# Patient Record
Sex: FEMALE | Race: ASIAN | NOT HISPANIC OR LATINO | URBAN - METROPOLITAN AREA
[De-identification: names, ages, dates, MRNs, and addresses within clinical notes are randomized per-mention and may not be internally consistent; named-entity substitution may affect disease eponyms.]

---

## 2017-06-14 ENCOUNTER — INPATIENT (INPATIENT)
Facility: HOSPITAL | Age: 57
LOS: 19 days | Discharge: HOME | DRG: 326 | End: 2017-07-04
Attending: SURGERY | Admitting: SURGERY
Payer: COMMERCIAL

## 2017-06-14 VITALS
OXYGEN SATURATION: 98 % | RESPIRATION RATE: 18 BRPM | HEART RATE: 80 BPM | SYSTOLIC BLOOD PRESSURE: 109 MMHG | DIASTOLIC BLOOD PRESSURE: 78 MMHG | TEMPERATURE: 98 F

## 2017-06-14 LAB
ALBUMIN SERPL ELPH-MCNC: 4.6 G/DL — SIGNIFICANT CHANGE UP (ref 3.3–5)
ALP SERPL-CCNC: 82 U/L — SIGNIFICANT CHANGE UP (ref 40–120)
ALT FLD-CCNC: 20 U/L RC — SIGNIFICANT CHANGE UP (ref 10–45)
ANION GAP SERPL CALC-SCNC: 22 MMOL/L — HIGH (ref 5–17)
APPEARANCE UR: CLEAR — SIGNIFICANT CHANGE UP
APTT BLD: 25.5 SEC — LOW (ref 27.5–37.4)
AST SERPL-CCNC: 24 U/L — SIGNIFICANT CHANGE UP (ref 10–40)
BASOPHILS # BLD AUTO: 0.1 K/UL — SIGNIFICANT CHANGE UP (ref 0–0.2)
BASOPHILS NFR BLD AUTO: 0.6 % — SIGNIFICANT CHANGE UP (ref 0–2)
BILIRUB SERPL-MCNC: 0.8 MG/DL — SIGNIFICANT CHANGE UP (ref 0.2–1.2)
BILIRUB UR-MCNC: NEGATIVE — SIGNIFICANT CHANGE UP
BLD GP AB SCN SERPL QL: NEGATIVE — SIGNIFICANT CHANGE UP
BUN SERPL-MCNC: 14 MG/DL — SIGNIFICANT CHANGE UP (ref 7–23)
CALCIUM SERPL-MCNC: 9.8 MG/DL — SIGNIFICANT CHANGE UP (ref 8.4–10.5)
CHLORIDE SERPL-SCNC: 84 MMOL/L — LOW (ref 96–108)
CO2 SERPL-SCNC: 33 MMOL/L — HIGH (ref 22–31)
COLOR SPEC: YELLOW — SIGNIFICANT CHANGE UP
CREAT SERPL-MCNC: 0.76 MG/DL — SIGNIFICANT CHANGE UP (ref 0.5–1.3)
DIFF PNL FLD: ABNORMAL
EOSINOPHIL # BLD AUTO: 0.1 K/UL — SIGNIFICANT CHANGE UP (ref 0–0.5)
EOSINOPHIL NFR BLD AUTO: 1 % — SIGNIFICANT CHANGE UP (ref 0–6)
EPI CELLS # UR: SIGNIFICANT CHANGE UP /HPF
GLUCOSE SERPL-MCNC: 91 MG/DL — SIGNIFICANT CHANGE UP (ref 70–99)
GLUCOSE UR QL: NEGATIVE — SIGNIFICANT CHANGE UP
HCT VFR BLD CALC: 42.3 % — SIGNIFICANT CHANGE UP (ref 34.5–45)
HGB BLD-MCNC: 14.2 G/DL — SIGNIFICANT CHANGE UP (ref 11.5–15.5)
INR BLD: 1.18 RATIO — HIGH (ref 0.88–1.16)
KETONES UR-MCNC: ABNORMAL
LEUKOCYTE ESTERASE UR-ACNC: ABNORMAL
LYMPHOCYTES # BLD AUTO: 2.2 K/UL — SIGNIFICANT CHANGE UP (ref 1–3.3)
LYMPHOCYTES # BLD AUTO: 24.1 % — SIGNIFICANT CHANGE UP (ref 13–44)
MCHC RBC-ENTMCNC: 30.5 PG — SIGNIFICANT CHANGE UP (ref 27–34)
MCHC RBC-ENTMCNC: 33.7 GM/DL — SIGNIFICANT CHANGE UP (ref 32–36)
MCV RBC AUTO: 90.6 FL — SIGNIFICANT CHANGE UP (ref 80–100)
MONOCYTES # BLD AUTO: 0.5 K/UL — SIGNIFICANT CHANGE UP (ref 0–0.9)
MONOCYTES NFR BLD AUTO: 6.2 % — SIGNIFICANT CHANGE UP (ref 2–14)
NEUTROPHILS # BLD AUTO: 6.1 K/UL — SIGNIFICANT CHANGE UP (ref 1.8–7.4)
NEUTROPHILS NFR BLD AUTO: 68 % — SIGNIFICANT CHANGE UP (ref 43–77)
NITRITE UR-MCNC: NEGATIVE — SIGNIFICANT CHANGE UP
PH UR: 6 — SIGNIFICANT CHANGE UP (ref 5–8)
PLATELET # BLD AUTO: 390 K/UL — SIGNIFICANT CHANGE UP (ref 150–400)
POTASSIUM SERPL-MCNC: 2.2 MMOL/L — CRITICAL LOW (ref 3.5–5.3)
POTASSIUM SERPL-SCNC: 2.2 MMOL/L — CRITICAL LOW (ref 3.5–5.3)
PROT SERPL-MCNC: 7.7 G/DL — SIGNIFICANT CHANGE UP (ref 6–8.3)
PROT UR-MCNC: 30 MG/DL
PROTHROM AB SERPL-ACNC: 12.9 SEC — HIGH (ref 9.8–12.7)
RBC # BLD: 4.67 M/UL — SIGNIFICANT CHANGE UP (ref 3.8–5.2)
RBC # FLD: 12.9 % — SIGNIFICANT CHANGE UP (ref 10.3–14.5)
RBC CASTS # UR COMP ASSIST: ABNORMAL /HPF (ref 0–2)
RH IG SCN BLD-IMP: POSITIVE — SIGNIFICANT CHANGE UP
SODIUM SERPL-SCNC: 139 MMOL/L — SIGNIFICANT CHANGE UP (ref 135–145)
SP GR SPEC: 1.02 — SIGNIFICANT CHANGE UP (ref 1.01–1.02)
UROBILINOGEN FLD QL: NEGATIVE — SIGNIFICANT CHANGE UP
WBC # BLD: 8.9 K/UL — SIGNIFICANT CHANGE UP (ref 3.8–10.5)
WBC # FLD AUTO: 8.9 K/UL — SIGNIFICANT CHANGE UP (ref 3.8–10.5)
WBC UR QL: SIGNIFICANT CHANGE UP /HPF (ref 0–5)

## 2017-06-14 PROCEDURE — 99285 EMERGENCY DEPT VISIT HI MDM: CPT | Mod: 25

## 2017-06-14 PROCEDURE — 71010: CPT | Mod: 26

## 2017-06-14 PROCEDURE — 43753 TX GASTRO INTUB W/ASP: CPT

## 2017-06-14 PROCEDURE — 74177 CT ABD & PELVIS W/CONTRAST: CPT | Mod: 26

## 2017-06-14 RX ORDER — CEFTRIAXONE 500 MG/1
1 INJECTION, POWDER, FOR SOLUTION INTRAMUSCULAR; INTRAVENOUS ONCE
Qty: 0 | Refills: 0 | Status: COMPLETED | OUTPATIENT
Start: 2017-06-14 | End: 2017-06-14

## 2017-06-14 RX ORDER — POTASSIUM CHLORIDE 20 MEQ
10 PACKET (EA) ORAL ONCE
Qty: 0 | Refills: 0 | Status: COMPLETED | OUTPATIENT
Start: 2017-06-14 | End: 2017-06-14

## 2017-06-14 RX ORDER — PANTOPRAZOLE SODIUM 20 MG/1
40 TABLET, DELAYED RELEASE ORAL ONCE
Qty: 0 | Refills: 0 | Status: COMPLETED | OUTPATIENT
Start: 2017-06-14 | End: 2017-06-14

## 2017-06-14 RX ORDER — SODIUM CHLORIDE 9 MG/ML
1000 INJECTION, SOLUTION INTRAVENOUS ONCE
Qty: 0 | Refills: 0 | Status: COMPLETED | OUTPATIENT
Start: 2017-06-14 | End: 2017-06-14

## 2017-06-14 RX ORDER — FAMOTIDINE 10 MG/ML
20 INJECTION INTRAVENOUS ONCE
Qty: 0 | Refills: 0 | Status: COMPLETED | OUTPATIENT
Start: 2017-06-14 | End: 2017-06-14

## 2017-06-14 RX ORDER — FAMOTIDINE 10 MG/ML
40 INJECTION INTRAVENOUS ONCE
Qty: 0 | Refills: 0 | Status: DISCONTINUED | OUTPATIENT
Start: 2017-06-14 | End: 2017-06-14

## 2017-06-14 RX ORDER — SODIUM CHLORIDE 9 MG/ML
1000 INJECTION, SOLUTION INTRAVENOUS
Qty: 0 | Refills: 0 | Status: DISCONTINUED | OUTPATIENT
Start: 2017-06-14 | End: 2017-06-16

## 2017-06-14 RX ORDER — ONDANSETRON 8 MG/1
4 TABLET, FILM COATED ORAL ONCE
Qty: 0 | Refills: 0 | Status: COMPLETED | OUTPATIENT
Start: 2017-06-14 | End: 2017-06-14

## 2017-06-14 RX ADMIN — PANTOPRAZOLE SODIUM 40 MILLIGRAM(S): 20 TABLET, DELAYED RELEASE ORAL at 20:55

## 2017-06-14 RX ADMIN — SODIUM CHLORIDE 1000 MILLILITER(S): 9 INJECTION, SOLUTION INTRAVENOUS at 20:55

## 2017-06-14 RX ADMIN — Medication 100 MILLIEQUIVALENT(S): at 22:53

## 2017-06-14 RX ADMIN — FAMOTIDINE 20 MILLIGRAM(S): 10 INJECTION INTRAVENOUS at 21:27

## 2017-06-14 RX ADMIN — SODIUM CHLORIDE 200 MILLILITER(S): 9 INJECTION, SOLUTION INTRAVENOUS at 22:53

## 2017-06-14 RX ADMIN — CEFTRIAXONE 100 GRAM(S): 500 INJECTION, POWDER, FOR SOLUTION INTRAMUSCULAR; INTRAVENOUS at 22:38

## 2017-06-14 RX ADMIN — ONDANSETRON 4 MILLIGRAM(S): 8 TABLET, FILM COATED ORAL at 20:55

## 2017-06-14 NOTE — ED PROVIDER NOTE - CARE PLAN
Principal Discharge DX:	Gastric mass Principal Discharge DX:	Gastric mass  Secondary Diagnosis:	Gastric outlet obstruction

## 2017-06-14 NOTE — ED ADULT NURSE NOTE - OBJECTIVE STATEMENT
57y female c/o abdominal mass. A&Ox3, neuro intact, VSS. Hx of recent endoscopy showing abdominal mass, instructed to come to ED. States abdominal swelling 3 weeks ago. Patient reports decreased PO intake for 1 month and increasing weakness. States nausea and vomiting. Denies abdominal pain. Denies chest pain, sob, fever/chills. Abdomen is distended yet soft and nontender.

## 2017-06-14 NOTE — ED PROVIDER NOTE - PROGRESS NOTE DETAILS
AR[employer]:  Pt has had CT abd/pelvis demonstrating distended stomach with mass at pylorus/distal stomach/proximal duodenum.  Surgery consulted with formal CT read pending.  Pt pending surg consult and formal recs with planned admission to surgery vs. medicine for onc work-up and relief of apparent outlet obstruction.  Stable at time of signout to incoming team.

## 2017-06-14 NOTE — ED ADULT NURSE NOTE - CHIEF COMPLAINT QUOTE
vomiting for a month s/p EGD today and with result of Gastric Mass and gastric obstruction. Weakness and loss 19 lbs in a month, no BM for 9 days.

## 2017-06-14 NOTE — ED PROVIDER NOTE - ATTENDING CONTRIBUTION TO CARE
Attending MD Connelly.  Agree with above.  Pt is a 57 yr old female presenting to ED after EGD 2 days ago and now recurrent EGD for '2nd opinion' today.  Pt found to have a large gastric mass that was insufficiently visualized given large size.  She has been vomiting and has now not had a BM x 9 days.  Pt is otherwise healthy.  Pt is not a smoker - no obvious oncologic risk factors.  Pt has progressed to limited/no PO in 1 mo. I agree with above.  Note/HPI authored by attendng.

## 2017-06-14 NOTE — ED PROVIDER NOTE - PHYSICAL EXAMINATION
PT is cachectic, pale, vague epigastric discomfort on palpation.  No palpable masses/rebound/distention noted.

## 2017-06-14 NOTE — ED PROVIDER NOTE - OBJECTIVE STATEMENT
56 yo female with No PMHx p/w nausea and vomiting.  The patient reports that she has been having worsening nausea and vomiting over the past months.  She endorses dysphagia to solids and liquids over that time.  No BM for 9 days.  Denies fevers/chills, CP, SOB.  Patient saw her GI doctor today (Dr. Newman) who did an EGD and saw a large antral mass so patient was sent to the ER.

## 2017-06-15 DIAGNOSIS — K31.1 ADULT HYPERTROPHIC PYLORIC STENOSIS: ICD-10-CM

## 2017-06-15 DIAGNOSIS — K31.9 DISEASE OF STOMACH AND DUODENUM, UNSPECIFIED: ICD-10-CM

## 2017-06-15 DIAGNOSIS — E87.6 HYPOKALEMIA: ICD-10-CM

## 2017-06-15 LAB
ANION GAP SERPL CALC-SCNC: 13 MMOL/L — SIGNIFICANT CHANGE UP (ref 5–17)
ANION GAP SERPL CALC-SCNC: 13 MMOL/L — SIGNIFICANT CHANGE UP (ref 5–17)
ANION GAP SERPL CALC-SCNC: 15 MMOL/L — SIGNIFICANT CHANGE UP (ref 5–17)
BUN SERPL-MCNC: 11 MG/DL — SIGNIFICANT CHANGE UP (ref 7–23)
BUN SERPL-MCNC: 9 MG/DL — SIGNIFICANT CHANGE UP (ref 7–23)
BUN SERPL-MCNC: 9 MG/DL — SIGNIFICANT CHANGE UP (ref 7–23)
CALCIUM SERPL-MCNC: 7.4 MG/DL — LOW (ref 8.4–10.5)
CALCIUM SERPL-MCNC: 8 MG/DL — LOW (ref 8.4–10.5)
CALCIUM SERPL-MCNC: 8 MG/DL — LOW (ref 8.4–10.5)
CHLORIDE SERPL-SCNC: 101 MMOL/L — SIGNIFICANT CHANGE UP (ref 96–108)
CHLORIDE SERPL-SCNC: 104 MMOL/L — SIGNIFICANT CHANGE UP (ref 96–108)
CHLORIDE SERPL-SCNC: 92 MMOL/L — LOW (ref 96–108)
CHOLEST SERPL-MCNC: 101 MG/DL — SIGNIFICANT CHANGE UP (ref 10–199)
CO2 SERPL-SCNC: 22 MMOL/L — SIGNIFICANT CHANGE UP (ref 22–31)
CO2 SERPL-SCNC: 24 MMOL/L — SIGNIFICANT CHANGE UP (ref 22–31)
CO2 SERPL-SCNC: 29 MMOL/L — SIGNIFICANT CHANGE UP (ref 22–31)
CREAT SERPL-MCNC: 0.46 MG/DL — LOW (ref 0.5–1.3)
CREAT SERPL-MCNC: 0.52 MG/DL — SIGNIFICANT CHANGE UP (ref 0.5–1.3)
CREAT SERPL-MCNC: 0.6 MG/DL — SIGNIFICANT CHANGE UP (ref 0.5–1.3)
GLUCOSE SERPL-MCNC: 69 MG/DL — LOW (ref 70–99)
GLUCOSE SERPL-MCNC: 86 MG/DL — SIGNIFICANT CHANGE UP (ref 70–99)
GLUCOSE SERPL-MCNC: 95 MG/DL — SIGNIFICANT CHANGE UP (ref 70–99)
MAGNESIUM SERPL-MCNC: 2 MG/DL — SIGNIFICANT CHANGE UP (ref 1.6–2.6)
POTASSIUM SERPL-MCNC: 2.7 MMOL/L — CRITICAL LOW (ref 3.5–5.3)
POTASSIUM SERPL-MCNC: 2.8 MMOL/L — CRITICAL LOW (ref 3.5–5.3)
POTASSIUM SERPL-MCNC: 3 MMOL/L — LOW (ref 3.5–5.3)
POTASSIUM SERPL-SCNC: 2.7 MMOL/L — CRITICAL LOW (ref 3.5–5.3)
POTASSIUM SERPL-SCNC: 2.8 MMOL/L — CRITICAL LOW (ref 3.5–5.3)
POTASSIUM SERPL-SCNC: 3 MMOL/L — LOW (ref 3.5–5.3)
SODIUM SERPL-SCNC: 136 MMOL/L — SIGNIFICANT CHANGE UP (ref 135–145)
SODIUM SERPL-SCNC: 138 MMOL/L — SIGNIFICANT CHANGE UP (ref 135–145)
SODIUM SERPL-SCNC: 139 MMOL/L — SIGNIFICANT CHANGE UP (ref 135–145)
TRIGL SERPL-MCNC: 58 MG/DL — SIGNIFICANT CHANGE UP (ref 10–149)

## 2017-06-15 PROCEDURE — 99222 1ST HOSP IP/OBS MODERATE 55: CPT

## 2017-06-15 PROCEDURE — 71010: CPT | Mod: 26

## 2017-06-15 RX ORDER — MAGNESIUM SULFATE 500 MG/ML
2 VIAL (ML) INJECTION ONCE
Qty: 0 | Refills: 0 | Status: COMPLETED | OUTPATIENT
Start: 2017-06-15 | End: 2017-06-15

## 2017-06-15 RX ORDER — ENOXAPARIN SODIUM 100 MG/ML
40 INJECTION SUBCUTANEOUS DAILY
Qty: 0 | Refills: 0 | Status: DISCONTINUED | OUTPATIENT
Start: 2017-06-15 | End: 2017-06-22

## 2017-06-15 RX ORDER — POTASSIUM CHLORIDE 20 MEQ
10 PACKET (EA) ORAL
Qty: 0 | Refills: 0 | Status: COMPLETED | OUTPATIENT
Start: 2017-06-15 | End: 2017-06-15

## 2017-06-15 RX ORDER — CALCIUM GLUCONATE 100 MG/ML
1 VIAL (ML) INTRAVENOUS ONCE
Qty: 0 | Refills: 0 | Status: COMPLETED | OUTPATIENT
Start: 2017-06-15 | End: 2017-06-15

## 2017-06-15 RX ORDER — POTASSIUM CHLORIDE 20 MEQ
10 PACKET (EA) ORAL ONCE
Qty: 0 | Refills: 0 | Status: COMPLETED | OUTPATIENT
Start: 2017-06-15 | End: 2017-06-15

## 2017-06-15 RX ADMIN — Medication 100 MILLIEQUIVALENT(S): at 14:50

## 2017-06-15 RX ADMIN — Medication 100 MILLIEQUIVALENT(S): at 16:49

## 2017-06-15 RX ADMIN — Medication 100 MILLIEQUIVALENT(S): at 02:45

## 2017-06-15 RX ADMIN — Medication 100 MILLIEQUIVALENT(S): at 09:29

## 2017-06-15 RX ADMIN — Medication 100 MILLIEQUIVALENT(S): at 00:26

## 2017-06-15 RX ADMIN — Medication 100 MILLIEQUIVALENT(S): at 03:50

## 2017-06-15 RX ADMIN — Medication 50 GRAM(S): at 19:51

## 2017-06-15 RX ADMIN — Medication 400 GRAM(S): at 23:25

## 2017-06-15 RX ADMIN — Medication 100 MILLIEQUIVALENT(S): at 19:51

## 2017-06-15 RX ADMIN — ENOXAPARIN SODIUM 40 MILLIGRAM(S): 100 INJECTION SUBCUTANEOUS at 14:50

## 2017-06-15 RX ADMIN — Medication 100 MILLIEQUIVALENT(S): at 08:01

## 2017-06-15 RX ADMIN — Medication 100 MILLIEQUIVALENT(S): at 11:00

## 2017-06-15 RX ADMIN — SODIUM CHLORIDE 125 MILLILITER(S): 9 INJECTION, SOLUTION INTRAVENOUS at 14:50

## 2017-06-15 RX ADMIN — Medication 100 MILLIEQUIVALENT(S): at 01:28

## 2017-06-15 NOTE — ED PROCEDURE NOTE - ATTENDING CONTRIBUTION TO CARE
ATTENDING MD:  I, Gerry Guzmán, was available in the Emergency Department throughout the entire procedure and I was present during the key portions. I agree with the procedure as documented.

## 2017-06-15 NOTE — CONSULT NOTE ADULT - ASSESSMENT
pt with above history  p/w nausea/ vominting found to have gastric mass    -nPO with IVF & NGT  - Electrolyte repletion  - surg onc f/u     Hypokalemia - likely sec to GI loss replete and f/u

## 2017-06-15 NOTE — H&P ADULT - NSHPPHYSICALEXAM_GEN_ALL_CORE
General: alert and oriented, NAD  Resp: airway patent, respirations unlabored  CVS: regular rate and rhythm  Abdomen: soft, significantly distended, focal point of mild LLQ TTP. No rebound/guarding. NGT in place, non-bilious drainage  Extremities: no edema  Skin: warm, dry, appropriate color

## 2017-06-15 NOTE — CONSULT NOTE ADULT - SUBJECTIVE AND OBJECTIVE BOX
ADVANCED GI INITIAL CONSULT:    HPI: 56 y/o F with no PMH p/w 6 weeks of abdominal pain, N/V and inability to tolerate oral intake with a 20 lb weight loss. She had an EGD on 6/12/17 that was incomplete due to food in the stomach, and subsequent EGD on 6/14/17 by Dr Newman that showed an obstructing gastric mass during outpatient EGD. She was sent to the ED for further management.    PAST MEDICAL & SURGICAL HISTORY:  No pertinent past medical history  No significant past surgical history    Review of Systems: Negative except as per HPI.    MEDICATIONS  (STANDING):  lactated ringers 1000milliLiter(s) IV Continuous <Continuous>  potassium chloride  10 mEq/100 mL IVPB 10milliEquivalent(s) IV Intermittent every 1 hour  enoxaparin Injectable 40milliGRAM(s) SubCutaneous daily    ALLERGIES: NKDA    SOCIAL HISTORY:  - Alcohol: denies  - Recreational drug use: denies    FAMILY HISTORY:    Vital Signs Last 24 Hrs  T(C): 36.4, Max: 36.7 (06-14 @ 19:03)  T(F): 97.6, Max: 98.1 (06-14 @ 19:03)  HR: 65 (64 - 80)  BP: 105/65 (105/65 - 110/62)  BP(mean): --  RR: 17 (17 - 18)  SpO2: 98% (97% - 99%)    PHYSICAL EXAM:  Constitutional: no acute distress, NGT in place  Eyes: no icterus  Neck: no masses, no LAD  Respiratory: normal inspiratory effort; no wheezing or crackles  Cardiovascular: RRR, normal S1/S2, no murmurs/rubs/gallops  Gastrointestinal: soft, nondistended, nontender, +BS  Extremities: no LE edema  Neurological: AAOx3, no asterixis  Skin: no rashes, bruises, petechiae    LABS:                        14.2   8.9   )-----------( 390      ( 14 Jun 2017 20:52 )             42.3     06-15    138  |  101  |  9   ----------------------------<  86  2.8<LL>   |  24  |  0.52    Ca    7.4<L>      15 Maynor 2017 05:42  Mg     2.0     06-15    TPro  7.7  /  Alb  4.6  /  TBili  0.8  /  DBili  x   /  AST  24  /  ALT  20  /  AlkPhos  82  06-14    PT/INR - ( 14 Jun 2017 20:52 )   PT: 12.9 sec;   INR: 1.18 ratio      PTT - ( 14 Jun 2017 20:52 )  PTT:25.5 sec  LIVER FUNCTIONS - ( 14 Jun 2017 20:52 )  Alb: 4.6 g/dL / Pro: 7.7 g/dL / ALK PHOS: 82 U/L / ALT: 20 U/L RC / AST: 24 U/L / GGT:

## 2017-06-15 NOTE — CONSULT NOTE ADULT - SUBJECTIVE AND OBJECTIVE BOX
Patient is a 57yFemale admitted with a gastric mass and gastric outlet obstruction       labs done on 06-15    138  |  101  |  9   ----------------------------<  86  2.8<LL>   |  24  |  0.52    Ca    7.4<L>      15 Maynor 2017 05:42  Mg     2.0     06-15    TPro  7.7  /  Alb  4.6  /  TBili  0.8  /  DBili  x   /  AST  24  /  ALT  20  /  AlkPhos  82  06-14  LIVER FUNCTIONS - ( 14 Jun 2017 20:52 )  Alb: 4.6 g/dL / Pro: 7.7 g/dL / ALK PHOS: 82 U/L / ALT: 20 U/L RC / AST: 24 U/L / GGT: x         CT:  Gastric thickening suspicious for CA no evidence of other bowel obstruction

## 2017-06-15 NOTE — ED ADULT NURSE REASSESSMENT NOTE - NS ED NURSE REASSESS COMMENT FT1
report received from MITZI Desir. Pt. awake, alert and oriented, NG tube drianing scant amount of brown drainage to wall suction. Breathing unlabored on RA. skin warm pink and dry. family at bedside. comfort and safety measures in place. admitted to surgery, awaiting bed.

## 2017-06-15 NOTE — ED PROCEDURE NOTE - CPROC ED GASTRIC INTUB DETAIL1
Gastric tube connected to low continuous suction./The nasogastric tube (see size above) was inserted via the anatomic location./Placement was confirmed by aspiration of gastric secretions.

## 2017-06-15 NOTE — CONSULT NOTE ADULT - ASSESSMENT
Gastric outlet obstruction secondary to gastric mass, biopsy pending  Hypokalemia most likely secondary to vomiting

## 2017-06-15 NOTE — H&P ADULT - HISTORY OF PRESENT ILLNESS
56 y/o female with h/o 6 weeks of abdominal pain, nausea, emesis, inability to tolerate liquids/solids & 20 pound weight loss in past 1 month now presents to the ED after her 2nd EGD in 2 days where her second opinion gastroenterologist (Dr. Newman) found an obstructing gastric mass during outpatient EGD. Patient denies fevers/chills, no night sweats.

## 2017-06-15 NOTE — CONSULT NOTE ADULT - SUBJECTIVE AND OBJECTIVE BOX
Chief Complaint/Reason for Admission: persistent nausea/emesis 	  History of Present Illness: 	  58 y/o female with h/o 6 weeks of abdominal pain, nausea, emesis, inability to tolerate liquids/solids & 20 pound weight loss in past 1 month now presents to the ED after her 2nd EGD in 2 days where her second opinion gastroenterologist (Dr. Newman) found an obstructing gastric mass during outpatient EGD. Patient denies fevers/chills, no night sweats.       Allergies and Intolerances:        Allergies:  	No Known Allergies:     Home Medications:   zofran    . .    Patient History:   Past Medical History:  No pertinent past medical history.    Past Surgical History:  No significant past surgical history.        Physical Exam:  Physical Exam: General: alert and oriented, NAD Resp: airway patent, respirations unlabored CVS: regular rate and rhythm Abdomen: soft, significantly distended, focal point of mild LLQ TTP. No rebound/guarding. NGT in place, non-bilious drainage Extremities: no edema Skin: warm, dry, appropriate color	      Labs and Results:  Labs, Radiology, Cardiology, and Other Results: 14.2  8.9   )-----------( 390      ( 2017 20:52 )            42.3    06-15  138  |  101  |  9  ----------------------------<  86 2.8<LL>   |  24  |  0.52  Ca    7.4<L>      15 Maynor 2017 05:42 Mg     2.0     06-15  TPro  7.7  /  Alb  4.6  /  TBili  0.8  /  DBili  x   /  AST  24  /  ALT  20  /  AlkPhos  82  06-14         Urinalysis Basic - ( 2017 21:07 )  Color: Yellow / Appearance: Clear / S.022 / pH: x Gluc: x / Ketone: Large  / Bili: Negative / Urobili: Negative  Blood: x / Protein: 30 mg/dL / Nitrite: Negative  Leuk Esterase: Moderate / RBC: 5-10 /HPF / WBC 11-25 /HPF  Sq Epi: x / Non Sq Epi: OCC /HPF / Bacteria: x    PT/INR - ( 2017 20:52 )   PT: 12.9 sec;   INR: 1.18 ratio      PTT - ( 2017 20:52 )  PTT:25.5 sec

## 2017-06-15 NOTE — ED ADULT NURSE REASSESSMENT NOTE - NS ED NURSE REASSESS COMMENT FT1
Patient awaiting Surgery Consult. Samuel WONG placed NG tube, draining dark brown fluid. Potassium currently being administered IV. Family at bedside. Safety provided. Will continue to monitor.

## 2017-06-15 NOTE — H&P ADULT - NSHPLABSRESULTS_GEN_ALL_CORE
14.2   8.9   )-----------( 390      ( 2017 20:52 )             42.3       06-15    138  |  101  |  9   ----------------------------<  86  2.8<LL>   |  24  |  0.52    Ca    7.4<L>      15 Maynor 2017 05:42  Mg     2.0     06-15    TPro  7.7  /  Alb  4.6  /  TBili  0.8  /  DBili  x   /  AST  24  /  ALT  20  /  AlkPhos  82  06-14              Urinalysis Basic - ( 2017 21:07 )    Color: Yellow / Appearance: Clear / S.022 / pH: x  Gluc: x / Ketone: Large  / Bili: Negative / Urobili: Negative   Blood: x / Protein: 30 mg/dL / Nitrite: Negative   Leuk Esterase: Moderate / RBC: 5-10 /HPF / WBC 11-25 /HPF   Sq Epi: x / Non Sq Epi: OCC /HPF / Bacteria: x        PT/INR - ( 2017 20:52 )   PT: 12.9 sec;   INR: 1.18 ratio         PTT - ( 2017 20:52 )  PTT:25.5 sec

## 2017-06-15 NOTE — CONSULT NOTE ADULT - ASSESSMENT
Impression:  1) Obstructing Gastric Mass    Plan:  - npo  - NGT to low intermittent suction  - Plan EGD + EUS today for additional biopsies and evaluation

## 2017-06-15 NOTE — H&P ADULT - ASSESSMENT
56 y/o female with GOO 2/2 obstructing gastric mass    - Admit to surgical oncology  - NPO with IVF & NGT  - Electrolyte repletion  - Will f/u with patient's gastroenterologist regarding exact EGD findings & f/u pathology results  - Will D/w Dr. Evans regarding TPN in anticipation of patient's prolonged inability to eat & concern for malnutrition  - D/w Dr. Trent

## 2017-06-16 ENCOUNTER — RESULT REVIEW (OUTPATIENT)
Age: 57
End: 2017-06-16

## 2017-06-16 LAB
ALBUMIN SERPL ELPH-MCNC: 2.8 G/DL — LOW (ref 3.3–5)
ALP SERPL-CCNC: 55 U/L — SIGNIFICANT CHANGE UP (ref 40–120)
ALT FLD-CCNC: 16 U/L RC — SIGNIFICANT CHANGE UP (ref 10–45)
ANION GAP SERPL CALC-SCNC: 15 MMOL/L — SIGNIFICANT CHANGE UP (ref 5–17)
ANION GAP SERPL CALC-SCNC: 16 MMOL/L — SIGNIFICANT CHANGE UP (ref 5–17)
ANION GAP SERPL CALC-SCNC: 18 MMOL/L — HIGH (ref 5–17)
ANION GAP SERPL CALC-SCNC: 22 MMOL/L — HIGH (ref 5–17)
APTT BLD: 26.3 SEC — LOW (ref 27.5–37.4)
APTT BLD: 26.7 SEC — LOW (ref 27.5–37.4)
AST SERPL-CCNC: 17 U/L — SIGNIFICANT CHANGE UP (ref 10–40)
BILIRUB SERPL-MCNC: 0.2 MG/DL — SIGNIFICANT CHANGE UP (ref 0.2–1.2)
BUN SERPL-MCNC: 11 MG/DL — SIGNIFICANT CHANGE UP (ref 7–23)
BUN SERPL-MCNC: 7 MG/DL — SIGNIFICANT CHANGE UP (ref 7–23)
BUN SERPL-MCNC: 8 MG/DL — SIGNIFICANT CHANGE UP (ref 7–23)
BUN SERPL-MCNC: 9 MG/DL — SIGNIFICANT CHANGE UP (ref 7–23)
CALCIUM SERPL-MCNC: 8 MG/DL — LOW (ref 8.4–10.5)
CALCIUM SERPL-MCNC: 8.5 MG/DL — SIGNIFICANT CHANGE UP (ref 8.4–10.5)
CHLORIDE SERPL-SCNC: 104 MMOL/L — SIGNIFICANT CHANGE UP (ref 96–108)
CHLORIDE SERPL-SCNC: 107 MMOL/L — SIGNIFICANT CHANGE UP (ref 96–108)
CO2 SERPL-SCNC: 14 MMOL/L — LOW (ref 22–31)
CO2 SERPL-SCNC: 15 MMOL/L — LOW (ref 22–31)
CO2 SERPL-SCNC: 17 MMOL/L — LOW (ref 22–31)
CO2 SERPL-SCNC: 20 MMOL/L — LOW (ref 22–31)
CREAT SERPL-MCNC: 0.43 MG/DL — LOW (ref 0.5–1.3)
CREAT SERPL-MCNC: 0.44 MG/DL — LOW (ref 0.5–1.3)
CREAT SERPL-MCNC: 0.47 MG/DL — LOW (ref 0.5–1.3)
CREAT SERPL-MCNC: 0.5 MG/DL — SIGNIFICANT CHANGE UP (ref 0.5–1.3)
CULTURE RESULTS: NO GROWTH — SIGNIFICANT CHANGE UP
GAS PNL BLDA: SIGNIFICANT CHANGE UP
GLUCOSE SERPL-MCNC: 108 MG/DL — HIGH (ref 70–99)
GLUCOSE SERPL-MCNC: 61 MG/DL — LOW (ref 70–99)
GLUCOSE SERPL-MCNC: 67 MG/DL — LOW (ref 70–99)
GLUCOSE SERPL-MCNC: 96 MG/DL — SIGNIFICANT CHANGE UP (ref 70–99)
HCT VFR BLD CALC: 34.4 % — LOW (ref 34.5–45)
HCT VFR BLD CALC: 38.1 % — SIGNIFICANT CHANGE UP (ref 34.5–45)
HGB BLD-MCNC: 11.5 G/DL — SIGNIFICANT CHANGE UP (ref 11.5–15.5)
HGB BLD-MCNC: 12.4 G/DL — SIGNIFICANT CHANGE UP (ref 11.5–15.5)
INR BLD: 1.14 RATIO — SIGNIFICANT CHANGE UP (ref 0.88–1.16)
INR BLD: 1.15 RATIO — SIGNIFICANT CHANGE UP (ref 0.88–1.16)
MAGNESIUM SERPL-MCNC: 1.9 MG/DL — SIGNIFICANT CHANGE UP (ref 1.6–2.6)
MAGNESIUM SERPL-MCNC: 2.1 MG/DL — SIGNIFICANT CHANGE UP (ref 1.6–2.6)
MAGNESIUM SERPL-MCNC: 2.2 MG/DL — SIGNIFICANT CHANGE UP (ref 1.6–2.6)
MCHC RBC-ENTMCNC: 30.1 PG — SIGNIFICANT CHANGE UP (ref 27–34)
MCHC RBC-ENTMCNC: 30.4 PG — SIGNIFICANT CHANGE UP (ref 27–34)
MCHC RBC-ENTMCNC: 32.5 GM/DL — SIGNIFICANT CHANGE UP (ref 32–36)
MCHC RBC-ENTMCNC: 33.4 GM/DL — SIGNIFICANT CHANGE UP (ref 32–36)
MCV RBC AUTO: 91.2 FL — SIGNIFICANT CHANGE UP (ref 80–100)
MCV RBC AUTO: 92.4 FL — SIGNIFICANT CHANGE UP (ref 80–100)
PHOSPHATE SERPL-MCNC: 2.6 MG/DL — SIGNIFICANT CHANGE UP (ref 2.5–4.5)
PHOSPHATE SERPL-MCNC: 2.6 MG/DL — SIGNIFICANT CHANGE UP (ref 2.5–4.5)
PHOSPHATE SERPL-MCNC: 3.9 MG/DL — SIGNIFICANT CHANGE UP (ref 2.5–4.5)
PLATELET # BLD AUTO: 301 K/UL — SIGNIFICANT CHANGE UP (ref 150–400)
PLATELET # BLD AUTO: 369 K/UL — SIGNIFICANT CHANGE UP (ref 150–400)
POTASSIUM SERPL-MCNC: 3.3 MMOL/L — LOW (ref 3.5–5.3)
POTASSIUM SERPL-MCNC: 3.5 MMOL/L — SIGNIFICANT CHANGE UP (ref 3.5–5.3)
POTASSIUM SERPL-MCNC: 3.7 MMOL/L — SIGNIFICANT CHANGE UP (ref 3.5–5.3)
POTASSIUM SERPL-MCNC: 4.8 MMOL/L — SIGNIFICANT CHANGE UP (ref 3.5–5.3)
POTASSIUM SERPL-SCNC: 3.3 MMOL/L — LOW (ref 3.5–5.3)
POTASSIUM SERPL-SCNC: 3.5 MMOL/L — SIGNIFICANT CHANGE UP (ref 3.5–5.3)
POTASSIUM SERPL-SCNC: 3.7 MMOL/L — SIGNIFICANT CHANGE UP (ref 3.5–5.3)
POTASSIUM SERPL-SCNC: 4.8 MMOL/L — SIGNIFICANT CHANGE UP (ref 3.5–5.3)
PROT SERPL-MCNC: 5 G/DL — LOW (ref 6–8.3)
PROTHROM AB SERPL-ACNC: 12.5 SEC — SIGNIFICANT CHANGE UP (ref 9.8–12.7)
PROTHROM AB SERPL-ACNC: 12.5 SEC — SIGNIFICANT CHANGE UP (ref 9.8–12.7)
RBC # BLD: 3.77 M/UL — LOW (ref 3.8–5.2)
RBC # BLD: 4.12 M/UL — SIGNIFICANT CHANGE UP (ref 3.8–5.2)
RBC # FLD: 12.8 % — SIGNIFICANT CHANGE UP (ref 10.3–14.5)
RBC # FLD: 12.8 % — SIGNIFICANT CHANGE UP (ref 10.3–14.5)
SODIUM SERPL-SCNC: 138 MMOL/L — SIGNIFICANT CHANGE UP (ref 135–145)
SODIUM SERPL-SCNC: 139 MMOL/L — SIGNIFICANT CHANGE UP (ref 135–145)
SODIUM SERPL-SCNC: 139 MMOL/L — SIGNIFICANT CHANGE UP (ref 135–145)
SODIUM SERPL-SCNC: 140 MMOL/L — SIGNIFICANT CHANGE UP (ref 135–145)
SPECIMEN SOURCE: SIGNIFICANT CHANGE UP
WBC # BLD: 11.8 K/UL — HIGH (ref 3.8–10.5)
WBC # BLD: 6 K/UL — SIGNIFICANT CHANGE UP (ref 3.8–10.5)
WBC # FLD AUTO: 11.8 K/UL — HIGH (ref 3.8–10.5)
WBC # FLD AUTO: 6 K/UL — SIGNIFICANT CHANGE UP (ref 3.8–10.5)

## 2017-06-16 PROCEDURE — 88312 SPECIAL STAINS GROUP 1: CPT | Mod: 26

## 2017-06-16 PROCEDURE — 93010 ELECTROCARDIOGRAM REPORT: CPT

## 2017-06-16 PROCEDURE — 43239 EGD BIOPSY SINGLE/MULTIPLE: CPT | Mod: 59,GC

## 2017-06-16 PROCEDURE — 71010: CPT | Mod: 26,77

## 2017-06-16 PROCEDURE — 43237 ENDOSCOPIC US EXAM ESOPH: CPT | Mod: GC

## 2017-06-16 PROCEDURE — 88305 TISSUE EXAM BY PATHOLOGIST: CPT | Mod: 26

## 2017-06-16 PROCEDURE — 71010: CPT | Mod: 26

## 2017-06-16 PROCEDURE — 99232 SBSQ HOSP IP/OBS MODERATE 35: CPT

## 2017-06-16 RX ORDER — SODIUM CHLORIDE 9 MG/ML
500 INJECTION, SOLUTION INTRAVENOUS ONCE
Qty: 0 | Refills: 0 | Status: COMPLETED | OUTPATIENT
Start: 2017-06-16 | End: 2017-06-16

## 2017-06-16 RX ORDER — ONDANSETRON 8 MG/1
4 TABLET, FILM COATED ORAL ONCE
Qty: 0 | Refills: 0 | Status: COMPLETED | OUTPATIENT
Start: 2017-06-16 | End: 2017-06-16

## 2017-06-16 RX ORDER — POTASSIUM PHOSPHATE, MONOBASIC POTASSIUM PHOSPHATE, DIBASIC 236; 224 MG/ML; MG/ML
15 INJECTION, SOLUTION INTRAVENOUS ONCE
Qty: 0 | Refills: 0 | Status: COMPLETED | OUTPATIENT
Start: 2017-06-16 | End: 2017-06-16

## 2017-06-16 RX ORDER — SODIUM CHLORIDE 9 MG/ML
1000 INJECTION, SOLUTION INTRAVENOUS ONCE
Qty: 0 | Refills: 0 | Status: COMPLETED | OUTPATIENT
Start: 2017-06-16 | End: 2017-06-16

## 2017-06-16 RX ORDER — SODIUM CHLORIDE 9 MG/ML
1000 INJECTION, SOLUTION INTRAVENOUS
Qty: 0 | Refills: 0 | Status: DISCONTINUED | OUTPATIENT
Start: 2017-06-16 | End: 2017-06-17

## 2017-06-16 RX ORDER — PANTOPRAZOLE SODIUM 20 MG/1
40 TABLET, DELAYED RELEASE ORAL DAILY
Qty: 0 | Refills: 0 | Status: DISCONTINUED | OUTPATIENT
Start: 2017-06-16 | End: 2017-06-23

## 2017-06-16 RX ORDER — POTASSIUM CHLORIDE 20 MEQ
10 PACKET (EA) ORAL
Qty: 0 | Refills: 0 | Status: DISCONTINUED | OUTPATIENT
Start: 2017-06-16 | End: 2017-06-16

## 2017-06-16 RX ORDER — POTASSIUM CHLORIDE 20 MEQ
10 PACKET (EA) ORAL
Qty: 0 | Refills: 0 | Status: COMPLETED | OUTPATIENT
Start: 2017-06-16 | End: 2017-06-16

## 2017-06-16 RX ORDER — DEXMEDETOMIDINE HYDROCHLORIDE IN 0.9% SODIUM CHLORIDE 4 UG/ML
0.2 INJECTION INTRAVENOUS
Qty: 200 | Refills: 0 | Status: DISCONTINUED | OUTPATIENT
Start: 2017-06-16 | End: 2017-06-16

## 2017-06-16 RX ORDER — PROPOFOL 10 MG/ML
10 INJECTION, EMULSION INTRAVENOUS
Qty: 500 | Refills: 0 | Status: DISCONTINUED | OUTPATIENT
Start: 2017-06-16 | End: 2017-06-17

## 2017-06-16 RX ADMIN — Medication 100 MILLIEQUIVALENT(S): at 15:30

## 2017-06-16 RX ADMIN — POTASSIUM PHOSPHATE, MONOBASIC POTASSIUM PHOSPHATE, DIBASIC 62.5 MILLIMOLE(S): 236; 224 INJECTION, SOLUTION INTRAVENOUS at 15:30

## 2017-06-16 RX ADMIN — SODIUM CHLORIDE 125 MILLILITER(S): 9 INJECTION, SOLUTION INTRAVENOUS at 05:49

## 2017-06-16 RX ADMIN — ONDANSETRON 4 MILLIGRAM(S): 8 TABLET, FILM COATED ORAL at 06:30

## 2017-06-16 RX ADMIN — SODIUM CHLORIDE 125 MILLILITER(S): 9 INJECTION, SOLUTION INTRAVENOUS at 17:08

## 2017-06-16 RX ADMIN — SODIUM CHLORIDE 4000 MILLILITER(S): 9 INJECTION, SOLUTION INTRAVENOUS at 16:04

## 2017-06-16 RX ADMIN — PROPOFOL 3.85 MICROGRAM(S)/KG/MIN: 10 INJECTION, EMULSION INTRAVENOUS at 20:18

## 2017-06-16 RX ADMIN — SODIUM CHLORIDE 4000 MILLILITER(S): 9 INJECTION, SOLUTION INTRAVENOUS at 19:10

## 2017-06-16 RX ADMIN — SODIUM CHLORIDE 1500 MILLILITER(S): 9 INJECTION, SOLUTION INTRAVENOUS at 21:41

## 2017-06-16 RX ADMIN — Medication 100 MILLIEQUIVALENT(S): at 17:10

## 2017-06-16 RX ADMIN — DEXMEDETOMIDINE HYDROCHLORIDE IN 0.9% SODIUM CHLORIDE 3.21 MICROGRAM(S)/KG/HR: 4 INJECTION INTRAVENOUS at 15:30

## 2017-06-16 RX ADMIN — Medication 100 MILLIEQUIVALENT(S): at 18:30

## 2017-06-16 RX ADMIN — PANTOPRAZOLE SODIUM 40 MILLIGRAM(S): 20 TABLET, DELAYED RELEASE ORAL at 01:42

## 2017-06-16 RX ADMIN — ENOXAPARIN SODIUM 40 MILLIGRAM(S): 100 INJECTION SUBCUTANEOUS at 15:30

## 2017-06-16 RX ADMIN — Medication 100 MILLIEQUIVALENT(S): at 03:03

## 2017-06-16 RX ADMIN — Medication 100 MILLIEQUIVALENT(S): at 05:50

## 2017-06-16 NOTE — PROGRESS NOTE ADULT - SUBJECTIVE AND OBJECTIVE BOX
Patient underwent EGD/EUS this morning, and was intubated for airway protection due to gastric outlet obstruction prior to the procedure. After the endoscope was withdrawn (while the patient was still intubated), she was noted to have oxygen desaturations to the 80s with decreased breath sounds in the left side. Urgent CXR revealed left lung opacification (but no pneumothorax), with the ETT in the right mainstem bronchus. The ETT was repositioned and positioned above the gloria with improvement in left lung aeration. She remained intubated and was transferred to the SICU for continued care and management. Discussed with Dr Trent, SICU team and the patient's  & son. Continue IV antibiotics and care per SICU. Patient underwent EGD/EUS this morning, and was intubated for airway protection due to gastric outlet obstruction prior to the procedure. After the endoscope was withdrawn (while the patient was still intubated), she was noted to have oxygen desaturations to the 80s with decreased breath sounds in the left side. Urgent CXR revealed left lung opacification consistent with atelectasis (but no pneumothorax), with the ETT in the right mainstem bronchus. The ETT was repositioned and positioned above the gloria with improvement in left lung aeration. She remained intubated and was transferred to the SICU for continued care and management. Discussed with Dr Trent, SICU team and the patient's  & son. Continue IV antibiotics and care per SICU. Patient underwent EGD/EUS this morning, and was intubated for airway protection due to gastric outlet obstruction prior to the procedure. After the endoscope was withdrawn (while the patient was still intubated), she was noted to have oxygen desaturations to the 80s with decreased breath sounds in the left side. Urgent CXR revealed left lung opacification consistent with atelectasis (but no pneumothorax), with the ETT in the right mainstem bronchus. The ETT was repositioned and positioned above the gloria with improvement in left lung aeration. She remained intubated and was transferred to the SICU for continued care and management. Discussed with Dr Trent, SICU team and the patient's  & son. Consider antibiotics for possible aspiration.

## 2017-06-16 NOTE — PROGRESS NOTE ADULT - SUBJECTIVE AND OBJECTIVE BOX
[facility] GENERAL SURGERY DAILY PROGRESS NOTE:       Subjective:Pt resting comfortably in bed. Nausea controlled. C/o yang-oral numbness and tingling o/n. Administered repletions for K, Ca, Mg, Ph. NGT in place      Objective:  Gen: NAD, AOx3  Abd: soft, NT, ND        MEDICATIONS  (STANDING):  lactated ringers 1000milliLiter(s) IV Continuous <Continuous>  enoxaparin Injectable 40milliGRAM(s) SubCutaneous daily  pantoprazole  Injectable 40milliGRAM(s) IV Push daily  potassium chloride  10 mEq/100 mL IVPB 10milliEquivalent(s) IV Intermittent every 1 hour  ondansetron Injectable 4milliGRAM(s) IV Push once    MEDICATIONS  (PRN):      Vital Signs Last 24 Hrs  T(C): 36.9, Max: 36.9 ( @ 05:13)  T(F): 98.4, Max: 98.4 ( @ 05:13)  HR: 64 (63 - 85)  BP: 111/74 (95/54 - 111/74)  BP(mean): --  RR: 18 (17 - 18)  SpO2: 98% (95% - 98%)    I&O's Detail    I & Os for current day (as of 2017 06:21)  =============================================  IN:    lactated ringers: 1500 ml    Total IN: 1500 ml  ---------------------------------------------  OUT:    Voided: 900 ml    Nasoenteral Tube: 625 ml    Total OUT: 1525 ml  ---------------------------------------------  Total NET: -25 ml      Daily Height in cm: 160.02 (15 Maynor 2017 16:44)    Daily     LABS:                        14.2   8.9   )-----------( 390      ( 2017 20:52 )             42.3     06-16    139  |  104  |  11  ----------------------------<  67<L>  3.5   |  20<L>  |  0.47<L>    Ca    8.5      2017 01:33  Mg     2.0     06-15    TPro  7.7  /  Alb  4.6  /  TBili  0.8  /  DBili  x   /  AST  24  /  ALT  20  /  AlkPhos  82  06-14    PT/INR - ( 2017 20:52 )   PT: 12.9 sec;   INR: 1.18 ratio         PTT - ( 2017 20:52 )  PTT:25.5 sec  Urinalysis Basic - ( 2017 21:07 )    Color: Yellow / Appearance: Clear / S.022 / pH: x  Gluc: x / Ketone: Large  / Bili: Negative / Urobili: Negative   Blood: x / Protein: 30 mg/dL / Nitrite: Negative   Leuk Esterase: Moderate / RBC: 5-10 /HPF / WBC 11-25 /HPF   Sq Epi: x / Non Sq Epi: OCC /HPF / Bacteria: x        RADIOLOGY & ADDITIONAL STUDIES:

## 2017-06-16 NOTE — PROGRESS NOTE ADULT - ASSESSMENT
57F GOO 2/2 obstructing gastric mass  - nausea control  - NPO/NGT  - f/u med/onc  - f/u GI for EGD/EUS today

## 2017-06-16 NOTE — CONSULT NOTE ADULT - ASSESSMENT
RIGOBERTO CHRISTENSEN is a 57y Female with no significant PMH/PSH admitted for gastric outlet obstruction secondary to mass at the gastric antrum, pt remained intubated post-op due to R mainstem intubation and possible mucous plug. Pt with worsening anion gap and metabolic acidosis, hypovolemic, responsive to fluid bolus.    NEURO: post-procedure sedation/pain control  - Propofol gtt  -IV Tylenol prn     RESPIRATORY: Intubated  - f/u ventilator changes with serial ABGs  - repeat CXR/ABG in am     CARDIOVASCULAR:  -Hemodynamic monitoring   -Trend lactate    GI: gastric mass  -NPO  -NGT to LCWS  -Protonix   -f/u bx results, GI following     RENAL: metabolic acidosis with anion gap  -LR @ 125mL/hr  -BMP q 6hrs  -Replete lytes as needed   -Monitor UOP   HEME: VTE prophylaxis  - Lovenox  -SCDs     ID:   - Trend WBC    ENDO:   -No issues

## 2017-06-16 NOTE — PROGRESS NOTE ADULT - ASSESSMENT
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  Post-procedure CXR showed L lung opacification, got intubated for airway, transferred to SICU   - vent support, propofol for sedation, iv fludis, further care as per sicu team  - f/u biopsy  - had extensive lengthy discussion with pts family about pts clinical status and management plan

## 2017-06-16 NOTE — PROGRESS NOTE ADULT - SUBJECTIVE AND OBJECTIVE BOX
PRE ENDOSCOPY EVALUATION    Referring Physician: Dr Koffi Trent    Procedure: EGD/EUS    Moderate sedation [ ]      Sedation by Anesthesia [X]    Indication for Procedure: Gastric Mass    Pertinent History: 58 y/o F with 4 weeks of abdominal pain, nausea and vomiting found to have large gastric mass on recent EGD as outpatient.    PAST MEDICAL & SURGICAL HISTORY:  No pertinent past medical history  No significant past surgical history    PMH of Gastroparesis [ ]  Gastric Surgery [ ]  Gastric Outlet Obstruction [x]    Allergies: No Known Allergies    Latex allergy [ ] yes [x] no    Medications:  lactated ringers 1000milliLiter(s) IV Continuous <Continuous>  enoxaparin Injectable 40milliGRAM(s) SubCutaneous daily  pantoprazole  Injectable 40milliGRAM(s) IV Push daily  potassium chloride  10 mEq/100 mL IVPB 10milliEquivalent(s) IV Intermittent every 1 hour    Smoking: [ ] yes  [x] no    AICD/PPM: [ ] yes   [x] no    Pertinent lab data:                        11.5   6.0   )-----------( 301      ( 16 Jun 2017 06:31 )             34.4     139  |  104  |  9   ----------------------------<  61<L>  3.7   |  17<L>  |  0.44<L>    Ca    8.5      16 Jun 2017 06:31  Phos  2.6     06-16  Mg     2.2     06-16    TPro  7.7  /  Alb  4.6  /  TBili  0.8  /  DBili  x   /  AST  24  /  ALT  20  /  AlkPhos  82  06-14    PT/INR - ( 16 Jun 2017 06:31 )   PT: 12.5 sec;   INR: 1.15 ratio      PTT - ( 16 Jun 2017 06:31 )  PTT:26.7 sec    Physical Examination:  Daily Height in cm: 160.02 (15 Maynor 2017 16:44)    Daily   Vital Signs Last 24 Hrs  T(C): 36.8, Max: 36.9 (06-16 @ 05:13)  T(F): 98.2, Max: 98.4 (06-16 @ 05:13)  HR: 60 (60 - 85)  BP: 106/70 (95/54 - 111/74)  BP(mean): --  RR: 18 (17 - 18)  SpO2: 96% (95% - 98%)    Constitutional: NAD, NGT in place.  HEENT: PERRLA, EOMI,     Neck:  No JVD  Respiratory: CTAB/L  Cardiovascular: S1 and S2  Gastrointestinal: BS+, soft, NT/ND  Extremities: No peripheral edema  Neurological: A/O x 3, no focal deficits  Psychiatric: Normal mood, normal affect  Skin: No rashes    ASA Class: I [ ]  II [ ]  III [x]  IV [ ]    The patient is a suitable candidate for the planned procedure unless box checked [ ]  No, explain:

## 2017-06-16 NOTE — CONSULT NOTE ADULT - SUBJECTIVE AND OBJECTIVE BOX
HISTORY OF PRESENT ILLNESS:  RIGOBERTO CHRISTENSEN is a 57y Female with no significant PMH/PSH admitted for abdominal pain x 6 weeks, 20lb weight loss over 1 month, worsening N/V and poor PO tolerance.  Pt received EGD as an outpatient which showed obstructive mass. Pt underwent EGD today and remained intubated post-procedure 85    PAST MEDICAL HISTORY: No pertinent past medical history      PAST SURGICAL HISTORY: No significant past surgical history      FAMILY HISTORY:     SOCIAL HISTORY:    CODE STATUS:     HOME MEDICATIONS:    ALLERGIES: No Known Allergies      VITAL SIGNS:  ICU Vital Signs Last 24 Hrs  T(C): 36.5, Max: 36.9 (06-16 @ 05:13)  T(F): 97.7, Max: 98.4 (06-16 @ 05:13)  HR: 58 (58 - 98)  BP: 123/66 (95/59 - 123/66)  BP(mean): 89 (89 - 89)  ABP: 97/51 (97/51 - 153/84)  ABP(mean): 70 (70 - 113)  RR: 27 (12 - 27)  SpO2: 100% (95% - 100%)      NEURO  Exam:  dexmedetomidine Infusion 0.2MICROgram(s)/kG/Hr IV Continuous <Continuous>      RESPIRATORY  Mechanical Ventilation: Mode: AC/ CMV (Assist Control/ Continuous Mandatory Ventilation), RR (machine): 12, RR (patient): 12, TV (machine): 400, FiO2: 40, PEEP: 5, ITime: 1, MAP: 8, PIP: 17  ABG - ( 16 Jun 2017 15:46 )  pH: 7.32  /  pCO2: 29    /  pO2: 198   / HCO3: 15    / Base Excess: -9.9  /  SaO2: 100     Lactate: x                Exam:      CARDIOVASCULAR    Exam:  Cardiac Rhythm:      GI/NUTRITION  Exam:  Diet:  pantoprazole  Injectable 40milliGRAM(s) IV Push daily      GENITOURINARY/RENAL  potassium chloride  10 mEq/100 mL IVPB 10milliEquivalent(s) IV Intermittent every 1 hour  lactated ringers. 1000milliLiter(s) IV Continuous <Continuous>    I & Os for 24h ending 06-16 @ 07:00  =============================================  IN:    lactated ringers: 1500 ml    Total IN: 1500 ml  ---------------------------------------------  OUT:    Voided: 900 ml    Nasoenteral Tube: 625 ml    Total OUT: 1525 ml  ---------------------------------------------  Total NET: -25 ml    I & Os for current day (as of 06-16 @ 17:27)  =============================================  IN:    Lactated Ringers IV Bolus: 1000 ml    dexmedetomidine Infusion: 4.8 ml    Total IN: 1004.8 ml  ---------------------------------------------  OUT:    Indwelling Catheter - Urethral: 650 ml    Total OUT: 650 ml  ---------------------------------------------  Total NET: 354.8 ml      06-16    140  |  104  |  8   ----------------------------<  96  3.3<L>   |  14<L>  |  0.50    Ca    8.5      16 Jun 2017 13:20  Phos  2.6     06-16  Mg     2.1     06-16    TPro  6.3  /  Alb  3.4  /  TBili  0.3  /  DBili  0.1  /  AST  28  /  ALT  21  /  AlkPhos  72  06-16    [ ] Mathis catheter, indication: urine output monitoring in critically ill patient    HEMATOLOGIC  [ ] VTE Prophylaxis:  enoxaparin Injectable 40milliGRAM(s) SubCutaneous daily                          12.4   11.8  )-----------( 369      ( 16 Jun 2017 13:20 )             38.1     PT/INR - ( 16 Jun 2017 13:20 )   PT: 12.5 sec;   INR: 1.14 ratio         PTT - ( 16 Jun 2017 13:20 )  PTT:26.3 sec  Transfusion: [ ] PRBC	[ ] Platelets	[ ] FFP	[ ] Cryoprecipitate      INFECTIOUS DISEASES    RECENT CULTURES:      ENDOCRINE    CAPILLARY BLOOD GLUCOSE      PATIENT CARE ACCESS DEVICES:  [ ] Peripheral IV  [ ] Central Venous Line	[ ] R	[ ] L	[ ] IJ	[ ] Fem	[ ] SC	Placed:   [ ] Arterial Line		[ ] R	[ ] L	[ ] Fem	[ ] Rad	[ ] Ax	Placed:   [ ] PICC:					[ ] Mediport  [ ] Urinary Catheter, Date Placed:   [x] Necessity of urinary, arterial, and venous catheters discussed    OTHER MEDICATIONS:     IMAGING STUDIES: HISTORY OF PRESENT ILLNESS:  RIGOBERTO CHRISTENSEN is a 57y Female with no significant PMH/PSH admitted for abdominal pain x 6 weeks, 20lb weight loss over 1 month, worsening N/V and poor PO tolerance.  Pt received EGD as an outpatient which showed obstructive mass. 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  Post-procedure CXR showed L lung opacification, pt remained intubated and SICU consulted. Initial CXR shows R mainstem intubation, ETT pulled back prior to arrival in SICU, repeat CXR shows improved ETT placement above the gloria and improvement of L lung aeration.  Pt noted to have increased anion gap and metabolic acidosis, possibly 2/2 N/V and poor PO intake.  Pt appears hypovolemic on bedside ultrasound, bolused 1L LR.     PAST MEDICAL HISTORY: No pertinent past medical history      PAST SURGICAL HISTORY: No significant past surgical history      SOCIAL HISTORY: , lives with spouse    CODE STATUS: Full code    HOME MEDICATIONS: Zofran     ALLERGIES: No Known Allergies      VITAL SIGNS:  ICU Vital Signs Last 24 Hrs  T(C): 36.5, Max: 36.9 (06-16 @ 05:13)  T(F): 97.7, Max: 98.4 (06-16 @ 05:13)  HR: 58 (58 - 98)  BP: 123/66 (95/59 - 123/66)  BP(mean): 89 (89 - 89)  ABP: 97/51 (97/51 - 153/84)  ABP(mean): 70 (70 - 113)  RR: 27 (12 - 27)  SpO2: 100% (95% - 100%)      NEURO  Exam: RASS -1  dexmedetomidine Infusion 0.2MICROgram(s)/kG/Hr IV Continuous <Continuous>      RESPIRATORY  Mechanical Ventilation: Mode: PRVC, RR (machine): 12, RR (patient): 12, TV (machine): 400, FiO2: 40, PEEP: 5, ITime: 1, MAP: 8, PIP: 17  ABG - ( 16 Jun 2017 15:46 )  pH: 7.32  /  pCO2: 29    /  pO2: 198   / HCO3: 15    / Base Excess: -9.9  /  SaO2: 100     Lactate: 1.3    Exam: CTA B/L       CARDIOVASCULAR    Exam: RRR. +S1, +S2.  Cardiac Rhythm: Sinus      GI/NUTRITION  Exam: Soft, non-tender, non-distended.  Diet: NPO  pantoprazole  Injectable 40milliGRAM(s) IV Push daily      GENITOURINARY/RENAL  potassium chloride  10 mEq/100 mL IVPB 10milliEquivalent(s) IV Intermittent every 1 hour  lactated ringers. 1000milliLiter(s) IV Continuous <Continuous>    I & Os for 24h ending 06-16 @ 07:00  =============================================  IN:    lactated ringers: 1500 ml    Total IN: 1500 ml  ---------------------------------------------  OUT:    Voided: 900 ml    Nasoenteral Tube: 625 ml    Total OUT: 1525 ml  ---------------------------------------------  Total NET: -25 ml    I & Os for current day (as of 06-16 @ 17:27)  =============================================  IN:    Lactated Ringers IV Bolus: 1000 ml    dexmedetomidine Infusion: 4.8 ml    Total IN: 1004.8 ml  ---------------------------------------------  OUT:    Indwelling Catheter - Urethral: 650 ml    Total OUT: 650 ml  ---------------------------------------------  Total NET: 354.8 ml      06-16    140  |  104  |  8   ----------------------------<  96  3.3<L>   |  14<L>  |  0.50    Ca    8.5      16 Jun 2017 13:20  Phos  2.6     06-16  Mg     2.1     06-16    TPro  6.3  /  Alb  3.4  /  TBili  0.3  /  DBili  0.1  /  AST  28  /  ALT  21  /  AlkPhos  72  06-16    [x ] Mathis catheter, indication: urine output monitoring in critically ill patient    HEMATOLOGIC  [ x] VTE Prophylaxis:  enoxaparin Injectable 40milliGRAM(s) SubCutaneous daily                          12.4   11.8  )-----------( 369      ( 16 Jun 2017 13:20 )             38.1     PT/INR - ( 16 Jun 2017 13:20 )   PT: 12.5 sec;   INR: 1.14 ratio         PTT - ( 16 Jun 2017 13:20 )  PTT:26.3 sec  Transfusion: [ ] PRBC	[ ] Platelets	[ ] FFP	[ ] Cryoprecipitate      INFECTIOUS DISEASES    RECENT CULTURES:      ENDOCRINE    CAPILLARY BLOOD GLUCOSE      PATIENT CARE ACCESS DEVICES:  [x ] Peripheral IV  [ ] Central Venous Line	[ ] R	[ ] L	[ ] IJ	[ ] Fem	[ ] SC	Placed:   [x ] Arterial Line		[x ] R	[ ] L	[ ] Fem	[x ] Rad	[ ] Ax	Placed:   [ ] PICC:					[ ] Mediport  [x ] Urinary Catheter, Date Placed:   [x] Necessity of urinary, arterial, and venous catheters discussed    OTHER MEDICATIONS:     IMAGING STUDIES:  CXR 6/16:  Repositioning of endotracheal tube, now in the midtrachea.Endotracheal tube has been repositioned and is in the midtrachea. Enteric   tube is in the stomach.    Small left pleural effusion with associated left lung atelectasis.  The right lungs clear.  No pneumothorax bilaterally.  The cardiomediastinal silhouette is unremarkable.

## 2017-06-16 NOTE — PROGRESS NOTE ADULT - SUBJECTIVE AND OBJECTIVE BOX
chief complaint : pt with gastric outlet obstruction    SUBJECTIVE / OVERNIGHT EVENTS: pt intubated       MEDICATIONS  (STANDING):  enoxaparin Injectable 40milliGRAM(s) SubCutaneous daily  pantoprazole  Injectable 40milliGRAM(s) IV Push daily  lactated ringers. 1000milliLiter(s) IV Continuous <Continuous>  propofol Infusion 10MICROgram(s)/kG/Min IV Continuous <Continuous>    MEDICATIONS  (PRN):      Vital Signs Last 24 Hrs  T(C): 36.5, Max: 36.9 (06-16 @ 05:13)  HR: 61 (52 - 98)  BP: 131/66 (95/61 - 131/66)  RR: 20 (12 - 39)  SpO2: 100% (95% - 100%)  Wt(kg): --  CAPILLARY BLOOD GLUCOSE    I&O's Summary  I & Os for 24h ending 16 Jun 2017 07:00  =============================================  IN: 1500 ml / OUT: 1525 ml / NET: -25 ml    I & Os for current day (as of 16 Jun 2017 22:18)  =============================================  IN: 2574.6 ml / OUT: 2100 ml / NET: 474.6 ml      Constitutional: No fever, fatigue  Skin: No rash.  Eyes: No recent vision problems or eye pain.  ENT: No congestion, ear pain, or sore throat.  Cardiovascular: No chest pain or palpation.  Respiratory: No cough, shortness of breath, congestion, or wheezing.  Gastrointestinal: No abdominal pain, nausea, vomiting, or diarrhea.  Genitourinary: No dysuria.  Musculoskeletal: No joint swelling.  Neurologic: No headache.    PHYSICAL EXAM  NECK: Supple, No JVD  CHEST/LUNG: dec br  HEART:  S1 , S2 +  ABDOMEN: soft, mild distension +, bs+  EXTREMITIES:  no edema      LABS:                        12.4   11.8  )-----------( 369      ( 16 Jun 2017 13:20 )             38.1     06-16    138  |  107  |  7   ----------------------------<  108<H>  4.8   |  15<L>  |  0.43<L>    Ca    8.0<L>      16 Jun 2017 19:09  Phos  3.9     06-16  Mg     1.9     06-16    TPro  5.0<L>  /  Alb  2.8<L>  /  TBili  0.2  /  DBili  x   /  AST  17  /  ALT  16  /  AlkPhos  55  06-16    PT/INR - ( 16 Jun 2017 13:20 )   PT: 12.5 sec;   INR: 1.14 ratio         PTT - ( 16 Jun 2017 13:20 )  PTT:26.3 sec          RADIOLOGY & ADDITIONAL TESTS:    Imaging Personally Reviewed:    Consultant(s) Notes Reviewed:      Care Discussed with Consultants/Other Providers:

## 2017-06-16 NOTE — PROGRESS NOTE ADULT - SUBJECTIVE AND OBJECTIVE BOX
PN not started yet.   Labs   06-16    139  |  104  |  9   ----------------------------<  61<L>  3.7   |  17<L>  |  0.44<L>    Ca    8.5      16 Jun 2017 06:31  Phos  2.6     06-16  Mg     2.2     06-16    TPro  7.7  /  Alb  4.6  /  TBili  0.8  /  DBili  x   /  AST  24  /  ALT  20  /  AlkPhos  82  06-14      LIVER FUNCTIONS - ( 14 Jun 2017 20:52 )  Alb: 4.6 g/dL / Pro: 7.7 g/dL / ALK PHOS: 82 U/L / ALT: 20 U/L RC / AST: 24 U/L / GGT: x           CAPILLARY BLOOD GLUCOSE    I&O's Detail    I & Os for current day (as of 16 Jun 2017 09:45)  =============================================  IN:    lactated ringers: 1500 ml    Total IN: 1500 ml  ---------------------------------------------  OUT:    Voided: 900 ml    Nasoenteral Tube: 625 ml    Total OUT: 1525 ml  ---------------------------------------------  Total NET: -25 ml      T(C): 36.8, Max: 36.9 (06-16 @ 05:13)  HR: 60 (60 - 85)  BP: 106/70 (95/54 - 111/74)  RR: 18 (17 - 18)  SpO2: 96% (95% - 98%)  Wt(kg): -- 64.1 kbs  BMI 25    Radiology    EXAM:  CHEST-PORTABLE URGENT                            PROCEDURE DATE:  06/15/2017            INTERPRETATION:  CLINICAL INDICATION: NG tube placement.    EXAM: Frontal view of the chest with comparison made to chest radiograph   dated the same day.    FINDINGS:   The heart size is normal.   The lungs are clear. There are no pleural effusions or pneumothorax.  Interval placement of an enteric tube seen coursing below the GE junction   with tip in the distal stomach/proximal duodenum.    IMPRESSION:   Interval placement of an enteric tube seen coursing below the GE junction   with tip in the distal stomach/proximal duodenum.                GHANSHYAM AN M.D., RADIOLOGY RESIDENT  This document has been electronically signed.  JOSE PEOPLES M.D., ATTENDING RADIOLOGIST  This document has been electronically signed. Maynor 15 2017  9:54AM

## 2017-06-16 NOTE — PROGRESS NOTE ADULT - PROBLEM SELECTOR PLAN 3
As per X Ray report of 6/15/17 tip of NG tube may already be in duodenum.  If GI can advance an NJ tube into the post pyloric position this will be a better way to provide nutritional support than TPN.  TPN not approved pending attempt at placement of NJ tube

## 2017-06-17 DIAGNOSIS — E44.1 MILD PROTEIN-CALORIE MALNUTRITION: ICD-10-CM

## 2017-06-17 LAB
ALBUMIN SERPL ELPH-MCNC: 2.3 G/DL — LOW (ref 3.3–5)
ALBUMIN SERPL ELPH-MCNC: 2.5 G/DL — LOW (ref 3.3–5)
ALP SERPL-CCNC: 51 U/L — SIGNIFICANT CHANGE UP (ref 40–120)
ALP SERPL-CCNC: 52 U/L — SIGNIFICANT CHANGE UP (ref 40–120)
ALT FLD-CCNC: 15 U/L RC — SIGNIFICANT CHANGE UP (ref 10–45)
ALT FLD-CCNC: 17 U/L RC — SIGNIFICANT CHANGE UP (ref 10–45)
ANION GAP SERPL CALC-SCNC: 15 MMOL/L — SIGNIFICANT CHANGE UP (ref 5–17)
ANION GAP SERPL CALC-SCNC: 16 MMOL/L — SIGNIFICANT CHANGE UP (ref 5–17)
AST SERPL-CCNC: 14 U/L — SIGNIFICANT CHANGE UP (ref 10–40)
AST SERPL-CCNC: 17 U/L — SIGNIFICANT CHANGE UP (ref 10–40)
BILIRUB SERPL-MCNC: 0.2 MG/DL — SIGNIFICANT CHANGE UP (ref 0.2–1.2)
BILIRUB SERPL-MCNC: 0.3 MG/DL — SIGNIFICANT CHANGE UP (ref 0.2–1.2)
BUN SERPL-MCNC: 6 MG/DL — LOW (ref 7–23)
BUN SERPL-MCNC: 6 MG/DL — LOW (ref 7–23)
CALCIUM SERPL-MCNC: 7.9 MG/DL — LOW (ref 8.4–10.5)
CALCIUM SERPL-MCNC: 8.1 MG/DL — LOW (ref 8.4–10.5)
CHLORIDE SERPL-SCNC: 107 MMOL/L — SIGNIFICANT CHANGE UP (ref 96–108)
CHLORIDE SERPL-SCNC: 109 MMOL/L — HIGH (ref 96–108)
CO2 SERPL-SCNC: 15 MMOL/L — LOW (ref 22–31)
CO2 SERPL-SCNC: 16 MMOL/L — LOW (ref 22–31)
CREAT SERPL-MCNC: 0.38 MG/DL — LOW (ref 0.5–1.3)
CREAT SERPL-MCNC: 0.41 MG/DL — LOW (ref 0.5–1.3)
GAS PNL BLDA: SIGNIFICANT CHANGE UP
GLUCOSE SERPL-MCNC: 82 MG/DL — SIGNIFICANT CHANGE UP (ref 70–99)
GLUCOSE SERPL-MCNC: 88 MG/DL — SIGNIFICANT CHANGE UP (ref 70–99)
HCT VFR BLD CALC: 30.2 % — LOW (ref 34.5–45)
HCT VFR BLD CALC: 30.4 % — LOW (ref 34.5–45)
HGB BLD-MCNC: 10.2 G/DL — LOW (ref 11.5–15.5)
HGB BLD-MCNC: 10.3 G/DL — LOW (ref 11.5–15.5)
MAGNESIUM SERPL-MCNC: 1.9 MG/DL — SIGNIFICANT CHANGE UP (ref 1.6–2.6)
MAGNESIUM SERPL-MCNC: 3.1 MG/DL — HIGH (ref 1.6–2.6)
MCHC RBC-ENTMCNC: 30.8 PG — SIGNIFICANT CHANGE UP (ref 27–34)
MCHC RBC-ENTMCNC: 31.6 PG — SIGNIFICANT CHANGE UP (ref 27–34)
MCHC RBC-ENTMCNC: 33.4 GM/DL — SIGNIFICANT CHANGE UP (ref 32–36)
MCHC RBC-ENTMCNC: 34.2 GM/DL — SIGNIFICANT CHANGE UP (ref 32–36)
MCV RBC AUTO: 92.1 FL — SIGNIFICANT CHANGE UP (ref 80–100)
MCV RBC AUTO: 92.4 FL — SIGNIFICANT CHANGE UP (ref 80–100)
PHOSPHATE SERPL-MCNC: 2.1 MG/DL — LOW (ref 2.5–4.5)
PHOSPHATE SERPL-MCNC: 3 MG/DL — SIGNIFICANT CHANGE UP (ref 2.5–4.5)
PLATELET # BLD AUTO: 275 K/UL — SIGNIFICANT CHANGE UP (ref 150–400)
PLATELET # BLD AUTO: 282 K/UL — SIGNIFICANT CHANGE UP (ref 150–400)
POTASSIUM SERPL-MCNC: 3.6 MMOL/L — SIGNIFICANT CHANGE UP (ref 3.5–5.3)
POTASSIUM SERPL-MCNC: 4.1 MMOL/L — SIGNIFICANT CHANGE UP (ref 3.5–5.3)
POTASSIUM SERPL-SCNC: 3.6 MMOL/L — SIGNIFICANT CHANGE UP (ref 3.5–5.3)
POTASSIUM SERPL-SCNC: 4.1 MMOL/L — SIGNIFICANT CHANGE UP (ref 3.5–5.3)
PROT SERPL-MCNC: 4.7 G/DL — LOW (ref 6–8.3)
PROT SERPL-MCNC: 4.7 G/DL — LOW (ref 6–8.3)
RBC # BLD: 3.27 M/UL — LOW (ref 3.8–5.2)
RBC # BLD: 3.3 M/UL — LOW (ref 3.8–5.2)
RBC # FLD: 12.8 % — SIGNIFICANT CHANGE UP (ref 10.3–14.5)
RBC # FLD: 13 % — SIGNIFICANT CHANGE UP (ref 10.3–14.5)
SODIUM SERPL-SCNC: 138 MMOL/L — SIGNIFICANT CHANGE UP (ref 135–145)
SODIUM SERPL-SCNC: 140 MMOL/L — SIGNIFICANT CHANGE UP (ref 135–145)
WBC # BLD: 7.7 K/UL — SIGNIFICANT CHANGE UP (ref 3.8–10.5)
WBC # BLD: 7.8 K/UL — SIGNIFICANT CHANGE UP (ref 3.8–10.5)
WBC # FLD AUTO: 7.7 K/UL — SIGNIFICANT CHANGE UP (ref 3.8–10.5)
WBC # FLD AUTO: 7.8 K/UL — SIGNIFICANT CHANGE UP (ref 3.8–10.5)

## 2017-06-17 PROCEDURE — 99232 SBSQ HOSP IP/OBS MODERATE 35: CPT | Mod: GC

## 2017-06-17 RX ORDER — POTASSIUM PHOSPHATE, MONOBASIC POTASSIUM PHOSPHATE, DIBASIC 236; 224 MG/ML; MG/ML
15 INJECTION, SOLUTION INTRAVENOUS ONCE
Qty: 0 | Refills: 0 | Status: COMPLETED | OUTPATIENT
Start: 2017-06-17 | End: 2017-06-17

## 2017-06-17 RX ORDER — SODIUM CHLORIDE 9 MG/ML
1000 INJECTION, SOLUTION INTRAVENOUS
Qty: 0 | Refills: 0 | Status: DISCONTINUED | OUTPATIENT
Start: 2017-06-17 | End: 2017-06-23

## 2017-06-17 RX ORDER — MAGNESIUM SULFATE 500 MG/ML
2 VIAL (ML) INJECTION ONCE
Qty: 0 | Refills: 0 | Status: COMPLETED | OUTPATIENT
Start: 2017-06-17 | End: 2017-06-17

## 2017-06-17 RX ORDER — POTASSIUM CHLORIDE 20 MEQ
10 PACKET (EA) ORAL
Qty: 0 | Refills: 0 | Status: COMPLETED | OUTPATIENT
Start: 2017-06-17 | End: 2017-06-17

## 2017-06-17 RX ORDER — ACETAMINOPHEN 500 MG
1000 TABLET ORAL ONCE
Qty: 0 | Refills: 0 | Status: COMPLETED | OUTPATIENT
Start: 2017-06-17 | End: 2017-06-18

## 2017-06-17 RX ADMIN — Medication 100 MILLIEQUIVALENT(S): at 09:43

## 2017-06-17 RX ADMIN — PROPOFOL 3.85 MICROGRAM(S)/KG/MIN: 10 INJECTION, EMULSION INTRAVENOUS at 06:00

## 2017-06-17 RX ADMIN — Medication 100 MILLIEQUIVALENT(S): at 08:35

## 2017-06-17 RX ADMIN — SODIUM CHLORIDE 125 MILLILITER(S): 9 INJECTION, SOLUTION INTRAVENOUS at 08:35

## 2017-06-17 RX ADMIN — POTASSIUM PHOSPHATE, MONOBASIC POTASSIUM PHOSPHATE, DIBASIC 62.5 MILLIMOLE(S): 236; 224 INJECTION, SOLUTION INTRAVENOUS at 08:35

## 2017-06-17 RX ADMIN — ENOXAPARIN SODIUM 40 MILLIGRAM(S): 100 INJECTION SUBCUTANEOUS at 11:58

## 2017-06-17 RX ADMIN — SODIUM CHLORIDE 125 MILLILITER(S): 9 INJECTION, SOLUTION INTRAVENOUS at 06:01

## 2017-06-17 RX ADMIN — Medication 50 GRAM(S): at 06:00

## 2017-06-17 RX ADMIN — PANTOPRAZOLE SODIUM 40 MILLIGRAM(S): 20 TABLET, DELAYED RELEASE ORAL at 11:58

## 2017-06-17 NOTE — PROGRESS NOTE ADULT - SUBJECTIVE AND OBJECTIVE BOX
[facility] GENERAL SURGERY DAILY PROGRESS NOTE:       Subjective: Pt remains intubated o/n. sedated on propofol. martinez in place.       Objective:  gen: NAD  Abd: soft, nt, nd        MEDICATIONS  (STANDING):  enoxaparin Injectable 40milliGRAM(s) SubCutaneous daily  pantoprazole  Injectable 40milliGRAM(s) IV Push daily  lactated ringers. 1000milliLiter(s) IV Continuous <Continuous>  acetaminophen  IVPB. 1000milliGRAM(s) IV Intermittent once  potassium phosphate IVPB 15milliMole(s) IV Intermittent once  potassium chloride  10 mEq/100 mL IVPB 10milliEquivalent(s) IV Intermittent every 1 hour    MEDICATIONS  (PRN):      Vital Signs Last 24 Hrs  T(C): 36.9, Max: 36.9 ( @ 07:00)  T(F): 98.4, Max: 98.4 ( @ 07:00)  HR: 60 (47 - 98)  BP: 115/57 (95/47 - 135/63)  BP(mean): 80 (68 - 97)  RR: 35 (12 - 39)  SpO2: 100% (99% - 100%)    I&O's Detail    I & Os for current day (as of 2017 07:54)  =============================================  IN:    lactated ringers.: 1625 ml    Lactated Ringers IV Bolus: 1500 ml    IV PiggyBack: 600 ml    propofol Infusion: 77 ml    dexmedetomidine Infusion: 9.2 ml    Total IN: 3811.2 ml  ---------------------------------------------  OUT:    Indwelling Catheter - Urethral: 3325 ml    Nasoenteral Tube: 500 ml    Total OUT: 3825 ml  ---------------------------------------------  Total NET: -13.8 ml      Daily     Daily Weight in k.3 (2017 05:00)    LABS:                        10.3   7.7   )-----------( 282      ( 2017 06:44 )             30.2         140  |  109<H>  |  6<L>  ----------------------------<  82  3.6   |  16<L>  |  0.41<L>    Ca    7.9<L>      2017 06:44  Phos  2.1       Mg     3.1         TPro  4.7<L>  /  Alb  2.5<L>  /  TBili  0.3  /  DBili  x   /  AST  14  /  ALT  15  /  AlkPhos  52      PT/INR - ( 2017 13:20 )   PT: 12.5 sec;   INR: 1.14 ratio         PTT - ( 2017 13:20 )  PTT:26.3 sec      RADIOLOGY & ADDITIONAL STUDIES:

## 2017-06-17 NOTE — PROGRESS NOTE ADULT - ASSESSMENT
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  Post-procedure CXR showed L lung opacification, got intubated for airway, s/p sicu stay , successfully extubated   - ngt  - f/u biopsy  - had extensive lengthy discussion with pts family about pts clinical status and management plan

## 2017-06-17 NOTE — AIRWAY REMOVAL NOTE  ADULT & PEDS - ARTIFICAL AIRWAY REMOVAL COMMENTS
Order for extubation verified, pt. was identified by full name and birth date compared to the ID band, RN was present during the extubation procedure

## 2017-06-17 NOTE — PROGRESS NOTE ADULT - ASSESSMENT
RIGOBERTO CHRISTENSEN is a 57y Female with no significant PMH/PSH admitted for gastric outlet obstruction secondary to mass at the gastric antrum, pt remained intubated post-op due to R mainstem intubation and possible mucous plug. Pt with worsening anion gap and metabolic acidosis, hypovolemic, responsive to fluid bolus.    NEURO: post-procedure sedation/pain control  - Propofol gtt, will wean once attempting extubation  -IV Tylenol prn     RESPIRATORY: Intubated  - repeat CXR/ABG in am   -likely SBT to attempt extubation    CARDIOVASCULAR:  -Hemodynamic monitoring   -Trend lactate    GI: gastric mass  -NPO  -NGT to LCWS  -Protonix   -f/u bx results, GI following     RENAL: metabolic acidosis with anion gap  -LR @ 125mL/hr  -BMP q 6hrs  -Replete lytes as needed   -Monitor UOP     HEME: VTE prophylaxis  - Lovenox  -SCDs     ID:   - Trend WBC    ENDO:   -No issues RIGOBERTO CHRISTENSEN is a 57y Female with no significant PMH/PSH admitted for gastric outlet obstruction secondary to mass at the gastric antrum, pt remained intubated post-op due to R mainstem intubation and possible mucous plug. Metabolic acidosis improved, euvolemic. Extubated to NC this am.     NEURO: post-procedure pain control  -IV Tylenol prn     RESPIRATORY: Extubated to NC  - NC prn  - Incentive spirometry     CARDIOVASCULAR:  - Hemodynamically stable    GI: gastric mass  -NPO  -NGT to LCWS  -Protonix   -f/u bx results, f/u nutrition plan with GI and primary team    RENAL: metabolic acidosis improved  -LR @ 125mL/hr  -Replete lytes as needed     HEME: VTE prophylaxis  - Lovenox  -SCDs     ID:   - Trend WBC    ENDO:   -No issues     Lines:  - D/C martinez  - PIV  -NGT    Dispo: Transfer to floor

## 2017-06-17 NOTE — PROVIDER CONTACT NOTE (OTHER) - BACKGROUND
6/15: endoscopy, gastric mass  6/16: endoscopy, gastric mass bx 6/15: endoscopy, gastric mass, gastric outlet obstruction  6/16: endoscopy, gastric mass bx

## 2017-06-17 NOTE — PROGRESS NOTE ADULT - ASSESSMENT
57F GOO 2/2 obstructing gastric mass s/p EGD, R mainstem intubation  - wean off vent as tolerated  - NPO/NGT  - f/u med/onc  - f/u GI  - care as per SICU

## 2017-06-17 NOTE — PROGRESS NOTE ADULT - SUBJECTIVE AND OBJECTIVE BOX
EUS and biopsy performed as per GI  Results as below  Procedure:           Upper EUS  Indications:         Gastric mass with gastric outlet obstruction.  Providers:           Zeeshan Garrett MD, Torsten Chacon MD (Fellow)  Referring MD:        Koffi Trent  Medicines:Monitored Anesthesia Care  Complications:       No immediate complications.  ____________________________________________________________________________________________________  Procedure:           Pre-Anesthesia Assessment:                       -The anesthesia plan was to use general anesthesia.                       After obtaining informed consent, the existing NG tube was removed, and the                        endoscope was passed under direct vision. Throughout the procedure, the                   patient's blood pressure, pulse, and oxygen saturations were monitored                        continuously. The Endoscope was introduced through the mouth, and advanced to                        the duodenal bulb. The Endoscope was introduced through the mouth, and                        advanced to the second part of duodenum. The upper GI endoscopy was                        accomplished without difficulty. After completion of the endoscopy, a new NG                        tube was inserted. The patient tolerated the procedure well. After obtaining                        informed consent, the existing NG tube was removed, and the endoscope was                        passed under direct vision. Throughout the procedure, the patient's blood                        pressure, pulse, and oxygen saturations were monitored continuously.                                                                                                        Findings:       EGD:       A few erosionswere found in the lower third of the esophagus.       The Z-line was regular and was found 38 cm from the incisors.       A large amount of food (residue) was found in the gastric fundus and in the gastric antrum.       A 4-5 cm, infiltrative, circumferential mass with friable mucosa was found in the prepyloric        antrum at 53 cm from the incisors. The lumen was distorted and narrowed, measuring        approximately 9 mm in diameter. The mass was able to be traversed using an upper endoscope.       Biopsies were taken with a cold forceps for histology.       Endosonographic Finding: :       A circumferential mass was identified in the antrum. Due to distorted lumen/angulation,        optimal views for T staging were not obtained, but thelesion appeared to invade beyond the        muscularis propria. No perigastric lymph nodes were visualized.                                                                                                        Impression:          EGD:          - A few erosions in the lower third of the esophagus.                       - Z-line regular, 38 cm from the incisors.                       - A large amount of food (residue) in the distended stomach.                       - 4-5 cm infiltrative, circumferential, prepyloric mass was found. Biopsied.                       EUS:                       - A mass was found in the antrum of the stomach. This was staged T3 Nx Mx by                        endosonographic criteria. Small perigastriclymph nodes were visualized on CT                        scan. Stage by EUS and CT is likely T3N1.                       - NGT replaced at end of procedure.                       - Post procedure hypoxia, patient remained intubated/ventilated and ETT                       position adjusted by anesthesia.  Recommendation:      - Transfer patient to SICU for ongoing care.                       - Await biopsy results.                       - Followup with SICU team and surgical oncology for furthercare.                       - If malignant gastric obstruction, duodenal stenting can be offered

## 2017-06-17 NOTE — PROGRESS NOTE ADULT - ATTENDING COMMENTS
57 year old with gastric outlet obstruction secondary to antral mass, s/p biopsies. awaiting results.

## 2017-06-17 NOTE — PROGRESS NOTE ADULT - SUBJECTIVE AND OBJECTIVE BOX
Chief Complaint:  Patient is a 57y old  Female who presents with a chief complaint of persistent nausea/emesis due to gastric outlet obstruction.      Interval Events: Patient reports having no pain and feels well. She is communicating by hand gestures appropriately. Per RN, no bowel movements. She is awake and alert and is being extubated now.    Allergies:  No Known Allergies      Hospital Medications:  enoxaparin Injectable 40milliGRAM(s) SubCutaneous daily  pantoprazole  Injectable 40milliGRAM(s) IV Push daily  lactated ringers. 1000milliLiter(s) IV Continuous <Continuous>  acetaminophen  IVPB. 1000milliGRAM(s) IV Intermittent once  potassium chloride  10 mEq/100 mL IVPB 10milliEquivalent(s) IV Intermittent every 1 hour      PMHX/PSHX:  No pertinent past medical history  No significant past surgical history      Family history:      ROS:     General:  No wt loss, fevers, chills, night sweats, fatigue,   ENT:  No sore throat  CV:  No pain, palpitations, hypo/hypertension  Resp:  No dyspnea, cough, tachypnea, wheezing  GI:  See HPI  :  No pain, bleeding  Muscle:  No pain  Heme:  No petechiae, ecchymosis, easy bruisability  Skin:  No rash, edema      PHYSICAL EXAM:   Vital Signs:  Vital Signs Last 24 Hrs  T(C): 36.9, Max: 36.9 ( @ 07:00)  T(F): 98.4, Max: 98.4 ( @ 07:00)  HR: 79 (47 - 98)  BP: 122/60 (95/47 - 135/63)  BP(mean): 86 (68 - 97)  RR: 25 (12 - 39)  SpO2: 100% (99% - 100%)  Daily     Daily Weight in k.3 (2017 05:00)    GENERAL:  Appears stated age, well-groomed, no distress, orally intubated  HEENT:  NC/AT,  conjunctivae clear, sclera -anicteric  CHEST:  Full & symmetric excursion, no increased effort, breath sounds clear  HEART:  Regular rhythm, S1, S2, no murmur/rub/S3/S4,  no edema  ABDOMEN:  Soft, non-tender, non-distended, normoactive bowel sounds,  no masses palpable  EXTREMITIES:  no cyanosis,clubbing or edema  SKIN:  No rash/erythema/ecchymoses/petechiae/wounds/abscess/warm/dry  NEURO:  Alert, oriented despite being intubated; moving all 4 extremities    LABS:                        10.3   7.7   )-----------( 282      ( 2017 06:44 )             30.2         140  |  109<H>  |  6<L>  ----------------------------<  82  3.6   |  16<L>  |  0.41<L>    Ca    7.9<L>      2017 06:44  Phos  2.1       Mg     3.1         TPro  4.7<L>  /  Alb  2.5<L>  /  TBili  0.3  /  DBili  x   /  AST  14  /  ALT  15  /  AlkPhos  52      LIVER FUNCTIONS - ( 2017 06:44 )  Alb: 2.5 g/dL / Pro: 4.7 g/dL / ALK PHOS: 52 U/L / ALT: 15 U/L RC / AST: 14 U/L / GGT: x           PT/INR - ( 2017 13:20 )   PT: 12.5 sec;   INR: 1.14 ratio         PTT - ( 2017 13:20 )  PTT:26.3 sec        Imaging:  EUS 17:  Impression:          EGD:          - A few erosions in the lower third of the esophagus.                       - Z-line regular, 38 cm from the incisors.                       - A large amount of food (residue) in the distended stomach.                       - 4-5 cm infiltrative, circumferential, prepyloric mass was found. Biopsied.                       EUS:                       - A mass was found in the antrum of the stomach. This was staged T3 Nx Mx by                        endosonographic criteria. Small perigastriclymph nodes were visualized on CT                        scan. Stage by EUS and CT is likely T3N1.                       - NGT replaced at end of procedure.                       - Post procedure hypoxia, patient remained intubated/ventilated and ETT                       position adjusted by anesthesia.  Recommendation:      - Transfer patient to SICU for ongoing care.                       - Await biopsy results.                       - Followup with SICU team and surgical oncology for furthercare.                       - If malignant gastric obstruction, duodenal stenting can be offered.

## 2017-06-17 NOTE — PROGRESS NOTE ADULT - SUBJECTIVE AND OBJECTIVE BOX
chief complaint : pt with gastric outlet obstruction    SUBJECTIVE / OVERNIGHT EVENTS: pt was successfully extubated transferred to floor for further care       MEDICATIONS  (STANDING):  enoxaparin Injectable 40milliGRAM(s) SubCutaneous daily  pantoprazole  Injectable 40milliGRAM(s) IV Push daily  lactated ringers. 1000milliLiter(s) IV Continuous <Continuous>  propofol Infusion 10MICROgram(s)/kG/Min IV Continuous <Continuous>    MEDICATIONS  (PRN):      Vital Signs Last 24 Hrs  T(C): 36.5, Max: 36.9 (06-16 @ 05:13)  HR: 61 (52 - 98)  BP: 131/66 (95/61 - 131/66)  RR: 20 (12 - 39)  SpO2: 100% (95% - 100%)  Wt(kg): --  CAPILLARY BLOOD GLUCOSE    I&O's Summary  I & Os for 24h ending 16 Jun 2017 07:00  =============================================  IN: 1500 ml / OUT: 1525 ml / NET: -25 ml    I & Os for current day (as of 16 Jun 2017 22:18)  =============================================  IN: 2574.6 ml / OUT: 2100 ml / NET: 474.6 ml      Constitutional: No fever, fatigue  Skin: No rash.  Eyes: No recent vision problems or eye pain.  ENT: No congestion, ear pain, or sore throat.  Cardiovascular: No chest pain or palpation.  Respiratory: No cough, shortness of breath, congestion, or wheezing.  Gastrointestinal: No abdominal pain, nausea, vomiting, or diarrhea.  Genitourinary: No dysuria.  Musculoskeletal: No joint swelling.  Neurologic: No headache.    PHYSICAL EXAM  NECK: Supple, No JVD  CHEST/LUNG: dec br  HEART:  S1 , S2 +  ABDOMEN: soft, mild distension +, bs+  EXTREMITIES:  no edema      LABS:                        12.4   11.8  )-----------( 369      ( 16 Jun 2017 13:20 )             38.1     06-16    138  |  107  |  7   ----------------------------<  108<H>  4.8   |  15<L>  |  0.43<L>    Ca    8.0<L>      16 Jun 2017 19:09  Phos  3.9     06-16  Mg     1.9     06-16    TPro  5.0<L>  /  Alb  2.8<L>  /  TBili  0.2  /  DBili  x   /  AST  17  /  ALT  16  /  AlkPhos  55  06-16    PT/INR - ( 16 Jun 2017 13:20 )   PT: 12.5 sec;   INR: 1.14 ratio         PTT - ( 16 Jun 2017 13:20 )  PTT:26.3 sec          RADIOLOGY & ADDITIONAL TESTS:    Imaging Personally Reviewed:    Consultant(s) Notes Reviewed:      Care Discussed with Consultants/Other Providers:

## 2017-06-17 NOTE — PROGRESS NOTE ADULT - SUBJECTIVE AND OBJECTIVE BOX
HISTORY  57y Female w/ no significant PMH/PSH admitted for abdominal pain x 6 weeks, 20lb weight loss over 1 month, worsening N/V and poor PO tolerance.  Pt received EGD as an outpatient which showed obstructive mass. Pt underwent EGD significant for gastric tumor in the antrum which was biopsied.  Post-procedure CXR showed L lung opacification, pt remained intubated and SICU consulted. Initial CXR shows R mainstem intubation, ETT pulled back prior to arrival in SICU, repeat CXR shows improved ETT placement above the gloria and improvement of L lung aeration.  Pt noted to have increased anion gap and metabolic acidosis, possibly 2/2 N/V and poor PO intake.    24 HOUR EVENTS: Given 1L and 500cc boluses for hypotension yesterday. Bradycardic to 40s/50s on precedex, which was switched to propofol, however patient remained bradycardic. EKG showed sinus bradycardia. Anion gap resolved.    SUBJECTIVE/ROS:  [ ] A ten-point review of systems was otherwise negative except as noted.  [X] Due to altered mental status/intubation, subjective information were not able to be obtained from the patient. History was obtained, to the extent possible, from review of the chart and collateral sources of information.      NEURO  RASS: 0     GCS:     CAM ICU:  Exam: arousable, following commands  Meds: propofol Infusion 10MICROgram(s)/kG/Min IV Continuous <Continuous>    [x] Adequacy of sedation and pain control has been assessed and adjusted      RESPIRATORY  RR: 22 (12 - 39)  SpO2: 100% (96% - 100%)  Wt(kg): --  Exam: unlabored, clear to auscultation bilaterally  Mechanical Ventilation: Mode: AC/ CMV (Assist Control/ Continuous Mandatory Ventilation), RR (machine): 12, RR (patient): 17, TV (machine): 400, FiO2: 40, PEEP: 5, ITime: 1, MAP: 8, PIP: 12  ABG - ( 17 Jun 2017 01:04 )  pH: 7.37  /  pCO2: 29    /  pO2: 184   / HCO3: 16    / Base Excess: -7.5  /  SaO2: 100     Lactate: x          [N/A] Extubation Readiness Assessed  Meds:       CARDIOVASCULAR  HR: 67 (47 - 98)  BP: 135/63 (96/51 - 135/63)  BP(mean): 91 (71 - 93)  ABP: 137/72 (90/64 - 153/84)  ABP(mean): 100 (70 - 113)  Wt(kg): --  CVP(cm H2O): --      Exam: regular rate and rhythm  Cardiac Rhythm: sinus  Perfusion     [x]Adequate   [ ]Inadequate  Mentation   [x]Normal       [ ]Reduced  Extremities  [x]Warm         [ ]Cool  Volume Status [ ]Hypervolemic [x]Euvolemic [ ]Hypovolemic  Meds:       GI/NUTRITION  Exam: soft, nontender, nondistended  Diet: NPO  Meds: pantoprazole  Injectable 40milliGRAM(s) IV Push daily      GENITOURINARY  I&O's Detail  I & Os for 24h ending 06-16 @ 07:00  =============================================  IN:    lactated ringers: 1500 ml    Total IN: 1500 ml  ---------------------------------------------  OUT:    Voided: 900 ml    Nasoenteral Tube: 625 ml    Total OUT: 1525 ml  ---------------------------------------------  Total NET: -25 ml    I & Os for current day (as of 06-17 @ 02:01)  =============================================  IN:    Lactated Ringers IV Bolus: 1500 ml    lactated ringers.: 875 ml    IV PiggyBack: 550 ml    propofol Infusion: 38.5 ml    dexmedetomidine Infusion: 9.2 ml    Total IN: 2972.7 ml  ---------------------------------------------  OUT:    Indwelling Catheter - Urethral: 2355 ml    Nasoenteral Tube: 200 ml    Total OUT: 2555 ml  ---------------------------------------------  Total NET: 417.7 ml      06-17    138  |  107  |  6<L>  ----------------------------<  88  4.1   |  15<L>  |  0.38<L>    Ca    8.1<L>      17 Jun 2017 01:03  Phos  3.0     06-17  Mg     1.9     06-17    TPro  4.7<L>  /  Alb  2.3<L>  /  TBili  0.2  /  DBili  x   /  AST  17  /  ALT  17  /  AlkPhos  51  06-17    [X] Mathis catheter, indication: N/A  Meds: lactated ringers. 1000milliLiter(s) IV Continuous <Continuous>        HEMATOLOGIC  Meds: enoxaparin Injectable 40milliGRAM(s) SubCutaneous daily    [x] VTE Prophylaxis                        10.2   7.8   )-----------( 275      ( 17 Jun 2017 01:03 )             30.4     PT/INR - ( 16 Jun 2017 13:20 )   PT: 12.5 sec;   INR: 1.14 ratio         PTT - ( 16 Jun 2017 13:20 )  PTT:26.3 sec  Transfusion     [ ] PRBC   [ ] Platelets   [ ] FFP   [ ] Cryoprecipitate      INFECTIOUS DISEASES  WBC Count: 7.8 K/uL (06-17 @ 01:03)  WBC Count: 11.8 K/uL (06-16 @ 13:20)  WBC Count: 6.0 K/uL (06-16 @ 06:31)    RECENT CULTURES:  Specimen Source: .Urine Clean Catch (Midstream)  Date/Time: 06-15 @ 19:15  Culture Results:   No growth  Gram Stain: --  Organism: --    Meds:       ENDOCRINE  CAPILLARY BLOOD GLUCOSE    Meds:       ACCESS DEVICES:  [X] Peripheral IV  [ ] Central Venous Line	[ ] R	[ ] L	[ ] IJ	[ ] Fem	[ ] SC	Placed:   [X] Arterial Line		[X] R	[ ] L	[ ] Fem	[X] Rad	[ ] Ax	Placed:   [ ] PICC:					[ ] Mediport  [X] Urinary Catheter, Date Placed:   [x] Necessity of urinary, arterial, and venous catheters discussed    OTHER MEDICATIONS:      CODE STATUS:      IMAGING: HISTORY  57y Female w/ no significant PMH/PSH admitted for abdominal pain x 6 weeks, 20lb weight loss over 1 month, worsening N/V and poor PO tolerance.  Pt received EGD as an outpatient which showed obstructive mass. Pt underwent EGD significant for gastric tumor in the antrum which was biopsied.  Post-procedure CXR showed L lung opacification, pt remained intubated and SICU consulted. Initial CXR shows R mainstem intubation, ETT pulled back prior to arrival in SICU, repeat CXR shows improved ETT placement above the gloria and improvement of L lung aeration.  Pt noted to have increased anion gap and metabolic acidosis, possibly 2/2 N/V and poor PO intake.    24 HOUR EVENTS: Given 1L and 500cc boluses for hypotension yesterday. Bradycardic to 40s/50s on precedex, which was switched to propofol, however patient remained bradycardic. EKG showed sinus bradycardia. Anion gap resolved.    SUBJECTIVE/ROS:  [ ] A ten-point review of systems was otherwise negative except as noted.  [X] Due to altered mental status/intubation, subjective information were not able to be obtained from the patient. History was obtained, to the extent possible, from review of the chart and collateral sources of information.      NEURO  RASS: 0     GCS:     CAM ICU:  Exam: arousable, following commands  Meds: propofol Infusion 10MICROgram(s)/kG/Min IV Continuous <Continuous>    [x] Adequacy of sedation and pain control has been assessed and adjusted      RESPIRATORY  RR: 22 (12 - 39)  SpO2: 100% (96% - 100%)  Wt(kg): --  Exam: unlabored, clear to auscultation bilaterally  Mechanical Ventilation: Mode: AC/ CMV (Assist Control/ Continuous Mandatory Ventilation), RR (machine): 12, RR (patient): 17, TV (machine): 400, FiO2: 40, PEEP: 5, ITime: 1, MAP: 8, PIP: 12  ABG - ( 17 Jun 2017 01:04 )  pH: 7.37  /  pCO2: 29    /  pO2: 184   / HCO3: 16    / Base Excess: -7.5  /  SaO2: 100     Lactate: x          [N/A] Extubation Readiness Assessed  Meds:       CARDIOVASCULAR  HR: 67 (47 - 98)  BP: 135/63 (96/51 - 135/63)  BP(mean): 91 (71 - 93)  ABP: 137/72 (90/64 - 153/84)  ABP(mean): 100 (70 - 113)  Wt(kg): --  CVP(cm H2O): --      Exam: regular rate and rhythm  Cardiac Rhythm: sinus  Perfusion     [x]Adequate   [ ]Inadequate  Mentation   [x]Normal       [ ]Reduced  Extremities  [x]Warm         [ ]Cool  Volume Status [ ]Hypervolemic [x]Euvolemic [ ]Hypovolemic  Meds:       GI/NUTRITION  Exam: soft, nontender, nondistended  Diet: NPO  Meds: pantoprazole  Injectable 40milliGRAM(s) IV Push daily      GENITOURINARY  I&O's Detail    I & Os for current day (as of 17 Jun 2017 07:32)  =============================================  IN:    lactated ringers.: 1625 ml    Lactated Ringers IV Bolus: 1500 ml    IV PiggyBack: 600 ml    propofol Infusion: 77 ml    dexmedetomidine Infusion: 9.2 ml    Total IN: 3811.2 ml  ---------------------------------------------  OUT:    Indwelling Catheter - Urethral: 3325 ml    Nasoenteral Tube: 500 ml    Total OUT: 3825 ml  ---------------------------------------------  Total NET: -13.8 ml    06-17    138  |  107  |  6<L>  ----------------------------<  88  4.1   |  15<L>  |  0.38<L>    Ca    8.1<L>      17 Jun 2017 01:03  Phos  3.0     06-17  Mg     1.9     06-17    TPro  4.7<L>  /  Alb  2.3<L>  /  TBili  0.2  /  DBili  x   /  AST  17  /  ALT  17  /  AlkPhos  51  06-17    [X] Mathis catheter, indication: N/A  Meds: lactated ringers. 1000milliLiter(s) IV Continuous <Continuous>        HEMATOLOGIC  Meds: enoxaparin Injectable 40milliGRAM(s) SubCutaneous daily    [x] VTE Prophylaxis                        10.2   7.8   )-----------( 275      ( 17 Jun 2017 01:03 )             30.4     PT/INR - ( 16 Jun 2017 13:20 )   PT: 12.5 sec;   INR: 1.14 ratio         PTT - ( 16 Jun 2017 13:20 )  PTT:26.3 sec  Transfusion     [ ] PRBC   [ ] Platelets   [ ] FFP   [ ] Cryoprecipitate      INFECTIOUS DISEASES  WBC Count: 7.8 K/uL (06-17 @ 01:03)  WBC Count: 11.8 K/uL (06-16 @ 13:20)  WBC Count: 6.0 K/uL (06-16 @ 06:31)    RECENT CULTURES:  Specimen Source: .Urine Clean Catch (Midstream)  Date/Time: 06-15 @ 19:15  Culture Results:   No growth  Gram Stain: --  Organism: --    Meds:       ENDOCRINE  CAPILLARY BLOOD GLUCOSE    Meds:       ACCESS DEVICES:  [X] Peripheral IV  [ ] Central Venous Line	[ ] R	[ ] L	[ ] IJ	[ ] Fem	[ ] SC	Placed:   [X] Arterial Line		[X] R	[ ] L	[ ] Fem	[X] Rad	[ ] Ax	Placed:   [ ] PICC:					[ ] Mediport  [X] Urinary Catheter, Date Placed:   [x] Necessity of urinary, arterial, and venous catheters discussed    OTHER MEDICATIONS:      CODE STATUS:      IMAGING: HISTORY  57y Female w/ no significant PMH/PSH admitted for abdominal pain x 6 weeks, 20lb weight loss over 1 month, worsening N/V and poor PO tolerance.  Pt received EGD as an outpatient which showed obstructive mass. Pt underwent EGD significant for gastric tumor in the antrum which was biopsied.  Post-procedure CXR showed L lung opacification, pt remained intubated and SICU consulted. Initial CXR shows R mainstem intubation, ETT pulled back prior to arrival in SICU, repeat CXR shows improved ETT placement above the gloria and improvement of L lung aeration.  Pt noted to have increased anion gap and metabolic acidosis, possibly 2/2 N/V and poor PO intake.    24 HOUR EVENTS: Given 1L and 500cc boluses for hypotension yesterday. Bradycardic to 40s/50s on precedex, which was switched to propofol, however patient remained bradycardic. EKG showed sinus bradycardia. Anion gap resolved. Extubated this am.     SUBJECTIVE/ROS:  [ ] A ten-point review of systems was otherwise negative except as noted.  [X] Due to altered mental status/intubation, subjective information were not able to be obtained from the patient. History was obtained, to the extent possible, from review of the chart and collateral sources of information.      NEURO: awake, alert, following commands. In NAD.   RASS: 0     GCS:     CAM ICU:  Exam: arousable, following commands  Meds: IV Tylenol prn     [x] Adequacy of sedation and pain control has been assessed and adjusted      RESPIRATORY  RR: 22 (12 - 39)  SpO2: 100% (96% - 100%) on NC  Wt(kg): --  Exam: unlabored, clear to auscultation bilaterally  Mechanical Ventilation:   ABG - ( 17 Jun 2017 01:04 )  pH: 7.37  /  pCO2: 29    /  pO2: 184   / HCO3: 16    / Base Excess: -7.5  /  SaO2: 100     Lactate: x        [N/A] Extubation Readiness Assessed  Meds:       CARDIOVASCULAR  HR: 67 (47 - 98)  BP: 135/63 (96/51 - 135/63)  BP(mean): 91 (71 - 93)  ABP: 137/72 (90/64 - 153/84)  ABP(mean): 100 (70 - 113)  Wt(kg): --  CVP(cm H2O): --      Exam: regular rate and rhythm  Cardiac Rhythm: sinus  Perfusion     [x]Adequate   [ ]Inadequate  Mentation   [x]Normal       [ ]Reduced  Extremities  [x]Warm         [ ]Cool  Volume Status [ ]Hypervolemic [x]Euvolemic [ ]Hypovolemic  Meds:       GI/NUTRITION  Exam: soft, nontender, nondistended  Diet: NPO with NGT  Meds: pantoprazole  Injectable 40milliGRAM(s) IV Push daily      GENITOURINARY  I&O's Detail    I & Os for current day (as of 17 Jun 2017 07:32)  =============================================  IN:    lactated ringers.: 1625 ml    Lactated Ringers IV Bolus: 1500 ml    IV PiggyBack: 600 ml    propofol Infusion: 77 ml    dexmedetomidine Infusion: 9.2 ml    Total IN: 3811.2 ml  ---------------------------------------------  OUT:    Indwelling Catheter - Urethral: 3325 ml    Nasoenteral Tube: 500 ml    Total OUT: 3825 ml  ---------------------------------------------  Total NET: -13.8 ml    06-17    138  |  107  |  6<L>  ----------------------------<  88  4.1   |  15<L>  |  0.38<L>    Ca    8.1<L>      17 Jun 2017 01:03  Phos  3.0     06-17  Mg     1.9     06-17    TPro  4.7<L>  /  Alb  2.3<L>  /  TBili  0.2  /  DBili  x   /  AST  17  /  ALT  17  /  AlkPhos  51  06-17    [X] Mathis catheter, indication: N/A  Meds: lactated ringers. 1000milliLiter(s) IV Continuous <Continuous>        HEMATOLOGIC  Meds: enoxaparin Injectable 40milliGRAM(s) SubCutaneous daily    [x] VTE Prophylaxis                        10.2   7.8   )-----------( 275      ( 17 Jun 2017 01:03 )             30.4     PT/INR - ( 16 Jun 2017 13:20 )   PT: 12.5 sec;   INR: 1.14 ratio         PTT - ( 16 Jun 2017 13:20 )  PTT:26.3 sec  Transfusion     [ ] PRBC   [ ] Platelets   [ ] FFP   [ ] Cryoprecipitate      INFECTIOUS DISEASES  WBC Count: 7.8 K/uL (06-17 @ 01:03)  WBC Count: 11.8 K/uL (06-16 @ 13:20)  WBC Count: 6.0 K/uL (06-16 @ 06:31)    RECENT CULTURES:  Specimen Source: .Urine Clean Catch (Midstream)  Date/Time: 06-15 @ 19:15  Culture Results:   No growth  Gram Stain: --  Organism: --    Meds:     ENDOCRINE  CAPILLARY BLOOD GLUCOSE    Meds:       ACCESS DEVICES:  [X] Peripheral IV  [ ] Central Venous Line	[ ] R	[ ] L	[ ] IJ	[ ] Fem	[ ] SC	Placed:   [ ] Arterial Line		[ ] R	[ ] L	[ ] Fem	[X] Rad	[ ] Ax	Placed:   [ ] PICC:					[ ] Mediport  [X] Urinary Catheter, Date Placed:   [x] Necessity of urinary, arterial, and venous catheters discussed    OTHER MEDICATIONS:      CODE STATUS:      IMAGING:  CXR 6/16  Small left pleural effusion with associated left lung atelectasis.  The right lungs clear.  No pneumothorax bilaterally.  The cardiomediastinal silhouette is unremarkable.  Degenerative changes of the thoracic spine.

## 2017-06-17 NOTE — PROGRESS NOTE ADULT - ASSESSMENT
Impression:  1. Gastric outlet obstruction due to likely gastric CA, T3N1 - s/p EUS for staging and biopsy  2. Hypoxia due to ?atelectasis - resolving, being extubated now    Recommend:  -Extubation per primary team  -Follow-up pathology from gastric mass biopsy  -If biopsy confirms malignancy, follow-up with surgical oncology team regarding possible surgery; otherwise stenting can be offered

## 2017-06-17 NOTE — PROGRESS NOTE ADULT - ASSESSMENT
Functional GOO secondary to gastric lesion  Since the mass can be traversed with the endosope it is unclear as to why, as per recommendation of 6/16/17, a naso enteric feeding tube was not placed.  TPN is not an option if the SB is not obstructed and a feeding tube can be placed.

## 2017-06-18 LAB
ANION GAP SERPL CALC-SCNC: 12 MMOL/L — SIGNIFICANT CHANGE UP (ref 5–17)
BUN SERPL-MCNC: 4 MG/DL — LOW (ref 7–23)
CA-I BLD-SCNC: 1.14 MMOL/L — SIGNIFICANT CHANGE UP (ref 1.12–1.3)
CALCIUM SERPL-MCNC: 7.9 MG/DL — LOW (ref 8.4–10.5)
CHLORIDE SERPL-SCNC: 106 MMOL/L — SIGNIFICANT CHANGE UP (ref 96–108)
CO2 SERPL-SCNC: 23 MMOL/L — SIGNIFICANT CHANGE UP (ref 22–31)
CREAT SERPL-MCNC: 0.46 MG/DL — LOW (ref 0.5–1.3)
GLUCOSE SERPL-MCNC: 119 MG/DL — HIGH (ref 70–99)
HCT VFR BLD CALC: 30.8 % — LOW (ref 34.5–45)
HGB BLD-MCNC: 10.7 G/DL — LOW (ref 11.5–15.5)
MAGNESIUM SERPL-MCNC: 2 MG/DL — SIGNIFICANT CHANGE UP (ref 1.6–2.6)
MCHC RBC-ENTMCNC: 31.9 PG — SIGNIFICANT CHANGE UP (ref 27–34)
MCHC RBC-ENTMCNC: 34.8 GM/DL — SIGNIFICANT CHANGE UP (ref 32–36)
MCV RBC AUTO: 91.7 FL — SIGNIFICANT CHANGE UP (ref 80–100)
PHOSPHATE SERPL-MCNC: 2.1 MG/DL — LOW (ref 2.5–4.5)
PLATELET # BLD AUTO: 283 K/UL — SIGNIFICANT CHANGE UP (ref 150–400)
POTASSIUM SERPL-MCNC: 2.9 MMOL/L — CRITICAL LOW (ref 3.5–5.3)
POTASSIUM SERPL-SCNC: 2.9 MMOL/L — CRITICAL LOW (ref 3.5–5.3)
RBC # BLD: 3.36 M/UL — LOW (ref 3.8–5.2)
RBC # FLD: 13.2 % — SIGNIFICANT CHANGE UP (ref 10.3–14.5)
SODIUM SERPL-SCNC: 141 MMOL/L — SIGNIFICANT CHANGE UP (ref 135–145)
WBC # BLD: 5.7 K/UL — SIGNIFICANT CHANGE UP (ref 3.8–10.5)
WBC # FLD AUTO: 5.7 K/UL — SIGNIFICANT CHANGE UP (ref 3.8–10.5)

## 2017-06-18 PROCEDURE — 71010: CPT | Mod: 26

## 2017-06-18 RX ORDER — TETRACAINE/BENZOCAINE/BUTAMBEN 2%-14%-2%
1 OINTMENT (GRAM) TOPICAL THREE TIMES A DAY
Qty: 0 | Refills: 0 | Status: DISCONTINUED | OUTPATIENT
Start: 2017-06-18 | End: 2017-06-23

## 2017-06-18 RX ORDER — POTASSIUM CHLORIDE 20 MEQ
10 PACKET (EA) ORAL
Qty: 0 | Refills: 0 | Status: COMPLETED | OUTPATIENT
Start: 2017-06-18 | End: 2017-06-18

## 2017-06-18 RX ORDER — POTASSIUM PHOSPHATE, MONOBASIC POTASSIUM PHOSPHATE, DIBASIC 236; 224 MG/ML; MG/ML
15 INJECTION, SOLUTION INTRAVENOUS ONCE
Qty: 0 | Refills: 0 | Status: COMPLETED | OUTPATIENT
Start: 2017-06-18 | End: 2017-06-18

## 2017-06-18 RX ORDER — ONDANSETRON 8 MG/1
4 TABLET, FILM COATED ORAL ONCE
Qty: 0 | Refills: 0 | Status: DISCONTINUED | OUTPATIENT
Start: 2017-06-18 | End: 2017-06-22

## 2017-06-18 RX ADMIN — PANTOPRAZOLE SODIUM 40 MILLIGRAM(S): 20 TABLET, DELAYED RELEASE ORAL at 13:06

## 2017-06-18 RX ADMIN — Medication 1 SPRAY(S): at 13:06

## 2017-06-18 RX ADMIN — Medication 100 MILLIEQUIVALENT(S): at 19:13

## 2017-06-18 RX ADMIN — SODIUM CHLORIDE 100 MILLILITER(S): 9 INJECTION, SOLUTION INTRAVENOUS at 15:24

## 2017-06-18 RX ADMIN — Medication 1000 MILLIGRAM(S): at 03:34

## 2017-06-18 RX ADMIN — Medication 100 MILLIEQUIVALENT(S): at 15:24

## 2017-06-18 RX ADMIN — Medication 400 MILLIGRAM(S): at 03:14

## 2017-06-18 RX ADMIN — Medication 100 MILLIEQUIVALENT(S): at 13:06

## 2017-06-18 RX ADMIN — POTASSIUM PHOSPHATE, MONOBASIC POTASSIUM PHOSPHATE, DIBASIC 62.5 MILLIMOLE(S): 236; 224 INJECTION, SOLUTION INTRAVENOUS at 22:04

## 2017-06-18 RX ADMIN — ENOXAPARIN SODIUM 40 MILLIGRAM(S): 100 INJECTION SUBCUTANEOUS at 13:06

## 2017-06-18 NOTE — PROGRESS NOTE ADULT - SUBJECTIVE AND OBJECTIVE BOX
chief complaint : pt with gastric outlet obstruction    SUBJECTIVE / OVERNIGHT EVENTS: pt denies abd pain, nausea, vomiting     NGT +      MEDICATIONS  (STANDING):  enoxaparin Injectable 40milliGRAM(s) SubCutaneous daily  pantoprazole  Injectable 40milliGRAM(s) IV Push daily  dextrose 5% + lactated ringers. 1000milliLiter(s) IV Continuous <Continuous>  ondansetron Injectable 4milliGRAM(s) IV Push once  potassium chloride  10 mEq/100 mL IVPB 10milliEquivalent(s) IV Intermittent every 1 hour  potassium phosphate IVPB 15milliMole(s) IV Intermittent once    MEDICATIONS  (PRN):  tetracaine/benzocaine/butamben Spray 1Spray(s) Topical three times a day PRN throat pain      Vital Signs Last 24 Hrs  T(C): 36.9, Max: 37 ( @ 01:18)  T(F): 98.4, Max: 98.6 ( @ 01:18)  HR: 57 (56 - 64)  BP: 106/66 (101/64 - 112/72)  BP(mean): --  RR: 18 (18 - 18)  SpO2: 96% (96% - 99%)  Constitutional: No fever, fatigue  Skin: No rash.  Eyes: No recent vision problems or eye pain.  ENT: No congestion, ear pain, or sore throat.  Cardiovascular: No chest pain or palpation.  Respiratory: No cough, shortness of breath, congestion, or wheezing.  Gastrointestinal: No abdominal pain, nausea, vomiting, or diarrhea.  Genitourinary: No dysuria.  Musculoskeletal: No joint swelling.  Neurologic: No headache.    PHYSICAL EXAM  NECK: Supple, No JVD  CHEST/LUNG: dec br  HEART:  S1 , S2 +  ABDOMEN: soft, mild distension +, bs+  EXTREMITIES:  no edema      LABS:      141  |  106  |  4<L>  ----------------------------<  119<H>  2.9<LL>   |  23  |  0.46<L>    Ca    7.9<L>      2017 10:12  Phos  2.1       Mg     2.0         TPro  4.7<L>  /  Alb  2.5<L>  /  TBili  0.3  /  DBili      /  AST  14  /  ALT  15  /  AlkPhos  52  06-17    Creatinine Trend: 0.46 <--, 0.41 <--, 0.38 <--, 0.43 <--, 0.50 <--, 0.44 <--, 0.47 <--, 0.46 <--, 0.52 <--, 0.60 <--, 0.76 <--                        10.7   5.7   )-----------( 283      ( 2017 09:33 )             30.8     Urine Studies:  Urinalysis Basic - ( 2017 21:07 )    Color: Yellow / Appearance: Clear / S.022 / pH:   Gluc:  / Ketone: Large  / Bili: Negative / Urobili: Negative   Blood:  / Protein: 30 mg/dL / Nitrite: Negative   Leuk Esterase: Moderate / RBC: 5-10 /HPF / WBC 11-25 /HPF   Sq Epi:  / Non Sq Epi: OCC /HPF / Bacteria:             ABG - ( 2017 07:52 )  pH: 7.40  /  pCO2: 30    /  pO2: 207   / HCO3: 19    / Base Excess: -4.9  /  SaO2: 100               LIVER FUNCTIONS - ( 2017 06:44 )  Alb: 2.5 g/dL / Pro: 4.7 g/dL / ALK PHOS: 52 U/L / ALT: 15 U/L RC / AST: 14 U/L / GGT: x             PT/INR - ( 2017 13:20 )   PT: 12.5 sec;   INR: 1.14 ratio         PTT - ( 2017 13:20 )  PTT:26.3 sec          RADIOLOGY & ADDITIONAL TESTS:    Imaging Personally Reviewed:    Consultant(s) Notes Reviewed:      Care Discussed with Consultants/Other Providers:

## 2017-06-18 NOTE — PROGRESS NOTE ADULT - ASSESSMENT
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  Post-procedure CXR showed L lung opacification, got intubated for airway, s/p sicu stay , successfully extubated   - ngt  - f/u biopsy  - had extensive lengthy discussion with pts family about pts clinical status and management plan    Hypokalemia - replete and f/u

## 2017-06-18 NOTE — PROGRESS NOTE ADULT - SUBJECTIVE AND OBJECTIVE BOX
[facility] GENERAL SURGERY DAILY PROGRESS NOTE:       Subjective: NGT fell out o/n. NGT re-inserted without issue. Pt denies N/V.     Objective:  Gen: NAD, AOx3  Abd: soft, NT, ND        MEDICATIONS  (STANDING):  enoxaparin Injectable 40milliGRAM(s) SubCutaneous daily  pantoprazole  Injectable 40milliGRAM(s) IV Push daily  dextrose 5% + lactated ringers. 1000milliLiter(s) IV Continuous <Continuous>    MEDICATIONS  (PRN):      Vital Signs Last 24 Hrs  T(C): 36.6, Max: 37 (06-18 @ 01:18)  T(F): 97.8, Max: 98.6 (06-18 @ 01:18)  HR: 56 (56 - 76)  BP: 103/58 (101/64 - 127/87)  BP(mean): 83 (83 - 101)  RR: 18 (18 - 35)  SpO2: 98% (96% - 100%)    I&O's Detail    I & Os for current day (as of 18 Jun 2017 09:36)  =============================================  IN:    dextrose 5% + lactated ringers.: 1900 ml    IV PiggyBack: 550 ml    lactated ringers.: 500 ml    Total IN: 2950 ml  ---------------------------------------------  OUT:    Nasoenteral Tube: 850 ml    Indwelling Catheter - Urethral: 635 ml    Voided: 600 ml    Total OUT: 2085 ml  ---------------------------------------------  Total NET: 865 ml      Daily     Daily     LABS:                        10.3   7.7   )-----------( 282      ( 17 Jun 2017 06:44 )             30.2     06-17    140  |  109<H>  |  6<L>  ----------------------------<  82  3.6   |  16<L>  |  0.41<L>    Ca    7.9<L>      17 Jun 2017 06:44  Phos  2.1     06-17  Mg     3.1     06-17    TPro  4.7<L>  /  Alb  2.5<L>  /  TBili  0.3  /  DBili  x   /  AST  14  /  ALT  15  /  AlkPhos  52  06-17    PT/INR - ( 16 Jun 2017 13:20 )   PT: 12.5 sec;   INR: 1.14 ratio         PTT - ( 16 Jun 2017 13:20 )  PTT:26.3 sec      RADIOLOGY & ADDITIONAL STUDIES:

## 2017-06-18 NOTE — PROGRESS NOTE ADULT - ASSESSMENT
57F GOO 2/2 obstructing gastric mass s/p EGD, R mainstem intubation  - NPO/NGT  - f/u med/onc  - f/u GIF  - f/u GI    Pt seen w/Dr. Gutierres

## 2017-06-19 LAB
ANION GAP SERPL CALC-SCNC: 10 MMOL/L — SIGNIFICANT CHANGE UP (ref 5–17)
ANION GAP SERPL CALC-SCNC: 10 MMOL/L — SIGNIFICANT CHANGE UP (ref 5–17)
BUN SERPL-MCNC: 3 MG/DL — LOW (ref 7–23)
BUN SERPL-MCNC: 4 MG/DL — LOW (ref 7–23)
CALCIUM SERPL-MCNC: 8 MG/DL — LOW (ref 8.4–10.5)
CALCIUM SERPL-MCNC: 8.7 MG/DL — SIGNIFICANT CHANGE UP (ref 8.4–10.5)
CHLORIDE SERPL-SCNC: 107 MMOL/L — SIGNIFICANT CHANGE UP (ref 96–108)
CHLORIDE SERPL-SCNC: 107 MMOL/L — SIGNIFICANT CHANGE UP (ref 96–108)
CO2 SERPL-SCNC: 26 MMOL/L — SIGNIFICANT CHANGE UP (ref 22–31)
CO2 SERPL-SCNC: 27 MMOL/L — SIGNIFICANT CHANGE UP (ref 22–31)
CREAT SERPL-MCNC: 0.44 MG/DL — LOW (ref 0.5–1.3)
CREAT SERPL-MCNC: 0.46 MG/DL — LOW (ref 0.5–1.3)
GLUCOSE SERPL-MCNC: 107 MG/DL — HIGH (ref 70–99)
GLUCOSE SERPL-MCNC: 120 MG/DL — HIGH (ref 70–99)
HCT VFR BLD CALC: 32.3 % — LOW (ref 34.5–45)
HGB BLD-MCNC: 10.5 G/DL — LOW (ref 11.5–15.5)
MAGNESIUM SERPL-MCNC: 1.9 MG/DL — SIGNIFICANT CHANGE UP (ref 1.6–2.6)
MCHC RBC-ENTMCNC: 30 PG — SIGNIFICANT CHANGE UP (ref 27–34)
MCHC RBC-ENTMCNC: 32.5 GM/DL — SIGNIFICANT CHANGE UP (ref 32–36)
MCV RBC AUTO: 92.2 FL — SIGNIFICANT CHANGE UP (ref 80–100)
PHOSPHATE SERPL-MCNC: 2.9 MG/DL — SIGNIFICANT CHANGE UP (ref 2.5–4.5)
PLATELET # BLD AUTO: 266 K/UL — SIGNIFICANT CHANGE UP (ref 150–400)
POTASSIUM SERPL-MCNC: 3.2 MMOL/L — LOW (ref 3.5–5.3)
POTASSIUM SERPL-MCNC: 3.2 MMOL/L — LOW (ref 3.5–5.3)
POTASSIUM SERPL-SCNC: 3.2 MMOL/L — LOW (ref 3.5–5.3)
POTASSIUM SERPL-SCNC: 3.2 MMOL/L — LOW (ref 3.5–5.3)
RBC # BLD: 3.51 M/UL — LOW (ref 3.8–5.2)
RBC # FLD: 13.5 % — SIGNIFICANT CHANGE UP (ref 10.3–14.5)
SODIUM SERPL-SCNC: 143 MMOL/L — SIGNIFICANT CHANGE UP (ref 135–145)
SODIUM SERPL-SCNC: 144 MMOL/L — SIGNIFICANT CHANGE UP (ref 135–145)
WBC # BLD: 3.8 K/UL — SIGNIFICANT CHANGE UP (ref 3.8–10.5)
WBC # FLD AUTO: 3.8 K/UL — SIGNIFICANT CHANGE UP (ref 3.8–10.5)

## 2017-06-19 PROCEDURE — 99232 SBSQ HOSP IP/OBS MODERATE 35: CPT

## 2017-06-19 PROCEDURE — 99232 SBSQ HOSP IP/OBS MODERATE 35: CPT | Mod: GC

## 2017-06-19 RX ORDER — MAGNESIUM SULFATE 500 MG/ML
2 VIAL (ML) INJECTION ONCE
Qty: 0 | Refills: 0 | Status: COMPLETED | OUTPATIENT
Start: 2017-06-19 | End: 2017-06-19

## 2017-06-19 RX ORDER — POTASSIUM CHLORIDE 20 MEQ
10 PACKET (EA) ORAL
Qty: 0 | Refills: 0 | Status: COMPLETED | OUTPATIENT
Start: 2017-06-19 | End: 2017-06-19

## 2017-06-19 RX ADMIN — PANTOPRAZOLE SODIUM 40 MILLIGRAM(S): 20 TABLET, DELAYED RELEASE ORAL at 11:23

## 2017-06-19 RX ADMIN — ENOXAPARIN SODIUM 40 MILLIGRAM(S): 100 INJECTION SUBCUTANEOUS at 11:21

## 2017-06-19 RX ADMIN — Medication 100 MILLIEQUIVALENT(S): at 10:00

## 2017-06-19 RX ADMIN — Medication 100 MILLIEQUIVALENT(S): at 14:00

## 2017-06-19 RX ADMIN — Medication 100 MILLIEQUIVALENT(S): at 11:20

## 2017-06-19 RX ADMIN — Medication 50 GRAM(S): at 22:02

## 2017-06-19 RX ADMIN — Medication 100 MILLIEQUIVALENT(S): at 23:02

## 2017-06-19 NOTE — PROGRESS NOTE ADULT - ASSESSMENT
57F GOO 2/2 obstructing gastric mass s/p EGD, R mainstem intubation  - NPO/NGT  - f/u med/onc  - f/u GIF  - f/u GI    Pt seen w/Dr. Gutierres 57F GOO 2/2 obstructing gastric mass s/p EGD, R mainstem intubation  - NPO/NGT  - f/u med/onc  - f/u GIFxn  - f/u GI- ?placement of NJ tube  - K 3.2 - KCL 10 meq x3 doses ordered, f/u repeat

## 2017-06-19 NOTE — PROGRESS NOTE ADULT - SUBJECTIVE AND OBJECTIVE BOX
chief complaint : pt with gastric outlet obstruction    SUBJECTIVE / OVERNIGHT EVENTS: pt denies abd pain, nausea, vomiting     NGT +    MEDICATIONS  (STANDING):  enoxaparin Injectable 40milliGRAM(s) SubCutaneous daily  pantoprazole  Injectable 40milliGRAM(s) IV Push daily  dextrose 5% + lactated ringers. 1000milliLiter(s) IV Continuous <Continuous>  ondansetron Injectable 4milliGRAM(s) IV Push once  potassium chloride  10 mEq/100 mL IVPB 10milliEquivalent(s) IV Intermittent every 1 hour    MEDICATIONS  (PRN):  tetracaine/benzocaine/butamben Spray 1Spray(s) Topical three times a day PRN throat pain      Vital Signs Last 24 Hrs  T(C): 36.9, Max: 37 ( @ 22:14)  T(F): 98.4, Max: 98.6 ( @ 22:14)  HR: 62 (54 - 62)  BP: 114/74 (101/61 - 119/74)  BP(mean): --  RR: 17 (17 - 18)  SpO2: 97% (96% - 97%)    Constitutional: No fever, fatigue  Skin: No rash.  Eyes: No recent vision problems or eye pain.  ENT: No congestion, ear pain, or sore throat.  Cardiovascular: No chest pain or palpation.  Respiratory: No cough, shortness of breath, congestion, or wheezing.  Gastrointestinal: No abdominal pain, nausea, vomiting, or diarrhea.  Genitourinary: No dysuria.  Musculoskeletal: No joint swelling.  Neurologic: No headache.    PHYSICAL EXAM  NECK: Supple, No JVD  CHEST/LUNG: dec br  HEART:  S1 , S2 +  ABDOMEN: soft, mild distension +, bs+  EXTREMITIES:  no edema    LABS:      143  |  107  |  4<L>  ----------------------------<  120<H>  3.2<L>   |  26  |  0.44<L>    Ca    8.0<L>      2017 07:16  Phos  2.9       Mg     1.9           Creatinine Trend: 0.44 <--, 0.46 <--, 0.41 <--, 0.38 <--, 0.43 <--, 0.50 <--, 0.44 <--, 0.47 <--, 0.46 <--, 0.52 <--, 0.60 <--, 0.76 <--                        10.5   3.8   )-----------( 266      ( 2017 07:16 )             32.3     Urine Studies:  Urinalysis Basic - ( 2017 21:07 )    Color: Yellow / Appearance: Clear / S.022 / pH:   Gluc:  / Ketone: Large  / Bili: Negative / Urobili: Negative   Blood:  / Protein: 30 mg/dL / Nitrite: Negative   Leuk Esterase: Moderate / RBC: 5-10 /HPF / WBC 11-25 /HPF   Sq Epi:  / Non Sq Epi: OCC /HPF / Bacteria:

## 2017-06-19 NOTE — PROGRESS NOTE ADULT - ASSESSMENT
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  Post-procedure CXR showed L lung opacification, got intubated for airway, s/p sicu stay , successfully extubated   - ngt, poss parenteral nutrition   - f/u biopsy  - had extensive lengthy discussion with pts family about pts clinical status and management plan    Hypokalemia - replete and f/u

## 2017-06-19 NOTE — PROGRESS NOTE ADULT - SUBJECTIVE AND OBJECTIVE BOX
[facility] GENERAL SURGERY DAILY PROGRESS NOTE:       Subjective: NGT fell out o/n. NGT re-inserted without issue. Pt denies N/V.     Objective:  Gen: NAD, AOx3  Abd: soft, NT, ND        MEDICATIONS  (STANDING):  enoxaparin Injectable 40milliGRAM(s) SubCutaneous daily  pantoprazole  Injectable 40milliGRAM(s) IV Push daily  dextrose 5% + lactated ringers. 1000milliLiter(s) IV Continuous <Continuous>    MEDICATIONS  (PRN):          RADIOLOGY & ADDITIONAL STUDIES: [facility] GENERAL SURGERY DAILY PROGRESS NOTE:       Subjective: NGT fell out o/n. NGT re-inserted without issue. Pt denies N/V.   no GI function      MEDICATIONS  (STANDING):  enoxaparin Injectable 40milliGRAM(s) SubCutaneous daily  pantoprazole  Injectable 40milliGRAM(s) IV Push daily  dextrose 5% + lactated ringers. 1000milliLiter(s) IV Continuous <Continuous>    Objective:  Gen: NAD, A&Ox3  Abd: soft, NT, ND          no rebound/guarding

## 2017-06-19 NOTE — PROGRESS NOTE ADULT - SUBJECTIVE AND OBJECTIVE BOX
Chief Complaint:  Gastric outlet obstruction      Interval Events: No acute events overnight. Denies abdominal pain/nausea/vomiting. Denies shortness of breath. NG output= 800cc/24hrs     Allergies:  No Known Allergies    Hospital Medications:  enoxaparin Injectable 40milliGRAM(s) SubCutaneous daily  pantoprazole  Injectable 40milliGRAM(s) IV Push daily  dextrose 5% + lactated ringers. 1000milliLiter(s) IV Continuous <Continuous>  tetracaine/benzocaine/butamben Spray 1Spray(s) Topical three times a day PRN  ondansetron Injectable 4milliGRAM(s) IV Push once  potassium chloride  10 mEq/100 mL IVPB 10milliEquivalent(s) IV Intermittent every 1 hour      PMHX/PSHX:  No pertinent past medical history  No significant past surgical history    ROS: as per hpi       PHYSICAL EXAM:   Vital Signs:  Vital Signs Last 24 Hrs  T(C): 36.9, Max: 37 (06-18 @ 22:14)  T(F): 98.5, Max: 98.6 (06-18 @ 22:14)  HR: 55 (54 - 62)  BP: 119/74 (101/61 - 119/74)  BP(mean): --  RR: 17 (17 - 18)  SpO2: 97% (96% - 98%)  Daily     Daily     GENERAL:  NAD, NG tube   HEENT: sclera -anicteric  CHEST:  breath sounds clear  HEART:  Regular rhythm, S1, S2  ABDOMEN:  Soft, mild distension, non-tender, normoactive bowel sounds  NEURO:  Alert, oriented    LABS:                        10.5   3.8   )-----------( 266      ( 19 Jun 2017 07:16 )             32.3     06-19    143  |  107  |  4<L>  ----------------------------<  120<H>  3.2<L>   |  26  |  0.44<L>    Ca    8.0<L>      19 Jun 2017 07:16  Phos  2.9     06-19  Mg     1.9     06-19                Imaging:

## 2017-06-19 NOTE — PROGRESS NOTE ADULT - ATTENDING COMMENTS
I have seen and examined the patient, reviewed the laboratory and radiologic data and agree with practitioner's history, physical assessment, plan and reviewed and edited where appropriate.    Plan:  1) OR planning  2) NGT/NPO  3) TPN consult

## 2017-06-19 NOTE — PROGRESS NOTE ADULT - ASSESSMENT
Impression:  1. Gastric outlet obstruction: likely due to gastric CA, T3N1 - s/p EUS for staging and biopsy  2. Hypoxia due to ?atelectasis - resolved    Recommend:  - NPO, IVF, NG tube   - Follow-up pathology from gastric mass biopsy  - If biopsy confirms malignancy, follow-up with surgical oncology team regarding possible surgery; otherwise stenting can be offered

## 2017-06-19 NOTE — PROGRESS NOTE ADULT - ATTENDING COMMENTS
Impression:    #1.  Biopsy-proven malignant gastric outlet obstruction from gastric cancer involving the prepyloric antrum, suspect T3N1 by EUS/CT combined staging.    Recommendations:    #1.  Discussed option of duodenal stent versus primary surgical management with Dr. Koffi Trent, surgical oncologist.  He plans surgical management without duodenal stent, which is reasonable.      #2.  Discussed with patient and son as well.     We'll sign off, please call for questions or concerns.    [phone]

## 2017-06-19 NOTE — PHYSICAL THERAPY INITIAL EVALUATION ADULT - PERTINENT HX OF CURRENT PROBLEM, REHAB EVAL
58 y/o female with h/o 6 weeks of abdominal pain, nausea, emesis, inability to tolerate liquids/solids & 20 pound weight loss in past 1 month now presents to the ED after her 2nd EGD in 2 days where her second opinion gastroenterologist (Dr. Newman) found an obstructing gastric mass during outpatient EGD.; CXR showed L lung opacification, got intubated for airway, s/p sicu stay , successfully extubated

## 2017-06-20 DIAGNOSIS — K86.9 DISEASE OF PANCREAS, UNSPECIFIED: ICD-10-CM

## 2017-06-20 PROBLEM — Z00.00 ENCOUNTER FOR PREVENTIVE HEALTH EXAMINATION: Status: ACTIVE | Noted: 2017-06-20

## 2017-06-20 LAB
ANION GAP SERPL CALC-SCNC: 11 MMOL/L — SIGNIFICANT CHANGE UP (ref 5–17)
BUN SERPL-MCNC: 3 MG/DL — LOW (ref 7–23)
CALCIUM SERPL-MCNC: 8.5 MG/DL — SIGNIFICANT CHANGE UP (ref 8.4–10.5)
CHLORIDE SERPL-SCNC: 105 MMOL/L — SIGNIFICANT CHANGE UP (ref 96–108)
CO2 SERPL-SCNC: 25 MMOL/L — SIGNIFICANT CHANGE UP (ref 22–31)
CREAT SERPL-MCNC: 0.43 MG/DL — LOW (ref 0.5–1.3)
GLUCOSE SERPL-MCNC: 100 MG/DL — HIGH (ref 70–99)
HCT VFR BLD CALC: 34.7 % — SIGNIFICANT CHANGE UP (ref 34.5–45)
HGB BLD-MCNC: 11.3 G/DL — LOW (ref 11.5–15.5)
MAGNESIUM SERPL-MCNC: 2.4 MG/DL — SIGNIFICANT CHANGE UP (ref 1.6–2.6)
MCHC RBC-ENTMCNC: 28.6 PG — SIGNIFICANT CHANGE UP (ref 27–34)
MCHC RBC-ENTMCNC: 32.6 GM/DL — SIGNIFICANT CHANGE UP (ref 32–36)
MCV RBC AUTO: 87.8 FL — SIGNIFICANT CHANGE UP (ref 80–100)
PHOSPHATE SERPL-MCNC: 2.7 MG/DL — SIGNIFICANT CHANGE UP (ref 2.5–4.5)
PLATELET # BLD AUTO: 339 K/UL — SIGNIFICANT CHANGE UP (ref 150–400)
POTASSIUM SERPL-MCNC: 3.7 MMOL/L — SIGNIFICANT CHANGE UP (ref 3.5–5.3)
POTASSIUM SERPL-SCNC: 3.7 MMOL/L — SIGNIFICANT CHANGE UP (ref 3.5–5.3)
RBC # BLD: 3.95 M/UL — SIGNIFICANT CHANGE UP (ref 3.8–5.2)
RBC # FLD: 14.6 % — HIGH (ref 10.3–14.5)
SODIUM SERPL-SCNC: 141 MMOL/L — SIGNIFICANT CHANGE UP (ref 135–145)
SURGICAL PATHOLOGY STUDY: SIGNIFICANT CHANGE UP
WBC # BLD: 5.08 K/UL — SIGNIFICANT CHANGE UP (ref 3.8–10.5)
WBC # FLD AUTO: 5.08 K/UL — SIGNIFICANT CHANGE UP (ref 3.8–10.5)

## 2017-06-20 PROCEDURE — 99232 SBSQ HOSP IP/OBS MODERATE 35: CPT

## 2017-06-20 PROCEDURE — 99231 SBSQ HOSP IP/OBS SF/LOW 25: CPT | Mod: GC

## 2017-06-20 RX ORDER — POTASSIUM CHLORIDE 20 MEQ
10 PACKET (EA) ORAL
Qty: 0 | Refills: 0 | Status: COMPLETED | OUTPATIENT
Start: 2017-06-20 | End: 2017-06-20

## 2017-06-20 RX ORDER — SODIUM CHLORIDE 0.65 %
1 AEROSOL, SPRAY (ML) NASAL
Qty: 0 | Refills: 0 | Status: DISCONTINUED | OUTPATIENT
Start: 2017-06-20 | End: 2017-06-23

## 2017-06-20 RX ADMIN — PANTOPRAZOLE SODIUM 40 MILLIGRAM(S): 20 TABLET, DELAYED RELEASE ORAL at 11:39

## 2017-06-20 RX ADMIN — ENOXAPARIN SODIUM 40 MILLIGRAM(S): 100 INJECTION SUBCUTANEOUS at 11:38

## 2017-06-20 RX ADMIN — Medication 100 MILLIEQUIVALENT(S): at 13:33

## 2017-06-20 RX ADMIN — Medication 100 MILLIEQUIVALENT(S): at 03:11

## 2017-06-20 RX ADMIN — Medication 100 MILLIEQUIVALENT(S): at 16:16

## 2017-06-20 RX ADMIN — Medication 100 MILLIEQUIVALENT(S): at 16:30

## 2017-06-20 RX ADMIN — Medication 1 SPRAY(S): at 18:40

## 2017-06-20 RX ADMIN — Medication 100 MILLIEQUIVALENT(S): at 01:09

## 2017-06-20 NOTE — PROGRESS NOTE ADULT - ASSESSMENT
Impression:  Gastric outlet obstruction: due to gastric CA, stage T3N1    Recommend:  - NPO, IVF, NG tube   - Plan for surgical management without duodenal stent  - Nasojejunal tube cannot be placed endoscopically due to obstruction and anatomy   - Care as per surgery team

## 2017-06-20 NOTE — DIETITIAN INITIAL EVALUATION ADULT. - NS AS NUTRI INTERV MEALS SNACK
Other (specify)/Medical team to advance diet when medically feasible via tolerated route. Consider advancing to clear liquid, followed by low fiber diet as tolerated. If NPO exceeds 7 days, recommend team consider alternate means of nutrition. Discussed nutrition concerns with PA. RD to remain available for further nutritional interventions as indicated/requested by medical team/pt. Medical team to advance diet when medically feasible via tolerated route. If NPO exceeds 7 days, recommend team consider alternate means of nutrition. Discussed nutrition concerns with PA. RD to remain available for further nutritional interventions as indicated/requested by medical team/pt./Other (specify)

## 2017-06-20 NOTE — PROGRESS NOTE ADULT - SUBJECTIVE AND OBJECTIVE BOX
chief complaint : pt with gastric outlet obstruction    SUBJECTIVE / OVERNIGHT EVENTS: pt denies abd pain, nausea, vomiting     NGT +    MEDICATIONS  (STANDING):  enoxaparin Injectable 40milliGRAM(s) SubCutaneous daily  pantoprazole  Injectable 40milliGRAM(s) IV Push daily  dextrose 5% + lactated ringers. 1000milliLiter(s) IV Continuous <Continuous>  ondansetron Injectable 4milliGRAM(s) IV Push once    MEDICATIONS  (PRN):  tetracaine/benzocaine/butamben Spray 1Spray(s) Topical three times a day PRN throat pain  sodium chloride 0.65% Nasal 1Spray(s) Both Nostrils every 1 hour PRN Congestion    Vital Signs Last 24 Hrs  T(C): 36.8, Max: 37 ( @ 01:08)  T(F): 98.3, Max: 98.6 ( @ 01:08)  HR: 68 (57 - 77)  BP: 110/68 (99/64 - 122/70)  BP(mean): --  RR: 18 (17 - 18)  SpO2: 98% (96% - 98%)      Constitutional: No fever, fatigue  Skin: No rash.  Eyes: No recent vision problems or eye pain.  ENT: No congestion, ear pain, or sore throat.  Cardiovascular: No chest pain or palpation.  Respiratory: No cough, shortness of breath, congestion, or wheezing.  Gastrointestinal: No abdominal pain, nausea, vomiting, or diarrhea.  Genitourinary: No dysuria.  Musculoskeletal: No joint swelling.  Neurologic: No headache.    PHYSICAL EXAM  NECK: Supple, No JVD  CHEST/LUNG: dec br  HEART:  S1 , S2 +  ABDOMEN: soft, mild distension +, bs+  EXTREMITIES:  no edema    LABS:      141  |  105  |  3<L>  ----------------------------<  100<H>  3.7   |  25  |  0.43<L>    Ca    8.5      2017 09:39  Phos  2.7     06-20  Mg     2.4     -20      Creatinine Trend: 0.43 <--, 0.46 <--, 0.44 <--, 0.46 <--, 0.41 <--, 0.38 <--, 0.43 <--, 0.50 <--, 0.44 <--, 0.47 <--, 0.46 <--, 0.52 <--, 0.60 <--, 0.76 <--                        11.3   5.08  )-----------( 339      ( 2017 09:37 )             34.7     Urine Studies:  Urinalysis Basic - ( 2017 21:07 )    Color: Yellow / Appearance: Clear / S.022 / pH:   Gluc:  / Ketone: Large  / Bili: Negative / Urobili: Negative   Blood:  / Protein: 30 mg/dL / Nitrite: Negative   Leuk Esterase: Moderate / RBC: 5-10 /HPF / WBC 11-25 /HPF   Sq Epi:  / Non Sq Epi: OCC /HPF / Bacteria:

## 2017-06-20 NOTE — DIETITIAN INITIAL EVALUATION ADULT. - ORAL INTAKE PTA
Pt reports poor po intake for 1.5 months PTA, reports inability to tolerate soups, fluids, finely pureed soups. Reports emesis within 2-3 hrs after eating./poor

## 2017-06-20 NOTE — PROGRESS NOTE ADULT - ATTENDING COMMENTS
I have seen and examined the patient, reviewed the laboratory and radiologic data and agree with practitioner's history, physical assessment, plan and reviewed and edited where appropriate.    Plan:  1) TPN consultation  2) NPO/NGT/IVF  3) OR planning  4) Awaiting pathology

## 2017-06-20 NOTE — DIETITIAN INITIAL EVALUATION ADULT. - ENERGY NEEDS
Height: 63 inches, Weight: 127 pounds (pt stated wt)- noted with chart wt of 143.9 lbs- suspect inaccuracy/based on pt's reported wt prior to loss. BMI: 22.5 kg/m2 IBW: 115 pounds (+/-10%), %IBW: 110%. No edema, no pressure ulcers.

## 2017-06-20 NOTE — PROGRESS NOTE ADULT - SUBJECTIVE AND OBJECTIVE BOX
[facility] GENERAL SURGERY DAILY PROGRESS NOTE:       Subjective: No acute events overnight. Pt denies N/V.   no GI function      MEDICATIONS  (STANDING):  enoxaparin Injectable 40milliGRAM(s) SubCutaneous daily  pantoprazole  Injectable 40milliGRAM(s) IV Push daily  dextrose 5% + lactated ringers. 1000milliLiter(s) IV Continuous <Continuous>    Objective:  Gen: NAD, A&Ox3  Abd: soft, NT, ND          no rebound/guarding [facility] GENERAL SURGERY DAILY PROGRESS NOTE:       Subjective: No acute events overnight. Pt denies N/V.   no GI function      Objective:  Gen: NAD, A&Ox3  Abd: soft, NT, ND          no rebound/guarding        MEDICATIONS  (STANDING):  enoxaparin Injectable 40milliGRAM(s) SubCutaneous daily  pantoprazole  Injectable 40milliGRAM(s) IV Push daily  dextrose 5% + lactated ringers. 1000milliLiter(s) IV Continuous <Continuous>  ondansetron Injectable 4milliGRAM(s) IV Push once  potassium chloride  10 mEq/100 mL IVPB 10milliEquivalent(s) IV Intermittent every 1 hour    MEDICATIONS  (PRN):  tetracaine/benzocaine/butamben Spray 1Spray(s) Topical three times a day PRN throat pain      Vital Signs Last 24 Hrs  T(C): 37, Max: 37 ( @ 14:14)  T(F): 98.6, Max: 98.6 ( @ 14:14)  HR: 70 (57 - 70)  BP: 115/70 (99/64 - 123/82)  BP(mean): --  RR: 17 (17 - 18)  SpO2: 97% (96% - 98%)    I&O's Detail  I & Os for 24h ending 2017 07:00  =============================================  IN:    dextrose 5% + lactated ringers.: 1200 ml    IV PiggyBack: 350 ml    Total IN: 1550 ml  ---------------------------------------------  OUT:    Voided: 1250 ml    Nasoenteral Tube: 900 ml    Total OUT: 2150 ml  ---------------------------------------------  Total NET: -600 ml    I & Os for current day (as of 2017 14:00)  =============================================  IN:    IV PiggyBack: 100 ml    Total IN: 100 ml  ---------------------------------------------  OUT:    Nasoenteral Tube: 250 ml    Total OUT: 250 ml  ---------------------------------------------  Total NET: -150 ml      Daily     Daily Weight in k.6 (2017 10:15)    LABS:                        11.3   5.08  )-----------( 339      ( 2017 09:37 )             34.7     06-20    141  |  105  |  3<L>  ----------------------------<  100<H>  3.7   |  25  |  0.43<L>    Ca    8.5      2017 09:39  Phos  2.7       Mg     2.4                 RADIOLOGY & ADDITIONAL STUDIES:

## 2017-06-20 NOTE — PROGRESS NOTE ADULT - ASSESSMENT
57F GOO 2/2 obstructing gastric mass s/p EGD, R mainstem intubation  - NPO/NGT  - replete K+  - f/u med/onc  - f/u GIFxn  - f/u GI- ?placement of NJ tube  - K 3.2 - KCL 10 meq x3 doses ordered, f/u repeat 57F GOO 2/2 obstructing gastric mass s/p EGD, R mainstem intubation  - NPO/NGT  - f/u med/onc  - f/u GIFxn

## 2017-06-20 NOTE — CHART NOTE - NSCHARTNOTEFT_GEN_A_CORE
Upon Nutritional Assessment by the Registered Dietitian your patient was determined to meet criteria / has evidence of the following diagnosis/diagnoses:          [ ]  Mild Protein Calorie Malnutrition        [ ]  Moderate Protein Calorie Malnutrition        [X ] Severe Protein Calorie Malnutrition        [ ] Unspecified Protein Calorie Malnutrition        [ ] Underweight / BMI <19        [ ] Morbid Obesity / BMI > 40      Findings as based on:  [X ] Comprehensive nutrition assessment   [X ] Nutrition Focused Physical Exam  [X ] Other: gastric obstruction, 20lb(13.6%) wt loss + poor intake PTA (<75% estimated nutrient needs)x1.5 months      Nutrition Plan/Recommendations:  Medical team to advance diet when medically feasible via tolerated route. If NPO exceeds 7 days, recommend team consider alternate means of nutrition.         PROVIDER Section:     By signing this assessment you are acknowledging and agree with the diagnosis/diagnoses assigned by the Registered Dietitian    Comments:

## 2017-06-20 NOTE — DIETITIAN INITIAL EVALUATION ADULT. - OTHER INFO
Pt admitted with gastric outlet obstruction secondary to obstructing gastric mass. Pt seen for length of stay and extended NPO status (day#6). Pt NPO with NGT to suction (output: 6/2070=127ll, 6/1970=999oc, 6/1809=7366wm). Per chart, pt with ?NJ tube placement, pending GI. Per Dr. Evans, pt not candidate for TPN at this time. Nutrition concerns discussed, team aware. Pt pending OR Friday for gastrectomy.

## 2017-06-20 NOTE — DIETITIAN INITIAL EVALUATION ADULT. - PHYSICAL APPEARANCE
Nutrition focused physical exam reveals severe muscle wasting in temporal and clavicle region; fat pad loss in orbital region./underweight

## 2017-06-20 NOTE — PROGRESS NOTE ADULT - SUBJECTIVE AND OBJECTIVE BOX
Chief Complaint:  malignant gastric outlet obstruction    Interval Events:  No acute events overnight. Denies abdominal pain/nausea/vomiting.    Allergies:  No Known Allergies    Hospital Medications:  enoxaparin Injectable 40milliGRAM(s) SubCutaneous daily  pantoprazole  Injectable 40milliGRAM(s) IV Push daily  dextrose 5% + lactated ringers. 1000milliLiter(s) IV Continuous <Continuous>  tetracaine/benzocaine/butamben Spray 1Spray(s) Topical three times a day PRN  ondansetron Injectable 4milliGRAM(s) IV Push once      PMHX/PSHX:  No pertinent past medical history  No significant past surgical history    ROS: as per hpi       PHYSICAL EXAM:   Vital Signs:  Vital Signs Last 24 Hrs  T(C): 37, Max: 37 (06-19 @ 14:14)  T(F): 98.6, Max: 98.6 (06-19 @ 14:14)  HR: 70 (57 - 70)  BP: 115/70 (99/64 - 123/82)  BP(mean): --  RR: 17 (17 - 18)  SpO2: 97% (96% - 98%)  Daily     Daily     GENERAL:  NAD, NG tube   HEENT: sclera -anicteric  CHEST:  breath sounds clear  HEART:  Regular rhythm, S1, S2  ABDOMEN:  Soft, mild distension, non-tender, normoactive bowel sounds  NEURO:  Alert, oriented    LABS:                        10.5   3.8   )-----------( 266      ( 19 Jun 2017 07:16 )             32.3     06-20    141  |  105  |  3<L>  ----------------------------<  100<H>  3.7   |  25  |  0.43<L>    Ca    8.5      20 Jun 2017 09:39  Phos  2.7     06-20  Mg     2.4     06-20                Imaging:

## 2017-06-21 LAB
ANION GAP SERPL CALC-SCNC: 9 MMOL/L — SIGNIFICANT CHANGE UP (ref 5–17)
BUN SERPL-MCNC: 4 MG/DL — LOW (ref 7–23)
CALCIUM SERPL-MCNC: 8.5 MG/DL — SIGNIFICANT CHANGE UP (ref 8.4–10.5)
CHLORIDE SERPL-SCNC: 107 MMOL/L — SIGNIFICANT CHANGE UP (ref 96–108)
CO2 SERPL-SCNC: 25 MMOL/L — SIGNIFICANT CHANGE UP (ref 22–31)
CREAT SERPL-MCNC: 0.41 MG/DL — LOW (ref 0.5–1.3)
GLUCOSE SERPL-MCNC: 102 MG/DL — HIGH (ref 70–99)
HCT VFR BLD CALC: 31.3 % — LOW (ref 34.5–45)
HGB BLD-MCNC: 10.3 G/DL — LOW (ref 11.5–15.5)
MAGNESIUM SERPL-MCNC: 2.1 MG/DL — SIGNIFICANT CHANGE UP (ref 1.6–2.6)
MCHC RBC-ENTMCNC: 29.3 PG — SIGNIFICANT CHANGE UP (ref 27–34)
MCHC RBC-ENTMCNC: 32.9 GM/DL — SIGNIFICANT CHANGE UP (ref 32–36)
MCV RBC AUTO: 88.9 FL — SIGNIFICANT CHANGE UP (ref 80–100)
PHOSPHATE SERPL-MCNC: 3.2 MG/DL — SIGNIFICANT CHANGE UP (ref 2.5–4.5)
PLATELET # BLD AUTO: 300 K/UL — SIGNIFICANT CHANGE UP (ref 150–400)
POTASSIUM SERPL-MCNC: 3.6 MMOL/L — SIGNIFICANT CHANGE UP (ref 3.5–5.3)
POTASSIUM SERPL-SCNC: 3.6 MMOL/L — SIGNIFICANT CHANGE UP (ref 3.5–5.3)
RBC # BLD: 3.52 M/UL — LOW (ref 3.8–5.2)
RBC # FLD: 14.7 % — HIGH (ref 10.3–14.5)
SODIUM SERPL-SCNC: 141 MMOL/L — SIGNIFICANT CHANGE UP (ref 135–145)
WBC # BLD: 5.35 K/UL — SIGNIFICANT CHANGE UP (ref 3.8–10.5)
WBC # FLD AUTO: 5.35 K/UL — SIGNIFICANT CHANGE UP (ref 3.8–10.5)

## 2017-06-21 PROCEDURE — 99232 SBSQ HOSP IP/OBS MODERATE 35: CPT

## 2017-06-21 PROCEDURE — 71010: CPT | Mod: 26

## 2017-06-21 RX ORDER — ACETAMINOPHEN 500 MG
1000 TABLET ORAL ONCE
Qty: 0 | Refills: 0 | Status: COMPLETED | OUTPATIENT
Start: 2017-06-21 | End: 2017-06-21

## 2017-06-21 RX ORDER — POTASSIUM CHLORIDE 20 MEQ
10 PACKET (EA) ORAL
Qty: 0 | Refills: 0 | Status: COMPLETED | OUTPATIENT
Start: 2017-06-21 | End: 2017-06-21

## 2017-06-21 RX ADMIN — ENOXAPARIN SODIUM 40 MILLIGRAM(S): 100 INJECTION SUBCUTANEOUS at 18:19

## 2017-06-21 RX ADMIN — Medication 400 MILLIGRAM(S): at 04:50

## 2017-06-21 RX ADMIN — PANTOPRAZOLE SODIUM 40 MILLIGRAM(S): 20 TABLET, DELAYED RELEASE ORAL at 18:19

## 2017-06-21 RX ADMIN — Medication 400 MILLIGRAM(S): at 20:10

## 2017-06-21 RX ADMIN — Medication 100 MILLIEQUIVALENT(S): at 21:05

## 2017-06-21 RX ADMIN — Medication 100 MILLIEQUIVALENT(S): at 18:19

## 2017-06-21 RX ADMIN — Medication 1000 MILLIGRAM(S): at 20:40

## 2017-06-21 NOTE — PROGRESS NOTE ADULT - ASSESSMENT
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  Post-procedure CXR showed L lung opacification, got intubated for airway, s/p sicu stay , successfully extubated   - ngt, NO TPN at present since pt going for OR on friday as per Dr. Evans  - f/u biopsy  - had extensive lengthy discussion with pts family about pts clinical status and management plan    Hypokalemia - replete and f/u

## 2017-06-21 NOTE — PROGRESS NOTE ADULT - ASSESSMENT
57F GOO 2/2 obstructing gastric mass s/p EGD, R mainstem intubation  - NPO/NGT  - f/u med/onc  - f/u GIFxn

## 2017-06-21 NOTE — CHART NOTE - NSCHARTNOTEFT_GEN_A_CORE
Source: Patient [ X]    Family [ X]     other [ X], [facility] PA (Hansa), RN (Terri), MD (Nathan)    Pt admitted with gastric outlet obstruction secondary to obstructing gastric mass. Pt last seen by RD . Extended NPO with NGT to suction continues (output: [id number], 47=339ah, 45=9422ik).    Diet : NPO      Patient reports: No N+V. No flatus. Small, pea sized BM noted .    Current Weight: No new wts. 6/15 admission/dosin.1kg    Pertinent Medications: MEDICATIONS  (STANDING):  enoxaparin Injectable 40milliGRAM(s) SubCutaneous daily  pantoprazole  Injectable 40milliGRAM(s) IV Push daily  dextrose 5% + lactated ringers. 1000milliLiter(s) IV Continuous <Continuous>  ondansetron Injectable 4milliGRAM(s) IV Push once    Pertinent Labs:   Na141 mmol/L Glu 102 mg/dL<H> K+ 3.6 mmol/L Cr  0.41 mg/dL<L> BUN 4 mg/dL<L> Phos 3.2 mg/dL    Skin: No edema. No pressure ulcers.     Estimated Needs:   [X ] no change since previous assessment  [ ] recalculated:       Previous Nutrition Diagnosis:  [X ] Malnutrition, Severe       Nutrition Diagnosis is [X ] ongoing  [ ] resolved [ ] not applicable        Interventions: Per discussion with PA & MD, Dr. Evans with plans for TPN. TPN ordered . TPN as per Dr. Evans.     Monitoring and Evaluation:     [X ] other: Monitor TPN tolerance. RD to remain available for further nutritional interventions as indicated/requested by medical team/pt.

## 2017-06-21 NOTE — CONSULT NOTE ADULT - SUBJECTIVE AND OBJECTIVE BOX
Patient is a 57y Female admitted for work up of gastric outlet obstruction secondary to gastric lesion    On admission she had been unable to tolerate any oral diet and had lost 20 lbs in the past month  PMG  No pertinent past medical history    Issues noted on this admission include    Gastric mass  Mild protein-calorie malnutrition  Hypokalemia  Gastric outlet obstruction    No significant past surgical history    Hgt, 160.02 cms  Wgt, 64.1  BMI 25    Vital Signs Last 24 Hrs  T(C): 36.7, Max: 36.9 (06-20 @ 14:46)  T(F): 98, Max: 98.5 (06-20 @ 14:46)  HR: 55 (55 - 77)  BP: 111/71 (95/57 - 122/70)  BP(mean): --  RR: 18 (18 - 18)  SpO2: 97% (96% - 99%)  06-21    141  |  107  |  4<L>  ----------------------------<  102<H>  3.6   |  25  |  0.41<L>    Ca    8.5      21 Jun 2017 08:27  Phos  3.2     06-21  Mg     2.1     06-21        CT scan on 6/14 was significant for:  IMPRESSION: Gastric outlet obstruction secondary to a mass in the   antrum/pylorus. Findings are concerning for adenocarcinoma. Tissue   sampling recommended.    Pt underwent endoscopy on  which showed the lesion and it was documented that a 9 mm scope could be passed through the lesion for purposes of biopsy.  At that time an NGT was reinserted but it not documented that any attempt was made to pass a naso enteric feeding tube past the lesion into the unobstructed bowel

## 2017-06-21 NOTE — PROGRESS NOTE ADULT - SUBJECTIVE AND OBJECTIVE BOX
[facility] GENERAL SURGERY DAILY PROGRESS NOTE:       Subjective: No acute events overnight. Pt denies N/V.   no GI function      Objective:  Gen: NAD, A&Ox3  Abd: soft, NT, ND          no rebound/guarding        MEDICATIONS  (STANDING):  enoxaparin Injectable 40milliGRAM(s) SubCutaneous daily  pantoprazole  Injectable 40milliGRAM(s) IV Push daily  dextrose 5% + lactated ringers. 1000milliLiter(s) IV Continuous <Continuous>  ondansetron Injectable 4milliGRAM(s) IV Push once  potassium chloride  10 mEq/100 mL IVPB 10milliEquivalent(s) IV Intermittent every 1 hour    MEDICATIONS  (PRN):  tetracaine/benzocaine/butamben Spray 1Spray(s) Topical three times a day PRN throat pain      Vital Signs Last 24 Hrs  T(C): 37, Max: 37 ( @ 14:14)  T(F): 98.6, Max: 98.6 ( @ 14:14)  HR: 70 (57 - 70)  BP: 115/70 (99/64 - 123/82)  BP(mean): --  RR: 17 (17 - 18)  SpO2: 97% (96% - 98%)    I&O's Detail  I & Os for 24h ending 2017 07:00  =============================================  IN:    dextrose 5% + lactated ringers.: 1200 ml    IV PiggyBack: 350 ml    Total IN: 1550 ml  ---------------------------------------------  OUT:    Voided: 1250 ml    Nasoenteral Tube: 900 ml    Total OUT: 2150 ml  ---------------------------------------------  Total NET: -600 ml    I & Os for current day (as of 2017 14:00)  =============================================  IN:    IV PiggyBack: 100 ml    Total IN: 100 ml  ---------------------------------------------  OUT:    Nasoenteral Tube: 250 ml    Total OUT: 250 ml  ---------------------------------------------  Total NET: -150 ml      Daily     Daily Weight in k.6 (2017 10:15)    LABS:                        11.3   5.08  )-----------( 339      ( 2017 09:37 )             34.7     06-20    141  |  105  |  3<L>  ----------------------------<  100<H>  3.7   |  25  |  0.43<L>    Ca    8.5      2017 09:39  Phos  2.7       Mg     2.4                 RADIOLOGY & ADDITIONAL STUDIES:

## 2017-06-21 NOTE — CONSULT NOTE ADULT - PROBLEM SELECTOR RECOMMENDATION 9
Since no attempt was made to pass NE tube at time of endoscopy will be required to start PN.  N.B. pt will need an j tube post op for nutrition unless that are anatomical or physiologic contraindications, i.e. carcinomatosis/ascites etc.  TPN is not approved for long term nutritional support in the absence of these findings

## 2017-06-21 NOTE — PROGRESS NOTE ADULT - ATTENDING COMMENTS
I have seen and examined the patient, reviewed the laboratory and radiologic data and agree with practitioner's history, physical assessment, plan and reviewed and edited where appropriate.    Plan:  1) TPN to start  2) NPO / NGT  3) Path confirmed as moderately differentiated adenocarcinoma  4) OR Friday - exlap, distal gastrectomy, possible venting gastrostomy, possible jejunostomy tube

## 2017-06-21 NOTE — CONSULT NOTE ADULT - ASSESSMENT
Pt with functional GOO secondary to gastric lesion  Pt remans NPO with note yesterday from GI stating that it was impossible to pass an NE tube even though there is no previous documentation of that.

## 2017-06-21 NOTE — PROGRESS NOTE ADULT - SUBJECTIVE AND OBJECTIVE BOX
chief complaint : pt with gastric outlet obstruction    SUBJECTIVE / OVERNIGHT EVENTS: pt denies abd pain, nausea, vomiting     NGT +  MEDICATIONS  (STANDING):  enoxaparin Injectable 40milliGRAM(s) SubCutaneous daily  pantoprazole  Injectable 40milliGRAM(s) IV Push daily  dextrose 5% + lactated ringers. 1000milliLiter(s) IV Continuous <Continuous>  ondansetron Injectable 4milliGRAM(s) IV Push once    MEDICATIONS  (PRN):  tetracaine/benzocaine/butamben Spray 1Spray(s) Topical three times a day PRN throat pain  sodium chloride 0.65% Nasal 1Spray(s) Both Nostrils every 1 hour PRN Congestion      Vital Signs Last 24 Hrs  T(C): 36.7, Max: 36.9 ( @ 14:46)  T(F): 98, Max: 98.5 ( @ 14:46)  HR: 55 (55 - 77)  BP: 111/71 (95/57 - 122/70)  BP(mean): --  RR: 18 (18 - 18)  SpO2: 97% (96% - 99%)    Constitutional: No fever, fatigue  Skin: No rash.  Eyes: No recent vision problems or eye pain.  ENT: No congestion, ear pain, or sore throat.  Cardiovascular: No chest pain or palpation.  Respiratory: No cough, shortness of breath, congestion, or wheezing.  Gastrointestinal: No abdominal pain, nausea, vomiting, or diarrhea.  Genitourinary: No dysuria.  Musculoskeletal: No joint swelling.  Neurologic: No headache.    PHYSICAL EXAM  NECK: Supple, No JVD  CHEST/LUNG: dec br  HEART:  S1 , S2 +  ABDOMEN: soft, mild distension +, bs+  EXTREMITIES:  no edema  LABS:      141  |  107  |  4<L>  ----------------------------<  102<H>  3.6   |  25  |  0.41<L>    Ca    8.5      2017 08:27  Phos  3.2     -  Mg     2.1           Creatinine Trend: 0.41 <--, 0.43 <--, 0.46 <--, 0.44 <--, 0.46 <--, 0.41 <--, 0.38 <--, 0.43 <--, 0.50 <--, 0.44 <--, 0.47 <--, 0.46 <--, 0.52 <--, 0.60 <--, 0.76 <--                        10.3   5.35  )-----------( 300      ( 2017 08:22 )             31.3     Urine Studies:  Urinalysis Basic - ( 2017 21:07 )    Color: Yellow / Appearance: Clear / S.022 / pH:   Gluc:  / Ketone: Large  / Bili: Negative / Urobili: Negative   Blood:  / Protein: 30 mg/dL / Nitrite: Negative   Leuk Esterase: Moderate / RBC: 5-10 /HPF / WBC 11-25 /HPF   Sq Epi:  / Non Sq Epi: OCC /HPF / Bacteria:

## 2017-06-22 DIAGNOSIS — R07.9 CHEST PAIN, UNSPECIFIED: ICD-10-CM

## 2017-06-22 LAB
ANION GAP SERPL CALC-SCNC: 7 MMOL/L — SIGNIFICANT CHANGE UP (ref 5–17)
APTT BLD: 31.2 SEC — SIGNIFICANT CHANGE UP (ref 27.5–37.4)
APTT BLD: 59 SEC — HIGH (ref 27.5–37.4)
BLD GP AB SCN SERPL QL: NEGATIVE — SIGNIFICANT CHANGE UP
BUN SERPL-MCNC: 9 MG/DL — SIGNIFICANT CHANGE UP (ref 7–23)
CALCIUM SERPL-MCNC: 8.3 MG/DL — LOW (ref 8.4–10.5)
CHLORIDE SERPL-SCNC: 107 MMOL/L — SIGNIFICANT CHANGE UP (ref 96–108)
CK MB BLD-MCNC: 2.5 % — SIGNIFICANT CHANGE UP (ref 0–3.5)
CK MB BLD-MCNC: 4.9 % — HIGH (ref 0–3.5)
CK MB BLD-MCNC: 5.6 % — HIGH (ref 0–3.5)
CK MB CFR SERPL CALC: 1.8 NG/ML — SIGNIFICANT CHANGE UP (ref 0–3.8)
CK MB CFR SERPL CALC: 5 NG/ML — HIGH (ref 0–3.8)
CK MB CFR SERPL CALC: 5.7 NG/ML — HIGH (ref 0–3.8)
CK SERPL-CCNC: 71 U/L — SIGNIFICANT CHANGE UP (ref 25–170)
CO2 SERPL-SCNC: 27 MMOL/L — SIGNIFICANT CHANGE UP (ref 22–31)
CREAT SERPL-MCNC: 0.44 MG/DL — LOW (ref 0.5–1.3)
GLUCOSE SERPL-MCNC: 142 MG/DL — HIGH (ref 70–99)
HCT VFR BLD CALC: 30.7 % — LOW (ref 34.5–45)
HCT VFR BLD CALC: 30.8 % — LOW (ref 34.5–45)
HGB BLD-MCNC: 10.3 G/DL — LOW (ref 11.5–15.5)
HGB BLD-MCNC: 10.8 G/DL — LOW (ref 11.5–15.5)
INR BLD: 1.26 RATIO — HIGH (ref 0.88–1.16)
MAGNESIUM SERPL-MCNC: 2 MG/DL — SIGNIFICANT CHANGE UP (ref 1.6–2.6)
MCHC RBC-ENTMCNC: 32.2 PG — SIGNIFICANT CHANGE UP (ref 27–34)
MCHC RBC-ENTMCNC: 32.5 PG — SIGNIFICANT CHANGE UP (ref 27–34)
MCHC RBC-ENTMCNC: 33.5 GM/DL — SIGNIFICANT CHANGE UP (ref 32–36)
MCHC RBC-ENTMCNC: 35 GM/DL — SIGNIFICANT CHANGE UP (ref 32–36)
MCV RBC AUTO: 92 FL — SIGNIFICANT CHANGE UP (ref 80–100)
MCV RBC AUTO: 97.1 FL — SIGNIFICANT CHANGE UP (ref 80–100)
PHOSPHATE SERPL-MCNC: 2.7 MG/DL — SIGNIFICANT CHANGE UP (ref 2.5–4.5)
PLATELET # BLD AUTO: 251 K/UL — SIGNIFICANT CHANGE UP (ref 150–400)
PLATELET # BLD AUTO: 262 K/UL — SIGNIFICANT CHANGE UP (ref 150–400)
POTASSIUM SERPL-MCNC: 3.9 MMOL/L — SIGNIFICANT CHANGE UP (ref 3.5–5.3)
POTASSIUM SERPL-SCNC: 3.9 MMOL/L — SIGNIFICANT CHANGE UP (ref 3.5–5.3)
PROTHROM AB SERPL-ACNC: 13.7 SEC — HIGH (ref 9.8–12.7)
RBC # BLD: 3.16 M/UL — LOW (ref 3.8–5.2)
RBC # BLD: 3.35 M/UL — LOW (ref 3.8–5.2)
RBC # FLD: 13 % — SIGNIFICANT CHANGE UP (ref 10.3–14.5)
RBC # FLD: 13.5 % — SIGNIFICANT CHANGE UP (ref 10.3–14.5)
RH IG SCN BLD-IMP: POSITIVE — SIGNIFICANT CHANGE UP
SODIUM SERPL-SCNC: 141 MMOL/L — SIGNIFICANT CHANGE UP (ref 135–145)
TROPONIN T SERPL-MCNC: 0.11 NG/ML — HIGH (ref 0–0.06)
TROPONIN T SERPL-MCNC: 0.2 NG/ML — HIGH (ref 0–0.06)
TROPONIN T SERPL-MCNC: 0.27 NG/ML — HIGH (ref 0–0.06)
WBC # BLD: 5.7 K/UL — SIGNIFICANT CHANGE UP (ref 3.8–10.5)
WBC # BLD: 5.9 K/UL — SIGNIFICANT CHANGE UP (ref 3.8–10.5)
WBC # FLD AUTO: 5.7 K/UL — SIGNIFICANT CHANGE UP (ref 3.8–10.5)
WBC # FLD AUTO: 5.9 K/UL — SIGNIFICANT CHANGE UP (ref 3.8–10.5)

## 2017-06-22 PROCEDURE — 99232 SBSQ HOSP IP/OBS MODERATE 35: CPT | Mod: 57

## 2017-06-22 PROCEDURE — 99255 IP/OBS CONSLTJ NEW/EST HI 80: CPT

## 2017-06-22 PROCEDURE — 93010 ELECTROCARDIOGRAM REPORT: CPT | Mod: 77

## 2017-06-22 PROCEDURE — 93010 ELECTROCARDIOGRAM REPORT: CPT

## 2017-06-22 PROCEDURE — 75574 CT ANGIO HRT W/3D IMAGE: CPT | Mod: 26

## 2017-06-22 RX ORDER — HEPARIN SODIUM 5000 [USP'U]/ML
INJECTION INTRAVENOUS; SUBCUTANEOUS
Qty: 25000 | Refills: 0 | Status: DISCONTINUED | OUTPATIENT
Start: 2017-06-22 | End: 2017-06-22

## 2017-06-22 RX ORDER — ASPIRIN/CALCIUM CARB/MAGNESIUM 324 MG
300 TABLET ORAL DAILY
Qty: 0 | Refills: 0 | Status: DISCONTINUED | OUTPATIENT
Start: 2017-06-22 | End: 2017-06-22

## 2017-06-22 RX ORDER — ASPIRIN/CALCIUM CARB/MAGNESIUM 324 MG
300 TABLET ORAL ONCE
Qty: 0 | Refills: 0 | Status: COMPLETED | OUTPATIENT
Start: 2017-06-22 | End: 2017-06-22

## 2017-06-22 RX ORDER — ACETAMINOPHEN 500 MG
1000 TABLET ORAL ONCE
Qty: 0 | Refills: 0 | Status: COMPLETED | OUTPATIENT
Start: 2017-06-22 | End: 2017-06-22

## 2017-06-22 RX ADMIN — Medication 100 MILLIEQUIVALENT(S): at 00:00

## 2017-06-22 RX ADMIN — Medication 63.75 MILLIMOLE(S): at 06:46

## 2017-06-22 RX ADMIN — HEPARIN SODIUM 750 UNIT(S)/HR: 5000 INJECTION INTRAVENOUS; SUBCUTANEOUS at 04:57

## 2017-06-22 RX ADMIN — Medication 1000 MILLIGRAM(S): at 19:48

## 2017-06-22 RX ADMIN — Medication 63.75 MILLIMOLE(S): at 12:27

## 2017-06-22 RX ADMIN — Medication 1000 MILLIGRAM(S): at 02:19

## 2017-06-22 RX ADMIN — HEPARIN SODIUM 750 UNIT(S)/HR: 5000 INJECTION INTRAVENOUS; SUBCUTANEOUS at 11:51

## 2017-06-22 RX ADMIN — PANTOPRAZOLE SODIUM 40 MILLIGRAM(S): 20 TABLET, DELAYED RELEASE ORAL at 11:43

## 2017-06-22 RX ADMIN — Medication 300 MILLIGRAM(S): at 05:02

## 2017-06-22 RX ADMIN — Medication 400 MILLIGRAM(S): at 19:48

## 2017-06-22 NOTE — CONSULT NOTE ADULT - PROBLEM SELECTOR RECOMMENDATION 2
As above, the question of the appropriateness of PN will have to wait pending results of work up and correction of hypokalemia  Also ordered Cholesterol and Triglyceride levels in anticipation of possible PN in the future
For OR this week
Will need to speak to primary team to clarify if surgery is planned, as this would require cardiac evaluation for possible CAD

## 2017-06-22 NOTE — PROGRESS NOTE ADULT - SUBJECTIVE AND OBJECTIVE BOX
chief complaint : pt with gastric outlet obstruction    SUBJECTIVE / OVERNIGHT EVENTS: pt denies abd pain, nausea, vomiting , c/o chest pain [facility]  MEDICATIONS  (STANDING):  pantoprazole  Injectable 40milliGRAM(s) IV Push daily  dextrose 5% + lactated ringers. 1000milliLiter(s) IV Continuous <Continuous>    MEDICATIONS  (PRN):  tetracaine/benzocaine/butamben Spray 1Spray(s) Topical three times a day PRN throat pain  sodium chloride 0.65% Nasal 1Spray(s) Both Nostrils every 1 hour PRN Congestion      Vital Signs Last 24 Hrs  T(C): 36.4, Max: 37.1 (06-21 @ 21:10)  T(F): 97.5, Max: 98.8 (06-21 @ 21:10)  HR: 65 (58 - 75)  BP: 102/61 (101/62 - 117/75)  BP(mean): --  RR: 18 (17 - 18)  SpO2: 98% (97% - 100%)      Constitutional: No fever, fatigue  Skin: No rash.  Eyes: No recent vision problems or eye pain.  ENT: No congestion, ear pain, or sore throat.  Cardiovascular: No chest pain or palpation.  Respiratory: No cough, shortness of breath, congestion, or wheezing.  Gastrointestinal: No abdominal pain, nausea, vomiting, or diarrhea.  Genitourinary: No dysuria.  Musculoskeletal: No joint swelling.  Neurologic: No headache.    PHYSICAL EXAM  NECK: Supple, No JVD  CHEST/LUNG: dec br  HEART:  S1 , S2 +  ABDOMEN: soft, mild distension +, bs+  EXTREMITIES:  no edema    LABS:  06-22    141  |  107  |  9   ----------------------------<  142<H>  3.9   |  27  |  0.44<L>    Ca    8.3<L>      22 Jun 2017 02:15  Phos  2.7     06-22  Mg     2.0     06-22      Creatinine Trend: 0.44 <--, 0.41 <--, 0.43 <--, 0.46 <--, 0.44 <--, 0.46 <--, 0.41 <--, 0.38 <--, 0.43 <--, 0.50 <--, 0.44 <--, 0.47 <--                        10.3   5.9   )-----------( 251      ( 22 Jun 2017 11:21 )             30.7     Urine Studies:      CARDIAC MARKERS ( 22 Jun 2017 17:17 )  x     / 0.20 ng/mL / 102 U/L / x     / 5.0 ng/mL  CARDIAC MARKERS ( 22 Jun 2017 11:25 )  x     / 0.27 ng/mL / 102 U/L / x     / 5.7 ng/mL  CARDIAC MARKERS ( 22 Jun 2017 02:15 )  x     / 0.11 ng/mL / 71 U/L / x     / 1.8 ng/mL          PT/INR - ( 22 Jun 2017 02:43 )   PT: 13.7 sec;   INR: 1.26 ratio         PTT - ( 22 Jun 2017 11:21 )  PTT:59.0 sec

## 2017-06-22 NOTE — PROGRESS NOTE ADULT - ASSESSMENT
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  Post-procedure CXR showed L lung opacification, got intubated for airway, s/p sicu stay , successfully extubated   - ngt, NO TPN at present since pt going for OR on friday as per Dr. Evans  - cards eval, echo pending   - had extensive lengthy discussion with pts family about pts clinical status and management plan    Hypokalemia - replete and f/u

## 2017-06-22 NOTE — PROGRESS NOTE ADULT - SUBJECTIVE AND OBJECTIVE BOX
Events of previous 24 hrs noted  VS    T(C): 36.7, Max: 37.2 (06-21 @ 14:40)  HR: 58 (52 - 75)  BP: 115/71 (101/62 - 128/80)  RR: 17 (17 - 18)  SpO2: 98% (97% - 100%)      Labs   06-22    141  |  107  |  9   ----------------------------<  142<H>  3.9   |  27  |  0.44<L>    Ca    8.3<L>      22 Jun 2017 02:15  Phos  2.7     06-22  Mg     2.0     06-22          CAPILLARY BLOOD GLUCOSE  122 (22 Jun 2017 07:07)  122 (22 Jun 2017 00:34)

## 2017-06-22 NOTE — PROVIDER CONTACT NOTE (OTHER) - ASSESSMENT
Pt. resting in bed.  phone used. Pt. C/o of pain in her chest that worsened when she walked to the bathroom. Pt. non diaphoretic. Pt. vital signs wdl.

## 2017-06-22 NOTE — CONSULT NOTE ADULT - SUBJECTIVE AND OBJECTIVE BOX
Patient is a 57y old  Female who presents with a chief complaint of persistent nausea/emesis (15 Maynor 2017 08:39), now with CP    HPI:  56 y/o female w/ no PMH with h/o 6 weeks of abdominal pain, nausea, emesis, inability to tolerate liquids/solids & 20 pound weight loss in past 1 month now presents to the ED after her 2nd EGD in 2 days where her second opinion gastroenterologist (Dr. Newman) found an obstructing gastric mass during outpatient EGD. Patient status post EGD 6/16, pathology showing moderately differentiated adenocarcinoma. Hospital course c/b R mainstem intubation and L lung opacification and ? mucus plug req patient to remain intubated post EGD (extubated 6/17). Currently on TPN via PICC.   Today, patient with new onset chest pressure/tightness radiating to back that started while she was laying in bed @ 0200 worse with ambulating to the bathroom and better now w/ rest, chest wall not tender to palpation, pain not pleuritic in nature or improved by sitting up. Currently very mild. She has never had pain like this before No dyspnea, diaphoresis, LE edema, palpitations, lightheadedness/dizziness, syncope/near syncope, orthopnea, PND, WALDROP. No family history of premature cardiac dz in 1st degree relatives, no prior cardiac w/u, no hx CHF, arrhythmia, HTN, DM, or HLD.     PAST MEDICAL & SURGICAL HISTORY:  No pertinent past medical history  No significant past surgical history    PREVIOUS DIAGNOSTIC TESTING: no previous cardiac w/u     HOME MEDICATIONS : none    MEDICATIONS  (STANDING):  pantoprazole  Injectable 40milliGRAM(s) IV Push daily  dextrose 5% + lactated ringers. 1000milliLiter(s) IV Continuous <Continuous>  heparin  Infusion. Unit(s)/Hr IV Continuous <Continuous>  sodium phosphate IVPB 15milliMole(s) IV Intermittent every 4 hours    MEDICATIONS  (PRN):  tetracaine/benzocaine/butamben Spray 1Spray(s) Topical three times a day PRN throat pain  sodium chloride 0.65% Nasal 1Spray(s) Both Nostrils every 1 hour PRN Congestion    FAMILY HISTORY: no pertinent PMH in 1st degree relatives     Social History: lives with , IADLs, no history of alcohol, tobacco or ilicit drug use  Allergies    No Known Allergies    REVIEW OF SYSTEMS  General: weight loss; no fevers/chills/sick contacts/sinus symptoms/URI symptoms   HEENT:no sore throats, rhinorrhea, no HAs   CV: see HPI  Respiratory: no SOB, no WALDROP,   GI: see HPI   : no difficulty urinating, change in urinary frequency, dysuria   Musculoskeletal: no myalgias or arthralgias,  Integumentary: no rashes   Neuro: no changes in vision or hearing, no syncope/near syncope, HAs  Psych:	no hx anxiety/depression  All others negative except as noted above and in HPI     Vital Signs Last 24 Hrs  T(C): 36.7, Max: 37.2 (06-21 @ 14:40)  T(F): 98.1, Max: 99 (06-21 @ 14:40)  HR: 58 (52 - 75)  BP: 115/71 (101/62 - 128/80)  RR: 17 (17 - 18)  SpO2: 98% (97% - 100%)    I&O's Summary  I & Os for 24h ending 21 Jun 2017 07:00  =============================================  IN: 2700 ml / OUT: 1500 ml / NET: 1200 ml    I & Os for current day (as of 22 Jun 2017 06:12)  =============================================  IN: 0 ml / OUT: 0 ml / NET: 0 ml    Daily     Daily     PHYSICAL EXAM:  General:NAD, thin   HEENT: oral mucosa moist; head atraumatic normocephalic; trachea midline;  CV: S1,S2, RRR, no M/R/G, no JVD, no LE edema, Pulses 2+ UE, LE   Respiratory: CTA bilaterally   GI: distended   Musculoskeletal: moving all 4 extremities, chest wall non tender on palpation   Integumentary: no focal lesions or breakdown   Neuro: A, A, O x 4, PERRL  Psych: mood and affect appropriate     TELEMETRY: sinus    EKG: sinus claribel                         10.8   5.7   )-----------( 262      ( 22 Jun 2017 02:15 )             30.8     06-22    141  |  107  |  9   ----------------------------<  142<H>  3.9   |  27  |  0.44<L>    Ca    8.3<L>      22 Jun 2017 02:15  Phos  2.7     06-22  Mg     2.0     06-22    Creatine Kinase, Serum: 71 U/L (06-22 @ 02:15)  CKMB Units: 1.8 ng/mL (06-22 @ 02:15)  Troponin T, Serum: 0.11 ng/mL (06-22 @ 02:15)    PT/INR - ( 22 Jun 2017 02:43 )   PT: 13.7 sec;   INR: 1.26 ratio       PTT - ( 22 Jun 2017 02:43 )  PTT:31.2 sec Patient is a 57y old  Female who presents with a chief complaint of persistent nausea/emesis (15 Maynor 2017 08:39), now with CP    HPI:  58 y/o female w/ no PMH with h/o 6 weeks of abdominal pain, nausea, emesis, inability to tolerate liquids/solids & 20 pound weight loss in past 1 month. Admitted thru ED after her 2nd EGD in 2 days by gastroenterologist (Dr. Newman) found an obstructing gastric mass. Patient also s/p EGD 6/16, pathology showing moderately differentiated adenocarcinoma. Hospital course c/b R mainstem intubation and L lung opacification and ? mucus plug req patient to remain intubated post EGD (extubated 6/17). Currently on TPN via PICC.   Today, patient with new onset chest pressure/tightness radiating to back that started while she was laying in bed @ 0200 worse with ambulating to the bathroom and better now w/ rest, chest wall not tender to palpation, pain not pleuritic in nature or improved by sitting up. Currently very mild. She has never had pain like this before.  No dyspnea, diaphoresis, LE edema, palpitations, lightheadedness/dizziness, syncope/near syncope, orthopnea, PND, WALDROP. No family history of premature cardiac dz in 1st degree relatives, no prior cardiac w/u, no hx CHF, arrhythmia, HTN, DM, or HLD.     PAST MEDICAL & SURGICAL HISTORY:  No pertinent past medical history  No significant past surgical history    PREVIOUS DIAGNOSTIC TESTING: no previous cardiac w/u     HOME MEDICATIONS : none    MEDICATIONS  (STANDING):  pantoprazole  Injectable 40milliGRAM(s) IV Push daily  dextrose 5% + lactated ringers. 1000milliLiter(s) IV Continuous <Continuous>  heparin  Infusion. Unit(s)/Hr IV Continuous <Continuous>  sodium phosphate IVPB 15milliMole(s) IV Intermittent every 4 hours  MEDICATIONS  (PRN):  tetracaine/benzocaine/butamben Spray 1Spray(s) Topical three times a day PRN throat pain  sodium chloride 0.65% Nasal 1Spray(s) Both Nostrils every 1 hour PRN Congestion    FAMILY HISTORY: no heart disease in 1st degree relatives     Social History: lives with , IADLs, no history of alcohol, tobacco or ilicit drug use    No Known Allergies    REVIEW OF SYSTEMS  General: weight loss; no fevers/chills/sick contacts/sinus symptoms/URI symptoms   HEENT:no sore throats, rhinorrhea, no HAs   CV: see HPI  Respiratory: no SOB, no WALDROP,   GI: see HPI   : no difficulty urinating, change in urinary frequency, dysuria   Musculoskeletal: no myalgias or arthralgias,  Integumentary: no rashes   Neuro: no changes in vision or hearing, no syncope/near syncope, HAs  Psych:	no hx anxiety/depression  All others negative except as noted above and in HPI     Vital Signs Last 24 Hrs  T(C): 36.7, Max: 37.2 (06-21 @ 14:40)  T(F): 98.1, Max: 99 (06-21 @ 14:40)  HR: 58 (52 - 75)  BP: 115/71 (101/62 - 128/80)  RR: 17 (17 - 18)  SpO2: 98% (97% - 100%)    I & Os for 24h ending 21 Jun 2017 07:00  =============================================  IN: 2700 ml / OUT: 1500 ml / NET: 1200 ml    I & Os for current day (as of 22 Jun 2017 06:12)  =============================================  IN: 0 ml / OUT: 0 ml / NET: 0 ml    EXAM:  General:NAD, thin   HEENT: oral mucosa moist; head atraumatic normocephalic; trachea midline;  Respiratory: CTA bilaterally   Heart: S1,S2, RRR, no M/R/G, no JVD  GI: distended   Musculoskeletal: moving all 4 extremities, chest wall non tender on palpation   EXT:  , no LE edema, Pulses 2+ UE, LE   Integumentary: no focal lesions or breakdown   Neuro: A, A, O x 3; non-focal  Psych: mood and affect appropriate     TELEMETRY: sinus    EKG: Sinus claribel.  Normal intervals and axis.  Early transition/J point elevation                        10.8   5.7   )-----------( 262      ( 22 Jun 2017 02:15 )             30.8     06-22    141  |  107  |  9   ----------------------------<  142<H>  3.9   |  27  |  0.44<L>    Ca    8.3<L>      22 Jun 2017 02:15  Phos  2.7     06-22  Mg     2.0     06-22    Creatine Kinase, Serum: 71 U/L (06-22 @ 02:15)  CKMB Units: 1.8 ng/mL (06-22 @ 02:15)  Troponin T, Serum: 0.11 ng/mL (06-22 @ 02:15)    PT/INR - ( 22 Jun 2017 02:43 )   PT: 13.7 sec;   INR: 1.26 ratio       PTT - ( 22 Jun 2017 02:43 )  PTT:31.2 sec

## 2017-06-22 NOTE — PROGRESS NOTE ADULT - SUBJECTIVE AND OBJECTIVE BOX
GENERAL SURGERY DAILY PROGRESS NOTE:     Subjective:  Chest pain improved somewhat, but still present. Denies SOB. Denies n/v.       Objective:    MEDICATIONS  (STANDING):  pantoprazole  Injectable 40milliGRAM(s) IV Push daily  dextrose 5% + lactated ringers. 1000milliLiter(s) IV Continuous <Continuous>  heparin  Infusion. Unit(s)/Hr IV Continuous <Continuous>  sodium phosphate IVPB 15milliMole(s) IV Intermittent every 4 hours    MEDICATIONS  (PRN):  tetracaine/benzocaine/butamben Spray 1Spray(s) Topical three times a day PRN throat pain  sodium chloride 0.65% Nasal 1Spray(s) Both Nostrils every 1 hour PRN Congestion      Vital Signs Last 24 Hrs  T(C): 36.7, Max: 37.2 (06-21 @ 14:40)  T(F): 98.1, Max: 99 (06-21 @ 14:40)  HR: 58 (52 - 75)  BP: 115/71 (101/62 - 128/80)  BP(mean): --  RR: 17 (17 - 18)  SpO2: 98% (97% - 100%)    I&O's Detail    I & Os for current day (as of 22 Jun 2017 07:42)  =============================================  IN:    Total IN: 0 ml  ---------------------------------------------  OUT:    Total OUT: 0 ml  ---------------------------------------------  Total NET: 0 ml      Daily     Daily     LABS:                        10.8   5.7   )-----------( 262      ( 22 Jun 2017 02:15 )             30.8     06-22    141  |  107  |  9   ----------------------------<  142<H>  3.9   |  27  |  0.44<L>    Ca    8.3<L>      22 Jun 2017 02:15  Phos  2.7     06-22  Mg     2.0     06-22      PT/INR - ( 22 Jun 2017 02:43 )   PT: 13.7 sec;   INR: 1.26 ratio         PTT - ( 22 Jun 2017 02:43 )  PTT:31.2 sec      NAD, awake and alert  NGT with brown gastric contents  Respirations nonlabored  CV Reg rate, no murmurs  Abdomen soft, nontender, nondistended  No guarding or rebound tenderness

## 2017-06-22 NOTE — PROGRESS NOTE ADULT - ASSESSMENT
57F GOO 2/2 obstructing gastric mass, now with elevated troponins and chest pain  - trend cardiac enzymes  - telemetry  - rectal ASA  - Heparin drip  - f/u cards  - will likely postpone J tube incision  - continue TPN

## 2017-06-22 NOTE — CONSULT NOTE ADULT - CONSULT REQUESTED DATE/TIME
15-Maynor-2017 09:58
15-Maynor-2017
15-Maynor-2017 09:42
16-Jun-2017 14:30
21-Jun-2017 09:15
22-Jun-2017 06:12

## 2017-06-22 NOTE — CONSULT NOTE ADULT - PROBLEM SELECTOR RECOMMENDATION 9
patient with exertional CP better with rest and + troponin suspicious for NSTEMI, trend Christie, monitor on tele, serial EKGs   c/w rectal ASA and hep gtt x 48 hours, no DAPT @ this time as patient cannot take PO, no statin @ this time as patient cannot take PO   ischemic eval   check bilateral BP in arms, low suspicion for PE @ this time patient with exertional CP better with rest and + troponin suspicious for NSTEMI, trend Christie, monitor on tele, serial EKGs   c/w rectal ASA and hep gtt x 48 hours, no DAPT @ this time as patient cannot take PO, no statin @ this time as patient cannot take PO   ischemic eval   check bilateral BP in arms and consider checking CXR, low suspicion for PE @ this time patient with exertional CP better with rest and + troponin suspicious for NSTEMI, trend Christie, monitor on tele, serial EKGs   c/w rectal ASA and hep gtt x 48 hours, no DAPT @ this time as patient cannot take PO, no statin @ this time as patient cannot take PO   check bilateral BP in arms and consider checking CXR, low suspicion for PE @ this time + troponin on one set of cardiac enz with normal total CPK and CPK-MB; possible NSTEMI  Await additional Christie  Schedule TTE to assess cardiac function  Monitor on tele  Repeat EKG   c/w rectal ASA and hep gtt x 48 hours, no DAPT @ this time as patient cannot take PO; no statin @ this time as patient cannot take PO

## 2017-06-22 NOTE — CONSULT NOTE ADULT - CONSULT REASON
CP, elevated troponin
Gastric Mass
Gastric outlet obstruction
TPN
intubated post-EGD
persistent hypokalemia

## 2017-06-22 NOTE — CHART NOTE - NSCHARTNOTEFT_GEN_A_CORE
Surgery Senior Resident    Called by surgical intern that patient's troponin was positive at 0.11    Patient had complained of chest pressure when ambulating to the bathroom. Pressure was described as tightness and pressure and radiating to the back.  Patient was hemodynamically stable. EKG was obtained and was unchanged. Troponin and AM labs were sent.      Patient seen and examined. Still complaining of mild chest pain but improved from prior. No shortness of breath.   T 37, HR 75, /75, RR 18, 100% on room air    EKG reviewed with cardiology fellow.   Recommending full dose ASA (rectal 300 mg), starting full dose anticoagulation with heparin without a bolus, and transferring patient to a telemetry floor.     Will proceed with the above.   Will continue to trend enzymes   Discussed with chief resident and attending, Dr. Trent

## 2017-06-22 NOTE — CONSULT NOTE ADULT - ASSESSMENT
58 y/o female w/ no PMH p/w obstructing gastric mass during outpatient EGD. Patient status post EGD 6/16, pathology showing moderately differentiated adenocarcinoma. Hospital course c/b R mainstem intubation and L lung opacification and ? mucus plug req patient to remain intubated post EGD (extubated 6/17). Currently on TPN via PICC. Now w/ new exertional CP and + troponin, currently CP very mild and improved 58 y/o female  Episode of exertional CP and + troponin, currently CP very mild and improved.  Possible NSTEMI,  Recently diagnosed obstructing gastric mass seen on EGD; pathology showing moderately differentiated adenocarcinoma.   Hospital course c/b R mainstem intubation and L lung opacification and ? mucus plug req patient to remain intubated post EGD (extubated 6/17).   Currently on TPN via PICC.

## 2017-06-22 NOTE — CONSULT NOTE ADULT - ATTENDING COMMENTS
57 year old female with gastric mass at the antrum s/ p endoscopy where the patient possibly aspirated and had desaturation. ON CXR it appeared ETT was in the right main stem bronchus and once pulled back CXR appeared improved. Patient was oxygenation appropriately the entire time.  WIll try to wean off sedatives and extubate as toelrated.
Patient seen and examined; discussed with MEJIA Ortega N.P and cardiology fellow, Dr. SOFIE Michael. Hx., exam and labs as above.  I agree with the assessment and recommendations.  JAIRO Gonzalez M.D.  Cardiology Consult Service  [phone] or [phone]

## 2017-06-23 ENCOUNTER — APPOINTMENT (OUTPATIENT)
Dept: GASTROENTEROLOGY | Facility: CLINIC | Age: 57
End: 2017-06-23

## 2017-06-23 ENCOUNTER — APPOINTMENT (OUTPATIENT)
Dept: SURGICAL ONCOLOGY | Facility: HOSPITAL | Age: 57
End: 2017-06-23

## 2017-06-23 ENCOUNTER — RESULT REVIEW (OUTPATIENT)
Age: 57
End: 2017-06-23

## 2017-06-23 LAB
ALBUMIN SERPL ELPH-MCNC: 2.3 G/DL — LOW (ref 3.3–5)
ALP SERPL-CCNC: 72 U/L — SIGNIFICANT CHANGE UP (ref 40–120)
ALT FLD-CCNC: 80 U/L RC — HIGH (ref 10–45)
ANION GAP SERPL CALC-SCNC: 10 MMOL/L — SIGNIFICANT CHANGE UP (ref 5–17)
ANION GAP SERPL CALC-SCNC: 11 MMOL/L — SIGNIFICANT CHANGE UP (ref 5–17)
APTT BLD: 30 SEC — SIGNIFICANT CHANGE UP (ref 27.5–37.4)
AST SERPL-CCNC: 89 U/L — HIGH (ref 10–40)
BILIRUB SERPL-MCNC: 1 MG/DL — SIGNIFICANT CHANGE UP (ref 0.2–1.2)
BUN SERPL-MCNC: 15 MG/DL — SIGNIFICANT CHANGE UP (ref 7–23)
BUN SERPL-MCNC: 18 MG/DL — SIGNIFICANT CHANGE UP (ref 7–23)
CALCIUM SERPL-MCNC: 7.7 MG/DL — LOW (ref 8.4–10.5)
CALCIUM SERPL-MCNC: 8.3 MG/DL — LOW (ref 8.4–10.5)
CHLORIDE SERPL-SCNC: 107 MMOL/L — SIGNIFICANT CHANGE UP (ref 96–108)
CHLORIDE SERPL-SCNC: 107 MMOL/L — SIGNIFICANT CHANGE UP (ref 96–108)
CK MB CFR SERPL CALC: 2.7 NG/ML — SIGNIFICANT CHANGE UP (ref 0–3.8)
CK MB CFR SERPL CALC: 2.7 NG/ML — SIGNIFICANT CHANGE UP (ref 0–3.8)
CK SERPL-CCNC: 79 U/L — SIGNIFICANT CHANGE UP (ref 25–170)
CO2 SERPL-SCNC: 21 MMOL/L — LOW (ref 22–31)
CO2 SERPL-SCNC: 24 MMOL/L — SIGNIFICANT CHANGE UP (ref 22–31)
CREAT SERPL-MCNC: 0.35 MG/DL — LOW (ref 0.5–1.3)
CREAT SERPL-MCNC: 0.36 MG/DL — LOW (ref 0.5–1.3)
GAS PNL BLDA: SIGNIFICANT CHANGE UP
GLUCOSE SERPL-MCNC: 100 MG/DL — HIGH (ref 70–99)
GLUCOSE SERPL-MCNC: 215 MG/DL — HIGH (ref 70–99)
HCT VFR BLD CALC: 31.3 % — LOW (ref 34.5–45)
HCT VFR BLD CALC: 38.1 % — SIGNIFICANT CHANGE UP (ref 34.5–45)
HGB BLD-MCNC: 10.4 G/DL — LOW (ref 11.5–15.5)
HGB BLD-MCNC: 13.1 G/DL — SIGNIFICANT CHANGE UP (ref 11.5–15.5)
INR BLD: 1.15 RATIO — SIGNIFICANT CHANGE UP (ref 0.88–1.16)
MAGNESIUM SERPL-MCNC: 2.3 MG/DL — SIGNIFICANT CHANGE UP (ref 1.6–2.6)
MCHC RBC-ENTMCNC: 29.3 PG — SIGNIFICANT CHANGE UP (ref 27–34)
MCHC RBC-ENTMCNC: 31.2 PG — SIGNIFICANT CHANGE UP (ref 27–34)
MCHC RBC-ENTMCNC: 33.2 GM/DL — SIGNIFICANT CHANGE UP (ref 32–36)
MCHC RBC-ENTMCNC: 34.3 GM/DL — SIGNIFICANT CHANGE UP (ref 32–36)
MCV RBC AUTO: 88.2 FL — SIGNIFICANT CHANGE UP (ref 80–100)
MCV RBC AUTO: 91 FL — SIGNIFICANT CHANGE UP (ref 80–100)
PHOSPHATE SERPL-MCNC: 2.7 MG/DL — SIGNIFICANT CHANGE UP (ref 2.5–4.5)
PLATELET # BLD AUTO: 259 K/UL — SIGNIFICANT CHANGE UP (ref 150–400)
PLATELET # BLD AUTO: 317 K/UL — SIGNIFICANT CHANGE UP (ref 150–400)
POTASSIUM SERPL-MCNC: 3.2 MMOL/L — LOW (ref 3.5–5.3)
POTASSIUM SERPL-MCNC: 3.5 MMOL/L — SIGNIFICANT CHANGE UP (ref 3.5–5.3)
POTASSIUM SERPL-SCNC: 3.2 MMOL/L — LOW (ref 3.5–5.3)
POTASSIUM SERPL-SCNC: 3.5 MMOL/L — SIGNIFICANT CHANGE UP (ref 3.5–5.3)
PROT SERPL-MCNC: 4.4 G/DL — LOW (ref 6–8.3)
PROTHROM AB SERPL-ACNC: 12.5 SEC — SIGNIFICANT CHANGE UP (ref 9.8–12.7)
RBC # BLD: 3.55 M/UL — LOW (ref 3.8–5.2)
RBC # BLD: 4.19 M/UL — SIGNIFICANT CHANGE UP (ref 3.8–5.2)
RBC # FLD: 13.1 % — SIGNIFICANT CHANGE UP (ref 10.3–14.5)
RBC # FLD: 14.9 % — HIGH (ref 10.3–14.5)
SODIUM SERPL-SCNC: 139 MMOL/L — SIGNIFICANT CHANGE UP (ref 135–145)
SODIUM SERPL-SCNC: 141 MMOL/L — SIGNIFICANT CHANGE UP (ref 135–145)
TROPONIN T SERPL-MCNC: 0.07 NG/ML — HIGH (ref 0–0.06)
TROPONIN T SERPL-MCNC: 0.12 NG/ML — HIGH (ref 0–0.06)
WBC # BLD: 12.9 K/UL — HIGH (ref 3.8–10.5)
WBC # BLD: 7.29 K/UL — SIGNIFICANT CHANGE UP (ref 3.8–10.5)
WBC # FLD AUTO: 12.9 K/UL — HIGH (ref 3.8–10.5)
WBC # FLD AUTO: 7.29 K/UL — SIGNIFICANT CHANGE UP (ref 3.8–10.5)

## 2017-06-23 PROCEDURE — 38747 REMOVE ABDOMINAL LYMPH NODES: CPT | Mod: 59

## 2017-06-23 PROCEDURE — 43632 REMOVAL OF STOMACH PARTIAL: CPT

## 2017-06-23 PROCEDURE — 44015 INSERT NEEDLE CATH BOWEL: CPT

## 2017-06-23 PROCEDURE — 99233 SBSQ HOSP IP/OBS HIGH 50: CPT

## 2017-06-23 PROCEDURE — 93010 ELECTROCARDIOGRAM REPORT: CPT

## 2017-06-23 RX ORDER — ENOXAPARIN SODIUM 100 MG/ML
40 INJECTION SUBCUTANEOUS DAILY
Qty: 0 | Refills: 0 | Status: DISCONTINUED | OUTPATIENT
Start: 2017-06-23 | End: 2017-06-23

## 2017-06-23 RX ORDER — POTASSIUM CHLORIDE 20 MEQ
10 PACKET (EA) ORAL
Qty: 0 | Refills: 0 | Status: COMPLETED | OUTPATIENT
Start: 2017-06-23 | End: 2017-06-23

## 2017-06-23 RX ORDER — HYDROMORPHONE HYDROCHLORIDE 2 MG/ML
0.5 INJECTION INTRAMUSCULAR; INTRAVENOUS; SUBCUTANEOUS
Qty: 0 | Refills: 0 | Status: DISCONTINUED | OUTPATIENT
Start: 2017-06-23 | End: 2017-06-30

## 2017-06-23 RX ORDER — ONDANSETRON 8 MG/1
4 TABLET, FILM COATED ORAL ONCE
Qty: 0 | Refills: 0 | Status: DISCONTINUED | OUTPATIENT
Start: 2017-06-23 | End: 2017-06-24

## 2017-06-23 RX ORDER — HYDROMORPHONE HYDROCHLORIDE 2 MG/ML
0.5 INJECTION INTRAMUSCULAR; INTRAVENOUS; SUBCUTANEOUS
Qty: 0 | Refills: 0 | Status: DISCONTINUED | OUTPATIENT
Start: 2017-06-23 | End: 2017-06-24

## 2017-06-23 RX ORDER — PANTOPRAZOLE SODIUM 20 MG/1
40 TABLET, DELAYED RELEASE ORAL DAILY
Qty: 0 | Refills: 0 | Status: DISCONTINUED | OUTPATIENT
Start: 2017-06-23 | End: 2017-06-29

## 2017-06-23 RX ORDER — NALOXONE HYDROCHLORIDE 4 MG/.1ML
0.1 SPRAY NASAL
Qty: 0 | Refills: 0 | Status: DISCONTINUED | OUTPATIENT
Start: 2017-06-23 | End: 2017-06-30

## 2017-06-23 RX ORDER — ONDANSETRON 8 MG/1
4 TABLET, FILM COATED ORAL EVERY 6 HOURS
Qty: 0 | Refills: 0 | Status: DISCONTINUED | OUTPATIENT
Start: 2017-06-23 | End: 2017-06-30

## 2017-06-23 RX ORDER — SODIUM CHLORIDE 9 MG/ML
1000 INJECTION, SOLUTION INTRAVENOUS
Qty: 0 | Refills: 0 | Status: DISCONTINUED | OUTPATIENT
Start: 2017-06-23 | End: 2017-06-25

## 2017-06-23 RX ORDER — ENOXAPARIN SODIUM 100 MG/ML
40 INJECTION SUBCUTANEOUS EVERY 24 HOURS
Qty: 0 | Refills: 0 | Status: DISCONTINUED | OUTPATIENT
Start: 2017-06-23 | End: 2017-07-04

## 2017-06-23 RX ORDER — HYDROMORPHONE HYDROCHLORIDE 2 MG/ML
30 INJECTION INTRAMUSCULAR; INTRAVENOUS; SUBCUTANEOUS
Qty: 0 | Refills: 0 | Status: DISCONTINUED | OUTPATIENT
Start: 2017-06-23 | End: 2017-06-30

## 2017-06-23 RX ADMIN — Medication 100 MILLIEQUIVALENT(S): at 22:50

## 2017-06-23 RX ADMIN — Medication 100 MILLIEQUIVALENT(S): at 22:02

## 2017-06-23 RX ADMIN — SODIUM CHLORIDE 100 MILLILITER(S): 9 INJECTION, SOLUTION INTRAVENOUS at 18:29

## 2017-06-23 RX ADMIN — Medication 100 MILLIEQUIVALENT(S): at 23:30

## 2017-06-23 RX ADMIN — HYDROMORPHONE HYDROCHLORIDE 30 MILLILITER(S): 2 INJECTION INTRAMUSCULAR; INTRAVENOUS; SUBCUTANEOUS at 23:32

## 2017-06-23 RX ADMIN — HYDROMORPHONE HYDROCHLORIDE 30 MILLILITER(S): 2 INJECTION INTRAMUSCULAR; INTRAVENOUS; SUBCUTANEOUS at 19:18

## 2017-06-23 NOTE — PROGRESS NOTE ADULT - SUBJECTIVE AND OBJECTIVE BOX
POST OPERATIVE DAY #:     SUBJECTIVE: Pt seen and examined at bedside.  No overnight events. Pt remains NPO/NGT in place  no nausea/ emesis  Pt transferred to [facility] for demand ischemia. Denies any chest pain, SOB, palpitations. Troponins trending down    MEDICATIONS  (STANDING):  pantoprazole  Injectable 40milliGRAM(s) IV Push daily  dextrose 5% + lactated ringers. 1000milliLiter(s) IV Continuous <Continuous>  enoxaparin Injectable 40milliGRAM(s) SubCutaneous daily    MEDICATIONS  (PRN):  tetracaine/benzocaine/butamben Spray 1Spray(s) Topical three times a day PRN throat pain  sodium chloride 0.65% Nasal 1Spray(s) Both Nostrils every 1 hour PRN Congestion      Vital Signs Last 24 Hrs  T(C): 36.8, Max: 36.9 (06-22 @ 20:44)  T(F): 98.3, Max: 98.4 (06-22 @ 20:44)  HR: 61 (60 - 65)  BP: 97/61 (92/62 - 102/61)  BP(mean): --  RR: 18 (18 - 18)  SpO2: 99% (97% - 99%)  I&O's Summary    I & Os for current day (as of 23 Jun 2017 07:35)  =============================================  IN: 1705 ml / OUT: 200 ml / NET: 1505 ml    I&O's Detail    I & Os for current day (as of 23 Jun 2017 07:35)  =============================================  IN:    TPN (Total Parenteral Nutrition): 780 ml    dextrose 5% + lactated ringers.: 600 ml    IV PiggyBack: 250 ml    heparin  Infusion.: 75 ml    Total IN: 1705 ml  ---------------------------------------------  OUT:    Voided: 200 ml    Total OUT: 200 ml  ---------------------------------------------  Total NET: 1505 ml      Labs:                         10.3   5.9   )-----------( 251      ( 22 Jun 2017 11:21 )             30.7     06-22    141  |  107  |  9   ----------------------------<  142<H>  3.9   |  27  |  0.44<L>    Ca    8.3<L>      22 Jun 2017 02:15  Phos  2.7     06-22  Mg     2.0     06-22      PT/INR - ( 22 Jun 2017 02:43 )   PT: 13.7 sec;   INR: 1.26 ratio         PTT - ( 22 Jun 2017 11:21 )  PTT:59.0 sec      Physical Exam:  General Appearance: NAD, A&O x3  Chest: Equal expansion bilaterally, equal breath sounds, no rales/rhonchi/ wheeze  CV: S1/ S2, regular rate/rhythm, no murmurs  Pulm: nonlabored breathing, no respiratory distress  Abdomen: Soft nontender, non distended

## 2017-06-23 NOTE — PROGRESS NOTE ADULT - ATTENDING COMMENTS
Patient seen and examined; discussed with cardiology fellow. Hx., exam and labs as above.  I agree with the assessment and recommendations.  JAIRO Gonzalez M.D.  Cardiology Consult Service  [phone] or [phone]

## 2017-06-23 NOTE — PROGRESS NOTE ADULT - ASSESSMENT
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  Post-procedure CXR showed L lung opacification, got intubated for airway, s/p sicu stay , successfully extubated   - ngt, NO TPN at present since pt going for OR on friday as per Dr. Evans, tahir OR today  - cards cleared pt for OR   - had extensive lengthy discussion with pts family about pts clinical status and management plan    Hypokalemia - replete and f/u

## 2017-06-23 NOTE — PROGRESS NOTE ADULT - SUBJECTIVE AND OBJECTIVE BOX
Day #2 of PN  PN infusion at 65  T(C): 36.8, Max: 36.9 (06-22 @ 20:44)  HR: 61 (60 - 65)  BP: 97/61 (92/62 - 102/61)  RR: 18 (18 - 18)  SpO2: 99% (97% - 99%)      Labs   06-23    141  |  107  |  18  ----------------------------<  100<H>  3.5   |  24  |  0.36<L>    Ca    8.3<L>      23 Jun 2017 07:11  Phos  2.7     06-23  Mg     2.3     06-23          CAPILLARY BLOOD GLUCOSE  117 (23 Jun 2017 05:33)  112 (22 Jun 2017 23:49)  111 (22 Jun 2017 18:06)  126 (22 Jun 2017 12:10)    I&O's Detail    I & Os for current day (as of 23 Jun 2017 09:43)  =============================================  IN:    TPN (Total Parenteral Nutrition): 780 ml    dextrose 5% + lactated ringers.: 600 ml    IV PiggyBack: 250 ml    heparin  Infusion.: 75 ml    Total IN: 1705 ml  ---------------------------------------------  OUT:    Voided: 200 ml    Total OUT: 200 ml  ---------------------------------------------  Total NET: 1505 ml

## 2017-06-23 NOTE — PROGRESS NOTE ADULT - SUBJECTIVE AND OBJECTIVE BOX
chief complaint : pt with gastric outlet obstruction    SUBJECTIVE / OVERNIGHT EVENTS: pt denies abd pain, nausea, vomiting , c/o chest pain overnight transferred to [facility]           MEDICATIONS  (STANDING):  HYDROmorphone PCA (1 mG/mL) 30milliLiter(s) PCA Continuous PCA Continuous  enoxaparin Injectable 40milliGRAM(s) SubCutaneous every 24 hours  lactated ringers. 1000milliLiter(s) IV Continuous <Continuous>  pantoprazole  Injectable 40milliGRAM(s) IV Push daily  potassium chloride  10 mEq/100 mL IVPB 10milliEquivalent(s) IV Intermittent every 1 hour    MEDICATIONS  (PRN):  HYDROmorphone  Injectable 0.5milliGRAM(s) IV Push every 10 minutes PRN Moderate Pain  ondansetron Injectable 4milliGRAM(s) IV Push once PRN Nausea and/or Vomiting  HYDROmorphone PCA (1 mG/mL) Rescue Clinician Bolus 0.5milliGRAM(s) IV Push every 15 minutes PRN for Pain Scale GREATER THAN 6  naloxone Injectable 0.1milliGRAM(s) IV Push every 3 minutes PRN For ANY of the following changes in patient status:  A. RR LESS THAN 10 breaths per minute, B. Oxygen saturation LESS THAN 90%, C. Sedation score of 6  ondansetron Injectable 4milliGRAM(s) IV Push every 6 hours PRN Nausea      Vital Signs Last 24 Hrs  T(C): 36.2, Max: 36.9 (06-23 @ 00:18)  T(F): 97.2, Max: 98.4 (06-23 @ 00:18)  HR: 71 (60 - 84)  BP: 102/51 (94/58 - 136/79)  BP(mean): 73 (68 - 101)  RR: 16 (15 - 18)  SpO2: 95% (95% - 100%)  Constitutional: No fever, fatigue      Skin: No rash.  Eyes: No recent vision problems or eye pain.  ENT: No congestion, ear pain, or sore throat.  Cardiovascular: No chest pain or palpation.  Respiratory: No cough, shortness of breath, congestion, or wheezing.  Gastrointestinal: No abdominal pain, nausea, vomiting, or diarrhea.  Genitourinary: No dysuria.  Musculoskeletal: No joint swelling.  Neurologic: No headache.    PHYSICAL EXAM  NECK: Supple, No JVD  CHEST/LUNG: dec br  HEART:  S1 , S2 +  ABDOMEN: soft, mild distension +, bs+  EXTREMITIES:  no edema    LABS:  06-23    139  |  107  |  15  ----------------------------<  215<H>  3.2<L>   |  21<L>  |  0.35<L>    Ca    7.7<L>      23 Jun 2017 18:41  Phos  2.7     06-23  Mg     2.3     06-23    TPro  4.4<L>  /  Alb  2.3<L>  /  TBili  1.0  /  DBili      /  AST  89<H>  /  ALT  80<H>  /  AlkPhos  72  06-23    Creatinine Trend: 0.35 <--, 0.36 <--, 0.44 <--, 0.41 <--, 0.43 <--, 0.46 <--, 0.44 <--, 0.46 <--, 0.41 <--, 0.38 <--                        13.1   12.9  )-----------( 259      ( 23 Jun 2017 18:41 )             38.1     Urine Studies:      CARDIAC MARKERS ( 23 Jun 2017 18:41 )  x     / 0.07 ng/mL / x     / x     / 2.7 ng/mL  CARDIAC MARKERS ( 23 Jun 2017 07:11 )  x     / 0.12 ng/mL / 79 U/L / x     / 2.7 ng/mL  CARDIAC MARKERS ( 22 Jun 2017 17:17 )  x     / 0.20 ng/mL / 102 U/L / x     / 5.0 ng/mL  CARDIAC MARKERS ( 22 Jun 2017 11:25 )  x     / 0.27 ng/mL / 102 U/L / x     / 5.7 ng/mL  CARDIAC MARKERS ( 22 Jun 2017 02:15 )  x     / 0.11 ng/mL / 71 U/L / x     / 1.8 ng/mL      ABG - ( 23 Jun 2017 18:27 )  pH: 7.38  /  pCO2: 38    /  pO2: 120   / HCO3: 22    / Base Excess: -2.5  /  SaO2: 99                LIVER FUNCTIONS - ( 23 Jun 2017 18:41 )  Alb: 2.3 g/dL / Pro: 4.4 g/dL / ALK PHOS: 72 U/L / ALT: 80 U/L RC / AST: 89 U/L / GGT: x           PT/INR - ( 23 Jun 2017 18:41 )   PT: 12.5 sec;   INR: 1.15 ratio         PTT - ( 23 Jun 2017 18:41 )  PTT:30.0 sec

## 2017-06-23 NOTE — PROGRESS NOTE ADULT - SUBJECTIVE AND OBJECTIVE BOX
[facility] Progress Note  Consult Spectra [id number]    Interval Events:  Pt still with very mild chest pain radiating to the back. Had CT heart yesterday with no significant blockages. small area of subendocardial hypoperfusion of unclear etiology.    Review of Systems:  Constitutional: [ -] Fever [- ] Chills [ ] Fatigue [ ] Weight change   HEENT: [ ] Blurred vision [ ] Eye Pain [- ] Headache [ ] Runny nose [ ] Sore Throat   Respiratory: [ ] Cough [ ] Wheezing [ -] Shortness of breath  Cardiovascular: [x ] Chest Pain [ -] Palpitations [- ] WALDROP [ ] PND [ ] Orthopnea  Gastrointestinal: [- ] Abdominal Pain [ ] Diarrhea [ ] Constipation [ ] Hemorrhoids [- ] Nausea [- ] Vomiting  Genitourinary: [ ] Nocturia [ -] Dysuria [ ] Incontinence  Extremities: [ ] Swelling [- ] Joint Pain  Neurologic: [ ] Focal deficit [ ] Paresthesias [- ] Syncope  Skin: [- ] Rash [ ] Ecchymoses [ ] Wounds [ ] Lesions  Psychiatry: [- ] Depression [ ] Suicidal/Homicidal Ideation [ ] Anxiety [ ] Sleep Disturbances  [x ] 10 point review of systems is otherwise negative except as mentioned above            [ ]Unable to obtain      MEDICATIONS:  enoxaparin Injectable 40milliGRAM(s) SubCutaneous daily  pantoprazole  Injectable 40milliGRAM(s) IV Push daily  dextrose 5% + lactated ringers. 1000milliLiter(s) IV Continuous <Continuous>  tetracaine/benzocaine/butamben Spray 1Spray(s) Topical three times a day PRN  sodium chloride 0.65% Nasal 1Spray(s) Both Nostrils every 1 hour PRN      PHYSICAL EXAM:  T(C): 36.8, Max: 36.9 (06-22 @ 20:44)  HR: 61 (60 - 65)  BP: 97/61 (92/62 - 102/61)  RR: 18 (18 - 18)  SpO2: 99% (97% - 99%)  Wt(kg): --  I&O's Summary    I & Os for current day (as of 23 Jun 2017 09:52)  =============================================  IN: 1705 ml / OUT: 200 ml / NET: 1505 ml    Daily       Appearance: no acute distress	  HEENT:   NGT 	  Cardiovascular: s1s2 rrr no m/g/r  Respiratory: Lungs clear to auscultation	  Psychiatry: A & O x 3, Mood & affect appropriate  Gastrointestinal:  soft nt nd  Skin: No rashes, No cyanosis	  Neurologic: grossly non-focal    LABS:	 	    CBC Full  -  ( 23 Jun 2017 08:14 )  WBC Count : 7.29 K/uL  Hemoglobin : 10.4 g/dL  Hematocrit : 31.3 %  Platelet Count - Automated : 317 K/uL  Mean Cell Volume : 88.2 fl  Mean Cell Hemoglobin : 29.3 pg  Mean Cell Hemoglobin Concentration : 33.2 gm/dL    06-23    141  |  107  |  18  ----------------------------<  100<H>  3.5   |  24  |  0.36<L>  06-22    141  |  107  |  9   ----------------------------<  142<H>  3.9   |  27  |  0.44<L>    Ca    8.3<L>      23 Jun 2017 07:11  Ca    8.3<L>      22 Jun 2017 02:15  Phos  2.7     06-23  Phos  2.7     06-22  Mg     2.3     06-23  Mg     2.0     06-22      CARDIAC MARKERS:  Troponin T, Serum: 0.12 ng/mL (06-23 @ 07:11)  Troponin T, Serum: 0.20 ng/mL (06-22 @ 17:17)  Troponin T, Serum: 0.27 ng/mL (06-22 @ 11:25)  Troponin T, Serum: 0.11 ng/mL (06-22 @ 02:15)      TELEMETRY: 	  none    PREVIOUS DIAGNOSTIC TESTING:    [ ] Echocardiogram:  [ ] Catheterization:  [ ] Stress Test:  	  [x] CT Heart: 6/22/2017  IMPRESSION:    1.  No coronary artery disease.  2.  No significant stenosis.  3.  Subendocardial hypodensity along the apex is of uncertain origin.   This could be secondary to a perfusion abnormality or stress related   perfusion abnormality in the appropriate clinical setting.  4.  The findings were discussed with Dr. Gonzalez. [facility] Progress Note  Consult Spectra [id number]    Interval Events:  Pt still with very mild chest pain radiating to the back. Had CT heart yesterday with no significant blockages; small area of subendocardial hypoperfusion of unclear etiology/significance noted.    Review of Systems:  Constitutional: [ -] Fever [- ] Chills [ ] Fatigue [ ] Weight change   HEENT: [ ] Blurred vision [ ] Eye Pain [- ] Headache [ ] Runny nose [ ] Sore Throat   Respiratory: [ ] Cough [ ] Wheezing [ -] Shortness of breath  Cardiovascular: [x ] Chest Pain [ -] Palpitations [- ] WALDROP [ ] PND [ ] Orthopnea  Gastrointestinal: [- ] Abdominal Pain [ ] Diarrhea [ ] Constipation [ ] Hemorrhoids [- ] Nausea [- ] Vomiting  Genitourinary: [ ] Nocturia [ -] Dysuria [ ] Incontinence  Extremities: [ ] Swelling [- ] Joint Pain  Neurologic: [ ] Focal deficit [ ] Paresthesias [- ] Syncope  Skin: [- ] Rash [ ] Ecchymoses [ ] Wounds [ ] Lesions  Psychiatry: [- ] Depression [ ] Suicidal/Homicidal Ideation [ ] Anxiety [ ] Sleep Disturbances  [x ] 10 point review of systems is otherwise negative except as mentioned above               MEDICATIONS:  enoxaparin Injectable 40milliGRAM(s) SubCutaneous daily  pantoprazole  Injectable 40milliGRAM(s) IV Push daily  dextrose 5% + lactated ringers. 1000milliLiter(s) IV Continuous <Continuous>  tetracaine/benzocaine/butamben Spray 1Spray(s) Topical three times a day PRN  sodium chloride 0.65% Nasal 1Spray(s) Both Nostrils every 1 hour PRN    PHYSICAL EXAM:  T(C): 36.8, Max: 36.9 (06-22 @ 20:44)  HR: 61 (60 - 65)  BP: 97/61 (92/62 - 102/61)  RR: 18 (18 - 18)  SpO2: 99% (97% - 99%)    Appearance: Alert, no acute distress	  HEENT:   PERRLA, EOM intact, NG tube in place	  Neck: Normal carotid upstrokes, no JVD  Cardiovascular: Normal s1 & s2, rrr, no m/g/r  Respiratory: Lungs clear to auscultation	  Gastrointestinal:  soft nt nd  Skin: No rashes, No cyanosis	  Neurologic: grossly non-focal  Psychiatry: A & O x 3, Mood & affect appropriate    LABS:	 	    CBC Full  -  ( 23 Jun 2017 08:14 )  WBC Count : 7.29 K/uL  Hemoglobin : 10.4 g/dL  Hematocrit : 31.3 %  Platelet Count - Automated : 317 K/uL  Mean Cell Volume : 88.2 fl  Mean Cell Hemoglobin : 29.3 pg  Mean Cell Hemoglobin Concentration : 33.2 gm/dL    06-23    141  |  107  |  18  ----------------------------<  100<H>  3.5   |  24  |  0.36<L>    06-22    141  |  107  |  9   ----------------------------<  142<H>  3.9   |  27  |  0.44<L>    Ca    8.3<L>      23 Jun 2017 07:11  Ca    8.3<L>      22 Jun 2017 02:15    Phos  2.7     06-23  Phos  2.7     06-22    Mg     2.3     06-23  Mg     2.0     06-22    CARDIAC MARKERS:  Troponin T, Serum: 0.12 ng/mL (06-23 @ 07:11)  Troponin T, Serum: 0.20 ng/mL (06-22 @ 17:17)  Troponin T, Serum: 0.27 ng/mL (06-22 @ 11:25)  Troponin T, Serum: 0.11 ng/mL (06-22 @ 02:15)    TELEMETRY: 	  patient not on tele    CT Heart: 6/22/2017  IMPRESSION:  1.  No coronary artery disease.  2.  No significant stenosis.  3.  Subendocardial hypodensity along the apex is of uncertain origin.   This could be secondary to a perfusion abnormality or stress related   perfusion abnormality in the appropriate clinical setting.  4.  The findings were discussed with Dr. Gonzalez.

## 2017-06-23 NOTE — PROGRESS NOTE ADULT - ATTENDING COMMENTS
Pt seen and examined.  For OR today - exlap, distal gastrectomy, possible gastrostomy, possible feeding jejunostomy

## 2017-06-23 NOTE — BRIEF OPERATIVE NOTE - OPERATION/FINDINGS
s/p exploratory laparotomy, distal gastrectomy, billroth II reconstruction, D2 lymphadenectomy, feeding jejunostomy tube placement    no evidence of metastatic disease in the abdomen, gross margins are negative

## 2017-06-23 NOTE — PROGRESS NOTE ADULT - ASSESSMENT
57F s/p exploratory laparotomy, distal gastrectomy, billroth II reconstruction, D2 lymphadenectomy, feeding jejunostomy tube placement  - PCA for pain  - NPO/NGT  - VTE ppx  - PACU overnight  - AM labs  - I/Os  - replete electrolytes

## 2017-06-23 NOTE — PROGRESS NOTE ADULT - SUBJECTIVE AND OBJECTIVE BOX
GENERAL SURGERY POSTOP NOTE:   s/p exploratory laparotomy, distal gastrectomy, billroth II reconstruction, D2 lymphadenectomy, feeding jejunostomy tube placement  Subjective:  Seen in PACU, accompanied by son.  Pain controlled. Denies nausea. Feeling tired. Denies CP/SOB.      Objective:    MEDICATIONS  (STANDING):  HYDROmorphone PCA (1 mG/mL) 30milliLiter(s) PCA Continuous PCA Continuous  enoxaparin Injectable 40milliGRAM(s) SubCutaneous every 24 hours  lactated ringers. 1000milliLiter(s) IV Continuous <Continuous>  pantoprazole  Injectable 40milliGRAM(s) IV Push daily  potassium chloride  10 mEq/100 mL IVPB 10milliEquivalent(s) IV Intermittent every 1 hour    MEDICATIONS  (PRN):  HYDROmorphone  Injectable 0.5milliGRAM(s) IV Push every 10 minutes PRN Moderate Pain  ondansetron Injectable 4milliGRAM(s) IV Push once PRN Nausea and/or Vomiting  HYDROmorphone PCA (1 mG/mL) Rescue Clinician Bolus 0.5milliGRAM(s) IV Push every 15 minutes PRN for Pain Scale GREATER THAN 6  naloxone Injectable 0.1milliGRAM(s) IV Push every 3 minutes PRN For ANY of the following changes in patient status:  A. RR LESS THAN 10 breaths per minute, B. Oxygen saturation LESS THAN 90%, C. Sedation score of 6  ondansetron Injectable 4milliGRAM(s) IV Push every 6 hours PRN Nausea      Vital Signs Last 24 Hrs  T(C): 36.2, Max: 36.9 (06-23 @ 00:18)  T(F): 97.2, Max: 98.4 (06-23 @ 00:18)  HR: 76 (60 - 84)  BP: 102/56 (94/58 - 136/79)  BP(mean): 77 (68 - 101)  RR: 15 (15 - 18)  SpO2: 96% (95% - 100%)    I&O's Detail  I & Os for 24h ending 23 Jun 2017 07:00  =============================================  IN:    TPN (Total Parenteral Nutrition): 780 ml    dextrose 5% + lactated ringers.: 600 ml    IV PiggyBack: 250 ml    heparin  Infusion.: 75 ml    Total IN: 1705 ml  ---------------------------------------------  OUT:    Voided: 200 ml    Total OUT: 200 ml  ---------------------------------------------  Total NET: 1505 ml    I & Os for current day (as of 23 Jun 2017 23:15)  =============================================  IN:    lactated ringers.: 400 ml    TPN (Total Parenteral Nutrition): 390 ml    IV PiggyBack: 200 ml    Total IN: 990 ml  ---------------------------------------------  OUT:    Indwelling Catheter - Urethral: 515 ml    Total OUT: 515 ml  ---------------------------------------------  Total NET: 475 ml      Daily     Daily     LABS:                        13.1   12.9  )-----------( 259      ( 23 Jun 2017 18:41 )             38.1     06-23    139  |  107  |  15  ----------------------------<  215<H>  3.2<L>   |  21<L>  |  0.35<L>    Ca    7.7<L>      23 Jun 2017 18:41  Phos  2.7     06-23  Mg     2.3     06-23    TPro  4.4<L>  /  Alb  2.3<L>  /  TBili  1.0  /  DBili  x   /  AST  89<H>  /  ALT  80<H>  /  AlkPhos  72  06-23    PT/INR - ( 23 Jun 2017 18:41 )   PT: 12.5 sec;   INR: 1.15 ratio         PTT - ( 23 Jun 2017 18:41 )  PTT:30.0 sec      NAD, responsive  NGT in place, min output  Respirations nonlabored  Abdomen soft, appropriately tender  No guarding or rebound tenderness   Midline incision with dressing, clean  J tube site clean  martinez with clear yellow urine

## 2017-06-23 NOTE — PROGRESS NOTE ADULT - ASSESSMENT
57F with GOO, ventral hernia  episode of CP with exertion  mildly elevated troponin and CK-MB but normal CK    unlikely that this is ACS given that the CT Heart with Coronaries was negative for obstruction. The elevated troponin likely reflects a stress response. Alternatively, there is a subendocardial perfusion abnormality of unclear etiology ?focal myocarditis vs. artifact. Regardless, this is not active ischemia.. The patient does not require antiplatelet agents nor does she require anticoagulation at this time for the elevated troponin. No further ischemic evaluation is required at this time. 57F with GOO, ventral hernia  episode of CP with exertion, mildly elevated troponin and CK-MB but normal CK.  Cardiac CTA  with coronaries showed no evidence of obstructive CAD, with calcium score of 0. The cause of elevated troponin is unclear.  A small subendocardial defect seen, also of uncertain significance.  This does not appear to represent ischemia or recent injury.  The patient does not require antiplatelet agents nor does she require anticoagulation at this time for the elevated troponin.   No further ischemic evaluation is required at this time.  No objection from cardiac standpoint to proceeding with necessary surgery to alleviate gastric outlet obstruction.

## 2017-06-23 NOTE — PROGRESS NOTE ADULT - ASSESSMENT
57F GOO 2/2 obstructing gastric mass, now with elevated troponins trending down  - trend cardiac enzymes  - telemetry  - f/u cards  - Surgery postponed until cleared by Cards  - continue TPN

## 2017-06-24 LAB
ANION GAP SERPL CALC-SCNC: 7 MMOL/L — SIGNIFICANT CHANGE UP (ref 5–17)
BUN SERPL-MCNC: 22 MG/DL — SIGNIFICANT CHANGE UP (ref 7–23)
CALCIUM SERPL-MCNC: 8.1 MG/DL — LOW (ref 8.4–10.5)
CHLORIDE SERPL-SCNC: 109 MMOL/L — HIGH (ref 96–108)
CO2 SERPL-SCNC: 24 MMOL/L — SIGNIFICANT CHANGE UP (ref 22–31)
CREAT SERPL-MCNC: 0.3 MG/DL — LOW (ref 0.5–1.3)
GLUCOSE SERPL-MCNC: 143 MG/DL — HIGH (ref 70–99)
HCT VFR BLD CALC: 37.4 % — SIGNIFICANT CHANGE UP (ref 34.5–45)
HGB BLD-MCNC: 12.5 G/DL — SIGNIFICANT CHANGE UP (ref 11.5–15.5)
MAGNESIUM SERPL-MCNC: 2.1 MG/DL — SIGNIFICANT CHANGE UP (ref 1.6–2.6)
MCHC RBC-ENTMCNC: 30.7 PG — SIGNIFICANT CHANGE UP (ref 27–34)
MCHC RBC-ENTMCNC: 33.5 GM/DL — SIGNIFICANT CHANGE UP (ref 32–36)
MCV RBC AUTO: 91.8 FL — SIGNIFICANT CHANGE UP (ref 80–100)
PHOSPHATE SERPL-MCNC: 2.7 MG/DL — SIGNIFICANT CHANGE UP (ref 2.5–4.5)
PLATELET # BLD AUTO: 244 K/UL — SIGNIFICANT CHANGE UP (ref 150–400)
POTASSIUM SERPL-MCNC: 4.2 MMOL/L — SIGNIFICANT CHANGE UP (ref 3.5–5.3)
POTASSIUM SERPL-SCNC: 4.2 MMOL/L — SIGNIFICANT CHANGE UP (ref 3.5–5.3)
RBC # BLD: 4.08 M/UL — SIGNIFICANT CHANGE UP (ref 3.8–5.2)
RBC # FLD: 13.3 % — SIGNIFICANT CHANGE UP (ref 10.3–14.5)
SODIUM SERPL-SCNC: 140 MMOL/L — SIGNIFICANT CHANGE UP (ref 135–145)
WBC # BLD: 13.5 K/UL — HIGH (ref 3.8–10.5)
WBC # FLD AUTO: 13.5 K/UL — HIGH (ref 3.8–10.5)

## 2017-06-24 PROCEDURE — 99232 SBSQ HOSP IP/OBS MODERATE 35: CPT | Mod: GC

## 2017-06-24 PROCEDURE — 49465 FLUORO EXAM OF G/COLON TUBE: CPT

## 2017-06-24 RX ADMIN — HYDROMORPHONE HYDROCHLORIDE 30 MILLILITER(S): 2 INJECTION INTRAMUSCULAR; INTRAVENOUS; SUBCUTANEOUS at 08:21

## 2017-06-24 RX ADMIN — HYDROMORPHONE HYDROCHLORIDE 30 MILLILITER(S): 2 INJECTION INTRAMUSCULAR; INTRAVENOUS; SUBCUTANEOUS at 13:10

## 2017-06-24 RX ADMIN — ENOXAPARIN SODIUM 40 MILLIGRAM(S): 100 INJECTION SUBCUTANEOUS at 06:00

## 2017-06-24 RX ADMIN — HYDROMORPHONE HYDROCHLORIDE 0.5 MILLIGRAM(S): 2 INJECTION INTRAMUSCULAR; INTRAVENOUS; SUBCUTANEOUS at 01:55

## 2017-06-24 RX ADMIN — HYDROMORPHONE HYDROCHLORIDE 30 MILLILITER(S): 2 INJECTION INTRAMUSCULAR; INTRAVENOUS; SUBCUTANEOUS at 19:23

## 2017-06-24 RX ADMIN — PANTOPRAZOLE SODIUM 40 MILLIGRAM(S): 20 TABLET, DELAYED RELEASE ORAL at 12:21

## 2017-06-24 RX ADMIN — SODIUM CHLORIDE 100 MILLILITER(S): 9 INJECTION, SOLUTION INTRAVENOUS at 09:26

## 2017-06-24 RX ADMIN — SODIUM CHLORIDE 100 MILLILITER(S): 9 INJECTION, SOLUTION INTRAVENOUS at 05:47

## 2017-06-24 RX ADMIN — HYDROMORPHONE HYDROCHLORIDE 0.5 MILLIGRAM(S): 2 INJECTION INTRAMUSCULAR; INTRAVENOUS; SUBCUTANEOUS at 07:43

## 2017-06-24 NOTE — PROGRESS NOTE ADULT - ATTENDING COMMENTS
--Agree with fellow assessment and plan.  --Patient tolerated surgery well; essentially normal coronary arteries on coronary CTA.  --A subendocardial hypodensity was visualized in the LV apex, however; etiology of this finding is uncertain.  --When okay from surgical perspective, would check transthoracic echocardiogram; pending TTE results, may opt for cardiac MRI to evaluate this finding in the future as clinically indicated.  --For now, care per surgical team/PACU. --Agree with fellow assessment and plan.  --Patient tolerated surgery well (exploratory laparotomy/distal gastrectomy/billroth II reconstruction/D2 lymphadenectomy/feeding jejunostomy tube)  --Essentially normal coronary arteries on coronary CTA.  --A subendocardial hypodensity was visualized in the LV apex, however; etiology of this finding is uncertain.  --When okay from surgical perspective, would check transthoracic echocardiogram; pending TTE results, may opt for cardiac MRI to evaluate this finding in the future as clinically indicated.  --For now, care per surgical team/PACU.

## 2017-06-24 NOTE — PROGRESS NOTE ADULT - ASSESSMENT
56 y/o F exertional CP and + troponin, with CT coronary without obastructive disease s/p gastric outlet obstruction relief 57F with GOO, ventral hernia  episode of CP with exertion, mildly elevated troponin and CK-MB but normal CK.  Cardiac CTA  with coronaries showed no evidence of obstructive CAD, with calcium score of 0. The cause of elevated troponin is unclear.  A small subendocardial defect seen, also of uncertain significance.  This does not appear to represent ischemia or recent injury.  The patient does not require antiplatelet agents nor does she require anticoagulation at this time for the elevated troponin.   Patient tolerated surgery well with no further ischemic eval needed. 57F with GOO, ventral hernia episode of CP with exertion, mildly elevated troponin and CK-MB but normal CK.  Cardiac CTA  with coronaries showed no evidence of obstructive CAD, with calcium score of 0. The cause of elevated troponin is unclear.  A small subendocardial defect seen, also of uncertain significance.  This does not appear to represent ischemia or recent injury.  The patient does not require antiplatelet agents nor does she require anticoagulation at this time for the elevated troponin.   Patient tolerated surgery well with no EKG changes post-op no further ischemic eval needed.

## 2017-06-24 NOTE — PROGRESS NOTE ADULT - ASSESSMENT
57F s/p exploratory laparotomy, distal gastrectomy, billroth II reconstruction, D2 lymphadenectomy, feeding jejunostomy tube placement  - PCA for pain  - NPO/NGT  - VTE ppx  - tx to floor  - AM labs  - I/Os  - J tube study today  - replete electrolytes

## 2017-06-24 NOTE — PROGRESS NOTE ADULT - SUBJECTIVE AND OBJECTIVE BOX
POD#1  Surgical procedure noted   T(C): 36.2, Max: 36.2 (06-23 @ 17:35)  HR: 72 (68 - 84)  BP: 113/56 (91/56 - 153/69)  RR: 18 (15 - 18)  SpO2: 95% (94% - 100%)  Wt(kg): --    Labs   06-24    140  |  109<H>  |  22  ----------------------------<  143<H>  4.2   |  24  |  0.30<L>    Ca    8.1<L>      24 Jun 2017 03:49  Phos  2.7     06-24  Mg     2.1     06-24    TPro  4.4<L>  /  Alb  2.3<L>  /  TBili  1.0  /  DBili  x   /  AST  89<H>  /  ALT  80<H>  /  AlkPhos  72  06-23      LIVER FUNCTIONS - ( 23 Jun 2017 18:41 )  Alb: 2.3 g/dL / Pro: 4.4 g/dL / ALK PHOS: 72 U/L / ALT: 80 U/L RC / AST: 89 U/L / GGT: x           CAPILLARY BLOOD GLUCOSE    I&O's Detail    I & Os for current day (as of 24 Jun 2017 08:19)  =============================================  IN:    lactated ringers.: 1200 ml    TPN (Total Parenteral Nutrition): 910 ml    IV PiggyBack: 200 ml    Total IN: 2310 ml  ---------------------------------------------  OUT:    Indwelling Catheter - Urethral: 870 ml    Nasoenteral Tube: 80 ml    Total OUT: 950 ml  ---------------------------------------------  Total NET: 1360 ml

## 2017-06-24 NOTE — PROGRESS NOTE ADULT - SUBJECTIVE AND OBJECTIVE BOX
GENERAL SURGERY DAILY PROGRESS NOTE:     Subjective:  Seen in PACU. Pain controlled. Denies nausea. -OOB      Objective:    MEDICATIONS  (STANDING):  HYDROmorphone PCA (1 mG/mL) 30milliLiter(s) PCA Continuous PCA Continuous  enoxaparin Injectable 40milliGRAM(s) SubCutaneous every 24 hours  lactated ringers. 1000milliLiter(s) IV Continuous <Continuous>  pantoprazole  Injectable 40milliGRAM(s) IV Push daily    MEDICATIONS  (PRN):  HYDROmorphone  Injectable 0.5milliGRAM(s) IV Push every 10 minutes PRN Moderate Pain  ondansetron Injectable 4milliGRAM(s) IV Push once PRN Nausea and/or Vomiting  HYDROmorphone PCA (1 mG/mL) Rescue Clinician Bolus 0.5milliGRAM(s) IV Push every 15 minutes PRN for Pain Scale GREATER THAN 6  naloxone Injectable 0.1milliGRAM(s) IV Push every 3 minutes PRN For ANY of the following changes in patient status:  A. RR LESS THAN 10 breaths per minute, B. Oxygen saturation LESS THAN 90%, C. Sedation score of 6  ondansetron Injectable 4milliGRAM(s) IV Push every 6 hours PRN Nausea      Vital Signs Last 24 Hrs  T(C): 36.2, Max: 36.2 (06-23 @ 17:35)  T(F): 97.2, Max: 97.2 (06-23 @ 17:35)  HR: 76 (68 - 84)  BP: 90/55 (90/55 - 153/69)  BP(mean): 70 (68 - 101)  RR: 15 (15 - 18)  SpO2: 94% (94% - 100%)    I&O's Detail  I & Os for 24h ending 24 Jun 2017 07:00  =============================================  IN:    lactated ringers.: 1200 ml    TPN (Total Parenteral Nutrition): 910 ml    IV PiggyBack: 200 ml    Total IN: 2310 ml  ---------------------------------------------  OUT:    Indwelling Catheter - Urethral: 870 ml    Nasoenteral Tube: 80 ml    Total OUT: 950 ml  ---------------------------------------------  Total NET: 1360 ml    I & Os for current day (as of 24 Jun 2017 09:55)  =============================================  IN:    lactated ringers.: 100 ml    TPN (Total Parenteral Nutrition): 65 ml    Total IN: 165 ml  ---------------------------------------------  OUT:    Indwelling Catheter - Urethral: 50 ml    Total OUT: 50 ml  ---------------------------------------------  Total NET: 115 ml      Daily     Daily     LABS:                        12.5   13.5  )-----------( 244      ( 24 Jun 2017 03:49 )             37.4     06-24    140  |  109<H>  |  22  ----------------------------<  143<H>  4.2   |  24  |  0.30<L>    Ca    8.1<L>      24 Jun 2017 03:49  Phos  2.7     06-24  Mg     2.1     06-24    TPro  4.4<L>  /  Alb  2.3<L>  /  TBili  1.0  /  DBili  x   /  AST  89<H>  /  ALT  80<H>  /  AlkPhos  72  06-23    PT/INR - ( 23 Jun 2017 18:41 )   PT: 12.5 sec;   INR: 1.15 ratio         PTT - ( 23 Jun 2017 18:41 )  PTT:30.0 sec    NAD, awake and alert  NGT with gastric contents  Respirations nonlabored  Abdomen soft, min tender  Midline incision c/d/i, dressing changed  J tube site c/d/i  No guarding or rebound tenderness

## 2017-06-24 NOTE — PROGRESS NOTE ADULT - SUBJECTIVE AND OBJECTIVE BOX
Date of Admission:    24H hour events:     s/p OR for gastric outlet obstruction 2/2 adenocarcinoma, denies CP/SOB,    MEDICATIONS:  enoxaparin Injectable 40milliGRAM(s) SubCutaneous every 24 hours        HYDROmorphone  Injectable 0.5milliGRAM(s) IV Push every 10 minutes PRN  ondansetron Injectable 4milliGRAM(s) IV Push once PRN  HYDROmorphone PCA (1 mG/mL) 30milliLiter(s) PCA Continuous PCA Continuous  HYDROmorphone PCA (1 mG/mL) Rescue Clinician Bolus 0.5milliGRAM(s) IV Push every 15 minutes PRN  ondansetron Injectable 4milliGRAM(s) IV Push every 6 hours PRN    pantoprazole  Injectable 40milliGRAM(s) IV Push daily      lactated ringers. 1000milliLiter(s) IV Continuous <Continuous>      REVIEW OF SYSTEMS:  Complete 10point ROS negative.    PHYSICAL EXAM:  T(C): 36.2, Max: 36.2 (06-23 @ 17:35)  HR: 76 (68 - 84)  BP: 90/55 (90/55 - 153/69)  RR: 15 (15 - 18)  SpO2: 94% (94% - 100%)  Wt(kg): --  I&O's Summary  I & Os for 24h ending 24 Jun 2017 07:00  =============================================  IN: 2310 ml / OUT: 950 ml / NET: 1360 ml    I & Os for current day (as of 24 Jun 2017 10:00)  =============================================  IN: 165 ml / OUT: 50 ml / NET: 115 ml      Appearance: Normal	  HEENT:   Normal oral mucosa, PERRL, EOMI, NG tube in place, somnolent  Cardiovascular: Normal S1 S2, No JVD, No murmurs, No edema  Respiratory: Lungs clear to auscultation	  Psychiatry: A & O x 3, Mood & affect appropriate  Gastrointestinal:  Soft, Non-tender, + BS	  Skin: No rashes, No ecchymoses, No cyanosis	   Extremities:  No clubbing, cyanosis or edema          LABS:	 	    CBC Full  -  ( 24 Jun 2017 03:49 )  WBC Count : 13.5 K/uL  Hemoglobin : 12.5 g/dL  Hematocrit : 37.4 %  Platelet Count - Automated : 244 K/uL  Mean Cell Volume : 91.8 fl  Mean Cell Hemoglobin : 30.7 pg  Mean Cell Hemoglobin Concentration : 33.5 gm/dL  Auto Neutrophil # : x  Auto Lymphocyte # : x  Auto Monocyte # : x  Auto Eosinophil # : x  Auto Basophil # : x  Auto Neutrophil % : x  Auto Lymphocyte % : x  Auto Monocyte % : x  Auto Eosinophil % : x  Auto Basophil % : x    06-24    140  |  109<H>  |  22  ----------------------------<  143<H>  4.2   |  24  |  0.30<L>  06-23    139  |  107  |  15  ----------------------------<  215<H>  3.2<L>   |  21<L>  |  0.35<L>    Ca    8.1<L>      24 Jun 2017 03:49  Ca    7.7<L>      23 Jun 2017 18:41  Phos  2.7     06-24  Phos  2.7     06-23  Mg     2.1     06-24  Mg     2.3     06-23    TPro  4.4<L>  /  Alb  2.3<L>  /  TBili  1.0  /  DBili  x   /  AST  89<H>  /  ALT  80<H>  /  AlkPhos  72  06-23      ASSESSMENT/PLAN: 	    Nirmal Nix MD  [phone]

## 2017-06-24 NOTE — PROGRESS NOTE ADULT - SUBJECTIVE AND OBJECTIVE BOX
Day _1__ of Anesthesia Pain Management Service    SUBJECTIVE:    Pain Scale Score	At rest: __2_ 	With Activity: __2_ 	[ ] Refer to charted pain scores    THERAPY:          IV PCA Morphine		[ ] 5 mg/mL	[ ] 1 mg/mL     x   IV PCA Hydromorphone	   . 5 mg/mL	    1 mg/mL        IV PCA Fentanyl		[ ] 50 micrograms/mL    Demand dose .2   lockout 6   (minutes) Continuous Rate 0   Total:     Daily      MEDICATIONS  (STANDING):  HYDROmorphone PCA (1 mG/mL) 30milliLiter(s) PCA Continuous PCA Continuous  enoxaparin Injectable 40milliGRAM(s) SubCutaneous every 24 hours  lactated ringers. 1000milliLiter(s) IV Continuous <Continuous>  pantoprazole  Injectable 40milliGRAM(s) IV Push daily    MEDICATIONS  (PRN):  HYDROmorphone  Injectable 0.5milliGRAM(s) IV Push every 10 minutes PRN Moderate Pain  ondansetron Injectable 4milliGRAM(s) IV Push once PRN Nausea and/or Vomiting  HYDROmorphone PCA (1 mG/mL) Rescue Clinician Bolus 0.5milliGRAM(s) IV Push every 15 minutes PRN for Pain Scale GREATER THAN 6  naloxone Injectable 0.1milliGRAM(s) IV Push every 3 minutes PRN For ANY of the following changes in patient status:  A. RR LESS THAN 10 breaths per minute, B. Oxygen saturation LESS THAN 90%, C. Sedation score of 6  ondansetron Injectable 4milliGRAM(s) IV Push every 6 hours PRN Nausea      OBJECTIVE:    Sedation Score:	Alert   x      Drowsy 	[ ] Arousable	[ ] Asleep	[ ] Unresponsive    Side Effects:	xNone	[ ] Nausea	[ ] Vomiting	[ ] Pruritus  		[ ] Other:    Vital Signs Last 24 Hrs  T(C): 36.2, Max: 36.2 (06-23 @ 17:35)  T(F): 97.2, Max: 97.2 (06-23 @ 17:35)  HR: 72 (68 - 84)  BP: 113/56 (91/56 - 153/69)  BP(mean): 80 (68 - 101)  RR: 18 (15 - 18)  SpO2: 95% (94% - 100%)    ASSESSMENT/ PLAN    Therapy to  be:	Continuex      Discontinued       Change to prn Analgesics    Documentation and Verification of current medications:   [X] Done	[ ] Not done, not elligible    Comments:

## 2017-06-25 LAB
ANION GAP SERPL CALC-SCNC: 10 MMOL/L — SIGNIFICANT CHANGE UP (ref 5–17)
BUN SERPL-MCNC: 18 MG/DL — SIGNIFICANT CHANGE UP (ref 7–23)
CALCIUM SERPL-MCNC: 7.9 MG/DL — LOW (ref 8.4–10.5)
CHLORIDE SERPL-SCNC: 108 MMOL/L — SIGNIFICANT CHANGE UP (ref 96–108)
CO2 SERPL-SCNC: 22 MMOL/L — SIGNIFICANT CHANGE UP (ref 22–31)
CREAT SERPL-MCNC: 0.3 MG/DL — LOW (ref 0.5–1.3)
GLUCOSE SERPL-MCNC: 108 MG/DL — HIGH (ref 70–99)
HCT VFR BLD CALC: 28.1 % — LOW (ref 34.5–45)
HGB BLD-MCNC: 9.2 G/DL — LOW (ref 11.5–15.5)
MAGNESIUM SERPL-MCNC: 2 MG/DL — SIGNIFICANT CHANGE UP (ref 1.6–2.6)
MCHC RBC-ENTMCNC: 28.5 PG — SIGNIFICANT CHANGE UP (ref 27–34)
MCHC RBC-ENTMCNC: 32.7 GM/DL — SIGNIFICANT CHANGE UP (ref 32–36)
MCV RBC AUTO: 87 FL — SIGNIFICANT CHANGE UP (ref 80–100)
PHOSPHATE SERPL-MCNC: 1.3 MG/DL — LOW (ref 2.5–4.5)
PLATELET # BLD AUTO: 272 K/UL — SIGNIFICANT CHANGE UP (ref 150–400)
POTASSIUM SERPL-MCNC: 3.9 MMOL/L — SIGNIFICANT CHANGE UP (ref 3.5–5.3)
POTASSIUM SERPL-SCNC: 3.9 MMOL/L — SIGNIFICANT CHANGE UP (ref 3.5–5.3)
RBC # BLD: 3.23 M/UL — LOW (ref 3.8–5.2)
RBC # FLD: 15.2 % — HIGH (ref 10.3–14.5)
SODIUM SERPL-SCNC: 140 MMOL/L — SIGNIFICANT CHANGE UP (ref 135–145)
WBC # BLD: 13.28 K/UL — HIGH (ref 3.8–10.5)
WBC # FLD AUTO: 13.28 K/UL — HIGH (ref 3.8–10.5)

## 2017-06-25 RX ORDER — POTASSIUM PHOSPHATE, MONOBASIC POTASSIUM PHOSPHATE, DIBASIC 236; 224 MG/ML; MG/ML
15 INJECTION, SOLUTION INTRAVENOUS ONCE
Qty: 0 | Refills: 0 | Status: COMPLETED | OUTPATIENT
Start: 2017-06-25 | End: 2017-06-25

## 2017-06-25 RX ORDER — DEXTROSE MONOHYDRATE, SODIUM CHLORIDE, AND POTASSIUM CHLORIDE 50; .745; 4.5 G/1000ML; G/1000ML; G/1000ML
1000 INJECTION, SOLUTION INTRAVENOUS
Qty: 0 | Refills: 0 | Status: DISCONTINUED | OUTPATIENT
Start: 2017-06-25 | End: 2017-06-26

## 2017-06-25 RX ADMIN — PANTOPRAZOLE SODIUM 40 MILLIGRAM(S): 20 TABLET, DELAYED RELEASE ORAL at 11:22

## 2017-06-25 RX ADMIN — HYDROMORPHONE HYDROCHLORIDE 30 MILLILITER(S): 2 INJECTION INTRAMUSCULAR; INTRAVENOUS; SUBCUTANEOUS at 19:30

## 2017-06-25 RX ADMIN — HYDROMORPHONE HYDROCHLORIDE 30 MILLILITER(S): 2 INJECTION INTRAMUSCULAR; INTRAVENOUS; SUBCUTANEOUS at 06:52

## 2017-06-25 RX ADMIN — POTASSIUM PHOSPHATE, MONOBASIC POTASSIUM PHOSPHATE, DIBASIC 62.5 MILLIMOLE(S): 236; 224 INJECTION, SOLUTION INTRAVENOUS at 11:20

## 2017-06-25 RX ADMIN — Medication 63.75 MILLIMOLE(S): at 23:54

## 2017-06-25 RX ADMIN — ENOXAPARIN SODIUM 40 MILLIGRAM(S): 100 INJECTION SUBCUTANEOUS at 05:10

## 2017-06-25 NOTE — PROGRESS NOTE ADULT - ASSESSMENT
pt s/p gastrectomy - pain control with pca                              - post op PN  anemia - monitor closely for active signs of bleeidng

## 2017-06-25 NOTE — PROGRESS NOTE ADULT - SUBJECTIVE AND OBJECTIVE BOX
Day __2_ of Anesthesia Pain Management Service    SUBJECTIVE:    Pain Scale Score	At rest: __2_ 	With Activity: __2_ 	[ ] Refer to charted pain scores    THERAPY:          IV PCA Morphine		[ ] 5 mg/mL	[ ] 1 mg/mL     x   IV PCA Hydromorphone	   . 5 mg/mL	    1 mg/mL        IV PCA Fentanyl		[ ] 50 micrograms/mL    Demand dose.2    lockout  6  (minutes) Continuous Rate 0   Total:     Daily      MEDICATIONS  (STANDING):  HYDROmorphone PCA (1 mG/mL) 30milliLiter(s) PCA Continuous PCA Continuous  enoxaparin Injectable 40milliGRAM(s) SubCutaneous every 24 hours  pantoprazole  Injectable 40milliGRAM(s) IV Push daily  sodium chloride 0.45% with potassium chloride 20 mEq/L 1000milliLiter(s) IV Continuous <Continuous>    MEDICATIONS  (PRN):  HYDROmorphone PCA (1 mG/mL) Rescue Clinician Bolus 0.5milliGRAM(s) IV Push every 15 minutes PRN for Pain Scale GREATER THAN 6  naloxone Injectable 0.1milliGRAM(s) IV Push every 3 minutes PRN For ANY of the following changes in patient status:  A. RR LESS THAN 10 breaths per minute, B. Oxygen saturation LESS THAN 90%, C. Sedation score of 6  ondansetron Injectable 4milliGRAM(s) IV Push every 6 hours PRN Nausea      OBJECTIVE:    Sedation Score:	Alert   x      Drowsy 	[ ] Arousable	[ ] Asleep	[ ] Unresponsive    Side Effects:	xNone	[ ] Nausea	[ ] Vomiting	[ ] Pruritus  		[ ] Other:    Vital Signs Last 24 Hrs  T(C): 37.6, Max: 37.7 (06-24 @ 21:00)  T(F): 99.6, Max: 99.8 (06-24 @ 21:00)  HR: 71 (66 - 82)  BP: 107/67 (90/55 - 118/52)  BP(mean): 80 (67 - 80)  RR: 18 (15 - 18)  SpO2: 94% (94% - 98%)    ASSESSMENT/ PLAN    Therapy to  be:	Continuex      Discontinued       Change to prn Analgesics    Documentation and Verification of current medications:   [X] Done	[ ] Not done, not elligible    Comments:

## 2017-06-25 NOTE — PROVIDER CONTACT NOTE (OTHER) - DATE AND TIME:
17-Jun-2017 01:50
17-Jun-2017 22:15
15-Maynor-2017 22:10
16-Jun-2017 17:15
22-Jun-2017 00:40
23-Jun-2017 00:00
25-Jun-2017 17:30

## 2017-06-25 NOTE — PROVIDER CONTACT NOTE (OTHER) - RECOMMENDATIONS
Change medication, give an additional fluid bolus
Notify MD
encouraged incentive sporometer. and pt got out of bed and went to chair
Fluid Bolus/ EKG

## 2017-06-25 NOTE — PROGRESS NOTE ADULT - ASSESSMENT
57F s/p exploratory laparotomy, distal gastrectomy, billroth II reconstruction, D2 lymphadenectomy, feeding jejunostomy tube placement  - PCA for pain  - NPO/NGT  - VTE ppx  - I/Os 57F s/p exploratory laparotomy, distal gastrectomy, billroth II reconstruction, D2 lymphadenectomy, feeding jejunostomy tube placement  - PCA for pain  - NPO/NGT  - Continue TPN  - begin tube feeds  - VTE ppx  - I/Os  - OOB/PT

## 2017-06-25 NOTE — PROGRESS NOTE ADULT - SUBJECTIVE AND OBJECTIVE BOX
GENERAL SURGERY DAILY PROGRESS NOTE:     Subjective:  Feeling okay. Pain controlled. Denies CP/SOB. - flatus yet            Objective:    MEDICATIONS  (STANDING):  HYDROmorphone PCA (1 mG/mL) 30milliLiter(s) PCA Continuous PCA Continuous  enoxaparin Injectable 40milliGRAM(s) SubCutaneous every 24 hours  pantoprazole  Injectable 40milliGRAM(s) IV Push daily  sodium chloride 0.45% with potassium chloride 20 mEq/L 1000milliLiter(s) IV Continuous <Continuous>    MEDICATIONS  (PRN):  HYDROmorphone PCA (1 mG/mL) Rescue Clinician Bolus 0.5milliGRAM(s) IV Push every 15 minutes PRN for Pain Scale GREATER THAN 6  naloxone Injectable 0.1milliGRAM(s) IV Push every 3 minutes PRN For ANY of the following changes in patient status:  A. RR LESS THAN 10 breaths per minute, B. Oxygen saturation LESS THAN 90%, C. Sedation score of 6  ondansetron Injectable 4milliGRAM(s) IV Push every 6 hours PRN Nausea      Vital Signs Last 24 Hrs  T(C): 36.9, Max: 37.7 (06-24 @ 21:00)  T(F): 98.4, Max: 99.8 (06-24 @ 21:00)  HR: 82 (66 - 82)  BP: 92/58 (90/55 - 118/52)  BP(mean): 80 (67 - 80)  RR: 17 (15 - 18)  SpO2: 95% (94% - 98%)    I&O's Detail    I & Os for current day (as of 25 Jun 2017 08:40)  =============================================  IN:    TPN (Total Parenteral Nutrition): 1495 ml    sodium chloride 0.45% with potassium chloride 20 mEq/L: 1200 ml    lactated ringers.: 1100 ml    IV PiggyBack: 50 ml    Total IN: 3845 ml  ---------------------------------------------  OUT:    Indwelling Catheter - Urethral: 1360 ml    Nasoenteral Tube: 140 ml    Total OUT: 1500 ml  ---------------------------------------------  Total NET: 2345 ml      Daily     Daily     LABS:                        9.2    13.28 )-----------( 272      ( 25 Jun 2017 08:19 )             28.1     06-24    140  |  109<H>  |  22  ----------------------------<  143<H>  4.2   |  24  |  0.30<L>    Ca    8.1<L>      24 Jun 2017 03:49  Phos  2.7     06-24  Mg     2.1     06-24    TPro  4.4<L>  /  Alb  2.3<L>  /  TBili  1.0  /  DBili  x   /  AST  89<H>  /  ALT  80<H>  /  AlkPhos  72  06-23    PT/INR - ( 23 Jun 2017 18:41 )   PT: 12.5 sec;   INR: 1.15 ratio         PTT - ( 23 Jun 2017 18:41 )  PTT:30.0 sec    NAD, awake and alert  Respirations nonlabored  Abdomen soft, mildly tender, nondistended  No guarding or rebound tenderness   J tube site c/d/i  Midline incision c/d/i with staples  Mathis with clear yellow urine GENERAL SURGERY DAILY PROGRESS NOTE:     Subjective:  Feeling okay. Pain controlled. Denies CP/SOB. - flatus yet            Objective:    MEDICATIONS  (STANDING):  HYDROmorphone PCA (1 mG/mL) 30milliLiter(s) PCA Continuous PCA Continuous  enoxaparin Injectable 40milliGRAM(s) SubCutaneous every 24 hours  pantoprazole  Injectable 40milliGRAM(s) IV Push daily  sodium chloride 0.45% with potassium chloride 20 mEq/L 1000milliLiter(s) IV Continuous <Continuous>    MEDICATIONS  (PRN):  HYDROmorphone PCA (1 mG/mL) Rescue Clinician Bolus 0.5milliGRAM(s) IV Push every 15 minutes PRN for Pain Scale GREATER THAN 6  naloxone Injectable 0.1milliGRAM(s) IV Push every 3 minutes PRN For ANY of the following changes in patient status:  A. RR LESS THAN 10 breaths per minute, B. Oxygen saturation LESS THAN 90%, C. Sedation score of 6  ondansetron Injectable 4milliGRAM(s) IV Push every 6 hours PRN Nausea      Vital Signs Last 24 Hrs  T(C): 36.9, Max: 37.7 (06-24 @ 21:00)  T(F): 98.4, Max: 99.8 (06-24 @ 21:00)  HR: 82 (66 - 82)  BP: 92/58 (90/55 - 118/52)  BP(mean): 80 (67 - 80)  RR: 17 (15 - 18)  SpO2: 95% (94% - 98%)    I&O's Detail    I & Os for current day (as of 25 Jun 2017 08:40)  =============================================  IN:    TPN (Total Parenteral Nutrition): 1495 ml    sodium chloride 0.45% with potassium chloride 20 mEq/L: 1200 ml    lactated ringers.: 1100 ml    IV PiggyBack: 50 ml    Total IN: 3845 ml  ---------------------------------------------  OUT:    Indwelling Catheter - Urethral: 1360 ml    Nasoenteral Tube: 140 ml    Total OUT: 1500 ml  ---------------------------------------------  Total NET: 2345 ml      Daily     Daily     LABS:                        9.2    13.28 )-----------( 272      ( 25 Jun 2017 08:19 )             28.1     06-24    140  |  109<H>  |  22  ----------------------------<  143<H>  4.2   |  24  |  0.30<L>    Ca    8.1<L>      24 Jun 2017 03:49  Phos  2.7     06-24  Mg     2.1     06-24    TPro  4.4<L>  /  Alb  2.3<L>  /  TBili  1.0  /  DBili  x   /  AST  89<H>  /  ALT  80<H>  /  AlkPhos  72  06-23    PT/INR - ( 23 Jun 2017 18:41 )   PT: 12.5 sec;   INR: 1.15 ratio         PTT - ( 23 Jun 2017 18:41 )  PTT:30.0 sec    NAD, awake and alert  NGT with gastric contents  Respirations nonlabored  Abdomen soft, mildly tender, nondistended  No guarding or rebound tenderness   J tube site c/d/i  Midline incision c/d/i with staples  Mathis with clear yellow urine

## 2017-06-25 NOTE — PROVIDER CONTACT NOTE (OTHER) - ACTION/TREATMENT ORDERED:
MD notified, no interventions given at this time, will monitor patient for nausea/vomiting.
Continue to Monitor. Notify id HR is less than 45bpm
NO INTERVENTION AT THIS TIME. WILL CONTINUE TO MONITOR
Red team notified, Yue WONG made aware, calcium gluconate ordered, continue to monitor
MD Briggs to bedside. EKG done. Cardiac enzymes and labs to be drawn. Will continue to monitor
No action ordered at this time. As per MD Last, current vitals are acceptable as long as pt is comfortable. Will continue to monitor.
temp retaken and it was 99.4

## 2017-06-25 NOTE — PROGRESS NOTE ADULT - SUBJECTIVE AND OBJECTIVE BOX
POD #2  PN infusion at 66 ccs/hr  T(C): 36.9, Max: 37.7 (06-24 @ 21:00)  HR: 82 (66 - 82)  BP: 92/58 (92/58 - 118/52)  RR: 17 (16 - 18)  SpO2: 95% (94% - 98%)      Labs   06-24    140  |  109<H>  |  22  ----------------------------<  143<H>  4.2   |  24  |  0.30<L>    Ca    8.1<L>      24 Jun 2017 03:49  Phos  2.7     06-24  Mg     2.1     06-24    TPro  4.4<L>  /  Alb  2.3<L>  /  TBili  1.0  /  DBili  x   /  AST  89<H>  /  ALT  80<H>  /  AlkPhos  72  06-23      LIVER FUNCTIONS - ( 23 Jun 2017 18:41 )  Alb: 2.3 g/dL / Pro: 4.4 g/dL / ALK PHOS: 72 U/L / ALT: 80 U/L RC / AST: 89 U/L / GGT: x             I&O's Detail    I & Os for current day (as of 25 Jun 2017 10:08)  =============================================  IN:    TPN (Total Parenteral Nutrition): 1495 ml    sodium chloride 0.45% with potassium chloride 20 mEq/L: 1200 ml    lactated ringers.: 1100 ml    IV PiggyBack: 50 ml    Total IN: 3845 ml  ---------------------------------------------  OUT:    Indwelling Catheter - Urethral: 1360 ml    Nasoenteral Tube: 140 ml    Total OUT: 1500 ml  ---------------------------------------------  Total NET: 2345 ml

## 2017-06-25 NOTE — PROGRESS NOTE ADULT - SUBJECTIVE AND OBJECTIVE BOX
SUBJECTIVE / OVERNIGHT EVENTS: s/p exploratory laparotomy, distal gastrectomy, billroth II reconstruction, D2 lymphadenectomy, feeding jejunostomy tube placement      MEDICATIONS  (STANDING):  HYDROmorphone PCA (1 mG/mL) 30milliLiter(s) PCA Continuous PCA Continuous  enoxaparin Injectable 40milliGRAM(s) SubCutaneous every 24 hours  pantoprazole  Injectable 40milliGRAM(s) IV Push daily  sodium chloride 0.45% with potassium chloride 20 mEq/L 1000milliLiter(s) IV Continuous <Continuous>  sodium phosphate IVPB 15milliMole(s) IV Intermittent once    MEDICATIONS  (PRN):  HYDROmorphone PCA (1 mG/mL) Rescue Clinician Bolus 0.5milliGRAM(s) IV Push every 15 minutes PRN for Pain Scale GREATER THAN 6  naloxone Injectable 0.1milliGRAM(s) IV Push every 3 minutes PRN For ANY of the following changes in patient status:  A. RR LESS THAN 10 breaths per minute, B. Oxygen saturation LESS THAN 90%, C. Sedation score of 6  ondansetron Injectable 4milliGRAM(s) IV Push every 6 hours PRN Nausea      Vital Signs Last 24 Hrs  T(C): 37.6, Max: 38.3 (06-25 @ 17:00)  HR: 94 (71 - 94)  BP: 100/59 (92/58 - 114/68)  RR: 18 (17 - 18)  SpO2: 95% (94% - 96%)  Wt(kg): --  CAPILLARY BLOOD GLUCOSE    I&O's Summary  I & Os for 24h ending 25 Jun 2017 07:00  =============================================  IN: 3845 ml / OUT: 1500 ml / NET: 2345 ml    I & Os for current day (as of 25 Jun 2017 23:01)  =============================================  IN: 1605 ml / OUT: 1650 ml / NET: -45 ml      Constitutional: No fever, fatigue  Skin: No rash.  Eyes: No recent vision problems or eye pain.  ENT: No congestion, ear pain, or sore throat.  Cardiovascular: No chest pain or palpation.  Respiratory: No cough, shortness of breath, congestion, or wheezing.  Gastrointestinal: No abdominal pain, nausea, vomiting, or diarrhea.  Genitourinary: No dysuria.  Musculoskeletal: No joint swelling.  Neurologic: No headache.    PHYSICAL EXAM:  GENERAL: NAD  EYES: EOMI, PERRLA  NECK: Supple, No JVD  CHEST/LUNG: dec breath sounds at bases   HEART:  S1 , S2 +  ABDOMEN: soft, mild tenderness+ all over, J tube site no bleeding   EXTREMITIES:  no edema  NEUROLOGY:alert awake oriented      LABS:                        9.2    13.28 )-----------( 272      ( 25 Jun 2017 08:19 )             28.1     06-25    140  |  108  |  18  ----------------------------<  108<H>  3.9   |  22  |  0.30<L>    Ca    7.9<L>      25 Jun 2017 08:27  Phos  1.3     06-25  Mg     2.0     06-25                RADIOLOGY & ADDITIONAL TESTS:    Imaging Personally Reviewed:    Consultant(s) Notes Reviewed:      Care Discussed with Consultants/Other Providers:

## 2017-06-25 NOTE — PROVIDER CONTACT NOTE (OTHER) - REASON
Pt bradycardic and hypotensive
Pt complaining of swelling of lips and tingling
Pt's NGT fell out
patient's blood pressure 90/58
pt had temp  of 100.9
Pt Bradycardic to 45
Pt. c/o chest pain

## 2017-06-25 NOTE — PROGRESS NOTE ADULT - ATTENDING COMMENTS
POD #02.    Comfortable.  Pain controlled.    Afebrile, VSS  NGT bilious.  UOP adequate.    Abdomen:  jtube in place.  Dressing in place.    Assessment:  Stable.  Plan:  Continue NGT.  Start trickle jtube feeds.

## 2017-06-26 ENCOUNTER — TRANSCRIPTION ENCOUNTER (OUTPATIENT)
Age: 57
End: 2017-06-26

## 2017-06-26 LAB
ANION GAP SERPL CALC-SCNC: 11 MMOL/L — SIGNIFICANT CHANGE UP (ref 5–17)
BUN SERPL-MCNC: 16 MG/DL — SIGNIFICANT CHANGE UP (ref 7–23)
CALCIUM SERPL-MCNC: 8.1 MG/DL — LOW (ref 8.4–10.5)
CHLORIDE SERPL-SCNC: 103 MMOL/L — SIGNIFICANT CHANGE UP (ref 96–108)
CO2 SERPL-SCNC: 21 MMOL/L — LOW (ref 22–31)
CREAT SERPL-MCNC: 0.32 MG/DL — LOW (ref 0.5–1.3)
GAS PNL BLDA: SIGNIFICANT CHANGE UP
GLUCOSE SERPL-MCNC: 131 MG/DL — HIGH (ref 70–99)
HCT VFR BLD CALC: 30.7 % — LOW (ref 34.5–45)
HGB BLD-MCNC: 10.1 G/DL — LOW (ref 11.5–15.5)
MAGNESIUM SERPL-MCNC: 2 MG/DL — SIGNIFICANT CHANGE UP (ref 1.6–2.6)
MCHC RBC-ENTMCNC: 30.4 PG — SIGNIFICANT CHANGE UP (ref 27–34)
MCHC RBC-ENTMCNC: 32.9 GM/DL — SIGNIFICANT CHANGE UP (ref 32–36)
MCV RBC AUTO: 92.5 FL — SIGNIFICANT CHANGE UP (ref 80–100)
PHOSPHATE SERPL-MCNC: 2.1 MG/DL — LOW (ref 2.5–4.5)
PLATELET # BLD AUTO: 276 K/UL — SIGNIFICANT CHANGE UP (ref 150–400)
POTASSIUM SERPL-MCNC: 3.9 MMOL/L — SIGNIFICANT CHANGE UP (ref 3.5–5.3)
POTASSIUM SERPL-SCNC: 3.9 MMOL/L — SIGNIFICANT CHANGE UP (ref 3.5–5.3)
RBC # BLD: 3.32 M/UL — LOW (ref 3.8–5.2)
RBC # FLD: 13.6 % — SIGNIFICANT CHANGE UP (ref 10.3–14.5)
SODIUM SERPL-SCNC: 135 MMOL/L — SIGNIFICANT CHANGE UP (ref 135–145)
WBC # BLD: 10.5 K/UL — SIGNIFICANT CHANGE UP (ref 3.8–10.5)
WBC # FLD AUTO: 10.5 K/UL — SIGNIFICANT CHANGE UP (ref 3.8–10.5)

## 2017-06-26 PROCEDURE — 93306 TTE W/DOPPLER COMPLETE: CPT | Mod: 26

## 2017-06-26 PROCEDURE — 93010 ELECTROCARDIOGRAM REPORT: CPT

## 2017-06-26 RX ORDER — POTASSIUM PHOSPHATE, MONOBASIC POTASSIUM PHOSPHATE, DIBASIC 236; 224 MG/ML; MG/ML
15 INJECTION, SOLUTION INTRAVENOUS ONCE
Qty: 0 | Refills: 0 | Status: COMPLETED | OUTPATIENT
Start: 2017-06-26 | End: 2017-06-26

## 2017-06-26 RX ORDER — DEXTROSE MONOHYDRATE, SODIUM CHLORIDE, AND POTASSIUM CHLORIDE 50; .745; 4.5 G/1000ML; G/1000ML; G/1000ML
1000 INJECTION, SOLUTION INTRAVENOUS
Qty: 0 | Refills: 0 | Status: DISCONTINUED | OUTPATIENT
Start: 2017-06-26 | End: 2017-07-03

## 2017-06-26 RX ADMIN — HYDROMORPHONE HYDROCHLORIDE 30 MILLILITER(S): 2 INJECTION INTRAMUSCULAR; INTRAVENOUS; SUBCUTANEOUS at 06:37

## 2017-06-26 RX ADMIN — HYDROMORPHONE HYDROCHLORIDE 30 MILLILITER(S): 2 INJECTION INTRAMUSCULAR; INTRAVENOUS; SUBCUTANEOUS at 19:12

## 2017-06-26 RX ADMIN — ENOXAPARIN SODIUM 40 MILLIGRAM(S): 100 INJECTION SUBCUTANEOUS at 06:30

## 2017-06-26 RX ADMIN — PANTOPRAZOLE SODIUM 40 MILLIGRAM(S): 20 TABLET, DELAYED RELEASE ORAL at 11:34

## 2017-06-26 RX ADMIN — DEXTROSE MONOHYDRATE, SODIUM CHLORIDE, AND POTASSIUM CHLORIDE 100 MILLILITER(S): 50; .745; 4.5 INJECTION, SOLUTION INTRAVENOUS at 07:26

## 2017-06-26 RX ADMIN — HYDROMORPHONE HYDROCHLORIDE 30 MILLILITER(S): 2 INJECTION INTRAMUSCULAR; INTRAVENOUS; SUBCUTANEOUS at 07:28

## 2017-06-26 RX ADMIN — POTASSIUM PHOSPHATE, MONOBASIC POTASSIUM PHOSPHATE, DIBASIC 62.5 MILLIMOLE(S): 236; 224 INJECTION, SOLUTION INTRAVENOUS at 15:41

## 2017-06-26 NOTE — PROGRESS NOTE ADULT - SUBJECTIVE AND OBJECTIVE BOX
SUBJECTIVE / OVERNIGHT EVENTS: s/p exploratory laparotomy, distal gastrectomy, billroth II reconstruction, D2 lymphadenectomy, feeding jejunostomy tube placement      MEDICATIONS  (STANDING):  HYDROmorphone PCA (1 mG/mL) 30milliLiter(s) PCA Continuous PCA Continuous  enoxaparin Injectable 40milliGRAM(s) SubCutaneous every 24 hours  pantoprazole  Injectable 40milliGRAM(s) IV Push daily  dextrose 5% + sodium chloride 0.45% with potassium chloride 20 mEq/L 1000milliLiter(s) IV Continuous <Continuous>    MEDICATIONS  (PRN):  HYDROmorphone PCA (1 mG/mL) Rescue Clinician Bolus 0.5milliGRAM(s) IV Push every 15 minutes PRN for Pain Scale GREATER THAN 6  naloxone Injectable 0.1milliGRAM(s) IV Push every 3 minutes PRN For ANY of the following changes in patient status:  A. RR LESS THAN 10 breaths per minute, B. Oxygen saturation LESS THAN 90%, C. Sedation score of 6  ondansetron Injectable 4milliGRAM(s) IV Push every 6 hours PRN Nausea      Vital Signs Last 24 Hrs  T(C): 36.6, Max: 37.8 (06-26 @ 05:25)  T(F): 97.9, Max: 100 (06-26 @ 05:25)  HR: 98 (94 - 98)  BP: 107/69 (98/66 - 107/69)  BP(mean): --  RR: 18 (17 - 18)  SpO2: 96% (95% - 97%)      Constitutional: No fever, fatigue  Skin: No rash.  Eyes: No recent vision problems or eye pain.  ENT: No congestion, ear pain, or sore throat.  Cardiovascular: No chest pain or palpation.  Respiratory: No cough, shortness of breath, congestion, or wheezing.  Gastrointestinal: No abdominal pain, nausea, vomiting, or diarrhea.  Genitourinary: No dysuria.  Musculoskeletal: No joint swelling.  Neurologic: No headache.    PHYSICAL EXAM:  GENERAL: NAD  EYES: EOMI, PERRLA  NECK: Supple, No JVD  CHEST/LUNG: dec breath sounds at bases   HEART:  S1 , S2 +  ABDOMEN: soft, mild tenderness+ all over, J tube site no bleeding   EXTREMITIES:  no edema  NEUROLOGY:alert awake oriented      LABS:  06-26    135  |  103  |  16  ----------------------------<  131<H>  3.9   |  21<L>  |  0.32<L>    Ca    8.1<L>      26 Jun 2017 10:52  Phos  2.1     06-26  Mg     2.0     06-26      Creatinine Trend: 0.32 <--, 0.30 <--, 0.30 <--, 0.35 <--, 0.36 <--, 0.44 <--, 0.41 <--, 0.43 <--, 0.46 <--                        10.1   10.5  )-----------( 276      ( 26 Jun 2017 10:52 )             30.7     Urine Studies:                  Consultant(s) Notes Reviewed:      Care Discussed with Consultants/Other Providers:

## 2017-06-26 NOTE — PROGRESS NOTE ADULT - SUBJECTIVE AND OBJECTIVE BOX
POST OPERATIVE DAY #: 3    SUBJECTIVE: Pt seen and examined at bedside.  no complaints. pain controlled.   No GI function. No N/V/SOB/CP      Vital Signs Last 24 Hrs  T(C): 37.8, Max: 38.3 (06-25 @ 17:00)  T(F): 100, Max: 100.9 (06-25 @ 17:00)  HR: 97 (88 - 97)  BP: 98/66 (98/66 - 114/68)  BP(mean): --  RR: 18 (18 - 18)  SpO2: 95% (95% - 96%)  I&O's Summary    I & Os for current day (as of 26 Jun 2017 09:34)  =============================================  IN: 3705 ml / OUT: 2450 ml / NET: 1255 ml    I&O's Detail    I & Os for current day (as of 26 Jun 2017 09:34)  =============================================  IN:    TPN (Total Parenteral Nutrition): 1455 ml    dextrose 5% + sodium chloride 0.45% with potassium chloride 20 mEq/L: 1200 ml    sodium chloride 0.45% with potassium chloride 20 mEq/L: 900 ml    Vital HN: 150 ml    Total IN: 3705 ml  ---------------------------------------------  OUT:    Indwelling Catheter - Urethral: 2250 ml    Nasoenteral Tube: 200 ml    Total OUT: 2450 ml  ---------------------------------------------  Total NET: 1255 ml      Labs:                         9.2    13.28 )-----------( 272      ( 25 Jun 2017 08:19 )             28.1     06-25    140  |  108  |  18  ----------------------------<  108<H>  3.9   |  22  |  0.30<L>    Ca    7.9<L>      25 Jun 2017 08:27  Phos  1.3     06-25  Mg     2.0     06-25            Physical Exam:  General Appearance: NAD, A&O x3  Chest: Equal expansion bilaterally, equal breath sounds, no rales/rhonchi/ wheeze  CV: S1/ S2, regular rate/rhythm, no murmurs  Pulm: nonlabored breathing, no respiratory distress  Abdomen: Softly distended, appropriately tender, no rebound/guarding, no active bleeding/hematoma                      dressings clean, dry, and intact  Extremities: Grossly symmetric, SCD's in place       RADIOLOGY & ADDITIONAL STUDIES:

## 2017-06-26 NOTE — PROGRESS NOTE ADULT - SUBJECTIVE AND OBJECTIVE BOX
Pain Management Attending Addendum    SUBJECTIVE:    Therapy:	  PCA	x   Epidural           s/p Spinal Opoid              Postpartum infusion	  Patient controlled regional anesthesia (PCRA)    prn Analgesics    OBJECTIVE: No new signsx      Other:    Side Effects:    Nonex			 Other:    Assessment of Catheter Site:		 Intact		 Other:    ASSESSMENT/PLAN  Continue current therapyx    Therapy changed to:     IV PCA        Epidural      prn Analgesics     post partum infusion    Comments:

## 2017-06-26 NOTE — PROGRESS NOTE ADULT - SUBJECTIVE AND OBJECTIVE BOX
Day #5 of PN  PN infusion at 65 ccs/hr  T(C): 37.8, Max: 38.3 (06-25 @ 17:00)  HR: 97 (88 - 97)  BP: 98/66 (98/66 - 114/68)  RR: 18 (18 - 18)  SpO2: 95% (95% - 96%)      Labs   06-25    140  |  108  |  18  ----------------------------<  108<H>  3.9   |  22  |  0.30<L>    Ca    7.9<L>      25 Jun 2017 08:27  Phos  1.3     06-25  Mg     2.0     06-25          CAPILLARY BLOOD GLUCOSE    I&O's Detail    I & Os for current day (as of 26 Jun 2017 09:32)  =============================================  IN:    TPN (Total Parenteral Nutrition): 1455 ml    dextrose 5% + sodium chloride 0.45% with potassium chloride 20 mEq/L: 1200 ml    sodium chloride 0.45% with potassium chloride 20 mEq/L: 900 ml    Vital HN: 150 ml    Total IN: 3705 ml  ---------------------------------------------  OUT:    Indwelling Catheter - Urethral: 2250 ml    Nasoenteral Tube: 200 ml    Total OUT: 2450 ml  ---------------------------------------------  Total NET: 1255 ml

## 2017-06-26 NOTE — PROGRESS NOTE ADULT - ASSESSMENT
pt s/p gastrectomy - pain control with pca, npo, ngt                              - post op PN  anemia - monitor closely for active signs of bleeding

## 2017-06-26 NOTE — PROGRESS NOTE ADULT - SUBJECTIVE AND OBJECTIVE BOX
Day __3_ of Anesthesia Pain Management Service    SUBJECTIVE:    Pain Scale Score	At rest: ___ 	With Activity: ___ 	[ x] Refer to charted pain scores    THERAPY:    [ ] IV PCA Morphine		[ ] 5 mg/mL	[ ] 1 mg/mL  [x ] IV PCA Hydromorphone	[ ] 5 mg/mL	[x ] 1 mg/mL  [ ] IV PCA Fentanyl		[ ] 50 micrograms/mL    Demand dose  .2  lockout  6  (minutes)          MEDICATIONS  (STANDING):  HYDROmorphone PCA (1 mG/mL) 30milliLiter(s) PCA Continuous PCA Continuous  enoxaparin Injectable 40milliGRAM(s) SubCutaneous every 24 hours  pantoprazole  Injectable 40milliGRAM(s) IV Push daily  dextrose 5% + sodium chloride 0.45% with potassium chloride 20 mEq/L 1000milliLiter(s) IV Continuous <Continuous>    MEDICATIONS  (PRN):  HYDROmorphone PCA (1 mG/mL) Rescue Clinician Bolus 0.5milliGRAM(s) IV Push every 15 minutes PRN for Pain Scale GREATER THAN 6  naloxone Injectable 0.1milliGRAM(s) IV Push every 3 minutes PRN For ANY of the following changes in patient status:  A. RR LESS THAN 10 breaths per minute, B. Oxygen saturation LESS THAN 90%, C. Sedation score of 6  ondansetron Injectable 4milliGRAM(s) IV Push every 6 hours PRN Nausea      OBJECTIVE:    Sedation Score:	[x ] Alert	[ ] Drowsy 	[ ] Arousable	[ ] Asleep	[ ] Unresponsive    Side Effects:	[ x] None	[ ] Nausea	[ ] Vomiting	[ ] Pruritus  		[ ] Other:    Vital Signs Last 24 Hrs  T(C): 37.8, Max: 38.3 (06-25 @ 17:00)  T(F): 100, Max: 100.9 (06-25 @ 17:00)  HR: 97 (88 - 97)  BP: 98/66 (98/66 - 114/68)  BP(mean): --  RR: 18 (18 - 18)  SpO2: 95% (95% - 96%)    ASSESSMENT/ PLAN    Therapy to  be:	[ x] Continue   [ ] Discontinued   [ ] Change to prn Analgesics    Documentation and Verification of current medications:   [X] Done	[ ] Not done, not elligible    Comments:

## 2017-06-26 NOTE — PROGRESS NOTE ADULT - ASSESSMENT
57F s/p exploratory laparotomy, distal gastrectomy, billroth II reconstruction, D2 lymphadenectomy, feeding jejunostomy tube placement  - PCA for pain  - NPO/NGT  - Continue TPN  - begin tube feeds  - VTE ppx  - I/Os  - OOB/PT  - Mathis removed during rounds  - F/U labs

## 2017-06-27 LAB
ANION GAP SERPL CALC-SCNC: 10 MMOL/L — SIGNIFICANT CHANGE UP (ref 5–17)
ANION GAP SERPL CALC-SCNC: 11 MMOL/L — SIGNIFICANT CHANGE UP (ref 5–17)
BUN SERPL-MCNC: 16 MG/DL — SIGNIFICANT CHANGE UP (ref 7–23)
BUN SERPL-MCNC: 16 MG/DL — SIGNIFICANT CHANGE UP (ref 7–23)
CALCIUM SERPL-MCNC: 7.7 MG/DL — LOW (ref 8.4–10.5)
CALCIUM SERPL-MCNC: 8.1 MG/DL — LOW (ref 8.4–10.5)
CHLORIDE SERPL-SCNC: 105 MMOL/L — SIGNIFICANT CHANGE UP (ref 96–108)
CHLORIDE SERPL-SCNC: 107 MMOL/L — SIGNIFICANT CHANGE UP (ref 96–108)
CK MB BLD-MCNC: 0.5 % — SIGNIFICANT CHANGE UP (ref 0–3.5)
CK MB CFR SERPL CALC: 1.4 NG/ML — SIGNIFICANT CHANGE UP (ref 0–3.8)
CK SERPL-CCNC: 270 U/L — HIGH (ref 25–170)
CO2 SERPL-SCNC: 19 MMOL/L — LOW (ref 22–31)
CO2 SERPL-SCNC: 20 MMOL/L — LOW (ref 22–31)
CREAT SERPL-MCNC: 0.34 MG/DL — LOW (ref 0.5–1.3)
CREAT SERPL-MCNC: 0.35 MG/DL — LOW (ref 0.5–1.3)
GLUCOSE SERPL-MCNC: 110 MG/DL — HIGH (ref 70–99)
GLUCOSE SERPL-MCNC: 110 MG/DL — HIGH (ref 70–99)
HCT VFR BLD CALC: 25 % — LOW (ref 34.5–45)
HCT VFR BLD CALC: 26.4 % — LOW (ref 34.5–45)
HGB BLD-MCNC: 8.4 G/DL — LOW (ref 11.5–15.5)
HGB BLD-MCNC: 8.9 G/DL — LOW (ref 11.5–15.5)
MAGNESIUM SERPL-MCNC: 2 MG/DL — SIGNIFICANT CHANGE UP (ref 1.6–2.6)
MAGNESIUM SERPL-MCNC: 2.1 MG/DL — SIGNIFICANT CHANGE UP (ref 1.6–2.6)
MCHC RBC-ENTMCNC: 28.9 PG — SIGNIFICANT CHANGE UP (ref 27–34)
MCHC RBC-ENTMCNC: 31.4 PG — SIGNIFICANT CHANGE UP (ref 27–34)
MCHC RBC-ENTMCNC: 33.6 GM/DL — SIGNIFICANT CHANGE UP (ref 32–36)
MCHC RBC-ENTMCNC: 33.8 GM/DL — SIGNIFICANT CHANGE UP (ref 32–36)
MCV RBC AUTO: 85.9 FL — SIGNIFICANT CHANGE UP (ref 80–100)
MCV RBC AUTO: 93 FL — SIGNIFICANT CHANGE UP (ref 80–100)
PHOSPHATE SERPL-MCNC: 2.1 MG/DL — LOW (ref 2.5–4.5)
PHOSPHATE SERPL-MCNC: 2.2 MG/DL — LOW (ref 2.5–4.5)
PLATELET # BLD AUTO: 249 K/UL — SIGNIFICANT CHANGE UP (ref 150–400)
PLATELET # BLD AUTO: 283 K/UL — SIGNIFICANT CHANGE UP (ref 150–400)
POTASSIUM SERPL-MCNC: 3.3 MMOL/L — LOW (ref 3.5–5.3)
POTASSIUM SERPL-MCNC: 3.7 MMOL/L — SIGNIFICANT CHANGE UP (ref 3.5–5.3)
POTASSIUM SERPL-SCNC: 3.3 MMOL/L — LOW (ref 3.5–5.3)
POTASSIUM SERPL-SCNC: 3.7 MMOL/L — SIGNIFICANT CHANGE UP (ref 3.5–5.3)
RBC # BLD: 2.84 M/UL — LOW (ref 3.8–5.2)
RBC # BLD: 2.91 M/UL — LOW (ref 3.8–5.2)
RBC # FLD: 13.4 % — SIGNIFICANT CHANGE UP (ref 10.3–14.5)
RBC # FLD: 15.2 % — HIGH (ref 10.3–14.5)
SODIUM SERPL-SCNC: 135 MMOL/L — SIGNIFICANT CHANGE UP (ref 135–145)
SODIUM SERPL-SCNC: 137 MMOL/L — SIGNIFICANT CHANGE UP (ref 135–145)
TROPONIN T SERPL-MCNC: 0.02 NG/ML — SIGNIFICANT CHANGE UP (ref 0–0.06)
WBC # BLD: 6.5 K/UL — SIGNIFICANT CHANGE UP (ref 3.8–10.5)
WBC # BLD: 7.87 K/UL — SIGNIFICANT CHANGE UP (ref 3.8–10.5)
WBC # FLD AUTO: 6.5 K/UL — SIGNIFICANT CHANGE UP (ref 3.8–10.5)
WBC # FLD AUTO: 7.87 K/UL — SIGNIFICANT CHANGE UP (ref 3.8–10.5)

## 2017-06-27 PROCEDURE — 93970 EXTREMITY STUDY: CPT | Mod: 26

## 2017-06-27 PROCEDURE — 71010: CPT | Mod: 26

## 2017-06-27 RX ORDER — POTASSIUM PHOSPHATE, MONOBASIC POTASSIUM PHOSPHATE, DIBASIC 236; 224 MG/ML; MG/ML
15 INJECTION, SOLUTION INTRAVENOUS ONCE
Qty: 0 | Refills: 0 | Status: COMPLETED | OUTPATIENT
Start: 2017-06-27 | End: 2017-06-27

## 2017-06-27 RX ORDER — DIPHENHYDRAMINE HCL 50 MG
25 CAPSULE ORAL ONCE
Qty: 0 | Refills: 0 | Status: COMPLETED | OUTPATIENT
Start: 2017-06-27 | End: 2017-06-27

## 2017-06-27 RX ORDER — ACETAMINOPHEN 500 MG
1000 TABLET ORAL ONCE
Qty: 0 | Refills: 0 | Status: COMPLETED | OUTPATIENT
Start: 2017-06-27 | End: 2017-06-27

## 2017-06-27 RX ORDER — CALCIUM GLUCONATE 100 MG/ML
1 VIAL (ML) INTRAVENOUS ONCE
Qty: 1 | Refills: 0 | Status: COMPLETED | OUTPATIENT
Start: 2017-06-27 | End: 2017-06-27

## 2017-06-27 RX ORDER — LIDOCAINE 4 G/100G
1 CREAM TOPICAL DAILY
Qty: 0 | Refills: 0 | Status: DISCONTINUED | OUTPATIENT
Start: 2017-06-27 | End: 2017-07-04

## 2017-06-27 RX ORDER — POTASSIUM CHLORIDE 20 MEQ
10 PACKET (EA) ORAL
Qty: 0 | Refills: 0 | Status: COMPLETED | OUTPATIENT
Start: 2017-06-27 | End: 2017-06-27

## 2017-06-27 RX ADMIN — Medication 100 MILLIEQUIVALENT(S): at 05:24

## 2017-06-27 RX ADMIN — PANTOPRAZOLE SODIUM 40 MILLIGRAM(S): 20 TABLET, DELAYED RELEASE ORAL at 13:40

## 2017-06-27 RX ADMIN — POTASSIUM PHOSPHATE, MONOBASIC POTASSIUM PHOSPHATE, DIBASIC 62.5 MILLIMOLE(S): 236; 224 INJECTION, SOLUTION INTRAVENOUS at 19:25

## 2017-06-27 RX ADMIN — ENOXAPARIN SODIUM 40 MILLIGRAM(S): 100 INJECTION SUBCUTANEOUS at 05:23

## 2017-06-27 RX ADMIN — Medication 100 MILLIEQUIVALENT(S): at 03:07

## 2017-06-27 RX ADMIN — Medication 400 MILLIGRAM(S): at 01:12

## 2017-06-27 RX ADMIN — Medication 200 GRAM(S): at 01:54

## 2017-06-27 RX ADMIN — Medication 400 MILLIGRAM(S): at 14:55

## 2017-06-27 RX ADMIN — Medication 1000 MILLIGRAM(S): at 01:42

## 2017-06-27 RX ADMIN — HYDROMORPHONE HYDROCHLORIDE 30 MILLILITER(S): 2 INJECTION INTRAMUSCULAR; INTRAVENOUS; SUBCUTANEOUS at 07:36

## 2017-06-27 RX ADMIN — POTASSIUM PHOSPHATE, MONOBASIC POTASSIUM PHOSPHATE, DIBASIC 62.5 MILLIMOLE(S): 236; 224 INJECTION, SOLUTION INTRAVENOUS at 14:54

## 2017-06-27 RX ADMIN — POTASSIUM PHOSPHATE, MONOBASIC POTASSIUM PHOSPHATE, DIBASIC 62.5 MILLIMOLE(S): 236; 224 INJECTION, SOLUTION INTRAVENOUS at 03:08

## 2017-06-27 RX ADMIN — Medication 100 MILLIEQUIVALENT(S): at 01:54

## 2017-06-27 RX ADMIN — HYDROMORPHONE HYDROCHLORIDE 30 MILLILITER(S): 2 INJECTION INTRAMUSCULAR; INTRAVENOUS; SUBCUTANEOUS at 19:25

## 2017-06-27 RX ADMIN — LIDOCAINE 1 PATCH: 4 CREAM TOPICAL at 18:26

## 2017-06-27 NOTE — PROGRESS NOTE ADULT - SUBJECTIVE AND OBJECTIVE BOX
POST OPERATIVE DAY #: 4    SUBJECTIVE: Pt seen and examined at bedside. Pt tachycardic overnight c/o anxiety. c/o BLE edema. Pt states she has had 3 episodes of loose bowel movements since this morning. Pain otherwise controlled Denies SOB/CP/palpitations.         Vital Signs Last 24 Hrs  T(C): 37.4 (27 Jun 2017 09:23), Max: 37.4 (26 Jun 2017 22:15)  T(F): 99.3 (27 Jun 2017 09:23), Max: 99.4 (26 Jun 2017 22:15)  HR: 90 (27 Jun 2017 09:23) (82 - 112)  BP: 111/76 (27 Jun 2017 09:23) (95/61 - 130/80)  BP(mean): --  RR: 18 (27 Jun 2017 09:23) (17 - 18)  SpO2: 99% (27 Jun 2017 09:23) (96% - 99%)  I&O's Summary    26 Jun 2017 07:01  -  27 Jun 2017 07:00  --------------------------------------------------------  IN: 3940 mL / OUT: 1400 mL / NET: 2540 mL      I&O's Detail    26 Jun 2017 07:01  -  27 Jun 2017 07:00  --------------------------------------------------------  IN:    dextrose 5% + sodium chloride 0.45% with potassium chloride 20 mEq/L: 1380 mL    IV PiggyBack: 1580 mL    TPN (Total Parenteral Nutrition): 780 mL    Vital HN: 200 mL  Total IN: 3940 mL    OUT:    Nasoenteral Tube: 550 mL    Voided: 850 mL  Total OUT: 1400 mL    Total NET: 2540 mL          Labs:                         8.9    6.5   )-----------( 249      ( 27 Jun 2017 01:00 )             26.4     06-27    135  |  105  |  16  ----------------------------<  110<H>  3.3<L>   |  20<L>  |  0.34<L>    Ca    7.7<L>      27 Jun 2017 01:00  Phos  2.1     06-27  Mg     2.0     06-27            Physical Exam:  General Appearance: NAD, A&O x3  Chest: Equal expansion bilaterally, equal breath sounds, no rales/rhonchi/ wheeze  CV: S1/ S2, regular rate/rhythm, no murmurs  Pulm: nonlabored breathing, no respiratory distress  Abdomen: Softly distended, appropriately tender, no rebound/guarding, no active bleeding/hematoma                      dressings clean, dry, and intact  Extremities: Grossly symmetric, SCD's in place       RADIOLOGY & ADDITIONAL STUDIES:

## 2017-06-27 NOTE — PROGRESS NOTE ADULT - SUBJECTIVE AND OBJECTIVE BOX
SUBJECTIVE / OVERNIGHT EVENTS: s/p exploratory laparotomy, distal gastrectomy, billroth II reconstruction, D2 lymphadenectomy, feeding jejunostomy tube placement, c/o loose bms+      MEDICATIONS  (STANDING):  HYDROmorphone PCA (1 mG/mL) 30 milliLiter(s) PCA Continuous PCA Continuous  enoxaparin Injectable 40 milliGRAM(s) SubCutaneous every 24 hours  pantoprazole  Injectable 40 milliGRAM(s) IV Push daily  dextrose 5% + sodium chloride 0.45% with potassium chloride 20 mEq/L 1000 milliLiter(s) (40 mL/Hr) IV Continuous <Continuous>  diphenhydrAMINE   Injectable 25 milliGRAM(s) IV Push once  lidocaine   Patch 1 Patch Transdermal daily    MEDICATIONS  (PRN):  HYDROmorphone PCA (1 mG/mL) Rescue Clinician Bolus 0.5 milliGRAM(s) IV Push every 15 minutes PRN for Pain Scale GREATER THAN 6  naloxone Injectable 0.1 milliGRAM(s) IV Push every 3 minutes PRN For ANY of the following changes in patient status:  A. RR LESS THAN 10 breaths per minute, B. Oxygen saturation LESS THAN 90%, C. Sedation score of 6  ondansetron Injectable 4 milliGRAM(s) IV Push every 6 hours PRN Nausea      Vital Signs Last 24 Hrs  T(C): 36.8 (27 Jun 2017 21:33), Max: 37.4 (26 Jun 2017 22:15)  T(F): 98.2 (27 Jun 2017 21:33), Max: 99.4 (26 Jun 2017 22:15)  HR: 86 (27 Jun 2017 21:33) (79 - 112)  BP: 120/77 (27 Jun 2017 21:33) (95/61 - 130/80)  BP(mean): --  RR: 18 (27 Jun 2017 21:33) (18 - 18)  SpO2: 96% (27 Jun 2017 21:33) (95% - 99%)        Constitutional: No fever, fatigue  Skin: No rash.  Eyes: No recent vision problems or eye pain.  ENT: No congestion, ear pain, or sore throat.  Cardiovascular: No chest pain or palpation.  Respiratory: No cough, shortness of breath, congestion, or wheezing.  Gastrointestinal: No abdominal pain, nausea, vomiting, or diarrhea.  Genitourinary: No dysuria.  Musculoskeletal: No joint swelling.  Neurologic: No headache.    PHYSICAL EXAM:  GENERAL: NAD  EYES: EOMI, PERRLA  NECK: Supple, No JVD  CHEST/LUNG: dec breath sounds at bases   HEART:  S1 , S2 +  ABDOMEN: soft, mild tenderness+ all over, J tube site no bleeding   EXTREMITIES:  edema+  NEUROLOGY:alert awake oriented      LABS:  06-27    137  |  107  |  16  ----------------------------<  110<H>  3.7   |  19<L>  |  0.35<L>    Ca    8.1<L>      27 Jun 2017 12:30  Phos  2.2     06-27  Mg     2.1     06-27      Creatinine Trend: 0.35 <--, 0.34 <--, 0.32 <--, 0.30 <--, 0.30 <--, 0.35 <--, 0.36 <--, 0.44 <--, 0.41 <--                        8.4    7.87  )-----------( 283      ( 27 Jun 2017 10:16 )             25.0     Urine Studies:      CARDIAC MARKERS ( 27 Jun 2017 01:00 )  x     / 0.02 ng/mL / 270 U/L / x     / 1.4 ng/mL      ABG - ( 26 Jun 2017 23:53 )  pH: 7.47  /  pCO2: 28    /  pO2: 72    / HCO3: 21    / Base Excess: -2.0  /  SaO2: 96

## 2017-06-27 NOTE — PROGRESS NOTE ADULT - SUBJECTIVE AND OBJECTIVE BOX
Pain Management Attending Addendum    SUBJECTIVE:    Therapy:	  [x ] IV PCA	   [ ] Epidural           [ ] s/p Spinal Opoid              [ ] Postpartum infusion	  [ ] Patient controlled regional anesthesia (PCRA)    [ ] prn Analgesics    OBJECTIVE:   [x ] No new signs     [ ] Other:    Side Effects:  [x] None			[ ] Other:    Assessment of Catheter Site:		[ ] Intact		[ ] Other:    ASSESSMENT/PLAN  [ x] Continue current therapy    [ ] Therapy changed to:    [ ] IV PCA       [ ] Epidural     [ ] prn Analgesics     [ ] post partum infusion    Comments:

## 2017-06-27 NOTE — PROGRESS NOTE ADULT - ATTENDING COMMENTS
I have seen and examined the patient, reviewed the laboratory and radiologic data and agree with practitioner's history, physical assessment, plan and reviewed and edited where appropriate.    Plan:  1) Consider changing TF to 2cal at lower rate  2) Awaiting more GI fxn  3) Continue NGT/NPO/TPN

## 2017-06-27 NOTE — PROGRESS NOTE ADULT - ASSESSMENT
57F s/p exploratory laparotomy, distal gastrectomy, billroth II reconstruction, D2 lymphadenectomy, feeding jejunostomy tube placement  - PCA for pain  - NPO/NGT  - Continue TPN  - begin tube feeds  - VTE ppx  - I/Os  - OOB/PT  - Duplex  - TF to goal ( Vital @ 60 cc/hr)

## 2017-06-27 NOTE — PROGRESS NOTE ADULT - SUBJECTIVE AND OBJECTIVE BOX
Day # 6 of PN  PN infusion at 65 ccs/hr  on TF with VItal at 20 cc/hr  06-26 @ 07:01  -  06-27 @ 07:00  --------------------------------------------------------  IN:    dextrose 5% + sodium chloride 0.45% with potassium chloride 20 mEq/L: 1380 mL    IV PiggyBack: 1580 mL    TPN (Total Parenteral Nutrition): 780 mL    Vital HN: 200 mL  Total IN: 3940 mL    OUT:    Nasoenteral Tube: 550 mL    Voided: 850 mL  Total OUT: 1400 mL    Total NET: 2540 mL        T(C): 37.4 (06-27-17 @ 09:23), Max: 37.4 (06-26-17 @ 22:15)  HR: 90 (06-27-17 @ 09:23) (82 - 112)  BP: 111/76 (06-27-17 @ 09:23) (95/61 - 130/80)  RR: 18 (06-27-17 @ 09:23) (17 - 18)  SpO2: 99% (06-27-17 @ 09:23) (96% - 99%)  Wt(kg): --    Labs   06-27    135  |  105  |  16  ----------------------------<  110<H>  3.3<L>   |  20<L>  |  0.34<L>    Ca    7.7<L>      27 Jun 2017 01:00  Phos  2.1     06-27  Mg     2.0     06-27          CAPILLARY BLOOD GLUCOSE        I&O's Detail    26 Jun 2017 07:01  -  27 Jun 2017 07:00  --------------------------------------------------------  IN:    dextrose 5% + sodium chloride 0.45% with potassium chloride 20 mEq/L: 1380 mL    IV PiggyBack: 1580 mL    TPN (Total Parenteral Nutrition): 780 mL    Vital HN: 200 mL  Total IN: 3940 mL    OUT:    Nasoenteral Tube: 550 mL    Voided: 850 mL  Total OUT: 1400 mL    Total NET: 2540 mL

## 2017-06-27 NOTE — PROGRESS NOTE ADULT - SUBJECTIVE AND OBJECTIVE BOX
Day 4 of Anesthesia Pain Management Service    SUBJECTIVE: I'm okay    Pain Scale Score	At rest: ___ 	With Activity: ___ 	[ x] Refer to charted pain scores    THERAPY:    [ ] IV PCA Morphine		[ ] 5 mg/mL	[ ] 1 mg/mL  [ X] IV PCA Hydromorphone	[ ] 5 mg/mL                  [X ] 1 mg/mL  [ ] IV PCA Fentanyl		[ ] 50 micrograms/mL    Demand dose       0.2mg  Lockout                 6 minutes  Continuous rate      0      MEDICATIONS  (STANDING):  HYDROmorphone PCA (1 mG/mL) 30 milliLiter(s) PCA Continuous PCA Continuous  enoxaparin Injectable 40 milliGRAM(s) SubCutaneous every 24 hours  pantoprazole  Injectable 40 milliGRAM(s) IV Push daily  dextrose 5% + sodium chloride 0.45% with potassium chloride 20 mEq/L 1000 milliLiter(s) (40 mL/Hr) IV Continuous <Continuous>    MEDICATIONS  (PRN):  HYDROmorphone PCA (1 mG/mL) Rescue Clinician Bolus 0.5 milliGRAM(s) IV Push every 15 minutes PRN for Pain Scale GREATER THAN 6  naloxone Injectable 0.1 milliGRAM(s) IV Push every 3 minutes PRN For ANY of the following changes in patient status:  A. RR LESS THAN 10 breaths per minute, B. Oxygen saturation LESS THAN 90%, C. Sedation score of 6  ondansetron Injectable 4 milliGRAM(s) IV Push every 6 hours PRN Nausea      OBJECTIVE:    Sedation Score:	[x ] Alert	[ ] Drowsy 	[ ] Arousable	[ ] Asleep	[ ] Unresponsive    Side Effects:	[ x] None	[ ] Nausea	[ ] Vomiting	[ ] Pruritus  		[ ] Other:    Vital Signs Last 24 Hrs  T(C): 37.4 (27 Jun 2017 09:23), Max: 37.4 (26 Jun 2017 22:15)  T(F): 99.3 (27 Jun 2017 09:23), Max: 99.4 (26 Jun 2017 22:15)  HR: 90 (27 Jun 2017 09:23) (82 - 112)  BP: 111/76 (27 Jun 2017 09:23) (95/61 - 130/80)  BP(mean): --  RR: 18 (27 Jun 2017 09:23) (17 - 18)  SpO2: 99% (27 Jun 2017 09:23) (96% - 99%)    ASSESSMENT/ PLAN    Therapy to  be:	[ x] Continue   [ ] Discontinued   [ ] Change to prn Analgesics    Documentation and Verification of current medications:   [X] Done	[ ] Not done, not eligible    Comments:

## 2017-06-27 NOTE — CHART NOTE - NSCHARTNOTEFT_GEN_A_CORE
RD f/u: Pt admitted with gastric outlet obstruction secondary to obstructing gastric mass; now POD#4 S/P exploratory laparotomy, distal gastrectomy, billroth II reconstruction, D2 lymphadenectomy, feeding jejunostomy tube placement.    Pt noted at high risk for refeeding syndrome; monitor potassium, magnesium, phosphorus, thiamine, glucose and fluid balance closely.     Pt tolerating PN @ 65ml/hr and EN via J-tube of Vital @ 20ml/hr x 24 hours, with NGT to low suction. Per chart and TPN Attending, plan to wean off PN when EN reaches goal rate of Vital1.2 @ 60ml/hr x 24 hours to provide 27cal/Kg and 1.7Gm protein/Kg.    Source: Patient [X ]    Family [X ]     other [X ]; medical record, son at bedside, TPN Attending, unit RD    Diet : NPO with EN + PN    Patient reports [X ] nausea  [ ] vomiting [ ] diarrhea [ ] constipation  [ ]chewing problems [ ] swallowing issues  [X ] other:   NGT to low suction; 24-hour output = 400ml (as of 6/27), 200ml (as of 6/26).  +BM 6/27     PO intake:  < 50% [ ] 50-75% [ ]   % [ ]  other : n/a     Source for PO intake [ ] Patient [ ] family [ ] chart [ ] staff [ ] other    Enteral /Parenteral Nutrition: Vital1.2 infusing via J-tube @ 20ml/hr x 24 hours.  TPN infusing at 65ml/hr (102Gm amino acids, 185Gm dextrose,123 ml 20%lipids) to provide  1283cal (20cal/kg, 1.6Gm prot/Kg per Adm w 64.1Kgt); 10cc MVI 9+3 and 3cc MTE-5 added.      Current Weight: 63Kgf (6/22), 64.1Kg (Adm 6/15)  % Weight Change: 98% of Adm wt    Edema: none noted    Pertinent Medications: MEDICATIONS  (STANDING):  HYDROmorphone PCA (1 mG/mL) 30 milliLiter(s) PCA Continuous PCA Continuous  pantoprazole  Injectable 40 milliGRAM(s) IV Push daily  dextrose 5% + sodium chloride 0.45% with potassium chloride 20 mEq/L 1000 milliLiter(s) (40 mL/Hr) IV Continuous <Continuous>    MEDICATIONS  (PRN):  HYDROmorphone PCA (1 mG/mL) Rescue Clinician Bolus 0.5 milliGRAM(s) IV Push every 15 minutes PRN for Pain Scale GREATER THAN 6  ondansetron Injectable 4 milliGRAM(s) IV Push every 6 hours PRN Nausea    Pertinent Labs:  06-27 Na135 mmol/L Glu 110 mg/dL<H> K+ 3.3 mmol/L<L> Cr  0.34 mg/dL<L> BUN 16 mg/dL Phos 2.1 mg/dL<L>     Skin: no pressure injuries    Estimated Needs:   [ X] no change since previous assessment  [ ] recalculated:       Previous Nutrition Diagnosis:    [X ] Malnutrition; severe    Nutrition Diagnosis is [X ] ongoing  [ ] resolved [ ] not applicable       New Nutrition Diagnosis: [X ] not applicable    Interventions:     Recommend    [ ] Change Diet To:    [ ] Nutrition Supplement    [X ] Nutrition Support: PN per Dr. Evans.    Recommend increase EN via J-tube as tolerated to goal rate of Vital1.2 @ 60ml/hr x 24 hours to provide 1440 ml formula, 1728cal, 108Gm protein, 1168ml free water (provides 27cal/Kg and 1.7 Gm/Kg protein based on Adm wt 64.1Kg).     [ ] Other:      Monitoring and Evaluation:     [ ] PO intake [ ] Tolerance to diet prescription [X ] weights [ X] follow up per protocol    [X ] other: Monitor EN and PN provision and tolerance; monitor for signs/symptoms of refeeding syndrome; monitor ability to wean off PN as EN reaches goal.

## 2017-06-28 LAB
ANION GAP SERPL CALC-SCNC: 16 MMOL/L — SIGNIFICANT CHANGE UP (ref 5–17)
BUN SERPL-MCNC: 15 MG/DL — SIGNIFICANT CHANGE UP (ref 7–23)
CALCIUM SERPL-MCNC: 7.8 MG/DL — LOW (ref 8.4–10.5)
CHLORIDE SERPL-SCNC: 107 MMOL/L — SIGNIFICANT CHANGE UP (ref 96–108)
CO2 SERPL-SCNC: 20 MMOL/L — LOW (ref 22–31)
CREAT SERPL-MCNC: 0.33 MG/DL — LOW (ref 0.5–1.3)
GLUCOSE SERPL-MCNC: 102 MG/DL — HIGH (ref 70–99)
HCT VFR BLD CALC: 24.1 % — LOW (ref 34.5–45)
HGB BLD-MCNC: 8 G/DL — LOW (ref 11.5–15.5)
MAGNESIUM SERPL-MCNC: 2.1 MG/DL — SIGNIFICANT CHANGE UP (ref 1.6–2.6)
MCHC RBC-ENTMCNC: 29.6 PG — SIGNIFICANT CHANGE UP (ref 27–34)
MCHC RBC-ENTMCNC: 33.2 GM/DL — SIGNIFICANT CHANGE UP (ref 32–36)
MCV RBC AUTO: 89.3 FL — SIGNIFICANT CHANGE UP (ref 80–100)
PHOSPHATE SERPL-MCNC: 2.1 MG/DL — LOW (ref 2.5–4.5)
PLATELET # BLD AUTO: 295 K/UL — SIGNIFICANT CHANGE UP (ref 150–400)
POTASSIUM SERPL-MCNC: 3.2 MMOL/L — LOW (ref 3.5–5.3)
POTASSIUM SERPL-SCNC: 3.2 MMOL/L — LOW (ref 3.5–5.3)
RBC # BLD: 2.7 M/UL — LOW (ref 3.8–5.2)
RBC # FLD: 15.3 % — HIGH (ref 10.3–14.5)
SODIUM SERPL-SCNC: 143 MMOL/L — SIGNIFICANT CHANGE UP (ref 135–145)
WBC # BLD: 9.56 K/UL — SIGNIFICANT CHANGE UP (ref 3.8–10.5)
WBC # FLD AUTO: 9.56 K/UL — SIGNIFICANT CHANGE UP (ref 3.8–10.5)

## 2017-06-28 RX ORDER — POTASSIUM CHLORIDE 20 MEQ
10 PACKET (EA) ORAL
Qty: 0 | Refills: 0 | Status: COMPLETED | OUTPATIENT
Start: 2017-06-28 | End: 2017-06-28

## 2017-06-28 RX ORDER — POTASSIUM PHOSPHATE, MONOBASIC POTASSIUM PHOSPHATE, DIBASIC 236; 224 MG/ML; MG/ML
15 INJECTION, SOLUTION INTRAVENOUS ONCE
Qty: 0 | Refills: 0 | Status: COMPLETED | OUTPATIENT
Start: 2017-06-28 | End: 2017-06-28

## 2017-06-28 RX ORDER — ACETAMINOPHEN 500 MG
1000 TABLET ORAL ONCE
Qty: 0 | Refills: 0 | Status: COMPLETED | OUTPATIENT
Start: 2017-06-28 | End: 2017-06-28

## 2017-06-28 RX ORDER — LIDOCAINE 4 G/100G
1 CREAM TOPICAL DAILY
Qty: 0 | Refills: 0 | Status: DISCONTINUED | OUTPATIENT
Start: 2017-06-28 | End: 2017-07-04

## 2017-06-28 RX ADMIN — DEXTROSE MONOHYDRATE, SODIUM CHLORIDE, AND POTASSIUM CHLORIDE 40 MILLILITER(S): 50; .745; 4.5 INJECTION, SOLUTION INTRAVENOUS at 14:43

## 2017-06-28 RX ADMIN — POTASSIUM PHOSPHATE, MONOBASIC POTASSIUM PHOSPHATE, DIBASIC 62.5 MILLIMOLE(S): 236; 224 INJECTION, SOLUTION INTRAVENOUS at 23:07

## 2017-06-28 RX ADMIN — Medication 100 MILLIEQUIVALENT(S): at 16:00

## 2017-06-28 RX ADMIN — PANTOPRAZOLE SODIUM 40 MILLIGRAM(S): 20 TABLET, DELAYED RELEASE ORAL at 14:42

## 2017-06-28 RX ADMIN — LIDOCAINE 1 PATCH: 4 CREAM TOPICAL at 06:18

## 2017-06-28 RX ADMIN — Medication 400 MILLIGRAM(S): at 03:11

## 2017-06-28 RX ADMIN — HYDROMORPHONE HYDROCHLORIDE 30 MILLILITER(S): 2 INJECTION INTRAMUSCULAR; INTRAVENOUS; SUBCUTANEOUS at 07:12

## 2017-06-28 RX ADMIN — Medication 1000 MILLIGRAM(S): at 03:41

## 2017-06-28 RX ADMIN — Medication 100 MILLIEQUIVALENT(S): at 14:43

## 2017-06-28 RX ADMIN — LIDOCAINE 1 PATCH: 4 CREAM TOPICAL at 14:42

## 2017-06-28 RX ADMIN — ENOXAPARIN SODIUM 40 MILLIGRAM(S): 100 INJECTION SUBCUTANEOUS at 06:17

## 2017-06-28 RX ADMIN — Medication 100 MILLIEQUIVALENT(S): at 18:23

## 2017-06-28 RX ADMIN — Medication 25 MILLIGRAM(S): at 00:52

## 2017-06-28 RX ADMIN — LIDOCAINE 1 PATCH: 4 CREAM TOPICAL at 18:22

## 2017-06-28 RX ADMIN — HYDROMORPHONE HYDROCHLORIDE 30 MILLILITER(S): 2 INJECTION INTRAMUSCULAR; INTRAVENOUS; SUBCUTANEOUS at 19:49

## 2017-06-28 NOTE — PROGRESS NOTE ADULT - ASSESSMENT
57F s/p exploratory laparotomy, distal gastrectomy, billroth II reconstruction, D2 lymphadenectomy, feeding jejunostomy tube placement  - PCA for pain  - NGT clamp trial  - Discontinue TPN  -CLD as tolerated after clamp trial   - VTE ppx  - I/Os  - OOB/PT

## 2017-06-28 NOTE — PROGRESS NOTE ADULT - PROBLEM SELECTOR PROBLEM 2
Gastric outlet obstruction
Gastric mass
Gastric outlet obstruction
Mild protein-calorie malnutrition
Mild protein-calorie malnutrition

## 2017-06-28 NOTE — PROGRESS NOTE ADULT - SUBJECTIVE AND OBJECTIVE BOX
Day 1 of Anesthesia Pain Management Service    SUBJECTIVE: I'm okay    Pain Scale Score	At rest: ___ 	With Activity: ___ 	[ x] Refer to charted pain scores    THERAPY:    [ ] IV PCA Morphine		[ ] 5 mg/mL	[ ] 1 mg/mL  [ X] IV PCA Hydromorphone	[ ] 5 mg/mL                  [X ] 1 mg/mL  [ ] IV PCA Fentanyl		[ ] 50 micrograms/mL    Demand dose       0.2mg  Lockout                 6 minutes  Continuous rate      0      MEDICATIONS  (STANDING):  HYDROmorphone PCA (1 mG/mL) 30 milliLiter(s) PCA Continuous PCA Continuous  enoxaparin Injectable 40 milliGRAM(s) SubCutaneous every 24 hours  pantoprazole  Injectable 40 milliGRAM(s) IV Push daily  dextrose 5% + sodium chloride 0.45% with potassium chloride 20 mEq/L 1000 milliLiter(s) (40 mL/Hr) IV Continuous <Continuous>  lidocaine   Patch 1 Patch Transdermal daily    MEDICATIONS  (PRN):  HYDROmorphone PCA (1 mG/mL) Rescue Clinician Bolus 0.5 milliGRAM(s) IV Push every 15 minutes PRN for Pain Scale GREATER THAN 6  naloxone Injectable 0.1 milliGRAM(s) IV Push every 3 minutes PRN For ANY of the following changes in patient status:  A. RR LESS THAN 10 breaths per minute, B. Oxygen saturation LESS THAN 90%, C. Sedation score of 6  ondansetron Injectable 4 milliGRAM(s) IV Push every 6 hours PRN Nausea      OBJECTIVE:    Sedation Score:	[x ] Alert	[ ] Drowsy 	[ ] Arousable	[ ] Asleep	[ ] Unresponsive    Side Effects:	[ x] None	[ ] Nausea	[ ] Vomiting	[ ] Pruritus  		[ ] Other:    Vital Signs Last 24 Hrs  T(C): 36.7 (28 Jun 2017 09:30), Max: 37.2 (28 Jun 2017 01:00)  T(F): 98.1 (28 Jun 2017 09:30), Max: 98.9 (28 Jun 2017 01:00)  HR: 85 (28 Jun 2017 09:30) (79 - 93)  BP: 101/58 (28 Jun 2017 09:30) (101/58 - 122/83)  BP(mean): --  RR: 18 (28 Jun 2017 09:30) (18 - 18)  SpO2: 97% (28 Jun 2017 09:30) (95% - 98%)    ASSESSMENT/ PLAN    Therapy to  be:	[ x] Continue   [ ] Discontinued   [ ] Change to prn Analgesics    Documentation and Verification of current medications:   [X] Done	[ ] Not done, not eligible    Comments: NGT. Continue PCA

## 2017-06-28 NOTE — PROGRESS NOTE ADULT - ASSESSMENT
pt s/p gastrectomy - pain control with pca, npo, ngt clmap and trial of CLD as per surgery   - Discontinue TPN  - OOB/PT                                anemia - monitor closely for active signs of bleeding

## 2017-06-28 NOTE — PROGRESS NOTE ADULT - SUBJECTIVE AND OBJECTIVE BOX
STATUS POST:  ex-lap, distal gastrectomy, Bilroth II recon, lymphadenectomy, feeding J tube    POST OPERATIVE DAY #: 5    Vital Signs Last 24 Hrs  T(C): 36.4 (28 Jun 2017 12:59), Max: 37.2 (28 Jun 2017 01:00)  T(F): 97.6 (28 Jun 2017 12:59), Max: 98.9 (28 Jun 2017 01:00)  HR: 90 (28 Jun 2017 12:59) (79 - 93)  BP: 103/68 (28 Jun 2017 12:59) (101/58 - 122/83)  BP(mean): --  RR: 18 (28 Jun 2017 12:59) (18 - 18)  SpO2: 98% (28 Jun 2017 12:59) (95% - 98%)    I&O's Summary    27 Jun 2017 07:01  -  28 Jun 2017 07:00  --------------------------------------------------------  IN: 3930 mL / OUT: 1375 mL / NET: 2555 mL    28 Jun 2017 07:01  -  28 Jun 2017 13:24  --------------------------------------------------------  IN: 0 mL / OUT: 450 mL / NET: -450 mL        SUBJECTIVE: Pt seen and examined. Denies N/V and pain. Tolerating tube feeds. Passing flatus and having BMs.     Physical Exam:  General Appearance: Appears well, NAD  Abdomen: Soft, nondistended, appropriate incisional tenderness, dressings clean and dry and intact  Extremities: Grossly symmetric, SCD's in place     LABS:                        8.0    9.56  )-----------( 295      ( 28 Jun 2017 08:45 )             24.1     06-28    143  |  107  |  15  ----------------------------<  102<H>  3.2<L>   |  20<L>  |  0.33<L>    Ca    7.8<L>      28 Jun 2017 08:45  Phos  2.1     06-28  Mg     2.1     06-28          Rozina Gonzalez PA-C  p#[id number]

## 2017-06-28 NOTE — PROGRESS NOTE ADULT - SUBJECTIVE AND OBJECTIVE BOX
PN stopping today  06-27 @ 07:01  -  06-28 @ 07:00  --------------------------------------------------------  IN:    dextrose 5% + sodium chloride 0.45% with potassium chloride 20 mEq/L: 960 mL    IV PiggyBack: 360 mL    TPN (Total Parenteral Nutrition): 1560 mL    Vital HN: 1050 mL  Total IN: 3930 mL    OUT:    Nasoenteral Tube: 275 mL    Voided: 1100 mL  Total OUT: 1375 mL    Total NET: 2555 mL        T(C): 36.7 (06-28-17 @ 06:05), Max: 37.4 (06-27-17 @ 09:23)  HR: 81 (06-28-17 @ 06:05) (79 - 93)  BP: 117/74 (06-28-17 @ 06:05) (108/72 - 122/83)  RR: 18 (06-28-17 @ 06:05) (18 - 18)  SpO2: 98% (06-28-17 @ 06:05) (95% - 99%)  Wt(kg): --    Labs   06-27    137  |  107  |  16  ----------------------------<  110<H>  3.7   |  19<L>  |  0.35<L>    Ca    8.1<L>      27 Jun 2017 12:30  Phos  2.2     06-27  Mg     2.1     06-27          CAPILLARY BLOOD GLUCOSE        I&O's Detail    27 Jun 2017 07:01  -  28 Jun 2017 07:00  --------------------------------------------------------  IN:    dextrose 5% + sodium chloride 0.45% with potassium chloride 20 mEq/L: 960 mL    IV PiggyBack: 360 mL    TPN (Total Parenteral Nutrition): 1560 mL    Vital HN: 1050 mL  Total IN: 3930 mL    OUT:    Nasoenteral Tube: 275 mL    Voided: 1100 mL  Total OUT: 1375 mL    Total NET: 2555 mL      28 Jun 2017 07:01  -  28 Jun 2017 08:49  --------------------------------------------------------  IN:  Total IN: 0 mL    OUT:  Total OUT: 0 mL    Total NET: 0 mL

## 2017-06-29 LAB
ANION GAP SERPL CALC-SCNC: 11 MMOL/L — SIGNIFICANT CHANGE UP (ref 5–17)
BUN SERPL-MCNC: 14 MG/DL — SIGNIFICANT CHANGE UP (ref 7–23)
CALCIUM SERPL-MCNC: 7.9 MG/DL — LOW (ref 8.4–10.5)
CHLORIDE SERPL-SCNC: 109 MMOL/L — HIGH (ref 96–108)
CO2 SERPL-SCNC: 22 MMOL/L — SIGNIFICANT CHANGE UP (ref 22–31)
CREAT SERPL-MCNC: 0.33 MG/DL — LOW (ref 0.5–1.3)
GLUCOSE SERPL-MCNC: 91 MG/DL — SIGNIFICANT CHANGE UP (ref 70–99)
HCT VFR BLD CALC: 22.9 % — LOW (ref 34.5–45)
HCT VFR BLD CALC: 23.2 % — LOW (ref 34.5–45)
HGB BLD-MCNC: 8 G/DL — LOW (ref 11.5–15.5)
HGB BLD-MCNC: 8.2 G/DL — LOW (ref 11.5–15.5)
MAGNESIUM SERPL-MCNC: 2 MG/DL — SIGNIFICANT CHANGE UP (ref 1.6–2.6)
MCHC RBC-ENTMCNC: 31.5 PG — SIGNIFICANT CHANGE UP (ref 27–34)
MCHC RBC-ENTMCNC: 32 PG — SIGNIFICANT CHANGE UP (ref 27–34)
MCHC RBC-ENTMCNC: 34.9 GM/DL — SIGNIFICANT CHANGE UP (ref 32–36)
MCHC RBC-ENTMCNC: 35.3 GM/DL — SIGNIFICANT CHANGE UP (ref 32–36)
MCV RBC AUTO: 90.2 FL — SIGNIFICANT CHANGE UP (ref 80–100)
MCV RBC AUTO: 90.6 FL — SIGNIFICANT CHANGE UP (ref 80–100)
PHOSPHATE SERPL-MCNC: 2.9 MG/DL — SIGNIFICANT CHANGE UP (ref 2.5–4.5)
PLATELET # BLD AUTO: 368 K/UL — SIGNIFICANT CHANGE UP (ref 150–400)
PLATELET # BLD AUTO: 403 K/UL — HIGH (ref 150–400)
POTASSIUM SERPL-MCNC: 3.5 MMOL/L — SIGNIFICANT CHANGE UP (ref 3.5–5.3)
POTASSIUM SERPL-SCNC: 3.5 MMOL/L — SIGNIFICANT CHANGE UP (ref 3.5–5.3)
RBC # BLD: 2.54 M/UL — LOW (ref 3.8–5.2)
RBC # BLD: 2.56 M/UL — LOW (ref 3.8–5.2)
RBC # FLD: 15.8 % — HIGH (ref 10.3–14.5)
RBC # FLD: 15.9 % — HIGH (ref 10.3–14.5)
SODIUM SERPL-SCNC: 142 MMOL/L — SIGNIFICANT CHANGE UP (ref 135–145)
WBC # BLD: 12.38 K/UL — HIGH (ref 3.8–10.5)
WBC # BLD: 12.49 K/UL — HIGH (ref 3.8–10.5)
WBC # FLD AUTO: 12.38 K/UL — HIGH (ref 3.8–10.5)
WBC # FLD AUTO: 12.49 K/UL — HIGH (ref 3.8–10.5)

## 2017-06-29 RX ORDER — PANTOPRAZOLE SODIUM 20 MG/1
40 TABLET, DELAYED RELEASE ORAL
Qty: 0 | Refills: 0 | Status: DISCONTINUED | OUTPATIENT
Start: 2017-06-29 | End: 2017-07-04

## 2017-06-29 RX ORDER — ACETAMINOPHEN 500 MG
1000 TABLET ORAL ONCE
Qty: 0 | Refills: 0 | Status: COMPLETED | OUTPATIENT
Start: 2017-06-29 | End: 2017-06-29

## 2017-06-29 RX ORDER — POTASSIUM CHLORIDE 20 MEQ
40 PACKET (EA) ORAL ONCE
Qty: 0 | Refills: 0 | Status: COMPLETED | OUTPATIENT
Start: 2017-06-29 | End: 2017-06-29

## 2017-06-29 RX ADMIN — LIDOCAINE 1 PATCH: 4 CREAM TOPICAL at 20:03

## 2017-06-29 RX ADMIN — ENOXAPARIN SODIUM 40 MILLIGRAM(S): 100 INJECTION SUBCUTANEOUS at 05:11

## 2017-06-29 RX ADMIN — Medication 400 MILLIGRAM(S): at 05:10

## 2017-06-29 RX ADMIN — LIDOCAINE 1 PATCH: 4 CREAM TOPICAL at 06:26

## 2017-06-29 RX ADMIN — Medication 1000 MILLIGRAM(S): at 17:46

## 2017-06-29 RX ADMIN — PANTOPRAZOLE SODIUM 40 MILLIGRAM(S): 20 TABLET, DELAYED RELEASE ORAL at 13:22

## 2017-06-29 RX ADMIN — Medication 1000 MILLIGRAM(S): at 05:40

## 2017-06-29 RX ADMIN — HYDROMORPHONE HYDROCHLORIDE 30 MILLILITER(S): 2 INJECTION INTRAMUSCULAR; INTRAVENOUS; SUBCUTANEOUS at 19:16

## 2017-06-29 RX ADMIN — Medication 40 MILLIEQUIVALENT(S): at 13:22

## 2017-06-29 RX ADMIN — DEXTROSE MONOHYDRATE, SODIUM CHLORIDE, AND POTASSIUM CHLORIDE 40 MILLILITER(S): 50; .745; 4.5 INJECTION, SOLUTION INTRAVENOUS at 08:30

## 2017-06-29 RX ADMIN — LIDOCAINE 1 PATCH: 4 CREAM TOPICAL at 02:42

## 2017-06-29 RX ADMIN — Medication 400 MILLIGRAM(S): at 17:16

## 2017-06-29 RX ADMIN — HYDROMORPHONE HYDROCHLORIDE 30 MILLILITER(S): 2 INJECTION INTRAMUSCULAR; INTRAVENOUS; SUBCUTANEOUS at 07:33

## 2017-06-29 NOTE — PROGRESS NOTE ADULT - SUBJECTIVE AND OBJECTIVE BOX
[facility] GENERAL SURGERY DAILY PROGRESS NOTE:       Subjective: Pain controlled. Denies n/v. Tree TF and CLD. Ambulating. Voiding. +flatus, + BM, c/o diarrhea      Objective:  Gen: NAD, AAOx3  Abd: soft, appropriately tender, ND. Incisions CDI. J-tube in place.       MEDICATIONS  (STANDING):  HYDROmorphone PCA (1 mG/mL) 30 milliLiter(s) PCA Continuous PCA Continuous  enoxaparin Injectable 40 milliGRAM(s) SubCutaneous every 24 hours  pantoprazole  Injectable 40 milliGRAM(s) IV Push daily  dextrose 5% + sodium chloride 0.45% with potassium chloride 20 mEq/L 1000 milliLiter(s) (40 mL/Hr) IV Continuous <Continuous>  lidocaine   Patch 1 Patch Transdermal daily  lidocaine   Patch 1 Patch Transdermal daily    MEDICATIONS  (PRN):  HYDROmorphone PCA (1 mG/mL) Rescue Clinician Bolus 0.5 milliGRAM(s) IV Push every 15 minutes PRN for Pain Scale GREATER THAN 6  naloxone Injectable 0.1 milliGRAM(s) IV Push every 3 minutes PRN For ANY of the following changes in patient status:  A. RR LESS THAN 10 breaths per minute, B. Oxygen saturation LESS THAN 90%, C. Sedation score of 6  ondansetron Injectable 4 milliGRAM(s) IV Push every 6 hours PRN Nausea      Vital Signs Last 24 Hrs  T(C): 36.8 (29 Jun 2017 06:23), Max: 37.5 (28 Jun 2017 21:59)  T(F): 98.3 (29 Jun 2017 06:23), Max: 99.5 (28 Jun 2017 21:59)  HR: 79 (29 Jun 2017 06:23) (79 - 102)  BP: 102/55 (29 Jun 2017 06:23) (101/58 - 110/73)  BP(mean): --  RR: 18 (29 Jun 2017 06:23) (18 - 18)  SpO2: 96% (29 Jun 2017 06:23) (95% - 98%)    I&O's Detail    28 Jun 2017 07:01  -  29 Jun 2017 07:00  --------------------------------------------------------  IN:    dextrose 5% + sodium chloride 0.45% with potassium chloride 20 mEq/L: 840 mL    IV PiggyBack: 300 mL    TPN (Total Parenteral Nutrition): 780 mL    Vital HN: 720 mL  Total IN: 2640 mL    OUT:    Voided: 1350 mL  Total OUT: 1350 mL    Total NET: 1290 mL          Daily     Daily     LABS:                        8.0    9.56  )-----------( 295      ( 28 Jun 2017 08:45 )             24.1     06-28    143  |  107  |  15  ----------------------------<  102<H>  3.2<L>   |  20<L>  |  0.33<L>    Ca    7.8<L>      28 Jun 2017 08:45  Phos  2.1     06-28  Mg     2.1     06-28            RADIOLOGY & ADDITIONAL STUDIES:

## 2017-06-29 NOTE — PROGRESS NOTE ADULT - SUBJECTIVE AND OBJECTIVE BOX
SUBJECTIVE / OVERNIGHT EVENTS: s/p exploratory laparotomy, distal gastrectomy, billroth II reconstruction, D2 lymphadenectomy, feeding jejunostomy tube placement      MEDICATIONS  (STANDING):  HYDROmorphone PCA (1 mG/mL) 30 milliLiter(s) PCA Continuous PCA Continuous  enoxaparin Injectable 40 milliGRAM(s) SubCutaneous every 24 hours  dextrose 5% + sodium chloride 0.45% with potassium chloride 20 mEq/L 1000 milliLiter(s) (40 mL/Hr) IV Continuous <Continuous>  lidocaine   Patch 1 Patch Transdermal daily  lidocaine   Patch 1 Patch Transdermal daily  pantoprazole    Tablet 40 milliGRAM(s) Oral before breakfast    MEDICATIONS  (PRN):  HYDROmorphone PCA (1 mG/mL) Rescue Clinician Bolus 0.5 milliGRAM(s) IV Push every 15 minutes PRN for Pain Scale GREATER THAN 6  naloxone Injectable 0.1 milliGRAM(s) IV Push every 3 minutes PRN For ANY of the following changes in patient status:  A. RR LESS THAN 10 breaths per minute, B. Oxygen saturation LESS THAN 90%, C. Sedation score of 6  ondansetron Injectable 4 milliGRAM(s) IV Push every 6 hours PRN Nausea    Vital Signs Last 24 Hrs  T(C): 36.8 (29 Jun 2017 17:12), Max: 37.5 (28 Jun 2017 21:59)  T(F): 98.3 (29 Jun 2017 17:12), Max: 99.5 (28 Jun 2017 21:59)  HR: 88 (29 Jun 2017 17:12) (76 - 102)  BP: 100/67 (29 Jun 2017 17:12) (100/67 - 106/69)  BP(mean): --  RR: 18 (29 Jun 2017 17:12) (17 - 18)  SpO2: 95% (29 Jun 2017 17:12) (95% - 97%)      Constitutional: No fever, fatigue  Skin: No rash.  Eyes: No recent vision problems or eye pain.  ENT: No congestion, ear pain, or sore throat.  Cardiovascular: No chest pain or palpation.  Respiratory: No cough, shortness of breath, congestion, or wheezing.  Gastrointestinal: No abdominal pain, nausea, vomiting, or diarrhea.  Genitourinary: No dysuria.  Musculoskeletal: No joint swelling.  Neurologic: No headache.    PHYSICAL EXAM:  GENERAL: NAD  EYES: EOMI, PERRLA  NECK: Supple, No JVD  CHEST/LUNG: dec breath sounds at bases   HEART:  S1 , S2 +  ABDOMEN: soft, mild tenderness+ all over, J tube site no bleeding   EXTREMITIES:  edema+  NEUROLOGY:alert awake oriented      LABS:  06-29    142  |  109<H>  |  14  ----------------------------<  91  3.5   |  22  |  0.33<L>    Ca    7.9<L>      29 Jun 2017 08:27  Phos  2.9     06-29  Mg     2.0     06-29      Creatinine Trend: 0.33 <--, 0.33 <--, 0.35 <--, 0.34 <--, 0.32 <--, 0.30 <--, 0.30 <--, 0.35 <--, 0.36 <--                        8.2    12.49 )-----------( 403      ( 29 Jun 2017 16:47 )             23.2     Urine Studies:

## 2017-06-29 NOTE — PROGRESS NOTE ADULT - SUBJECTIVE AND OBJECTIVE BOX
Day 6 of Anesthesia Pain Management Service    SUBJECTIVE: I'm okay -some back pain    Pain Scale Score	At rest: ___ 	With Activity: ___ 	[ x] Refer to charted pain scores    THERAPY:    [ ] IV PCA Morphine		[ ] 5 mg/mL	[ ] 1 mg/mL  [ X] IV PCA Hydromorphone	[ ] 5 mg/mL                  [X ] 1 mg/mL  [ ] IV PCA Fentanyl		[ ] 50 micrograms/mL    Demand dose       0.2mg  Lockout                 6 minutes  Continuous rate      0      MEDICATIONS  (STANDING):  HYDROmorphone PCA (1 mG/mL) 30 milliLiter(s) PCA Continuous PCA Continuous  enoxaparin Injectable 40 milliGRAM(s) SubCutaneous every 24 hours  pantoprazole  Injectable 40 milliGRAM(s) IV Push daily  dextrose 5% + sodium chloride 0.45% with potassium chloride 20 mEq/L 1000 milliLiter(s) (40 mL/Hr) IV Continuous <Continuous>  lidocaine   Patch 1 Patch Transdermal daily  lidocaine   Patch 1 Patch Transdermal daily    MEDICATIONS  (PRN):  HYDROmorphone PCA (1 mG/mL) Rescue Clinician Bolus 0.5 milliGRAM(s) IV Push every 15 minutes PRN for Pain Scale GREATER THAN 6  naloxone Injectable 0.1 milliGRAM(s) IV Push every 3 minutes PRN For ANY of the following changes in patient status:  A. RR LESS THAN 10 breaths per minute, B. Oxygen saturation LESS THAN 90%, C. Sedation score of 6  ondansetron Injectable 4 milliGRAM(s) IV Push every 6 hours PRN Nausea      OBJECTIVE:    Sedation Score:	[x ] Alert	[ ] Drowsy 	[ ] Arousable	[ ] Asleep	[ ] Unresponsive    Side Effects:	[ x] None	[ ] Nausea	[ ] Vomiting	[ ] Pruritus  		[ ] Other:    Vital Signs Last 24 Hrs  T(C): 36.8 (29 Jun 2017 06:23), Max: 37.5 (28 Jun 2017 21:59)  T(F): 98.3 (29 Jun 2017 06:23), Max: 99.5 (28 Jun 2017 21:59)  HR: 79 (29 Jun 2017 06:23) (79 - 102)  BP: 102/55 (29 Jun 2017 06:23) (101/58 - 110/73)  BP(mean): --  RR: 18 (29 Jun 2017 06:23) (18 - 18)  SpO2: 96% (29 Jun 2017 06:23) (95% - 98%)    ASSESSMENT/ PLAN    Therapy to  be:	[ x] Continue   [ ] Discontinued   [ ] Change to prn Analgesics    Documentation and Verification of current medications:   [X] Done	[ ] Not done, not eligible    Comments: OOB in chair

## 2017-06-29 NOTE — PROGRESS NOTE ADULT - ASSESSMENT
57F s/p exploratory laparotomy, distal gastrectomy, billroth II reconstruction, D2 lymphadenectomy, feeding jejunostomy tube placement  - PCA for pain  - f/u GI fxn  - VTE ppx  - I/Os  - OOB/PT

## 2017-06-30 LAB
ANION GAP SERPL CALC-SCNC: 12 MMOL/L — SIGNIFICANT CHANGE UP (ref 5–17)
BLD GP AB SCN SERPL QL: NEGATIVE — SIGNIFICANT CHANGE UP
BUN SERPL-MCNC: 9 MG/DL — SIGNIFICANT CHANGE UP (ref 7–23)
CALCIUM SERPL-MCNC: 8.1 MG/DL — LOW (ref 8.4–10.5)
CHLORIDE SERPL-SCNC: 103 MMOL/L — SIGNIFICANT CHANGE UP (ref 96–108)
CO2 SERPL-SCNC: 22 MMOL/L — SIGNIFICANT CHANGE UP (ref 22–31)
CREAT SERPL-MCNC: 0.34 MG/DL — LOW (ref 0.5–1.3)
GLUCOSE SERPL-MCNC: 113 MG/DL — HIGH (ref 70–99)
HCT VFR BLD CALC: 21.8 % — LOW (ref 34.5–45)
HCT VFR BLD CALC: 22.7 % — LOW (ref 34.5–45)
HGB BLD-MCNC: 7.7 G/DL — LOW (ref 11.5–15.5)
HGB BLD-MCNC: 8.1 G/DL — LOW (ref 11.5–15.5)
MAGNESIUM SERPL-MCNC: 2 MG/DL — SIGNIFICANT CHANGE UP (ref 1.6–2.6)
MCHC RBC-ENTMCNC: 32.1 PG — SIGNIFICANT CHANGE UP (ref 27–34)
MCHC RBC-ENTMCNC: 33.5 PG — SIGNIFICANT CHANGE UP (ref 27–34)
MCHC RBC-ENTMCNC: 35.3 GM/DL — SIGNIFICANT CHANGE UP (ref 32–36)
MCHC RBC-ENTMCNC: 35.9 GM/DL — SIGNIFICANT CHANGE UP (ref 32–36)
MCV RBC AUTO: 90.8 FL — SIGNIFICANT CHANGE UP (ref 80–100)
MCV RBC AUTO: 93.2 FL — SIGNIFICANT CHANGE UP (ref 80–100)
PHOSPHATE SERPL-MCNC: 3.1 MG/DL — SIGNIFICANT CHANGE UP (ref 2.5–4.5)
PLATELET # BLD AUTO: 387 K/UL — SIGNIFICANT CHANGE UP (ref 150–400)
PLATELET # BLD AUTO: 404 K/UL — HIGH (ref 150–400)
POTASSIUM SERPL-MCNC: 3.5 MMOL/L — SIGNIFICANT CHANGE UP (ref 3.5–5.3)
POTASSIUM SERPL-SCNC: 3.5 MMOL/L — SIGNIFICANT CHANGE UP (ref 3.5–5.3)
RBC # BLD: 2.4 M/UL — LOW (ref 3.8–5.2)
RBC # BLD: 2.43 M/UL — LOW (ref 3.8–5.2)
RBC # FLD: 14.8 % — HIGH (ref 10.3–14.5)
RBC # FLD: 16.5 % — HIGH (ref 10.3–14.5)
RH IG SCN BLD-IMP: POSITIVE — SIGNIFICANT CHANGE UP
SODIUM SERPL-SCNC: 137 MMOL/L — SIGNIFICANT CHANGE UP (ref 135–145)
WBC # BLD: 11.58 K/UL — HIGH (ref 3.8–10.5)
WBC # BLD: 12.5 K/UL — HIGH (ref 3.8–10.5)
WBC # FLD AUTO: 11.58 K/UL — HIGH (ref 3.8–10.5)
WBC # FLD AUTO: 12.5 K/UL — HIGH (ref 3.8–10.5)

## 2017-06-30 RX ORDER — HYDROMORPHONE HYDROCHLORIDE 2 MG/ML
0.5 INJECTION INTRAMUSCULAR; INTRAVENOUS; SUBCUTANEOUS
Qty: 0 | Refills: 0 | Status: DISCONTINUED | OUTPATIENT
Start: 2017-06-30 | End: 2017-07-01

## 2017-06-30 RX ORDER — NALOXONE HYDROCHLORIDE 4 MG/.1ML
0.1 SPRAY NASAL
Qty: 0 | Refills: 0 | Status: DISCONTINUED | OUTPATIENT
Start: 2017-06-30 | End: 2017-07-01

## 2017-06-30 RX ORDER — HYDROMORPHONE HYDROCHLORIDE 2 MG/ML
30 INJECTION INTRAMUSCULAR; INTRAVENOUS; SUBCUTANEOUS
Qty: 0 | Refills: 0 | Status: DISCONTINUED | OUTPATIENT
Start: 2017-06-30 | End: 2017-07-01

## 2017-06-30 RX ORDER — ACETAMINOPHEN 500 MG
1000 TABLET ORAL ONCE
Qty: 0 | Refills: 0 | Status: COMPLETED | OUTPATIENT
Start: 2017-06-30 | End: 2017-06-30

## 2017-06-30 RX ORDER — ONDANSETRON 8 MG/1
4 TABLET, FILM COATED ORAL EVERY 6 HOURS
Qty: 0 | Refills: 0 | Status: DISCONTINUED | OUTPATIENT
Start: 2017-06-30 | End: 2017-07-01

## 2017-06-30 RX ADMIN — HYDROMORPHONE HYDROCHLORIDE 30 MILLILITER(S): 2 INJECTION INTRAMUSCULAR; INTRAVENOUS; SUBCUTANEOUS at 19:20

## 2017-06-30 RX ADMIN — Medication 400 MILLIGRAM(S): at 02:25

## 2017-06-30 RX ADMIN — LIDOCAINE 1 PATCH: 4 CREAM TOPICAL at 20:02

## 2017-06-30 RX ADMIN — LIDOCAINE 1 PATCH: 4 CREAM TOPICAL at 08:27

## 2017-06-30 RX ADMIN — HYDROMORPHONE HYDROCHLORIDE 30 MILLILITER(S): 2 INJECTION INTRAMUSCULAR; INTRAVENOUS; SUBCUTANEOUS at 07:32

## 2017-06-30 RX ADMIN — ENOXAPARIN SODIUM 40 MILLIGRAM(S): 100 INJECTION SUBCUTANEOUS at 05:38

## 2017-06-30 RX ADMIN — Medication 1000 MILLIGRAM(S): at 02:55

## 2017-06-30 RX ADMIN — PANTOPRAZOLE SODIUM 40 MILLIGRAM(S): 20 TABLET, DELAYED RELEASE ORAL at 05:38

## 2017-06-30 NOTE — PROGRESS NOTE ADULT - ATTENDING COMMENTS
I have seen and examined the patient, reviewed the laboratory and radiologic data and agree with practitioner's history, physical assessment, plan and reviewed and edited where appropriate.    Plan:  1) Advance diet to post gastrectomy regular  2) TF to nocturnal only  3) OOB/ ambulate

## 2017-06-30 NOTE — PROGRESS NOTE ADULT - SUBJECTIVE AND OBJECTIVE BOX
SUBJECTIVE / OVERNIGHT EVENTS: s/p exploratory laparotomy, distal gastrectomy, billroth II reconstruction, D2 lymphadenectomy, feeding jejunostomy tube placement      MEDICATIONS  (STANDING):  enoxaparin Injectable 40 milliGRAM(s) SubCutaneous every 24 hours  dextrose 5% + sodium chloride 0.45% with potassium chloride 20 mEq/L 1000 milliLiter(s) (10 mL/Hr) IV Continuous <Continuous>  lidocaine   Patch 1 Patch Transdermal daily  lidocaine   Patch 1 Patch Transdermal daily  pantoprazole    Tablet 40 milliGRAM(s) Oral before breakfast  HYDROmorphone PCA (1 mG/mL) 30 milliLiter(s) PCA Continuous PCA Continuous    MEDICATIONS  (PRN):  HYDROmorphone PCA (1 mG/mL) Rescue Clinician Bolus 0.5 milliGRAM(s) IV Push every 15 minutes PRN for Pain Scale GREATER THAN 6  naloxone Injectable 0.1 milliGRAM(s) IV Push every 3 minutes PRN For ANY of the following changes in patient status:  A. RR LESS THAN 10 breaths per minute, B. Oxygen saturation LESS THAN 90%, C. Sedation score of 6  ondansetron Injectable 4 milliGRAM(s) IV Push every 6 hours PRN Nausea      Vital Signs Last 24 Hrs  T(C): 36.6 (30 Jun 2017 14:24), Max: 37 (30 Jun 2017 10:19)  T(F): 97.9 (30 Jun 2017 14:24), Max: 98.6 (30 Jun 2017 10:19)  HR: 88 (30 Jun 2017 14:24) (83 - 90)  BP: 99/65 (30 Jun 2017 14:24) (98/62 - 105/69)  BP(mean): --  RR: 17 (30 Jun 2017 14:24) (17 - 18)  SpO2: 97% (30 Jun 2017 14:24) (95% - 98%)    Constitutional: No fever, fatigue  Skin: No rash.  Eyes: No recent vision problems or eye pain.  ENT: No congestion, ear pain, or sore throat.  Cardiovascular: No chest pain or palpation.  Respiratory: No cough, shortness of breath, congestion, or wheezing.  Gastrointestinal: No abdominal pain, nausea, vomiting, or diarrhea.  Genitourinary: No dysuria.  Musculoskeletal: No joint swelling.  Neurologic: No headache.    PHYSICAL EXAM:  GENERAL: NAD  EYES: EOMI, PERRLA  NECK: Supple, No JVD  CHEST/LUNG: dec breath sounds at bases   HEART:  S1 , S2 +  ABDOMEN: soft, mild tenderness+ all over, J tube site no bleeding   EXTREMITIES:  edema+  NEUROLOGY:alert awake oriented      LLABS:  06-30    137  |  103  |  9   ----------------------------<  113<H>  3.5   |  22  |  0.34<L>    Ca    8.1<L>      30 Jun 2017 08:29  Phos  3.1     06-30  Mg     2.0     06-30      Creatinine Trend: 0.34 <--, 0.33 <--, 0.33 <--, 0.35 <--, 0.34 <--, 0.32 <--, 0.30 <--, 0.30 <--, 0.35 <--                        8.1    12.5  )-----------( 387      ( 30 Jun 2017 14:41 )             22.7     Urine Studies:

## 2017-06-30 NOTE — PROGRESS NOTE ADULT - SUBJECTIVE AND OBJECTIVE BOX
[facility] GENERAL SURGERY DAILY PROGRESS NOTE:       Subjective: Pain controlled. Denies N/V. Tree soft, TF. +flatus, +BM      Objective:  Gen: NAD, AAOx3  Abd: soft, appropriately tender, ND. Incisions CDI. J-tube in place.       MEDICATIONS  (STANDING):  HYDROmorphone PCA (1 mG/mL) 30 milliLiter(s) PCA Continuous PCA Continuous  enoxaparin Injectable 40 milliGRAM(s) SubCutaneous every 24 hours  dextrose 5% + sodium chloride 0.45% with potassium chloride 20 mEq/L 1000 milliLiter(s) (10 mL/Hr) IV Continuous <Continuous>  lidocaine   Patch 1 Patch Transdermal daily  lidocaine   Patch 1 Patch Transdermal daily  pantoprazole    Tablet 40 milliGRAM(s) Oral before breakfast    MEDICATIONS  (PRN):  HYDROmorphone PCA (1 mG/mL) Rescue Clinician Bolus 0.5 milliGRAM(s) IV Push every 15 minutes PRN for Pain Scale GREATER THAN 6  naloxone Injectable 0.1 milliGRAM(s) IV Push every 3 minutes PRN For ANY of the following changes in patient status:  A. RR LESS THAN 10 breaths per minute, B. Oxygen saturation LESS THAN 90%, C. Sedation score of 6  ondansetron Injectable 4 milliGRAM(s) IV Push every 6 hours PRN Nausea      Vital Signs Last 24 Hrs  T(C): 36.6 (30 Jun 2017 06:18), Max: 36.9 (29 Jun 2017 10:51)  T(F): 97.9 (30 Jun 2017 06:18), Max: 98.4 (29 Jun 2017 10:51)  HR: 83 (30 Jun 2017 06:18) (76 - 90)  BP: 105/69 (30 Jun 2017 06:18) (98/65 - 105/69)  BP(mean): --  RR: 18 (30 Jun 2017 06:18) (17 - 18)  SpO2: 95% (30 Jun 2017 06:18) (95% - 98%)    I&O's Detail    29 Jun 2017 07:01  -  30 Jun 2017 07:00  --------------------------------------------------------  IN:    dextrose 5% + sodium chloride 0.45% with potassium chloride 20 mEq/L: 960 mL    IV PiggyBack: 200 mL    Oral Fluid: 700 mL    Vital HN: 1440 mL  Total IN: 3300 mL    OUT:    Voided: 1470 mL  Total OUT: 1470 mL    Total NET: 1830 mL          Daily     Daily     LABS:                        8.2    12.49 )-----------( 403      ( 29 Jun 2017 16:47 )             23.2     06-29    142  |  109<H>  |  14  ----------------------------<  91  3.5   |  22  |  0.33<L>    Ca    7.9<L>      29 Jun 2017 08:27  Phos  2.9     06-29  Mg     2.0     06-29            RADIOLOGY & ADDITIONAL STUDIES: [facility] GENERAL SURGERY DAILY PROGRESS NOTE:       Subjective: Pain controlled. Denies N/V. Tree soft, TF. +flatus, +BM, loose      Objective:  Gen: NAD, AAOx3  Abd: soft, appropriately tender, ND. Incisions CDI. J-tube in place, site dry.      MEDICATIONS  (STANDING):  HYDROmorphone PCA (1 mG/mL) 30 milliLiter(s) PCA Continuous PCA Continuous  enoxaparin Injectable 40 milliGRAM(s) SubCutaneous every 24 hours  dextrose 5% + sodium chloride 0.45% with potassium chloride 20 mEq/L 1000 milliLiter(s) (10 mL/Hr) IV Continuous <Continuous>  lidocaine   Patch 1 Patch Transdermal daily  lidocaine   Patch 1 Patch Transdermal daily  pantoprazole    Tablet 40 milliGRAM(s) Oral before breakfast    MEDICATIONS  (PRN):  HYDROmorphone PCA (1 mG/mL) Rescue Clinician Bolus 0.5 milliGRAM(s) IV Push every 15 minutes PRN for Pain Scale GREATER THAN 6  naloxone Injectable 0.1 milliGRAM(s) IV Push every 3 minutes PRN For ANY of the following changes in patient status:  A. RR LESS THAN 10 breaths per minute, B. Oxygen saturation LESS THAN 90%, C. Sedation score of 6  ondansetron Injectable 4 milliGRAM(s) IV Push every 6 hours PRN Nausea      Vital Signs Last 24 Hrs  T(C): 36.6 (30 Jun 2017 06:18), Max: 36.9 (29 Jun 2017 10:51)  T(F): 97.9 (30 Jun 2017 06:18), Max: 98.4 (29 Jun 2017 10:51)  HR: 83 (30 Jun 2017 06:18) (76 - 90)  BP: 105/69 (30 Jun 2017 06:18) (98/65 - 105/69)  BP(mean): --  RR: 18 (30 Jun 2017 06:18) (17 - 18)  SpO2: 95% (30 Jun 2017 06:18) (95% - 98%)    I&O's Detail    29 Jun 2017 07:01  -  30 Jun 2017 07:00  --------------------------------------------------------  IN:    dextrose 5% + sodium chloride 0.45% with potassium chloride 20 mEq/L: 960 mL    IV PiggyBack: 200 mL    Oral Fluid: 700 mL    Vital HN: 1440 mL  Total IN: 3300 mL    OUT:    Voided: 1470 mL  Total OUT: 1470 mL    Total NET: 1830 mL          Daily     Daily     LABS:                        8.2    12.49 )-----------( 403      ( 29 Jun 2017 16:47 )             23.2     06-29    142  |  109<H>  |  14  ----------------------------<  91  3.5   |  22  |  0.33<L>    Ca    7.9<L>      29 Jun 2017 08:27  Phos  2.9     06-29  Mg     2.0     06-29

## 2017-06-30 NOTE — PROGRESS NOTE ADULT - ASSESSMENT
57F s/p exploratory laparotomy, distal gastrectomy, billroth II reconstruction, D2 lymphadenectomy, feeding jejunostomy tube placement  - PCA for pain  - f/u GI fxn  - VTE ppx  - I/Os  - OOB/PT 57F s/p exploratory laparotomy, distal gastrectomy, billroth II reconstruction, D2 lymphadenectomy, feeding jejunostomy tube placement  - PCA for pain  -soft diet as tolerated  - f/u GI fxn  - VTE ppx  - I/Os  - OOB/PT

## 2017-06-30 NOTE — PROGRESS NOTE ADULT - ASSESSMENT
pt s/p gastrectomy - pain control with pca, advance diet  - Discontinue TPN   - OOB/PT                                anemia - monitor closely for active signs of bleeding

## 2017-06-30 NOTE — PROGRESS NOTE ADULT - SUBJECTIVE AND OBJECTIVE BOX
Day 7 of Anesthesia Pain Management Service    SUBJECTIVE: I'm okay    Pain Scale Score	At rest: ___ 	With Activity: ___ 	[ x] Refer to charted pain scores    THERAPY:    [ ] IV PCA Morphine		[ ] 5 mg/mL	[ ] 1 mg/mL  [ X] IV PCA Hydromorphone	[ ] 5 mg/mL                  [X ] 1 mg/mL  [ ] IV PCA Fentanyl		[ ] 50 micrograms/mL    Demand dose       0.2mg  Lockout                 6 minutes  Continuous rate  0      MEDICATIONS  (STANDING):  HYDROmorphone PCA (1 mG/mL) 30 milliLiter(s) PCA Continuous PCA Continuous  enoxaparin Injectable 40 milliGRAM(s) SubCutaneous every 24 hours  dextrose 5% + sodium chloride 0.45% with potassium chloride 20 mEq/L 1000 milliLiter(s) (10 mL/Hr) IV Continuous <Continuous>  lidocaine   Patch 1 Patch Transdermal daily  lidocaine   Patch 1 Patch Transdermal daily  pantoprazole    Tablet 40 milliGRAM(s) Oral before breakfast    MEDICATIONS  (PRN):  HYDROmorphone PCA (1 mG/mL) Rescue Clinician Bolus 0.5 milliGRAM(s) IV Push every 15 minutes PRN for Pain Scale GREATER THAN 6  naloxone Injectable 0.1 milliGRAM(s) IV Push every 3 minutes PRN For ANY of the following changes in patient status:  A. RR LESS THAN 10 breaths per minute, B. Oxygen saturation LESS THAN 90%, C. Sedation score of 6  ondansetron Injectable 4 milliGRAM(s) IV Push every 6 hours PRN Nausea      OBJECTIVE:    Sedation Score:	[x ] Alert	[ ] Drowsy 	[ ] Arousable	[ ] Asleep	[ ] Unresponsive    Side Effects:	[ x] None	[ ] Nausea	[ ] Vomiting	[ ] Pruritus  		[ ] Other:    Vital Signs Last 24 Hrs  T(C): 37 (30 Jun 2017 10:19), Max: 37 (30 Jun 2017 10:19)  T(F): 98.6 (30 Jun 2017 10:19), Max: 98.6 (30 Jun 2017 10:19)  HR: 87 (30 Jun 2017 10:19) (76 - 90)  BP: 98/62 (30 Jun 2017 10:19) (98/62 - 105/69)  BP(mean): --  RR: 18 (30 Jun 2017 10:19) (17 - 18)  SpO2: 96% (30 Jun 2017 10:19) (95% - 98%)    ASSESSMENT/ PLAN    Therapy to  be:	[ x] Continue   [ ] Discontinued   [ ] Change to prn Analgesics    Documentation and Verification of current medications:   [X] Done	[ ] Not done, not eligible    Comments:

## 2017-06-30 NOTE — CHART NOTE - NSCHARTNOTEFT_GEN_A_CORE
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    RD f/u: Pt previously on PN + EN (Vital or Jevity) @ 60ml/hr x 24 hours via J-tube. As of 6/28, PN discontinued, NGT discontinued, + flatus/BM, diet advanced to clears. This morning diet further advanced to Phase 3 Bariatric; TF changed to Jevity @ 60ml/hr x 12 hours.      Source: Patient [X ]    Family [ ]     other [X ]; medical record    Diet : Phase 3 Bariatric as of 6/30      Patient reports [ ] nausea  [ ] vomiting [ ] diarrhea [ ] constipation  [ ]chewing problems [ ] swallowing issues  [X ] other: Pt reports abdominal pain and discomfort     PO intake:  < 50% [ ] 50-75% [ ]   % [X ]  other : Diet recently advanced; unclear if pt has tried po food yet     Source for PO intake [ ] Patient [ ] family [ ] chart [ ] staff [ ] other     Enteral /Parenteral Nutrition: Pt previously receiving Jevity1.2 @ 60ml/hr x 24 hours via J-tube; 24-hour provision = 1440ml (6/30), 720ml (6/29).  TF changed today to Jevity1.2 @ 60ml/hr x 12 hours via J-tube.      Current Weight: 63Kg (6/22), 64.1Kg (dosing 6/15)  % Weight Change: 98% of dosing wt    Edema: none noted    Pertinent Medications: MEDICATIONS  (STANDING):  HYDROmorphone PCA (1 mG/mL) 30 milliLiter(s) PCA Continuous PCA Continuous  enoxaparin Injectable 40 milliGRAM(s) SubCutaneous every 24 hours  dextrose 5% + sodium chloride 0.45% with potassium chloride 20 mEq/L 1000 milliLiter(s) (10 mL/Hr) IV Continuous <Continuous>  pantoprazole    Tablet 40 milliGRAM(s) Oral before breakfast    MEDICATIONS  (PRN):  HYDROmorphone PCA (1 mG/mL) Rescue Clinician Bolus 0.5 milliGRAM(s) IV Push every 15 minutes PRN for Pain Scale GREATER THAN 6  ondansetron Injectable 4 milliGRAM(s) IV Push every 6 hours PRN Nausea    Pertinent Labs:  06-29 Na142 mmol/L Glu 91 mg/dL K+ 3.5 mmol/L Cr  0.33 mg/dL<L> BUN 14 mg/dL Phos 2.9 mg/dL          Skin: no pressure injuries    Estimated Needs:   [ X] no change since previous assessment  [ ] recalculated:       Previous Nutrition Diagnosis:    [X ] Malnutrition; severe         Nutrition Diagnosis is [X ] ongoing  [ ] resolved [ ] not applicable        New Nutrition Diagnosis: [X ] not applicable    Interventions:     Recommend    [ ] Change Diet To: continue Phase 3 Bariatric diet    [ ] Nutrition Supplement    [ ] Nutrition Support: continue Jevity1.2@ 60ml/hr x 12 hours; provides 720ml formula, 864 anisha, 40Gm protein, 581ml free water (meets ~ 50% of lowest estimated needs; 13cal/Kg and 0.6Gm protein/Kg per dosing wt 64.1Kg).    [ ] Other:        Monitoring and Evaluation:     [ X] PO intake [X ] Tolerance to diet prescription [X ] weights [X ] follow up per protocol    [ X] other: encourage po intake to meet 50% of estimated nutrition needs

## 2017-07-01 LAB
ANION GAP SERPL CALC-SCNC: 10 MMOL/L — SIGNIFICANT CHANGE UP (ref 5–17)
BUN SERPL-MCNC: 9 MG/DL — SIGNIFICANT CHANGE UP (ref 7–23)
CALCIUM SERPL-MCNC: 8.2 MG/DL — LOW (ref 8.4–10.5)
CHLORIDE SERPL-SCNC: 98 MMOL/L — SIGNIFICANT CHANGE UP (ref 96–108)
CO2 SERPL-SCNC: 26 MMOL/L — SIGNIFICANT CHANGE UP (ref 22–31)
CREAT SERPL-MCNC: 0.44 MG/DL — LOW (ref 0.5–1.3)
GLUCOSE SERPL-MCNC: 99 MG/DL — SIGNIFICANT CHANGE UP (ref 70–99)
HCT VFR BLD CALC: 24.2 % — LOW (ref 34.5–45)
HGB BLD-MCNC: 8.3 G/DL — LOW (ref 11.5–15.5)
MAGNESIUM SERPL-MCNC: 2.2 MG/DL — SIGNIFICANT CHANGE UP (ref 1.6–2.6)
MCHC RBC-ENTMCNC: 32 PG — SIGNIFICANT CHANGE UP (ref 27–34)
MCHC RBC-ENTMCNC: 34.4 GM/DL — SIGNIFICANT CHANGE UP (ref 32–36)
MCV RBC AUTO: 92.9 FL — SIGNIFICANT CHANGE UP (ref 80–100)
PHOSPHATE SERPL-MCNC: 3.6 MG/DL — SIGNIFICANT CHANGE UP (ref 2.5–4.5)
PLATELET # BLD AUTO: 465 K/UL — HIGH (ref 150–400)
POTASSIUM SERPL-MCNC: 3.3 MMOL/L — LOW (ref 3.5–5.3)
POTASSIUM SERPL-SCNC: 3.3 MMOL/L — LOW (ref 3.5–5.3)
RBC # BLD: 2.61 M/UL — LOW (ref 3.8–5.2)
RBC # FLD: 14.8 % — HIGH (ref 10.3–14.5)
SODIUM SERPL-SCNC: 134 MMOL/L — LOW (ref 135–145)
WBC # BLD: 12 K/UL — HIGH (ref 3.8–10.5)
WBC # FLD AUTO: 12 K/UL — HIGH (ref 3.8–10.5)

## 2017-07-01 RX ORDER — POTASSIUM CHLORIDE 20 MEQ
40 PACKET (EA) ORAL EVERY 4 HOURS
Qty: 0 | Refills: 0 | Status: COMPLETED | OUTPATIENT
Start: 2017-07-01 | End: 2017-07-01

## 2017-07-01 RX ORDER — OXYCODONE HYDROCHLORIDE 5 MG/1
10 TABLET ORAL EVERY 4 HOURS
Qty: 0 | Refills: 0 | Status: DISCONTINUED | OUTPATIENT
Start: 2017-07-01 | End: 2017-07-04

## 2017-07-01 RX ORDER — OXYCODONE HYDROCHLORIDE 5 MG/1
5 TABLET ORAL EVERY 4 HOURS
Qty: 0 | Refills: 0 | Status: DISCONTINUED | OUTPATIENT
Start: 2017-07-01 | End: 2017-07-04

## 2017-07-01 RX ORDER — ACETAMINOPHEN 500 MG
650 TABLET ORAL EVERY 4 HOURS
Qty: 0 | Refills: 0 | Status: DISCONTINUED | OUTPATIENT
Start: 2017-07-01 | End: 2017-07-04

## 2017-07-01 RX ADMIN — OXYCODONE HYDROCHLORIDE 10 MILLIGRAM(S): 5 TABLET ORAL at 11:15

## 2017-07-01 RX ADMIN — Medication 40 MILLIEQUIVALENT(S): at 17:47

## 2017-07-01 RX ADMIN — OXYCODONE HYDROCHLORIDE 10 MILLIGRAM(S): 5 TABLET ORAL at 17:48

## 2017-07-01 RX ADMIN — PANTOPRAZOLE SODIUM 40 MILLIGRAM(S): 20 TABLET, DELAYED RELEASE ORAL at 05:40

## 2017-07-01 RX ADMIN — LIDOCAINE 1 PATCH: 4 CREAM TOPICAL at 11:19

## 2017-07-01 RX ADMIN — OXYCODONE HYDROCHLORIDE 10 MILLIGRAM(S): 5 TABLET ORAL at 11:45

## 2017-07-01 RX ADMIN — LIDOCAINE 1 PATCH: 4 CREAM TOPICAL at 23:33

## 2017-07-01 RX ADMIN — LIDOCAINE 1 PATCH: 4 CREAM TOPICAL at 11:18

## 2017-07-01 RX ADMIN — HYDROMORPHONE HYDROCHLORIDE 30 MILLILITER(S): 2 INJECTION INTRAMUSCULAR; INTRAVENOUS; SUBCUTANEOUS at 07:25

## 2017-07-01 RX ADMIN — Medication 40 MILLIEQUIVALENT(S): at 21:19

## 2017-07-01 RX ADMIN — ENOXAPARIN SODIUM 40 MILLIGRAM(S): 100 INJECTION SUBCUTANEOUS at 05:40

## 2017-07-01 NOTE — PROGRESS NOTE ADULT - SUBJECTIVE AND OBJECTIVE BOX
Day _8__ of Anesthesia Pain Management Service    SUBJECTIVE:    Pain Scale Score	At rest: ___ 	With Activity: ___ 	[ ] Refer to charted pain scores    THERAPY:    [ ] IV PCA Morphine		[ ] 5 mg/mL	[ ] 1 mg/mL  [x ] IV PCA Hydromorphone	[ ] 5 mg/mL	[ ] 1 mg/mL  [ ] IV PCA Fentanyl		[ ] 50 micrograms/mL    Demand dose 0.2   lockout  6  (minutes) 0 Continuous Rate    Total:     Daily      MEDICATIONS  (STANDING):  enoxaparin Injectable 40 milliGRAM(s) SubCutaneous every 24 hours  dextrose 5% + sodium chloride 0.45% with potassium chloride 20 mEq/L 1000 milliLiter(s) (10 mL/Hr) IV Continuous <Continuous>  lidocaine   Patch 1 Patch Transdermal daily  lidocaine   Patch 1 Patch Transdermal daily  pantoprazole    Tablet 40 milliGRAM(s) Oral before breakfast  HYDROmorphone PCA (1 mG/mL) 30 milliLiter(s) PCA Continuous PCA Continuous    MEDICATIONS  (PRN):  HYDROmorphone PCA (1 mG/mL) Rescue Clinician Bolus 0.5 milliGRAM(s) IV Push every 15 minutes PRN for Pain Scale GREATER THAN 6  naloxone Injectable 0.1 milliGRAM(s) IV Push every 3 minutes PRN For ANY of the following changes in patient status:  A. RR LESS THAN 10 breaths per minute, B. Oxygen saturation LESS THAN 90%, C. Sedation score of 6  ondansetron Injectable 4 milliGRAM(s) IV Push every 6 hours PRN Nausea      OBJECTIVE:    Sedation Score:	[x ] Alert	[ ] Drowsy 	[ ] Arousable	[ ] Asleep	[ ] Unresponsive    Side Effects:	[x ] None	[ ] Nausea	[ ] Vomiting	[ ] Pruritus  		[ ] Other:    Vital Signs Last 24 Hrs  T(C): 36.9 (01 Jul 2017 05:37), Max: 37.3 (30 Jun 2017 21:32)  T(F): 98.4 (01 Jul 2017 05:37), Max: 99.1 (30 Jun 2017 21:32)  HR: 84 (01 Jul 2017 05:37) (84 - 88)  BP: 95/52 (01 Jul 2017 05:37) (95/52 - 103/68)  BP(mean): --  RR: 18 (01 Jul 2017 05:37) (17 - 18)  SpO2: 97% (01 Jul 2017 05:37) (95% - 97%)    ASSESSMENT/ PLAN    Therapy to  be:	[ ] Continue   [x] Discontinued   [x ] Change to prn Analgesics    Documentation and Verification of current medications:   [X] Done	[ ] Not done, not elligible    Comments:

## 2017-07-01 NOTE — PROGRESS NOTE ADULT - SUBJECTIVE AND OBJECTIVE BOX
Patient is tolerating regular diet but has decreased appetite. Pain is well controlled.               Objective:   Vitals:   Vital Signs Last 24 Hrs  T(C): 36.9 (01 Jul 2017 05:37), Max: 37.3 (30 Jun 2017 21:32)  T(F): 98.4 (01 Jul 2017 05:37), Max: 99.1 (30 Jun 2017 21:32)  HR: 84 (01 Jul 2017 05:37) (84 - 88)  BP: 95/52 (01 Jul 2017 05:37) (95/52 - 103/68)  BP(mean): --  RR: 18 (01 Jul 2017 05:37) (17 - 18)  SpO2: 97% (01 Jul 2017 05:37) (95% - 97%)  In/Outs:    06-30 @ 07:01  -  07-01 @ 07:00  --------------------------------------------------------  IN:    dextrose 5% + sodium chloride 0.45% with potassium chloride 20 mEq/L: 120 mL    Oral Fluid: 1100 mL  Total IN: 1220 mL    OUT:    Voided: 1825 mL  Total OUT: 1825 mL    Total NET: -605 mL      Physical Exam  General: NAD   Abd: soft, appropriately tender, ND. Incisions CDI. J-tube in place, site dry.      MEDICATIONS  (STANDING):  enoxaparin Injectable 40 milliGRAM(s) SubCutaneous every 24 hours  dextrose 5% + sodium chloride 0.45% with potassium chloride 20 mEq/L 1000 milliLiter(s) (10 mL/Hr) IV Continuous <Continuous>  lidocaine   Patch 1 Patch Transdermal daily  lidocaine   Patch 1 Patch Transdermal daily  pantoprazole    Tablet 40 milliGRAM(s) Oral before breakfast    MEDICATIONS  (PRN):  oxyCODONE Solution 5 milliGRAM(s) Oral every 4 hours PRN Moderate Pain (4 - 6)  oxyCODONE Solution 10 milliGRAM(s) Oral every 4 hours PRN Severe Pain (7 - 10)  acetaminophen    Suspension. 650 milliGRAM(s) Oral every 4 hours PRN Mild Pain (1 - 3)      LABS:                        8.1    12.5  )-----------( 387      ( 30 Jun 2017 14:41 )             22.7     06-30    137  |  103  |  9   ----------------------------<  113<H>  3.5   |  22  |  0.34<L>    Ca    8.1<L>      30 Jun 2017 08:29  Phos  3.1     06-30  Mg     2.0     06-30          57F s/p exploratory laparotomy, distal gastrectomy, billroth II reconstruction, D2 lymphadenectomy, feeding jejunostomy tube placement  - d/c PCA, PO oxycodone for pain  - soft diet as tolerated  - Will do nocturnal feeds today to try to improve the patient's appetite.  - f/u GI fxn  - VTE ppx  - OOB/PT  - Dispo planning for this week Patient is tolerating regular diet but has decreased appetite. Pain is well controlled.               Objective:   Vitals:   Vital Signs Last 24 Hrs  T(C): 36.9 (01 Jul 2017 05:37), Max: 37.3 (30 Jun 2017 21:32)  T(F): 98.4 (01 Jul 2017 05:37), Max: 99.1 (30 Jun 2017 21:32)  HR: 84 (01 Jul 2017 05:37) (84 - 88)  BP: 95/52 (01 Jul 2017 05:37) (95/52 - 103/68)  BP(mean): --  RR: 18 (01 Jul 2017 05:37) (17 - 18)  SpO2: 97% (01 Jul 2017 05:37) (95% - 97%)  In/Outs:    06-30 @ 07:01  -  07-01 @ 07:00  --------------------------------------------------------  IN:    dextrose 5% + sodium chloride 0.45% with potassium chloride 20 mEq/L: 120 mL    Oral Fluid: 1100 mL  Total IN: 1220 mL    OUT:    Voided: 1825 mL  Total OUT: 1825 mL    Total NET: -605 mL      Physical Exam  General: NAD   Abd: soft, appropriately tender, ND. Incisions CDI. J-tube in place, site dry.      MEDICATIONS  (STANDING):  enoxaparin Injectable 40 milliGRAM(s) SubCutaneous every 24 hours  dextrose 5% + sodium chloride 0.45% with potassium chloride 20 mEq/L 1000 milliLiter(s) (10 mL/Hr) IV Continuous <Continuous>  lidocaine   Patch 1 Patch Transdermal daily  lidocaine   Patch 1 Patch Transdermal daily  pantoprazole    Tablet 40 milliGRAM(s) Oral before breakfast    MEDICATIONS  (PRN):  oxyCODONE Solution 5 milliGRAM(s) Oral every 4 hours PRN Moderate Pain (4 - 6)  oxyCODONE Solution 10 milliGRAM(s) Oral every 4 hours PRN Severe Pain (7 - 10)  acetaminophen    Suspension. 650 milliGRAM(s) Oral every 4 hours PRN Mild Pain (1 - 3)      LABS:                        8.1    12.5  )-----------( 387      ( 30 Jun 2017 14:41 )             22.7     06-30    137  |  103  |  9   ----------------------------<  113<H>  3.5   |  22  |  0.34<L>    Ca    8.1<L>      30 Jun 2017 08:29  Phos  3.1     06-30  Mg     2.0     06-30          57F s/p exploratory laparotomy, distal gastrectomy, billroth II reconstruction, D2 lymphadenectomy, feeding jejunostomy tube placement  - d/c PCA, PO oxycodone for pain  - soft diet as tolerated  - Nocturnal feeds  - f/u GI fxn  - VTE ppx  - OOB/PT  - Dispo planning for this week

## 2017-07-01 NOTE — PROGRESS NOTE ADULT - SUBJECTIVE AND OBJECTIVE BOX
chief complaint :   SUBJECTIVE / OVERNIGHT EVENTS: s/p exploratory laparotomy, distal gastrectomy, billroth II reconstruction, D2 lymphadenectomy, feeding jejunostomy tube placement    MEDICATIONS  (STANDING):  enoxaparin Injectable 40 milliGRAM(s) SubCutaneous every 24 hours  dextrose 5% + sodium chloride 0.45% with potassium chloride 20 mEq/L 1000 milliLiter(s) (10 mL/Hr) IV Continuous <Continuous>  lidocaine   Patch 1 Patch Transdermal daily  lidocaine   Patch 1 Patch Transdermal daily  pantoprazole    Tablet 40 milliGRAM(s) Oral before breakfast    MEDICATIONS  (PRN):  oxyCODONE Solution 5 milliGRAM(s) Oral every 4 hours PRN Moderate Pain (4 - 6)  oxyCODONE Solution 10 milliGRAM(s) Oral every 4 hours PRN Severe Pain (7 - 10)  acetaminophen    Suspension. 650 milliGRAM(s) Oral every 4 hours PRN Mild Pain (1 - 3)    Vital Signs Last 24 Hrs  T(C): 36.8 (01 Jul 2017 10:00), Max: 37.3 (30 Jun 2017 21:32)  T(F): 98.2 (01 Jul 2017 10:00), Max: 99.1 (30 Jun 2017 21:32)  HR: 90 (01 Jul 2017 10:00) (84 - 90)  BP: 113/75 (01 Jul 2017 10:00) (95/52 - 113/75)  BP(mean): --  RR: 18 (01 Jul 2017 10:00) (17 - 18)  SpO2: 95% (01 Jul 2017 10:00) (95% - 97%)  Constitutional: No fever, fatigue  Skin: No rash.  Eyes: No recent vision problems or eye pain.  ENT: No congestion, ear pain, or sore throat.  Cardiovascular: No chest pain or palpation.  Respiratory: No cough, shortness of breath, congestion, or wheezing.  Gastrointestinal: No abdominal pain, nausea, vomiting, or diarrhea.  Genitourinary: No dysuria.  Musculoskeletal: No joint swelling.  Neurologic: No headache.    PHYSICAL EXAM:  GENERAL: NAD  EYES: EOMI, PERRLA  NECK: Supple, No JVD  CHEST/LUNG: dec breath sounds at bases   HEART:  S1 , S2 +  ABDOMEN: soft, mild tenderness+ all over, J tube site no bleeding   EXTREMITIES:  edema+  NEUROLOGY:alert awake oriented    LABS:  06-30    137  |  103  |  9   ----------------------------<  113<H>  3.5   |  22  |  0.34<L>    Ca    8.1<L>      30 Jun 2017 08:29  Phos  3.1     06-30  Mg     2.0     06-30      Creatinine Trend: 0.34 <--, 0.33 <--, 0.33 <--, 0.35 <--, 0.34 <--, 0.32 <--, 0.30 <--                        8.1    12.5  )-----------( 387      ( 30 Jun 2017 14:41 )             22.7     Urine Studies:

## 2017-07-02 LAB
ANION GAP SERPL CALC-SCNC: 11 MMOL/L — SIGNIFICANT CHANGE UP (ref 5–17)
BUN SERPL-MCNC: 9 MG/DL — SIGNIFICANT CHANGE UP (ref 7–23)
CALCIUM SERPL-MCNC: 8.4 MG/DL — SIGNIFICANT CHANGE UP (ref 8.4–10.5)
CHLORIDE SERPL-SCNC: 99 MMOL/L — SIGNIFICANT CHANGE UP (ref 96–108)
CO2 SERPL-SCNC: 24 MMOL/L — SIGNIFICANT CHANGE UP (ref 22–31)
CREAT SERPL-MCNC: 0.45 MG/DL — LOW (ref 0.5–1.3)
GLUCOSE SERPL-MCNC: 108 MG/DL — HIGH (ref 70–99)
HCT VFR BLD CALC: 22.2 % — LOW (ref 34.5–45)
HGB BLD-MCNC: 7.8 G/DL — LOW (ref 11.5–15.5)
MAGNESIUM SERPL-MCNC: 2.2 MG/DL — SIGNIFICANT CHANGE UP (ref 1.6–2.6)
MCHC RBC-ENTMCNC: 32.1 PG — SIGNIFICANT CHANGE UP (ref 27–34)
MCHC RBC-ENTMCNC: 35.1 GM/DL — SIGNIFICANT CHANGE UP (ref 32–36)
MCV RBC AUTO: 91.4 FL — SIGNIFICANT CHANGE UP (ref 80–100)
PHOSPHATE SERPL-MCNC: 3.4 MG/DL — SIGNIFICANT CHANGE UP (ref 2.5–4.5)
PLATELET # BLD AUTO: 538 K/UL — HIGH (ref 150–400)
POTASSIUM SERPL-MCNC: 4.2 MMOL/L — SIGNIFICANT CHANGE UP (ref 3.5–5.3)
POTASSIUM SERPL-SCNC: 4.2 MMOL/L — SIGNIFICANT CHANGE UP (ref 3.5–5.3)
RBC # BLD: 2.43 M/UL — LOW (ref 3.8–5.2)
RBC # FLD: 17 % — HIGH (ref 10.3–14.5)
SODIUM SERPL-SCNC: 134 MMOL/L — LOW (ref 135–145)
WBC # BLD: 10.17 K/UL — SIGNIFICANT CHANGE UP (ref 3.8–10.5)
WBC # FLD AUTO: 10.17 K/UL — SIGNIFICANT CHANGE UP (ref 3.8–10.5)

## 2017-07-02 RX ADMIN — OXYCODONE HYDROCHLORIDE 10 MILLIGRAM(S): 5 TABLET ORAL at 04:20

## 2017-07-02 RX ADMIN — LIDOCAINE 1 PATCH: 4 CREAM TOPICAL at 20:19

## 2017-07-02 RX ADMIN — ENOXAPARIN SODIUM 40 MILLIGRAM(S): 100 INJECTION SUBCUTANEOUS at 04:49

## 2017-07-02 RX ADMIN — OXYCODONE HYDROCHLORIDE 10 MILLIGRAM(S): 5 TABLET ORAL at 03:50

## 2017-07-02 RX ADMIN — OXYCODONE HYDROCHLORIDE 10 MILLIGRAM(S): 5 TABLET ORAL at 20:45

## 2017-07-02 RX ADMIN — PANTOPRAZOLE SODIUM 40 MILLIGRAM(S): 20 TABLET, DELAYED RELEASE ORAL at 04:49

## 2017-07-02 RX ADMIN — OXYCODONE HYDROCHLORIDE 10 MILLIGRAM(S): 5 TABLET ORAL at 11:41

## 2017-07-02 RX ADMIN — OXYCODONE HYDROCHLORIDE 10 MILLIGRAM(S): 5 TABLET ORAL at 20:15

## 2017-07-02 NOTE — PROGRESS NOTE ADULT - SUBJECTIVE AND OBJECTIVE BOX
[facility] SURGERY    STATUS POST:  exlap distal gastrectomy, bilroth II    POST OPERATIVE DAY # 10        SUBJECTIVE:   Patient was seen and examined this morning. There was no acute event overnight. Her pain is well managed with pain medication, Her PO intake is limited. She is able to ambulate. She did not report n/v/dizziness, fever, or chest pain.      OBJECTIVE:   T(C): 37.2 (07-02-17 @ 04:50), Max: 37.2 (07-02-17 @ 04:50)  HR: 91 (07-02-17 @ 04:50) (65 - 94)  BP: 96/63 (07-02-17 @ 04:50) (96/59 - 113/75)  RR: 17 (07-02-17 @ 04:50) (17 - 18)  SpO2: 96% (07-02-17 @ 04:50) (95% - 97%)  Wt(kg): --  CAPILLARY BLOOD GLUCOSE  241 (01 Jul 2017 13:08)        I&O's Detail    01 Jul 2017 07:01  -  02 Jul 2017 07:00  --------------------------------------------------------  IN:    Oral Fluid: 1040 mL    Vital HN: 660 mL  Total IN: 1700 mL    OUT:    Voided: 450 mL  Total OUT: 450 mL    Total NET: 1250 mL          Physical exam:  General: [facility] SURGERY    STATUS POST:  exlap distal gastrectomy, bilroth II    POST OPERATIVE DAY # 10        SUBJECTIVE:   Patient was seen and examined this morning. There was no acute event overnight. Her pain is well managed with pain medication, Her PO intake is limited. She is able to ambulate. She did not report n/v/dizziness, fever, or chest pain.      OBJECTIVE:   T(C): 37.2 (07-02-17 @ 04:50), Max: 37.2 (07-02-17 @ 04:50)  HR: 91 (07-02-17 @ 04:50) (65 - 94)  BP: 96/63 (07-02-17 @ 04:50) (96/59 - 113/75)  RR: 17 (07-02-17 @ 04:50) (17 - 18)  SpO2: 96% (07-02-17 @ 04:50) (95% - 97%)  Wt(kg): --  CAPILLARY BLOOD GLUCOSE  241 (01 Jul 2017 13:08)        I&O's Detail    01 Jul 2017 07:01  -  02 Jul 2017 07:00  --------------------------------------------------------  IN:    Oral Fluid: 1040 mL    Vital HN: 660 mL  Total IN: 1700 mL    OUT:    Voided: 450 mL  Total OUT: 450 mL    Total NET: 1250 mL          Physical exam:  General: NAD  Abd: soft, NT, ND  Incision: C/D/I

## 2017-07-02 NOTE — PROGRESS NOTE ADULT - SUBJECTIVE AND OBJECTIVE BOX
chief complaint :   SUBJECTIVE / OVERNIGHT EVENTS: s/p exploratory laparotomy, distal gastrectomy, billroth II reconstruction, D2 lymphadenectomy, feeding jejunostomy tube placement    MEDICATIONS  (STANDING):  enoxaparin Injectable 40 milliGRAM(s) SubCutaneous every 24 hours  dextrose 5% + sodium chloride 0.45% with potassium chloride 20 mEq/L 1000 milliLiter(s) (10 mL/Hr) IV Continuous <Continuous>  lidocaine   Patch 1 Patch Transdermal daily  lidocaine   Patch 1 Patch Transdermal daily  pantoprazole    Tablet 40 milliGRAM(s) Oral before breakfast    MEDICATIONS  (PRN):  oxyCODONE Solution 5 milliGRAM(s) Oral every 4 hours PRN Moderate Pain (4 - 6)  oxyCODONE Solution 10 milliGRAM(s) Oral every 4 hours PRN Severe Pain (7 - 10)  acetaminophen    Suspension. 650 milliGRAM(s) Oral every 4 hours PRN Mild Pain (1 - 3)      Vital Signs Last 24 Hrs  T(C): 36.8 (02 Jul 2017 22:26), Max: 37.4 (02 Jul 2017 17:11)  T(F): 98.2 (02 Jul 2017 22:26), Max: 99.3 (02 Jul 2017 17:11)  HR: 90 (02 Jul 2017 22:34) (83 - 94)  BP: 96/63 (02 Jul 2017 22:34) (89/58 - 107/69)  BP(mean): --  RR: 18 (02 Jul 2017 22:26) (17 - 18)  SpO2: 95% (02 Jul 2017 22:26) (94% - 99%)    Constitutional: No fever, fatigue  Skin: No rash.  Eyes: No recent vision problems or eye pain.  ENT: No congestion, ear pain, or sore throat.  Cardiovascular: No chest pain or palpation.  Respiratory: No cough, shortness of breath, congestion, or wheezing.  Gastrointestinal: No abdominal pain, nausea, vomiting, or diarrhea.  Genitourinary: No dysuria.  Musculoskeletal: No joint swelling.  Neurologic: No headache.    PHYSICAL EXAM:  GENERAL: NAD  EYES: EOMI, PERRLA  NECK: Supple, No JVD  CHEST/LUNG: dec breath sounds at bases   HEART:  S1 , S2 +  ABDOMEN: soft, mild tenderness+ all over, J tube site no bleeding   EXTREMITIES:  edema+  NEUROLOGY:alert awake oriented    LABS:  07-02    134<L>  |  99  |  9   ----------------------------<  108<H>  4.2   |  24  |  0.45<L>    Ca    8.4      02 Jul 2017 08:33  Phos  3.4     07-02  Mg     2.2     07-02      Creatinine Trend: 0.45 <--, 0.44 <--, 0.34 <--, 0.33 <--, 0.33 <--, 0.35 <--, 0.34 <--, 0.32 <--                        7.8    10.17 )-----------( 538      ( 02 Jul 2017 08:30 )             22.2     Urine Studies:

## 2017-07-02 NOTE — PROGRESS NOTE ADULT - ASSESSMENT
- Ms. Blum is a 57F s/p exploratory laparotomy, distal gastrectomy, billroth II reconstruction, D2 lymphadenectomy, feeding jejunostomy tube placement. Her physical exam was unremarkable. labs are within normal limit.    - PCA is discontinued, PO oxycodone for pain  - soft diet as tolerated  - Nocturnal feeds  - f/u GI fxn  - VTE ppx  - OOB/PT  - Dispo planning for this week

## 2017-07-02 NOTE — PROGRESS NOTE ADULT - ASSESSMENT
pt s/p gastrectomy - pain control with po oxy , pt tolerating po   -    - OOB/PT                                anemia - monitor closely for active signs of bleeding

## 2017-07-03 ENCOUNTER — TRANSCRIPTION ENCOUNTER (OUTPATIENT)
Age: 57
End: 2017-07-03

## 2017-07-03 RX ORDER — CALCIUM CARBONATE 500(1250)
1 TABLET ORAL ONCE
Qty: 0 | Refills: 0 | Status: COMPLETED | OUTPATIENT
Start: 2017-07-03 | End: 2017-07-03

## 2017-07-03 RX ADMIN — Medication 1 TABLET(S): at 20:26

## 2017-07-03 RX ADMIN — PANTOPRAZOLE SODIUM 40 MILLIGRAM(S): 20 TABLET, DELAYED RELEASE ORAL at 05:09

## 2017-07-03 RX ADMIN — ENOXAPARIN SODIUM 40 MILLIGRAM(S): 100 INJECTION SUBCUTANEOUS at 05:09

## 2017-07-03 RX ADMIN — LIDOCAINE 1 PATCH: 4 CREAM TOPICAL at 19:53

## 2017-07-03 RX ADMIN — LIDOCAINE 1 PATCH: 4 CREAM TOPICAL at 09:24

## 2017-07-03 RX ADMIN — OXYCODONE HYDROCHLORIDE 10 MILLIGRAM(S): 5 TABLET ORAL at 19:53

## 2017-07-03 RX ADMIN — OXYCODONE HYDROCHLORIDE 10 MILLIGRAM(S): 5 TABLET ORAL at 15:17

## 2017-07-03 RX ADMIN — OXYCODONE HYDROCHLORIDE 10 MILLIGRAM(S): 5 TABLET ORAL at 20:23

## 2017-07-03 RX ADMIN — OXYCODONE HYDROCHLORIDE 10 MILLIGRAM(S): 5 TABLET ORAL at 05:10

## 2017-07-03 RX ADMIN — LIDOCAINE 1 PATCH: 4 CREAM TOPICAL at 09:25

## 2017-07-03 RX ADMIN — OXYCODONE HYDROCHLORIDE 10 MILLIGRAM(S): 5 TABLET ORAL at 15:50

## 2017-07-03 RX ADMIN — OXYCODONE HYDROCHLORIDE 10 MILLIGRAM(S): 5 TABLET ORAL at 05:40

## 2017-07-03 NOTE — CHART NOTE - NSCHARTNOTEFT_GEN_A_CORE
Source: Patient [X ]    Friend at bedside [X ]     other [X ] PA, Calorie Count    POD #10 s/p exploratory laparotomy, distal gastrectomy, billroth II reconstruction, D2 lymphadenectomy, feeding jejunostomy tube placement.      Diet : Regular Diet  Previously received/tolerated Jevity 1.2 @60ml/hr x12 hrs (overnight) via J-tube. Discontinued earlier today.     Patient denies nausea + emesis. + flatus, + small formed BM earlier this morning    PO intake:  < 50% [ X] 50-75% [ ]   % [X ]  other : 3 Day Calorie count in progress. Results display minimal intake thus far. 7/2: ~750 anisha, 25g protein (~40% lower end of anisha needs, ~35% lower end of protein needs). Minimal po intake discussed with PA.    Enteral /Parenteral Nutrition: Discontinued.    Current Weight: No new wts. 6/22=63 kg, 6/15 dosing=64.1kg    Pertinent Medications: MEDICATIONS  (STANDING):  pantoprazole    Tablet 40 milliGRAM(s) Oral before breakfast    Pertinent Labs:  07-02 Na134 mmol/L<L> Glu 108 mg/dL<H> K+ 4.2 mmol/L Cr  0.45 mg/dL<L> BUN 9 mg/dL Phos 3.4 mg/dL    Skin: No pressure ulcers. No edema.    Estimated Needs:   [X ] no change since previous assessment  [ ] recalculated:       Previous Nutrition Diagnosis: [X ] Malnutrition, severe       Nutrition Diagnosis is [ X] ongoing  [ ] resolved [ ] not applicable        Interventions:     Recommend    [ ] Change Diet To:    [X ] Nutrition Supplement: Promote-3 per day (provides additional 711 kcal & 44g protein/day). Discussed with PA.    [ ] Nutrition Support    [X ] Other: Encouraged PO intake. Adequate nutrition emphasized for optimal strength and energy. Discussed small, frequent meals high in protein and energy.        Monitoring and Evaluation:     [X ] PO intake [ X] Tolerance to diet prescription [ X] weights [X ] follow up per protocol    [X] other: RD to remain available for calorie count review and further nutritional interventions as indicated/requested by medical team/pt.

## 2017-07-03 NOTE — DISCHARGE NOTE ADULT - HOSPITAL COURSE
Ms Blum is a 56 yo F who presented with 6 weeks of abdominal pain, nausea, emesis, and 20lb weight loss. Repeat endoscopy 6/16/17 confirmed gastric adenocarcinoma. Following the procedure she remained intubated in the SICU for L lung opacification on chest xray and anion gap metabolic acidosis, and successfully extubated 06/17. on 6/23 she underwent ex-lap with distal gastrectomy and billroth II reconstruction, D2 lymphadenectomy and feeding tube placement. She had a normal postoperative course with return of bowel function but slow return of appetite. Tube feedings were decreased and switched to nocturnal, and following return of appetite discontinued 7/3. Ms Blum is a 56 yo F who presented with 6 weeks of abdominal pain, nausea, emesis, and 20lb weight loss. Repeat endoscopy 6/16/17 confirmed gastric adenocarcinoma. Following the endoscopy she remained intubated in the SICU for L lung opacification on chest xray and anion gap metabolic acidosis, and was successfully extubated 06/17. On 6/23 she underwent ex-lap with distal gastrectomy and billroth II reconstruction, D2 lymphadenectomy and feeding tube placement.  She was started on TPN for prolonged NPO status and slow return of GI function.  She had a normal postoperative course with return of bowel function but slow return of appetite. Calorie counts were performed and Tube feedings were transitioned to nocturnal.  When PO intake increased, tube feeds were stopped.  She is tolerating a diet, is ambulating, voiding, and pain is controlled.  She is stable for discharge home and will follow-up with Dr. Trent within 1-2 weeks.  She is discharged home with J tube, which will be removed in office.

## 2017-07-03 NOTE — DISCHARGE NOTE ADULT - CARE PLAN
Principal Discharge DX:	Gastric outlet obstruction  Goal:	Recovery from surgery  Instructions for follow-up, activity and diet:	WOUND CARE:  Keep incisions clean and dry.  Follow-up in office for staple removal.  BATHING: Please do not submerge wound underwater. You may shower and/or sponge bathe.  ACTIVITY: No heavy lifting or straining. Otherwise, you may return to your usual level of physical activity. If you are taking narcotic pain medication (such as Percocet), do NOT drive a car, operate machinery or make important decisions.  DIET: Return to your usual diet.  NOTIFY YOUR SURGEON IF: You have any bleeding that does not stop, any pus draining from your wound, any fever (over 100.4 F) or chills, persistent nausea/vomiting, persistent diarrhea, or if your pain is not controlled on your discharge pain medications.  FOLLOW-UP:  1. Follow-up with Dr. Trent within 1-2 weeks of discharge.  Please call office for appointment  2. Please follow up with your primary care physician in one week regarding your hospitalization.

## 2017-07-03 NOTE — PROGRESS NOTE ADULT - PROBLEM SELECTOR PROBLEM 1
R/O Chest pain
Chest pain
Gastric outlet obstruction
Hypokalemia
Mild protein-calorie malnutrition

## 2017-07-03 NOTE — DISCHARGE NOTE ADULT - ADDITIONAL INSTRUCTIONS
Follow-up with Dr. Trent within 1-2 weeks.  Please call office for appointment. Follow-up with Dr. Trent within 1-2 weeks.  Please call office for appointment.  Flush J tube with 20 cc NS once daily.

## 2017-07-03 NOTE — DISCHARGE NOTE ADULT - MEDICATION SUMMARY - MEDICATIONS TO TAKE
I will START or STAY ON the medications listed below when I get home from the hospital:    Zofran  --     -- Indication: For Nausea    acetaminophen 160 mg/5 mL oral suspension  -- 20.31 milliliter(s) by mouth every 4 hours, As needed, Mild Pain (1 - 3)  -- Indication: For Mild pain    oxyCODONE 5 mg oral tablet  -- 1 tab(s) by mouth every 4 hours, As needed, Moderate Pain (4 - 6) MDD:8  -- Indication: For Moderate pain    oxyCODONE 10 mg oral tablet  -- 1 tab(s) by mouth every 4 hours, As needed, Severe Pain (7 - 10)  -- Indication: For severe pain    pantoprazole 40 mg oral delayed release tablet  -- 1 tab(s) by mouth once a day (before a meal)  -- Indication: For reflux I will START or STAY ON the medications listed below when I get home from the hospital:    Zofran  --     -- Indication: For Nausea    acetaminophen 160 mg/5 mL oral suspension  -- 20.31 milliliter(s) by mouth every 4 hours, As needed, Mild Pain (1 - 3)  -- Indication: For Mild pain    oxyCODONE 10 mg oral tablet  -- 1 tab(s) by mouth every 4 hours, As needed, Severe Pain (7 - 10)  -- Indication: For severe pain    oxyCODONE 5 mg oral tablet  -- 1 tab(s) by mouth every 4 hours, As needed, Moderate Pain (4 - 6) MDD:8  -- Indication: For Mild to moderate pain    pantoprazole 40 mg oral delayed release tablet  -- 1 tab(s) by mouth once a day (before a meal)  -- Indication: For reflux

## 2017-07-03 NOTE — PROGRESS NOTE ADULT - PROBLEM SELECTOR PLAN 1
- continue calorie count for now  - continue tube feeds at night  - disposition planning with tube feeds likely but will discuss with Dr. Trent.
As per GI/Surgical Oncology
Awaiting placement of NE tube
Continue with PN until
Continue with PN until J tube feeding can be started and are adequae
Continue with TPN until TF is adequate (i.e. >= 60% of required amount x 24 hrs)
Continue with current PN RX
Corrected with supplementation, continue to monitor
Management as per cardiology
On PN, TF started will continue with PN until TF is adequate
On TF at goal, starting on clear liquid diet
as above

## 2017-07-03 NOTE — PROGRESS NOTE ADULT - SUBJECTIVE AND OBJECTIVE BOX
SURGERY    STATUS POST:  s/p exploratory laparotomy, distal gastrectomy, billroth II reconstruction, D2 lymphadenectomy, feeding jejunostomy tube placement    POST OPERATIVE DAY # 10        SUBJECTIVE:   Patient states some abdominal discomfort.  States appetite poor due to this. Denies any nausea, vomiting, diarrhea, fevers, chills. On calorie count       OBJECTIVE:   T(C): 37.5 (07-03-17 @ 05:04), Max: 37.5 (07-03-17 @ 05:04)  HR: 80 (07-03-17 @ 05:04) (80 - 93)  BP: 98/63 (07-03-17 @ 05:04) (89/58 - 107/69)  RR: 18 (07-03-17 @ 05:04) (18 - 18)  SpO2: 95% (07-03-17 @ 05:04) (94% - 99%)      I&O's Detail    02 Jul 2017 07:01  -  03 Jul 2017 07:00  --------------------------------------------------------  IN:    Oral Fluid: 1040 mL    Vital HN: 660 mL  Total IN: 1700 mL    OUT:  Total OUT: 0 mL    Total NET: 1700 mL          Physical exam:   General:In NAD, A+Ox3  Abdomen: soft non distended, mild tenderness periincisional.     MEDICATIONS  (STANDING):  enoxaparin Injectable 40 milliGRAM(s) SubCutaneous every 24 hours  dextrose 5% + sodium chloride 0.45% with potassium chloride 20 mEq/L 1000 milliLiter(s) (10 mL/Hr) IV Continuous <Continuous>  lidocaine   Patch 1 Patch Transdermal daily  lidocaine   Patch 1 Patch Transdermal daily  pantoprazole    Tablet 40 milliGRAM(s) Oral before breakfast    MEDICATIONS  (PRN):  oxyCODONE Solution 5 milliGRAM(s) Oral every 4 hours PRN Moderate Pain (4 - 6)  oxyCODONE Solution 10 milliGRAM(s) Oral every 4 hours PRN Severe Pain (7 - 10)  acetaminophen    Suspension. 650 milliGRAM(s) Oral every 4 hours PRN Mild Pain (1 - 3)      LABS:                        7.8    10.17 )-----------( 538      ( 02 Jul 2017 08:30 )             22.2     07-02    134<L>  |  99  |  9   ----------------------------<  108<H>  4.2   |  24  |  0.45<L>    Ca    8.4      02 Jul 2017 08:33  Phos  3.4     07-02  Mg     2.2     07-02

## 2017-07-03 NOTE — PROGRESS NOTE ADULT - ASSESSMENT
57y Female s/p s/p exploratory laparotomy, distal gastrectomy, billroth II reconstruction, D2 lymphadenectomy, feeding jejunostomy tube placement POD 10 with poor appetite currently on calorie count and nocturnal tube feeds.

## 2017-07-03 NOTE — DISCHARGE NOTE ADULT - FINDINGS/TREATMENT
Pathology shows negative margins, tumor invading the serosa with extensive lymphovascular and perineural invasion and 18/42 nodes positive for metastatic adenocarcinoma, stage [id number].

## 2017-07-03 NOTE — DISCHARGE NOTE ADULT - CARE PROVIDERS DIRECT ADDRESSES
[email or web address]

## 2017-07-03 NOTE — DISCHARGE NOTE ADULT - NS AS ACTIVITY OBS
No Heavy lifting/straining/Do not drive or operate machinery/no driving while taking narcotic pain medication

## 2017-07-03 NOTE — DISCHARGE NOTE ADULT - CARE PROVIDER_API CALL
Koffi Trent), Surgery; Surgical Oncology  [address] [place] [id number]  Phone: [phone]  Fax: [phone]

## 2017-07-03 NOTE — DISCHARGE NOTE ADULT - PATIENT PORTAL LINK FT
“You can access the [employer] Patient Portal, offered by [facility], by registering with the following website: [email or web address]”

## 2017-07-03 NOTE — DISCHARGE NOTE ADULT - PLAN OF CARE
Recovery from surgery WOUND CARE:  Keep incisions clean and dry.  Follow-up in office for staple removal.  BATHING: Please do not submerge wound underwater. You may shower and/or sponge bathe.  ACTIVITY: No heavy lifting or straining. Otherwise, you may return to your usual level of physical activity. If you are taking narcotic pain medication (such as Percocet), do NOT drive a car, operate machinery or make important decisions.  DIET: Return to your usual diet.  NOTIFY YOUR SURGEON IF: You have any bleeding that does not stop, any pus draining from your wound, any fever (over 100.4 F) or chills, persistent nausea/vomiting, persistent diarrhea, or if your pain is not controlled on your discharge pain medications.  FOLLOW-UP:  1. Follow-up with Dr. Trent within 1-2 weeks of discharge.  Please call office for appointment  2. Please follow up with your primary care physician in one week regarding your hospitalization.

## 2017-07-03 NOTE — DISCHARGE NOTE ADULT - NS DC ANGIO PCI YN
ACUPUNCTURE FOLLOW-UP VISIT    Annamarie Dias  1/17/2017    Reason for Treatment: Post FET    TCM Diagnosis:  (Previous):         Current (only if different): NA    Subjective:  Annamarie reports she had successful FET. I provided pre nad post acupuncture therapy. If the FET results in pregnancy, I will see Annamarie 1-2x weekly for the first trimester.    Objective: Alert x3    TREATMENT PLAN    Acupuncture: Right:HT5,PC6,LU7,ST36,GB34. Left:LI4,TW5,SP10,6,KD7.  DU20    # of Needles used:  11  # of Needles out: 11    Ear Needles:  yes  left  nguyen men and uterus and right  internal secretion and sub cortex    Electro-Stimulation: No    TDP Lamp: No    Guasha/Cupping:  No    Tui Na:  No    Ear Seeds:  no    Moxibustion:  No    Herbs:    Sin Arriola  1/17/2017     no

## 2017-07-04 VITALS
SYSTOLIC BLOOD PRESSURE: 107 MMHG | OXYGEN SATURATION: 98 % | HEART RATE: 81 BPM | RESPIRATION RATE: 17 BRPM | TEMPERATURE: 98 F | DIASTOLIC BLOOD PRESSURE: 70 MMHG

## 2017-07-04 PROCEDURE — 82330 ASSAY OF CALCIUM: CPT

## 2017-07-04 PROCEDURE — 84484 ASSAY OF TROPONIN QUANT: CPT

## 2017-07-04 PROCEDURE — 75574 CT ANGIO HRT W/3D IMAGE: CPT

## 2017-07-04 PROCEDURE — 86850 RBC ANTIBODY SCREEN: CPT

## 2017-07-04 PROCEDURE — 96375 TX/PRO/DX INJ NEW DRUG ADDON: CPT | Mod: XU

## 2017-07-04 PROCEDURE — C1769: CPT

## 2017-07-04 PROCEDURE — 49465 FLUORO EXAM OF G/COLON TUBE: CPT

## 2017-07-04 PROCEDURE — 84100 ASSAY OF PHOSPHORUS: CPT

## 2017-07-04 PROCEDURE — 94002 VENT MGMT INPAT INIT DAY: CPT

## 2017-07-04 PROCEDURE — 99238 HOSP IP/OBS DSCHRG MGMT 30/<: CPT

## 2017-07-04 PROCEDURE — 85730 THROMBOPLASTIN TIME PARTIAL: CPT

## 2017-07-04 PROCEDURE — 96376 TX/PRO/DX INJ SAME DRUG ADON: CPT | Mod: XU

## 2017-07-04 PROCEDURE — 71045 X-RAY EXAM CHEST 1 VIEW: CPT

## 2017-07-04 PROCEDURE — 86900 BLOOD TYPING SEROLOGIC ABO: CPT

## 2017-07-04 PROCEDURE — 84295 ASSAY OF SERUM SODIUM: CPT

## 2017-07-04 PROCEDURE — C1751: CPT

## 2017-07-04 PROCEDURE — 85610 PROTHROMBIN TIME: CPT

## 2017-07-04 PROCEDURE — 80076 HEPATIC FUNCTION PANEL: CPT

## 2017-07-04 PROCEDURE — 93306 TTE W/DOPPLER COMPLETE: CPT

## 2017-07-04 PROCEDURE — 82435 ASSAY OF BLOOD CHLORIDE: CPT

## 2017-07-04 PROCEDURE — 96374 THER/PROPH/DIAG INJ IV PUSH: CPT | Mod: XU

## 2017-07-04 PROCEDURE — 74177 CT ABD & PELVIS W/CONTRAST: CPT

## 2017-07-04 PROCEDURE — 36569 INSJ PICC 5 YR+ W/O IMAGING: CPT

## 2017-07-04 PROCEDURE — 82803 BLOOD GASES ANY COMBINATION: CPT

## 2017-07-04 PROCEDURE — 87086 URINE CULTURE/COLONY COUNT: CPT

## 2017-07-04 PROCEDURE — P9016: CPT

## 2017-07-04 PROCEDURE — 81001 URINALYSIS AUTO W/SCOPE: CPT

## 2017-07-04 PROCEDURE — 82465 ASSAY BLD/SERUM CHOLESTEROL: CPT

## 2017-07-04 PROCEDURE — 82550 ASSAY OF CK (CPK): CPT

## 2017-07-04 PROCEDURE — 43753 TX GASTRO INTUB W/ASP: CPT

## 2017-07-04 PROCEDURE — 85014 HEMATOCRIT: CPT

## 2017-07-04 PROCEDURE — 99285 EMERGENCY DEPT VISIT HI MDM: CPT | Mod: 25

## 2017-07-04 PROCEDURE — 84478 ASSAY OF TRIGLYCERIDES: CPT

## 2017-07-04 PROCEDURE — 83605 ASSAY OF LACTIC ACID: CPT

## 2017-07-04 PROCEDURE — 80053 COMPREHEN METABOLIC PANEL: CPT

## 2017-07-04 PROCEDURE — 82947 ASSAY GLUCOSE BLOOD QUANT: CPT

## 2017-07-04 PROCEDURE — 83735 ASSAY OF MAGNESIUM: CPT

## 2017-07-04 PROCEDURE — 82565 ASSAY OF CREATININE: CPT

## 2017-07-04 PROCEDURE — 80048 BASIC METABOLIC PNL TOTAL CA: CPT

## 2017-07-04 PROCEDURE — 97162 PT EVAL MOD COMPLEX 30 MIN: CPT

## 2017-07-04 PROCEDURE — 93970 EXTREMITY STUDY: CPT

## 2017-07-04 PROCEDURE — 93005 ELECTROCARDIOGRAM TRACING: CPT

## 2017-07-04 PROCEDURE — 84132 ASSAY OF SERUM POTASSIUM: CPT

## 2017-07-04 PROCEDURE — 88305 TISSUE EXAM BY PATHOLOGIST: CPT

## 2017-07-04 PROCEDURE — 36430 TRANSFUSION BLD/BLD COMPNT: CPT

## 2017-07-04 PROCEDURE — 86923 COMPATIBILITY TEST ELECTRIC: CPT

## 2017-07-04 PROCEDURE — 86901 BLOOD TYPING SEROLOGIC RH(D): CPT

## 2017-07-04 PROCEDURE — 88312 SPECIAL STAINS GROUP 1: CPT

## 2017-07-04 PROCEDURE — 85027 COMPLETE CBC AUTOMATED: CPT

## 2017-07-04 PROCEDURE — 82553 CREATINE MB FRACTION: CPT

## 2017-07-04 PROCEDURE — C1889: CPT

## 2017-07-04 RX ORDER — OXYCODONE HYDROCHLORIDE 5 MG/1
1 TABLET ORAL
Qty: 0 | Refills: 0 | COMMUNITY
Start: 2017-07-04

## 2017-07-04 RX ORDER — PANTOPRAZOLE SODIUM 20 MG/1
1 TABLET, DELAYED RELEASE ORAL
Qty: 30 | Refills: 2 | OUTPATIENT
Start: 2017-07-04

## 2017-07-04 RX ORDER — OXYCODONE HYDROCHLORIDE 5 MG/1
1 TABLET ORAL
Qty: 20 | Refills: 0 | OUTPATIENT
Start: 2017-07-04

## 2017-07-04 RX ORDER — OXYCODONE HYDROCHLORIDE 5 MG/1
5 TABLET ORAL EVERY 4 HOURS
Qty: 0 | Refills: 0 | Status: DISCONTINUED | OUTPATIENT
Start: 2017-07-04 | End: 2017-07-04

## 2017-07-04 RX ORDER — OXYCODONE HYDROCHLORIDE 5 MG/1
10 TABLET ORAL EVERY 4 HOURS
Qty: 0 | Refills: 0 | Status: DISCONTINUED | OUTPATIENT
Start: 2017-07-04 | End: 2017-07-04

## 2017-07-04 RX ORDER — ACETAMINOPHEN 500 MG
20.31 TABLET ORAL
Qty: 0 | Refills: 0 | COMMUNITY
Start: 2017-07-04

## 2017-07-04 RX ADMIN — ENOXAPARIN SODIUM 40 MILLIGRAM(S): 100 INJECTION SUBCUTANEOUS at 06:09

## 2017-07-04 RX ADMIN — OXYCODONE HYDROCHLORIDE 10 MILLIGRAM(S): 5 TABLET ORAL at 06:14

## 2017-07-04 RX ADMIN — OXYCODONE HYDROCHLORIDE 5 MILLIGRAM(S): 5 TABLET ORAL at 15:31

## 2017-07-04 RX ADMIN — PANTOPRAZOLE SODIUM 40 MILLIGRAM(S): 20 TABLET, DELAYED RELEASE ORAL at 06:09

## 2017-07-04 NOTE — PROGRESS NOTE ADULT - ASSESSMENT
57y Female s/p  exploratory laparotomy, distal gastrectomy, billroth II reconstruction, D2 lymphadenectomy, feeding jejunostomy tube placement POD 11. her lab and physical exam are unremarkable. Her appetite is improving.      Mild protein-calorie malnutrition: calorie count in the past two nights indicates improved appetite.  -she was instructed how to flush her feeding tube  - pt is hemodynamically stable and can tolerate PO intake. She is going to discharge today

## 2017-07-04 NOTE — PROGRESS NOTE ADULT - PROVIDER SPECIALTY LIST ADULT
Anesthesia
Cardiology
Gastroenterology
Internal Medicine
Nutrition Support
SICU
Surgery
Gastroenterology
Nutrition Support
Surgery
Surgery
Cardiology
Internal Medicine
Surgery

## 2017-07-04 NOTE — PROGRESS NOTE ADULT - SUBJECTIVE AND OBJECTIVE BOX
SURGERY    STATUS POST:      POST OPERATIVE DAY #        SUBJECTIVE:       OBJECTIVE:   T(C): 36.9 (07-04-17 @ 09:15), Max: 36.9 (07-04-17 @ 09:15)  HR: 80 (07-04-17 @ 09:15) (80 - 89)  BP: 93/65 (07-04-17 @ 09:15) (90/50 - 98/65)  RR: 18 (07-04-17 @ 09:15) (17 - 18)  SpO2: 94% (07-04-17 @ 09:15) (94% - 97%)  Wt(kg): --  CAPILLARY BLOOD GLUCOSE        I&O's Detail    03 Jul 2017 07:01  -  04 Jul 2017 07:00  --------------------------------------------------------  IN:    Oral Fluid: 840 mL  Total IN: 840 mL    OUT:    Voided: 1200 mL  Total OUT: 1200 mL    Total NET: -360 mL      04 Jul 2017 07:01  -  04 Jul 2017 12:30  --------------------------------------------------------  IN:    Oral Fluid: 240 mL  Total IN: 240 mL    OUT:  Total OUT: 0 mL    Total NET: 240 mL          Physical exam:  General: HPBT SURGERY    STATUS POST:  GOO 2/2 obstructing gastric mass    POST OPERATIVE DAY # 11        SUBJECTIVE:     Patient was seen and examined this morning. There was no acute event overnight. her appetite is improving and she is able to tolerate. She does not report pain, nausea, vomitting      OBJECTIVE:   T(C): 36.9 (07-04-17 @ 09:15), Max: 36.9 (07-04-17 @ 09:15)  HR: 80 (07-04-17 @ 09:15) (80 - 89)  BP: 93/65 (07-04-17 @ 09:15) (90/50 - 98/65)  RR: 18 (07-04-17 @ 09:15) (17 - 18)  SpO2: 94% (07-04-17 @ 09:15) (94% - 97%)  Wt(kg): --  CAPILLARY BLOOD GLUCOSE        I&O's Detail    03 Jul 2017 07:01  -  04 Jul 2017 07:00  --------------------------------------------------------  IN:    Oral Fluid: 840 mL  Total IN: 840 mL    OUT:    Voided: 1200 mL  Total OUT: 1200 mL    Total NET: -360 mL      04 Jul 2017 07:01  -  04 Jul 2017 12:30  --------------------------------------------------------  IN:    Oral Fluid: 240 mL  Total IN: 240 mL    OUT:  Total OUT: 0 mL    Total NET: 240 mL          Physical exam:  General:WN,WD, NAD  Abdomen: soft, ND, NT, no organomegaly incision line was c/d/i

## 2017-07-09 ENCOUNTER — INPATIENT (INPATIENT)
Facility: HOSPITAL | Age: 57
LOS: 5 days | Discharge: HOME | DRG: 862 | End: 2017-07-15
Attending: SURGERY | Admitting: SURGERY
Payer: COMMERCIAL

## 2017-07-09 VITALS
RESPIRATION RATE: 21 BRPM | DIASTOLIC BLOOD PRESSURE: 63 MMHG | OXYGEN SATURATION: 99 % | SYSTOLIC BLOOD PRESSURE: 96 MMHG | HEART RATE: 104 BPM | TEMPERATURE: 99 F

## 2017-07-09 DIAGNOSIS — R18.8 OTHER ASCITES: ICD-10-CM

## 2017-07-09 DIAGNOSIS — Z90.3 ACQUIRED ABSENCE OF STOMACH [PART OF]: Chronic | ICD-10-CM

## 2017-07-09 LAB
ALBUMIN SERPL ELPH-MCNC: 2.5 G/DL — LOW (ref 3.3–5)
ALBUMIN SERPL ELPH-MCNC: 2.8 G/DL — LOW (ref 3.3–5)
ALP SERPL-CCNC: 426 U/L — HIGH (ref 40–120)
ALP SERPL-CCNC: 539 U/L — HIGH (ref 40–120)
ALT FLD-CCNC: 100 U/L RC — HIGH (ref 10–45)
ALT FLD-CCNC: 73 U/L RC — HIGH (ref 10–45)
ANION GAP SERPL CALC-SCNC: 12 MMOL/L — SIGNIFICANT CHANGE UP (ref 5–17)
ANION GAP SERPL CALC-SCNC: 12 MMOL/L — SIGNIFICANT CHANGE UP (ref 5–17)
APPEARANCE UR: ABNORMAL
APTT BLD: 29.4 SEC — SIGNIFICANT CHANGE UP (ref 27.5–37.4)
AST SERPL-CCNC: 35 U/L — SIGNIFICANT CHANGE UP (ref 10–40)
AST SERPL-CCNC: 52 U/L — HIGH (ref 10–40)
BASOPHILS # BLD AUTO: 0 K/UL — SIGNIFICANT CHANGE UP (ref 0–0.2)
BASOPHILS # BLD AUTO: 0 K/UL — SIGNIFICANT CHANGE UP (ref 0–0.2)
BASOPHILS NFR BLD AUTO: 0 % — SIGNIFICANT CHANGE UP (ref 0–2)
BASOPHILS NFR BLD AUTO: 0.4 % — SIGNIFICANT CHANGE UP (ref 0–2)
BILIRUB SERPL-MCNC: 1.5 MG/DL — HIGH (ref 0.2–1.2)
BILIRUB SERPL-MCNC: 1.7 MG/DL — HIGH (ref 0.2–1.2)
BILIRUB UR-MCNC: NEGATIVE — SIGNIFICANT CHANGE UP
BUN SERPL-MCNC: 7 MG/DL — SIGNIFICANT CHANGE UP (ref 7–23)
BUN SERPL-MCNC: 9 MG/DL — SIGNIFICANT CHANGE UP (ref 7–23)
CALCIUM SERPL-MCNC: 8.5 MG/DL — SIGNIFICANT CHANGE UP (ref 8.4–10.5)
CALCIUM SERPL-MCNC: 9.3 MG/DL — SIGNIFICANT CHANGE UP (ref 8.4–10.5)
CHLORIDE SERPL-SCNC: 94 MMOL/L — LOW (ref 96–108)
CHLORIDE SERPL-SCNC: 97 MMOL/L — SIGNIFICANT CHANGE UP (ref 96–108)
CO2 SERPL-SCNC: 22 MMOL/L — SIGNIFICANT CHANGE UP (ref 22–31)
CO2 SERPL-SCNC: 24 MMOL/L — SIGNIFICANT CHANGE UP (ref 22–31)
COLOR SPEC: YELLOW — SIGNIFICANT CHANGE UP
CREAT SERPL-MCNC: 0.38 MG/DL — LOW (ref 0.5–1.3)
CREAT SERPL-MCNC: 0.47 MG/DL — LOW (ref 0.5–1.3)
DIFF PNL FLD: ABNORMAL
EOSINOPHIL # BLD AUTO: 0 K/UL — SIGNIFICANT CHANGE UP (ref 0–0.5)
EOSINOPHIL # BLD AUTO: 0 K/UL — SIGNIFICANT CHANGE UP (ref 0–0.5)
EOSINOPHIL NFR BLD AUTO: 0 % — SIGNIFICANT CHANGE UP (ref 0–6)
EOSINOPHIL NFR BLD AUTO: 0.1 % — SIGNIFICANT CHANGE UP (ref 0–6)
GAS PNL BLDV: SIGNIFICANT CHANGE UP
GLUCOSE SERPL-MCNC: 103 MG/DL — HIGH (ref 70–99)
GLUCOSE SERPL-MCNC: 115 MG/DL — HIGH (ref 70–99)
GLUCOSE UR QL: NEGATIVE — SIGNIFICANT CHANGE UP
HCT VFR BLD CALC: 24.2 % — LOW (ref 34.5–45)
HCT VFR BLD CALC: 29.6 % — LOW (ref 34.5–45)
HGB BLD-MCNC: 8.2 G/DL — LOW (ref 11.5–15.5)
HGB BLD-MCNC: 9.8 G/DL — LOW (ref 11.5–15.5)
INR BLD: 1.47 RATIO — HIGH (ref 0.88–1.16)
KETONES UR-MCNC: NEGATIVE — SIGNIFICANT CHANGE UP
LEUKOCYTE ESTERASE UR-ACNC: NEGATIVE — SIGNIFICANT CHANGE UP
LYMPHOCYTES # BLD AUTO: 0.6 K/UL — LOW (ref 1–3.3)
LYMPHOCYTES # BLD AUTO: 1 K/UL — SIGNIFICANT CHANGE UP (ref 1–3.3)
LYMPHOCYTES # BLD AUTO: 2 % — LOW (ref 13–44)
LYMPHOCYTES # BLD AUTO: 8.6 % — LOW (ref 13–44)
MCHC RBC-ENTMCNC: 31.6 PG — SIGNIFICANT CHANGE UP (ref 27–34)
MCHC RBC-ENTMCNC: 31.9 PG — SIGNIFICANT CHANGE UP (ref 27–34)
MCHC RBC-ENTMCNC: 33.1 GM/DL — SIGNIFICANT CHANGE UP (ref 32–36)
MCHC RBC-ENTMCNC: 33.9 GM/DL — SIGNIFICANT CHANGE UP (ref 32–36)
MCV RBC AUTO: 94.1 FL — SIGNIFICANT CHANGE UP (ref 80–100)
MCV RBC AUTO: 95.7 FL — SIGNIFICANT CHANGE UP (ref 80–100)
MONOCYTES # BLD AUTO: 0.7 K/UL — SIGNIFICANT CHANGE UP (ref 0–0.9)
MONOCYTES # BLD AUTO: 0.9 K/UL — SIGNIFICANT CHANGE UP (ref 0–0.9)
MONOCYTES NFR BLD AUTO: 7.3 % — SIGNIFICANT CHANGE UP (ref 2–14)
MONOCYTES NFR BLD AUTO: 8 % — SIGNIFICANT CHANGE UP (ref 2–14)
NEUTROPHILS # BLD AUTO: 9.2 K/UL — HIGH (ref 1.8–7.4)
NEUTROPHILS # BLD AUTO: 9.9 K/UL — HIGH (ref 1.8–7.4)
NEUTROPHILS NFR BLD AUTO: 75 % — SIGNIFICANT CHANGE UP (ref 43–77)
NEUTROPHILS NFR BLD AUTO: 83.7 % — HIGH (ref 43–77)
NITRITE UR-MCNC: NEGATIVE — SIGNIFICANT CHANGE UP
PH UR: 7 — SIGNIFICANT CHANGE UP (ref 5–8)
PLATELET # BLD AUTO: 691 K/UL — HIGH (ref 150–400)
PLATELET # BLD AUTO: 876 K/UL — HIGH (ref 150–400)
POTASSIUM SERPL-MCNC: 3.7 MMOL/L — SIGNIFICANT CHANGE UP (ref 3.5–5.3)
POTASSIUM SERPL-MCNC: 3.9 MMOL/L — SIGNIFICANT CHANGE UP (ref 3.5–5.3)
POTASSIUM SERPL-SCNC: 3.7 MMOL/L — SIGNIFICANT CHANGE UP (ref 3.5–5.3)
POTASSIUM SERPL-SCNC: 3.9 MMOL/L — SIGNIFICANT CHANGE UP (ref 3.5–5.3)
PROCALCITONIN SERPL-MCNC: 0.34 NG/ML — HIGH (ref 0–0.04)
PROT SERPL-MCNC: 6.3 G/DL — SIGNIFICANT CHANGE UP (ref 6–8.3)
PROT SERPL-MCNC: 7.4 G/DL — SIGNIFICANT CHANGE UP (ref 6–8.3)
PROT UR-MCNC: SIGNIFICANT CHANGE UP
PROTHROM AB SERPL-ACNC: 16 SEC — HIGH (ref 9.8–12.7)
RBC # BLD: 2.57 M/UL — LOW (ref 3.8–5.2)
RBC # BLD: 3.09 M/UL — LOW (ref 3.8–5.2)
RBC # FLD: 14.3 % — SIGNIFICANT CHANGE UP (ref 10.3–14.5)
RBC # FLD: 14.5 % — SIGNIFICANT CHANGE UP (ref 10.3–14.5)
SODIUM SERPL-SCNC: 130 MMOL/L — LOW (ref 135–145)
SODIUM SERPL-SCNC: 131 MMOL/L — LOW (ref 135–145)
SP GR SPEC: 1.01 — SIGNIFICANT CHANGE UP (ref 1.01–1.02)
UROBILINOGEN FLD QL: 1
WBC # BLD: 10.7 K/UL — HIGH (ref 3.8–10.5)
WBC # BLD: 11.8 K/UL — HIGH (ref 3.8–10.5)
WBC # FLD AUTO: 10.7 K/UL — HIGH (ref 3.8–10.5)
WBC # FLD AUTO: 11.8 K/UL — HIGH (ref 3.8–10.5)

## 2017-07-09 PROCEDURE — 99292 CRITICAL CARE ADDL 30 MIN: CPT | Mod: 25

## 2017-07-09 PROCEDURE — 74177 CT ABD & PELVIS W/CONTRAST: CPT | Mod: 26

## 2017-07-09 PROCEDURE — 99233 SBSQ HOSP IP/OBS HIGH 50: CPT | Mod: 24

## 2017-07-09 PROCEDURE — 99291 CRITICAL CARE FIRST HOUR: CPT | Mod: 25

## 2017-07-09 PROCEDURE — 93010 ELECTROCARDIOGRAM REPORT: CPT

## 2017-07-09 PROCEDURE — 71010: CPT | Mod: 26

## 2017-07-09 RX ORDER — SODIUM CHLORIDE 9 MG/ML
1000 INJECTION, SOLUTION INTRAVENOUS
Qty: 0 | Refills: 0 | Status: DISCONTINUED | OUTPATIENT
Start: 2017-07-09 | End: 2017-07-09

## 2017-07-09 RX ORDER — MORPHINE SULFATE 50 MG/1
1 CAPSULE, EXTENDED RELEASE ORAL
Qty: 0 | Refills: 0 | Status: DISCONTINUED | OUTPATIENT
Start: 2017-07-09 | End: 2017-07-13

## 2017-07-09 RX ORDER — PIPERACILLIN AND TAZOBACTAM 4; .5 G/20ML; G/20ML
3.38 INJECTION, POWDER, LYOPHILIZED, FOR SOLUTION INTRAVENOUS EVERY 8 HOURS
Qty: 0 | Refills: 0 | Status: DISCONTINUED | OUTPATIENT
Start: 2017-07-09 | End: 2017-07-11

## 2017-07-09 RX ORDER — PHYTONADIONE (VIT K1) 5 MG
5 TABLET ORAL ONCE
Qty: 0 | Refills: 0 | Status: COMPLETED | OUTPATIENT
Start: 2017-07-09 | End: 2017-07-09

## 2017-07-09 RX ORDER — SODIUM CHLORIDE 9 MG/ML
1000 INJECTION INTRAMUSCULAR; INTRAVENOUS; SUBCUTANEOUS ONCE
Qty: 0 | Refills: 0 | Status: COMPLETED | OUTPATIENT
Start: 2017-07-09 | End: 2017-07-09

## 2017-07-09 RX ORDER — SODIUM CHLORIDE 9 MG/ML
1000 INJECTION, SOLUTION INTRAVENOUS ONCE
Qty: 0 | Refills: 0 | Status: COMPLETED | OUTPATIENT
Start: 2017-07-09 | End: 2017-07-09

## 2017-07-09 RX ORDER — FLUCONAZOLE 150 MG/1
TABLET ORAL
Qty: 0 | Refills: 0 | Status: DISCONTINUED | OUTPATIENT
Start: 2017-07-09 | End: 2017-07-13

## 2017-07-09 RX ORDER — HEPARIN SODIUM 5000 [USP'U]/ML
5000 INJECTION INTRAVENOUS; SUBCUTANEOUS EVERY 8 HOURS
Qty: 0 | Refills: 0 | Status: DISCONTINUED | OUTPATIENT
Start: 2017-07-09 | End: 2017-07-11

## 2017-07-09 RX ORDER — FAMOTIDINE 10 MG/ML
20 INJECTION INTRAVENOUS DAILY
Qty: 0 | Refills: 0 | Status: COMPLETED | OUTPATIENT
Start: 2017-07-09 | End: 2017-07-09

## 2017-07-09 RX ORDER — MORPHINE SULFATE 50 MG/1
2 CAPSULE, EXTENDED RELEASE ORAL EVERY 4 HOURS
Qty: 0 | Refills: 0 | Status: DISCONTINUED | OUTPATIENT
Start: 2017-07-09 | End: 2017-07-13

## 2017-07-09 RX ORDER — FLUCONAZOLE 150 MG/1
400 TABLET ORAL EVERY 24 HOURS
Qty: 0 | Refills: 0 | Status: DISCONTINUED | OUTPATIENT
Start: 2017-07-10 | End: 2017-07-13

## 2017-07-09 RX ORDER — SODIUM CHLORIDE 9 MG/ML
1000 INJECTION INTRAMUSCULAR; INTRAVENOUS; SUBCUTANEOUS
Qty: 0 | Refills: 0 | Status: DISCONTINUED | OUTPATIENT
Start: 2017-07-09 | End: 2017-07-10

## 2017-07-09 RX ORDER — FLUCONAZOLE 150 MG/1
400 TABLET ORAL ONCE
Qty: 0 | Refills: 0 | Status: COMPLETED | OUTPATIENT
Start: 2017-07-09 | End: 2017-07-09

## 2017-07-09 RX ORDER — PIPERACILLIN AND TAZOBACTAM 4; .5 G/20ML; G/20ML
3.38 INJECTION, POWDER, LYOPHILIZED, FOR SOLUTION INTRAVENOUS ONCE
Qty: 0 | Refills: 0 | Status: COMPLETED | OUTPATIENT
Start: 2017-07-09 | End: 2017-07-09

## 2017-07-09 RX ADMIN — SODIUM CHLORIDE 100 MILLILITER(S): 9 INJECTION, SOLUTION INTRAVENOUS at 15:32

## 2017-07-09 RX ADMIN — SODIUM CHLORIDE 4000 MILLILITER(S): 9 INJECTION, SOLUTION INTRAVENOUS at 13:20

## 2017-07-09 RX ADMIN — SODIUM CHLORIDE 4000 MILLILITER(S): 9 INJECTION INTRAMUSCULAR; INTRAVENOUS; SUBCUTANEOUS at 12:13

## 2017-07-09 RX ADMIN — PIPERACILLIN AND TAZOBACTAM 200 GRAM(S): 4; .5 INJECTION, POWDER, LYOPHILIZED, FOR SOLUTION INTRAVENOUS at 12:13

## 2017-07-09 RX ADMIN — Medication 5 MILLIGRAM(S): at 19:32

## 2017-07-09 RX ADMIN — PIPERACILLIN AND TAZOBACTAM 25 GRAM(S): 4; .5 INJECTION, POWDER, LYOPHILIZED, FOR SOLUTION INTRAVENOUS at 22:07

## 2017-07-09 RX ADMIN — FLUCONAZOLE 100 MILLIGRAM(S): 150 TABLET ORAL at 19:32

## 2017-07-09 RX ADMIN — HEPARIN SODIUM 5000 UNIT(S): 5000 INJECTION INTRAVENOUS; SUBCUTANEOUS at 22:07

## 2017-07-09 RX ADMIN — FAMOTIDINE 20 MILLIGRAM(S): 10 INJECTION INTRAVENOUS at 19:59

## 2017-07-09 NOTE — ED PROVIDER NOTE - CONSTITUTIONAL, MLM
normal... nontoxic appearing, but chronically ill appearing awake, alert, oriented to person, place, time/situation and in no apparent distress.

## 2017-07-09 NOTE — H&P ADULT - HISTORY OF PRESENT ILLNESS
57 year old woman s/p distal gastrectomy and Bilroth II reconstruction on 6/23 presents with 2 days of fevers abdominal pain, headaches, and urinary frequency. She states that after discharge she was eating a lot of soup, son endorses that she has been eating a regular diet post-operatively and has not used the feeding jejunostomy. However, she has had decreased PO intake over the last few days. She states that the abdominal pain is all over and gestures throughout her entire abdomen and says it is most intense in the superior aspect of her incision/staple line. Her  took her temperature at home and had recorded temperatures to 39.1. She has not had any nausea or vomiting. No diarrhea or constipation.     PMH: Gastric cancer  PSHx: s/p distal gastrectomy, Bilroth II reconstruction, D2 lymphadenectomy and placement of feeding jejunostomy    Allergies: NKDA      MEDICATIONS  (STANDING):  lactated ringers. 1000 milliLiter(s) (100 mL/Hr) IV Continuous <Continuous>  piperacillin/tazobactam IVPB. 3.375 Gram(s) IV Intermittent every 8 hours  sodium chloride 0.9%. 1000 milliLiter(s) (100 mL/Hr) IV Continuous <Continuous>  heparin  Injectable 5000 Unit(s) SubCutaneous every 8 hours  phytonadione   Solution 5 milliGRAM(s) Oral once  famotidine Injectable 20 milliGRAM(s) IV Push daily  fluconAZOLE IVPB   IV Intermittent   fluconAZOLE IVPB 400 milliGRAM(s) IV Intermittent once    MEDICATIONS  (PRN):  morphine  - Injectable 2 milliGRAM(s) IV Push every 4 hours PRN Severe Pain (7 - 10)  morphine  - Injectable 1 milliGRAM(s) IV Push every 3 hours PRN Moderate Pain (4 - 6)

## 2017-07-09 NOTE — ED PROVIDER NOTE - GASTROINTESTINAL, MLM
abdomen soft, moderately distended, diffuse mild TTP, midline vertical incision with staples in place. clean, dry, and intact. J tube in place

## 2017-07-09 NOTE — H&P ADULT - ATTENDING COMMENTS
I have seen and examined the patient, reviewed the laboratory and radiologic data and agree with practitioner's history, physical assessment, plan and reviewed and edited where appropriate.  Drainage from midline purulent with some debris - not bilious - duodenal stump leak unlikely.  Dissection close to pancreas due to locally advanced mass ? pancreatic leak vs. ? contained GJ leak that subsequently sealed    Plan:  1) IR drainage - send for amylase, lipase  2) Pain control  3) Abx - Zosyn  4) NPO for now  5) Wound care

## 2017-07-09 NOTE — H&P ADULT - NSHPPHYSICALEXAM_GEN_ALL_CORE
T(C): 37.4 (07-09-17 @ 18:10), Max: 37.4 (07-09-17 @ 18:10)  HR: 102 (07-09-17 @ 18:10) (91 - 104)  BP: 98/66 (07-09-17 @ 18:10) (96/63 - 107/66)  RR: 18 (07-09-17 @ 18:10) (18 - 21)  SpO2: 97% (07-09-17 @ 18:10) (97% - 99%)    General: Patient appears uncomfortable, lying in bed, Alert and orientedx3  HEENT: normocephalic, atraumatic, sclerae do not appear icteric  CV: RRR  Chest: Clear to auscultation bilaterally  Abd: soft, diffusely mildly tender, indurated in the superior aspect of incision with very mild blanching erythema, jejunostomy site clean, dry and intact  Extremities: warm and well perfused

## 2017-07-09 NOTE — ED PROVIDER NOTE - OBJECTIVE STATEMENT
56 y/o female s/p Gastrectomy and recent diagnosis of Stomach CA presents to the ED c/o fever (TMAX 102.1F) hematuria, and nausea x2 days.  Also endorses HA today. Reports last Tylenol and Oxycodone dose was prior to arrival. Reports she has abd and back pain since surgery which has not worsened. Reports J tube placement, however has not been used for feeding. Reports normal PO intake. Reports harder stools. Denies chills and diarrhea. PMD: Dr. Luciano Strong ([phone]). Surgical Oncologist: Dr. Koffi Trent ([phone]).

## 2017-07-09 NOTE — H&P ADULT - ASSESSMENT
57 year old woman with invasive gastric adenocarcinoma s/p distal gastrectomy, Billroth II reconstruction and feeding jejunostomy presents on POD 16 with abdominal pain and fevers. CT scan shows small amount of fluid on ventral incision measuring 2.4x2.4 cm and anterior abdominal fluid collection measuring 10x7.7 cm.   Plan:   -Staples and incision opened at bedside with drainage of thin purulent material  -f/u cultures from wound  -regular diet, NPOpMN for possible IR intervention 57 year old woman with invasive gastric adenocarcinoma s/p distal gastrectomy, Billroth II reconstruction and feeding jejunostomy presents on POD 16 with abdominal pain and fevers. CT scan shows small amount of fluid on ventral incision measuring 2.4x2.4 cm and anterior abdominal fluid collection measuring 10x7.7 cm.   Plan:   -Staples and incision opened at bedside with drainage of thin purulent material  -f/u cultures from wound  -NPO  -possible role for IR drainage  -Zosyn  -Diflucan  -AM labs, f/u T. bili, if remains elevated, will consider RUQ sono  -Mathis catheter

## 2017-07-09 NOTE — H&P ADULT - NSHPLABSRESULTS_GEN_ALL_CORE
9.8    10.7  )-----------( 876      ( 2017 12:20 )             29.6     2017 12:20    130    |  94     |  9      ----------------------------<  115    3.9     |  24     |  0.47     Ca    9.3        2017 12:20    TPro  7.4    /  Alb  2.8    /  TBili  1.7    /  DBili  x      /  AST  52     /  ALT  100    /  AlkPhos  539    2017 12:20    LIVER FUNCTIONS - ( 2017 12:20 )  Alb: 2.8 g/dL / Pro: 7.4 g/dL / ALK PHOS: 539 U/L / ALT: 100 U/L RC / AST: 52 U/L / GGT: x           PT/INR - ( 2017 12:20 )   PT: 16.0 sec;   INR: 1.47 ratio         PTT - ( 2017 12:20 )  PTT:29.4 sec  CAPILLARY BLOOD GLUCOSE            Urinalysis Basic - ( 2017 14:01 )    Color: Yellow / Appearance: x / S.012 / pH: x  Gluc: x / Ketone: Negative  / Bili: Negative / Urobili: 1   Blood: x / Protein: Trace / Nitrite: Negative   Leuk Esterase: Negative / RBC: 5-10 /HPF / WBC 2-5 /HPF   Sq Epi: x / Non Sq Epi: x / Bacteria: Few /HPF    Imaging:  CT Abdomen/ Pelvis:  Anterior  abdominal  fluid  collection  measures  10  x  7.7  cm  in  total  on  series  2  image  40  with  multiple  communicating  locules.  On  the  left,  this  fluid  collection  abuts  the  gastric  resection  margin.  Superiorly,  the  collection  is  bordered  by  the  left  hepatic  lobe.  Posteriorly,  there  is  abutment  of  the  pancreatic  head  and  neck.  The  collection  is  bordered  by  the  gallbladder  on  the  right.  Small  amount  of  fluid  in  the  ventral  incision  measures  2.4  x  2.4  cm.    Status  post  distal  gastrectomy  with  an  anterior  abdominal  fluid  collection.  Small  amount  of  fluid  in  the  ventral  incision.

## 2017-07-09 NOTE — ED PROVIDER NOTE - MUSCULOSKELETAL, MLM
No calf tenderness or swelling bilaterally. Spine appears normal, range of motion is not limited, no muscle or joint tenderness

## 2017-07-09 NOTE — ED ADULT NURSE NOTE - OBJECTIVE STATEMENT
56 y/o female BIB family s/p Gastrectomy and recently diagnosed with stomach cancer,   c/o fever (102.1F), hematuria, nausea x2 days and headache today. Pt states she has abd pain and back pain since surgery which has not worsened.   Reports normal PO intake.  Denies chills, vomiting, diarrhea.   Abd soft, midline abd incision with staples in place, clean, dry and intact.  J tube with dressing intact.  Respiration easy and non labored.

## 2017-07-09 NOTE — ED PROVIDER NOTE - PROGRESS NOTE DETAILS
Surgery resident updated on CT scan performed and concerned for intraabdominal collection. She will discuss case with radiology team and get back to me.

## 2017-07-09 NOTE — ED PROVIDER NOTE - MEDICAL DECISION MAKING DETAILS
Attending MD Gordillo: 57F recent diagnosis gastric adenoCA s/p gastrectomy billroth reconstruction J tube placement presenting with fever x 2 days with difficulty urinating and hematuria, pt with acute on chronic diffuse abd pain and right back pain, ddx includes abdominal sepsis, sbo, UTI/pyelo. Will treat empirically with IV zosyn for possible abd sepsis, CT a/p to eval for surgical complication/abscess, surgical consult, IV crystalloid, pan-culture and reassess

## 2017-07-09 NOTE — H&P ADULT - PSH
S/P partial gastrectomy  distal gastrectomy with Billroth II reconstruction, D2 lymphadenectomy, feeding jejunostomy tube placement 6/23/17

## 2017-07-10 LAB
ALBUMIN SERPL ELPH-MCNC: 2.4 G/DL — LOW (ref 3.3–5)
ALP SERPL-CCNC: 411 U/L — HIGH (ref 40–120)
ALT FLD-CCNC: 66 U/L — HIGH (ref 10–45)
ANION GAP SERPL CALC-SCNC: 16 MMOL/L — SIGNIFICANT CHANGE UP (ref 5–17)
APTT BLD: 27.6 SEC — SIGNIFICANT CHANGE UP (ref 27.5–37.4)
AST SERPL-CCNC: 27 U/L — SIGNIFICANT CHANGE UP (ref 10–40)
BILIRUB SERPL-MCNC: 1.2 MG/DL — SIGNIFICANT CHANGE UP (ref 0.2–1.2)
BUN SERPL-MCNC: 8 MG/DL — SIGNIFICANT CHANGE UP (ref 7–23)
CALCIUM SERPL-MCNC: 8.9 MG/DL — SIGNIFICANT CHANGE UP (ref 8.4–10.5)
CHLORIDE SERPL-SCNC: 99 MMOL/L — SIGNIFICANT CHANGE UP (ref 96–108)
CO2 SERPL-SCNC: 21 MMOL/L — LOW (ref 22–31)
CREAT SERPL-MCNC: 0.52 MG/DL — SIGNIFICANT CHANGE UP (ref 0.5–1.3)
CULTURE RESULTS: NO GROWTH — SIGNIFICANT CHANGE UP
GLUCOSE SERPL-MCNC: 94 MG/DL — SIGNIFICANT CHANGE UP (ref 70–99)
HCT VFR BLD CALC: 24 % — LOW (ref 34.5–45)
HGB BLD-MCNC: 7.9 G/DL — LOW (ref 11.5–15.5)
INR BLD: 1.27 RATIO — HIGH (ref 0.88–1.16)
MCHC RBC-ENTMCNC: 29.5 PG — SIGNIFICANT CHANGE UP (ref 27–34)
MCHC RBC-ENTMCNC: 32.9 GM/DL — SIGNIFICANT CHANGE UP (ref 32–36)
MCV RBC AUTO: 89.6 FL — SIGNIFICANT CHANGE UP (ref 80–100)
PLATELET # BLD AUTO: 708 K/UL — HIGH (ref 150–400)
POTASSIUM SERPL-MCNC: 3.8 MMOL/L — SIGNIFICANT CHANGE UP (ref 3.5–5.3)
POTASSIUM SERPL-SCNC: 3.8 MMOL/L — SIGNIFICANT CHANGE UP (ref 3.5–5.3)
PROT SERPL-MCNC: 6.5 G/DL — SIGNIFICANT CHANGE UP (ref 6–8.3)
PROTHROM AB SERPL-ACNC: 13.8 SEC — HIGH (ref 9.8–12.7)
RBC # BLD: 2.68 M/UL — LOW (ref 3.8–5.2)
RBC # FLD: 15.9 % — HIGH (ref 10.3–14.5)
SODIUM SERPL-SCNC: 136 MMOL/L — SIGNIFICANT CHANGE UP (ref 135–145)
SPECIMEN SOURCE: SIGNIFICANT CHANGE UP
WBC # BLD: 7.45 K/UL — SIGNIFICANT CHANGE UP (ref 3.8–10.5)
WBC # FLD AUTO: 7.45 K/UL — SIGNIFICANT CHANGE UP (ref 3.8–10.5)

## 2017-07-10 PROCEDURE — 74177 CT ABD & PELVIS W/CONTRAST: CPT | Mod: 26

## 2017-07-10 PROCEDURE — 99232 SBSQ HOSP IP/OBS MODERATE 35: CPT | Mod: 24

## 2017-07-10 RX ORDER — PANTOPRAZOLE SODIUM 20 MG/1
40 TABLET, DELAYED RELEASE ORAL DAILY
Qty: 0 | Refills: 0 | Status: DISCONTINUED | OUTPATIENT
Start: 2017-07-10 | End: 2017-07-13

## 2017-07-10 RX ORDER — ACETAMINOPHEN 500 MG
1000 TABLET ORAL ONCE
Qty: 0 | Refills: 0 | Status: COMPLETED | OUTPATIENT
Start: 2017-07-10 | End: 2017-07-10

## 2017-07-10 RX ORDER — ACETAMINOPHEN 500 MG
1000 TABLET ORAL ONCE
Qty: 0 | Refills: 0 | Status: COMPLETED | OUTPATIENT
Start: 2017-07-10 | End: 2017-07-11

## 2017-07-10 RX ORDER — DEXTROSE MONOHYDRATE, SODIUM CHLORIDE, AND POTASSIUM CHLORIDE 50; .745; 4.5 G/1000ML; G/1000ML; G/1000ML
1000 INJECTION, SOLUTION INTRAVENOUS
Qty: 0 | Refills: 0 | Status: DISCONTINUED | OUTPATIENT
Start: 2017-07-10 | End: 2017-07-11

## 2017-07-10 RX ADMIN — HEPARIN SODIUM 5000 UNIT(S): 5000 INJECTION INTRAVENOUS; SUBCUTANEOUS at 05:04

## 2017-07-10 RX ADMIN — HEPARIN SODIUM 5000 UNIT(S): 5000 INJECTION INTRAVENOUS; SUBCUTANEOUS at 13:11

## 2017-07-10 RX ADMIN — Medication 400 MILLIGRAM(S): at 07:34

## 2017-07-10 RX ADMIN — PIPERACILLIN AND TAZOBACTAM 25 GRAM(S): 4; .5 INJECTION, POWDER, LYOPHILIZED, FOR SOLUTION INTRAVENOUS at 05:04

## 2017-07-10 RX ADMIN — PIPERACILLIN AND TAZOBACTAM 25 GRAM(S): 4; .5 INJECTION, POWDER, LYOPHILIZED, FOR SOLUTION INTRAVENOUS at 13:11

## 2017-07-10 RX ADMIN — DEXTROSE MONOHYDRATE, SODIUM CHLORIDE, AND POTASSIUM CHLORIDE 100 MILLILITER(S): 50; .745; 4.5 INJECTION, SOLUTION INTRAVENOUS at 22:18

## 2017-07-10 RX ADMIN — HEPARIN SODIUM 5000 UNIT(S): 5000 INJECTION INTRAVENOUS; SUBCUTANEOUS at 21:12

## 2017-07-10 RX ADMIN — SODIUM CHLORIDE 100 MILLILITER(S): 9 INJECTION INTRAMUSCULAR; INTRAVENOUS; SUBCUTANEOUS at 05:06

## 2017-07-10 RX ADMIN — SODIUM CHLORIDE 100 MILLILITER(S): 9 INJECTION INTRAMUSCULAR; INTRAVENOUS; SUBCUTANEOUS at 07:39

## 2017-07-10 RX ADMIN — PANTOPRAZOLE SODIUM 40 MILLIGRAM(S): 20 TABLET, DELAYED RELEASE ORAL at 18:08

## 2017-07-10 RX ADMIN — PIPERACILLIN AND TAZOBACTAM 25 GRAM(S): 4; .5 INJECTION, POWDER, LYOPHILIZED, FOR SOLUTION INTRAVENOUS at 21:13

## 2017-07-10 RX ADMIN — FLUCONAZOLE 100 MILLIGRAM(S): 150 TABLET ORAL at 18:08

## 2017-07-10 RX ADMIN — Medication 1000 MILLIGRAM(S): at 08:04

## 2017-07-11 LAB
-  AMIKACIN: SIGNIFICANT CHANGE UP
-  AMPICILLIN/SULBACTAM: SIGNIFICANT CHANGE UP
-  AMPICILLIN: SIGNIFICANT CHANGE UP
-  AZTREONAM: SIGNIFICANT CHANGE UP
-  CEFAZOLIN: SIGNIFICANT CHANGE UP
-  CEFEPIME: SIGNIFICANT CHANGE UP
-  CEFOXITIN: SIGNIFICANT CHANGE UP
-  CEFTAZIDIME: SIGNIFICANT CHANGE UP
-  CEFTRIAXONE: SIGNIFICANT CHANGE UP
-  CIPROFLOXACIN: SIGNIFICANT CHANGE UP
-  ERTAPENEM: SIGNIFICANT CHANGE UP
-  GENTAMICIN: SIGNIFICANT CHANGE UP
-  IMIPENEM: SIGNIFICANT CHANGE UP
-  LEVOFLOXACIN: SIGNIFICANT CHANGE UP
-  MEROPENEM: SIGNIFICANT CHANGE UP
-  PIPERACILLIN/TAZOBACTAM: SIGNIFICANT CHANGE UP
-  TOBRAMYCIN: SIGNIFICANT CHANGE UP
-  TRIMETHOPRIM/SULFAMETHOXAZOLE: SIGNIFICANT CHANGE UP
ANION GAP SERPL CALC-SCNC: 16 MMOL/L — SIGNIFICANT CHANGE UP (ref 5–17)
APTT BLD: 26.4 SEC — LOW (ref 27.5–37.4)
BUN SERPL-MCNC: 8 MG/DL — SIGNIFICANT CHANGE UP (ref 7–23)
CALCIUM SERPL-MCNC: 8.5 MG/DL — SIGNIFICANT CHANGE UP (ref 8.4–10.5)
CHLORIDE SERPL-SCNC: 103 MMOL/L — SIGNIFICANT CHANGE UP (ref 96–108)
CO2 SERPL-SCNC: 18 MMOL/L — LOW (ref 22–31)
CREAT SERPL-MCNC: 0.48 MG/DL — LOW (ref 0.5–1.3)
GLUCOSE SERPL-MCNC: 120 MG/DL — HIGH (ref 70–99)
GRAM STN FLD: SIGNIFICANT CHANGE UP
HCT VFR BLD CALC: 22.7 % — LOW (ref 34.5–45)
HGB BLD-MCNC: 7.5 G/DL — LOW (ref 11.5–15.5)
INR BLD: 1.13 RATIO — SIGNIFICANT CHANGE UP (ref 0.88–1.16)
MCHC RBC-ENTMCNC: 29.6 PG — SIGNIFICANT CHANGE UP (ref 27–34)
MCHC RBC-ENTMCNC: 33 GM/DL — SIGNIFICANT CHANGE UP (ref 32–36)
MCV RBC AUTO: 89.7 FL — SIGNIFICANT CHANGE UP (ref 80–100)
METHOD TYPE: SIGNIFICANT CHANGE UP
PLATELET # BLD AUTO: 684 K/UL — HIGH (ref 150–400)
POTASSIUM SERPL-MCNC: 3.2 MMOL/L — LOW (ref 3.5–5.3)
POTASSIUM SERPL-SCNC: 3.2 MMOL/L — LOW (ref 3.5–5.3)
PROTHROM AB SERPL-ACNC: 12.8 SEC — SIGNIFICANT CHANGE UP (ref 10–13.1)
RBC # BLD: 2.53 M/UL — LOW (ref 3.8–5.2)
RBC # FLD: 15.9 % — HIGH (ref 10.3–14.5)
SODIUM SERPL-SCNC: 137 MMOL/L — SIGNIFICANT CHANGE UP (ref 135–145)
SPECIMEN SOURCE: SIGNIFICANT CHANGE UP
WBC # BLD: 6.08 K/UL — SIGNIFICANT CHANGE UP (ref 3.8–10.5)
WBC # FLD AUTO: 6.08 K/UL — SIGNIFICANT CHANGE UP (ref 3.8–10.5)

## 2017-07-11 PROCEDURE — 99232 SBSQ HOSP IP/OBS MODERATE 35: CPT | Mod: 24

## 2017-07-11 PROCEDURE — 49406 IMAGE CATH FLUID PERI/RETRO: CPT

## 2017-07-11 RX ORDER — POTASSIUM CHLORIDE 20 MEQ
10 PACKET (EA) ORAL
Qty: 0 | Refills: 0 | Status: COMPLETED | OUTPATIENT
Start: 2017-07-11 | End: 2017-07-11

## 2017-07-11 RX ORDER — ENOXAPARIN SODIUM 100 MG/ML
40 INJECTION SUBCUTANEOUS DAILY
Qty: 0 | Refills: 0 | Status: DISCONTINUED | OUTPATIENT
Start: 2017-07-11 | End: 2017-07-15

## 2017-07-11 RX ORDER — HEPARIN SODIUM 5000 [USP'U]/ML
5000 INJECTION INTRAVENOUS; SUBCUTANEOUS EVERY 8 HOURS
Qty: 0 | Refills: 0 | Status: DISCONTINUED | OUTPATIENT
Start: 2017-07-11 | End: 2017-07-11

## 2017-07-11 RX ORDER — CIPROFLOXACIN LACTATE 400MG/40ML
400 VIAL (ML) INTRAVENOUS EVERY 12 HOURS
Qty: 0 | Refills: 0 | Status: DISCONTINUED | OUTPATIENT
Start: 2017-07-12 | End: 2017-07-15

## 2017-07-11 RX ORDER — CIPROFLOXACIN LACTATE 400MG/40ML
VIAL (ML) INTRAVENOUS
Qty: 0 | Refills: 0 | Status: DISCONTINUED | OUTPATIENT
Start: 2017-07-11 | End: 2017-07-15

## 2017-07-11 RX ORDER — CIPROFLOXACIN LACTATE 400MG/40ML
400 VIAL (ML) INTRAVENOUS ONCE
Qty: 0 | Refills: 0 | Status: COMPLETED | OUTPATIENT
Start: 2017-07-11 | End: 2017-07-11

## 2017-07-11 RX ADMIN — MORPHINE SULFATE 1 MILLIGRAM(S): 50 CAPSULE, EXTENDED RELEASE ORAL at 23:23

## 2017-07-11 RX ADMIN — DEXTROSE MONOHYDRATE, SODIUM CHLORIDE, AND POTASSIUM CHLORIDE 100 MILLILITER(S): 50; .745; 4.5 INJECTION, SOLUTION INTRAVENOUS at 06:12

## 2017-07-11 RX ADMIN — MORPHINE SULFATE 1 MILLIGRAM(S): 50 CAPSULE, EXTENDED RELEASE ORAL at 23:55

## 2017-07-11 RX ADMIN — HEPARIN SODIUM 5000 UNIT(S): 5000 INJECTION INTRAVENOUS; SUBCUTANEOUS at 06:11

## 2017-07-11 RX ADMIN — MORPHINE SULFATE 2 MILLIGRAM(S): 50 CAPSULE, EXTENDED RELEASE ORAL at 17:36

## 2017-07-11 RX ADMIN — Medication 100 MILLIEQUIVALENT(S): at 20:11

## 2017-07-11 RX ADMIN — HEPARIN SODIUM 5000 UNIT(S): 5000 INJECTION INTRAVENOUS; SUBCUTANEOUS at 17:36

## 2017-07-11 RX ADMIN — PANTOPRAZOLE SODIUM 40 MILLIGRAM(S): 20 TABLET, DELAYED RELEASE ORAL at 12:03

## 2017-07-11 RX ADMIN — MORPHINE SULFATE 1 MILLIGRAM(S): 50 CAPSULE, EXTENDED RELEASE ORAL at 12:03

## 2017-07-11 RX ADMIN — Medication 100 MILLIEQUIVALENT(S): at 09:58

## 2017-07-11 RX ADMIN — PIPERACILLIN AND TAZOBACTAM 25 GRAM(S): 4; .5 INJECTION, POWDER, LYOPHILIZED, FOR SOLUTION INTRAVENOUS at 16:42

## 2017-07-11 RX ADMIN — Medication 100 MILLIEQUIVALENT(S): at 12:52

## 2017-07-11 RX ADMIN — Medication 1000 MILLIGRAM(S): at 01:53

## 2017-07-11 RX ADMIN — MORPHINE SULFATE 1 MILLIGRAM(S): 50 CAPSULE, EXTENDED RELEASE ORAL at 12:33

## 2017-07-11 RX ADMIN — Medication 400 MILLIGRAM(S): at 01:23

## 2017-07-11 RX ADMIN — Medication 200 MILLIGRAM(S): at 20:11

## 2017-07-11 RX ADMIN — FLUCONAZOLE 100 MILLIGRAM(S): 150 TABLET ORAL at 23:24

## 2017-07-11 RX ADMIN — PIPERACILLIN AND TAZOBACTAM 25 GRAM(S): 4; .5 INJECTION, POWDER, LYOPHILIZED, FOR SOLUTION INTRAVENOUS at 05:01

## 2017-07-11 NOTE — PROGRESS NOTE ADULT - SUBJECTIVE AND OBJECTIVE BOX
GENERAL SURGERY DAILY PROGRESS NOTE:       Subjective:  Pt stable overnight. Pt had a repeat ct scan abd/ pelvis which revealed a decrease in size of her abdominal collection. Pt has been npo. Passing flatus.  No chest pain, no sob      Objective:    Gen- nad  abd- nt, nd, incision- min purulent drainage from midline wound, j tube in place  MEDICATIONS  (STANDING):  piperacillin/tazobactam IVPB. 3.375 Gram(s) IV Intermittent every 8 hours  fluconAZOLE IVPB   IV Intermittent   fluconAZOLE IVPB 400 milliGRAM(s) IV Intermittent every 24 hours  pantoprazole  Injectable 40 milliGRAM(s) IV Push daily  dextrose 5% + sodium chloride 0.45% with potassium chloride 20 mEq/L 1000 milliLiter(s) (100 mL/Hr) IV Continuous <Continuous>  heparin  Injectable 5000 Unit(s) SubCutaneous every 8 hours    MEDICATIONS  (PRN):  morphine  - Injectable 2 milliGRAM(s) IV Push every 4 hours PRN Severe Pain (7 - 10)  morphine  - Injectable 1 milliGRAM(s) IV Push every 3 hours PRN Moderate Pain (4 - 6)      Vital Signs Last 24 Hrs  T(C): 36.8 (2017 06:30), Max: 37.1 (10 Jul 2017 14:35)  T(F): 98.3 (2017 06:30), Max: 98.7 (10 Jul 2017 14:35)  HR: 69 (2017 06:30) (69 - 82)  BP: 100/66 (2017 06:30) (90/60 - 109/73)  BP(mean): --  RR: 17 (2017 06:30) (17 - 18)  SpO2: 98% (2017 06:30) (96% - 98%)    I&O's Detail    10 Jul 2017 07:01  -  2017 07:00  --------------------------------------------------------  IN:    dextrose 5% + sodium chloride 0.45% with potassium chloride 20 mEq/L: 900 mL    IV PiggyBack: 300 mL    sodium chloride 0.9%: 1500 mL  Total IN: 2700 mL    OUT:    Voided: 500 mL  Total OUT: 500 mL    Total NET: 2200 mL          Daily Height in cm: 160.02 (10 Jul 2017 07:14)    Daily     LABS:                        7.9    7.45  )-----------( 708      ( 10 Jul 2017 08:24 )             24.0     07-10    136  |  99  |  8   ----------------------------<  94  3.8   |  21<L>  |  0.52    Ca    8.9      10 Jul 2017 08:35    TPro  6.5  /  Alb  2.4<L>  /  TBili  1.2  /  DBili  x   /  AST  27  /  ALT  66<H>  /  AlkPhos  411<H>  07-10    PT/INR - ( 10 Jul 2017 07:11 )   PT: 13.8 sec;   INR: 1.27 ratio         PTT - ( 10 Jul 2017 07:11 )  PTT:27.6 sec  Urinalysis Basic - ( 2017 14:01 )    Color: Yellow / Appearance: x / S.012 / pH: x  Gluc: x / Ketone: Negative  / Bili: Negative / Urobili: 1   Blood: x / Protein: Trace / Nitrite: Negative   Leuk Esterase: Negative / RBC: 5-10 /HPF / WBC 2-5 /HPF   Sq Epi: x / Non Sq Epi: x / Bacteria: Few /HPF

## 2017-07-11 NOTE — DIETITIAN INITIAL EVALUATION ADULT. - SOURCE
other (specify)/patient/family/significant other/chart/medical record, pt familiar to RD from previous admission; pt's daughter &  at bedside

## 2017-07-11 NOTE — DIETITIAN INITIAL EVALUATION ADULT. - OTHER INFO
Pt seen by RD, known from previous admission for malnutrition and increased nutrient needs. S/p distal gastrectomy and Bilroth II reconstruction on 6/23 presents with 2 days of fevers abdominal pain. Noted with abdominal collection, NPO pending IR drainage. NKFA. No difficulty chewing/swallowing. Denies Nausea + Emesis at present. +Flatus.

## 2017-07-11 NOTE — DIETITIAN INITIAL EVALUATION ADULT. - PT NOT SOURCE
pt speaks Tibetan, able to communicate in some english, daughter at bedside provided additional translations

## 2017-07-11 NOTE — DIETITIAN INITIAL EVALUATION ADULT. - ENERGY NEEDS
Height: 63 inches, Weight: 127.8 pounds, BMI: 22.7 kg/m2 IBW: 115 pounds (+/-10%), %IBW: 111%  No edema, no pressure ulcers.

## 2017-07-11 NOTE — PROGRESS NOTE ADULT - SUBJECTIVE AND OBJECTIVE BOX
Interventional Radiology  Pre-Procedure Note    This is a 57y  Female presenting for drainage of an upper abdominal collection.    HPI:  57 year old woman s/p distal gastrectomy and Bilroth II reconstruction on 6/23 presents with 2 days of fevers abdominal pain, headaches, and urinary frequency. She states that after discharge she was eating a lot of soup, son endorses that she has been eating a regular diet post-operatively and has not used the feeding jejunostomy. However, she has had decreased PO intake over the last few days. She states that the abdominal pain is all over and gestures throughout her entire abdomen and says it is most intense in the superior aspect of her incision/staple line. Her  took her temperature at home and had recorded temperatures to 39.1. She has not had any nausea or vomiting. No diarrhea or constipation.     PMH: Gastric cancer  PSHx: s/p distal gastrectomy, Bilroth II reconstruction, D2 lymphadenectomy and placement of feeding jejunostomy    Allergies: NKDA      MEDICATIONS  (STANDING):  lactated ringers. 1000 milliLiter(s) (100 mL/Hr) IV Continuous <Continuous>  piperacillin/tazobactam IVPB. 3.375 Gram(s) IV Intermittent every 8 hours  sodium chloride 0.9%. 1000 milliLiter(s) (100 mL/Hr) IV Continuous <Continuous>  heparin  Injectable 5000 Unit(s) SubCutaneous every 8 hours  phytonadione   Solution 5 milliGRAM(s) Oral once  famotidine Injectable 20 milliGRAM(s) IV Push daily  fluconAZOLE IVPB   IV Intermittent   fluconAZOLE IVPB 400 milliGRAM(s) IV Intermittent once    MEDICATIONS  (PRN):  morphine  - Injectable 2 milliGRAM(s) IV Push every 4 hours PRN Severe Pain (7 - 10)  morphine  - Injectable 1 milliGRAM(s) IV Push every 3 hours PRN Moderate Pain (4 - 6) (09 Jul 2017 18:55)      PAST MEDICAL & SURGICAL HISTORY:  Malignant neoplasm of stomach, unspecified location  S/P partial gastrectomy: distal gastrectomy with Billroth II reconstruction, D2 lymphadenectomy, feeding jejunostomy tube placement 6/23/17      Social History:     FAMILY HISTORY:  No pertinent family history in first degree relatives      Allergies: No Known Allergies      Current Medications: piperacillin/tazobactam IVPB. 3.375 Gram(s) IV Intermittent every 8 hours  morphine  - Injectable 2 milliGRAM(s) IV Push every 4 hours PRN  morphine  - Injectable 1 milliGRAM(s) IV Push every 3 hours PRN  fluconAZOLE IVPB   IV Intermittent   fluconAZOLE IVPB 400 milliGRAM(s) IV Intermittent every 24 hours  pantoprazole  Injectable 40 milliGRAM(s) IV Push daily  dextrose 5% + sodium chloride 0.45% with potassium chloride 20 mEq/L 1000 milliLiter(s) IV Continuous <Continuous>  heparin  Injectable 5000 Unit(s) SubCutaneous every 8 hours  potassium chloride  10 mEq/100 mL IVPB 10 milliEquivalent(s) IV Intermittent every 1 hour      Labs:                         7.5    6.08  )-----------( 684      ( 11 Jul 2017 08:17 )             22.7       07-11    137  |  103  |  8   ----------------------------<  120<H>  3.2<L>   |  18<L>  |  0.48<L>    Ca    8.5      11 Jul 2017 08:39    TPro  6.5  /  Alb  2.4<L>  /  TBili  1.2  /  DBili  x   /  AST  27  /  ALT  66<H>  /  AlkPhos  411<H>  07-10      HCG:       Assessment/Plan:   This is a 58yo Female  presents with an upper abdominal collection status post distal gastrectomy. Patient presents to IR for percutaneous drainage.  Procedure/ risks/ benefits/ goals/ alternatives were explained to the patient and her daughter. All questions answered. Informed content obtained from patient. Consent placed in chart.

## 2017-07-11 NOTE — CHART NOTE - NSCHARTNOTEFT_GEN_A_CORE
Upon Nutritional Assessment by the Registered Dietitian your patient was determined to meet criteria / has evidence of the following diagnosis/diagnoses:          [ ]  Mild Protein Calorie Malnutrition        [ ]  Moderate Protein Calorie Malnutrition        [X ] Severe Protein Calorie Malnutrition        [ ] Unspecified Protein Calorie Malnutrition        [ ] Underweight / BMI <19        [ ] Morbid Obesity / BMI > 40      Findings as based on:  [X ] Comprehensive nutrition assessment   [ ] Nutrition Focused Physical Exam  [X ] Other:  20lb (13.6%) wt loss x 2 months, hx of <75% po intake, hx of distal gastrectomy      Nutrition Plan/Recommendations:  Medical team to advance diet when medically feasible via tolerated route. Consider advancing to clear liquid, followed by low fiber diet as tolerated. If NPO exceeds 7 days, recommend team consider alternate means of nutrition.   Consider oral nutrition supplement with diet advancement as feasible ( Ensure Enlive- 2 per day (provides additional 700kcal, 40g protein).        PROVIDER Section:     By signing this assessment you are acknowledging and agree with the diagnosis/diagnoses assigned by the Registered Dietitian    Comments:

## 2017-07-11 NOTE — PROGRESS NOTE ADULT - SUBJECTIVE AND OBJECTIVE BOX
Interventional Radiology Brief- Operative Note    Procedure: CT guided drainage of abdominal fluid    Operators: [facility]    Anesthesia (type): Sedation administered by an Anesth Attg. 2% lidocaine local.    Contrast: none    EBL: none    Findings/Follow up Plan of Care: CT guided drainage of right upper abdominal collection performed. 10 Fr drain placed. Appx 105cc purulent appearing fluid aspirated. Sample collected for micro. Drain to DARRYL.    Specimens Removed: Sample collected for micro.     Implants: 10 Fr drain    Complications: none    Condition/Disposition: stable / PACU    Please call Interventional Radiology x [phone] with any questions, concerns, or issues.

## 2017-07-11 NOTE — DIETITIAN INITIAL EVALUATION ADULT. - NUTRITION INTERVENTION
Collaboration and Referral of Nutrition Care/Meals and Snack Medical Food Supplements/Collaboration and Referral of Nutrition Care/Meals and Snack

## 2017-07-11 NOTE — DIETITIAN INITIAL EVALUATION ADULT. - NS AS NUTRI INTERV MEDICAL AND FOOD SUPPLEMENTS
pending diet advancement, consider Ensure Enlive- 2 per day (provides additional 700kcal, 40g protein) when feasible./[employer]

## 2017-07-11 NOTE — DIETITIAN INITIAL EVALUATION ADULT. - ORAL INTAKE PTA
Poor po intake x 1-2 days PTA. Prior to that, family/pt state fair intake, soups, proteins, starches at meals; 1 Ensure/day. No additional vitamins/supplements.

## 2017-07-11 NOTE — PROGRESS NOTE ADULT - SUBJECTIVE AND OBJECTIVE BOX
[facility] GENERAL SURGERY POST-OP NOTE    SUBJECTIVE: Pt seen and evaluated at bedside. Resting comfortably in bed. Pain well controlled. Denies nausea/vomiting, CP, palpitations, SOB, lightheaded, dizziness. Pt is voiding.      Objective:  Gen: NAD, AAOx3  Pulm: b/l chest rise. No work on breathing  Card: RRR  Abd: soft, appropriately tender, ND. Dressings CDI, drains in place with minimal sanguinous drainage    Vital Signs Last 24 Hrs  T(C): 36.9 (11 Jul 2017 22:04), Max: 36.9 (11 Jul 2017 10:27)  T(F): 98.5 (11 Jul 2017 22:04), Max: 98.5 (11 Jul 2017 17:27)  HR: 80 (11 Jul 2017 22:04) (69 - 80)  BP: 96/62 (11 Jul 2017 22:04) (96/62 - 116/70)  BP(mean): --  RR: 17 (11 Jul 2017 22:04) (17 - 18)  SpO2: 95% (11 Jul 2017 22:04) (95% - 99%)  I&O's Summary    10 Jul 2017 07:01  -  11 Jul 2017 07:00  --------------------------------------------------------  IN: 2700 mL / OUT: 700 mL / NET: 2000 mL    11 Jul 2017 07:01  -  11 Jul 2017 23:31  --------------------------------------------------------  IN: 700 mL / OUT: 1365 mL / NET: -665 mL      I&O's Detail    10 Jul 2017 07:01  -  11 Jul 2017 07:00  --------------------------------------------------------  IN:    dextrose 5% + sodium chloride 0.45% with potassium chloride 20 mEq/L: 900 mL    IV PiggyBack: 300 mL    sodium chloride 0.9%: 1500 mL  Total IN: 2700 mL    OUT:    Voided: 700 mL  Total OUT: 700 mL    Total NET: 2000 mL      11 Jul 2017 07:01  -  11 Jul 2017 23:31  --------------------------------------------------------  IN:    IV PiggyBack: 700 mL  Total IN: 700 mL    OUT:    Bulb: 15 mL    Voided: 1350 mL  Total OUT: 1365 mL    Total NET: -665 mL          MEDICATIONS  (STANDING):  fluconAZOLE IVPB   IV Intermittent   fluconAZOLE IVPB 400 milliGRAM(s) IV Intermittent every 24 hours  pantoprazole  Injectable 40 milliGRAM(s) IV Push daily  enoxaparin Injectable 40 milliGRAM(s) SubCutaneous daily  ciprofloxacin   IVPB   IV Intermittent     MEDICATIONS  (PRN):  morphine  - Injectable 2 milliGRAM(s) IV Push every 4 hours PRN Severe Pain (7 - 10)  morphine  - Injectable 1 milliGRAM(s) IV Push every 3 hours PRN Moderate Pain (4 - 6)      LABS:                        7.5    6.08  )-----------( 684      ( 11 Jul 2017 08:17 )             22.7     07-11    137  |  103  |  8   ----------------------------<  120<H>  3.2<L>   |  18<L>  |  0.48<L>    Ca    8.5      11 Jul 2017 08:39    TPro  6.5  /  Alb  2.4<L>  /  TBili  1.2  /  DBili  x   /  AST  27  /  ALT  66<H>  /  AlkPhos  411<H>  07-10    PT/INR - ( 11 Jul 2017 08:42 )   PT: 12.8 sec;   INR: 1.13 ratio         PTT - ( 11 Jul 2017 08:42 )  PTT:26.4 sec      RADIOLOGY & ADDITIONAL STUDIES:      A/P: 57y Female s/p IR drainage of fluid collection. Pt is hemodynamically stable with adequately controlled pain, good UOP, and is asymptomatic. Dressings c/d/i and drains with minimal serous drainage.       - Pain control  - Strict I/O's  - F/U GI fxn  - OOB/DVT ppx  - AM labs  - F/u drain output

## 2017-07-12 LAB
ANION GAP SERPL CALC-SCNC: 14 MMOL/L — SIGNIFICANT CHANGE UP (ref 5–17)
APTT BLD: 24.6 SEC — LOW (ref 27.5–37.4)
BUN SERPL-MCNC: 4 MG/DL — LOW (ref 7–23)
CALCIUM SERPL-MCNC: 8.6 MG/DL — SIGNIFICANT CHANGE UP (ref 8.4–10.5)
CHLORIDE SERPL-SCNC: 100 MMOL/L — SIGNIFICANT CHANGE UP (ref 96–108)
CO2 SERPL-SCNC: 20 MMOL/L — LOW (ref 22–31)
CREAT SERPL-MCNC: 0.47 MG/DL — LOW (ref 0.5–1.3)
GLUCOSE SERPL-MCNC: 124 MG/DL — HIGH (ref 70–99)
HCT VFR BLD CALC: 26.7 % — LOW (ref 34.5–45)
HGB BLD-MCNC: 9 G/DL — LOW (ref 11.5–15.5)
INR BLD: 1.19 RATIO — HIGH (ref 0.88–1.16)
MCHC RBC-ENTMCNC: 31.9 PG — SIGNIFICANT CHANGE UP (ref 27–34)
MCHC RBC-ENTMCNC: 33.6 GM/DL — SIGNIFICANT CHANGE UP (ref 32–36)
MCV RBC AUTO: 94.9 FL — SIGNIFICANT CHANGE UP (ref 80–100)
PLATELET # BLD AUTO: 680 K/UL — HIGH (ref 150–400)
POTASSIUM SERPL-MCNC: 2.8 MMOL/L — CRITICAL LOW (ref 3.5–5.3)
POTASSIUM SERPL-SCNC: 2.8 MMOL/L — CRITICAL LOW (ref 3.5–5.3)
PROTHROM AB SERPL-ACNC: 13 SEC — HIGH (ref 9.8–12.7)
RBC # BLD: 2.81 M/UL — LOW (ref 3.8–5.2)
RBC # FLD: 14.5 % — SIGNIFICANT CHANGE UP (ref 10.3–14.5)
SODIUM SERPL-SCNC: 134 MMOL/L — LOW (ref 135–145)
WBC # BLD: 6.8 K/UL — SIGNIFICANT CHANGE UP (ref 3.8–10.5)
WBC # FLD AUTO: 6.8 K/UL — SIGNIFICANT CHANGE UP (ref 3.8–10.5)

## 2017-07-12 PROCEDURE — 99232 SBSQ HOSP IP/OBS MODERATE 35: CPT | Mod: 24

## 2017-07-12 RX ORDER — METRONIDAZOLE 500 MG
500 TABLET ORAL ONCE
Qty: 0 | Refills: 0 | Status: COMPLETED | OUTPATIENT
Start: 2017-07-12 | End: 2017-07-12

## 2017-07-12 RX ORDER — POTASSIUM CHLORIDE 20 MEQ
10 PACKET (EA) ORAL
Qty: 0 | Refills: 0 | Status: COMPLETED | OUTPATIENT
Start: 2017-07-12 | End: 2017-07-12

## 2017-07-12 RX ORDER — METRONIDAZOLE 500 MG
500 TABLET ORAL EVERY 8 HOURS
Qty: 0 | Refills: 0 | Status: DISCONTINUED | OUTPATIENT
Start: 2017-07-12 | End: 2017-07-13

## 2017-07-12 RX ORDER — METRONIDAZOLE 500 MG
TABLET ORAL
Qty: 0 | Refills: 0 | Status: DISCONTINUED | OUTPATIENT
Start: 2017-07-12 | End: 2017-07-13

## 2017-07-12 RX ORDER — ACETAMINOPHEN 500 MG
1000 TABLET ORAL ONCE
Qty: 0 | Refills: 0 | Status: COMPLETED | OUTPATIENT
Start: 2017-07-12 | End: 2017-07-12

## 2017-07-12 RX ORDER — POTASSIUM CHLORIDE 20 MEQ
40 PACKET (EA) ORAL ONCE
Qty: 0 | Refills: 0 | Status: COMPLETED | OUTPATIENT
Start: 2017-07-12 | End: 2017-07-12

## 2017-07-12 RX ADMIN — Medication 200 MILLIGRAM(S): at 18:19

## 2017-07-12 RX ADMIN — Medication 100 MILLIEQUIVALENT(S): at 14:48

## 2017-07-12 RX ADMIN — ENOXAPARIN SODIUM 40 MILLIGRAM(S): 100 INJECTION SUBCUTANEOUS at 11:03

## 2017-07-12 RX ADMIN — Medication 100 MILLIGRAM(S): at 07:03

## 2017-07-12 RX ADMIN — Medication 100 MILLIGRAM(S): at 22:41

## 2017-07-12 RX ADMIN — Medication 100 MILLIEQUIVALENT(S): at 11:03

## 2017-07-12 RX ADMIN — PANTOPRAZOLE SODIUM 40 MILLIGRAM(S): 20 TABLET, DELAYED RELEASE ORAL at 11:03

## 2017-07-12 RX ADMIN — Medication 100 MILLIGRAM(S): at 14:45

## 2017-07-12 RX ADMIN — Medication 1000 MILLIGRAM(S): at 06:10

## 2017-07-12 RX ADMIN — Medication 100 MILLIEQUIVALENT(S): at 22:40

## 2017-07-12 RX ADMIN — FLUCONAZOLE 100 MILLIGRAM(S): 150 TABLET ORAL at 22:11

## 2017-07-12 RX ADMIN — MORPHINE SULFATE 2 MILLIGRAM(S): 50 CAPSULE, EXTENDED RELEASE ORAL at 16:25

## 2017-07-12 RX ADMIN — MORPHINE SULFATE 2 MILLIGRAM(S): 50 CAPSULE, EXTENDED RELEASE ORAL at 16:55

## 2017-07-12 RX ADMIN — Medication 200 MILLIGRAM(S): at 05:18

## 2017-07-12 RX ADMIN — Medication 40 MILLIEQUIVALENT(S): at 11:03

## 2017-07-12 RX ADMIN — Medication 400 MILLIGRAM(S): at 05:40

## 2017-07-12 NOTE — CONSULT NOTE ADULT - SUBJECTIVE AND OBJECTIVE BOX
Chief Complaint/Reason for Admission: Abdominal pain, fevers	  History of Present Illness: 	  57 year old woman s/p distal gastrectomy and Bilroth II reconstruction on  presents with 2 days of fevers abdominal pain, headaches, and urinary frequency. She states that after discharge she was eating a lot of soup, son endorses that she has been eating a regular diet post-operatively and has not used the feeding jejunostomy. However, she has had decreased PO intake over the last few days. She states that the abdominal pain is all over and gestures throughout her entire abdomen and says it is most intense in the superior aspect of her incision/staple line. Her  took her temperature at home and had recorded temperatures to 39.1. She has not had any nausea or vomiting. No diarrhea or constipation.     PMH: Gastric cancer  PSHx: s/p distal gastrectomy, Bilroth II reconstruction, D2 lymphadenectomy and placement of feeding jejunostomy    Allergies: NKDA      MEDICATIONS  (STANDING):  lactated ringers. 1000 milliLiter(s) (100 mL/Hr) IV Continuous <Continuous>  piperacillin/tazobactam IVPB. 3.375 Gram(s) IV Intermittent every 8 hours  sodium chloride 0.9%. 1000 milliLiter(s) (100 mL/Hr) IV Continuous <Continuous>  heparin  Injectable 5000 Unit(s) SubCutaneous every 8 hours  phytonadione   Solution 5 milliGRAM(s) Oral once  famotidine Injectable 20 milliGRAM(s) IV Push daily  fluconAZOLE IVPB   IV Intermittent   fluconAZOLE IVPB 400 milliGRAM(s) IV Intermittent once    MEDICATIONS  (PRN):  morphine  - Injectable 2 milliGRAM(s) IV Push every 4 hours PRN Severe Pain (7 - 10)  morphine  - Injectable 1 milliGRAM(s) IV Push every 3 hours PRN Moderate Pain (4 - 6)      Review of Systems:  Review of Systems: (+)Urinary frequency, fevers, headache no nausea vomiting, constipation or diarrhea	      Allergies and Intolerances:        Allergies:  	No Known Allergies:     Home Medications:   * Patient Currently Takes Medications as of 2017 15:22 documented in Prescription Writer  · 	acetaminophen 160 mg/5 mL oral suspension: 20.31 milliliter(s) orally every 4 hours, As needed, Mild Pain (1 - 3)  · 	oxyCODONE 10 mg oral tablet: 1 tab(s) orally every 4 hours, As needed, Severe Pain (7 - 10)  · 	pantoprazole 40 mg oral delayed release tablet: 1 tab(s) orally once a day (before a meal)  · 	oxyCODONE 5 mg oral tablet: 1 tab(s) orally every 4 hours, As needed, Moderate Pain (4 - 6) MDD:8  · 	Zofran:       Patient History:   Past Medical History:  Malignant neoplasm of stomach, unspecified location.    Past Surgical History:  S/P partial gastrectomy  distal gastrectomy with Billroth II reconstruction, D2 lymphadenectomy, feeding jejunostomy tube placement 17.    Family History:  No pertinent family history in first degree relatives.    Tobacco Screening:  · Core Measure Site	No	  · Has the patient used tobacco in the past 30 days?	No	    Risk Assessment:   Present on Admission:  Deep Venous Thrombosis	no	  Pulmonary Embolus	no	  Urinary Catheter	no	  Central Venous Catheter/PICC Line	no	  Surgical Site Incision	yes	  Pressure Ulcer(s)	no	    Heart Failure:  Does this patient have a history of or has been diagnosed with heart failure? no.    HIV Screen (per [facility], HIV screening must be offered to every individual between ages 13 and 64)	Unable to offer due to clinical condition	  Hepatitis C Screen (per [facility], hepatitis C screening must be offered to every individual born between 1945 and 1965)	Unable to offer due to clinical condition	  Screening uterine cytology (Pap smear) performed in last 3 years	no	      Physical Exam:  Physical Exam: T(C): 37.4 (17 @ 18:10), Max: 37.4 (17 @ 18:10) HR: 102 (17 @ 18:10) (91 - 104) BP: 98/66 (17 @ 18:10) (96/63 - 107/66) RR: 18 (17 @ 18:10) (18 - 21) SpO2: 97% (17 @ 18:10) (97% - 99%)  General: Patient appears uncomfortable, lying in bed, Alert and orientedx3 HEENT: normocephalic, atraumatic, sclerae do not appear icteric CV: RRR Chest: Clear to auscultation bilaterally Abd: soft, diffusely mildly tender, indurated in the superior aspect of incision with very mild blanching erythema, jejunostomy site clean, dry and intact Extremities: warm and well perfused	      Labs and Results:  Labs, Radiology, Cardiology, and Other Results: 9.8   10.7  )-----------( 876      ( 2017 12:20 )            29.6   2017 12:20  130    |  94     |  9     ----------------------------<  115   3.9     |  24     |  0.47   Ca    9.3        2017 12:20  TPro  7.4    /  Alb  2.8    /  TBili  1.7    /  DBili  x      /  AST  52     /  ALT  100    /  AlkPhos  539    2017 12:20  LIVER FUNCTIONS - ( 2017 12:20 ) Alb: 2.8 g/dL / Pro: 7.4 g/dL / ALK PHOS: 539 U/L / ALT: 100 U/L RC / AST: 52 U/L / GGT: x         PT/INR - ( 2017 12:20 )   PT: 16.0 sec;   INR: 1.47 ratio      PTT - ( 2017 12:20 )  PTT:29.4 sec CAPILLARY BLOOD GLUCOSE      Urinalysis Basic - ( 2017 14:01 )  Color: Yellow / Appearance: x / S.012 / pH: x Gluc: x / Ketone: Negative  / Bili: Negative / Urobili: 1  Blood: x / Protein: Trace / Nitrite: Negative  Leuk Esterase: Negative / RBC: 5-10 /HPF / WBC 2-5 /HPF  Sq Epi: x / Non Sq Epi: x / Bacteria: Few /HPF  Imaging: CT Abdomen/ Pelvis: Anterior  abdominal  fluid  collection  measures  10  x  7.7  cm  in total  on  series  2  image  40  with  multiple  communicating  locules.  On  the  left, this  fluid  collection  abuts  the  gastric  resection  margin.  Superiorly,  the collection  is  bordered  by  the  left  hepatic  lobe.  Posteriorly,  there  is abutment  of  the  pancreatic  head  and  neck.  The  collection  is  bordered  by  the gallbladder  on  the  right. Small  amount  of  fluid  in  the  ventral  incision  measures  2.4  x 2.4  cm.  Status  post  distal  gastrectomy  with  an  anterior  abdominal  fluid  collection. Small  amount  of  fluid  in  the  ventral  incision.	    Assessment and Plan:   Assessment:  · Assessment		  57 year old woman with invasive gastric adenocarcinoma s/p distal gastrectomy, Billroth II reconstruction and feeding jejunostomy presents on POD 16 with abdominal pain and fevers. CT scan shows small amount of fluid on ventral incision measuring 2.4x2.4 cm and anterior abdominal fluid collection measuring 10x7.7 cm.   Plan:   -Staples and incision opened at bedside with drainage of thin purulent material  -f/u cultures from wound  -NPO  -possible role for IR drainage  -Zosyn  -Diflucan  -AM labs, f/u T. bili, if remains elevated, will consider RUQ sono  -Mathis catheter

## 2017-07-12 NOTE — PROVIDER CONTACT NOTE (CRITICAL VALUE NOTIFICATION) - BACKGROUND
s/p partial gastrectomy  7/11 IR for abd abscess drainage, DARRYL placed and body cultures taken.
7/9 abd fluid collection hematuria, fever s/p partial gastrectomy  7/10 abd CT  7/11 IR drainage of abscess and received 3 runs of K

## 2017-07-12 NOTE — PROVIDER CONTACT NOTE (CRITICAL VALUE NOTIFICATION) - SITUATION
moderate polymorph nuclear leukocytes per low powerfield and rare gram negative rods per oil powerfield from body fluid culture.
pt. k 2.8

## 2017-07-12 NOTE — PROGRESS NOTE ADULT - SUBJECTIVE AND OBJECTIVE BOX
[facility]BT SURGERY    SUBJECTIVE:     patient was seen and examined this morning. She had mild headache which is well managed with IV Tylenol now. She is flaus and has BM. She does not have fever,  n/v,  or chest pain.      OBJECTIVE:   T(C): 36.8 (07-12-17 @ 09:27), Max: 36.9 (07-11-17 @ 17:27)  HR: 72 (07-12-17 @ 09:27) (71 - 80)  BP: 93/61 (07-12-17 @ 09:27) (90/53 - 101/68)  RR: 18 (07-12-17 @ 09:27) (17 - 18)  SpO2: 98% (07-12-17 @ 09:27) (95% - 99%)  Wt(kg): --  CAPILLARY BLOOD GLUCOSE        I&O's Detail    11 Jul 2017 07:01  -  12 Jul 2017 07:00  --------------------------------------------------------  IN:    IV PiggyBack: 1100 mL  Total IN: 1100 mL    OUT:    Bulb: 15 mL    Voided: 1750 mL  Total OUT: 1765 mL    Total NET: -665 mL          Physical exam:  General: WN, WD elderly women NAD  Abdomen: Soft, NT, ND, no organomegaly  Incision: clean and intact

## 2017-07-12 NOTE — PROGRESS NOTE ADULT - SUBJECTIVE AND OBJECTIVE BOX
Interventional Radiology Follow- Up Note      57y Female s/p drainage of abdominal fulid  on 7/10  in Interventional Radiology with Dr. Goel       Patient seen and examined @ bedside. reports abdominal pain controlled with meds. Afberile      Vitals: T(F): 98.2 (07-12-17 @ 09:27), Max: 98.5 (07-11-17 @ 17:27)  HR: 72 (07-12-17 @ 09:27) (71 - 80)  BP: 93/61 (07-12-17 @ 09:27) (90/53 - 101/68)  RR: 18 (07-12-17 @ 09:27) (17 - 18)  SpO2: 98% (07-12-17 @ 09:27) (95% - 99%)  Wt(kg): --    LABS:                        9.0    6.8   )-----------( 680      ( 12 Jul 2017 09:12 )             26.7     07-12    134<L>  |  100  |  4<L>  ----------------------------<  124<H>  2.8<LL>   |  20<L>  |  0.47<L>    Ca    8.6      12 Jul 2017 09:12      PT/INR - ( 12 Jul 2017 09:52 )   PT: 13.0 sec;   INR: 1.19 ratio         PTT - ( 12 Jul 2017 09:52 )  PTT:24.6 sec    PHYSICAL EXAM:  General: Nontoxic, in NAD  Abdomen: ttp. drain site without purulence/edema/erythema. suture intact. DARRYL with thick pururlent fluid. 15cc of output recorded.    Impression: 57y Female s/p drainage of abdominal fluid    Plan:  -continue to monitor ouput    -Flush drain per doctor orders  -trend vitals, labs  -will discuss with IR attending     Please call IR at extension [phone] with any questions, concerns, or issues regarding above.

## 2017-07-12 NOTE — PROVIDER CONTACT NOTE (CRITICAL VALUE NOTIFICATION) - TEST AND RESULT REPORTED:
moderate polymorph nuclear leukocytes per low powerfield and rare gram negative rods per oil powerfield from body fluid culture.
K 2.8

## 2017-07-13 LAB
-  AMIKACIN: SIGNIFICANT CHANGE UP
-  AMPICILLIN/SULBACTAM: SIGNIFICANT CHANGE UP
-  AMPICILLIN: SIGNIFICANT CHANGE UP
-  AZTREONAM: SIGNIFICANT CHANGE UP
-  CEFAZOLIN: SIGNIFICANT CHANGE UP
-  CEFEPIME: SIGNIFICANT CHANGE UP
-  CEFOXITIN: SIGNIFICANT CHANGE UP
-  CEFTAZIDIME: SIGNIFICANT CHANGE UP
-  CEFTRIAXONE: SIGNIFICANT CHANGE UP
-  CIPROFLOXACIN: SIGNIFICANT CHANGE UP
-  ERTAPENEM: SIGNIFICANT CHANGE UP
-  GENTAMICIN: SIGNIFICANT CHANGE UP
-  IMIPENEM: SIGNIFICANT CHANGE UP
-  LEVOFLOXACIN: SIGNIFICANT CHANGE UP
-  MEROPENEM: SIGNIFICANT CHANGE UP
-  PIPERACILLIN/TAZOBACTAM: SIGNIFICANT CHANGE UP
-  TOBRAMYCIN: SIGNIFICANT CHANGE UP
-  TRIMETHOPRIM/SULFAMETHOXAZOLE: SIGNIFICANT CHANGE UP
ANION GAP SERPL CALC-SCNC: 12 MMOL/L — SIGNIFICANT CHANGE UP (ref 5–17)
BUN SERPL-MCNC: 3 MG/DL — LOW (ref 7–23)
CALCIUM SERPL-MCNC: 8.4 MG/DL — SIGNIFICANT CHANGE UP (ref 8.4–10.5)
CHLORIDE SERPL-SCNC: 105 MMOL/L — SIGNIFICANT CHANGE UP (ref 96–108)
CO2 SERPL-SCNC: 20 MMOL/L — LOW (ref 22–31)
CREAT SERPL-MCNC: 0.41 MG/DL — LOW (ref 0.5–1.3)
GLUCOSE SERPL-MCNC: 136 MG/DL — HIGH (ref 70–99)
HCT VFR BLD CALC: 25.5 % — LOW (ref 34.5–45)
HGB BLD-MCNC: 8.4 G/DL — LOW (ref 11.5–15.5)
MAGNESIUM SERPL-MCNC: 1.9 MG/DL — SIGNIFICANT CHANGE UP (ref 1.6–2.6)
MCHC RBC-ENTMCNC: 30.8 PG — SIGNIFICANT CHANGE UP (ref 27–34)
MCHC RBC-ENTMCNC: 33 GM/DL — SIGNIFICANT CHANGE UP (ref 32–36)
MCV RBC AUTO: 93.5 FL — SIGNIFICANT CHANGE UP (ref 80–100)
METHOD TYPE: SIGNIFICANT CHANGE UP
PHOSPHATE SERPL-MCNC: 3 MG/DL — SIGNIFICANT CHANGE UP (ref 2.5–4.5)
PLATELET # BLD AUTO: 576 K/UL — HIGH (ref 150–400)
POTASSIUM SERPL-MCNC: 3.5 MMOL/L — SIGNIFICANT CHANGE UP (ref 3.5–5.3)
POTASSIUM SERPL-SCNC: 3.5 MMOL/L — SIGNIFICANT CHANGE UP (ref 3.5–5.3)
RBC # BLD: 2.73 M/UL — LOW (ref 3.8–5.2)
RBC # FLD: 14.7 % — HIGH (ref 10.3–14.5)
SODIUM SERPL-SCNC: 137 MMOL/L — SIGNIFICANT CHANGE UP (ref 135–145)
WBC # BLD: 7.2 K/UL — SIGNIFICANT CHANGE UP (ref 3.8–10.5)
WBC # FLD AUTO: 7.2 K/UL — SIGNIFICANT CHANGE UP (ref 3.8–10.5)

## 2017-07-13 PROCEDURE — 99232 SBSQ HOSP IP/OBS MODERATE 35: CPT | Mod: 24

## 2017-07-13 RX ORDER — ACETAMINOPHEN 500 MG
650 TABLET ORAL EVERY 6 HOURS
Qty: 0 | Refills: 0 | Status: DISCONTINUED | OUTPATIENT
Start: 2017-07-13 | End: 2017-07-15

## 2017-07-13 RX ORDER — POTASSIUM CHLORIDE 20 MEQ
40 PACKET (EA) ORAL ONCE
Qty: 0 | Refills: 0 | Status: DISCONTINUED | OUTPATIENT
Start: 2017-07-13 | End: 2017-07-13

## 2017-07-13 RX ORDER — ONDANSETRON 8 MG/1
4 TABLET, FILM COATED ORAL ONCE
Qty: 0 | Refills: 0 | Status: COMPLETED | OUTPATIENT
Start: 2017-07-13 | End: 2017-07-13

## 2017-07-13 RX ORDER — ACETAMINOPHEN 500 MG
1000 TABLET ORAL ONCE
Qty: 0 | Refills: 0 | Status: COMPLETED | OUTPATIENT
Start: 2017-07-13 | End: 2017-07-13

## 2017-07-13 RX ORDER — POTASSIUM CHLORIDE 20 MEQ
40 PACKET (EA) ORAL ONCE
Qty: 0 | Refills: 0 | Status: COMPLETED | OUTPATIENT
Start: 2017-07-13 | End: 2017-07-13

## 2017-07-13 RX ORDER — OXYCODONE HYDROCHLORIDE 5 MG/1
5 TABLET ORAL EVERY 4 HOURS
Qty: 0 | Refills: 0 | Status: DISCONTINUED | OUTPATIENT
Start: 2017-07-13 | End: 2017-07-15

## 2017-07-13 RX ORDER — PANTOPRAZOLE SODIUM 20 MG/1
40 TABLET, DELAYED RELEASE ORAL
Qty: 0 | Refills: 0 | Status: DISCONTINUED | OUTPATIENT
Start: 2017-07-13 | End: 2017-07-15

## 2017-07-13 RX ADMIN — Medication 40 MILLIEQUIVALENT(S): at 18:14

## 2017-07-13 RX ADMIN — OXYCODONE HYDROCHLORIDE 5 MILLIGRAM(S): 5 TABLET ORAL at 18:14

## 2017-07-13 RX ADMIN — Medication 200 MILLIGRAM(S): at 17:39

## 2017-07-13 RX ADMIN — ONDANSETRON 4 MILLIGRAM(S): 8 TABLET, FILM COATED ORAL at 10:57

## 2017-07-13 RX ADMIN — Medication 1000 MILLIGRAM(S): at 06:31

## 2017-07-13 RX ADMIN — Medication 100 MILLIGRAM(S): at 05:32

## 2017-07-13 RX ADMIN — OXYCODONE HYDROCHLORIDE 5 MILLIGRAM(S): 5 TABLET ORAL at 18:45

## 2017-07-13 RX ADMIN — ENOXAPARIN SODIUM 40 MILLIGRAM(S): 100 INJECTION SUBCUTANEOUS at 15:37

## 2017-07-13 RX ADMIN — Medication 200 MILLIGRAM(S): at 05:32

## 2017-07-13 RX ADMIN — Medication 400 MILLIGRAM(S): at 06:11

## 2017-07-13 NOTE — PROGRESS NOTE ADULT - ATTENDING COMMENTS
I have seen and examined the patient, reviewed the laboratory and radiologic data and agree with practitioner's history, physical assessment, plan and reviewed and edited where appropriate.  The patient presented to the hospital with sepsis and an abdominal wall abscess that was secondary to an intraabdominal abscess from the gastrectomy procedure.    Plan:  1) Monitor wound drainage / IR drainage  2) Antibiotics  3) Pain control  4) Restart J tube feeds due to inadequate PO intake and appetite
IR drainage today  Continue abx  Wound care
Spoke with patient's son on phone Hillary, answered all questions.  Regular post gastrectomy diet today.  D/C planning for tomorrow on Cipro x 7 days  Wound packing and VNS for drain management

## 2017-07-13 NOTE — PROGRESS NOTE ADULT - SUBJECTIVE AND OBJECTIVE BOX
Subjective: complaning of abd pain around drain and superior wound being red       Objective: NAD AAOx3  IR drain with milky brown drainage, superior wound indurated with erythema - 2 staples removed and able to drain about 20cc - wound packed and clean dressing placed       MEDICATIONS  (STANDING):  enoxaparin Injectable 40 milliGRAM(s) SubCutaneous daily  ciprofloxacin   IVPB   IV Intermittent   ciprofloxacin   IVPB 400 milliGRAM(s) IV Intermittent every 12 hours  pantoprazole    Tablet 40 milliGRAM(s) Oral before breakfast    MEDICATIONS  (PRN):  acetaminophen   Tablet. 650 milliGRAM(s) Oral every 6 hours PRN Mild Pain (1 - 3)  oxyCODONE    IR 5 milliGRAM(s) Oral every 4 hours PRN Moderate Pain (4 - 6)      Vital Signs Last 24 Hrs  T(C): 36.8 (13 Jul 2017 06:48), Max: 37.7 (12 Jul 2017 17:38)  T(F): 98.3 (13 Jul 2017 06:48), Max: 99.8 (12 Jul 2017 17:38)  HR: 80 (13 Jul 2017 06:48) (72 - 94)  BP: 94/60 (13 Jul 2017 06:48) (93/61 - 113/72)  BP(mean): --  RR: 18 (13 Jul 2017 06:48) (17 - 18)  SpO2: 98% (13 Jul 2017 06:48) (96% - 98%)    I&O's Detail    12 Jul 2017 07:01  -  13 Jul 2017 07:00  --------------------------------------------------------  IN:    Oral Fluid: 600 mL    Solution: 600 mL    Solution: 100 mL    Solution: 200 mL    Solution: 100 mL    Solution: 200 mL  Total IN: 1800 mL    OUT:    Bulb: 70 mL    Voided: 1250 mL  Total OUT: 1320 mL    Total NET: 480 mL          Daily     Daily     LABS:                        8.4    7.2   )-----------( 576      ( 13 Jul 2017 03:40 )             25.5     07-13    137  |  105  |  3<L>  ----------------------------<  136<H>  3.5   |  20<L>  |  0.41<L>    Ca    8.4      13 Jul 2017 03:40  Phos  3.0     07-13  Mg     1.9     07-13      PT/INR - ( 12 Jul 2017 09:52 )   PT: 13.0 sec;   INR: 1.19 ratio         PTT - ( 12 Jul 2017 09:52 )  PTT:24.6 sec

## 2017-07-13 NOTE — PROGRESS NOTE ADULT - SUBJECTIVE AND OBJECTIVE BOX
Interventional Radiology Follow- Up Note      57y Female s/p drainage of abdominal collectio  Patient seen and examined @ bedside. Reports drainage s/p staple removal. IR drain site c/d/i.  reports abdominal pain only improved with tylenol.    Vitals: T(F): 98.3 (07-13-17 @ 06:48), Max: 99.8 (07-12-17 @ 17:38)  HR: 80 (07-13-17 @ 06:48) (79 - 94)  BP: 94/60 (07-13-17 @ 06:48) (93/61 - 113/72)  RR: 18 (07-13-17 @ 06:48) (17 - 18)  SpO2: 98% (07-13-17 @ 06:48) (96% - 98%)  Wt(kg): --    LABS:                        8.4    7.2   )-----------( 576      ( 13 Jul 2017 03:40 )             25.5     07-13    137  |  105  |  3<L>  ----------------------------<  136<H>  3.5   |  20<L>  |  0.41<L>    Ca    8.4      13 Jul 2017 03:40  Phos  3.0     07-13  Mg     1.9     07-13      PT/INR - ( 12 Jul 2017 09:52 )   PT: 13.0 sec;   INR: 1.19 ratio      culture: klebsiella       Output     Bulb: 70 mL    Voided: 1250 mL    PHYSICAL EXAM:  General: Nontoxic, in NAD  Neuro:  Alert & oriented x 3  Abdomen: tender. Drain site no erythema/edema. suture intact.     Impression: 57y Female s/p abdominal abscess drain     Plan:  -continue to monitor ouput    -Flush drain per doctor orders  -trend vitals, labs  -will discuss with IR attending     Please call IR at extension [phone] with any questions, concerns, or issues regarding above. Interventional Radiology Follow- Up Note      57y Female s/p drainage of abdominal collectio  Patient seen and examined @ bedside. Reports drainage s/p staple removal. IR drain site c/d/i.  reports abdominal pain only improved with tylenol.    Vitals: T(F): 98.3 (07-13-17 @ 06:48), Max: 99.8 (07-12-17 @ 17:38)  HR: 80 (07-13-17 @ 06:48) (79 - 94)  BP: 94/60 (07-13-17 @ 06:48) (93/61 - 113/72)  RR: 18 (07-13-17 @ 06:48) (17 - 18)  SpO2: 98% (07-13-17 @ 06:48) (96% - 98%)  Wt(kg): --    LABS:                        8.4    7.2   )-----------( 576      ( 13 Jul 2017 03:40 )             25.5     07-13    137  |  105  |  3<L>  ----------------------------<  136<H>  3.5   |  20<L>  |  0.41<L>    Ca    8.4      13 Jul 2017 03:40  Phos  3.0     07-13  Mg     1.9     07-13      PT/INR - ( 12 Jul 2017 09:52 )   PT: 13.0 sec;   INR: 1.19 ratio      culture: klebsiella       Output     Bulb: 70 mL    Voided: 1250 mL    PHYSICAL EXAM:  General: Nontoxic, in NAD  Neuro:  Alert & oriented x 3  Abdomen: tender. Drain site no erythema/edema. suture intact.     Impression: 57y Female s/p abdominal abscess drain     Plan:  -continue to monitor ouput    -Flush drain per doctor orders  -trend vitals, labs  -will discuss with IR attending  - Ir follow up information given to the patient  Please call IR at extension [phone] with any questions, concerns, or issues regarding above. Interventional Radiology Follow- Up Note      57y Female s/p drainage of abdominal collectio  Patient seen and examined @ bedside. Reports drainage s/p staple removal. IR drain site c/d/i.  reports abdominal pain only improved with tylenol.    Vitals: T(F): 98.3 (07-13-17 @ 06:48), Max: 99.8 (07-12-17 @ 17:38)  HR: 80 (07-13-17 @ 06:48) (79 - 94)  BP: 94/60 (07-13-17 @ 06:48) (93/61 - 113/72)  RR: 18 (07-13-17 @ 06:48) (17 - 18)  SpO2: 98% (07-13-17 @ 06:48) (96% - 98%)  Wt(kg): --    LABS:                        8.4    7.2   )-----------( 576      ( 13 Jul 2017 03:40 )             25.5     07-13    137  |  105  |  3<L>  ----------------------------<  136<H>  3.5   |  20<L>  |  0.41<L>    Ca    8.4      13 Jul 2017 03:40  Phos  3.0     07-13  Mg     1.9     07-13      PT/INR - ( 12 Jul 2017 09:52 )   PT: 13.0 sec;   INR: 1.19 ratio      culture: klebsiella       Output     Bulb: 70 mL    Voided: 1250 mL    PHYSICAL EXAM:  General: Nontoxic, in NAD  Neuro:  Alert & oriented x 3  Abdomen: tender. Drain site no erythema/edema. suture intact. Barry brown colored fluid.    Impression: 57y Female s/p abdominal abscess drain     Plan:  -continue to monitor ouput    -Flush drain per doctor orders  -trend vitals, labs  -will discuss with IR attending  - Ir follow up information given to the patient  Please call IR at extension [phone] with any questions, concerns, or issues regarding above.

## 2017-07-14 LAB
ANION GAP SERPL CALC-SCNC: 15 MMOL/L — SIGNIFICANT CHANGE UP (ref 5–17)
BUN SERPL-MCNC: 4 MG/DL — LOW (ref 7–23)
CALCIUM SERPL-MCNC: 8.3 MG/DL — LOW (ref 8.4–10.5)
CHLORIDE SERPL-SCNC: 103 MMOL/L — SIGNIFICANT CHANGE UP (ref 96–108)
CO2 SERPL-SCNC: 18 MMOL/L — LOW (ref 22–31)
CREAT SERPL-MCNC: 0.43 MG/DL — LOW (ref 0.5–1.3)
CULTURE RESULTS: SIGNIFICANT CHANGE UP
GLUCOSE SERPL-MCNC: 125 MG/DL — HIGH (ref 70–99)
HCT VFR BLD CALC: 28.7 % — LOW (ref 34.5–45)
HGB BLD-MCNC: 9.4 G/DL — LOW (ref 11.5–15.5)
MCHC RBC-ENTMCNC: 29.5 PG — SIGNIFICANT CHANGE UP (ref 27–34)
MCHC RBC-ENTMCNC: 32.8 GM/DL — SIGNIFICANT CHANGE UP (ref 32–36)
MCV RBC AUTO: 90 FL — SIGNIFICANT CHANGE UP (ref 80–100)
ORGANISM # SPEC MICROSCOPIC CNT: SIGNIFICANT CHANGE UP
ORGANISM # SPEC MICROSCOPIC CNT: SIGNIFICANT CHANGE UP
PLATELET # BLD AUTO: 612 K/UL — HIGH (ref 150–400)
POTASSIUM SERPL-MCNC: 3.8 MMOL/L — SIGNIFICANT CHANGE UP (ref 3.5–5.3)
POTASSIUM SERPL-SCNC: 3.8 MMOL/L — SIGNIFICANT CHANGE UP (ref 3.5–5.3)
RBC # BLD: 3.19 M/UL — LOW (ref 3.8–5.2)
RBC # FLD: 16.2 % — HIGH (ref 10.3–14.5)
SODIUM SERPL-SCNC: 136 MMOL/L — SIGNIFICANT CHANGE UP (ref 135–145)
SPECIMEN SOURCE: SIGNIFICANT CHANGE UP
WBC # BLD: 5.6 K/UL — SIGNIFICANT CHANGE UP (ref 3.8–10.5)
WBC # FLD AUTO: 5.6 K/UL — SIGNIFICANT CHANGE UP (ref 3.8–10.5)

## 2017-07-14 PROCEDURE — 99232 SBSQ HOSP IP/OBS MODERATE 35: CPT | Mod: 24

## 2017-07-14 PROCEDURE — 74177 CT ABD & PELVIS W/CONTRAST: CPT | Mod: 26

## 2017-07-14 RX ORDER — SODIUM CHLORIDE 9 MG/ML
1000 INJECTION INTRAMUSCULAR; INTRAVENOUS; SUBCUTANEOUS
Qty: 0 | Refills: 0 | Status: COMPLETED | OUTPATIENT
Start: 2017-07-14 | End: 2017-07-14

## 2017-07-14 RX ORDER — SODIUM CHLORIDE 9 MG/ML
1000 INJECTION INTRAMUSCULAR; INTRAVENOUS; SUBCUTANEOUS
Qty: 0 | Refills: 0 | Status: DISCONTINUED | OUTPATIENT
Start: 2017-07-14 | End: 2017-07-15

## 2017-07-14 RX ORDER — PANTOPRAZOLE SODIUM 20 MG/1
40 TABLET, DELAYED RELEASE ORAL ONCE
Qty: 0 | Refills: 0 | Status: COMPLETED | OUTPATIENT
Start: 2017-07-14 | End: 2017-07-14

## 2017-07-14 RX ADMIN — PANTOPRAZOLE SODIUM 40 MILLIGRAM(S): 20 TABLET, DELAYED RELEASE ORAL at 23:37

## 2017-07-14 RX ADMIN — OXYCODONE HYDROCHLORIDE 5 MILLIGRAM(S): 5 TABLET ORAL at 09:46

## 2017-07-14 RX ADMIN — SODIUM CHLORIDE 500 MILLILITER(S): 9 INJECTION INTRAMUSCULAR; INTRAVENOUS; SUBCUTANEOUS at 18:03

## 2017-07-14 RX ADMIN — Medication 200 MILLIGRAM(S): at 06:11

## 2017-07-14 RX ADMIN — Medication 650 MILLIGRAM(S): at 02:35

## 2017-07-14 RX ADMIN — ENOXAPARIN SODIUM 40 MILLIGRAM(S): 100 INJECTION SUBCUTANEOUS at 12:24

## 2017-07-14 RX ADMIN — Medication 650 MILLIGRAM(S): at 07:53

## 2017-07-14 RX ADMIN — Medication 200 MILLIGRAM(S): at 17:16

## 2017-07-14 RX ADMIN — OXYCODONE HYDROCHLORIDE 5 MILLIGRAM(S): 5 TABLET ORAL at 10:16

## 2017-07-14 RX ADMIN — PANTOPRAZOLE SODIUM 40 MILLIGRAM(S): 20 TABLET, DELAYED RELEASE ORAL at 06:17

## 2017-07-14 NOTE — PROGRESS NOTE ADULT - SUBJECTIVE AND OBJECTIVE BOX
Subjective: complaning of abd pain around drain and superior wound being red       Objective ; denies chest pain, shortness of breath, nausea, vomiting, abd pain    MEDICATIONS  (STANDING):  enoxaparin Injectable 40 milliGRAM(s) SubCutaneous daily  ciprofloxacin   IVPB   IV Intermittent   ciprofloxacin   IVPB 400 milliGRAM(s) IV Intermittent every 12 hours  pantoprazole    Tablet 40 milliGRAM(s) Oral before breakfast    MEDICATIONS  (PRN):  acetaminophen   Tablet. 650 milliGRAM(s) Oral every 6 hours PRN Mild Pain (1 - 3)  oxyCODONE    IR 5 milliGRAM(s) Oral every 4 hours PRN Moderate Pain (4 - 6)      Vital Signs Last 24 Hrs  T(C): 36.9 (2017 09:32), Max: 36.9 (2017 16:44)  T(F): 98.4 (2017 09:32), Max: 98.4 (2017 16:44)  HR: 60 (2017 09:32) (60 - 84)  BP: 91/52 (2017 09:32) (91/51 - 110/75)  BP(mean): --  RR: 18 (2017 09:32) (17 - 18)  SpO2: 99% (2017 09:32) (97% - 99%)    Constitutional: No fever, fatigue  Skin: No rash.  Eyes: No recent vision problems or eye pain.  ENT: No congestion, ear pain, or sore throat.  Cardiovascular: No chest pain or palpation.  Respiratory: No cough, shortness of breath, congestion, or wheezing.  Gastrointestinal: No abdominal pain, nausea, vomiting, or diarrhea.  Genitourinary: No dysuria.  Musculoskeletal: No joint swelling.  Neurologic: No headache.    physical exam :    pt NAD   CVS: S1, S2  RS; dec breath sounds at rt base  ABD; soft, bs+  EXT;no edema, no tenderness  SKIN; no rash    LABS:      136  |  103  |  4<L>  ----------------------------<  125<H>  3.8   |  18<L>  |  0.43<L>    Ca    8.3<L>      2017 09:03  Phos  3.0     07-13  Mg     1.9           Creatinine Trend: 0.43 <--, 0.41 <--, 0.47 <--, 0.48 <--, 0.52 <--, 0.38 <--, 0.47 <--                        9.4    5.60  )-----------( 612      ( 2017 08:36 )             28.7     Urine Studies:  Urinalysis Basic - ( 2017 14:01 )    Color: Yellow / Appearance:  / S.012 / pH:   Gluc:  / Ketone: Negative  / Bili: Negative / Urobili: 1   Blood:  / Protein: Trace / Nitrite: Negative   Leuk Esterase: Negative / RBC: 5-10 /HPF / WBC 2-5 /HPF   Sq Epi:  / Non Sq Epi:  / Bacteria: Few /HPF

## 2017-07-14 NOTE — PROGRESS NOTE ADULT - SUBJECTIVE AND OBJECTIVE BOX
GENERAL SURGERY DAILY PROGRESS NOTE:       Subjective:  Pt stable overnight. DARRYL drain output decreased from yesterday, increased output from midline wound. Cx grew out Klebsiella.  Pt c/o being nauseated. No vomiting, Pain is controlled. Passing flatus. No chest pain, no sob.     Objective:    Gen- Nad  Abd- mid line wound- purulent drainage from superior portion of midline wound, packing changed  soft, mildly tender along wound, mildly distended.    MEDICATIONS  (STANDING):  enoxaparin Injectable 40 milliGRAM(s) SubCutaneous daily  ciprofloxacin   IVPB   IV Intermittent   ciprofloxacin   IVPB 400 milliGRAM(s) IV Intermittent every 12 hours  pantoprazole    Tablet 40 milliGRAM(s) Oral before breakfast    MEDICATIONS  (PRN):  acetaminophen   Tablet. 650 milliGRAM(s) Oral every 6 hours PRN Mild Pain (1 - 3)  oxyCODONE    IR 5 milliGRAM(s) Oral every 4 hours PRN Moderate Pain (4 - 6)      Vital Signs Last 24 Hrs  T(C): 36.7 (14 Jul 2017 06:18), Max: 36.9 (13 Jul 2017 16:44)  T(F): 98 (14 Jul 2017 06:18), Max: 98.4 (13 Jul 2017 16:44)  HR: 69 (14 Jul 2017 06:18) (69 - 84)  BP: 95/64 (14 Jul 2017 06:18) (91/51 - 110/75)  BP(mean): --  RR: 18 (14 Jul 2017 06:18) (17 - 18)  SpO2: 98% (14 Jul 2017 06:18) (97% - 98%)    I&O's Detail    13 Jul 2017 07:01  -  14 Jul 2017 07:00  --------------------------------------------------------  IN:    Oral Fluid: 700 mL    Solution: 200 mL  Total IN: 900 mL    OUT:    Bulb: 10 mL  Total OUT: 10 mL    Total NET: 890 mL                                  9.4    5.60  )-----------( 612      ( 14 Jul 2017 08:36 )             28.7     07-13    137  |  105  |  3<L>  ----------------------------<  136<H>  3.5   |  20<L>  |  0.41<L>    Ca    8.4      13 Jul 2017 03:40  Phos  3.0     07-13  Mg     1.9     07-13      PT/INR - ( 12 Jul 2017 09:52 )   PT: 13.0 sec;   INR: 1.19 ratio         PTT - ( 12 Jul 2017 09:52 )  PTT:24.6 sec

## 2017-07-14 NOTE — CHART NOTE - NSCHARTNOTEFT_GEN_A_CORE
Source: Patient [X ]    Family [ X]     other [X ] chart/medical record, PA    Diet : NPO for CT, prior on Regular + VitalAF @60ml/hr x 12 hrs via J-tube.     58 yo F w/ intraabdominal collection- now s/p IR drainage, following distal gastrectomy and bilroth II reconstruction    Patient reports [X ] nausea  [X ] vomiting after eating- PA informed [X ] other: +flatus; DARRYL drain output decreased from yesterday, increased output from midline wound.      PO intake:  < 50% [X ] 50-75% [ ]   % [ ]  other :     Source for PO intake [X ] Patient [X ] family [ ] chart [X ] staff [X ] other     Enteral /Parenteral Nutrition: Vital @0 ml/hr x12 hrs. At goal, feed provides: 864 anisha, 54 gm protein (15 anisha/kg, 0.9 gm prot/kg based on 58kg dosing wt).       Current Weight: Weight (kg): 58 (07-10 @ 07:15)- consistent with 7/9 admin wt    Pertinent Medications: MEDICATIONS  (STANDING):  enoxaparin Injectable 40 milliGRAM(s) SubCutaneous daily  ciprofloxacin   IVPB   IV Intermittent   ciprofloxacin   IVPB 400 milliGRAM(s) IV Intermittent every 12 hours  pantoprazole    Tablet 40 milliGRAM(s) Oral before breakfast    Pertinent Labs:  07-14 Na136 mmol/L Glu 125 mg/dL<H> K+ 3.8 mmol/L Cr  0.43 mg/dL<L> BUN 4 mg/dL<L> Phos n/a     Skin: No edema, no pressure ulcers noted.    Estimated Needs:   [X ] no change since previous assessment  [ ] recalculated:       Previous Nutrition Diagnosis:  [X ] Malnutrition, severe    Nutrition Diagnosis is [X ] ongoing  [ ] resolved [ ] not applicable        Interventions:     Recommend    [ ] Change Diet To:    [ ] Nutrition Supplement:      [X ] Nutrition Support: Pending diet advancement and po intake, consider increasing enteral nutrition if pt po intake remains low. Discussed increasing duration as tolerated. Consider Vital to goal rate of 60 ml/hr x16 hrs. At goal, feed provides: 1152cal, 72 gm protein (20cal/kg, 1.2 gm prot/kg based on dosing wt of 58 kg) .    [ ] Other:        Monitoring and Evaluation:     [X ] PO intake [X ] Tolerance to diet prescription [ X] weights [ X] follow up per protocol    [X ] other: RD to remain available for further nutritional interventions as indicated/requested by medical team/pt.

## 2017-07-15 ENCOUNTER — TRANSCRIPTION ENCOUNTER (OUTPATIENT)
Age: 57
End: 2017-07-15

## 2017-07-15 VITALS
HEART RATE: 74 BPM | RESPIRATION RATE: 18 BRPM | OXYGEN SATURATION: 98 % | TEMPERATURE: 98 F | DIASTOLIC BLOOD PRESSURE: 64 MMHG | SYSTOLIC BLOOD PRESSURE: 98 MMHG

## 2017-07-15 LAB
ANION GAP SERPL CALC-SCNC: 14 MMOL/L — SIGNIFICANT CHANGE UP (ref 5–17)
BUN SERPL-MCNC: 6 MG/DL — LOW (ref 7–23)
CALCIUM SERPL-MCNC: 8.6 MG/DL — SIGNIFICANT CHANGE UP (ref 8.4–10.5)
CHLORIDE SERPL-SCNC: 105 MMOL/L — SIGNIFICANT CHANGE UP (ref 96–108)
CO2 SERPL-SCNC: 20 MMOL/L — LOW (ref 22–31)
CREAT SERPL-MCNC: 0.46 MG/DL — LOW (ref 0.5–1.3)
CULTURE RESULTS: SIGNIFICANT CHANGE UP
GLUCOSE SERPL-MCNC: 117 MG/DL — HIGH (ref 70–99)
HCT VFR BLD CALC: 25.7 % — LOW (ref 34.5–45)
HGB BLD-MCNC: 8.4 G/DL — LOW (ref 11.5–15.5)
MCHC RBC-ENTMCNC: 29.6 PG — SIGNIFICANT CHANGE UP (ref 27–34)
MCHC RBC-ENTMCNC: 32.7 GM/DL — SIGNIFICANT CHANGE UP (ref 32–36)
MCV RBC AUTO: 90.5 FL — SIGNIFICANT CHANGE UP (ref 80–100)
ORGANISM # SPEC MICROSCOPIC CNT: SIGNIFICANT CHANGE UP
ORGANISM # SPEC MICROSCOPIC CNT: SIGNIFICANT CHANGE UP
PLATELET # BLD AUTO: 593 K/UL — HIGH (ref 150–400)
POTASSIUM SERPL-MCNC: 3.1 MMOL/L — LOW (ref 3.5–5.3)
POTASSIUM SERPL-SCNC: 3.1 MMOL/L — LOW (ref 3.5–5.3)
RBC # BLD: 2.84 M/UL — LOW (ref 3.8–5.2)
RBC # FLD: 16.6 % — HIGH (ref 10.3–14.5)
SODIUM SERPL-SCNC: 139 MMOL/L — SIGNIFICANT CHANGE UP (ref 135–145)
SPECIMEN SOURCE: SIGNIFICANT CHANGE UP
WBC # BLD: 5.47 K/UL — SIGNIFICANT CHANGE UP (ref 3.8–10.5)
WBC # FLD AUTO: 5.47 K/UL — SIGNIFICANT CHANGE UP (ref 3.8–10.5)

## 2017-07-15 PROCEDURE — 82947 ASSAY GLUCOSE BLOOD QUANT: CPT

## 2017-07-15 PROCEDURE — 80048 BASIC METABOLIC PNL TOTAL CA: CPT

## 2017-07-15 PROCEDURE — 84132 ASSAY OF SERUM POTASSIUM: CPT

## 2017-07-15 PROCEDURE — 87186 SC STD MICRODIL/AGAR DIL: CPT

## 2017-07-15 PROCEDURE — 84145 PROCALCITONIN (PCT): CPT

## 2017-07-15 PROCEDURE — 93005 ELECTROCARDIOGRAM TRACING: CPT

## 2017-07-15 PROCEDURE — 96374 THER/PROPH/DIAG INJ IV PUSH: CPT | Mod: XU

## 2017-07-15 PROCEDURE — 74177 CT ABD & PELVIS W/CONTRAST: CPT

## 2017-07-15 PROCEDURE — 82803 BLOOD GASES ANY COMBINATION: CPT

## 2017-07-15 PROCEDURE — 87205 SMEAR GRAM STAIN: CPT

## 2017-07-15 PROCEDURE — 81001 URINALYSIS AUTO W/SCOPE: CPT

## 2017-07-15 PROCEDURE — 85014 HEMATOCRIT: CPT

## 2017-07-15 PROCEDURE — 85730 THROMBOPLASTIN TIME PARTIAL: CPT

## 2017-07-15 PROCEDURE — 87040 BLOOD CULTURE FOR BACTERIA: CPT

## 2017-07-15 PROCEDURE — 85027 COMPLETE CBC AUTOMATED: CPT

## 2017-07-15 PROCEDURE — 71045 X-RAY EXAM CHEST 1 VIEW: CPT

## 2017-07-15 PROCEDURE — 49406 IMAGE CATH FLUID PERI/RETRO: CPT

## 2017-07-15 PROCEDURE — 82435 ASSAY OF BLOOD CHLORIDE: CPT

## 2017-07-15 PROCEDURE — 83605 ASSAY OF LACTIC ACID: CPT

## 2017-07-15 PROCEDURE — 87070 CULTURE OTHR SPECIMN AEROBIC: CPT

## 2017-07-15 PROCEDURE — C1769: CPT

## 2017-07-15 PROCEDURE — 84295 ASSAY OF SERUM SODIUM: CPT

## 2017-07-15 PROCEDURE — 83735 ASSAY OF MAGNESIUM: CPT

## 2017-07-15 PROCEDURE — 87075 CULTR BACTERIA EXCEPT BLOOD: CPT

## 2017-07-15 PROCEDURE — 99238 HOSP IP/OBS DSCHRG MGMT 30/<: CPT | Mod: 24

## 2017-07-15 PROCEDURE — 80053 COMPREHEN METABOLIC PANEL: CPT

## 2017-07-15 PROCEDURE — 85610 PROTHROMBIN TIME: CPT

## 2017-07-15 PROCEDURE — 87086 URINE CULTURE/COLONY COUNT: CPT

## 2017-07-15 PROCEDURE — 82330 ASSAY OF CALCIUM: CPT

## 2017-07-15 PROCEDURE — 99285 EMERGENCY DEPT VISIT HI MDM: CPT | Mod: 25

## 2017-07-15 PROCEDURE — 83690 ASSAY OF LIPASE: CPT

## 2017-07-15 PROCEDURE — C1729: CPT

## 2017-07-15 PROCEDURE — 84100 ASSAY OF PHOSPHORUS: CPT

## 2017-07-15 RX ORDER — MOXIFLOXACIN HYDROCHLORIDE TABLETS, 400 MG 400 MG/1
1 TABLET, FILM COATED ORAL
Qty: 14 | Refills: 0 | OUTPATIENT
Start: 2017-07-15 | End: 2017-07-22

## 2017-07-15 RX ORDER — LIDOCAINE HCL 20 MG/ML
5 VIAL (ML) INJECTION ONCE
Qty: 0 | Refills: 0 | Status: DISCONTINUED | OUTPATIENT
Start: 2017-07-15 | End: 2017-07-15

## 2017-07-15 RX ORDER — ONDANSETRON 8 MG/1
0 TABLET, FILM COATED ORAL
Qty: 0 | Refills: 0 | COMMUNITY

## 2017-07-15 RX ORDER — ONDANSETRON 8 MG/1
1 TABLET, FILM COATED ORAL
Qty: 10 | Refills: 0 | OUTPATIENT
Start: 2017-07-15

## 2017-07-15 RX ADMIN — Medication 200 MILLIGRAM(S): at 05:00

## 2017-07-15 NOTE — DISCHARGE NOTE ADULT - MEDICATION SUMMARY - MEDICATIONS TO TAKE
I will START or STAY ON the medications listed below when I get home from the hospital:    Zofran ODT 4 mg oral tablet, disintegrating  -- 1 tab(s) by mouth prn  -- Indication: For prn nausea I will START or STAY ON the medications listed below when I get home from the hospital:    Zofran ODT 4 mg oral tablet, disintegrating  -- 1 tab(s) by mouth prn  -- Indication: For prn nausea    Cipro 500 mg oral tablet  -- 1 tab(s) by mouth 2 times a day  -- Avoid prolonged or excessive exposure to direct and/or artificial sunlight while taking this medication.  Check with your doctor before becoming pregnant.  Do not take dairy products, antacids, or iron preparations within one hour of this medication.  Finish all this medication unless otherwise directed by prescriber.  Medication should be taken with plenty of water.    -- Indication: For abdominal/wound infection

## 2017-07-15 NOTE — PROVIDER CONTACT NOTE (OTHER) - ACTION/TREATMENT ORDERED:
MD notified, no interventions given at this time, will continue to monitor.
No action at this time. Will continue to monitor.
Will finish whats left of NS fluid bolus and recheck BP. No other action at this time. Will continue to monitor.
awaiting orders

## 2017-07-15 NOTE — PROVIDER CONTACT NOTE (OTHER) - BACKGROUND
pt admitted with abd collection
tp admitted with abd collection. s/p gastrectomy
7/9: hematuria, nausea, abd collection  7/11: IR- drainage of collection
s/p gastrectomy in june 2017 7/9 fever, nausea, hematuria, abd collection  7/11 IR for abd collection drainage. pigtail to DARRYL placed

## 2017-07-15 NOTE — DISCHARGE NOTE ADULT - PATIENT PORTAL LINK FT
“You can access the Follow[facility] Patient Portal, offered by [facility], by registering with the following website: [email or web address]”

## 2017-07-15 NOTE — PROGRESS NOTE ADULT - ASSESSMENT
Patient is a 57y Female s/p IR drainage of fluid collection. Pt is hemodynamically stable with adequately controlled pain, good UOP, and is asymptomatic. Dressings c/d/i and drains with minimal serous drainage.  Lab was significant for low potasium. Drain culture shows PMN and negative marysol (E coli bacteria)     - replete the potasium  - Pain control with IV Tylenol PRN  - Strict I/O's  - F/U GI fxn  - OOB/DVT ppx
56 yo F w/ intraabdominal collection following distal gastrectomy and bilroth II reconstruction  - CT scan abd/ pelvis with iv contrast to evalulate size of abscess  - cont antibtiotics  - cont tube feeds  - wound care  - dvt ppx    Nadja BROCK
56 yo s/p IR drainage for collection s/p distal gastrectomy   -monitor wound   -pain control, cipro    Anemia - no signs of active bleeding
56 yo s/p IR drainage for collection s/p distal gastrectomy   -monitor wound   -plan for home today vs tomorrow with VNS for packing, drain care, PO cipro
56 yo s/p IR drainage for collection s/p distal gastrectomy   -monitor wound   -plan for home today vs tomorrow with VNS for packing, drain care, PO cipro  - discussed with Dr. Trent
57 F s/p distal gastrectomy now with abdominal collection  - f/u cultures  - NPO for IR drainage of abscess  - oob/ amb  - reg diet after IR procedure  - cont antibiotics    Nadja Yeung PA
58 yo s/p IR drainage for collection s/p distal gastrectomy   -monitor wound   -pain control, cipro    Anemia - no signs of active bleeding
S/p drain placment w prior gastrectomy    DC on ABX    Tube study as outpt    F/u w Dr. Trent

## 2017-07-15 NOTE — PROGRESS NOTE ADULT - PROVIDER SPECIALTY LIST ADULT
Internal Medicine
Intervent Radiology
Surgery
Intervent Radiology
Surgery
Surgery

## 2017-07-15 NOTE — DISCHARGE NOTE ADULT - CARE PROVIDER_API CALL
Koffi Trent), Surgery; Surgical Oncology  [address] [place] [id number]  Phone: [phone]  Fax: [phone]

## 2017-07-15 NOTE — DISCHARGE NOTE ADULT - CARE PROVIDERS DIRECT ADDRESSES
[email or web address]

## 2017-07-15 NOTE — PROGRESS NOTE ADULT - SUBJECTIVE AND OBJECTIVE BOX
Subjective: complaning of abd pain around drain and superior wound being red       Objective ; denies chest pain, shortness of breath, nausea, vomiting, abd pain    MEDICATIONS  (STANDING):  enoxaparin Injectable 40 milliGRAM(s) SubCutaneous daily  ciprofloxacin   IVPB   IV Intermittent   ciprofloxacin   IVPB 400 milliGRAM(s) IV Intermittent every 12 hours  pantoprazole    Tablet 40 milliGRAM(s) Oral before breakfast  sodium chloride 0.9%. 1000 milliLiter(s) (50 mL/Hr) IV Continuous <Continuous>  lidocaine 1% Injectable 5 milliLiter(s) Local Injection once    MEDICATIONS  (PRN):  acetaminophen   Tablet. 650 milliGRAM(s) Oral every 6 hours PRN Mild Pain (1 - 3)  oxyCODONE    IR 5 milliGRAM(s) Oral every 4 hours PRN Moderate Pain (4 - 6)      Vital Signs Last 24 Hrs  T(C): 36.7 (15 Jul 2017 14:25), Max: 36.8 (15 Jul 2017 06:17)  T(F): 98 (15 Jul 2017 14:25), Max: 98.3 (15 Jul 2017 06:17)  HR: 74 (15 Jul 2017 14:25) (74 - 95)  BP: 98/64 (15 Jul 2017 14:25) (95/57 - 106/68)  BP(mean): --  RR: 18 (15 Jul 2017 14:25) (16 - 18)  SpO2: 98% (15 Jul 2017 14:25) (98% - 99%)    Constitutional: No fever, fatigue  Skin: No rash.  Eyes: No recent vision problems or eye pain.  ENT: No congestion, ear pain, or sore throat.  Cardiovascular: No chest pain or palpation.  Respiratory: No cough, shortness of breath, congestion, or wheezing.  Gastrointestinal: No abdominal pain, nausea, vomiting, or diarrhea.  Genitourinary: No dysuria.  Musculoskeletal: No joint swelling.  Neurologic: No headache.    physical exam :    pt NAD   CVS: S1, S2  RS; dec breath sounds at rt base  ABD; soft, bs+  EXT;no edema, no tenderness  SKIN; no rash    LABS:  07-15    139  |  105  |  6<L>  ----------------------------<  117<H>  3.1<L>   |  20<L>  |  0.46<L>    Ca    8.6      15 Jul 2017 15:43      Creatinine Trend: 0.46 <--, 0.43 <--, 0.41 <--, 0.47 <--, 0.48 <--, 0.52 <--, 0.38 <--, 0.47 <--                        8.4    5.47  )-----------( 593      ( 15 Jul 2017 15:45 )             25.7     Urine Studies:  Urinalysis Basic - ( 2017 14:01 )    Color: Yellow / Appearance:  / S.012 / pH:   Gluc:  / Ketone: Negative  / Bili: Negative / Urobili: 1   Blood:  / Protein: Trace / Nitrite: Negative   Leuk Esterase: Negative / RBC: 5-10 /HPF / WBC 2-5 /HPF   Sq Epi:  / Non Sq Epi:  / Bacteria: Few /HPF

## 2017-07-15 NOTE — DISCHARGE NOTE ADULT - PLAN OF CARE
resolve collection and remove drainage tube follow-up with Dr. Trent as scheduled follow-up with Dr. Trent as scheduled.    Pack the two openings of the wound with iodoform gauze to keep skin from closing over and allow drainage onto bandages. Replace bandages as needed.    Empty the drain bag as needed for comfort/cleanliness

## 2017-07-15 NOTE — DISCHARGE NOTE ADULT - HOSPITAL COURSE
You were admitted for a developing collection under the skin. An additional collection was found in the abdomen on CT scan, which was drained by IR guidance. The follow-up scans showed the drain in place, with resolving collection which will continue to be managed outpatient.

## 2017-07-15 NOTE — PROVIDER CONTACT NOTE (OTHER) - ASSESSMENT
pt refusing and family 2am vitals. Last vitals are stable. Pt in so signs of distress.
pt bp 73/47. All other VSS. Pt denies lightheadedness or dizziness. No signs of distress. Denies chest pain or shortness of breath.
Pt's vitals have been stable, does not appear to be in any distress.
pt. A&Ox4. all other vss. no c/o of dizziness or lightheadedness. pt. eating minimally

## 2017-07-15 NOTE — DISCHARGE NOTE ADULT - CARE PLAN
Principal Discharge DX:	Intra-abdominal collection  Goal:	resolve collection and remove drainage tube  Instructions for follow-up, activity and diet:	follow-up with Dr. Trent as scheduled Principal Discharge DX:	Intra-abdominal collection  Goal:	resolve collection and remove drainage tube  Instructions for follow-up, activity and diet:	follow-up with Dr. Trent as scheduled.    Pack the two openings of the wound with iodoform gauze to keep skin from closing over and allow drainage onto bandages. Replace bandages as needed.    Empty the drain bag as needed for comfort/cleanliness

## 2017-07-15 NOTE — PROGRESS NOTE ADULT - SUBJECTIVE AND OBJECTIVE BOX
SURGICAL ONCOLOGY PROGRESS NOTE    No complaints.  Tree diet. On TFs    Vital Signs Last 24 Hrs  T(C): 36.6 (15 Jul 2017 09:19), Max: 36.8 (14 Jul 2017 14:25)  T(F): 97.9 (15 Jul 2017 09:19), Max: 98.3 (15 Jul 2017 06:17)  HR: 74 (15 Jul 2017 09:19) (74 - 95)  BP: 95/57 (15 Jul 2017 09:19) (73/47 - 106/68)  BP(mean): --  RR: 16 (15 Jul 2017 09:19) (16 - 18)  SpO2: 98% (15 Jul 2017 09:19) (97% - 100%)                        9.4    5.60  )-----------( 612      ( 14 Jul 2017 08:36 )             28.7     07-14    136  |  103  |  4<L>  ----------------------------<  125<H>  3.8   |  18<L>  |  0.43<L>    Ca    8.3<L>      14 Jul 2017 09:03

## 2017-07-18 ENCOUNTER — APPOINTMENT (OUTPATIENT)
Dept: SURGICAL ONCOLOGY | Facility: CLINIC | Age: 57
End: 2017-07-18

## 2017-07-18 VITALS
WEIGHT: 124 LBS | HEIGHT: 64 IN | OXYGEN SATURATION: 95 % | DIASTOLIC BLOOD PRESSURE: 66 MMHG | SYSTOLIC BLOOD PRESSURE: 98 MMHG | HEART RATE: 85 BPM | BODY MASS INDEX: 21.17 KG/M2

## 2017-07-18 VITALS — TEMPERATURE: 97.5 F

## 2017-07-25 ENCOUNTER — APPOINTMENT (OUTPATIENT)
Dept: SURGICAL ONCOLOGY | Facility: CLINIC | Age: 57
End: 2017-07-25

## 2017-07-25 VITALS
SYSTOLIC BLOOD PRESSURE: 105 MMHG | RESPIRATION RATE: 15 BRPM | TEMPERATURE: 98.2 F | DIASTOLIC BLOOD PRESSURE: 69 MMHG | OXYGEN SATURATION: 99 % | WEIGHT: 124 LBS | HEART RATE: 74 BPM | BODY MASS INDEX: 21.28 KG/M2

## 2017-07-25 RX ORDER — METRONIDAZOLE 500 MG/1
500 TABLET ORAL
Qty: 21 | Refills: 0 | Status: ACTIVE | COMMUNITY
Start: 2017-04-29

## 2017-07-25 RX ORDER — SUCRALFATE 1 G/10ML
1 SUSPENSION ORAL
Qty: 414 | Refills: 0 | Status: ACTIVE | COMMUNITY
Start: 2017-06-06

## 2017-07-25 RX ORDER — METOCLOPRAMIDE 5 MG/1
5 TABLET ORAL
Qty: 30 | Refills: 0 | Status: ACTIVE | COMMUNITY
Start: 2017-06-12

## 2017-07-25 RX ORDER — OMEPRAZOLE 40 MG/1
40 CAPSULE, DELAYED RELEASE ORAL
Qty: 30 | Refills: 0 | Status: ACTIVE | COMMUNITY
Start: 2017-06-12

## 2017-07-25 RX ORDER — OXYCODONE 5 MG/1
5 TABLET ORAL
Qty: 20 | Refills: 0 | Status: ACTIVE | COMMUNITY
Start: 2017-07-04

## 2017-07-25 RX ORDER — CIPROFLOXACIN HYDROCHLORIDE 500 MG/1
500 TABLET, FILM COATED ORAL
Qty: 14 | Refills: 0 | Status: ACTIVE | COMMUNITY
Start: 2017-07-15

## 2017-07-25 RX ORDER — ONDANSETRON 4 MG/1
4 TABLET, ORALLY DISINTEGRATING ORAL
Qty: 10 | Refills: 0 | Status: ACTIVE | COMMUNITY
Start: 2017-07-15

## 2017-07-30 ENCOUNTER — FORM ENCOUNTER (OUTPATIENT)
Age: 57
End: 2017-07-30

## 2017-07-31 ENCOUNTER — APPOINTMENT (OUTPATIENT)
Dept: CT IMAGING | Facility: HOSPITAL | Age: 57
End: 2017-07-31

## 2017-07-31 ENCOUNTER — OUTPATIENT (OUTPATIENT)
Dept: OUTPATIENT SERVICES | Facility: HOSPITAL | Age: 57
LOS: 1 days | End: 2017-07-31
Payer: COMMERCIAL

## 2017-07-31 DIAGNOSIS — Z90.3 ACQUIRED ABSENCE OF STOMACH [PART OF]: Chronic | ICD-10-CM

## 2017-07-31 DIAGNOSIS — T81.4XXA INFECTION FOLLOWING A PROCEDURE, INITIAL ENCOUNTER: ICD-10-CM

## 2017-07-31 PROCEDURE — 76080 X-RAY EXAM OF FISTULA: CPT | Mod: 26

## 2017-07-31 PROCEDURE — 76080 X-RAY EXAM OF FISTULA: CPT

## 2017-07-31 PROCEDURE — 49424 ASSESS CYST CONTRAST INJECT: CPT

## 2017-08-01 ENCOUNTER — APPOINTMENT (OUTPATIENT)
Dept: SURGICAL ONCOLOGY | Facility: CLINIC | Age: 57
End: 2017-08-01
Payer: COMMERCIAL

## 2017-08-01 VITALS
TEMPERATURE: 98.2 F | SYSTOLIC BLOOD PRESSURE: 119 MMHG | OXYGEN SATURATION: 99 % | HEART RATE: 74 BPM | DIASTOLIC BLOOD PRESSURE: 72 MMHG | RESPIRATION RATE: 14 BRPM | HEIGHT: 64 IN | WEIGHT: 121 LBS | BODY MASS INDEX: 20.66 KG/M2

## 2017-08-01 DIAGNOSIS — T81.4XXA INFECTION FOLLOWING A PROCEDURE, INITIAL ENCOUNTER: ICD-10-CM

## 2017-08-01 PROCEDURE — 99024 POSTOP FOLLOW-UP VISIT: CPT

## 2017-08-03 DIAGNOSIS — Z43.4 ENCOUNTER FOR ATTENTION TO OTHER ARTIFICIAL OPENINGS OF DIGESTIVE TRACT: ICD-10-CM

## 2017-08-03 DIAGNOSIS — K65.1 PERITONEAL ABSCESS: ICD-10-CM

## 2017-08-08 DIAGNOSIS — K65.1 PERITONEAL ABSCESS: ICD-10-CM

## 2017-08-18 ENCOUNTER — OUTPATIENT (OUTPATIENT)
Dept: OUTPATIENT SERVICES | Facility: HOSPITAL | Age: 57
LOS: 1 days | End: 2017-08-18
Payer: COMMERCIAL

## 2017-08-18 DIAGNOSIS — K65.1 PERITONEAL ABSCESS: ICD-10-CM

## 2017-08-18 DIAGNOSIS — Z90.3 ACQUIRED ABSENCE OF STOMACH [PART OF]: Chronic | ICD-10-CM

## 2017-08-18 PROCEDURE — 49424 ASSESS CYST CONTRAST INJECT: CPT

## 2017-08-18 PROCEDURE — 76080 X-RAY EXAM OF FISTULA: CPT | Mod: 26

## 2017-08-18 PROCEDURE — 76080 X-RAY EXAM OF FISTULA: CPT

## 2017-08-22 DIAGNOSIS — Z43.4 ENCOUNTER FOR ATTENTION TO OTHER ARTIFICIAL OPENINGS OF DIGESTIVE TRACT: ICD-10-CM

## 2017-08-22 DIAGNOSIS — K65.1 PERITONEAL ABSCESS: ICD-10-CM

## 2017-09-01 ENCOUNTER — OUTPATIENT (OUTPATIENT)
Dept: OUTPATIENT SERVICES | Facility: HOSPITAL | Age: 57
LOS: 1 days | End: 2017-09-01
Payer: COMMERCIAL

## 2017-09-01 DIAGNOSIS — K63.2 FISTULA OF INTESTINE: ICD-10-CM

## 2017-09-01 DIAGNOSIS — K65.1 PERITONEAL ABSCESS: ICD-10-CM

## 2017-09-01 DIAGNOSIS — Z90.3 ACQUIRED ABSENCE OF STOMACH [PART OF]: Chronic | ICD-10-CM

## 2017-09-01 PROCEDURE — 49424 ASSESS CYST CONTRAST INJECT: CPT

## 2017-09-01 PROCEDURE — 76080 X-RAY EXAM OF FISTULA: CPT | Mod: 26

## 2017-09-01 PROCEDURE — 76080 X-RAY EXAM OF FISTULA: CPT

## 2017-09-06 DIAGNOSIS — K65.1 PERITONEAL ABSCESS: ICD-10-CM

## 2017-09-06 DIAGNOSIS — K63.2 FISTULA OF INTESTINE: ICD-10-CM

## 2017-09-06 DIAGNOSIS — Z43.4 ENCOUNTER FOR ATTENTION TO OTHER ARTIFICIAL OPENINGS OF DIGESTIVE TRACT: ICD-10-CM

## 2017-09-21 ENCOUNTER — OUTPATIENT (OUTPATIENT)
Dept: OUTPATIENT SERVICES | Facility: HOSPITAL | Age: 57
LOS: 1 days | End: 2017-09-21
Payer: COMMERCIAL

## 2017-09-21 ENCOUNTER — APPOINTMENT (OUTPATIENT)
Dept: SURGICAL ONCOLOGY | Facility: CLINIC | Age: 57
End: 2017-09-21
Payer: COMMERCIAL

## 2017-09-21 VITALS
SYSTOLIC BLOOD PRESSURE: 133 MMHG | DIASTOLIC BLOOD PRESSURE: 78 MMHG | TEMPERATURE: 97.6 F | OXYGEN SATURATION: 97 % | HEART RATE: 57 BPM | BODY MASS INDEX: 21.44 KG/M2 | WEIGHT: 124.89 LBS | RESPIRATION RATE: 16 BRPM

## 2017-09-21 DIAGNOSIS — C16.9 MALIGNANT NEOPLASM OF STOMACH, UNSPECIFIED: ICD-10-CM

## 2017-09-21 DIAGNOSIS — K63.2 FISTULA OF INTESTINE: ICD-10-CM

## 2017-09-21 DIAGNOSIS — Z90.3 ACQUIRED ABSENCE OF STOMACH [PART OF]: Chronic | ICD-10-CM

## 2017-09-21 PROCEDURE — C1769: CPT

## 2017-09-21 PROCEDURE — 75984 XRAY CONTROL CATHETER CHANGE: CPT | Mod: 26

## 2017-09-21 PROCEDURE — 49423 EXCHANGE DRAINAGE CATHETER: CPT

## 2017-09-21 PROCEDURE — C1887: CPT

## 2017-09-21 PROCEDURE — C1729: CPT

## 2017-09-21 PROCEDURE — 99024 POSTOP FOLLOW-UP VISIT: CPT

## 2017-09-21 PROCEDURE — 75984 XRAY CONTROL CATHETER CHANGE: CPT

## 2017-09-26 DIAGNOSIS — T85.618A BREAKDOWN (MECHANICAL) OF OTHER SPECIFIED INTERNAL PROSTHETIC DEVICES, IMPLANTS AND GRAFTS, INITIAL ENCOUNTER: ICD-10-CM

## 2017-09-26 DIAGNOSIS — T81.4XXD INFECTION FOLLOWING A PROCEDURE, SUBSEQUENT ENCOUNTER: ICD-10-CM

## 2017-10-20 ENCOUNTER — OUTPATIENT (OUTPATIENT)
Dept: OUTPATIENT SERVICES | Facility: HOSPITAL | Age: 57
LOS: 1 days | End: 2017-10-20
Payer: COMMERCIAL

## 2017-10-20 DIAGNOSIS — Z90.3 ACQUIRED ABSENCE OF STOMACH [PART OF]: Chronic | ICD-10-CM

## 2017-10-20 DIAGNOSIS — K63.2 FISTULA OF INTESTINE: ICD-10-CM

## 2017-10-20 PROCEDURE — 76080 X-RAY EXAM OF FISTULA: CPT

## 2017-10-20 PROCEDURE — 49424 ASSESS CYST CONTRAST INJECT: CPT

## 2017-10-20 PROCEDURE — 76080 X-RAY EXAM OF FISTULA: CPT | Mod: 26

## 2017-10-25 DIAGNOSIS — K65.1 PERITONEAL ABSCESS: ICD-10-CM

## 2017-10-25 DIAGNOSIS — Z43.4 ENCOUNTER FOR ATTENTION TO OTHER ARTIFICIAL OPENINGS OF DIGESTIVE TRACT: ICD-10-CM

## 2018-02-22 ENCOUNTER — RESULT REVIEW (OUTPATIENT)
Age: 58
End: 2018-02-22

## 2018-02-22 ENCOUNTER — OUTPATIENT (OUTPATIENT)
Dept: OUTPATIENT SERVICES | Facility: HOSPITAL | Age: 58
LOS: 1 days | End: 2018-02-22

## 2018-02-22 DIAGNOSIS — C16.3 MALIGNANT NEOPLASM OF PYLORIC ANTRUM: ICD-10-CM

## 2018-02-22 DIAGNOSIS — Z90.3 ACQUIRED ABSENCE OF STOMACH [PART OF]: Chronic | ICD-10-CM

## 2018-02-27 ENCOUNTER — OUTPATIENT (OUTPATIENT)
Dept: OUTPATIENT SERVICES | Facility: HOSPITAL | Age: 58
LOS: 1 days | End: 2018-02-27

## 2018-02-27 DIAGNOSIS — Z90.3 ACQUIRED ABSENCE OF STOMACH [PART OF]: Chronic | ICD-10-CM

## 2018-02-27 DIAGNOSIS — C16.3 MALIGNANT NEOPLASM OF PYLORIC ANTRUM: ICD-10-CM

## 2018-05-04 NOTE — ED ADULT NURSE NOTE - FALL HARM RISK TYPE OF ASSESSMENT
Ronnie Blackwell from [facility] [place] AYALA Castillo: [phone] or C: [phone]    Patient was complaining left sided pain yesterday evening. Patient was given Tylenol PRN. Still awaiting results on urine. Nurse states vitals:  Temp 99.3  O2 73%- 76% (Please advise)    Patient has been c/o loose stools and he is taking miralax daily - nurse would like to change to PRN. Please advise. Admission

## 2018-05-18 ENCOUNTER — INPATIENT (INPATIENT)
Facility: HOSPITAL | Age: 58
LOS: 6 days | Discharge: HOME HEALTH | DRG: 380 | End: 2018-05-25
Attending: SPECIALIST | Admitting: SPECIALIST
Payer: COMMERCIAL

## 2018-05-18 VITALS
HEIGHT: 63 IN | RESPIRATION RATE: 18 BRPM | TEMPERATURE: 98 F | WEIGHT: 117.95 LBS | SYSTOLIC BLOOD PRESSURE: 130 MMHG | HEART RATE: 66 BPM | OXYGEN SATURATION: 100 % | DIASTOLIC BLOOD PRESSURE: 80 MMHG

## 2018-05-18 DIAGNOSIS — R19.8 OTHER SPECIFIED SYMPTOMS AND SIGNS INVOLVING THE DIGESTIVE SYSTEM AND ABDOMEN: ICD-10-CM

## 2018-05-18 DIAGNOSIS — Z90.3 ACQUIRED ABSENCE OF STOMACH [PART OF]: Chronic | ICD-10-CM

## 2018-05-18 DIAGNOSIS — C16.9 MALIGNANT NEOPLASM OF STOMACH, UNSPECIFIED: ICD-10-CM

## 2018-05-18 DIAGNOSIS — D64.9 ANEMIA, UNSPECIFIED: ICD-10-CM

## 2018-05-18 DIAGNOSIS — Z29.9 ENCOUNTER FOR PROPHYLACTIC MEASURES, UNSPECIFIED: ICD-10-CM

## 2018-05-18 LAB
ALBUMIN SERPL ELPH-MCNC: 2.7 G/DL — LOW (ref 3.5–5)
ALP SERPL-CCNC: 227 U/L — HIGH (ref 40–120)
ALT FLD-CCNC: 34 U/L DA — SIGNIFICANT CHANGE UP (ref 10–60)
ANION GAP SERPL CALC-SCNC: 9 MMOL/L — SIGNIFICANT CHANGE UP (ref 5–17)
APPEARANCE UR: CLEAR — SIGNIFICANT CHANGE UP
APTT BLD: 29 SEC — SIGNIFICANT CHANGE UP (ref 27.5–37.4)
AST SERPL-CCNC: 28 U/L — SIGNIFICANT CHANGE UP (ref 10–40)
BASOPHILS # BLD AUTO: 0.1 K/UL — SIGNIFICANT CHANGE UP (ref 0–0.2)
BASOPHILS NFR BLD AUTO: 1.3 % — SIGNIFICANT CHANGE UP (ref 0–2)
BILIRUB SERPL-MCNC: 0.3 MG/DL — SIGNIFICANT CHANGE UP (ref 0.2–1.2)
BILIRUB UR-MCNC: NEGATIVE — SIGNIFICANT CHANGE UP
BUN SERPL-MCNC: 8 MG/DL — SIGNIFICANT CHANGE UP (ref 7–18)
CALCIUM SERPL-MCNC: 8.4 MG/DL — SIGNIFICANT CHANGE UP (ref 8.4–10.5)
CHLORIDE SERPL-SCNC: 112 MMOL/L — HIGH (ref 96–108)
CO2 SERPL-SCNC: 20 MMOL/L — LOW (ref 22–31)
COLOR SPEC: YELLOW — SIGNIFICANT CHANGE UP
CREAT SERPL-MCNC: 0.67 MG/DL — SIGNIFICANT CHANGE UP (ref 0.5–1.3)
DIFF PNL FLD: NEGATIVE — SIGNIFICANT CHANGE UP
EOSINOPHIL # BLD AUTO: 0.1 K/UL — SIGNIFICANT CHANGE UP (ref 0–0.5)
EOSINOPHIL NFR BLD AUTO: 3 % — SIGNIFICANT CHANGE UP (ref 0–6)
GLUCOSE SERPL-MCNC: 78 MG/DL — SIGNIFICANT CHANGE UP (ref 70–99)
GLUCOSE UR QL: NEGATIVE — SIGNIFICANT CHANGE UP
HCT VFR BLD CALC: 27.8 % — LOW (ref 34.5–45)
HGB BLD-MCNC: 8.5 G/DL — LOW (ref 11.5–15.5)
INR BLD: 1.07 RATIO — SIGNIFICANT CHANGE UP (ref 0.88–1.16)
KETONES UR-MCNC: NEGATIVE — SIGNIFICANT CHANGE UP
LEUKOCYTE ESTERASE UR-ACNC: ABNORMAL
LIDOCAIN IGE QN: 150 U/L — SIGNIFICANT CHANGE UP (ref 73–393)
LYMPHOCYTES # BLD AUTO: 1.4 K/UL — SIGNIFICANT CHANGE UP (ref 1–3.3)
LYMPHOCYTES # BLD AUTO: 35.5 % — SIGNIFICANT CHANGE UP (ref 13–44)
MAGNESIUM SERPL-MCNC: 2.3 MG/DL — SIGNIFICANT CHANGE UP (ref 1.6–2.6)
MCHC RBC-ENTMCNC: 28.2 PG — SIGNIFICANT CHANGE UP (ref 27–34)
MCHC RBC-ENTMCNC: 30.5 GM/DL — LOW (ref 32–36)
MCV RBC AUTO: 92.3 FL — SIGNIFICANT CHANGE UP (ref 80–100)
MONOCYTES # BLD AUTO: 0.4 K/UL — SIGNIFICANT CHANGE UP (ref 0–0.9)
MONOCYTES NFR BLD AUTO: 10.2 % — SIGNIFICANT CHANGE UP (ref 2–14)
NEUTROPHILS # BLD AUTO: 2 K/UL — SIGNIFICANT CHANGE UP (ref 1.8–7.4)
NEUTROPHILS NFR BLD AUTO: 50 % — SIGNIFICANT CHANGE UP (ref 43–77)
NITRITE UR-MCNC: NEGATIVE — SIGNIFICANT CHANGE UP
PH UR: 7 — SIGNIFICANT CHANGE UP (ref 5–8)
PLATELET # BLD AUTO: 367 K/UL — SIGNIFICANT CHANGE UP (ref 150–400)
POTASSIUM SERPL-MCNC: 3.8 MMOL/L — SIGNIFICANT CHANGE UP (ref 3.5–5.3)
POTASSIUM SERPL-SCNC: 3.8 MMOL/L — SIGNIFICANT CHANGE UP (ref 3.5–5.3)
PROT SERPL-MCNC: 6.6 G/DL — SIGNIFICANT CHANGE UP (ref 6–8.3)
PROT UR-MCNC: NEGATIVE — SIGNIFICANT CHANGE UP
PROTHROM AB SERPL-ACNC: 11.7 SEC — SIGNIFICANT CHANGE UP (ref 9.8–12.7)
RBC # BLD: 3.02 M/UL — LOW (ref 3.8–5.2)
RBC # FLD: 15.7 % — HIGH (ref 10.3–14.5)
SODIUM SERPL-SCNC: 141 MMOL/L — SIGNIFICANT CHANGE UP (ref 135–145)
SP GR SPEC: 1 — LOW (ref 1.01–1.02)
TROPONIN I SERPL-MCNC: <0.015 NG/ML — SIGNIFICANT CHANGE UP (ref 0–0.04)
UROBILINOGEN FLD QL: NEGATIVE — SIGNIFICANT CHANGE UP
WBC # BLD: 4 K/UL — SIGNIFICANT CHANGE UP (ref 3.8–10.5)
WBC # FLD AUTO: 4 K/UL — SIGNIFICANT CHANGE UP (ref 3.8–10.5)

## 2018-05-18 PROCEDURE — 99285 EMERGENCY DEPT VISIT HI MDM: CPT

## 2018-05-18 PROCEDURE — 99223 1ST HOSP IP/OBS HIGH 75: CPT | Mod: AI,GC

## 2018-05-18 PROCEDURE — 71046 X-RAY EXAM CHEST 2 VIEWS: CPT | Mod: 26

## 2018-05-18 PROCEDURE — 99223 1ST HOSP IP/OBS HIGH 75: CPT

## 2018-05-18 PROCEDURE — 74177 CT ABD & PELVIS W/CONTRAST: CPT | Mod: 26

## 2018-05-18 PROCEDURE — 71045 X-RAY EXAM CHEST 1 VIEW: CPT | Mod: 26,59

## 2018-05-18 RX ORDER — PANTOPRAZOLE SODIUM 20 MG/1
40 TABLET, DELAYED RELEASE ORAL ONCE
Qty: 0 | Refills: 0 | Status: COMPLETED | OUTPATIENT
Start: 2018-05-18 | End: 2018-05-18

## 2018-05-18 RX ORDER — ONDANSETRON 8 MG/1
4 TABLET, FILM COATED ORAL EVERY 8 HOURS
Qty: 0 | Refills: 0 | Status: DISCONTINUED | OUTPATIENT
Start: 2018-05-18 | End: 2018-05-25

## 2018-05-18 RX ORDER — PANTOPRAZOLE SODIUM 20 MG/1
40 TABLET, DELAYED RELEASE ORAL DAILY
Qty: 0 | Refills: 0 | Status: DISCONTINUED | OUTPATIENT
Start: 2018-05-18 | End: 2018-05-24

## 2018-05-18 RX ORDER — LIDOCAINE 4 G/100G
10 CREAM TOPICAL ONCE
Qty: 0 | Refills: 0 | Status: COMPLETED | OUTPATIENT
Start: 2018-05-18 | End: 2018-05-18

## 2018-05-18 RX ORDER — OCTREOTIDE ACETATE 200 UG/ML
100 INJECTION, SOLUTION INTRAVENOUS; SUBCUTANEOUS THREE TIMES A DAY
Qty: 0 | Refills: 0 | Status: DISCONTINUED | OUTPATIENT
Start: 2018-05-18 | End: 2018-05-21

## 2018-05-18 RX ORDER — SODIUM CHLORIDE 9 MG/ML
1000 INJECTION, SOLUTION INTRAVENOUS
Qty: 0 | Refills: 0 | Status: DISCONTINUED | OUTPATIENT
Start: 2018-05-18 | End: 2018-05-19

## 2018-05-18 RX ORDER — PIPERACILLIN AND TAZOBACTAM 4; .5 G/20ML; G/20ML
3.38 INJECTION, POWDER, LYOPHILIZED, FOR SOLUTION INTRAVENOUS ONCE
Qty: 0 | Refills: 0 | Status: COMPLETED | OUTPATIENT
Start: 2018-05-18 | End: 2018-05-18

## 2018-05-18 RX ORDER — FAMOTIDINE 10 MG/ML
20 INJECTION INTRAVENOUS ONCE
Qty: 0 | Refills: 0 | Status: COMPLETED | OUTPATIENT
Start: 2018-05-18 | End: 2018-05-18

## 2018-05-18 RX ORDER — ENOXAPARIN SODIUM 100 MG/ML
40 INJECTION SUBCUTANEOUS DAILY
Qty: 0 | Refills: 0 | Status: DISCONTINUED | OUTPATIENT
Start: 2018-05-18 | End: 2018-05-25

## 2018-05-18 RX ADMIN — PIPERACILLIN AND TAZOBACTAM 200 GRAM(S): 4; .5 INJECTION, POWDER, LYOPHILIZED, FOR SOLUTION INTRAVENOUS at 11:47

## 2018-05-18 RX ADMIN — FAMOTIDINE 104 MILLIGRAM(S): 10 INJECTION INTRAVENOUS at 11:47

## 2018-05-18 RX ADMIN — SODIUM CHLORIDE 60 MILLILITER(S): 9 INJECTION, SOLUTION INTRAVENOUS at 23:30

## 2018-05-18 RX ADMIN — OCTREOTIDE ACETATE 100 MICROGRAM(S): 200 INJECTION, SOLUTION INTRAVENOUS; SUBCUTANEOUS at 22:14

## 2018-05-18 RX ADMIN — LIDOCAINE 10 MILLILITER(S): 4 CREAM TOPICAL at 11:48

## 2018-05-18 RX ADMIN — SODIUM CHLORIDE 60 MILLILITER(S): 9 INJECTION, SOLUTION INTRAVENOUS at 17:19

## 2018-05-18 RX ADMIN — PANTOPRAZOLE SODIUM 40 MILLIGRAM(S): 20 TABLET, DELAYED RELEASE ORAL at 11:47

## 2018-05-18 NOTE — CONSULT NOTE ADULT - SUBJECTIVE AND OBJECTIVE BOX
58y Female with PMHx of gastric cancer s/p distal gastrectomy and bilroth II reconstruction on 6/23/17 with Dr. Trent. Her oncologist told her to come to ED today after results of an outside CT done one week ago revealed a possible anastamotic breakdown vs perforated ulcer and small amount of free air. Patient reports she has been having abdominal pain and heart burn for a couple of weeks now and is unable to tolerate PO intake. She states she feels better after throwing up. She denies change in bowel habits and no fevers. Ct scan done in ED confirmed outside CT results. She is accompanied by her son.    pmhx: as above  pshx: as above    No Known Allergies    meds:  T(C): 36.9 (05-18-18 @ 15:53), Max: 36.9 (05-18-18 @ 15:53)  HR: 56 (05-18-18 @ 15:53) (56 - 66)  BP: 123/60 (05-18-18 @ 15:53) (123/60 - 130/80)  RR: 16 (05-18-18 @ 15:53) (16 - 18)  SpO2: 100% (05-18-18 @ 15:53) (100% - 100%)  Wt(kg): --    Gen:A&O x3, NAD  Skin: no rashes, no jaundice  Abdomen: soft, nontender, nondistended, no masses, no guarding, no rebound tenderness  Lower Extremities: no edema, no skin discoloration, no calf tenderness bilaterally                              8.5    4.0   )-----------( 367      ( 18 May 2018 10:43 )             27.8   05-18    141  |  112<H>  |  8   ----------------------------<  78  3.8   |  20<L>  |  0.67    Ca    8.4      18 May 2018 10:43  Mg     2.3     05-18    TPro  6.6  /  Alb  2.7<L>  /  TBili  0.3  /  DBili  x   /  AST  28  /  ALT  34  /  AlkPhos  227<H>  05-18  < from: CT Abdomen and Pelvis w/ Oral Cont and w/ IV Cont (05.18.18 @ 14:32) >  IMPRESSION:     Status post distal gastrectomy and Bilroth 2 procedure with small focus   of extraluminal air anterior to the duodenal suture line and a larger   focus of air anterior to the gallbladder fossa with surrounding   phlegmonous change in the rightupper quadrant. Differential includes   anastomotic breakdown or perforated duodenal ulcer.    Inflammatory changes surrounding the gallbladder likely reactive.    Additional findings as above.    < end of copied text > 58y Female with PMHx of gastric cancer s/p distal gastrectomy and bilroth II reconstruction on 6/23/17 with Dr. Trent. Her oncologist told her to come to ED today after results of an outside CT done one week ago revealed a possible anastamotic breakdown vs perforated ulcer and small amount of free air. Patient reports she has been having abdominal pain and heart burn for a couple of weeks now and is unable to tolerate PO intake. She states she feels better after throwing up. She denies change in bowel habits and no fevers. Ct scan done in ED confirmed outside CT results. She is accompanied by her son.    pmhx: as above  pshx: as above    No Known Allergies    meds:  T(C): 36.9 (05-18-18 @ 15:53), Max: 36.9 (05-18-18 @ 15:53)  HR: 56 (05-18-18 @ 15:53) (56 - 66)  BP: 123/60 (05-18-18 @ 15:53) (123/60 - 130/80)  RR: 16 (05-18-18 @ 15:53) (16 - 18)  SpO2: 100% (05-18-18 @ 15:53) (100% - 100%)  Wt(kg): --    Gen:A&O x3, NAD  Skin: no rashes, no jaundice  Abdomen: soft, nontender, nondistended, no masses, no guarding, no rebound tenderness, well healed midline abdominal incision  Lower Extremities: no edema, no skin discoloration, no calf tenderness bilaterally                              8.5    4.0   )-----------( 367      ( 18 May 2018 10:43 )             27.8   05-18    141  |  112<H>  |  8   ----------------------------<  78  3.8   |  20<L>  |  0.67    Ca    8.4      18 May 2018 10:43  Mg     2.3     05-18    TPro  6.6  /  Alb  2.7<L>  /  TBili  0.3  /  DBili  x   /  AST  28  /  ALT  34  /  AlkPhos  227<H>  05-18  < from: CT Abdomen and Pelvis w/ Oral Cont and w/ IV Cont (05.18.18 @ 14:32) >  IMPRESSION:     Status post distal gastrectomy and Bilroth 2 procedure with small focus   of extraluminal air anterior to the duodenal suture line and a larger   focus of air anterior to the gallbladder fossa with surrounding   phlegmonous change in the rightupper quadrant. Differential includes   anastomotic breakdown or perforated duodenal ulcer.    Inflammatory changes surrounding the gallbladder likely reactive.    Additional findings as above.    < end of copied text >

## 2018-05-18 NOTE — H&P ADULT - NEGATIVE ENMT SYMPTOMS
no sinus symptoms/no nasal obstruction/no gum bleeding/no dry mouth/no throat pain/no ear pain/no tinnitus/no nose bleeds/no recurrent cold sores/no abnormal taste sensation/no hearing difficulty/no vertigo/no nasal congestion/no nasal discharge/no post-nasal discharge/no dysphagia

## 2018-05-18 NOTE — ED ADULT NURSE NOTE - OBJECTIVE STATEMENT
arrived ambulatory c/o generalized abd pain and vomiting x1 yesterday recently  diagnosed with stomach Ca

## 2018-05-18 NOTE — H&P ADULT - NEGATIVE GASTROINTESTINAL SYMPTOMS
no flatulence/no melena/no diarrhea/no change in bowel habits/no constipation/no hiccoughs/no hematochezia/no steatorrhea/no jaundice

## 2018-05-18 NOTE — ED PROVIDER NOTE - OBJECTIVE STATEMENT
57 y/o F pt with PMHx of gastric CA s/p partial gastrectomy on June 2017 and on chemotherapy since September 2017 sent in for admission. Pt reports that she had a CT scan (done at [facility]) last Saturday and was informed by her doctor that the results showed a stomach perforation, and she needs to be NPO for a few days. Pt's doctor also stated that the perforation was likely due to her chemotherapy medication. Pt is c/o abd pain and heart burn. Pt also reports difficulty keeping food down and states that she feels back pain piror to vomiting; she also states that the back pain resolves after vomiting. Pt denies fever, chills, or any other complaints.

## 2018-05-18 NOTE — H&P ADULT - ATTENDING COMMENTS
Patient was examined in the ED and discussed with Dr. Hanna and with surgery attending.     She has c/o epigastric pain and vomiting for over one week.  She has also had back pain, which is relieved by vomiting.   She has a history of gastric cancer, s/p partial gastrectomy in  (Dr. Trent, [facility]).  She has been receiving weekly chemotherapy.    Last week she had a CT abdomen, which showed intraabdominal free air (gastric perforation) and was referred to ED by Dr. Crowe.    She has been able to eat, ate a little breakfast today in AM.    Chronically ill, alert, cooperative 57 yo woman who looks older  Vital Signs Last 24 Hrs  T(C): 36.9 (18 May 2018 15:53), Max: 36.9 (18 May 2018 15:53)  T(F): 98.5 (18 May 2018 15:53), Max: 98.5 (18 May 2018 15:53)  HR: 56 (18 May 2018 15:53) (56 - 66)  BP: 123/60 (18 May 2018 15:53) (123/60 - 130/80)  BP(mean): --  RR: 16 (18 May 2018 15:53) (16 - 18)  SpO2: 100% (18 May 2018 15:53) (100% - 100%)  Lungs, clear  Cor, RRR  Abdomen, slight distention, bs present, no rebound.   Neurological, intact                         8.5    4.0   )-----------( 367      ( 18 May 2018 10:43 )             27.8       05-18    141  |  112<H>  |  8   ----------------------------<  78  3.8   |  20<L>  |  0.67    Ca    8.4      18 May 2018 10:43  Mg     2.3     05-18    TPro  6.6  /  Alb  2.7<L>  /  TBili  0.3  /  DBili  x   /  AST  28  /  ALT  34  /  AlkPhos  227<H>  05-18        Urinalysis Basic - ( 18 May 2018 10:43 )    Color: Yellow / Appearance: Clear / S.005 / pH: x  Gluc: x / Ketone: Negative  / Bili: Negative / Urobili: Negative   Blood: x / Protein: Negative / Nitrite: Negative   Leuk Esterase: Trace / RBC: 0-2 /HPF / WBC 0-2 /HPF   Sq Epi: x / Non Sq Epi: Occasional /HPF / Bacteria: x  PT/INR - ( 18 May 2018 10:43 )   PT: 11.7 sec;   INR: 1.07 ratio    PTT - ( 18 May 2018 10:43 )  PTT:29.0 sec  CARDIAC MARKERS ( 18 May 2018 10:43 )  <0.015 ng/mL / x     / x     / x     / x        < from: CT Abdomen and Pelvis w/ Oral Cont and w/ IV Cont (18 @ 14:32) >    Status post distal gastrectomy and Bilroth 2 procedure with small focus   of extraluminal air anterior to the duodenal suture line and a larger   focus of air anterior to the gallbladder fossa with surrounding   phlegmonous change in the rightupper quadrant. Differential includes   anastomotic breakdown or perforated duodenal ulcer.    Inflammatory changes surrounding the gallbladder likely reactive.    < end of copied text >    < from: Xray Chest 2 Views PA/Lat (18 @ 11:25) >    Impression: No evidence for pulmonary consolidation, pleural effusion or   pneumothorax.    The trachea is midline.    The cardiac silhouette is within normal limits.    No evidence for free air under the diaphragm.    Stable examination.    < end of copied text >    IMP:  Abdominal pain in a patient with gastric cancer, post surgery, seen on CT imaging to have anastomotic perforation.           Perforation of duodenal ulcer is also in the ddx.           Gastric cancer, s/p Billroth II          Anemia, likely chronic disease, r/o active bleed, iron deficiency  Plan: Surgical consultation.  Dr. Aguilar will see patient this evening.           NPO, IV PPI          We have discussed goals of care with the patient.  As of now, she is willing to accept intervention.           Anemia w/u.           Oncology consultation, Dr. Crowe.           GI consultation, as requested by surgery.

## 2018-05-18 NOTE — CONSULT NOTE ADULT - PROBLEM SELECTOR RECOMMENDATION 9
1) npo  2) iv fluids  3) recommend GI consult for possible scope  4) protonics  5) case and plan discussed with Dr. Trent and agrees

## 2018-05-18 NOTE — ED PROVIDER NOTE - PROGRESS NOTE DETAILS
received call from radiologist to report micro perf at both anastomosis locations, appears to have anastomosis breakdown anterior to pancreas and at biliary anastomosis. pt stable, not in pain.   spoke with Eloise from gen surg to consult, who will discuss with her attending.

## 2018-05-18 NOTE — H&P ADULT - HISTORY OF PRESENT ILLNESS
Patient is a 59 y/o F pt with PMHx of gastric CA, and no other medical history, s/p partial gastrectomy on June 2017 and on chemotherapy since September 2017 sent in for admission for intraabdominal free air. Patient has been on bi-weekly chemotherapy and get IV ifusions and oral meds. She has been reporting extreme heartburn and feeling of acid rotating in her esophagus. she has vomited 2-3 times in the past few days and vomiting improves her abdominal pain. She also reports back pain a/w the burning sensation, which is also better with vomiting. Patient got CT A/P done at [facility] on 12 th may that showed stomach perforation and was recommended by Dr Crowe to go to the ED. She has been able to eat, ate a little breakfast today in AM  NKDA, FHx: unclear, Shx: none except the laprotomy in 2017 june, never smoker/drinker  discussed GOC with the patient, she does not want to make a decision right now, will d/w family

## 2018-05-18 NOTE — H&P ADULT - NEGATIVE CARDIOVASCULAR SYMPTOMS
no palpitations/no claudication/no chest pain/no peripheral edema/no paroxysmal nocturnal dyspnea/no dyspnea on exertion/no orthopnea

## 2018-05-18 NOTE — H&P ADULT - ASSESSMENT
Patient is a 59 y/o F pt with PMHx of gastric CA, and no other medical history, s/p partial gastrectomy on June 2017 and on chemotherapy since September 2017 sent in for admission for intraabdominal free air.     stable vitals in the ED, no fever/leucocytosis, abdominal exam reveals very mild RUQ tenderness, CT A/P findings:   Status post distal gastrectomy and Bilroth 2 procedure with small focus   of extraluminal air anterior to the duodenal suture line and a larger   focus of air anterior to the gallbladder fossa with surrounding   phlegmonous change in the right upper quadrant. Differential includes   anastomotic breakdown or perforated duodenal ulcer.    Inflammatory changes surrounding the gallbladder likely reactive.  Surgical team consulted, Dr Aguilar to see the patient today  Anemic to 8.5/27.8  Admitted for bowel perforation

## 2018-05-18 NOTE — CONSULT NOTE ADULT - SUBJECTIVE AND OBJECTIVE BOX
[  ] STAT REQUEST              [ X ] ROUTINE REQUEST    Patient is a 58 year old female presented with bowel perforation. GI consulted to evaluate.       HPI:  Patient is a 58 year old female with past medical history significant for gastric cancer, s/p partial gastrectomy on June 2017 and on chemotherapy since September 2017 sent in for admission for intraabdominal free air. Patient has been on bi-weekly chemotherapy and get IV ifusions and oral meds. She has been reporting extreme heartburn and feeling of acid rotating in her esophagus. she has vomited 2-3 times in the past few days and vomiting improves her abdominal pain. She also reports back pain a/w the burning sensation, which is also better with vomiting. Patient got CT A/P done at [facility] on 12 th may that showed stomach perforation and was recommended by Dr Crowe to go to the ED. She has been able to eat, ate a little breakfast today in AM       PAIN MANAGEMENT:  Pain Scale:                 5-6/10  Pain Location:  RUQ abdominal pain    Prior Colonoscopy:  UNknown    PAST MEDICAL HISTORY  Malignant neoplasm of stomach, unspecified location         PAST SURGICAL HISTORY  S/P partial gastrectomy         Allergies    No Known Allergies        MEDICATIONS  (STANDING):  dextrose 5% + sodium chloride 0.9%. 1000 milliLiter(s) (60 mL/Hr) IV Continuous <Continuous>  enoxaparin Injectable 40 milliGRAM(s) SubCutaneous daily  pantoprazole  Injectable 40 milliGRAM(s) IV Push daily    MEDICATIONS  (PRN):  ondansetron Injectable 4 milliGRAM(s) IV Push every 8 hours PRN Nausea and/or Vomiting      SOCIAL HISTORY  Advanced Directives:       [ X ] Full Code       [  ] DNR  Marital Status:         [ X ] M      [  ] S      [  ] D       [  ] W  Children:       [ X ] Yes      [  ] No  Occupation:        [  ] Employed       [ X ] Unemployed       [  ] Retired  Diet:       [ X ] Regular       [  ] PEG feeding          [  ] NG tube feeding  Drug Use:           [ X ] Patient denied          [  ] Yes  Alcohol:           [ X ] No             [  ] Yes (socially)         [  ] Yes (chronic)  Tobacco:           [  ] Yes           [X  ] No        FAMILY HISTORY  [ X ] Heart Disease            [  ] Diabetes             [  ] HTN             [  ] Colon Cancer             [  ] Stomach Cancer              [  ] Pancreatic Cancer        VITAL SIGNS   Vital Signs Last 24 Hrs  T(C): 36.9 (18 May 2018 15:53), Max: 36.9 (18 May 2018 15:53)  T(F): 98.5 (18 May 2018 15:53), Max: 98.5 (18 May 2018 15:53)  HR: 56 (18 May 2018 15:53) (56 - 66)  BP: 123/60 (18 May 2018 15:53) (123/60 - 130/80)   RR: 16 (18 May 2018 15:53) (16 - 18)  SpO2: 100% (18 May 2018 15:53) (100% - 100%)  Daily Height in cm: 160.02 (18 May 2018 09:00)    Daily        CBC Full  -  ( 18 May 2018 10:43 )  WBC Count : 4.0 K/uL  Hemoglobin : 8.5 g/dL  Hematocrit : 27.8 %  Platelet Count - Automated : 367 K/uL  Mean Cell Volume : 92.3 fl  Mean Cell Hemoglobin : 28.2 pg  Mean Cell Hemoglobin Concentration : 30.5 gm/dL  Auto Neutrophil # : 2.0 K/uL  Auto Lymphocyte # : 1.4 K/uL  Auto Monocyte # : 0.4 K/uL  Auto Eosinophil # : 0.1 K/uL  Auto Basophil # : 0.1 K/uL  Auto Neutrophil % : 50.0 %  Auto Lymphocyte % : 35.5 %  Auto Monocyte % : 10.2 %  Auto Eosinophil % : 3.0 %  Auto Basophil % : 1.3 %      05-18    141  |  112<H>  |  8   ----------------------------<  78  3.8   |  20<L>  |  0.67    Ca    8.4      18 May 2018 10:43  Mg     2.3     05-18    TPro  6.6  /  Alb  2.7<L>  /  TBili  0.3  /  DBili  x   /  AST  28  /  ALT  34  /  AlkPhos  227<H>  05-18    Lipase, Serum: 150 U/L (05-18 @ 10:43)    PT/INR - ( 18 May 2018 10:43 )   PT: 11.7 sec;   INR: 1.07 ratio       PTT - ( 18 May 2018 10:43 )  PTT:29.0 sec      Urinalysis (05.18.18 @ 10:43)    Glucose Qualitative, Urine: Negative    pH Urine: 7.0    Blood, Urine: Negative    Color: Yellow    Urine Appearance: Clear    Bilirubin: Negative    Ketone - Urine: Negative    Specific Gravity: 1.005    Protein, Urine: Negative    Urobilinogen: Negative    Nitrite: Negative    Leukocyte Esterase Concentration: Trace      ECG  Ventricular Rate 91 BPM    Atrial Rate 91 BPM    P-R Interval 142 ms    QRS Duration 78 ms     ms    QTc 455 ms    P Axis 43 degrees    R Axis 14 degrees    T Axis 47 degrees    Diagnosis Line NORMAL SINUS RHYTHM  NORMAL ECG    RADIOLOGY/IMAGING      EXAM:  CT ABDOMEN AND PELVIS OC IC                            PROCEDURE DATE:  05/18/2018          INTERPRETATION:  CLINICAL INFORMATION: Epigastric abdominal pain,   evaluate for perforation    COMPARISON: None    PROCEDURE:   CT of the Abdomen and Pelvis was performed with intravenous contrast.   Intravenous contrast: 95 ml Omnipaque 350. 5 ml discarded.  Oral contrast: positive contrast was administered.  Sagittal and coronal reformats were performed.    FINDINGS:    LOWER CHEST: Within normal limits.    LIVER: There is mild periportal edema, most pronounced in the right   hepatic lobe. There is a cyst near the hepatic dome.  BILE DUCTS: Inflammatory changes surrounding the mid common bile duct.  GALLBLADDER: Abnormal enhancement of the gallbladder fundus with   surrounding inflammatory change, likely reactive.  SPLEEN: Within normal limits.  PANCREAS: Surgical staples anterior to the pancreatic head and   inflammatory changes surrounding the pancreatic head, likely reactive.  ADRENALS: Within normal limits.  KIDNEYS/URETERS: Mild fullness of the renal collecting systems. There is   small cyst in the upper pole the right kidney.    BLADDER: Within normal limits.  REPRODUCTIVE ORGANS: Within normal limits.    BOWEL: Status post distal gastrectomy and Bilroth 2. There is a small   droplet of air anterior to the duodenal suture line and a larger focus of   air with surrounding phlegmonous change anterior to the gallbladder fossa   (series 2, image 31). Mild inflammatory changes of the hepatic flexure of   the colon, likely reactive. Small hiatal hernia.  PERITONEUM: No ascites.  VESSELS:  Within normal limits.  RETROPERITONEUM: No lymphadenopathy.    ABDOMINAL WALL: Within normal limits.  BONES: Osteopenic bones.    IMPRESSION:     Status post distal gastrectomy and Bilroth 2 procedure with small focus   of extraluminal air anterior to the duodenal suture line and a larger   focus of air anterior to the gallbladder fossa with surrounding   phlegmonous change in the rightupper quadrant. Differential includes   anastomotic breakdown or perforated duodenal ulcer.    Inflammatory changes surrounding the gallbladder likely reactive.                EXAM:  XR CHEST PA LAT 2V                            PROCEDURE DATE:  05/18/2018          INTERPRETATION:  PA and lateral chest x-rays    Indication: The patient was sent in for stomach/intestinal perforation.    PA and lateral chest x-rays are compared to a previous examination dated   5/18/2018.    Impression: No evidence for pulmonary consolidation, pleural effusion or   pneumothorax.    The trachea is midline.    The cardiac silhouette is within normal limits.    No evidence for free air under the diaphragm.    Stable examination.

## 2018-05-18 NOTE — H&P ADULT - NEGATIVE GENERAL GENITOURINARY SYMPTOMS
no urinary hesitancy/no dysuria/normal urinary frequency/no flank pain R/no urine discoloration/no renal colic/no gas in urine/no nocturia/normal libido/no hematuria/no flank pain L/no bladder infections/no incontinence

## 2018-05-18 NOTE — H&P ADULT - PROBLEM SELECTOR PLAN 1
Surgical team to evaluate the patient  From medical point of view plan is to keep her NPO, IV protonix, IVF, Zofran PRN  and GI consult

## 2018-05-18 NOTE — CONSULT NOTE ADULT - SUBJECTIVE AND OBJECTIVE BOX
Chief Complaint:    Vital Signs Last 24 Hrs  T(C): 36.9 (18 May 2018 15:53), Max: 36.9 (18 May 2018 15:53)  T(F): 98.5 (18 May 2018 15:53), Max: 98.5 (18 May 2018 15:53)  HR: 56 (18 May 2018 15:53) (56 - 66)  59 yo female S/P sub-total gastrectomy 6/17 and now with carcinomatosis on chemotherapy who presents with free air and a phlegmon in the RUQ. this was noted on an outside CT scan and confirmed on CT in our ER today.    She was vomiting yesterday but feels better today.    BP: 123/60 (18 May 2018 15:53) (123/60 - 130/80)  BP(mean): --  RR: 16 (18 May 2018 15:53) (16 - 18)  SpO2: 100% (18 May 2018 15:53) (100% - 100%)                          8.5    4.0   )-----------( 367      ( 18 May 2018 10:43 )             27.8       05-18    141  |  112<H>  |  8   ----------------------------<  78  3.8   |  20<L>  |  0.67    Ca    8.4      18 May 2018 10:43  Mg     2.3     05-18    TPro  6.6  /  Alb  2.7<L>  /  TBili  0.3  /  DBili  x   /  AST  28  /  ALT  34  /  AlkPhos  227<H>  05-18      PT/INR - ( 18 May 2018 10:43 )   PT: 11.7 sec;   INR: 1.07 ratio         PTT - ( 18 May 2018 10:43 )  PTT:29.0 sec    CXR clear without any free air noted under the diaragm    Abdomen is non-tender    Impression:  Walled off duodenal perforation or possibly a marginal ulcer from the B-II reconstruction    Suggest:    NPO  Protonix  Observation unless the pt. shows peritoneal signs.  We will follow with you

## 2018-05-18 NOTE — ED PROVIDER NOTE - MEDICAL DECISION MAKING DETAILS
iv, monitor, analgesia, empiric abx given. pt not in pain.  consulted surgery. admit for further workup and mgmt

## 2018-05-18 NOTE — H&P ADULT - NEGATIVE PSYCHIATRIC SYMPTOMS
no paranoia/no mood swings/no depression/no insomnia/no suicidal ideation/no auditory hallucinations/no anxiety/no memory loss/no agitation/no visual hallucinations/no hyperactivity

## 2018-05-18 NOTE — H&P ADULT - NEGATIVE MUSCULOSKELETAL SYMPTOMS
no neck pain/no back pain/no leg pain R/no muscle weakness/no arthralgia/no arthritis/no joint swelling/no myalgia/no stiffness/no arm pain R/no leg pain L/no muscle cramps/no arm pain L

## 2018-05-18 NOTE — H&P ADULT - NEGATIVE NEUROLOGICAL SYMPTOMS
no transient paralysis/no paresthesias/no confusion/no generalized seizures/no vertigo/no loss of consciousness/no hemiparesis/no weakness/no facial palsy/no focal seizures/no loss of sensation/no difficulty walking/no syncope/no tremors/no headache

## 2018-05-18 NOTE — H&P ADULT - NEGATIVE OPHTHALMOLOGIC SYMPTOMS
no diplopia/no photophobia/no irritation R/no lacrimation R/no discharge R/no loss of vision R/no scleral injection R/no blurred vision R/no pain L/no discharge L/no pain R/no irritation L/no loss of vision L/no scleral injection L/no blurred vision L/no lacrimation L

## 2018-05-18 NOTE — CONSULT NOTE ADULT - SUBJECTIVE AND OBJECTIVE BOX
58 year old lady with metastatic gastric ca s/p resection.  she had chemotherapy.  the last one was cyramza and taxol.  she has mild pain at RUQ.  CT showed small free air at RUQ suspecting anastomosis site perforation.  the pain went away today. she does not have fever, chills, sob, N/V/D.  repeat CT showed 2 perforations.  alert, not in distress. no palpable nodes. chest is clear. abd is soft and flat. no tenderness. no leg edema.                        8.5    4.0   )-----------( 367      ( 18 May 2018 10:43 )             27.8   05-18    141  |  112<H>  |  8   ----------------------------<  78  3.8   |  20<L>  |  0.67    Ca    8.4      18 May 2018 10:43  Mg     2.3     05-18    TPro  6.6  /  Alb  2.7<L>  /  TBili  0.3  /  DBili  x   /  AST  28  /  ALT  34  /  AlkPhos  227<H>  05-18  < from: CT Abdomen and Pelvis w/ Oral Cont and w/ IV Cont (05.18.18 @ 14:32) >  INTERPRETATION:  CLINICAL INFORMATION: Epigastric abdominal pain,   evaluate for perforation    COMPARISON: None    PROCEDURE:   CT of the Abdomen and Pelvis was performed with intravenous contrast.   Intravenous contrast: 95 ml Omnipaque 350. 5 ml discarded.  Oral contrast: positive contrast was administered.  Sagittal and coronal reformats were performed.    FINDINGS:    LOWER CHEST: Within normal limits.    LIVER: There is mild periportal edema, most pronounced in the right   hepatic lobe. There is a cyst near the hepatic dome.  BILE DUCTS: Inflammatory changes surrounding the mid common bile duct.  GALLBLADDER: Abnormal enhancement of the gallbladder fundus with   surrounding inflammatory change, likely reactive.  SPLEEN: Within normal limits.  PANCREAS: Surgical staples anterior to the pancreatic head and   inflammatory changes surrounding the pancreatic head, likely reactive.  ADRENALS: Within normal limits.  KIDNEYS/URETERS: Mild fullness of the renal collecting systems. There is   small cyst in the upper pole the right kidney.    BLADDER: Within normal limits.  REPRODUCTIVE ORGANS: Within normal limits.    BOWEL: Status post distal gastrectomy and Bilroth 2. There is a small   droplet of air anterior to the duodenal suture line and a larger focus of   air with surrounding phlegmonous change anterior to the gallbladder fossa   (series 2, image 31). Mild inflammatory changes of the hepatic flexure of   the colon, likely reactive. Small hiatal hernia.  PERITONEUM: No ascites.  VESSELS:  Within normal limits.    < end of copied text >  < from: CT Abdomen and Pelvis w/ Oral Cont and w/ IV Cont (05.18.18 @ 14:32) >  RETROPERITONEUM: No lymphadenopathy.    ABDOMINAL WALL: Within normal limits.  BONES: Osteopenic bones.    IMPRESSION:     Status post distal gastrectomy and Bilroth 2 procedure with small focus   of extraluminal air anterior to the duodenal suture line and a larger   focus of air anterior to the gallbladder fossa with surrounding   phlegmonous change in the rightupper quadrant. Differential includes   anastomotic breakdown or perforated duodenal ulcer.    Inflammatory changes surrounding the gallbladder likely reactive.    Additional findings as above.    < end of copied text >

## 2018-05-18 NOTE — H&P ADULT - NSHPPHYSICALEXAM_GEN_ALL_CORE
GENERAL: cachetic, no acute dsitress, pale  HEAD: atraumatic, normocephalic   EYES: PERRLA, white sclera. pale conjunctiva  ENMT: nasal mucosa- Oral cavity- moist  NECK: supple,  no JVD, thyroid- not palpable  LYMPH: no palpable lymph nodes     SKIN:  warm and dry  CHEST/LUNG: No Chest deformity , no chest tenderness. bilateral breath sounds,no  adventitious sounds  HEART: RRR, no m/r/g   ABDOMEN: soft, very mild RUQ tenderness, nondistended; bowel sounds.  EXTREMITIES:No pedal edema, no cyanosis, no clubbing.  NEURO: AA0X 3, mood/ affect- stable , no focal neuro deficits

## 2018-05-18 NOTE — H&P ADULT - NEGATIVE GENERAL SYMPTOMS
no polydipsia/no fatigue/no malaise/no chills/no sweating/no anorexia/no weight loss/no weight gain/no polyphagia/no fever/no polyuria

## 2018-05-19 LAB
24R-OH-CALCIDIOL SERPL-MCNC: 16.8 NG/ML — LOW (ref 30–80)
ALBUMIN SERPL ELPH-MCNC: 2.6 G/DL — LOW (ref 3.5–5)
ALP SERPL-CCNC: 222 U/L — HIGH (ref 40–120)
ALT FLD-CCNC: 33 U/L DA — SIGNIFICANT CHANGE UP (ref 10–60)
ANION GAP SERPL CALC-SCNC: 8 MMOL/L — SIGNIFICANT CHANGE UP (ref 5–17)
APTT BLD: 29.7 SEC — SIGNIFICANT CHANGE UP (ref 27.5–37.4)
AST SERPL-CCNC: 32 U/L — SIGNIFICANT CHANGE UP (ref 10–40)
BASOPHILS # BLD AUTO: 0 K/UL — SIGNIFICANT CHANGE UP (ref 0–0.2)
BASOPHILS NFR BLD AUTO: 1.5 % — SIGNIFICANT CHANGE UP (ref 0–2)
BILIRUB SERPL-MCNC: 0.4 MG/DL — SIGNIFICANT CHANGE UP (ref 0.2–1.2)
BUN SERPL-MCNC: 6 MG/DL — LOW (ref 7–18)
CALCIUM SERPL-MCNC: 8.5 MG/DL — SIGNIFICANT CHANGE UP (ref 8.4–10.5)
CHLORIDE SERPL-SCNC: 110 MMOL/L — HIGH (ref 96–108)
CHOLEST SERPL-MCNC: 122 MG/DL — SIGNIFICANT CHANGE UP (ref 10–199)
CO2 SERPL-SCNC: 23 MMOL/L — SIGNIFICANT CHANGE UP (ref 22–31)
CREAT SERPL-MCNC: 0.52 MG/DL — SIGNIFICANT CHANGE UP (ref 0.5–1.3)
CULTURE RESULTS: NO GROWTH — SIGNIFICANT CHANGE UP
EOSINOPHIL # BLD AUTO: 0.2 K/UL — SIGNIFICANT CHANGE UP (ref 0–0.5)
EOSINOPHIL NFR BLD AUTO: 4.8 % — SIGNIFICANT CHANGE UP (ref 0–6)
FERRITIN SERPL-MCNC: 47 NG/ML — SIGNIFICANT CHANGE UP (ref 15–150)
GLUCOSE SERPL-MCNC: 93 MG/DL — SIGNIFICANT CHANGE UP (ref 70–99)
HAPTOGLOB SERPL-MCNC: 149 MG/DL — SIGNIFICANT CHANGE UP (ref 34–200)
HAV IGM SER-ACNC: SIGNIFICANT CHANGE UP
HBA1C BLD-MCNC: 5.2 % — SIGNIFICANT CHANGE UP (ref 4–5.6)
HBV CORE IGM SER-ACNC: SIGNIFICANT CHANGE UP
HBV SURFACE AG SER-ACNC: SIGNIFICANT CHANGE UP
HCT VFR BLD CALC: 27.1 % — LOW (ref 34.5–45)
HCV AB S/CO SERPL IA: 0.15 S/CO — SIGNIFICANT CHANGE UP
HCV AB SERPL-IMP: SIGNIFICANT CHANGE UP
HDLC SERPL-MCNC: 33 MG/DL — LOW (ref 40–125)
HGB BLD-MCNC: 8.5 G/DL — LOW (ref 11.5–15.5)
HIV 1+2 AB+HIV1 P24 AG SERPL QL IA: SIGNIFICANT CHANGE UP
INR BLD: 1.06 RATIO — SIGNIFICANT CHANGE UP (ref 0.88–1.16)
IRON SATN MFR SERPL: 12 % — LOW (ref 15–50)
IRON SATN MFR SERPL: 41 UG/DL — SIGNIFICANT CHANGE UP (ref 40–150)
LDH SERPL L TO P-CCNC: 155 U/L — SIGNIFICANT CHANGE UP (ref 120–225)
LIPID PNL WITH DIRECT LDL SERPL: 73 MG/DL — SIGNIFICANT CHANGE UP
LYMPHOCYTES # BLD AUTO: 1.3 K/UL — SIGNIFICANT CHANGE UP (ref 1–3.3)
LYMPHOCYTES # BLD AUTO: 40.2 % — SIGNIFICANT CHANGE UP (ref 13–44)
MAGNESIUM SERPL-MCNC: 2.4 MG/DL — SIGNIFICANT CHANGE UP (ref 1.6–2.6)
MCHC RBC-ENTMCNC: 29.1 PG — SIGNIFICANT CHANGE UP (ref 27–34)
MCHC RBC-ENTMCNC: 31.6 GM/DL — LOW (ref 32–36)
MCV RBC AUTO: 92.3 FL — SIGNIFICANT CHANGE UP (ref 80–100)
MONOCYTES # BLD AUTO: 0.3 K/UL — SIGNIFICANT CHANGE UP (ref 0–0.9)
MONOCYTES NFR BLD AUTO: 8.6 % — SIGNIFICANT CHANGE UP (ref 2–14)
NEUTROPHILS # BLD AUTO: 1.5 K/UL — LOW (ref 1.8–7.4)
NEUTROPHILS NFR BLD AUTO: 44.8 % — SIGNIFICANT CHANGE UP (ref 43–77)
PHOSPHATE SERPL-MCNC: 3.5 MG/DL — SIGNIFICANT CHANGE UP (ref 2.5–4.5)
PLATELET # BLD AUTO: 357 K/UL — SIGNIFICANT CHANGE UP (ref 150–400)
POTASSIUM SERPL-MCNC: 3.7 MMOL/L — SIGNIFICANT CHANGE UP (ref 3.5–5.3)
POTASSIUM SERPL-SCNC: 3.7 MMOL/L — SIGNIFICANT CHANGE UP (ref 3.5–5.3)
PROT SERPL-MCNC: 6.1 G/DL — SIGNIFICANT CHANGE UP (ref 6–8.3)
PROTHROM AB SERPL-ACNC: 11.6 SEC — SIGNIFICANT CHANGE UP (ref 9.8–12.7)
RBC # BLD: 2.93 M/UL — LOW (ref 3.8–5.2)
RBC # BLD: 2.95 M/UL — LOW (ref 3.8–5.2)
RBC # FLD: 15.9 % — HIGH (ref 10.3–14.5)
RETICS #: 58.4 K/UL — SIGNIFICANT CHANGE UP (ref 25–125)
RETICS/RBC NFR: 2 % — SIGNIFICANT CHANGE UP (ref 0.5–2.5)
SODIUM SERPL-SCNC: 141 MMOL/L — SIGNIFICANT CHANGE UP (ref 135–145)
SPECIMEN SOURCE: SIGNIFICANT CHANGE UP
TIBC SERPL-MCNC: 327 UG/DL — SIGNIFICANT CHANGE UP (ref 250–450)
TOTAL CHOLESTEROL/HDL RATIO MEASUREMENT: 3.7 RATIO — SIGNIFICANT CHANGE UP (ref 3.3–7.1)
TRANSFERRIN SERPL-MCNC: 258 MG/DL — SIGNIFICANT CHANGE UP (ref 200–360)
TRIGL SERPL-MCNC: 78 MG/DL — SIGNIFICANT CHANGE UP (ref 10–149)
TSH SERPL-MCNC: 0.24 UU/ML — LOW (ref 0.34–4.82)
UIBC SERPL-MCNC: 286 UG/DL — SIGNIFICANT CHANGE UP (ref 110–370)
VIT B12 SERPL-MCNC: 534 PG/ML — SIGNIFICANT CHANGE UP (ref 232–1245)
WBC # BLD: 3.3 K/UL — LOW (ref 3.8–10.5)
WBC # FLD AUTO: 3.3 K/UL — LOW (ref 3.8–10.5)

## 2018-05-19 PROCEDURE — 99233 SBSQ HOSP IP/OBS HIGH 50: CPT | Mod: GC

## 2018-05-19 RX ORDER — ACETAMINOPHEN 500 MG
1000 TABLET ORAL ONCE
Qty: 0 | Refills: 0 | Status: COMPLETED | OUTPATIENT
Start: 2018-05-19 | End: 2018-05-19

## 2018-05-19 RX ORDER — FLUCONAZOLE 150 MG/1
200 TABLET ORAL EVERY 24 HOURS
Qty: 0 | Refills: 0 | Status: DISCONTINUED | OUTPATIENT
Start: 2018-05-20 | End: 2018-05-21

## 2018-05-19 RX ORDER — SODIUM CHLORIDE 9 MG/ML
1000 INJECTION, SOLUTION INTRAVENOUS
Qty: 0 | Refills: 0 | Status: DISCONTINUED | OUTPATIENT
Start: 2018-05-19 | End: 2018-05-25

## 2018-05-19 RX ORDER — PIPERACILLIN AND TAZOBACTAM 4; .5 G/20ML; G/20ML
3.38 INJECTION, POWDER, LYOPHILIZED, FOR SOLUTION INTRAVENOUS EVERY 8 HOURS
Qty: 0 | Refills: 0 | Status: DISCONTINUED | OUTPATIENT
Start: 2018-05-19 | End: 2018-05-21

## 2018-05-19 RX ORDER — FLUCONAZOLE 150 MG/1
400 TABLET ORAL ONCE
Qty: 0 | Refills: 0 | Status: COMPLETED | OUTPATIENT
Start: 2018-05-19 | End: 2018-05-19

## 2018-05-19 RX ORDER — PIPERACILLIN AND TAZOBACTAM 4; .5 G/20ML; G/20ML
3.38 INJECTION, POWDER, LYOPHILIZED, FOR SOLUTION INTRAVENOUS EVERY 8 HOURS
Qty: 0 | Refills: 0 | Status: DISCONTINUED | OUTPATIENT
Start: 2018-05-19 | End: 2018-05-19

## 2018-05-19 RX ADMIN — PANTOPRAZOLE SODIUM 40 MILLIGRAM(S): 20 TABLET, DELAYED RELEASE ORAL at 14:12

## 2018-05-19 RX ADMIN — Medication 400 MILLIGRAM(S): at 06:07

## 2018-05-19 RX ADMIN — FLUCONAZOLE 100 MILLIGRAM(S): 150 TABLET ORAL at 17:29

## 2018-05-19 RX ADMIN — OCTREOTIDE ACETATE 100 MICROGRAM(S): 200 INJECTION, SOLUTION INTRAVENOUS; SUBCUTANEOUS at 14:13

## 2018-05-19 RX ADMIN — SODIUM CHLORIDE 75 MILLILITER(S): 9 INJECTION, SOLUTION INTRAVENOUS at 13:36

## 2018-05-19 RX ADMIN — Medication 1000 MILLIGRAM(S): at 06:42

## 2018-05-19 RX ADMIN — ENOXAPARIN SODIUM 40 MILLIGRAM(S): 100 INJECTION SUBCUTANEOUS at 13:36

## 2018-05-19 RX ADMIN — OCTREOTIDE ACETATE 100 MICROGRAM(S): 200 INJECTION, SOLUTION INTRAVENOUS; SUBCUTANEOUS at 21:22

## 2018-05-19 RX ADMIN — PIPERACILLIN AND TAZOBACTAM 25 GRAM(S): 4; .5 INJECTION, POWDER, LYOPHILIZED, FOR SOLUTION INTRAVENOUS at 13:36

## 2018-05-19 RX ADMIN — PIPERACILLIN AND TAZOBACTAM 25 GRAM(S): 4; .5 INJECTION, POWDER, LYOPHILIZED, FOR SOLUTION INTRAVENOUS at 21:22

## 2018-05-19 RX ADMIN — OCTREOTIDE ACETATE 100 MICROGRAM(S): 200 INJECTION, SOLUTION INTRAVENOUS; SUBCUTANEOUS at 06:07

## 2018-05-19 NOTE — PROGRESS NOTE ADULT - ASSESSMENT
58 year old female with walled off possibly marginal ulcer perforation, hx partial gastrectomy bilroth II reconstruction    1) npo  2) iv fluids  3) GI follow-up  4) dvt prophylaxis  5) surgery will follow

## 2018-05-19 NOTE — CONSULT NOTE ADULT - GASTROINTESTINAL DETAILS
soft/no rebound tenderness/no rigidity/no guarding/bowel sounds normal/no organomegaly/no distention/no masses palpable
no distention/bowel sounds normal/soft/nontender/no rebound tenderness/no rigidity/no guarding

## 2018-05-19 NOTE — CONSULT NOTE ADULT - SUBJECTIVE AND OBJECTIVE BOX
HPI:  Patient is a 57 y/o F pt with PMHx of gastric CA, and no other medical history, s/p partial gastrectomy on 2017 and on chemotherapy since 2017 sent in for admission for intraabdominal free air. Patient has been on bi-weekly chemotherapy and get IV ifusions and oral meds. She has been reporting extreme heartburn and feeling of acid rotating in her esophagus. she has vomited 2-3 times in the past few days and vomiting improves her abdominal pain. She also reports back pain a/w the burning sensation, which is also better with vomiting. Patient got CT A/P done at [facility] on 12 th may that showed stomach perforation and was recommended by Dr Crowe to go to the ED. She has been able to eat, ate a little breakfast today in AM  NKDA, FHx: unclear, Shx: none except the laprotomy in 2017, never smoker/drinker  discussed GOC with the patient, she does not want to make a decision right now, will d/w family (18 May 2018 16:46)      PAST MEDICAL & SURGICAL HISTORY:  Malignant neoplasm of stomach, unspecified location  S/P partial gastrectomy: distal gastrectomy with Billroth II reconstruction, D2 lymphadenectomy, feeding jejunostomy tube placement 17      No Known Allergies      Meds:  dextrose 5% + sodium chloride 0.9% 1000 milliLiter(s) IV Continuous <Continuous>  enoxaparin Injectable 40 milliGRAM(s) SubCutaneous daily  octreotide  Injectable 100 MICROGram(s) SubCutaneous three times a day  ondansetron Injectable 4 milliGRAM(s) IV Push every 8 hours PRN  pantoprazole  Injectable 40 milliGRAM(s) IV Push daily  piperacillin/tazobactam IVPB. 3.375 Gram(s) IV Intermittent every 8 hours      SOCIAL HISTORY:  Smoker:  YES / NO        PACK YEARS:                         WHEN QUIT?  ETOH use:  YES / NO               FREQUENCY / QUANTITY:  Ilicit Drug use:  YES / NO  Occupation:  Assisted device use (Cane / Walker):  Live with:    FAMILY HISTORY:  No pertinent family history in first degree relatives      VITALS:  Vital Signs Last 24 Hrs  T(C): 37 (19 May 2018 05:39), Max: 37.1 (18 May 2018 21:21)  T(F): 98.6 (19 May 2018 05:39), Max: 98.8 (18 May 2018 21:21)  HR: 52 (19 May 2018 05:39) (52 - 57)  BP: 121/59 (19 May 2018 05:39) (109/56 - 123/60)  BP(mean): --  RR: 18 (19 May 2018 05:39) (16 - 18)  SpO2: 100% (19 May 2018 05:39) (97% - 100%)    LABS/DIAGNOSTIC TESTS:                          8.5    3.3   )-----------( 357      ( 19 May 2018 07:03 )             27.1     WBC Count: 3.3 K/uL ( @ 07:03)  WBC Count: 4.0 K/uL ( @ 10:43)          141  |  110<H>  |  6<L>  ----------------------------<  93  3.7   |  23  |  0.52    Ca    8.5      19 May 2018 07:03  Phos  3.5       Mg     2.4         TPro  6.1  /  Alb  2.6<L>  /  TBili  0.4  /  DBili  x   /  AST  32  /  ALT  33  /  AlkPhos  222<H>        Urinalysis Basic - ( 18 May 2018 10:43 )    Color: Yellow / Appearance: Clear / S.005 / pH: x  Gluc: x / Ketone: Negative  / Bili: Negative / Urobili: Negative   Blood: x / Protein: Negative / Nitrite: Negative   Leuk Esterase: Trace / RBC: 0-2 /HPF / WBC 0-2 /HPF   Sq Epi: x / Non Sq Epi: Occasional /HPF / Bacteria: x        LIVER FUNCTIONS - ( 19 May 2018 07:03 )  Alb: 2.6 g/dL / Pro: 6.1 g/dL / ALK PHOS: 222 U/L / ALT: 33 U/L DA / AST: 32 U/L / GGT: x             PT/INR - ( 19 May 2018 07:03 )   PT: 11.6 sec;   INR: 1.06 ratio         PTT - ( 19 May 2018 07:03 )  PTT:29.7 sec    LACTATE:    ABG -     CULTURES:   .Urine Clean Catch (Midstream)   @ 14:02   No growth  --  --          RADIOLOGY:< from: CT Abdomen and Pelvis w/ Oral Cont and w/ IV Cont (18 @ 14:32) >  EXAM:  CT ABDOMEN AND PELVIS OC IC                            PROCEDURE DATE:  2018          INTERPRETATION:  CLINICAL INFORMATION: Epigastric abdominal pain,   evaluate for perforation    COMPARISON: None    PROCEDURE:   CT of the Abdomen and Pelvis was performed with intravenous contrast.   Intravenous contrast: 95 ml Omnipaque 350. 5 ml discarded.  Oral contrast: positive contrast was administered.  Sagittal and coronal reformats were performed.    FINDINGS:    LOWER CHEST: Within normal limits.    LIVER: There is mild periportal edema, most pronounced in the right   hepatic lobe. There is a cyst near the hepatic dome.  BILE DUCTS: Inflammatory changes surrounding the mid common bile duct.  GALLBLADDER: Abnormal enhancement of the gallbladder fundus with   surrounding inflammatory change, likely reactive.  SPLEEN: Within normal limits.  PANCREAS: Surgical staples anterior to the pancreatic head and   inflammatory changes surrounding the pancreatic head, likely reactive.  ADRENALS: Within normal limits.  KIDNEYS/URETERS: Mild fullness of the renal collecting systems. There is   small cyst in the upper pole the right kidney.    BLADDER: Within normal limits.  REPRODUCTIVE ORGANS: Within normal limits.    BOWEL: Status post distal gastrectomy and Bilroth 2. There is a small   droplet of air anterior to the duodenal suture line and a larger focus of   air with surrounding phlegmonous change anterior to the gallbladder fossa   (series 2, image 31). Mild inflammatory changes of the hepatic flexure of   the colon, likely reactive. Small hiatal hernia.  PERITONEUM: No ascites.  VESSELS:  Within normal limits.  RETROPERITONEUM: No lymphadenopathy.    ABDOMINAL WALL: Within normal limits.  BONES: Osteopenic bones.    IMPRESSION:     Status post distal gastrectomy and Bilroth 2 procedure with small focus   of extraluminal air anterior to the duodenal suture line and a larger   focus of air anterior to the gallbladder fossa with surrounding   phlegmonous change in the rightupper quadrant. Differential includes   anastomotic breakdown or perforated duodenal ulcer.    Inflammatory changes surrounding the gallbladder likely reactive.    Additional findings as above.    Critical finding was discussed with Dr. Mobley at 2:55 PM on 2018.     ------------------------------------------------------------------------------------------------------------------------------------------------    < from: Xray Chest 2 Views PA/Lat (18 @ 11:25) >  EXAM:  XR CHEST PA LAT 2V                            PROCEDURE DATE:  2018          INTERPRETATION:  PA and lateral chest x-rays    Indication: The patient was sent in for stomach/intestinal perforation.    PA and lateral chest x-rays are compared to a previous examination dated   2018.    Impression: No evidence for pulmonary consolidation, pleural effusion or   pneumothorax.    The trachea is midline.    The cardiac silhouette is within normal limits.    No evidence for free air under the diaphragm.    Stable examination.      < end of copied text >      ROS  [  ] UNABLE TO ELICIT HPI:  Patient is a 57 y/o F pt with PMHx of gastric CA, and no other medical history, s/p partial gastrectomy on 2017 and on chemotherapy since 2017 sent in for admission for intraabdominal free air. Patient has been on bi-weekly chemotherapy and get IV ifusions and oral meds. She has been reporting extreme heartburn and feeling of acid rotating in her esophagus. she has vomited 2-3 times in the past few days and vomiting improves her abdominal pain. She also reports back pain a/w the burning sensation, which is also better with vomiting. Patient got CT A/P done at [facility] on 12 th may that showed stomach perforation and was recommended by Dr Crowe to go to the ED.  Pt is currently asymptomatic but hungry, she has no significant abdominal pain except a little in the RUQ region.        PAST MEDICAL & SURGICAL HISTORY:  Malignant neoplasm of stomach, unspecified location  S/P partial gastrectomy: distal gastrectomy with Billroth II reconstruction, D2 lymphadenectomy, feeding jejunostomy tube placement 17      No Known Allergies      Meds:  dextrose 5% + sodium chloride 0.9% 1000 milliLiter(s) IV Continuous <Continuous>  enoxaparin Injectable 40 milliGRAM(s) SubCutaneous daily  octreotide  Injectable 100 MICROGram(s) SubCutaneous three times a day  ondansetron Injectable 4 milliGRAM(s) IV Push every 8 hours PRN  pantoprazole  Injectable 40 milliGRAM(s) IV Push daily  piperacillin/tazobactam IVPB. 3.375 Gram(s) IV Intermittent every 8 hours      SOCIAL HISTORY:  Smoker:  no  ETOH use:  no  Ilicit Drug use:  no    FAMILY HISTORY:  No pertinent family history in first degree relatives      VITALS:  Vital Signs Last 24 Hrs  T(C): 37 (19 May 2018 05:39), Max: 37.1 (18 May 2018 21:21)  T(F): 98.6 (19 May 2018 05:39), Max: 98.8 (18 May 2018 21:21)  HR: 52 (19 May 2018 05:39) (52 - 57)  BP: 121/59 (19 May 2018 05:39) (109/56 - 123/60)  BP(mean): --  RR: 18 (19 May 2018 05:39) (16 - 18)  SpO2: 100% (19 May 2018 05:39) (97% - 100%)    LABS/DIAGNOSTIC TESTS:                          8.5    3.3   )-----------( 357      ( 19 May 2018 07:03 )             27.1     WBC Count: 3.3 K/uL ( @ 07:03)  WBC Count: 4.0 K/uL ( @ 10:43)          141  |  110<H>  |  6<L>  ----------------------------<  93  3.7   |  23  |  0.52    Ca    8.5      19 May 2018 07:03  Phos  3.5       Mg     2.4         TPro  6.1  /  Alb  2.6<L>  /  TBili  0.4  /  DBili  x   /  AST  32  /  ALT  33  /  AlkPhos  222<H>        Urinalysis Basic - ( 18 May 2018 10:43 )    Color: Yellow / Appearance: Clear / S.005 / pH: x  Gluc: x / Ketone: Negative  / Bili: Negative / Urobili: Negative   Blood: x / Protein: Negative / Nitrite: Negative   Leuk Esterase: Trace / RBC: 0-2 /HPF / WBC 0-2 /HPF   Sq Epi: x / Non Sq Epi: Occasional /HPF / Bacteria: x        LIVER FUNCTIONS - ( 19 May 2018 07:03 )  Alb: 2.6 g/dL / Pro: 6.1 g/dL / ALK PHOS: 222 U/L / ALT: 33 U/L DA / AST: 32 U/L / GGT: x             PT/INR - ( 19 May 2018 07:03 )   PT: 11.6 sec;   INR: 1.06 ratio         PTT - ( 19 May 2018 07:03 )  PTT:29.7 sec    LACTATE:    ABG -     CULTURES:   .Urine Clean Catch (Midstream)   @ 14:02   No growth  --  --          RADIOLOGY:< from: CT Abdomen and Pelvis w/ Oral Cont and w/ IV Cont (18 @ 14:32) >  EXAM:  CT ABDOMEN AND PELVIS OC IC                            PROCEDURE DATE:  2018          INTERPRETATION:  CLINICAL INFORMATION: Epigastric abdominal pain,   evaluate for perforation    COMPARISON: None    PROCEDURE:   CT of the Abdomen and Pelvis was performed with intravenous contrast.   Intravenous contrast: 95 ml Omnipaque 350. 5 ml discarded.  Oral contrast: positive contrast was administered.  Sagittal and coronal reformats were performed.    FINDINGS:    LOWER CHEST: Within normal limits.    LIVER: There is mild periportal edema, most pronounced in the right   hepatic lobe. There is a cyst near the hepatic dome.  BILE DUCTS: Inflammatory changes surrounding the mid common bile duct.  GALLBLADDER: Abnormal enhancement of the gallbladder fundus with   surrounding inflammatory change, likely reactive.  SPLEEN: Within normal limits.  PANCREAS: Surgical staples anterior to the pancreatic head and   inflammatory changes surrounding the pancreatic head, likely reactive.  ADRENALS: Within normal limits.  KIDNEYS/URETERS: Mild fullness of the renal collecting systems. There is   small cyst in the upper pole the right kidney.    BLADDER: Within normal limits.  REPRODUCTIVE ORGANS: Within normal limits.    BOWEL: Status post distal gastrectomy and Bilroth 2. There is a small   droplet of air anterior to the duodenal suture line and a larger focus of   air with surrounding phlegmonous change anterior to the gallbladder fossa   (series 2, image 31). Mild inflammatory changes of the hepatic flexure of   the colon, likely reactive. Small hiatal hernia.  PERITONEUM: No ascites.  VESSELS:  Within normal limits.  RETROPERITONEUM: No lymphadenopathy.    ABDOMINAL WALL: Within normal limits.  BONES: Osteopenic bones.    IMPRESSION:     Status post distal gastrectomy and Bilroth 2 procedure with small focus   of extraluminal air anterior to the duodenal suture line and a larger   focus of air anterior to the gallbladder fossa with surrounding   phlegmonous change in the rightupper quadrant. Differential includes   anastomotic breakdown or perforated duodenal ulcer.    Inflammatory changes surrounding the gallbladder likely reactive.    Additional findings as above.    Critical finding was discussed with Dr. Mobley at 2:55 PM on 2018.     ------------------------------------------------------------------------------------------------------------------------------------------------    < from: Xray Chest 2 Views PA/Lat (18 @ 11:25) >  EXAM:  XR CHEST PA LAT 2V                            PROCEDURE DATE:  2018          INTERPRETATION:  PA and lateral chest x-rays    Indication: The patient was sent in for stomach/intestinal perforation.    PA and lateral chest x-rays are compared to a previous examination dated   2018.    Impression: No evidence for pulmonary consolidation, pleural effusion or   pneumothorax.    The trachea is midline.    The cardiac silhouette is within normal limits.    No evidence for free air under the diaphragm.    Stable examination.      < end of copied text >      ROS  [  ] UNABLE TO ELICIT

## 2018-05-19 NOTE — PROGRESS NOTE ADULT - SUBJECTIVE AND OBJECTIVE BOX
Patient examined at bedside, reports no abdominal pain  No nausea, no vomiting  pt is npo    T(C): 37 (05-19-18 @ 05:39), Max: 37.1 (05-18-18 @ 21:21)  HR: 52 (05-19-18 @ 05:39) (52 - 57)  BP: 121/59 (05-19-18 @ 05:39) (109/56 - 123/60)  RR: 18 (05-19-18 @ 05:39) (16 - 18)  SpO2: 100% (05-19-18 @ 05:39) (97% - 100%)  Wt(kg): --      Physical Exam  General: AAOx3, No acute distress  Skin: No jaundice, no icterus  Abdomen: soft, nontender, nondistended, well healed midline incision  Extremities: non edematous, no calf pain bilaterally                          8.5    3.3   )-----------( 357      ( 19 May 2018 07:03 )             27.1   05-19    141  |  110<H>  |  6<L>  ----------------------------<  93  3.7   |  23  |  0.52    Ca    8.5      19 May 2018 07:03  Phos  3.5     05-19  Mg     2.4     05-19    TPro  6.1  /  Alb  2.6<L>  /  TBili  0.4  /  DBili  x   /  AST  32  /  ALT  33  /  AlkPhos  222<H>  05-19

## 2018-05-19 NOTE — PROGRESS NOTE ADULT - ASSESSMENT
Patient is a 59 y/o F pt with PMHx of gastric CA, and no other medical history, s/p partial gastrectomy on June 2017 and on chemotherapy since September 2017 sent in for intraabdominal free air admitted for Perforated duodenal anastomotic breakdown or duodenal  ulcer perforation.              Problem/Plan - 1:  ·  Problem: Perforated viscus.  Plan:   From medical point of view plan is to keep her NPO, IV protonix, IVF, Zofran PRN  Surgery Dr. Aguilar saw the pt and wants to monitor for peritoneal signs and medical management for now  CT Abd showed anastomotic breakdown or perforated duodenal ulcer.  GI consult.      Problem/Plan - 2:  ·  Problem: Malignant neoplasm of stomach, unspecified location.  Plan:   f/u Dr Crowe   Plan as above.      Problem/Plan - 3:  ·  Problem: Anemia.  Plan: likely chemotherapy related  iron levels fine  Hb 8.5 stable, monitor cbc     Problem/Plan - 4:  ·  Problem: Prophylactic measure.  Plan: IMPROVE VTE score 2 for current Cancer  Lovenox sc.

## 2018-05-19 NOTE — CONSULT NOTE ADULT - ASSESSMENT
58 year old female with concerns for perforated marginal ulcer vs anastomotic breakdown
metastatic gastric ca and perforation
peritonitis - given bowel(duodenal) perforation vs anastomotic leak ( less likely given surgery was 1 year ago)   possibly has phlegmon in gallbladder fossa region    plan - started on zosyn 3.375gm siv q8hrs  will start on diflucan 200mgs iv q24hrs after initial 400mgs dose  keep NPO  will need EGD at some point in the near future to see wear the leak is.
1. Abdominal pain  2. Perforated duodenal ulcer Vs Anastomotic break through  3. Gastric cancer    Suggestions:    1. NPO  2. IVF hydration  3. Surgical consult  4. Protonix IV  5. DVT prophylaxis

## 2018-05-19 NOTE — CONSULT NOTE ADULT - RS GEN PE MLT RESP DETAILS PC
clear to auscultation bilaterally/no rhonchi/good air movement/no rales/no wheezes
good air movement/breath sounds equal/airway patent

## 2018-05-19 NOTE — CHART NOTE - NSCHARTNOTEFT_GEN_A_CORE
Discussed with ID Dr. Albrecht earlier, consult appreciated  Although patient is clinically stable, at risk of Peritonitis placed on IV Zosyn q 8hrs as well as Diflucan.   Will consider repeating a CT in the next 48 hrs and if gas persist will possibly need an Endoscopy   Informed Dr. Aguilar

## 2018-05-19 NOTE — PROGRESS NOTE ADULT - SUBJECTIVE AND OBJECTIVE BOX
INTERVAL HPI/OVERNIGHT EVENTS: no acute events overnight. Pt comfortably sitting on bed. Pt denies any heart burn, nausea or vomiting this AM and feeling better.    HPI:  Patient is a 57 y/o F pt with PMHx of gastric CA, and no other medical history, s/p partial gastrectomy on 2017 and on chemotherapy since 2017 sent in for intraabdominal free air      Hematalogic:  enoxaparin Injectable 40 milliGRAM(s) SubCutaneous daily    Other:  dextrose 5% + sodium chloride 0.9%. 1000 milliLiter(s) IV Continuous <Continuous>  octreotide  Injectable 100 MICROGram(s) SubCutaneous three times a day  ondansetron Injectable 4 milliGRAM(s) IV Push every 8 hours PRN  pantoprazole  Injectable 40 milliGRAM(s) IV Push daily    dextrose 5% + sodium chloride 0.9%. 1000 milliLiter(s) IV Continuous <Continuous>  enoxaparin Injectable 40 milliGRAM(s) SubCutaneous daily  octreotide  Injectable 100 MICROGram(s) SubCutaneous three times a day  ondansetron Injectable 4 milliGRAM(s) IV Push every 8 hours PRN  pantoprazole  Injectable 40 milliGRAM(s) IV Push daily    Drug Dosing Weight  Height (cm): 160.02 (18 May 2018 09:00)  Weight (kg): 53.5 (18 May 2018 09:00)  BMI (kg/m2): 20.9 (18 May 2018 09:00)  BSA (m2): 1.55 (18 May 2018 09:00)      PMH -reviewed admission note, no change since admission  PAST MEDICAL & SURGICAL HISTORY:  Malignant neoplasm of stomach, unspecified location  S/P partial gastrectomy: distal gastrectomy with Billroth II reconstruction, D2 lymphadenectomy, feeding jejunostomy tube placement 17      ICU Vital Signs Last 24 Hrs  T(C): 37 (19 May 2018 05:39), Max: 37.1 (18 May 2018 21:21)  T(F): 98.6 (19 May 2018 05:39), Max: 98.8 (18 May 2018 21:21)  HR: 52 (19 May 2018 05:39) (52 - 57)  BP: 121/59 (19 May 2018 05:39) (109/56 - 123/60)  BP(mean): --  ABP: --  ABP(mean): --  RR: 18 (19 May 2018 05:39) (16 - 18)  SpO2: 100% (19 May 2018 05:39) (97% - 100%)      PHYSICAL EXAM:    GENERAL: [x ]NAD, [ ]well-groomed, [ ]well-developed  HEAD:  [x ]Atraumatic, [x ]Normocephalic  EYES: [x ]EOMI, [x ]PERRLA, [ x]conjunctiva and sclera clear  ENMT: [ ]No tonsillar erythema, exudates, or enlargement; [x ]Moist mucous membranes, [ ]Good dentition, [ ]No lesions  NECK: [x ]Supple, normal appearance, [ ]No JVD; [ ]Normal thyroid; [ ]Trachea midline  NERVOUS SYSTEM:  [x ]Alert & Oriented X3, [ x]Good concentration; [ x]Motor Strength 5/5 B/L upper and lower extremities  CHEST/LUNG: [x ]No chest deformity;  [x ]No rales, rhonchi, wheezing   HEART: [x ]Regular rate and rhythm; [x ]No murmurs, rubs, or gallops  ABDOMEN: [x ]Soft, mild abd tenderness, Nondistended; [x ]Bowel sounds present  EXTREMITIES:  [x ]2+ Peripheral Pulses, [x ]No edema  LYMPH: [x ]No lymphadenopathy noted  SKIN: [x ]No rashes or lesions; [ ]Good capillary refill      LABS:  CBC Full  -  ( 19 May 2018 07:03 )  WBC Count : 3.3 K/uL  Hemoglobin : 8.5 g/dL  Hematocrit : 27.1 %  Platelet Count - Automated : 357 K/uL  Mean Cell Volume : 92.3 fl  Mean Cell Hemoglobin : 29.1 pg  Mean Cell Hemoglobin Concentration : 31.6 gm/dL  Auto Neutrophil # : 1.5 K/uL  Auto Lymphocyte # : 1.3 K/uL  Auto Monocyte # : 0.3 K/uL  Auto Eosinophil # : 0.2 K/uL  Auto Basophil # : 0.0 K/uL  Auto Neutrophil % : 44.8 %  Auto Lymphocyte % : 40.2 %  Auto Monocyte % : 8.6 %  Auto Eosinophil % : 4.8 %  Auto Basophil % : 1.5 %        141  |  110<H>  |  6<L>  ----------------------------<  93  3.7   |  23  |  0.52    Ca    8.5      19 May 2018 07:03  Phos  3.5       Mg     2.4         TPro  6.1  /  Alb  2.6<L>  /  TBili  0.4  /  DBili  x   /  AST  32  /  ALT  33  /  AlkPhos  222<H>      PT/INR - ( 19 May 2018 07:03 )   PT: 11.6 sec;   INR: 1.06 ratio         PTT - ( 19 May 2018 07:03 )  PTT:29.7 sec  Urinalysis Basic - ( 18 May 2018 10:43 )    Color: Yellow / Appearance: Clear / S.005 / pH: x  Gluc: x / Ketone: Negative  / Bili: Negative / Urobili: Negative   Blood: x / Protein: Negative / Nitrite: Negative   Leuk Esterase: Trace / RBC: 0-2 /HPF / WBC 0-2 /HPF   Sq Epi: x / Non Sq Epi: Occasional /HPF / Bacteria: x      Culture Results:   No growth ( @ 14:02)      RADIOLOGY & ADDITIONAL STUDIES REVIEWED:   < from: CT Abdomen and Pelvis w/ Oral Cont and w/ IV Cont (18 @ 14:32) >  Status post distal gastrectomy and Bilroth 2 procedure with small focus   of extraluminal air anterior to the duodenal suture line and a larger   focus of air anterior to the gallbladder fossa with surrounding   phlegmonous change in the rightupper quadrant. Differential includes   anastomotic breakdown or perforated duodenal ulcer.    Inflammatory changes surrounding the gallbladder likely reactive.    < end of copied text >        CRITICAL CARE TIME SPENT: 35 minutes

## 2018-05-19 NOTE — PROGRESS NOTE ADULT - ATTENDING COMMENTS
Seen and examined.  Small amt of air around duodenum, likely residual from duodenal stump leak posoperatively ~1 year ago  Not a new finding  Pt stable without abdominal pain  Would start Protonix  Symptoms likely due to chemo less likely due to surgical complication - if continues would get EGD to evaluate GJ

## 2018-05-19 NOTE — PROGRESS NOTE ADULT - ATTENDING COMMENTS
Patient seen and examined with Son at bedside; Agree with PGY 3 A/P above with editing as needed. Discussed with Dr. Martinez    Patient is doing well. No new complaints; only mild epigastric pain. Afebrile. No vomiting.     Vital Signs Last 24 Hrs  T(C): 37 (19 May 2018 05:39), Max: 37.1 (18 May 2018 21:21)  T(F): 98.6 (19 May 2018 05:39), Max: 98.8 (18 May 2018 21:21)  HR: 52 (19 May 2018 05:39) (52 - 57)  BP: 121/59 (19 May 2018 05:39) (109/56 - 123/60)  RR: 18 (19 May 2018 05:39) (16 - 18)  SpO2: 100% (19 May 2018 05:39) (97% - 100%)    P/E; As per PGY3 above  GI: Soft, Bs+, very mild tenderness only epigastrium, not distended, no rebound or guarding    Labs:                        8.5    3.3   )-----------( 357      ( 19 May 2018 07:03 )             27.1   05-19    141  |  110<H>  |  6<L>  ----------------------------<  93  3.7   |  23  |  0.52    Ca    8.5      19 May 2018 07:03  Phos  3.5     05-19  Mg     2.4     05-19    TPro  6.1  /  Alb  2.6<L>  /  TBili  0.4  /  DBili  x   /  AST  32  /  ALT  33  /  AlkPhos  222<H>  05-19    D/D:  Abdominal Pain with possible perforation or anastomotic leak s/p Billroth II   Hx Gastric Ca s/p gastrectomy on Chemotherapy  At risk for Peritonitis    Plan:  NPO: IV Fluids; Change to D5 NS with 20 meq KCL  Zofran as needed for vomiting;   d/w DR. Crowe added Sandostatin to reduce secretions  Patient was seen by Dr. Koffi Trent this morning  Spoke to Sx House Officer recommended GI and Endoscopy!!!  Spoke with Dr. Aguilar, saw patient last night; consult appreciated  As per Dr. Aguilar he will not recommend EGD at this time as well as does not think needs to continue ABX at this time;  Recommend NPO and close monitor  Will get ID eval Dr. Albrecht to see if recommend Antibiotics Patient seen and examined with Son at bedside; Agree with PGY 3 A/P above with editing as needed. Discussed with Dr. Martinez    Patient is doing well. No new complaints; only mild epigastric pain. Afebrile. No vomiting.     Vital Signs Last 24 Hrs  T(C): 37 (19 May 2018 05:39), Max: 37.1 (18 May 2018 21:21)  T(F): 98.6 (19 May 2018 05:39), Max: 98.8 (18 May 2018 21:21)  HR: 52 (19 May 2018 05:39) (52 - 57)  BP: 121/59 (19 May 2018 05:39) (109/56 - 123/60)  RR: 18 (19 May 2018 05:39) (16 - 18)  SpO2: 100% (19 May 2018 05:39) (97% - 100%)    P/E; As per PGY3 above  GI: Soft, Bs+, very mild tenderness only epigastrium, not distended, no rebound or guarding    Labs:                        8.5    3.3   )-----------( 357      ( 19 May 2018 07:03 )             27.1   05-19    141  |  110<H>  |  6<L>  ----------------------------<  93  3.7   |  23  |  0.52    Ca    8.5      19 May 2018 07:03  Phos  3.5     05-19  Mg     2.4     05-19    TPro  6.1  /  Alb  2.6<L>  /  TBili  0.4  /  DBili  x   /  AST  32  /  ALT  33  /  AlkPhos  222<H>  05-19    D/D:  Abdominal Pain with possible duodenal ulcer perforation or anastomotic leak s/p Billroth II   Hx Gastric Ca s/p gastrectomy on Chemotherapy  At risk for Peritonitis    Plan:  NPO: IV Fluids; Change to D5 NS with 20 meq KCL  Zofran as needed for vomiting;   d/w DR. Crowe added Sandostatin to reduce secretions  Patient was seen by Dr. Koffi Trent this morning  Spoke to Sx House Officer recommended GI and Endoscopy!!!  Spoke with Dr. Aguilar, saw patient last night; consult appreciated  As per Dr. Aguilar he will not recommend EGD at this time as well as does not think needs to continue ABX at this time;  Recommend NPO and close monitor  Will get ID eval Dr. Albrecht to see if recommend Antibiotics

## 2018-05-19 NOTE — CONSULT NOTE ADULT - NEUROLOGICAL DETAILS
normal strength/alert and oriented x 3
sensation intact/responds to verbal commands/alert and oriented x 3/responds to pain

## 2018-05-20 LAB
ALBUMIN SERPL ELPH-MCNC: 2.5 G/DL — LOW (ref 3.5–5)
ALP SERPL-CCNC: 206 U/L — HIGH (ref 40–120)
ALT FLD-CCNC: 30 U/L DA — SIGNIFICANT CHANGE UP (ref 10–60)
ANION GAP SERPL CALC-SCNC: 10 MMOL/L — SIGNIFICANT CHANGE UP (ref 5–17)
AST SERPL-CCNC: 29 U/L — SIGNIFICANT CHANGE UP (ref 10–40)
BASOPHILS # BLD AUTO: 0.1 K/UL — SIGNIFICANT CHANGE UP (ref 0–0.2)
BASOPHILS NFR BLD AUTO: 2.7 % — HIGH (ref 0–2)
BILIRUB SERPL-MCNC: 0.4 MG/DL — SIGNIFICANT CHANGE UP (ref 0.2–1.2)
BUN SERPL-MCNC: 4 MG/DL — LOW (ref 7–18)
CALCIUM SERPL-MCNC: 8.1 MG/DL — LOW (ref 8.4–10.5)
CHLORIDE SERPL-SCNC: 111 MMOL/L — HIGH (ref 96–108)
CO2 SERPL-SCNC: 22 MMOL/L — SIGNIFICANT CHANGE UP (ref 22–31)
CREAT SERPL-MCNC: 0.56 MG/DL — SIGNIFICANT CHANGE UP (ref 0.5–1.3)
EOSINOPHIL # BLD AUTO: 0.1 K/UL — SIGNIFICANT CHANGE UP (ref 0–0.5)
EOSINOPHIL NFR BLD AUTO: 3.8 % — SIGNIFICANT CHANGE UP (ref 0–6)
GLUCOSE SERPL-MCNC: 105 MG/DL — HIGH (ref 70–99)
HCT VFR BLD CALC: 28.2 % — LOW (ref 34.5–45)
HGB BLD-MCNC: 8.7 G/DL — LOW (ref 11.5–15.5)
LYMPHOCYTES # BLD AUTO: 1.4 K/UL — SIGNIFICANT CHANGE UP (ref 1–3.3)
LYMPHOCYTES # BLD AUTO: 38 % — SIGNIFICANT CHANGE UP (ref 13–44)
MAGNESIUM SERPL-MCNC: 2.5 MG/DL — SIGNIFICANT CHANGE UP (ref 1.6–2.6)
MCHC RBC-ENTMCNC: 28.7 PG — SIGNIFICANT CHANGE UP (ref 27–34)
MCHC RBC-ENTMCNC: 30.9 GM/DL — LOW (ref 32–36)
MCV RBC AUTO: 92.8 FL — SIGNIFICANT CHANGE UP (ref 80–100)
MONOCYTES # BLD AUTO: 0.4 K/UL — SIGNIFICANT CHANGE UP (ref 0–0.9)
MONOCYTES NFR BLD AUTO: 10.5 % — SIGNIFICANT CHANGE UP (ref 2–14)
NEUTROPHILS # BLD AUTO: 1.6 K/UL — LOW (ref 1.8–7.4)
NEUTROPHILS NFR BLD AUTO: 44.9 % — SIGNIFICANT CHANGE UP (ref 43–77)
PHOSPHATE SERPL-MCNC: 2.9 MG/DL — SIGNIFICANT CHANGE UP (ref 2.5–4.5)
PLATELET # BLD AUTO: 344 K/UL — SIGNIFICANT CHANGE UP (ref 150–400)
POTASSIUM SERPL-MCNC: 3.8 MMOL/L — SIGNIFICANT CHANGE UP (ref 3.5–5.3)
POTASSIUM SERPL-SCNC: 3.8 MMOL/L — SIGNIFICANT CHANGE UP (ref 3.5–5.3)
PROT SERPL-MCNC: 5.6 G/DL — LOW (ref 6–8.3)
RBC # BLD: 3.04 M/UL — LOW (ref 3.8–5.2)
RBC # FLD: 15.7 % — HIGH (ref 10.3–14.5)
SODIUM SERPL-SCNC: 143 MMOL/L — SIGNIFICANT CHANGE UP (ref 135–145)
WBC # BLD: 3.6 K/UL — LOW (ref 3.8–10.5)
WBC # FLD AUTO: 3.6 K/UL — LOW (ref 3.8–10.5)

## 2018-05-20 PROCEDURE — 99233 SBSQ HOSP IP/OBS HIGH 50: CPT | Mod: GC

## 2018-05-20 RX ADMIN — OCTREOTIDE ACETATE 100 MICROGRAM(S): 200 INJECTION, SOLUTION INTRAVENOUS; SUBCUTANEOUS at 05:03

## 2018-05-20 RX ADMIN — OCTREOTIDE ACETATE 100 MICROGRAM(S): 200 INJECTION, SOLUTION INTRAVENOUS; SUBCUTANEOUS at 21:07

## 2018-05-20 RX ADMIN — FLUCONAZOLE 100 MILLIGRAM(S): 150 TABLET ORAL at 17:29

## 2018-05-20 RX ADMIN — ENOXAPARIN SODIUM 40 MILLIGRAM(S): 100 INJECTION SUBCUTANEOUS at 12:01

## 2018-05-20 RX ADMIN — OCTREOTIDE ACETATE 100 MICROGRAM(S): 200 INJECTION, SOLUTION INTRAVENOUS; SUBCUTANEOUS at 14:26

## 2018-05-20 RX ADMIN — PIPERACILLIN AND TAZOBACTAM 25 GRAM(S): 4; .5 INJECTION, POWDER, LYOPHILIZED, FOR SOLUTION INTRAVENOUS at 14:26

## 2018-05-20 RX ADMIN — PIPERACILLIN AND TAZOBACTAM 25 GRAM(S): 4; .5 INJECTION, POWDER, LYOPHILIZED, FOR SOLUTION INTRAVENOUS at 21:07

## 2018-05-20 RX ADMIN — PANTOPRAZOLE SODIUM 40 MILLIGRAM(S): 20 TABLET, DELAYED RELEASE ORAL at 12:01

## 2018-05-20 RX ADMIN — PIPERACILLIN AND TAZOBACTAM 25 GRAM(S): 4; .5 INJECTION, POWDER, LYOPHILIZED, FOR SOLUTION INTRAVENOUS at 05:03

## 2018-05-20 NOTE — PROGRESS NOTE ADULT - SUBJECTIVE AND OBJECTIVE BOX
58y Female in our care for peritonitis. Pt is in bed, no distress, complains of right upper quadrant abdominal pain, no nausea or vomiting, pt is NPO. Pt was seen by surgery, no surgical intervention at the moment. Pt remains afebrile, no leukocytosis, blood cultures with no growth.  Pt denies having shortness of breath or chest pain.     MEDS:  fluconAZOLE IVPB 200 milliGRAM(s) IV Intermittent every 24 hours  piperacillin/tazobactam IVPB. 3.375 Gram(s) IV Intermittent every 8 hours    No Known Allergies    VITALS:  Vital Signs Last 24 Hrs  T(C): 36.8 (20 May 2018 05:25), Max: 36.8 (19 May 2018 13:56)  T(F): 98.3 (20 May 2018 05:25), Max: 98.3 (19 May 2018 13:56)  HR: 49 (20 May 2018 05:25) (46 - 52)  BP: 120/55 (20 May 2018 05:25) (120/55 - 129/73)  BP(mean): --  RR: 18 (20 May 2018 05:25) (18 - 18)  SpO2: 100% (20 May 2018 05:25) (97% - 100%)    LABS/DIAGNOSTIC TESTS:                          8.7    3.6   )-----------( 344      ( 20 May 2018 05:49 )             28.2         05-20    143  |  111<H>  |  4<L>  ----------------------------<  105<H>  3.8   |  22  |  0.56    Ca    8.1<L>      20 May 2018 05:49  Phos  2.9     05-20  Mg     2.5     05-20    TPro  5.6<L>  /  Alb  2.5<L>  /  TBili  0.4  /  DBili  x   /  AST  29  /  ALT  30  /  AlkPhos  206<H>  05-20      CULTURES:   .Urine Clean Catch (Midstream)  05-18 @ 14:02   No growth  --  --

## 2018-05-20 NOTE — PROGRESS NOTE ADULT - SUBJECTIVE AND OBJECTIVE BOX
Resident Note discussed with  primary attending    Patient is a 58y old  Female who presents with a chief complaint of sent for admission for bowel perforation (18 May 2018 16:46)      INTERVAL HPI/OVERNIGHT EVENTS: no new complaints overnight. This AM patient is sitting comfortably in bed, not in distress. c/o pain in mid low back which is 2 intensity and RUQ pain which 1 in intensity. denies any nausea/ vomiting. Reports having bowel movement yesterday.    MEDICATIONS  (STANDING):  dextrose 5% + sodium chloride 0.9% 1000 milliLiter(s) (75 mL/Hr) IV Continuous <Continuous>  enoxaparin Injectable 40 milliGRAM(s) SubCutaneous daily  fluconAZOLE IVPB 200 milliGRAM(s) IV Intermittent every 24 hours  octreotide  Injectable 100 MICROGram(s) SubCutaneous three times a day  pantoprazole  Injectable 40 milliGRAM(s) IV Push daily  piperacillin/tazobactam IVPB. 3.375 Gram(s) IV Intermittent every 8 hours    MEDICATIONS  (PRN):  ondansetron Injectable 4 milliGRAM(s) IV Push every 8 hours PRN Nausea and/or Vomiting      __________________________________________________  REVIEW OF SYSTEMS:    CONSTITUTIONAL: No fever,   EYES: no acute visual disturbances  NECK: No pain or stiffness  RESPIRATORY: No cough; No shortness of breath  CARDIOVASCULAR: No chest pain, no palpitations  GASTROINTESTINAL: mild RUQ pain and mid low back pain. No nausea or vomiting; No diarrhea   NEUROLOGICAL: No headache or numbness, no tremors  MUSCULOSKELETAL: No joint pain, no muscle pain  GENITOURINARY: no dysuria, no frequency, no hesitancy  PSYCHIATRY: no depression , no anxiety  ALL OTHER  ROS negative        Vital Signs Last 24 Hrs  T(C): 36.8 (20 May 2018 05:25), Max: 36.8 (19 May 2018 13:56)  T(F): 98.3 (20 May 2018 05:25), Max: 98.3 (19 May 2018 13:56)  HR: 49 (20 May 2018 05:25) (46 - 52)  BP: 120/55 (20 May 2018 05:25) (120/55 - 129/73)  BP(mean): --  RR: 18 (20 May 2018 05:25) (18 - 18)  SpO2: 100% (20 May 2018 05:25) (97% - 100%)    ________________________________________________  PHYSICAL EXAM:  GENERAL: Patient sitting comfortably in chair, not in distress.  HEENT: Normocephalic; conjunctivae and sclerae clear; moist mucous membranes;   NECK : supple  CHEST/LUNG: Clear to auscultation bilaterally with good air entry   HEART: S1 S2  regular; bradycardia, no murmurs, gallops or rubs  ABDOMEN: Soft, minimally tender in RUQ, Nondistended; Bowel sounds hypotonic.  EXTREMITIES: no cyanosis; no edema; no calf tenderness  NERVOUS SYSTEM:  Awake and alert; Oriented  to place, person and time ; no new deficits    _________________________________________________  LABS:                        8.7    3.6   )-----------( 344      ( 20 May 2018 05:49 )             28.2     05-20    143  |  111<H>  |  4<L>  ----------------------------<  105<H>  3.8   |  22  |  0.56    Ca    8.1<L>      20 May 2018 05:49  Phos  2.9     05-20  Mg     2.5     05-20    TPro  5.6<L>  /  Alb  2.5<L>  /  TBili  0.4  /  DBili  x   /  AST  29  /  ALT  30  /  AlkPhos  206<H>  05-20    PT/INR - ( 19 May 2018 07:03 )   PT: 11.6 sec;   INR: 1.06 ratio         PTT - ( 19 May 2018 07:03 )  PTT:29.7 sec    CAPILLARY BLOOD GLUCOSE            RADIOLOGY & ADDITIONAL TESTS: NO NEW IMAGING STUDIES PERFORMED TODAY.        Consultant(s) Notes Reviewed:   YES    Care Discussed with Consultants : YES    Plan of care was discussed with patient and /or primary care giver; all questions and concerns were addressed and care was aligned with patient's wishes. Resident Note discussed with  primary attending    Patient is a 58y old  Female who presents with a chief complaint of sent for admission for bowel perforation (18 May 2018 16:46)      INTERVAL HPI/OVERNIGHT EVENTS: no new complaints overnight. This AM patient is sitting comfortably in bed, not in distress. c/o pain in mid low back which is 2 intensity and RUQ pain which 1 in intensity. denies any nausea/ vomiting. Reports having bowel movement yesterday.    MEDICATIONS  (STANDING):  dextrose 5% + sodium chloride 0.9% 1000 milliLiter(s) (75 mL/Hr) IV Continuous <Continuous>  enoxaparin Injectable 40 milliGRAM(s) SubCutaneous daily  fluconAZOLE IVPB 200 milliGRAM(s) IV Intermittent every 24 hours  octreotide  Injectable 100 MICROGram(s) SubCutaneous three times a day  pantoprazole  Injectable 40 milliGRAM(s) IV Push daily  piperacillin/tazobactam IVPB. 3.375 Gram(s) IV Intermittent every 8 hours    MEDICATIONS  (PRN):  ondansetron Injectable 4 milliGRAM(s) IV Push every 8 hours PRN Nausea and/or Vomiting      __________________________________________________  REVIEW OF SYSTEMS:    CONSTITUTIONAL: No fever,   EYES: no acute visual disturbances  NECK: No pain or stiffness  RESPIRATORY: No cough; No shortness of breath  CARDIOVASCULAR: No chest pain, no palpitations  GASTROINTESTINAL: mild RUQ pain and mid low back pain. No nausea or vomiting; No diarrhea   NEUROLOGICAL: No headache or numbness, no tremors  MUSCULOSKELETAL: No joint pain, no muscle pain  GENITOURINARY: no dysuria, no frequency, no hesitancy  PSYCHIATRY: no depression , no anxiety  ALL OTHER  ROS negative        Vital Signs Last 24 Hrs  T(C): 36.8 (20 May 2018 05:25), Max: 36.8 (19 May 2018 13:56)  T(F): 98.3 (20 May 2018 05:25), Max: 98.3 (19 May 2018 13:56)  HR: 49 (20 May 2018 05:25) (46 - 52)  BP: 120/55 (20 May 2018 05:25) (120/55 - 129/73)  BP(mean): --  RR: 18 (20 May 2018 05:25) (18 - 18)  SpO2: 100% (20 May 2018 05:25) (97% - 100%)    ________________________________________________  PHYSICAL EXAM:  GENERAL: Patient sitting comfortably in chair, not in distress.  HEENT: Normocephalic; conjunctivae and sclerae clear; moist mucous membranes;   NECK : supple  CHEST/LUNG: Clear to auscultation bilaterally with good air entry   HEART: S1 S2  regular; bradycardia, no murmurs, gallops or rubs  ABDOMEN: Soft, minimally tender in RUQ, Nondistended; Bowel sounds hypotonic.  EXTREMITIES: no cyanosis; no edema; no calf tenderness  NERVOUS SYSTEM:  Awake and alert; Oriented  to place, person and time ; no new deficits    _________________________________________________  LABS:                        8.7    3.6   )-----------( 344      ( 20 May 2018 05:49 )             28.2     05-20    143  |  111<H>  |  4<L>  ----------------------------<  105<H>  3.8   |  22  |  0.56    Ca    8.1<L>      20 May 2018 05:49  Phos  2.9     05-20  Mg     2.5     05-20    TPro  5.6<L>  /  Alb  2.5<L>  /  TBili  0.4  /  DBili  x   /  AST  29  /  ALT  30  /  AlkPhos  206<H>  05-20    PT/INR - ( 19 May 2018 07:03 )   PT: 11.6 sec;   INR: 1.06 ratio    PTT - ( 19 May 2018 07:03 )  PTT:29.7 sec      RADIOLOGY & ADDITIONAL TESTS: NO NEW IMAGING STUDIES PERFORMED TODAY.    Consultant(s) Notes Reviewed:   YES    Care Discussed with Consultants : YES    Plan of care was discussed with patient and /or primary care giver; all questions and concerns were addressed and care was aligned with patient's wishes.

## 2018-05-20 NOTE — PROGRESS NOTE ADULT - SUBJECTIVE AND OBJECTIVE BOX
Patient examined at bedside, reports no abdominal pain  No nausea, no vomiting  pt is npo    T(C): 37 (05-19-18 @ 05:39), Max: 37.1 (05-18-18 @ 21:21)  HR: 52 (05-19-18 @ 05:39) (52 - 57)  BP: 121/59 (05-19-18 @ 05:39) (109/56 - 123/60)  RR: 18 (05-19-18 @ 05:39) (16 - 18)  SpO2: 100% (05-19-18 @ 05:39) (97% - 100%)  Wt(kg): --      Physical Exam  General: AAOx3, No acute distress  Skin: No jaundice, no icterus  Abdomen: soft, nontender, nondistended, well healed midline incision  Extremities: non edematous, no calf pain bilaterally                                     8.7    3.6   )-----------( 344      ( 20 May 2018 05:49 )             28.2   05-20    143  |  111<H>  |  4<L>  ----------------------------<  105<H>  3.8   |  22  |  0.56    Ca    8.1<L>      20 May 2018 05:49  Phos  2.9     05-20  Mg     2.5     05-20    TPro  5.6<L>  /  Alb  2.5<L>  /  TBili  0.4  /  DBili  x   /  AST  29  /  ALT  30  /  AlkPhos  206<H>  05-20

## 2018-05-20 NOTE — PROGRESS NOTE ADULT - ASSESSMENT
1. Perforated Duodenum (etiology is not clear)  2. Anemia  3. No evidence of acute GI bleeding    Suggestions:    1. Continue antibiotics  2. Monitor electrolytes  3. Anemia  4. Surgical follow up  5. Repeat abdominal CT-Scan  6. EGD is contraindicated  7. DVT prophylaxis  8. Protonix IV

## 2018-05-20 NOTE — PROGRESS NOTE ADULT - ATTENDING COMMENTS
Patient seen and examined around 9.45 AM; Agree with PGY 2 A/P above with editing as needed. d/w Dr. Robles this morning    Patient is clinically doing better, was OOB to chair; minimal pain only RUQ; HR was little low; No new complaints.    Vitals: reviewed; stable as above    P/E: As above  GI: Mild RUQ tenderness only on deep palpation; Non distended, BS+ sluggish    `Labs: reviewed; As above stable CBC and BMP    D/D:  RUQ pain concern for perforated ulcer due to Chemotherapy Vs Anastomotic leak  At risk for Perforation peritonitis  Anemia likely due to Chronic disease due to malignancy  Hx Gastric ca s/p gastrectomy and Billroth II     Plan:  Will continue NPO; IVF to continue  Continue IV Zosyn and Diflucan; ID consult appreciated  Will d/w Surgical team and consider repeating a CT to evaluate for resolution of air  If air persist, will consider EGD; If resolving will consider advancing diet once okay with Sx  Monitor electrolytes, renal function and H/H closely  DVT ppx    Discussed with Patient findings and plan of care  Discussed with PGY 2 Dr. Robles  Discussed with RN

## 2018-05-20 NOTE — PROGRESS NOTE ADULT - PROBLEM SELECTOR PLAN 1
CT Abd showed anastomotic breakdown or perforated duodenal ulcer.  surgery Dr Aguilar/ Dr Trent following the case, recommended close monitoring, NPO , IV hydration  Small amt of air around duodenum, likely residual from duodenal stump leak postoperatively ~1 year ago, unlikely to be new finding.  Symptoms likely due to chemotherapy   may need EGD to evaluate GJ.  ID Dr Albrecht consulted, will continue IV zosyn D3 and IV diflucan D2 for peritonitis given perforation of hollow viscus.  GI consult- Dr Hazel on case. CT Abd showed anastomotic breakdown or perforated duodenal ulcer.  surgery Dr Aguilar/ Dr Trent following the case, recommended close monitoring, NPO , IV hydration  Small amt of air around duodenum, likely residual from duodenal stump leak postoperatively ~1 year ago, unlikely to be new finding.  Symptoms likely due to chemotherapy   may need EGD to evaluate GJ.  ID Dr Albrecht consulted, will continue IV zosyn D# 2 and IV diflucan D2 for peritonitis given perforation of hollow viscus.  GI consult- Dr Hazel on case.

## 2018-05-20 NOTE — PROGRESS NOTE ADULT - ASSESSMENT
58 year old female with walled off possibly marginal ulcer perforation, hx partial gastrectomy bilroth II reconstruction    1) npo  2) iv fluids  3) continue protonics   4) dvt prophylaxis  5)no surgical intervention at this time but surgery will follow

## 2018-05-20 NOTE — PROGRESS NOTE ADULT - ASSESSMENT
peritonitis - given bowel(duodenal) perforation vs anastomotic leak ( less likely given surgery was 1 year ago)   possibly has phlegmon in gallbladder fossa region    plan  continue zosyn 3.375gm siv q8hrs - day #3 (on day #1 got one dose)  continue diflucan 200mgs iv q24hrs- day #2  keep NPO  will need EGD at some point in the near future to see wear the leak is. peritonitis - given bowel(duodenal) perforation vs anastomotic leak ( less likely given surgery was 1 year ago)   possibly has phlegmon in gallbladder fossa region    plan  continue zosyn 3.375gm siv q8hrs - day #3 (on day #1 got one dose)  continue diflucan 200mgs iv q24hrs- day #2  keep NPO  will need EGD at some point in the near future to see wear the leak is.  will get a repeat CT abdomen in 2 days and if air has resorbed then would indicate that the leak has closed and will determine disposition after that.

## 2018-05-20 NOTE — PROGRESS NOTE ADULT - SUBJECTIVE AND OBJECTIVE BOX
feel fine  no abd pain, mild tenderness at RUQ  no fever or leukocytosis  had loose BM  ?antibiotics  NPO, IVF  on sandostation

## 2018-05-20 NOTE — PROGRESS NOTE ADULT - ASSESSMENT
Patient is a 59 y/o F pt with PMHx of gastric CA, and no other medical history, s/p partial gastrectomy on June 2017 and on chemotherapy since September 2017 sent in for intraabdominal free air admitted for Perforated duodenal anastomotic breakdown or duodenal  ulcer perforation.                Problem/Plan - 2:  ·  Problem: Malignant neoplasm of stomach, unspecified location.  Plan:   f/u Dr Crowe   Plan as above.      Problem/Plan - 3:  ·  Problem: Anemia.  Plan: likely chemotherapy related  iron levels fine  Hb 8.5 stable, monitor cbc     Problem/Plan - 4:  ·  Problem: Prophylactic measure.  Plan: Patient is a 57 y/o F pt with PMHx of gastric CA, and no other medical history, s/p partial gastrectomy on June 2017 and on chemotherapy since September 2017 sent in for intraabdominal free air admitted for Perforated duodenal anastomotic breakdown or duodenal  ulcer perforation.

## 2018-05-20 NOTE — PROGRESS NOTE ADULT - SUBJECTIVE AND OBJECTIVE BOX
[   ] ICU                                          [   ] CCU                                      [ X  ] Medical Floor    Patient is comfortable. No new complaints reported, No abdominal pain, N/V, hematemesis, hematochezia, melena, fever, chills, chest pain, SOB, cough or diarrhea reported.    VITALS  Vital Signs Last 24 Hrs  T(C): 36.7 (20 May 2018 13:36), Max: 36.8 (20 May 2018 05:25)  T(F): 98.1 (20 May 2018 13:36), Max: 98.3 (20 May 2018 05:25)  HR: 46 (20 May 2018 13:36) (46 - 49)  BP: 114/70 (20 May 2018 13:36) (114/70 - 129/73)   RR: 18 (20 May 2018 13:36) (18 - 18)  SpO2: 97% (20 May 2018 13:36) (97% - 100%)       MEDICATIONS  (STANDING):  dextrose 5% + sodium chloride 0.9% 1000 milliLiter(s) (75 mL/Hr) IV Continuous <Continuous>  enoxaparin Injectable 40 milliGRAM(s) SubCutaneous daily  fluconAZOLE IVPB 200 milliGRAM(s) IV Intermittent every 24 hours  octreotide  Injectable 100 MICROGram(s) SubCutaneous three times a day  pantoprazole  Injectable 40 milliGRAM(s) IV Push daily  piperacillin/tazobactam IVPB. 3.375 Gram(s) IV Intermittent every 8 hours    MEDICATIONS  (PRN):  ondansetron Injectable 4 milliGRAM(s) IV Push every 8 hours PRN Nausea and/or Vomiting                            8.7    3.6   )-----------( 344      ( 20 May 2018 05:49 )             28.2       05-20    143  |  111<H>  |  4<L>  ----------------------------<  105<H>  3.8   |  22  |  0.56    Ca    8.1<L>      20 May 2018 05:49  Phos  2.9     05-20  Mg     2.5     05-20    TPro  5.6<L>  /  Alb  2.5<L>  /  TBili  0.4  /  DBili  x   /  AST  29  /  ALT  30  /  AlkPhos  206<H>  05-20      PT/INR - ( 19 May 2018 07:03 )   PT: 11.6 sec;   INR: 1.06 ratio         PTT - ( 19 May 2018 07:03 )  PTT:29.7 sec

## 2018-05-20 NOTE — PROGRESS NOTE ADULT - PROBLEM SELECTOR PLAN 4
[] Previous VTE                                                3  [] Thrombophilia                                             2  [] Lower limb paralysis                                   2    [x] Current Cancer                                             2   [x] Immobilization > 24 hrs                              1  [] ICU/CCU stay > 24 hours                             1  [] Age > 60                                                         1    IMPROVE VTE Score: 3, lovenox for DVT ppx  Gi ppx with protonix.

## 2018-05-20 NOTE — PROGRESS NOTE ADULT - PROBLEM SELECTOR PLAN 2
metastatic gastric ca s/p partial gastrectomy bilroth II reconstruction  was on chemotherapy cyramza and taxol.  will continue Sandostatin to reduce secretions  Dr Crowe following the case.

## 2018-05-21 LAB
ALBUMIN SERPL ELPH-MCNC: 2.7 G/DL — LOW (ref 3.5–5)
ALP SERPL-CCNC: 300 U/L — HIGH (ref 40–120)
ALT FLD-CCNC: 42 U/L DA — SIGNIFICANT CHANGE UP (ref 10–60)
ANION GAP SERPL CALC-SCNC: 7 MMOL/L — SIGNIFICANT CHANGE UP (ref 5–17)
AST SERPL-CCNC: 52 U/L — HIGH (ref 10–40)
BASOPHILS # BLD AUTO: 0.1 K/UL — SIGNIFICANT CHANGE UP (ref 0–0.2)
BASOPHILS NFR BLD AUTO: 2.6 % — HIGH (ref 0–2)
BILIRUB SERPL-MCNC: 0.6 MG/DL — SIGNIFICANT CHANGE UP (ref 0.2–1.2)
BUN SERPL-MCNC: 4 MG/DL — LOW (ref 7–18)
CALCIUM SERPL-MCNC: 8.8 MG/DL — SIGNIFICANT CHANGE UP (ref 8.4–10.5)
CHLORIDE SERPL-SCNC: 110 MMOL/L — HIGH (ref 96–108)
CO2 SERPL-SCNC: 25 MMOL/L — SIGNIFICANT CHANGE UP (ref 22–31)
CREAT SERPL-MCNC: 0.58 MG/DL — SIGNIFICANT CHANGE UP (ref 0.5–1.3)
EOSINOPHIL # BLD AUTO: 0.2 K/UL — SIGNIFICANT CHANGE UP (ref 0–0.5)
EOSINOPHIL NFR BLD AUTO: 4.1 % — SIGNIFICANT CHANGE UP (ref 0–6)
GLUCOSE SERPL-MCNC: 81 MG/DL — SIGNIFICANT CHANGE UP (ref 70–99)
HCT VFR BLD CALC: 30.9 % — LOW (ref 34.5–45)
HGB BLD-MCNC: 9.5 G/DL — LOW (ref 11.5–15.5)
LYMPHOCYTES # BLD AUTO: 1.5 K/UL — SIGNIFICANT CHANGE UP (ref 1–3.3)
LYMPHOCYTES # BLD AUTO: 39.7 % — SIGNIFICANT CHANGE UP (ref 13–44)
MAGNESIUM SERPL-MCNC: 2.5 MG/DL — SIGNIFICANT CHANGE UP (ref 1.6–2.6)
MCHC RBC-ENTMCNC: 28.7 PG — SIGNIFICANT CHANGE UP (ref 27–34)
MCHC RBC-ENTMCNC: 30.9 GM/DL — LOW (ref 32–36)
MCV RBC AUTO: 93 FL — SIGNIFICANT CHANGE UP (ref 80–100)
MONOCYTES # BLD AUTO: 0.3 K/UL — SIGNIFICANT CHANGE UP (ref 0–0.9)
MONOCYTES NFR BLD AUTO: 7.9 % — SIGNIFICANT CHANGE UP (ref 2–14)
NEUTROPHILS # BLD AUTO: 1.7 K/UL — LOW (ref 1.8–7.4)
NEUTROPHILS NFR BLD AUTO: 45.6 % — SIGNIFICANT CHANGE UP (ref 43–77)
PHOSPHATE SERPL-MCNC: 3.1 MG/DL — SIGNIFICANT CHANGE UP (ref 2.5–4.5)
PLATELET # BLD AUTO: 381 K/UL — SIGNIFICANT CHANGE UP (ref 150–400)
POTASSIUM SERPL-MCNC: 3.5 MMOL/L — SIGNIFICANT CHANGE UP (ref 3.5–5.3)
POTASSIUM SERPL-SCNC: 3.5 MMOL/L — SIGNIFICANT CHANGE UP (ref 3.5–5.3)
PROT SERPL-MCNC: 6.3 G/DL — SIGNIFICANT CHANGE UP (ref 6–8.3)
RBC # BLD: 3.32 M/UL — LOW (ref 3.8–5.2)
RBC # FLD: 15.9 % — HIGH (ref 10.3–14.5)
SODIUM SERPL-SCNC: 142 MMOL/L — SIGNIFICANT CHANGE UP (ref 135–145)
WBC # BLD: 3.7 K/UL — LOW (ref 3.8–10.5)
WBC # FLD AUTO: 3.7 K/UL — LOW (ref 3.8–10.5)

## 2018-05-21 PROCEDURE — 99232 SBSQ HOSP IP/OBS MODERATE 35: CPT | Mod: 24

## 2018-05-21 PROCEDURE — 99233 SBSQ HOSP IP/OBS HIGH 50: CPT | Mod: GC

## 2018-05-21 RX ADMIN — OCTREOTIDE ACETATE 100 MICROGRAM(S): 200 INJECTION, SOLUTION INTRAVENOUS; SUBCUTANEOUS at 05:07

## 2018-05-21 RX ADMIN — PIPERACILLIN AND TAZOBACTAM 25 GRAM(S): 4; .5 INJECTION, POWDER, LYOPHILIZED, FOR SOLUTION INTRAVENOUS at 05:10

## 2018-05-21 RX ADMIN — ENOXAPARIN SODIUM 40 MILLIGRAM(S): 100 INJECTION SUBCUTANEOUS at 15:00

## 2018-05-21 RX ADMIN — PANTOPRAZOLE SODIUM 40 MILLIGRAM(S): 20 TABLET, DELAYED RELEASE ORAL at 12:09

## 2018-05-21 NOTE — PROGRESS NOTE ADULT - ASSESSMENT
58 year old female with walled off possibly marginal ulcer perforation, hx partial gastrectomy bilroth II reconstruction    1) npo  2) iv fluids  3) continue protonics   4) dvt prophylaxis, oob ambulate encouraged  5)plan for repeat CT scan this week, will discuss with Dr. Haddad  6)no surgical intervention at this time but surgery will follow 58 year old female with walled off possibly marginal ulcer perforation, hx partial gastrectomy bilroth II reconstruction    1) recommend advance diet as tolerated  2)continue protonics   3) dvt prophylaxis, oob ambulate encouraged  5)Case discussed with Dr. Trent and agrees, no surgical intervention at this time but surgery will follow

## 2018-05-21 NOTE — PROGRESS NOTE ADULT - PROBLEM SELECTOR PLAN 3
normocytic normochromic anemia, likely secondary to underlying chemotherapy and malignancy  baseline around 8.5-9  H/H stable  will monitor CBC Q daily

## 2018-05-21 NOTE — PROGRESS NOTE ADULT - ASSESSMENT
Patient is a 57 y/o F pt with PMHx of gastric CA, and no other medical history, s/p partial gastrectomy on June 2017 and on chemotherapy since September 2017 sent in for intraabdominal free air admitted for Perforated duodenal anastomotic breakdown or duodenal  ulcer perforation.

## 2018-05-21 NOTE — PROGRESS NOTE ADULT - ATTENDING COMMENTS
Patient was examined at the bedside and discussed with Dr. Robles.     She was referred to ED the other day after a CT showed microperforation at the anastomotic sites of previous partial gastrectomy and Billroth II.   She has known gastric cancer and has been receiving chemotherapy.     ROS is negative for fever, chills, SOB, abdominal pain.   She has been NPO since admission and was started over the weekend on empirical antibiotics.     WDWN in NAD  Vital Signs Last 24 Hrs  T(C): 36.7 (21 May 2018 14:17), Max: 36.9 (20 May 2018 20:22)  T(F): 98 (21 May 2018 14:17), Max: 98.5 (20 May 2018 20:22)  HR: 52 (21 May 2018 14:17) (47 - 57)  BP: 118/59 (21 May 2018 14:17) (102/66 - 127/52)  BP(mean): --  RR: 16 (21 May 2018 14:17) (16 - 17)  SpO2: 100% (21 May 2018 14:17) (99% - 100%)  Lungs, clera  Cor, RRR  Abdomen, soft, minimal RUQ tenderness  Neurological, intact                          9.5    3.7   )-----------( 381      ( 21 May 2018 07:31 )             30.9   05-21    142  |  110<H>  |  4<L>  ----------------------------<  81  3.5   |  25  |  0.58    Ca    8.8      21 May 2018 07:31  Phos  3.1     05-21  Mg     2.5     05-21    TPro  6.3  /  Alb  2.7<L>  /  TBili  0.6  /  DBili  x   /  AST  52<H>  /  ALT  42  /  AlkPhos  300<H>  05-21    < from: CT Abdomen and Pelvis w/ Oral Cont and w/ IV Cont (05.18.18 @ 14:32) >    Status post distal gastrectomy and Bilroth 2 procedure with small focus   of extraluminal air anterior to the duodenal suture line and a larger   focus of air anterior to the gallbladder fossa with surrounding   phlegmonous change in the rightupper quadrant. Differential includes   anastomotic breakdown or perforated duodenal ulcer.    Inflammatory changes surrounding the gallbladder likely reactive.    Additional findings as above.    Critical finding was discussed with Dr. Mobley at 2:55 PM on 5/18/2018.     < end of copied text >    IMP:  S/p resection of gastric cancer.  Known metastases.          Small perforation, having caused abdominal pain.  Likely resealed.           Patient is hemodynamically stable.   Heme/Onc follow up, appreciated.   Recommend: Surgery, Dr. Aguilar, has agreed to direct the care of this patient on his service.          Advance diet.           Recommend discontinuation of antibiotics and clinical observation to determine whether patient can tolerated advancement of diet.

## 2018-05-21 NOTE — PROGRESS NOTE ADULT - SUBJECTIVE AND OBJECTIVE BOX
58y Female    Meds:    Allergies    No Known Allergies    Intolerances        VITALS:  Vital Signs Last 24 Hrs  T(C): 36.7 (21 May 2018 14:17), Max: 36.9 (20 May 2018 20:22)  T(F): 98 (21 May 2018 14:17), Max: 98.5 (20 May 2018 20:22)  HR: 52 (21 May 2018 14:17) (47 - 57)  BP: 118/59 (21 May 2018 14:17) (102/66 - 127/52)  BP(mean): --  RR: 16 (21 May 2018 14:17) (16 - 17)  SpO2: 100% (21 May 2018 14:17) (99% - 100%)    LABS/DIAGNOSTIC TESTS:                          9.5    3.7   )-----------( 381      ( 21 May 2018 07:31 )             30.9         05-21    142  |  110<H>  |  4<L>  ----------------------------<  81  3.5   |  25  |  0.58    Ca    8.8      21 May 2018 07:31  Phos  3.1     05-21  Mg     2.5     05-21    TPro  6.3  /  Alb  2.7<L>  /  TBili  0.6  /  DBili  x   /  AST  52<H>  /  ALT  42  /  AlkPhos  300<H>  05-21      LIVER FUNCTIONS - ( 21 May 2018 07:31 )  Alb: 2.7 g/dL / Pro: 6.3 g/dL / ALK PHOS: 300 U/L / ALT: 42 U/L DA / AST: 52 U/L / GGT: x             CULTURES: .Urine Clean Catch (Midstream)  05-18 @ 14:02   No growth  --  --            RADIOLOGY:      ROS:  [  ] UNABLE TO ELICIT 58y Female who  is doing okay but still has RUQ abdominal pain and upper mid back pain, her antibiotics were stopped by Dr Corrales who feels she does not have any infection and she has been transferred over to surgical service. she was started on clears and was having it when I saw her and c/o a discomfort in her epigastric region like something is stuck but not pain. surgery did not want to repeat the CT abdomen at this time apparently. I discussed the case with Dr Sebastien herrera and would like to repeat CT abdomen to see if perforation has closed up or not which I feel is a reasonable approach, hence will discuss with Dr Aguilar tomorrow.    Meds:    Allergies    No Known Allergies    Intolerances        VITALS:  Vital Signs Last 24 Hrs  T(C): 36.7 (21 May 2018 14:17), Max: 36.9 (20 May 2018 20:22)  T(F): 98 (21 May 2018 14:17), Max: 98.5 (20 May 2018 20:22)  HR: 52 (21 May 2018 14:17) (47 - 57)  BP: 118/59 (21 May 2018 14:17) (102/66 - 127/52)  BP(mean): --  RR: 16 (21 May 2018 14:17) (16 - 17)  SpO2: 100% (21 May 2018 14:17) (99% - 100%)    LABS/DIAGNOSTIC TESTS:                          9.5    3.7   )-----------( 381      ( 21 May 2018 07:31 )             30.9         05-21    142  |  110<H>  |  4<L>  ----------------------------<  81  3.5   |  25  |  0.58    Ca    8.8      21 May 2018 07:31  Phos  3.1     05-21  Mg     2.5     05-21    TPro  6.3  /  Alb  2.7<L>  /  TBili  0.6  /  DBili  x   /  AST  52<H>  /  ALT  42  /  AlkPhos  300<H>  05-21      LIVER FUNCTIONS - ( 21 May 2018 07:31 )  Alb: 2.7 g/dL / Pro: 6.3 g/dL / ALK PHOS: 300 U/L / ALT: 42 U/L DA / AST: 52 U/L / GGT: x             CULTURES: .Urine Clean Catch (Midstream)  05-18 @ 14:02   No growth  --  --            RADIOLOGY:      ROS:  [  ] UNABLE TO ELICIT

## 2018-05-21 NOTE — PROGRESS NOTE ADULT - PROBLEM SELECTOR PLAN 1
CT Abd showed anastomotic breakdown or perforated duodenal ulcer.  surgery Dr Aguilar/ Dr Trent following the case, recommended close monitoring, NPO , IV hydration  Small amt of air around duodenum, likely residual from duodenal stump leak postoperatively ~1 year ago, unlikely to be new finding.  Symptoms likely due to chemotherapy   may need EGD to evaluate GJ.  ID Dr Albrecht consulted, will continue IV zosyn D# 2 and IV diflucan D2 for peritonitis given perforation of hollow viscus.  GI consult- Dr Hazel on case. CT Abd showed anastomotic breakdown or perforated duodenal ulcer.   NPO , IV hydration  attending discussed with DR Aguilar who recommended no need to repeat CT abdomen at this point and advance diet and surgery team will take over the case.  Gi Dr Hazel recommended EGD is contraindicated.  as discussed with attending will hold off antibiotics and monitor closely as no fever/ leucocytosis.

## 2018-05-21 NOTE — DIETITIAN INITIAL EVALUATION ADULT. - PERTINENT LABORATORY DATA
05-21 Na142 mmol/L Glu 81 mg/dL K+ 3.5 mmol/L Cr  0.58 mg/dL BUN 4 mg/dL<L> 05-21 Phos 3.1 mg/dL 05-21 Alb 2.7 g/dL<L> 05-19 [id number] 5.2 % 05-19 Chol 122 mg/dL LDL 73 mg/dL HDL 33 mg/dL<L> Trig 78 mg/dL

## 2018-05-21 NOTE — DIETITIAN INITIAL EVALUATION ADULT. - OTHER INFO
Patient reports losing 5% from usual in 11 months ( 56.8 to 54kg) . Patient previously diagnosed as severely malnourished ( nutrition note: 7/11/17).  Nutrition focused physical exam conducted:  Subcutaneous fat loss: [x ] Orbital fat pads region, moderate [ ]Buccal fat region, [ x]Triceps region, moderate, [ ]Ribs region.  Muscle wasting: [x ]Temples region, severe [x ]Clavicle region, severe [x ]Shoulder region, severe [ ]Scapula region, [ x]Interosseous region,  severe [ ]thigh region, [ ]Calf region

## 2018-05-21 NOTE — PROGRESS NOTE ADULT - SUBJECTIVE AND OBJECTIVE BOX
Resident Note discussed with  primary attending    Patient is a 58y old  Female who presents with a chief complaint of sent for admission for bowel perforation (18 May 2018 16:46)      INTERVAL HPI/OVERNIGHT EVENTS: no new complaints    MEDICATIONS  (STANDING):  dextrose 5% + sodium chloride 0.9% 1000 milliLiter(s) (75 mL/Hr) IV Continuous <Continuous>  enoxaparin Injectable 40 milliGRAM(s) SubCutaneous daily  fluconAZOLE IVPB 200 milliGRAM(s) IV Intermittent every 24 hours  octreotide  Injectable 100 MICROGram(s) SubCutaneous three times a day  pantoprazole  Injectable 40 milliGRAM(s) IV Push daily  piperacillin/tazobactam IVPB. 3.375 Gram(s) IV Intermittent every 8 hours    MEDICATIONS  (PRN):  ondansetron Injectable 4 milliGRAM(s) IV Push every 8 hours PRN Nausea and/or Vomiting      __________________________________________________  REVIEW OF SYSTEMS:    CONSTITUTIONAL: No fever,   EYES: no acute visual disturbances  NECK: No pain or stiffness  RESPIRATORY: No cough; No shortness of breath  CARDIOVASCULAR: No chest pain, no palpitations  GASTROINTESTINAL: No pain. No nausea or vomiting; No diarrhea   NEUROLOGICAL: No headache or numbness, no tremors  MUSCULOSKELETAL: No joint pain, no muscle pain  GENITOURINARY: no dysuria, no frequency, no hesitancy  PSYCHIATRY: no depression , no anxiety  ALL OTHER  ROS negative        Vital Signs Last 24 Hrs  T(C): 36.6 (21 May 2018 05:05), Max: 36.9 (20 May 2018 20:22)  T(F): 97.8 (21 May 2018 05:05), Max: 98.5 (20 May 2018 20:22)  HR: 57 (21 May 2018 05:05) (46 - 57)  BP: 102/66 (21 May 2018 05:05) (102/66 - 127/52)  BP(mean): --  RR: 16 (21 May 2018 05:05) (16 - 18)  SpO2: 99% (21 May 2018 05:05) (97% - 100%)    ________________________________________________  PHYSICAL EXAM:  GENERAL: NAD  HEENT:Normocephalic;  conjunctivae and sclerae clear; moist mucous membranes;   NECK : supple  CHEST/LUNG: Clear to auscuitation bilaterally with good air entry   HEART: S1 S2  regular; no murmurs, gallops or rubs  ABDOMEN: Soft, Nontender, Nondistended; Bowel sounds present  EXTREMITIES: no cyanosis; no edema; no calf tenderness  NERVOUS SYSTEM:  Awake and alert; Oriented  to place, person and time ; no new deficits    _________________________________________________  LABS:                        8.7    3.6   )-----------( 344      ( 20 May 2018 05:49 )             28.2     05-20    143  |  111<H>  |  4<L>  ----------------------------<  105<H>  3.8   |  22  |  0.56    Ca    8.1<L>      20 May 2018 05:49  Phos  2.9     05-20  Mg     2.5     05-20    TPro  5.6<L>  /  Alb  2.5<L>  /  TBili  0.4  /  DBili  x   /  AST  29  /  ALT  30  /  AlkPhos  206<H>  05-20        CAPILLARY BLOOD GLUCOSE            RADIOLOGY & ADDITIONAL TESTS:    Imaging Personally Reviewed:  YES/NO    Consultant(s) Notes Reviewed:   YES/ No    Care Discussed with Consultants :     Plan of care was discussed with patient and /or primary care giver; all questions and concerns were addressed and care was aligned with patient's wishes. Resident Note discussed with  primary attending    Patient is a 58y old  Female who presents with a chief complaint of sent for admission for bowel perforation (18 May 2018 16:46)      INTERVAL HPI/OVERNIGHT EVENTS: no new complaints overnight. This AM patient was lying comfortably in bed, not in distress, c/o mild pain in RUQ and pain in upper mid back pain. denies any nausea/ vomiting/ sob.    MEDICATIONS  (STANDING):  dextrose 5% + sodium chloride 0.9% 1000 milliLiter(s) (75 mL/Hr) IV Continuous <Continuous>  enoxaparin Injectable 40 milliGRAM(s) SubCutaneous daily  fluconAZOLE IVPB 200 milliGRAM(s) IV Intermittent every 24 hours  octreotide  Injectable 100 MICROGram(s) SubCutaneous three times a day  pantoprazole  Injectable 40 milliGRAM(s) IV Push daily  piperacillin/tazobactam IVPB. 3.375 Gram(s) IV Intermittent every 8 hours    MEDICATIONS  (PRN):  ondansetron Injectable 4 milliGRAM(s) IV Push every 8 hours PRN Nausea and/or Vomiting      __________________________________________________  REVIEW OF SYSTEMS:    CONSTITUTIONAL: No fever,   EYES: no acute visual disturbances  NECK: No pain or stiffness  RESPIRATORY: No cough; No shortness of breath  CARDIOVASCULAR: No chest pain, no palpitations  GASTROINTESTINAL: mild RUQ pain and mid low back pain. No nausea or vomiting; No diarrhea   NEUROLOGICAL: No headache or numbness, no tremors  MUSCULOSKELETAL: No joint pain, no muscle pain  GENITOURINARY: no dysuria, no frequency, no hesitancy  PSYCHIATRY: no depression , no anxiety  ALL OTHER  ROS negative        Vital Signs Last 24 Hrs  T(C): 36.6 (21 May 2018 05:05), Max: 36.9 (20 May 2018 20:22)  T(F): 97.8 (21 May 2018 05:05), Max: 98.5 (20 May 2018 20:22)  HR: 57 (21 May 2018 05:05) (46 - 57)  BP: 102/66 (21 May 2018 05:05) (102/66 - 127/52)  BP(mean): --  RR: 16 (21 May 2018 05:05) (16 - 18)  SpO2: 99% (21 May 2018 05:05) (97% - 100%)    ________________________________________________  PHYSICAL EXAM:  GENERAL: Patient lying comfortably in bed, not in distress.  HEENT: Normocephalic; conjunctivae and sclerae clear; moist mucous membranes;   NECK : supple  CHEST/LUNG: Clear to auscultation bilaterally with good air entry   HEART: S1 S2  regular; bradycardia, no murmurs, gallops or rubs  ABDOMEN: Soft, minimally tender in RUQ, Nondistended; Bowel sounds+  EXTREMITIES: no cyanosis; no edema; no calf tenderness  NERVOUS SYSTEM:  Awake and alert; Oriented  to place, person and time ; no new deficits  _________________________________________________  LABS:                          9.5    3.7   )-----------( 381      ( 21 May 2018 07:31 )             30.9       05-21    142  |  110<H>  |  4<L>  ----------------------------<  81  3.5   |  25  |  0.58    Ca    8.8      21 May 2018 07:31  Phos  3.1     05-21  Mg     2.5     05-21    TPro  6.3  /  Alb  2.7<L>  /  TBili  0.6  /  DBili  x   /  AST  52<H>  /  ALT  42  /  AlkPhos  300<H>  05-21    Culture Results:   No growth (05-18 @ 14:02)            RADIOLOGY & ADDITIONAL TESTS: NO NEW IMAGING STUDIES PERFORMED TODAY.      Consultant(s) Notes Reviewed:   YES  Care Discussed with Consultants : YES    Plan of care was discussed with patient and /or primary care giver; all questions and concerns were addressed and care was aligned with patient's wishes. Internal Medicine Consultation: Resident Note discussed with medical attending    Patient is a 58y old  Female who presents with a chief complaint of sent for admission for bowel perforation (18 May 2018 16:46)      INTERVAL HPI/OVERNIGHT EVENTS: no new complaints overnight. This AM patient was lying comfortably in bed, not in distress, c/o mild pain in RUQ and pain in upper mid back pain. denies any nausea/ vomiting/ sob.    MEDICATIONS  (STANDING):  dextrose 5% + sodium chloride 0.9% 1000 milliLiter(s) (75 mL/Hr) IV Continuous <Continuous>  enoxaparin Injectable 40 milliGRAM(s) SubCutaneous daily  fluconAZOLE IVPB 200 milliGRAM(s) IV Intermittent every 24 hours  octreotide  Injectable 100 MICROGram(s) SubCutaneous three times a day  pantoprazole  Injectable 40 milliGRAM(s) IV Push daily  piperacillin/tazobactam IVPB. 3.375 Gram(s) IV Intermittent every 8 hours    MEDICATIONS  (PRN):  ondansetron Injectable 4 milliGRAM(s) IV Push every 8 hours PRN Nausea and/or Vomiting      __________________________________________________  REVIEW OF SYSTEMS:    CONSTITUTIONAL: No fever,   EYES: no acute visual disturbances  NECK: No pain or stiffness  RESPIRATORY: No cough; No shortness of breath  CARDIOVASCULAR: No chest pain, no palpitations  GASTROINTESTINAL: mild RUQ pain and mid low back pain. No nausea or vomiting; No diarrhea   NEUROLOGICAL: No headache or numbness, no tremors  MUSCULOSKELETAL: No joint pain, no muscle pain  GENITOURINARY: no dysuria, no frequency, no hesitancy  PSYCHIATRY: no depression , no anxiety  ALL OTHER  ROS negative        Vital Signs Last 24 Hrs  T(C): 36.6 (21 May 2018 05:05), Max: 36.9 (20 May 2018 20:22)  T(F): 97.8 (21 May 2018 05:05), Max: 98.5 (20 May 2018 20:22)  HR: 57 (21 May 2018 05:05) (46 - 57)  BP: 102/66 (21 May 2018 05:05) (102/66 - 127/52)  BP(mean): --  RR: 16 (21 May 2018 05:05) (16 - 18)  SpO2: 99% (21 May 2018 05:05) (97% - 100%)    ________________________________________________  PHYSICAL EXAM:  GENERAL: Patient lying comfortably in bed, not in distress.  HEENT: Normocephalic; conjunctivae and sclerae clear; moist mucous membranes;   NECK : supple  CHEST/LUNG: Clear to auscultation bilaterally with good air entry   HEART: S1 S2  regular; bradycardia, no murmurs, gallops or rubs  ABDOMEN: Soft, minimally tender in RUQ, Nondistended; Bowel sounds+ Minimal RUQ tenderness  EXTREMITIES: no cyanosis; no edema; no calf tenderness  NERVOUS SYSTEM:  Awake and alert; Oriented  to place, person and time ; no new deficits  _________________________________________________  LABS:                          9.5    3.7   )-----------( 381      ( 21 May 2018 07:31 )             30.9       05-21    142  |  110<H>  |  4<L>  ----------------------------<  81  3.5   |  25  |  0.58    Ca    8.8      21 May 2018 07:31  Phos  3.1     05-21  Mg     2.5     05-21    TPro  6.3  /  Alb  2.7<L>  /  TBili  0.6  /  DBili  x   /  AST  52<H>  /  ALT  42  /  AlkPhos  300<H>  05-21    Culture Results:   No growth (05-18 @ 14:02)            RADIOLOGY & ADDITIONAL TESTS: NO NEW IMAGING STUDIES PERFORMED TODAY.      Consultant(s) Notes Reviewed:   YES  Care Discussed with Consultants : YES    Plan of care was discussed with patient and /or primary care giver; all questions and concerns were addressed and care was aligned with patient's wishes.

## 2018-05-21 NOTE — PROGRESS NOTE ADULT - ASSESSMENT
duodenal perforation vs anastomotic leak (unlikely)  peritonitis ( air/ fluid in a sterile environment from a non sterile environment)    plan - off antibiotics at this time  on a clear liquid diet  will discuss with surgery about repeating CT abdomen especially if still having pain tomorrow

## 2018-05-21 NOTE — CHART NOTE - NSCHARTNOTEFT_GEN_A_CORE
Upon Nutritional Assessment by the Registered Dietitian your patient was determined to meet criteria / has evidence of the following diagnosis/diagnoses:          [ ]  Mild Protein Calorie Malnutrition        [ ]  Moderate Protein Calorie Malnutrition        [x ] Severe Protein Calorie Malnutrition        [ ] Unspecified Protein Calorie Malnutrition        [ ] Underweight / BMI <19        [ ] Morbid Obesity / BMI > 40      Findings as based on:  •  Comprehensive nutrition assessment and consultation  •  Calorie counts (nutrient intake analysis)  •  Food acceptance and intake status from observations by staff  •  Follow up  •  Patient education  •  Intervention secondary to interdisciplinary rounds  •   concerns      Treatment:    The following diet has been recommended:      PROVIDER Section:     By signing this assessment you are acknowledging and agree with the diagnosis/diagnoses assigned by the Registered Dietitian    Comments:  Advance diet to solids, if diet cannot be advanced, consider nutrition support , in the interim, add ensure clear to clear liquid diet

## 2018-05-21 NOTE — PROGRESS NOTE ADULT - SUBJECTIVE AND OBJECTIVE BOX
Patient examined at bedside, reports no abdominal pain  No nausea, no vomiting  pt is npo    Vital Signs Last 24 Hrs  T(C): 36.6 (21 May 2018 05:05), Max: 36.9 (20 May 2018 20:22)  T(F): 97.8 (21 May 2018 05:05), Max: 98.5 (20 May 2018 20:22)  HR: 57 (21 May 2018 05:05) (46 - 57)  BP: 102/66 (21 May 2018 05:05) (102/66 - 127/52)  RR: 16 (21 May 2018 05:05) (16 - 18)  SpO2: 99% (21 May 2018 05:05) (97% - 100%)      Physical Exam  General: AAOx3, No acute distress  Skin: No jaundice, no icterus  Abdomen: soft, nontender, nondistended, well healed midline incision  Extremities: non edematous, no calf pain bilaterally

## 2018-05-21 NOTE — PROGRESS NOTE ADULT - SUBJECTIVE AND OBJECTIVE BOX
doing well, little pain, no leukocytosis  no sob  surgeon wants to advance diet  the leak is new because CT 3 months ago did not show any leak  will check with surgeon once more before advance diet.

## 2018-05-22 LAB
ANION GAP SERPL CALC-SCNC: 6 MMOL/L — SIGNIFICANT CHANGE UP (ref 5–17)
BASOPHILS # BLD AUTO: 0.1 K/UL — SIGNIFICANT CHANGE UP (ref 0–0.2)
BASOPHILS NFR BLD AUTO: 1.8 % — SIGNIFICANT CHANGE UP (ref 0–2)
BUN SERPL-MCNC: 3 MG/DL — LOW (ref 7–18)
CALCIUM SERPL-MCNC: 8.5 MG/DL — SIGNIFICANT CHANGE UP (ref 8.4–10.5)
CHLORIDE SERPL-SCNC: 114 MMOL/L — HIGH (ref 96–108)
CO2 SERPL-SCNC: 24 MMOL/L — SIGNIFICANT CHANGE UP (ref 22–31)
CREAT SERPL-MCNC: 0.62 MG/DL — SIGNIFICANT CHANGE UP (ref 0.5–1.3)
EOSINOPHIL # BLD AUTO: 0.1 K/UL — SIGNIFICANT CHANGE UP (ref 0–0.5)
EOSINOPHIL NFR BLD AUTO: 3.9 % — SIGNIFICANT CHANGE UP (ref 0–6)
GLUCOSE SERPL-MCNC: 95 MG/DL — SIGNIFICANT CHANGE UP (ref 70–99)
HCT VFR BLD CALC: 32.2 % — LOW (ref 34.5–45)
HGB BLD-MCNC: 9.6 G/DL — LOW (ref 11.5–15.5)
LYMPHOCYTES # BLD AUTO: 1.7 K/UL — SIGNIFICANT CHANGE UP (ref 1–3.3)
LYMPHOCYTES # BLD AUTO: 48.6 % — HIGH (ref 13–44)
MCHC RBC-ENTMCNC: 27.8 PG — SIGNIFICANT CHANGE UP (ref 27–34)
MCHC RBC-ENTMCNC: 29.6 GM/DL — LOW (ref 32–36)
MCV RBC AUTO: 93.7 FL — SIGNIFICANT CHANGE UP (ref 80–100)
MONOCYTES # BLD AUTO: 0.3 K/UL — SIGNIFICANT CHANGE UP (ref 0–0.9)
MONOCYTES NFR BLD AUTO: 8.4 % — SIGNIFICANT CHANGE UP (ref 2–14)
NEUTROPHILS # BLD AUTO: 1.3 K/UL — LOW (ref 1.8–7.4)
NEUTROPHILS NFR BLD AUTO: 37.2 % — LOW (ref 43–77)
PLATELET # BLD AUTO: 382 K/UL — SIGNIFICANT CHANGE UP (ref 150–400)
POTASSIUM SERPL-MCNC: 3.6 MMOL/L — SIGNIFICANT CHANGE UP (ref 3.5–5.3)
POTASSIUM SERPL-SCNC: 3.6 MMOL/L — SIGNIFICANT CHANGE UP (ref 3.5–5.3)
RBC # BLD: 3.44 M/UL — LOW (ref 3.8–5.2)
RBC # FLD: 16.2 % — HIGH (ref 10.3–14.5)
SODIUM SERPL-SCNC: 144 MMOL/L — SIGNIFICANT CHANGE UP (ref 135–145)
WBC # BLD: 3.4 K/UL — LOW (ref 3.8–10.5)
WBC # FLD AUTO: 3.4 K/UL — LOW (ref 3.8–10.5)

## 2018-05-22 PROCEDURE — 99232 SBSQ HOSP IP/OBS MODERATE 35: CPT | Mod: 24

## 2018-05-22 RX ADMIN — PANTOPRAZOLE SODIUM 40 MILLIGRAM(S): 20 TABLET, DELAYED RELEASE ORAL at 11:25

## 2018-05-22 RX ADMIN — ENOXAPARIN SODIUM 40 MILLIGRAM(S): 100 INJECTION SUBCUTANEOUS at 11:25

## 2018-05-22 NOTE — PROGRESS NOTE ADULT - [facility] HE PE MLT D E PC
regular rate and rhythm
regular rate and rhythm
regular rate and rhythm/no rub/no murmur
regular rate and rhythm

## 2018-05-22 NOTE — PROGRESS NOTE ADULT - SUBJECTIVE AND OBJECTIVE BOX
58y Female    Meds:    Allergies    No Known Allergies    Intolerances        VITALS:  Vital Signs Last 24 Hrs  T(C): 36.5 (22 May 2018 06:13), Max: 36.7 (21 May 2018 14:17)  T(F): 97.7 (22 May 2018 06:13), Max: 98.1 (21 May 2018 17:59)  HR: 57 (22 May 2018 06:13) (52 - 57)  BP: 133/64 (22 May 2018 06:13) (118/59 - 133/64)  BP(mean): --  RR: 18 (22 May 2018 06:13) (16 - 18)  SpO2: 100% (22 May 2018 06:13) (97% - 100%)    LABS/DIAGNOSTIC TESTS:                          9.6    3.4   )-----------( 382      ( 22 May 2018 07:46 )             32.2         05-22    144  |  114<H>  |  3<L>  ----------------------------<  95  3.6   |  24  |  0.62    Ca    8.5      22 May 2018 07:46  Phos  3.1     05-21  Mg     2.5     05-21    TPro  6.3  /  Alb  2.7<L>  /  TBili  0.6  /  DBili  x   /  AST  52<H>  /  ALT  42  /  AlkPhos  300<H>  05-21      LIVER FUNCTIONS - ( 21 May 2018 07:31 )  Alb: 2.7 g/dL / Pro: 6.3 g/dL / ALK PHOS: 300 U/L / ALT: 42 U/L DA / AST: 52 U/L / GGT: x             CULTURES: .Urine Clean Catch (Midstream)  05-18 @ 14:02   No growth  --  --            RADIOLOGY:      ROS:  [  ] UNABLE TO ELICIT 58y Female who is clinically doing well, she is tolerating her liquid diet for the most part, she has no chest discomfort with liquids but still has some RUQ pain and some back pain. She has no fevers or leukocytosis off antibiotics. she is going to be advanced to solids by surgery team. Surgery  is not planning on doing a CT abdomen and is probably going to dc her home once she is tolerating a solid diet.    Meds:    Allergies    No Known Allergies    Intolerances        VITALS:  Vital Signs Last 24 Hrs  T(C): 36.5 (22 May 2018 06:13), Max: 36.7 (21 May 2018 14:17)  T(F): 97.7 (22 May 2018 06:13), Max: 98.1 (21 May 2018 17:59)  HR: 57 (22 May 2018 06:13) (52 - 57)  BP: 133/64 (22 May 2018 06:13) (118/59 - 133/64)  BP(mean): --  RR: 18 (22 May 2018 06:13) (16 - 18)  SpO2: 100% (22 May 2018 06:13) (97% - 100%)    LABS/DIAGNOSTIC TESTS:                          9.6    3.4   )-----------( 382      ( 22 May 2018 07:46 )             32.2         05-22    144  |  114<H>  |  3<L>  ----------------------------<  95  3.6   |  24  |  0.62    Ca    8.5      22 May 2018 07:46  Phos  3.1     05-21  Mg     2.5     05-21    TPro  6.3  /  Alb  2.7<L>  /  TBili  0.6  /  DBili  x   /  AST  52<H>  /  ALT  42  /  AlkPhos  300<H>  05-21      LIVER FUNCTIONS - ( 21 May 2018 07:31 )  Alb: 2.7 g/dL / Pro: 6.3 g/dL / ALK PHOS: 300 U/L / ALT: 42 U/L DA / AST: 52 U/L / GGT: x             CULTURES: .Urine Clean Catch (Midstream)  05-18 @ 14:02   No growth  --  --            RADIOLOGY:      ROS:  [  ] UNABLE TO ELICIT

## 2018-05-22 NOTE — PROGRESS NOTE ADULT - ASSESSMENT
free air in abdomen - ?perforated duodenal ulcer that closed off    plan - no antibiotics at this time  dcplanning if she tolerates solids. free air in abdomen - ?perforated duodenal ulcer that closed off    plan - no antibiotics at this time  dc planning as per surgery  if she tolerates solids.

## 2018-05-22 NOTE — PROGRESS NOTE ADULT - NEGATIVE RESPIRATORY AND THORAX SYMPTOMS
no cough/no pleuritic chest pain/no wheezing/no dyspnea
no dyspnea/no cough/no pleuritic chest pain
no cough/no pleuritic chest pain/no dyspnea
no hemoptysis/no wheezing/no dyspnea/no cough

## 2018-05-22 NOTE — PROGRESS NOTE ADULT - SUBJECTIVE AND OBJECTIVE BOX
mild pain at RUQ and mid back  not related with drinking or eating  no fever or leukocytosis  she started po liquid as per surgery  will watch closely.

## 2018-05-22 NOTE — PROGRESS NOTE ADULT - RS GEN PE MLT RESP DETAILS PC
good air movement/clear to auscultation bilaterally/no rhonchi/no wheezes/no rales
no rales/no wheezes/clear to auscultation bilaterally/good air movement/no rhonchi
clear to auscultation bilaterally/breath sounds equal/airway patent/good air movement/respirations non-labored
good air movement/airway patent/breath sounds equal

## 2018-05-22 NOTE — PROGRESS NOTE ADULT - NEGATIVE CARDIOVASCULAR SYMPTOMS
no chest pain/no palpitations
no palpitations/no chest pain
no chest pain/no palpitations
no chest pain/no palpitations/no orthopnea

## 2018-05-22 NOTE — PROGRESS NOTE ADULT - MS EXT PE MLT D E PC
no cyanosis/no pedal edema
no pedal edema/no cyanosis
no pedal edema/no clubbing/no cyanosis
no clubbing/no cyanosis

## 2018-05-22 NOTE — PROGRESS NOTE ADULT - GASTROINTESTINAL DETAILS
no distention/no masses palpable/soft/no guarding/bowel sounds normal/no rebound tenderness/no rigidity
no rigidity/no guarding/soft/bowel sounds normal/no distention/no rebound tenderness
soft/bowel sounds normal
bowel sounds normal/no guarding/soft/no distention/no rebound tenderness/no rigidity

## 2018-05-22 NOTE — PROGRESS NOTE ADULT - NEGATIVE GASTROINTESTINAL SYMPTOMS
no diarrhea/no nausea/no vomiting
no diarrhea/no vomiting/no nausea
no vomiting/no nausea/no diarrhea
no nausea/no vomiting/no diarrhea/no melena/no hematochezia/no jaundice

## 2018-05-22 NOTE — PROGRESS NOTE ADULT - SUBJECTIVE AND OBJECTIVE BOX
Vital Signs Last 24 Hrs  T(C): 36.5 (22 May 2018 06:13), Max: 36.7 (21 May 2018 14:17)  T(F): 97.7 (22 May 2018 06:13), Max: 98.1 (21 May 2018 17:59)  HR: 57 (22 May 2018 06:13) (52 - 57)  BP: 133/64 (22 May 2018 06:13) (118/59 - 133/64)  BP(mean): --  RR: 18 (22 May 2018 06:13) (16 - 18)  SpO2: 100% (22 May 2018 06:13) (97% - 100%)    I&O's Detail                            9.5    3.7   )-----------( 381      ( 21 May 2018 07:31 )             30.9       05-21    142  |  110<H>  |  4<L>  ----------------------------<  81  3.5   |  25  |  0.58    Ca    8.8      21 May 2018 07:31  Phos  3.1     05-21  Mg     2.5     05-21    TPro  6.3  /  Alb  2.7<L>  /  TBili  0.6  /  DBili  x   /  AST  52<H>  /  ALT  42  /  AlkPhos  300<H>  05-21    Abdomen with minimal tenderness  tolerating clear liquids          PLAN:    Continue clear liquids for today  Advance diet tomorrow

## 2018-05-23 PROCEDURE — 99232 SBSQ HOSP IP/OBS MODERATE 35: CPT | Mod: 24

## 2018-05-23 RX ADMIN — PANTOPRAZOLE SODIUM 40 MILLIGRAM(S): 20 TABLET, DELAYED RELEASE ORAL at 12:32

## 2018-05-23 RX ADMIN — ENOXAPARIN SODIUM 40 MILLIGRAM(S): 100 INJECTION SUBCUTANEOUS at 12:31

## 2018-05-23 NOTE — PROGRESS NOTE ADULT - SUBJECTIVE AND OBJECTIVE BOX
Patient seen and examined at bedside. Patient states she is feeling well. Denies any abdominal pain, nausea or vomiting. Tolerating liquid diet, Had a bowel movement yesterday and is passing flatus. Remains afebrile and hemodynamically stable. No acute events overnight       Vital Signs Last 24 Hrs  T(C): 36.7 (23 May 2018 05:57), Max: 36.7 (22 May 2018 14:48)  T(F): 98.1 (23 May 2018 05:57), Max: 98.1 (22 May 2018 14:48)  HR: 51 (23 May 2018 05:57) (51 - 55)  BP: 118/60 (23 May 2018 05:57) (95/53 - 126/59)  BP(mean): --  RR: 16 (23 May 2018 05:57) (16 - 17)  SpO2: 97% (23 May 2018 05:57) (95% - 100%)        Pain: [ ] YES [X ] NO  Pain (0-10):              Pain Control Adequate: [X ] YES [ ] NO  SOB: [ ]YES [X ] NO  Chest Discomfort: [ ] YES [ X] NO    Nausea: [ ] YES [X ] NO           Vomiting: [ ] YES [X ] NO  Flatus: [X ] YES [ ] NO             Bowel Movement: [X ] YES [ ] NO     Void: [X ]YES [ ]No    PHYSCIAL EXAM    General Appearance: Appears well, NAD  Neck: Supple  Chest: Equal expansion bilaterally, equal breath sounds  CV: Pulse regular presently  Abdomen: Soft, non tender, non distended, normal bowel sounds   Extremities: Grossly symmetric, SCD's in place       MEDICATIONS  (STANDING):  dextrose 5% + sodium chloride 0.9% 1000 milliLiter(s) (75 mL/Hr) IV Continuous <Continuous>  enoxaparin Injectable 40 milliGRAM(s) SubCutaneous daily  pantoprazole  Injectable 40 milliGRAM(s) IV Push daily    MEDICATIONS  (PRN):  ondansetron Injectable 4 milliGRAM(s) IV Push every 8 hours PRN Nausea and/or Vomiting      LABS:                        9.6    3.4   )-----------( 382      ( 22 May 2018 07:46 )             32.2     05-22    144  |  114<H>  |  3<L>  ----------------------------<  95  3.6   |  24  |  0.62    Ca    8.5      22 May 2018 07:46

## 2018-05-23 NOTE — PROGRESS NOTE ADULT - SUBJECTIVE AND OBJECTIVE BOX
Vital Signs Last 24 Hrs  T(C): 36.7 (23 May 2018 05:57), Max: 36.7 (22 May 2018 14:48)  T(F): 98.1 (23 May 2018 05:57), Max: 98.1 (22 May 2018 14:48)  HR: 51 (23 May 2018 05:57) (51 - 55)  BP: 118/60 (23 May 2018 05:57) (95/53 - 126/59)  BP(mean): --  RR: 16 (23 May 2018 05:57) (16 - 17)  SpO2: 97% (23 May 2018 05:57) (95% - 100%)    I&O's Detail                            9.6    3.4   )-----------( 382      ( 22 May 2018 07:46 )             32.2       05-22    144  |  114<H>  |  3<L>  ----------------------------<  95  3.6   |  24  |  0.62    Ca    8.5      22 May 2018 07:46    Tolerating diet          PLAN:  Continue diet  Repeat CT on Friday with oral contrast only

## 2018-05-23 NOTE — PROGRESS NOTE ADULT - SUBJECTIVE AND OBJECTIVE BOX
no fever or chill no pain last night  but has pain at RUQ and LUQ after some solids at breakfast  discussed with Dr. Aguilar  I will hold off solids and do liquids only  will do a CT to see the free air

## 2018-05-23 NOTE — PROGRESS NOTE ADULT - ASSESSMENT
58 year old female with walled off possibly marginal ulcer perforation, hx partial gastrectomy bilroth II reconstruction, clinically improving.  - Advance diet as tolerated  - DVT ppx- Ambulate  - Dispo planing

## 2018-05-23 NOTE — PROGRESS NOTE ADULT - ATTENDING COMMENTS
I agree with above  I had a long discussion with the son on the phone as both he and his mother don't feel she is getting better and are getting conflicting statements from everyone. I explained to him that as her condition changed and she started vomiting so did the plan for her and he understood that.

## 2018-05-23 NOTE — PROGRESS NOTE ADULT - ASSESSMENT
1.	Pneumoperitoneum - ?perforated duodenal ulcer that closed off  ·	not on antibiotics at this time  ·	possible downgrade of diet since patient is symptomatic postprandial 1.	Pneumoperitoneum - ?perforated duodenal ulcer that closed off  ·	not on antibiotics at this time  ·	possible downgrade of diet since patient is symptomatic postprandial  ·	for CT abdomen  with oral and iv contrast tomorrow am

## 2018-05-23 NOTE — PROGRESS NOTE ADULT - SUBJECTIVE AND OBJECTIVE BOX
58y Female is under our care for pneumoperitoneum (?prior DU perforation).  Patient reports that she was upgrade to regular diet this am and that about one hour later postprandial began to have worsening dull, cramping abdominal pain especially over the epigastric area and RUQ. Also c/o assoc. nausea.  Awaiting to be seen by Dr. Aguilar to determine diet status.    REVIEW OF SYSTEMS:  [  ] Not able to illicit  General: no fevers no malaise  Chest: no cough no sob  GI: no vd  +nausea +abd pain  : no urinary sxs   Skin: no rashes  Musculoskeletal: no trauma no LBP  Neuro: no ha's no dizziness     MEDS: no antibiotics     ALLERGIES: No Known Allergies    VITALS:  Vital Signs Last 24 Hrs  T(C): 36.7 (23 May 2018 05:57), Max: 36.7 (22 May 2018 14:48)  T(F): 98.1 (23 May 2018 05:57), Max: 98.1 (22 May 2018 14:48)  HR: 51 (23 May 2018 05:57) (51 - 55)  BP: 118/60 (23 May 2018 05:57) (95/53 - 126/59)  BP(mean): --  RR: 16 (23 May 2018 05:57) (16 - 17)  SpO2: 97% (23 May 2018 05:57) (95% - 100%)      PHYSICAL EXAM:  HEENT: n/a  Neck: supple no LN's   Respiratory: lungs clear no rales  Cardiovascular: S1 S2 reg no murmurs  Gastrointestinal: +BS with soft, nondistended abdomen; mild tenderness over epigastric region no masses/ lumps  Extremities: no edema  Skin: no rashes  Ortho: n/a  Neuro: AAO x 3      LABS/DIAGNOSTIC TESTS:                        9.6    3.4   )-----------( 382      ( 22 May 2018 07:46 )             32.2     WBC Count: 3.4 K/uL (05-22 @ 07:46)  WBC Count: 3.7 K/uL (05-21 @ 07:31)  WBC Count: 3.6 K/uL (05-20 @ 05:49)  WBC Count: 3.3 K/uL (05-19 @ 07:03)    05-22    144  |  114<H>  |  3<L>  ----------------------------<  95  3.6   |  24  |  0.62    Ca    8.5      22 May 2018 07:46      CULTURES:   .Urine Clean Catch (Midstream)  05-18 @ 14:02   No growth  --  --      RADIOLOGY:  no new studies 58y Female is under our care for pneumoperitoneum (? DU perforation).  Patient reports that she was upgrade to regular diet this am and that about one hour later postprandial began to have worsening dull, cramping abdominal pain especially over the epigastric area and RUQ. Also c/o assoc. nausea and then vomited.  Awaiting to be seen by Dr. Aguilar to determine diet status. she is now scheduled to go for a CT abdomen with po and IV contrast tomorrow.    REVIEW OF SYSTEMS:  [  ] Not able to illicit  General: no fevers no malaise  Chest: no cough no sob  GI: no vd  +nausea +abd pain  : no urinary sxs   Skin: no rashes  Musculoskeletal: no trauma no LBP  Neuro: no ha's no dizziness     MEDS: no antibiotics     ALLERGIES: No Known Allergies    VITALS:  Vital Signs Last 24 Hrs  T(C): 36.7 (23 May 2018 05:57), Max: 36.7 (22 May 2018 14:48)  T(F): 98.1 (23 May 2018 05:57), Max: 98.1 (22 May 2018 14:48)  HR: 51 (23 May 2018 05:57) (51 - 55)  BP: 118/60 (23 May 2018 05:57) (95/53 - 126/59)  BP(mean): --  RR: 16 (23 May 2018 05:57) (16 - 17)  SpO2: 97% (23 May 2018 05:57) (95% - 100%)      PHYSICAL EXAM:  HEENT: n/a  Neck: supple no LN's   Respiratory: lungs clear no rales  Cardiovascular: S1 S2 reg no murmurs  Gastrointestinal: +BS with soft, nondistended abdomen; mild tenderness over epigastric region no masses/ lumps, tenderness in ruq also  Extremities: no edema  Skin: no rashes  Ortho: n/a  Neuro: AAO x 3      LABS/DIAGNOSTIC TESTS:                        9.6    3.4   )-----------( 382      ( 22 May 2018 07:46 )             32.2     WBC Count: 3.4 K/uL (05-22 @ 07:46)  WBC Count: 3.7 K/uL (05-21 @ 07:31)  WBC Count: 3.6 K/uL (05-20 @ 05:49)  WBC Count: 3.3 K/uL (05-19 @ 07:03)    05-22    144  |  114<H>  |  3<L>  ----------------------------<  95  3.6   |  24  |  0.62    Ca    8.5      22 May 2018 07:46      CULTURES:   .Urine Clean Catch (Midstream)  05-18 @ 14:02   No growth  --  --      RADIOLOGY:  no new studies

## 2018-05-24 PROCEDURE — 99232 SBSQ HOSP IP/OBS MODERATE 35: CPT | Mod: 24

## 2018-05-24 PROCEDURE — 74177 CT ABD & PELVIS W/CONTRAST: CPT | Mod: 26

## 2018-05-24 RX ORDER — PIPERACILLIN AND TAZOBACTAM 4; .5 G/20ML; G/20ML
3.38 INJECTION, POWDER, LYOPHILIZED, FOR SOLUTION INTRAVENOUS EVERY 8 HOURS
Qty: 0 | Refills: 0 | Status: DISCONTINUED | OUTPATIENT
Start: 2018-05-24 | End: 2018-05-25

## 2018-05-24 RX ORDER — PANTOPRAZOLE SODIUM 20 MG/1
40 TABLET, DELAYED RELEASE ORAL
Qty: 0 | Refills: 0 | Status: DISCONTINUED | OUTPATIENT
Start: 2018-05-24 | End: 2018-05-25

## 2018-05-24 RX ORDER — SUCRALFATE 1 G
1 TABLET ORAL EVERY 6 HOURS
Qty: 0 | Refills: 0 | Status: DISCONTINUED | OUTPATIENT
Start: 2018-05-24 | End: 2018-05-25

## 2018-05-24 RX ORDER — SUCRALFATE 1 G
1 TABLET ORAL EVERY 6 HOURS
Qty: 0 | Refills: 0 | Status: DISCONTINUED | OUTPATIENT
Start: 2018-05-24 | End: 2018-05-24

## 2018-05-24 RX ORDER — SODIUM CHLORIDE 9 MG/ML
1000 INJECTION, SOLUTION INTRAVENOUS
Qty: 0 | Refills: 0 | Status: DISCONTINUED | OUTPATIENT
Start: 2018-05-24 | End: 2018-05-25

## 2018-05-24 RX ADMIN — ENOXAPARIN SODIUM 40 MILLIGRAM(S): 100 INJECTION SUBCUTANEOUS at 11:14

## 2018-05-24 RX ADMIN — PANTOPRAZOLE SODIUM 40 MILLIGRAM(S): 20 TABLET, DELAYED RELEASE ORAL at 20:13

## 2018-05-24 RX ADMIN — PIPERACILLIN AND TAZOBACTAM 25 GRAM(S): 4; .5 INJECTION, POWDER, LYOPHILIZED, FOR SOLUTION INTRAVENOUS at 22:29

## 2018-05-24 RX ADMIN — PANTOPRAZOLE SODIUM 40 MILLIGRAM(S): 20 TABLET, DELAYED RELEASE ORAL at 11:14

## 2018-05-24 RX ADMIN — PIPERACILLIN AND TAZOBACTAM 25 GRAM(S): 4; .5 INJECTION, POWDER, LYOPHILIZED, FOR SOLUTION INTRAVENOUS at 16:00

## 2018-05-24 RX ADMIN — Medication 1 GRAM(S): at 20:13

## 2018-05-24 NOTE — PROGRESS NOTE ADULT - SUBJECTIVE AND OBJECTIVE BOX
Vital Signs Last 24 Hrs  T(C): 36.8 (24 May 2018 06:00), Max: 36.8 (24 May 2018 06:00)  T(F): 98.3 (24 May 2018 06:00), Max: 98.3 (24 May 2018 06:00)  HR: 52 (24 May 2018 06:00) (52 - 66)  BP: 109/61 (24 May 2018 06:00) (109/61 - 131/68)  BP(mean): --  RR: 18 (24 May 2018 06:00) (16 - 18)  SpO2: 100% (24 May 2018 06:00) (100% - 100%)    I&O's Detail    Pt. still having RUQ pain  Non-tender to palpation    PLAN:  CT with oral and IV contrast today

## 2018-05-24 NOTE — PROGRESS NOTE ADULT - SUBJECTIVE AND OBJECTIVE BOX
58y Female is under our care for pneumoperitoneum.  Patient had repeat CT A/P today which showed resolved pneumoperitoneum, but also certain areas with stranding. Surgery feels these areas of stranding likely more reactive, but will agree to a course of antibiotics. Patient started on zosyn. Patient will also be started on liquid clears diet.    REVIEW OF SYSTEMS:  [  ] Not able to illicit  General: no fevers no malaise  Chest: no cough no sob  GI: no vd  +nausea +abd pain  Skin: no rashes  Neuro: no ha's no dizziness     MEDS: no antibiotics     ALLERGIES: No Known Allergies    VITALS:  Vital Signs Last 24 Hrs  T(C): 36.8 (24 May 2018 13:15), Max: 36.8 (24 May 2018 06:00)  T(F): 98.2 (24 May 2018 13:15), Max: 98.3 (24 May 2018 06:00)  HR: 57 (24 May 2018 13:15) (52 - 66)  BP: 105/64 (24 May 2018 13:15) (105/64 - 116/53)  BP(mean): --  RR: 16 (24 May 2018 13:15) (16 - 18)  SpO2: 100% (24 May 2018 13:15) (100% - 100%)      PHYSICAL EXAM:  HEENT: n/a  Neck: supple no LN's   Respiratory: lungs clear no rales  Cardiovascular: S1 S2 reg no murmurs  Gastrointestinal: +BS with soft, nondistended abdomen; mild tenderness over epigastric region/ RUQ  Extremities: no edema  Skin: no rashes  Ortho: n/a  Neuro: AAO x 3      LABS/DIAGNOSTIC TESTS:  no new labs      CULTURES:   .Urine Clean Catch (Midstream)  05-18 @ 14:02   No growth  --  --      RADIOLOGY:    EXAM:  CT ABDOMEN AND PELVIS OC IC                        PROCEDURE DATE:  05/24/2018    INTERPRETATION:  CT of the abdomen and pelvis with IV contrast    Clinical Indication: Status post partial gastrectomy with Billroth II.   Evaluate for perforation versus marginal ulcer.    Technique: Axial multidetector CT images of the abdomen and pelvis are   acquired following the administration of oral and IV contrast (95 cc   Omnipaque-350 administered, 5 cc discarded).    Comparison: 5/18/2018.    Findings:    Abdomen: Limited sections through the lung bases demonstrate 2 left lower   lobe lung nodules measuring up to 5 mm (image 6 and 4 series 2).    Hepatic steatosis. The liver dome, there is a 1.9 cm cyst. The pancreas,   spleen, adrenals and left kidney appear unremarkable. There is a small   cyst in the right kidney. The common bile duct is not dilated. Apparent   dilatation of the common hepatic duct measuring 1.1 cm in diameter.   Apparent focal mural thickening at the gallbladder fundus.    Postsurgical changes of partial gastrectomy are again noted. Previously   noted small extraluminal air in the right upper quadrant abdomen is no   longer seen. Stranding are again noted adjacent to the duodenal stump,   probably representing peritonitis related to prior duodenal stump leak.   Pericholecystic stranding is again noted medially, probably reactive. No   drainable abscess is identified.    No bowel obstruction or grossly thickened bowel wall. No evidence for   free air, or enlarged lymph node.    Pelvis: The urinary bladder and uterus appear unremarkable. Small pelvic   ascites. No enlarged pelvic lymph node. The sigmoid colon and the rectum   appear grossly unremarkable.    Impression: Small left lower lobe lung nodules; if clinically indicated,   follow-up chest CT may pursue in 6 months to ensure stability.    Hepatic steatosis.    Apparent focal mural thickening at the gallbladder fundus. Apparent   dilatation of the common hepatic duct. If clinically indicated, abdominal   MR without and with IV contrast including MRCP may be pursued for further   evaluation.    Postsurgical changes of partial gastrectomy are again noted. Previously   noted small extraluminal air in the right upper quadrant abdomen is no   longer seen. Stranding are again noted adjacent to the duodenal stump,   probably representing peritonitis related to prior duodenal stump leak.   Pericholecystic stranding is again noted medially, probably reactive. No   drainable abscess is identified. 58y Female is under our care for pneumoperitoneum.  Patient had repeat CT A/P today which showed resolved pneumoperitoneum, but also certain areas with stranding. Surgery feels these areas of stranding are likely to be reactive, but will agree to a course of antibiotics. Patient started on zosyn. Patient will also be started on liquid clears diet.    REVIEW OF SYSTEMS:  [  ] Not able to illicit  General: no fevers no malaise  Chest: no cough no sob  GI: no vd  +nausea +abd pain  Skin: no rashes  Neuro: no ha's no dizziness     MEDS: no antibiotics     ALLERGIES: No Known Allergies    VITALS:  Vital Signs Last 24 Hrs  T(C): 36.8 (24 May 2018 13:15), Max: 36.8 (24 May 2018 06:00)  T(F): 98.2 (24 May 2018 13:15), Max: 98.3 (24 May 2018 06:00)  HR: 57 (24 May 2018 13:15) (52 - 66)  BP: 105/64 (24 May 2018 13:15) (105/64 - 116/53)  BP(mean): --  RR: 16 (24 May 2018 13:15) (16 - 18)  SpO2: 100% (24 May 2018 13:15) (100% - 100%)      PHYSICAL EXAM:  HEENT: n/a  Neck: supple no LN's   Respiratory: lungs clear no rales  Cardiovascular: S1 S2 reg no murmurs  Gastrointestinal: +BS with soft, nondistended abdomen; mild tenderness over epigastric region/ RUQ  Extremities: no edema  Skin: no rashes  Ortho: n/a  Neuro: AAO x 3      LABS/DIAGNOSTIC TESTS:  no new labs      CULTURES:   .Urine Clean Catch (Midstream)  05-18 @ 14:02   No growth  --  --      RADIOLOGY:    EXAM:  CT ABDOMEN AND PELVIS OC IC                        PROCEDURE DATE:  05/24/2018    INTERPRETATION:  CT of the abdomen and pelvis with IV contrast    Clinical Indication: Status post partial gastrectomy with Billroth II.   Evaluate for perforation versus marginal ulcer.    Technique: Axial multidetector CT images of the abdomen and pelvis are   acquired following the administration of oral and IV contrast (95 cc   Omnipaque-350 administered, 5 cc discarded).    Comparison: 5/18/2018.    Findings:    Abdomen: Limited sections through the lung bases demonstrate 2 left lower   lobe lung nodules measuring up to 5 mm (image 6 and 4 series 2).    Hepatic steatosis. The liver dome, there is a 1.9 cm cyst. The pancreas,   spleen, adrenals and left kidney appear unremarkable. There is a small   cyst in the right kidney. The common bile duct is not dilated. Apparent   dilatation of the common hepatic duct measuring 1.1 cm in diameter.   Apparent focal mural thickening at the gallbladder fundus.    Postsurgical changes of partial gastrectomy are again noted. Previously   noted small extraluminal air in the right upper quadrant abdomen is no   longer seen. Stranding are again noted adjacent to the duodenal stump,   probably representing peritonitis related to prior duodenal stump leak.   Pericholecystic stranding is again noted medially, probably reactive. No   drainable abscess is identified.    No bowel obstruction or grossly thickened bowel wall. No evidence for   free air, or enlarged lymph node.    Pelvis: The urinary bladder and uterus appear unremarkable. Small pelvic   ascites. No enlarged pelvic lymph node. The sigmoid colon and the rectum   appear grossly unremarkable.    Impression: Small left lower lobe lung nodules; if clinically indicated,   follow-up chest CT may pursue in 6 months to ensure stability.    Hepatic steatosis.    Apparent focal mural thickening at the gallbladder fundus. Apparent   dilatation of the common hepatic duct. If clinically indicated, abdominal   MR without and with IV contrast including MRCP may be pursued for further   evaluation.    Postsurgical changes of partial gastrectomy are again noted. Previously   noted small extraluminal air in the right upper quadrant abdomen is no   longer seen. Stranding are again noted adjacent to the duodenal stump,   probably representing peritonitis related to prior duodenal stump leak.   Pericholecystic stranding is again noted medially, probably reactive. No   drainable abscess is identified.

## 2018-05-24 NOTE — PROGRESS NOTE ADULT - ASSESSMENT
CT scan of abd reviewed from today- PO contrast traverses into distal bowel easily  Previously seen air collections by the duodenal stump have resolved, no new collections    Plan:  Clears today  Sucralfate for possible bile reflux

## 2018-05-24 NOTE — PROGRESS NOTE ADULT - SUBJECTIVE AND OBJECTIVE BOX
Pt seen and examined at bedside. Pt resting comfortably and in no acute distress. Pt continues to c/o of intermittent episodes of nausea a/w abdominal discomfort but denies sharp pain or episodes of vomiting. Pt remains afebrile and hemodynamically stable.    Vital Signs Last 24 Hrs  T(C): 36.8 (24 May 2018 06:00), Max: 36.8 (24 May 2018 06:00)  T(F): 98.3 (24 May 2018 06:00), Max: 98.3 (24 May 2018 06:00)  HR: 52 (24 May 2018 06:00) (52 - 66)  BP: 109/61 (24 May 2018 06:00) (109/61 - 131/68)  BP(mean): --  RR: 18 (24 May 2018 06:00) (16 - 18)  SpO2: 100% (24 May 2018 06:00) (100% - 100%)      Gen: alert, awake, no distress  Resp: normal inspiratory effort, non-labored  Cardiac: sinus rhythm  Abd: soft, ND, NT, no rebound or guarding    A: 58F w/ hx of partial gastrectomy bilroth II reconstruction, remains afebrile and stable but continues to c/o intermittent episodes of nausea.     Plan  - pending CT A/P with oral contrast  - SCD, DVT ppx  - pain control and anti-emetic as needed

## 2018-05-24 NOTE — PROGRESS NOTE ADULT - ASSESSMENT
1.	?Peritonitis  2.	Pneumoperitoneum - resolved  ·	started on zosyn 3.375gm IV q8h for now  ·	started on clears diet 1.	Peritonitis - reactive from bile vs possibly secondarily infected  2.	Pneumoperitoneum - resolved  ·	started on zosyn 3.375gm IV q8h for now  ·	started on clears diet  ·	if she is doing well and tolerating clears will plan to send home tomorrow  on po abxs for a few days and continue on a full liquid diet at home and slowly introduce solids after a few days as she tolerates a diet.

## 2018-05-24 NOTE — PROGRESS NOTE ADULT - SUBJECTIVE AND OBJECTIVE BOX
feel chest tight and need to induce vomiting after solids  but tolerates liquids well  also has pain at RUQ and back after eating solids  CT repeated free air resolved  on liquis diet nowon antibiotics

## 2018-05-24 NOTE — PROGRESS NOTE ADULT - SUBJECTIVE AND OBJECTIVE BOX
Pt reports relief of abdominal pain  No nausea or vomiting  Complains of feeling of fullness in the chest after every meal.    Abd- soft, minimally tender RUQ for deep palpation

## 2018-05-25 ENCOUNTER — TRANSCRIPTION ENCOUNTER (OUTPATIENT)
Age: 58
End: 2018-05-25

## 2018-05-25 VITALS
DIASTOLIC BLOOD PRESSURE: 62 MMHG | HEART RATE: 62 BPM | RESPIRATION RATE: 16 BRPM | SYSTOLIC BLOOD PRESSURE: 104 MMHG | TEMPERATURE: 98 F | OXYGEN SATURATION: 100 %

## 2018-05-25 PROCEDURE — 85045 AUTOMATED RETICULOCYTE COUNT: CPT

## 2018-05-25 PROCEDURE — 87389 HIV-1 AG W/HIV-1&-2 AB AG IA: CPT

## 2018-05-25 PROCEDURE — 83690 ASSAY OF LIPASE: CPT

## 2018-05-25 PROCEDURE — 71046 X-RAY EXAM CHEST 2 VIEWS: CPT

## 2018-05-25 PROCEDURE — 83735 ASSAY OF MAGNESIUM: CPT

## 2018-05-25 PROCEDURE — 87086 URINE CULTURE/COLONY COUNT: CPT

## 2018-05-25 PROCEDURE — 99285 EMERGENCY DEPT VISIT HI MDM: CPT | Mod: 25

## 2018-05-25 PROCEDURE — 85730 THROMBOPLASTIN TIME PARTIAL: CPT

## 2018-05-25 PROCEDURE — 83036 HEMOGLOBIN GLYCOSYLATED A1C: CPT

## 2018-05-25 PROCEDURE — 74177 CT ABD & PELVIS W/CONTRAST: CPT

## 2018-05-25 PROCEDURE — 80074 ACUTE HEPATITIS PANEL: CPT

## 2018-05-25 PROCEDURE — 85610 PROTHROMBIN TIME: CPT

## 2018-05-25 PROCEDURE — 84484 ASSAY OF TROPONIN QUANT: CPT

## 2018-05-25 PROCEDURE — 82728 ASSAY OF FERRITIN: CPT

## 2018-05-25 PROCEDURE — 84443 ASSAY THYROID STIM HORMONE: CPT

## 2018-05-25 PROCEDURE — 82607 VITAMIN B-12: CPT

## 2018-05-25 PROCEDURE — 84100 ASSAY OF PHOSPHORUS: CPT

## 2018-05-25 PROCEDURE — 83550 IRON BINDING TEST: CPT

## 2018-05-25 PROCEDURE — 93005 ELECTROCARDIOGRAM TRACING: CPT

## 2018-05-25 PROCEDURE — 80061 LIPID PANEL: CPT

## 2018-05-25 PROCEDURE — 83615 LACTATE (LD) (LDH) ENZYME: CPT

## 2018-05-25 PROCEDURE — 71045 X-RAY EXAM CHEST 1 VIEW: CPT

## 2018-05-25 PROCEDURE — 80048 BASIC METABOLIC PNL TOTAL CA: CPT

## 2018-05-25 PROCEDURE — 85027 COMPLETE CBC AUTOMATED: CPT

## 2018-05-25 PROCEDURE — 81001 URINALYSIS AUTO W/SCOPE: CPT

## 2018-05-25 PROCEDURE — 99232 SBSQ HOSP IP/OBS MODERATE 35: CPT | Mod: 24

## 2018-05-25 PROCEDURE — 82306 VITAMIN D 25 HYDROXY: CPT

## 2018-05-25 PROCEDURE — 84466 ASSAY OF TRANSFERRIN: CPT

## 2018-05-25 PROCEDURE — 83010 ASSAY OF HAPTOGLOBIN QUANT: CPT

## 2018-05-25 PROCEDURE — 80053 COMPREHEN METABOLIC PANEL: CPT

## 2018-05-25 RX ORDER — CEFUROXIME AXETIL 250 MG
1 TABLET ORAL
Qty: 14 | Refills: 0 | OUTPATIENT
Start: 2018-05-25 | End: 2018-05-31

## 2018-05-25 RX ORDER — METOCLOPRAMIDE HCL 10 MG
10 TABLET ORAL EVERY 8 HOURS
Qty: 0 | Refills: 0 | Status: DISCONTINUED | OUTPATIENT
Start: 2018-05-25 | End: 2018-05-25

## 2018-05-25 RX ORDER — PIPERACILLIN AND TAZOBACTAM 4; .5 G/20ML; G/20ML
3.38 INJECTION, POWDER, LYOPHILIZED, FOR SOLUTION INTRAVENOUS EVERY 8 HOURS
Qty: 0 | Refills: 0 | Status: DISCONTINUED | OUTPATIENT
Start: 2018-05-25 | End: 2018-05-25

## 2018-05-25 RX ORDER — PANTOPRAZOLE SODIUM 20 MG/1
1 TABLET, DELAYED RELEASE ORAL
Qty: 0 | Refills: 0 | COMMUNITY
Start: 2018-05-25

## 2018-05-25 RX ORDER — SUCRALFATE 1 G
1 TABLET ORAL
Qty: 56 | Refills: 0 | OUTPATIENT
Start: 2018-05-25 | End: 2018-06-07

## 2018-05-25 RX ADMIN — Medication 10 MILLIGRAM(S): at 15:08

## 2018-05-25 RX ADMIN — Medication 1 GRAM(S): at 00:53

## 2018-05-25 RX ADMIN — PIPERACILLIN AND TAZOBACTAM 25 GRAM(S): 4; .5 INJECTION, POWDER, LYOPHILIZED, FOR SOLUTION INTRAVENOUS at 15:08

## 2018-05-25 RX ADMIN — Medication 1 GRAM(S): at 06:42

## 2018-05-25 RX ADMIN — PIPERACILLIN AND TAZOBACTAM 25 GRAM(S): 4; .5 INJECTION, POWDER, LYOPHILIZED, FOR SOLUTION INTRAVENOUS at 06:42

## 2018-05-25 RX ADMIN — Medication 1 GRAM(S): at 12:17

## 2018-05-25 RX ADMIN — ENOXAPARIN SODIUM 40 MILLIGRAM(S): 100 INJECTION SUBCUTANEOUS at 12:18

## 2018-05-25 RX ADMIN — PANTOPRAZOLE SODIUM 40 MILLIGRAM(S): 20 TABLET, DELAYED RELEASE ORAL at 06:42

## 2018-05-25 NOTE — DISCHARGE NOTE ADULT - PLAN OF CARE
Home care and return to normal activity 58F w/ hx of partial gastrectomy bilroth II reconstruction, admitted with abdominal pain and oral intolerance, clinically improved.  - Liquids diet, advance slowly as tolerated  - Antibiotics sent to Pharmacy, Ceftin 500 mg BID during 7 days  - Sucralfate 1 g PO every 6h  - OTC pain meds PRN  - Return to ED if intractable abdominal pain, nausea or vomiting, or any other symptoms

## 2018-05-25 NOTE — DISCHARGE NOTE ADULT - PATIENT PORTAL LINK FT
You can access the [facility] Patient Portal, offered by [facility], by registering with the following website: [email or web address]

## 2018-05-25 NOTE — DISCHARGE NOTE ADULT - CARE PLAN
Principal Discharge DX:	S/P partial gastrectomy  Goal:	Home care and return to normal activity  Assessment and plan of treatment:	58F w/ hx of partial gastrectomy bilroth II reconstruction, admitted with abdominal pain and oral intolerance, clinically improved.  - Liquids diet, advance slowly as tolerated  - Antibiotics sent to Pharmacy, Ceftin 500 mg BID during 7 days  - Sucralfate 1 g PO every 6h  - OTC pain meds PRN  - Return to ED if intractable abdominal pain, nausea or vomiting, or any other symptoms

## 2018-05-25 NOTE — PROGRESS NOTE ADULT - SUBJECTIVE AND OBJECTIVE BOX
feel ok  no sob  pain is minimal  CT scan showed resolution of free air  able to tolerate liquid well  plan to discharge

## 2018-05-25 NOTE — DISCHARGE NOTE ADULT - HOSPITAL COURSE
Patient is a 59 y/o F pt with PMHx of gastric CA, and no other medical history, s/p partial gastrectomy on June 2017 and on chemotherapy since September 2017 who was admitted with abdominal pain and oral intolerance. She was found to have in CT abdomen possible free air. She was made NPO and on IVF. Patient was evaluated by Internal Medicine, Hem/Onc and Infectious diseased who made specific recommendations.  Patient remained stable during her admission. She was started on liquid diet, tolerated,  has some pain with regular diet. Repeat CT abdomen shows absence of free air and radiologic improvement. Patient received IV antibiotics during the admission.  On 5/25 patient was found clinically stable for discharge. She was discharge on PO antibiotics and instructed to follow up in clinic.

## 2018-05-25 NOTE — PROGRESS NOTE ADULT - SUBJECTIVE AND OBJECTIVE BOX
Vital Signs Last 24 Hrs  T(C): 36.9 (25 May 2018 14:28), Max: 37.6 (25 May 2018 05:45)  T(F): 98.5 (25 May 2018 14:28), Max: 99.6 (25 May 2018 05:45)  HR: 56 (25 May 2018 14:28) (56 - 69)  BP: 109/48 (25 May 2018 14:28) (105/59 - 109/57)  BP(mean): --  RR: 16 (25 May 2018 14:28) (16 - 17)  SpO2: 97% (25 May 2018 14:28) (96% - 99%)    I&O's Detail    tolerating liquids                      PLAN:    discharge home today on oral antibiotics for one week  slowly increase diet

## 2018-05-25 NOTE — CHART NOTE - NSCHARTNOTEFT_GEN_A_CORE
Spoke with patient and   Patient wants to go home today  She tolerated full liquid diet. She does not feel comfortable taking a soft diet    D/w patient to be seen by Dr. Aguilar today and discuss options for discharge home with appropriate medications and diet recommendations.   Pt agreed

## 2018-05-25 NOTE — PROGRESS NOTE ADULT - SUBJECTIVE AND OBJECTIVE BOX
Patient seen and examined at bedside. Patient states she is feeling okay. She only have pain when she eats something solid. Was started on clears yesterday and tolerated, denies  nausea or vomiting. She is having bowel movement and is passing flatus. Remains afebrile and hemodynamically stable.  CT scan done yesterday showed contrast distally.     Vital Signs Last 24 Hrs  T(C): 37.6 (25 May 2018 05:45), Max: 37.6 (25 May 2018 05:45)  T(F): 99.6 (25 May 2018 05:45), Max: 99.6 (25 May 2018 05:45)  HR: 69 (25 May 2018 05:45) (57 - 69)  BP: 109/57 (25 May 2018 05:45) (105/59 - 109/57)  BP(mean): --  RR: 16 (25 May 2018 05:45) (16 - 17)  SpO2: 96% (25 May 2018 05:45) (96% - 100%)      Pain: [ ] YES [X ] NO  Pain (0-10):              Pain Control Adequate: [X ] YES [ ] NO  SOB: [ ]YES [X ] NO  Chest Discomfort: [ ] YES [X ] NO    Nausea: [ ] YES [X ] NO           Vomiting: [ ] YES [ X] NO  Flatus: [X ] YES [ ] NO             Bowel Movement: [ X] YES [ ] NO     Void: [X ]YES [ ]No    Physical exam    General Appearance: Appears well, NAD  Neck: Supple  Chest: Equal expansion bilaterally, equal breath sounds  CV: Pulse soft, non distended, minimal tenderness to palpation on epigastric area, no peritoneal signs   Extremities: Grossly symmetric, SCD's in place     I&O's Summary    I&O's Detail      MEDICATIONS  (STANDING):  dextrose 5% + sodium chloride 0.45%. 1000 milliLiter(s) (75 mL/Hr) IV Continuous <Continuous>  dextrose 5% + sodium chloride 0.9% 1000 milliLiter(s) (75 mL/Hr) IV Continuous <Continuous>  enoxaparin Injectable 40 milliGRAM(s) SubCutaneous daily  metoclopramide 10 milliGRAM(s) Oral every 8 hours  pantoprazole    Tablet 40 milliGRAM(s) Oral two times a day  piperacillin/tazobactam IVPB. 3.375 Gram(s) IV Intermittent every 8 hours  sucralfate 1 Gram(s) Oral every 6 hours    MEDICATIONS  (PRN):  ondansetron Injectable 4 milliGRAM(s) IV Push every 8 hours PRN Nausea and/or Vomiting

## 2018-05-25 NOTE — PROGRESS NOTE ADULT - ASSESSMENT
1.	Peritonitis - reactive from bile vs possibly secondarily infected  2.	Pneumoperitoneum - resolved  ·	dc planning today  ·	dc on ceftin 500mg PO BID x 7 days  ·	started on full liquid diet, will slowly be introduced to solids at home after a few days as tolerated  ·	reconsult prn 1.	Peritonitis - reactive from bile vs possibly secondarily infected  2.	Pneumoperitoneum - resolved  ·	dc planning today  ·	dc on ceftin 500mg PO BID x 7 days  ·	started on full liquid diet, will slowly be introduced to solids at home after a few days as tolerated

## 2018-05-25 NOTE — PROGRESS NOTE ADULT - ASSESSMENT
58F w/ hx of partial gastrectomy bilroth II reconstruction, with abdominal pain when eating, currently tolerrating clears  - Continue with diet  - DVT ppx  - Ambulate  -

## 2018-05-25 NOTE — DISCHARGE NOTE ADULT - CARE PROVIDER_API CALL
Anthony Aguilar (MD), Surgery  [address] [place] [id number]  Phone: [phone]  Fax: [phone]

## 2018-05-25 NOTE — PROGRESS NOTE ADULT - SUBJECTIVE AND OBJECTIVE BOX
58y Female is under our care for pneumoperitoneum.  Patient is doing better and is now on full liquid diet. Admits abdominal pain has improved, but but still has lingering right sided rib aches. Remains afebrile. Due for dc home to on PO antibiotics.     REVIEW OF SYSTEMS:  [  ] Not able to illicit  General: no fevers no malaise  Chest: no cough no sob  GI: no vd  +improved abd pain  Skin: no rashes    MEDS: no antibiotics     ALLERGIES: No Known Allergies    VITALS:  Vital Signs Last 24 Hrs  T(C): 37.6 (25 May 2018 05:45), Max: 37.6 (25 May 2018 05:45)  T(F): 99.6 (25 May 2018 05:45), Max: 99.6 (25 May 2018 05:45)  HR: 69 (25 May 2018 05:45) (57 - 69)  BP: 109/57 (25 May 2018 05:45) (105/59 - 109/57)  BP(mean): --  RR: 16 (25 May 2018 05:45) (16 - 17)  SpO2: 96% (25 May 2018 05:45) (96% - 100%)      PHYSICAL EXAM:  HEENT: n/a  Neck: supple no LN's   Respiratory: lungs clear no rales  Cardiovascular: S1 S2 reg no murmurs  Gastrointestinal: +BS with soft, nondistended abdomen; resolved tenderness over epigastric region/ RUQ, faint right flank tenderness  Extremities: no edema  Skin: no rashes  Ortho: n/a  Neuro: AAO x 3      LABS/DIAGNOSTIC TESTS:  no new labs      CULTURES:   .Urine Clean Catch (Midstream)  05-18 @ 14:02   No growth  --  --      RADIOLOGY:  no new studied

## 2018-05-25 NOTE — DISCHARGE NOTE ADULT - MEDICATION SUMMARY - MEDICATIONS TO TAKE
I will START or STAY ON the medications listed below when I get home from the hospital:    cefuroxime 500 mg oral tablet  -- 1 tab(s) by mouth 2 times a day   -- Finish all this medication unless otherwise directed by prescriber.  Medication should be taken with plenty of water.  Take with food or milk.    -- Indication: For Other specified symptoms and signs involving the digestive system and abdomen    sucralfate 1 g oral tablet  -- 1 tab(s) by mouth every 6 hours   -- Do not take dairy products, antacids, or iron preparations within one hour of this medication.  It is very important that you take or use this exactly as directed.  Do not skip doses or discontinue unless directed by your doctor.  Take medication on an empty stomach 1 hour before or 2 to 3 hours after a meal unless otherwise directed by your doctor.    -- Indication: For Other specified symptoms and signs involving the digestive system and abdomen    pantoprazole 40 mg oral delayed release tablet  -- 1 tab(s) by mouth 2 times a day  -- Indication: For Other specified symptoms and signs involving the digestive system and abdomen

## 2018-06-03 ENCOUNTER — INPATIENT (INPATIENT)
Facility: HOSPITAL | Age: 58
LOS: 1 days | Discharge: ANOTHER HOSPITAL | DRG: 305 | End: 2018-06-05
Attending: INTERNAL MEDICINE | Admitting: INTERNAL MEDICINE
Payer: COMMERCIAL

## 2018-06-03 VITALS
OXYGEN SATURATION: 99 % | RESPIRATION RATE: 18 BRPM | HEART RATE: 73 BPM | DIASTOLIC BLOOD PRESSURE: 87 MMHG | TEMPERATURE: 98 F | SYSTOLIC BLOOD PRESSURE: 142 MMHG

## 2018-06-03 DIAGNOSIS — Z90.3 ACQUIRED ABSENCE OF STOMACH [PART OF]: Chronic | ICD-10-CM

## 2018-06-03 LAB
ALBUMIN SERPL ELPH-MCNC: 2.4 G/DL — LOW (ref 3.5–5)
ALP SERPL-CCNC: 784 U/L — HIGH (ref 40–120)
ALT FLD-CCNC: 113 U/L DA — HIGH (ref 10–60)
ANION GAP SERPL CALC-SCNC: 6 MMOL/L — SIGNIFICANT CHANGE UP (ref 5–17)
APPEARANCE UR: CLEAR — SIGNIFICANT CHANGE UP
APTT BLD: 32.2 SEC — SIGNIFICANT CHANGE UP (ref 27.5–37.4)
AST SERPL-CCNC: 105 U/L — HIGH (ref 10–40)
BASOPHILS # BLD AUTO: 0.1 K/UL — SIGNIFICANT CHANGE UP (ref 0–0.2)
BASOPHILS NFR BLD AUTO: 1.2 % — SIGNIFICANT CHANGE UP (ref 0–2)
BILIRUB DIRECT SERPL-MCNC: 6.7 MG/DL — HIGH (ref 0–0.2)
BILIRUB INDIRECT FLD-MCNC: 1.4 MG/DL — HIGH (ref 0.2–1)
BILIRUB SERPL-MCNC: 8.1 MG/DL — HIGH (ref 0.2–1.2)
BILIRUB SERPL-MCNC: 8.1 MG/DL — HIGH (ref 0.2–1.2)
BILIRUB UR-MCNC: NEGATIVE — SIGNIFICANT CHANGE UP
BUN SERPL-MCNC: 8 MG/DL — SIGNIFICANT CHANGE UP (ref 7–18)
CALCIUM SERPL-MCNC: 8.6 MG/DL — SIGNIFICANT CHANGE UP (ref 8.4–10.5)
CHLORIDE SERPL-SCNC: 111 MMOL/L — HIGH (ref 96–108)
CO2 SERPL-SCNC: 23 MMOL/L — SIGNIFICANT CHANGE UP (ref 22–31)
COLOR SPEC: YELLOW — SIGNIFICANT CHANGE UP
CREAT SERPL-MCNC: 0.47 MG/DL — LOW (ref 0.5–1.3)
DIFF PNL FLD: ABNORMAL
EOSINOPHIL # BLD AUTO: 0.1 K/UL — SIGNIFICANT CHANGE UP (ref 0–0.5)
EOSINOPHIL NFR BLD AUTO: 1.4 % — SIGNIFICANT CHANGE UP (ref 0–6)
GLUCOSE SERPL-MCNC: 99 MG/DL — SIGNIFICANT CHANGE UP (ref 70–99)
GLUCOSE UR QL: NEGATIVE — SIGNIFICANT CHANGE UP
HCT VFR BLD CALC: 27.8 % — LOW (ref 34.5–45)
HGB BLD-MCNC: 8.5 G/DL — LOW (ref 11.5–15.5)
INR BLD: 1.12 RATIO — SIGNIFICANT CHANGE UP (ref 0.88–1.16)
KETONES UR-MCNC: NEGATIVE — SIGNIFICANT CHANGE UP
LACTATE SERPL-SCNC: 1 MMOL/L — SIGNIFICANT CHANGE UP (ref 0.7–2)
LEUKOCYTE ESTERASE UR-ACNC: NEGATIVE — SIGNIFICANT CHANGE UP
LIDOCAIN IGE QN: 107 U/L — SIGNIFICANT CHANGE UP (ref 73–393)
LYMPHOCYTES # BLD AUTO: 1.3 K/UL — SIGNIFICANT CHANGE UP (ref 1–3.3)
LYMPHOCYTES # BLD AUTO: 18.7 % — SIGNIFICANT CHANGE UP (ref 13–44)
MCHC RBC-ENTMCNC: 28 PG — SIGNIFICANT CHANGE UP (ref 27–34)
MCHC RBC-ENTMCNC: 30.5 GM/DL — LOW (ref 32–36)
MCV RBC AUTO: 91.9 FL — SIGNIFICANT CHANGE UP (ref 80–100)
MONOCYTES # BLD AUTO: 0.6 K/UL — SIGNIFICANT CHANGE UP (ref 0–0.9)
MONOCYTES NFR BLD AUTO: 8.7 % — SIGNIFICANT CHANGE UP (ref 2–14)
NEUTROPHILS # BLD AUTO: 4.7 K/UL — SIGNIFICANT CHANGE UP (ref 1.8–7.4)
NEUTROPHILS NFR BLD AUTO: 70 % — SIGNIFICANT CHANGE UP (ref 43–77)
NITRITE UR-MCNC: NEGATIVE — SIGNIFICANT CHANGE UP
PH UR: 7 — SIGNIFICANT CHANGE UP (ref 5–8)
PLATELET # BLD AUTO: 266 K/UL — SIGNIFICANT CHANGE UP (ref 150–400)
POTASSIUM SERPL-MCNC: 3.8 MMOL/L — SIGNIFICANT CHANGE UP (ref 3.5–5.3)
POTASSIUM SERPL-SCNC: 3.8 MMOL/L — SIGNIFICANT CHANGE UP (ref 3.5–5.3)
PROT SERPL-MCNC: 6.2 G/DL — SIGNIFICANT CHANGE UP (ref 6–8.3)
PROT UR-MCNC: NEGATIVE — SIGNIFICANT CHANGE UP
PROTHROM AB SERPL-ACNC: 12.2 SEC — SIGNIFICANT CHANGE UP (ref 9.8–12.7)
RBC # BLD: 3.03 M/UL — LOW (ref 3.8–5.2)
RBC # FLD: 19.1 % — HIGH (ref 10.3–14.5)
SODIUM SERPL-SCNC: 140 MMOL/L — SIGNIFICANT CHANGE UP (ref 135–145)
SP GR SPEC: 1 — LOW (ref 1.01–1.02)
UROBILINOGEN FLD QL: NEGATIVE — SIGNIFICANT CHANGE UP
WBC # BLD: 6.7 K/UL — SIGNIFICANT CHANGE UP (ref 3.8–10.5)
WBC # FLD AUTO: 6.7 K/UL — SIGNIFICANT CHANGE UP (ref 3.8–10.5)

## 2018-06-03 PROCEDURE — 74177 CT ABD & PELVIS W/CONTRAST: CPT | Mod: 26

## 2018-06-03 NOTE — ED PROVIDER NOTE - OBJECTIVE STATEMENT
57 y/o F pt with a PMHx of gastric CA and malignant neoplasm of stomach and a PSHx of partial gastrectomy presents to the ED c/o jaundice. Pt was recently admitted for gastric perforation. Pt reports that she is not in pain but came in for increasing jaundice. Pt denies pain, nausea, vomiting or any other complaints. NKDA.

## 2018-06-03 NOTE — ED PROVIDER NOTE - MEDICAL DECISION MAKING DETAILS
57 y/o F pt with painless jaundice. Will get CT abd to evaluate for worsening obstructing mass. Will check labs including LFTs.

## 2018-06-03 NOTE — ED ADULT NURSE NOTE - OBJECTIVE STATEMENT
Patient came to the ED a/o x 3 sent by PMD for jaundice. patient denies any pain, nausea/ vomiting noted.

## 2018-06-04 DIAGNOSIS — Z29.9 ENCOUNTER FOR PROPHYLACTIC MEASURES, UNSPECIFIED: ICD-10-CM

## 2018-06-04 DIAGNOSIS — C16.9 MALIGNANT NEOPLASM OF STOMACH, UNSPECIFIED: ICD-10-CM

## 2018-06-04 DIAGNOSIS — D49.2 NEOPLASM OF UNSPECIFIED BEHAVIOR OF BONE, SOFT TISSUE, AND SKIN: ICD-10-CM

## 2018-06-04 DIAGNOSIS — D64.9 ANEMIA, UNSPECIFIED: ICD-10-CM

## 2018-06-04 DIAGNOSIS — R17 UNSPECIFIED JAUNDICE: ICD-10-CM

## 2018-06-04 DIAGNOSIS — K86.9 DISEASE OF PANCREAS, UNSPECIFIED: ICD-10-CM

## 2018-06-04 LAB
AMYLASE P1 CFR SERPL: 26 U/L — SIGNIFICANT CHANGE UP (ref 25–115)
CANCER AG19-9 SERPL-ACNC: 141.8 U/ML — HIGH
CEA SERPL-MCNC: 7 NG/ML — HIGH (ref 0–3.8)
CEA SERPL-MCNC: 7.1 NG/ML — HIGH (ref 0–3.8)
CULTURE RESULTS: SIGNIFICANT CHANGE UP
SPECIMEN SOURCE: SIGNIFICANT CHANGE UP

## 2018-06-04 PROCEDURE — 99285 EMERGENCY DEPT VISIT HI MDM: CPT | Mod: 25

## 2018-06-04 PROCEDURE — 99223 1ST HOSP IP/OBS HIGH 75: CPT

## 2018-06-04 PROCEDURE — 99232 SBSQ HOSP IP/OBS MODERATE 35: CPT

## 2018-06-04 PROCEDURE — 74183 MRI ABD W/O CNTR FLWD CNTR: CPT | Mod: 26

## 2018-06-04 PROCEDURE — 99223 1ST HOSP IP/OBS HIGH 75: CPT | Mod: AI,GC

## 2018-06-04 RX ORDER — HEPARIN SODIUM 5000 [USP'U]/ML
5000 INJECTION INTRAVENOUS; SUBCUTANEOUS EVERY 8 HOURS
Qty: 0 | Refills: 0 | Status: DISCONTINUED | OUTPATIENT
Start: 2018-06-04 | End: 2018-06-05

## 2018-06-04 RX ORDER — DEXTROSE MONOHYDRATE, SODIUM CHLORIDE, AND POTASSIUM CHLORIDE 50; .745; 4.5 G/1000ML; G/1000ML; G/1000ML
1000 INJECTION, SOLUTION INTRAVENOUS
Qty: 0 | Refills: 0 | Status: DISCONTINUED | OUTPATIENT
Start: 2018-06-04 | End: 2018-06-05

## 2018-06-04 RX ADMIN — HEPARIN SODIUM 5000 UNIT(S): 5000 INJECTION INTRAVENOUS; SUBCUTANEOUS at 06:14

## 2018-06-04 RX ADMIN — HEPARIN SODIUM 5000 UNIT(S): 5000 INJECTION INTRAVENOUS; SUBCUTANEOUS at 13:03

## 2018-06-04 RX ADMIN — HEPARIN SODIUM 5000 UNIT(S): 5000 INJECTION INTRAVENOUS; SUBCUTANEOUS at 22:01

## 2018-06-04 RX ADMIN — DEXTROSE MONOHYDRATE, SODIUM CHLORIDE, AND POTASSIUM CHLORIDE 125 MILLILITER(S): 50; .745; 4.5 INJECTION, SOLUTION INTRAVENOUS at 06:14

## 2018-06-04 NOTE — H&P ADULT - ASSESSMENT
59 y/o F  with PMH of gastric CA, s/p partial gastrectomy 6/2017 s/p chemotherapy presents to ED due to jaundice x 4 days . Pt had  recent admission for pneumoperitoneum managed conservatively c/o gross jaundice f. Pt noticed her urine becoming yellow and found jaundiced tinge to sclerae and skin that became progressively more intense over last 2-3 days. Abdominal CT shows shows pancreatic mass with severe intra/extra hepatic biliary dilatation. Admitted for MRCP/ERCP

## 2018-06-04 NOTE — CONSULT NOTE ADULT - ASSESSMENT
57 y/o woman with hx of gastric ca s/p partial gastrectomy now presents with painless jaundice    Pancreatic neoplasm vs recurrence of gastric ca    -IVF  -MRCP  -GI/ERCP/possible biliary stent  -Tumor markers- CA 19-9, CEA

## 2018-06-04 NOTE — CHART NOTE - NSCHARTNOTEFT_GEN_A_CORE
Patient seen and examined at bedside, complained of hunger, denies any abdominal pain. Surgery have evaluated her recommended MRCP and then possible ERCP if MRCP is abnormal  advanced diet to clear liquid diet after MRCP.     Vital Signs Last 24 Hrs  T(C): 37.2 (04 Jun 2018 07:39), Max: 37.2 (04 Jun 2018 07:39)  T(F): 98.9 (04 Jun 2018 07:39), Max: 98.9 (04 Jun 2018 07:39)  HR: 70 (04 Jun 2018 07:39) (62 - 70)  BP: 133/63 (04 Jun 2018 07:39) (105/58 - 133/63)  BP(mean): --  RR: 17 (04 Jun 2018 07:39) (17 - 18)  SpO2: 100% (04 Jun 2018 07:39) (99% - 100%)    PHYSICAL EXAM:  GENERAL: NAD,   HEAD:  Atraumatic, Normocephalic  EYES:  Scleral icterus conjunctiva clear  NECK: Supple, No JVD, Normal thyroid  CHEST/LUNG: Clear to auscultation. Clear to percussion bilaterally; No rales, rhonchi, wheezing, or rubs  HEART: Regular rate and rhythm; No murmurs, rubs, or gallops  ABDOMEN: Soft, Nontender, Nondistended; Bowel sounds present  NERVOUS SYSTEM:  Alert & Oriented X3,    EXTREMITIES:  2+ Peripheral Pulses, No clubbing, cyanosis, or edema  SKIN: warm dry, icteric    A/P  F/u MRCP results  F/u surgery recommendation  advanced diet to clear liquids only      Discussed with Attending

## 2018-06-04 NOTE — H&P ADULT - NSHPPHYSICALEXAM_GEN_ALL_CORE
PHYSICAL EXAM:  GENERAL: NAD, speaks in full sentences, no signs of respiratory distress  HEENT:  Atraumatic, Normocephalic,  EOMI, PERRLA, icteric conjunctiva   NECK: Supple, No JVD  LUNG: Clear to auscultation bilaterally; No wheeze; No crackles; No accessory muscles used  CVS: Regular rate and rhythm, no RMG  ABDOMEN: Soft, RUQ tenderness with deep palpation, Nondistended; Bowel sounds present; No guarding, no rebound   : no suprapubic pain   EXTREMITIES:  2+ Peripheral Pulses, No cyanosis or edema  SKIN: jaundice   Neuro: AAOx3, non-focal

## 2018-06-04 NOTE — CONSULT NOTE ADULT - SUBJECTIVE AND OBJECTIVE BOX
58 year old lady with metastatic gastric ca, s/p resection, chemo and perforation due to cyramza.  she was doing fine at home tolerating liquid.  she has some mild pain at RUQ as usual.  but jaundice developed over 1 week. no fever, chills.  CT and MRI showed dilated CBD and intrahepatic duct and a 1.5 cm pancreatic head mass.  alert, not in distress. jaundice is less intense today. no palpable nodes at neck. chest is clear. abd is soft and flat. no mass no leg edema.  there are 3 socr skin mass developed in the last few weeks.  one is at left upper chest, red, raised, sofr. one at right side abd and one at right eye angle.                        8.5    6.7   )-----------( 266      ( 03 Jun 2018 17:20 )             27.8   06-03    140  |  111<H>  |  8   ----------------------------<  99  3.8   |  23  |  0.47<L>    Ca    8.6      03 Jun 2018 17:20    TPro  6.2  /  Alb  2.4<L>  /  TBili  8.1<H>  /  DBili  6.7<H>  /  AST  105<H>  /  ALT  113<H>  /  AlkPhos  784<H>  06-03  < from: MR MRCP w/wo IV Cont (06.04.18 @ 16:23) >  INTERPRETATION:  Abdominal MR without and with IV contrast including MRCP    Indication: Painless jaundice.    Technique: Utilizing a 1.5 Emilee high-field magnet,multiplanar   multisequence MR images of the abdomen are acquired without and with IV   contrast (5.5 cc Gadavist administered, 4.5 cc discarded), supplemented   by thin and thick slab MRCP images. Postcontrast images are acquired   dynamically.    Comparison: No prior abdominal MR is available for comparison.    Findings: There is dilatation of the intrahepatic biliary ducts. The   common hepatic duct is also dilated measuring 1.5 cm in caliber with an   abrupt transition; in the area of the abrupt transition within the   pancreatic head, there appears to be a 1.4 cm enhancing mass which is   better seen on delayed postcontrast images, suspicious for an obstructive   cholangiocarcinoma. The distal common bile duct maintains normal caliber   at 6 mm. The main pancreatic duct maintains normal caliber without   dilatation. The SMV and portal veins do not appear to be encased.    A few hepatic cysts are seen measuring up to 1.7 cm. The spleen,   adrenals, and the left kidney appear unremarkable. There is a 1.6 cm   right renal.     Mild perisplenic ascites. No evidence for an enlarged lymph node.    Impression: Dilatation of the intrahepatic biliary ducts. The common   hepatic duct is also dilated measuring 1.5 cm in caliber with an abrupt   transition; in the area of the abrupt transition within the pancreatic   head, there appears to be a 1.4 cm enhancing mass which is better seen on   delayed postcontrast images, suspicious for an obstructive   cholangiocarcinoma. A pancreatic head cancer is also in the differential   consideration. The distal common bile duct maintains normal caliber at 6   mm. The main pancreatic duct maintains normal caliber without dilatation.   The SMV and portal veins do not appear to be encased.    Mildascites.    Other findings as above.      < end of copied text >  < from: CT Abdomen and Pelvis w/ Oral Cont and w/ IV Cont (06.03.18 @ 22:13) >    INTERPRETATION:  CT ABDOMEN AND PELVIS WITH CONTRAST    INDICATION: Painless jaundice, history of gastric cancer.    TECHNIQUE: Contrast enhanced CT of the abdomen and pelvis.     75 mL of Omnipaque 350 contrast material was injected IV.    COMPARISON: 5/24/2018.    FINDINGS:    Lower Thorax: No consolidation or effusion.        Liver: No suspicious lesions. Stable hepatic cysts.  Biliary: Severe intrahepatic and extra hepatic ductal dilatation. This   appears slightly worse relative to the prior examination. Again seen is   abrupt cut off of the distal CBD where there is a suggestion of a   pancreatic head mass on series 2 image 37 measuring approximately 2.3 cm.   Improved inflammatory changes around the gallbladder. Gallbladder is   distended. Hyperenhancing focus at the gallbladder fundus is stable,   indeterminate.  Spleen: No suspicious lesions.      Pancreas: See above. Adrenals: Normal.      Kidneys: No hydronephrosis or solid mass. Also both collecting system   appears similar to the prior.  Vessels: Normal caliber.        GI tract: No evidence of small bowel obstruction. Status post distal   gastrectomy with Ryan-en-Y bypass.Inflammatory changes at the duodenum   have improved.    Peritoneum/retroperitoneum and mesentery: No free air. No organized fluid   collection. No adenopathy. Trace ascites.    Pelvic organs/Bladder: No pelvic masses. Bladder is normal.        Abdominal wall: Unremarkable.  Bones and soft tissues: No destructive lesion.        IMPRESSION:    < end of copied text >  < from: CT Abdomen and Pelvis w/ Oral Cont and w/ IV Cont (06.03.18 @ 22:13) >  Suspicion for possible pancreatic head mass. Severe intrahepatic and   extrahepatic biliary ductal dilatation appears slightly worsened relative   to the prior exam. Consider pancreatic protocol MRI for further   evaluation.    No other interval change from prior examination. No acute findings.   Gallbladder is again distended but the inflammatory changes around it   have slightly improved.      < end of copied text >

## 2018-06-04 NOTE — H&P ADULT - PROBLEM SELECTOR PLAN 4
IMPROVE VTE Individual Risk Assessment    RISK                                                          Points  [] Previous VTE                                           3  [] Thrombophilia                                        2  [] Lower limb paralysis                              2   [x] Current Cancer                                       2   [] Immobilization > 24 hrs                        1  [] ICU/CCU stay > 24 hours                       1  [] Age > 60                                                   1    IMPROVE VTE Score: 2  Heparin

## 2018-06-04 NOTE — CONSULT NOTE ADULT - SUBJECTIVE AND OBJECTIVE BOX
Patient is a 58y old  Female who presents with a chief complaint of painless jaundice (04 Jun 2018 04:29)    HPI:     57 y/o F  with PMH of gastric CA, s/p partial gastrectomy 6/2017on chemotherapy presents to ED due to jaundice x 4 days . Pt had  recent admission for pneumoperitoneum managed conservatively c/o gross jaundice f. Pt noticed her urine becoming yellow and found jaundiced tinge to sclerae and skin that became progressively more intense over last 2-3 days. Abdominal CT shows shows pancreatic mass with severe intra/extra hepatic biliary dilatation.  Pt denies abdominal pain, N/V, fever/chills and is tolerating diet and having BM.    greasy stools-   weight loss-   N/V/D-   fatigue-     REVIEW OF SYSTEMS  negative except above    Allergies    No Known Allergies    Intolerances      MEDICATIONS  (STANDING):  dextrose 5% + sodium chloride 0.45% with potassium chloride 10 mEq/L 1000 milliLiter(s) (125 mL/Hr) IV Continuous <Continuous>  heparin  Injectable 5000 Unit(s) SubCutaneous every 8 hours    MEDICATIONS  (PRN):      PAST MEDICAL & SURGICAL HISTORY:  Gastric cancer  Malignant neoplasm of stomach, unspecified location  S/P partial gastrectomy: distal gastrectomy with Billroth II reconstruction, D2 lymphadenectomy, feeding jejunostomy tube placement 6/23/17    FAMILY HISTORY:  No pertinent family history in first degree relatives    Social History: No hsitory of : Tobacco use, IVDA, EToH  ______________________________________________________________________________________    PHYSICAL EXAM    Daily     Daily   BMI: 20.9 (05-18 @ 09:00)  Change in Weight:  Vital Signs Last 24 Hrs  T(C): 37.2 (04 Jun 2018 07:39), Max: 37.2 (04 Jun 2018 07:39)  T(F): 98.9 (04 Jun 2018 07:39), Max: 98.9 (04 Jun 2018 07:39)  HR: 70 (04 Jun 2018 07:39) (62 - 73)  BP: 133/63 (04 Jun 2018 07:39) (105/58 - 142/87)  BP(mean): --  RR: 17 (04 Jun 2018 07:39) (17 - 18)  SpO2: 100% (04 Jun 2018 07:39) (99% - 100%)    GENERAL: NAD, speaks in full sentences, no signs of respiratory distress  HEENT:  Atraumatic, Normocephalic,  EOMI, PERRLA, icteric conjunctiva   NECK: Supple, No JVD  LUNG: Clear to auscultation bilaterally; No wheeze; No crackles; No accessory muscles used  CVS: Regular rate and rhythm, no RMG  ABDOMEN: Soft, RUQ tenderness with deep palpation, Nondistended; Bowel sounds present; No guarding, no rebound   : no suprapubic pain   EXTREMITIES:  2+ Peripheral Pulses, No cyanosis or edema  SKIN: jaundice   Neuro: AAOx3, non-focal    Other:   _______________________________________________________________________________________________  Lab Results:                          8.5    6.7   )-----------( 266      ( 03 Jun 2018 17:20 )             27.8     06-03    140  |  111<H>  |  8   ----------------------------<  99  3.8   |  23  |  0.47<L>    Ca    8.6      03 Jun 2018 17:20    TPro  6.2  /  Alb  2.4<L>  /  TBili  8.1<H>  /  DBili  6.7<H>  /  AST  105<H>  /  ALT  113<H>  /  AlkPhos  784<H>  06-03    LIVER FUNCTIONS - ( 03 Jun 2018 17:20 )  Alb: 2.4 g/dL / Pro: 6.2 g/dL / ALK PHOS: 784 U/L / ALT: 113 U/L DA / AST: 105 U/L / GGT: x           PT/INR - ( 03 Jun 2018 17:20 )   PT: 12.2 sec;   INR: 1.12 ratio         PTT - ( 03 Jun 2018 17:20 )  PTT:32.2 sec        Stool Results:          RADIOLOGY RESULTS:    SURGICAL PATHOLOGY: Patient is a 58y old  Female who presents with a chief complaint of painless jaundice (04 Jun 2018 04:29)    HPI:     57 y/o F  with PMH of gastric CA, s/p partial gastrectomy 6/2017on chemotherapy presents to ED due to jaundice x 4 days . Pt had  recent admission for pneumoperitoneum managed conservatively c/o gross jaundice f. Pt noticed her urine becoming yellow and found jaundiced tinge to sclerae and skin that became progressively more intense over last 2-3 days. Abdominal CT shows shows pancreatic mass with severe intra/extra hepatic biliary dilatation.  Pt denies abdominal pain, N/V, fever/chills and is tolerating diet and having BM.    Pt currently in MRI will reassess him once he comes back    REVIEW OF SYSTEMS      Allergies    No Known Allergies    Intolerances      MEDICATIONS  (STANDING):  dextrose 5% + sodium chloride 0.45% with potassium chloride 10 mEq/L 1000 milliLiter(s) (125 mL/Hr) IV Continuous <Continuous>  heparin  Injectable 5000 Unit(s) SubCutaneous every 8 hours    MEDICATIONS  (PRN):      PAST MEDICAL & SURGICAL HISTORY:  Gastric cancer  Malignant neoplasm of stomach, unspecified location  S/P partial gastrectomy: distal gastrectomy with Billroth II reconstruction, D2 lymphadenectomy, feeding jejunostomy tube placement 6/23/17    FAMILY HISTORY:  No pertinent family history in first degree relatives    Social History: No hsitory of : Tobacco use, IVDA, EToH  ______________________________________________________________________________________    PHYSICAL EXAM    Daily     Daily   BMI: 20.9 (05-18 @ 09:00)  Change in Weight:  Vital Signs Last 24 Hrs  T(C): 37.2 (04 Jun 2018 07:39), Max: 37.2 (04 Jun 2018 07:39)  T(F): 98.9 (04 Jun 2018 07:39), Max: 98.9 (04 Jun 2018 07:39)  HR: 70 (04 Jun 2018 07:39) (62 - 73)  BP: 133/63 (04 Jun 2018 07:39) (105/58 - 142/87)  BP(mean): --  RR: 17 (04 Jun 2018 07:39) (17 - 18)  SpO2: 100% (04 Jun 2018 07:39) (99% - 100%)    Other:   _______________________________________________________________________________________________  Lab Results:                          8.5    6.7   )-----------( 266      ( 03 Jun 2018 17:20 )             27.8     06-03    140  |  111<H>  |  8   ----------------------------<  99  3.8   |  23  |  0.47<L>    Ca    8.6      03 Jun 2018 17:20    TPro  6.2  /  Alb  2.4<L>  /  TBili  8.1<H>  /  DBili  6.7<H>  /  AST  105<H>  /  ALT  113<H>  /  AlkPhos  784<H>  06-03    LIVER FUNCTIONS - ( 03 Jun 2018 17:20 )  Alb: 2.4 g/dL / Pro: 6.2 g/dL / ALK PHOS: 784 U/L / ALT: 113 U/L DA / AST: 105 U/L / GGT: x           PT/INR - ( 03 Jun 2018 17:20 )   PT: 12.2 sec;   INR: 1.12 ratio         PTT - ( 03 Jun 2018 17:20 )  PTT:32.2 sec        Stool Results:          RADIOLOGY RESULTS:    SURGICAL PATHOLOGY:

## 2018-06-04 NOTE — CONSULT NOTE ADULT - ATTENDING COMMENTS
I was physically present for the key portions of the evaluation and management (E/M) service provided.  The patient was personally seen and examined at bedside.  I reviewed the resident's  H& P , ROS,  Exam, assessment and plan. VS and labs  were personally reviewed.   I agree with  the all of the above with the following additions:    Summary:  Very pleasant lady with a known history of gastric CA s.p. partial gastric resection. She now presents with obstructive jaundice and CT concerning for pancreatic mass.     Suggest  Await MRCP report  Pending above patient should be transferred to [facility] for EUS/ FNA and possible stent placement           Thank you for your consultation and allowing  me to participate in the care of your patients. If you have further questions please contact me at [phone].

## 2018-06-04 NOTE — CONSULT NOTE ADULT - ASSESSMENT
59 y/o F  with PMH of gastric CA, s/p partial gastrectomy 6/2017on chemotherapy presents to ED due to jaundice x 4 days . Pt had  recent admission for pneumoperitoneum managed conservatively c/o gross jaundice f. Pt noticed her urine becoming yellow and found jaundiced tinge to sclerae and skin that became progressively more intense over last 2-3 days. Abdominal CT shows shows pancreatic mass with severe intra/extra hepatic biliary dilatation.  Pt denies abdominal pain, N/V, fever/chills and is tolerating diet and having BM.    Obstructive Jaundice work up  painless jaundice   Abd CT: pancreatic mass with extra/intra hepatic dilatation  pancreatic CA vs gastric CA  MRCP/ERCP  will f/u on results of MRI 59 y/o F  with PMH of gastric CA, s/p partial gastrectomy 6/2017on chemotherapy presents to ED due to jaundice x 4 days . Pt had  recent admission for pneumoperitoneum managed conservatively c/o gross jaundice f. Pt noticed her urine becoming yellow and found jaundiced tinge to sclerae and skin that became progressively more intense over last 2-3 days. Abdominal CT shows shows pancreatic mass with severe intra/extra hepatic biliary dilatation.  Pt denies abdominal pain, N/V, fever/chills and is tolerating diet and having BM.    Obstructive Jaundice work up  painless jaundice   Abd CT: pancreatic mass with extra/intra hepatic dilatation  pancreatic CA vs gastric CA  MRCP/ERCP  will f/u on results of MRI  will reassess pt once he comes back from MRI

## 2018-06-04 NOTE — H&P ADULT - PROBLEM SELECTOR PLAN 1
presents with painless jaundice   Abd CT: pancreatic mass with extra/intra hepatic dilatation  pancreatic CA vs gastric CA  NPO   IVF  MRCP/ERCP  Dr: Baltazar

## 2018-06-04 NOTE — CONSULT NOTE ADULT - SUBJECTIVE AND OBJECTIVE BOX
59 y/o woman with PMH of gastric ca, s/p partial gastrectomy, June 2017, on chemotherapy, recent admission for pneumoperitoneum managed conservatively c/o gross jaundice for last 4-5 days. Pt noticed her urine becoming yellow and found jaundiced tinge to sclerae and skin that became progressively more intense over last 2-3 days. Pt denies abdominal/RUQ pain, N/V, fever/chills. Pt tolerating diet and having regular BM's.    Labs indicate elevated LFT's and T. bili > 8  CT abd/pel shows pancreatic mass with dilatation severe intra/extra hepatic biliary dilatation.    PAST MEDICAL & SURGICAL HISTORY:  Gastric cancer  Malignant neoplasm of stomach, unspecified location  S/P partial gastrectomy: distal gastrectomy with Billroth II reconstruction, D2 lymphadenectomy, feeding jejunostomy tube placement 6/23/17    Social Hx: non-smoker; no EtOH    Home Medications:  pantoprazole 40 mg oral delayed release tablet: 1 tab(s) orally 2 times a day (25 May 2018 15:46)    PE: grossly jaundiced, NAD    Vital Signs Last 24 Hrs  T(C): 37 (04 Jun 2018 00:15), Max: 37 (04 Jun 2018 00:15)  T(F): 98.6 (04 Jun 2018 00:15), Max: 98.6 (04 Jun 2018 00:15)  HR: 62 (04 Jun 2018 00:15) (62 - 73)  BP: 105/58 (04 Jun 2018 00:15) (105/58 - 142/87)  BP(mean): --  RR: 18 (04 Jun 2018 00:15) (18 - 18)  SpO2: 99% (04 Jun 2018 00:15) (99% - 99%)    Sclerae icteric  Abd: soft, ND, +BS, mild tenderness in RUQ on deep palpation, no guarding or rebound                            8.5    6.7   )-----------( 266      ( 03 Jun 2018 17:20 )             27.8     06-03    140  |  111<H>  |  8   ----------------------------<  99  3.8   |  23  |  0.47<L>    Ca    8.6      03 Jun 2018 17:20    TPro  6.2  /  Alb  2.4<L>  /  TBili  8.1<H>  /  DBili  6.7<H>  /  AST  105<H>  /  ALT  113<H>  /  AlkPhos  784<H>  06-03      < from: CT Abdomen and Pelvis w/ Oral Cont and w/ IV Cont (06.03.18 @ 22:13) >  EXAM:  CT ABDOMEN AND PELVIS OC IC                            PROCEDURE DATE:  06/03/2018          INTERPRETATION:  CT ABDOMEN AND PELVIS WITH CONTRAST    INDICATION: Painless jaundice, history of gastric cancer.    TECHNIQUE: Contrast enhanced CT of the abdomen and pelvis.     75 mL of Omnipaque 350 contrast material was injected IV.    COMPARISON: 5/24/2018.    FINDINGS:    Lower Thorax: No consolidation or effusion.        Liver: No suspicious lesions. Stable hepatic cysts.  Biliary: Severe intrahepatic and extra hepatic ductal dilatation. This   appears slightly worse relative to the prior examination. Again seen is   abrupt cut off of the distal CBD where there is a suggestion of a   pancreatic head mass on series 2 image 37 measuring approximately 2.3 cm.   Improved inflammatory changes around the gallbladder. Gallbladder is   distended. Hyperenhancing focus at the gallbladder fundus is stable,   indeterminate.  Spleen: No suspicious lesions.      Pancreas: See above. Adrenals: Normal.      Kidneys: No hydronephrosis or solid mass. Also both collecting system   appears similar to the prior.  Vessels: Normal caliber.        GI tract: No evidence of small bowel obstruction. Status post distal   gastrectomy with Ryan-en-Y bypass.Inflammatory changes at the duodenum   have improved.    Peritoneum/retroperitoneum and mesentery: No free air. No organized fluid   collection. No adenopathy. Trace ascites.    Pelvic organs/Bladder: No pelvic masses. Bladder is normal.        Abdominal wall: Unremarkable.  Bones and soft tissues: No destructive lesion.        IMPRESSION:    Suspicion for possible pancreatic head mass. Severe intrahepatic and   extrahepatic biliary ductal dilatation appears slightly worsened relative   to the prior exam. Consider pancreatic protocol MRI for further   evaluation.    No other interval change from prior examination. No acute findings.   Gallbladder is again distended but the inflammatory changes around it   have slightly improved.        YU ROWAN M.D., ATTENDING RADIOLOGIST  This document has been electronically signed. Maynor  3 2018 11:03PM    < end of copied text >

## 2018-06-04 NOTE — H&P ADULT - HISTORY OF PRESENT ILLNESS
57 y/o F  with PMH of gastric CA, s/p partial gastrectomy 6/2017on chemotherapy presents to ED due to jaundice x 4 days . Pt had  recent admission for pneumoperitoneum managed conservatively c/o gross jaundice f. Pt noticed her urine becoming yellow and found jaundiced tinge to sclerae and skin that became progressively more intense over last 2-3 days. Abdominal CT shows shows pancreatic mass with severe intra/extra hepatic biliary dilatation.  Pt denies abdominal pain, N/V, fever/chills and is tolerating diet and having BM.

## 2018-06-04 NOTE — CONSULT NOTE ADULT - PROBLEM SELECTOR RECOMMENDATION 9
with dilated bile duct and pancreatic head mass  discussed with family, this can be related to gastric ca, such as a peripancreatic node, or related to local preitonitis.  unlikely to be pancreatic ca

## 2018-06-04 NOTE — CONSULT NOTE ADULT - PROBLEM SELECTOR RECOMMENDATION 3
new, red, soft, metastatic ca /, vascular anormaly ?  may need biopsy  if these are cancer, the pancreatic head can be cancer also.

## 2018-06-04 NOTE — H&P ADULT - PROBLEM SELECTOR PLAN 3
IMPROVE VTE Individual Risk Assessment    RISK                                                          Points  [] Previous VTE                                           3  [] Thrombophilia                                        2  [] Lower limb paralysis                              2   [x] Current Cancer                                       2   [] Immobilization > 24 hrs                        1  [] ICU/CCU stay > 24 hours                       1  [] Age > 60                                                   1    IMPROVE VTE Score: 2  Heparin H.5 around baseline  no sings of active bleeding  monitor CBC

## 2018-06-04 NOTE — PROGRESS NOTE ADULT - SUBJECTIVE AND OBJECTIVE BOX
Vital Signs Last 24 Hrs  T(C): 37.2 (04 Jun 2018 07:39), Max: 37.2 (04 Jun 2018 07:39)  T(F): 98.9 (04 Jun 2018 07:39), Max: 98.9 (04 Jun 2018 07:39)  HR: 70 (04 Jun 2018 07:39) (62 - 73)  BP: 133/63 (04 Jun 2018 07:39) (105/58 - 142/87)  BP(mean): --  RR: 17 (04 Jun 2018 07:39) (17 - 18)  SpO2: 100% (04 Jun 2018 07:39) (99% - 100%)    I&O's Detail                            8.5    6.7   )-----------( 266      ( 03 Jun 2018 17:20 )             27.8       06-03    140  |  111<H>  |  8   ----------------------------<  99  3.8   |  23  |  0.47<L>    Ca    8.6      03 Jun 2018 17:20    TPro  6.2  /  Alb  2.4<L>  /  TBili  8.1<H>  /  DBili  6.7<H>  /  AST  105<H>  /  ALT  113<H>  /  AlkPhos  784<H>  06-03      PT/INR - ( 03 Jun 2018 17:20 )   PT: 12.2 sec;   INR: 1.12 ratio         PTT - ( 03 Jun 2018 17:20 )  PTT:32.2 sec    PLAN:  MRCP today  possible ERCP  Will check Ca19-9

## 2018-06-05 ENCOUNTER — RESULT REVIEW (OUTPATIENT)
Age: 58
End: 2018-06-05

## 2018-06-05 ENCOUNTER — INPATIENT (INPATIENT)
Facility: HOSPITAL | Age: 58
LOS: 35 days | Discharge: HOSPICE | End: 2018-07-11
Attending: SURGERY | Admitting: SURGERY
Payer: COMMERCIAL

## 2018-06-05 VITALS
WEIGHT: 125 LBS | DIASTOLIC BLOOD PRESSURE: 61 MMHG | OXYGEN SATURATION: 100 % | SYSTOLIC BLOOD PRESSURE: 107 MMHG | HEART RATE: 68 BPM | TEMPERATURE: 99 F | RESPIRATION RATE: 14 BRPM

## 2018-06-05 VITALS
RESPIRATION RATE: 17 BRPM | HEART RATE: 64 BPM | DIASTOLIC BLOOD PRESSURE: 56 MMHG | TEMPERATURE: 99 F | OXYGEN SATURATION: 99 % | SYSTOLIC BLOOD PRESSURE: 114 MMHG

## 2018-06-05 DIAGNOSIS — Z90.3 ACQUIRED ABSENCE OF STOMACH [PART OF]: Chronic | ICD-10-CM

## 2018-06-05 DIAGNOSIS — R17 UNSPECIFIED JAUNDICE: ICD-10-CM

## 2018-06-05 PROBLEM — C16.9 MALIGNANT NEOPLASM OF STOMACH, UNSPECIFIED: Chronic | Status: ACTIVE | Noted: 2018-06-04

## 2018-06-05 LAB
ALBUMIN SERPL ELPH-MCNC: 1.9 G/DL — LOW (ref 3.5–5)
ALP SERPL-CCNC: 752 U/L — HIGH (ref 40–120)
ALT FLD-CCNC: 93 U/L DA — HIGH (ref 10–60)
ANION GAP SERPL CALC-SCNC: 11 MMOL/L — SIGNIFICANT CHANGE UP (ref 5–17)
AST SERPL-CCNC: 124 U/L — HIGH (ref 10–40)
BASE EXCESS BLDA CALC-SCNC: -1.2 MMOL/L — SIGNIFICANT CHANGE UP (ref -2–2)
BASOPHILS # BLD AUTO: 0.1 K/UL — SIGNIFICANT CHANGE UP (ref 0–0.2)
BASOPHILS NFR BLD AUTO: 1.5 % — SIGNIFICANT CHANGE UP (ref 0–2)
BILIRUB SERPL-MCNC: 9 MG/DL — HIGH (ref 0.2–1.2)
BLOOD GAS COMMENTS ARTERIAL: SIGNIFICANT CHANGE UP
BUN SERPL-MCNC: 4 MG/DL — LOW (ref 7–18)
CALCIUM SERPL-MCNC: 8.2 MG/DL — LOW (ref 8.4–10.5)
CANCER AG19-9 SERPL-ACNC: 147.7 U/ML — HIGH
CHLORIDE SERPL-SCNC: 108 MMOL/L — SIGNIFICANT CHANGE UP (ref 96–108)
CHOLEST SERPL-MCNC: 156 MG/DL — SIGNIFICANT CHANGE UP (ref 10–199)
CO2 SERPL-SCNC: 21 MMOL/L — LOW (ref 22–31)
CREAT SERPL-MCNC: 0.4 MG/DL — LOW (ref 0.5–1.3)
EOSINOPHIL # BLD AUTO: 0.2 K/UL — SIGNIFICANT CHANGE UP (ref 0–0.5)
EOSINOPHIL NFR BLD AUTO: 2.8 % — SIGNIFICANT CHANGE UP (ref 0–6)
FERRITIN SERPL-MCNC: 72 NG/ML — SIGNIFICANT CHANGE UP (ref 15–150)
FOLATE SERPL-MCNC: 16.8 NG/ML — SIGNIFICANT CHANGE UP
GLUCOSE SERPL-MCNC: 104 MG/DL — HIGH (ref 70–99)
HCO3 BLDA-SCNC: 22 MMOL/L — LOW (ref 23–27)
HCT VFR BLD CALC: 24.8 % — LOW (ref 34.5–45)
HDLC SERPL-MCNC: 8 MG/DL — LOW (ref 40–125)
HGB BLD-MCNC: 7.6 G/DL — LOW (ref 11.5–15.5)
HOROWITZ INDEX BLDA+IHG-RTO: 21 — SIGNIFICANT CHANGE UP
IRON SATN MFR SERPL: 18 % — SIGNIFICANT CHANGE UP (ref 15–50)
IRON SATN MFR SERPL: 52 UG/DL — SIGNIFICANT CHANGE UP (ref 40–150)
LIPID PNL WITH DIRECT LDL SERPL: 131 MG/DL — SIGNIFICANT CHANGE UP
LYMPHOCYTES # BLD AUTO: 1.2 K/UL — SIGNIFICANT CHANGE UP (ref 1–3.3)
LYMPHOCYTES # BLD AUTO: 21 % — SIGNIFICANT CHANGE UP (ref 13–44)
MAGNESIUM SERPL-MCNC: 2.3 MG/DL — SIGNIFICANT CHANGE UP (ref 1.6–2.6)
MCHC RBC-ENTMCNC: 28.3 PG — SIGNIFICANT CHANGE UP (ref 27–34)
MCHC RBC-ENTMCNC: 30.8 GM/DL — LOW (ref 32–36)
MCV RBC AUTO: 91.9 FL — SIGNIFICANT CHANGE UP (ref 80–100)
MONOCYTES # BLD AUTO: 0.7 K/UL — SIGNIFICANT CHANGE UP (ref 0–0.9)
MONOCYTES NFR BLD AUTO: 11.5 % — SIGNIFICANT CHANGE UP (ref 2–14)
NEUTROPHILS # BLD AUTO: 3.7 K/UL — SIGNIFICANT CHANGE UP (ref 1.8–7.4)
NEUTROPHILS NFR BLD AUTO: 63.3 % — SIGNIFICANT CHANGE UP (ref 43–77)
PCO2 BLDA: 30 MMHG — LOW (ref 32–46)
PH BLDA: 7.47 — HIGH (ref 7.35–7.45)
PHOSPHATE SERPL-MCNC: 2.7 MG/DL — SIGNIFICANT CHANGE UP (ref 2.5–4.5)
PLATELET # BLD AUTO: 262 K/UL — SIGNIFICANT CHANGE UP (ref 150–400)
PO2 BLDA: 94 MMHG — SIGNIFICANT CHANGE UP (ref 74–108)
POTASSIUM SERPL-MCNC: 3.3 MMOL/L — LOW (ref 3.5–5.3)
POTASSIUM SERPL-SCNC: 3.3 MMOL/L — LOW (ref 3.5–5.3)
PROT SERPL-MCNC: 5.3 G/DL — LOW (ref 6–8.3)
RBC # BLD: 2.7 M/UL — LOW (ref 3.8–5.2)
RBC # FLD: 19.5 % — HIGH (ref 10.3–14.5)
SAO2 % BLDA: 98 % — HIGH (ref 92–96)
SODIUM SERPL-SCNC: 140 MMOL/L — SIGNIFICANT CHANGE UP (ref 135–145)
TIBC SERPL-MCNC: 296 UG/DL — SIGNIFICANT CHANGE UP (ref 250–450)
TOTAL CHOLESTEROL/HDL RATIO MEASUREMENT: 19.5 RATIO — HIGH (ref 3.3–7.1)
TRIGL SERPL-MCNC: 85 MG/DL — SIGNIFICANT CHANGE UP (ref 10–149)
TSH SERPL-MCNC: 0.8 UU/ML — SIGNIFICANT CHANGE UP (ref 0.34–4.82)
UIBC SERPL-MCNC: 244 UG/DL — SIGNIFICANT CHANGE UP (ref 110–370)
VIT B12 SERPL-MCNC: 626 PG/ML — SIGNIFICANT CHANGE UP (ref 232–1245)
WBC # BLD: 5.9 K/UL — SIGNIFICANT CHANGE UP (ref 3.8–10.5)
WBC # FLD AUTO: 5.9 K/UL — SIGNIFICANT CHANGE UP (ref 3.8–10.5)

## 2018-06-05 PROCEDURE — 83605 ASSAY OF LACTIC ACID: CPT

## 2018-06-05 PROCEDURE — 83690 ASSAY OF LIPASE: CPT

## 2018-06-05 PROCEDURE — 85027 COMPLETE CBC AUTOMATED: CPT

## 2018-06-05 PROCEDURE — 99232 SBSQ HOSP IP/OBS MODERATE 35: CPT

## 2018-06-05 PROCEDURE — 74183 MRI ABD W/O CNTR FLWD CNTR: CPT

## 2018-06-05 PROCEDURE — 83550 IRON BINDING TEST: CPT

## 2018-06-05 PROCEDURE — 82248 BILIRUBIN DIRECT: CPT

## 2018-06-05 PROCEDURE — 87086 URINE CULTURE/COLONY COUNT: CPT

## 2018-06-05 PROCEDURE — 82607 VITAMIN B-12: CPT

## 2018-06-05 PROCEDURE — 82150 ASSAY OF AMYLASE: CPT

## 2018-06-05 PROCEDURE — 80053 COMPREHEN METABOLIC PANEL: CPT

## 2018-06-05 PROCEDURE — 81001 URINALYSIS AUTO W/SCOPE: CPT

## 2018-06-05 PROCEDURE — 80061 LIPID PANEL: CPT

## 2018-06-05 PROCEDURE — 82746 ASSAY OF FOLIC ACID SERUM: CPT

## 2018-06-05 PROCEDURE — G0378: CPT

## 2018-06-05 PROCEDURE — 88305 TISSUE EXAM BY PATHOLOGIST: CPT | Mod: 26

## 2018-06-05 PROCEDURE — 86900 BLOOD TYPING SEROLOGIC ABO: CPT

## 2018-06-05 PROCEDURE — 85610 PROTHROMBIN TIME: CPT

## 2018-06-05 PROCEDURE — 84443 ASSAY THYROID STIM HORMONE: CPT

## 2018-06-05 PROCEDURE — 71045 X-RAY EXAM CHEST 1 VIEW: CPT | Mod: 26

## 2018-06-05 PROCEDURE — 82803 BLOOD GASES ANY COMBINATION: CPT

## 2018-06-05 PROCEDURE — 99285 EMERGENCY DEPT VISIT HI MDM: CPT | Mod: 25

## 2018-06-05 PROCEDURE — 84100 ASSAY OF PHOSPHORUS: CPT

## 2018-06-05 PROCEDURE — 83735 ASSAY OF MAGNESIUM: CPT

## 2018-06-05 PROCEDURE — 86850 RBC ANTIBODY SCREEN: CPT

## 2018-06-05 PROCEDURE — 82378 CARCINOEMBRYONIC ANTIGEN: CPT

## 2018-06-05 PROCEDURE — 86901 BLOOD TYPING SEROLOGIC RH(D): CPT

## 2018-06-05 PROCEDURE — 86301 IMMUNOASSAY TUMOR CA 19-9: CPT

## 2018-06-05 PROCEDURE — 82728 ASSAY OF FERRITIN: CPT

## 2018-06-05 PROCEDURE — 74177 CT ABD & PELVIS W/CONTRAST: CPT

## 2018-06-05 PROCEDURE — 85730 THROMBOPLASTIN TIME PARTIAL: CPT

## 2018-06-05 RX ORDER — PANTOPRAZOLE SODIUM 20 MG/1
40 TABLET, DELAYED RELEASE ORAL DAILY
Qty: 0 | Refills: 0 | Status: DISCONTINUED | OUTPATIENT
Start: 2018-06-05 | End: 2018-06-07

## 2018-06-05 RX ORDER — ENOXAPARIN SODIUM 100 MG/ML
40 INJECTION SUBCUTANEOUS AT BEDTIME
Qty: 0 | Refills: 0 | Status: DISCONTINUED | OUTPATIENT
Start: 2018-06-05 | End: 2018-06-05

## 2018-06-05 RX ORDER — DEXTROSE MONOHYDRATE, SODIUM CHLORIDE, AND POTASSIUM CHLORIDE 50; .745; 4.5 G/1000ML; G/1000ML; G/1000ML
1000 INJECTION, SOLUTION INTRAVENOUS
Qty: 0 | Refills: 0 | Status: DISCONTINUED | OUTPATIENT
Start: 2018-06-05 | End: 2018-06-05

## 2018-06-05 RX ORDER — SODIUM CHLORIDE 9 MG/ML
1000 INJECTION, SOLUTION INTRAVENOUS
Qty: 0 | Refills: 0 | Status: DISCONTINUED | OUTPATIENT
Start: 2018-06-05 | End: 2018-06-05

## 2018-06-05 RX ORDER — DEXTROSE MONOHYDRATE, SODIUM CHLORIDE, AND POTASSIUM CHLORIDE 50; .745; 4.5 G/1000ML; G/1000ML; G/1000ML
1000 INJECTION, SOLUTION INTRAVENOUS
Qty: 0 | Refills: 0 | Status: DISCONTINUED | OUTPATIENT
Start: 2018-06-05 | End: 2018-06-06

## 2018-06-05 RX ORDER — ACETAMINOPHEN 500 MG
650 TABLET ORAL EVERY 6 HOURS
Qty: 0 | Refills: 0 | Status: DISCONTINUED | OUTPATIENT
Start: 2018-06-05 | End: 2018-06-06

## 2018-06-05 RX ORDER — ENOXAPARIN SODIUM 100 MG/ML
40 INJECTION SUBCUTANEOUS DAILY
Qty: 0 | Refills: 0 | Status: DISCONTINUED | OUTPATIENT
Start: 2018-06-05 | End: 2018-06-18

## 2018-06-05 RX ORDER — OXYCODONE HYDROCHLORIDE 5 MG/1
5 TABLET ORAL EVERY 4 HOURS
Qty: 0 | Refills: 0 | Status: DISCONTINUED | OUTPATIENT
Start: 2018-06-05 | End: 2018-06-07

## 2018-06-05 RX ORDER — OXYCODONE HYDROCHLORIDE 5 MG/1
10 TABLET ORAL EVERY 4 HOURS
Qty: 0 | Refills: 0 | Status: DISCONTINUED | OUTPATIENT
Start: 2018-06-05 | End: 2018-06-07

## 2018-06-05 RX ORDER — PANTOPRAZOLE SODIUM 20 MG/1
40 TABLET, DELAYED RELEASE ORAL
Qty: 0 | Refills: 0 | Status: DISCONTINUED | OUTPATIENT
Start: 2018-06-05 | End: 2018-06-05

## 2018-06-05 RX ORDER — SODIUM CHLORIDE 9 MG/ML
3 INJECTION INTRAMUSCULAR; INTRAVENOUS; SUBCUTANEOUS EVERY 8 HOURS
Qty: 0 | Refills: 0 | Status: DISCONTINUED | OUTPATIENT
Start: 2018-06-05 | End: 2018-06-05

## 2018-06-05 RX ADMIN — ENOXAPARIN SODIUM 40 MILLIGRAM(S): 100 INJECTION SUBCUTANEOUS at 21:16

## 2018-06-05 RX ADMIN — HEPARIN SODIUM 5000 UNIT(S): 5000 INJECTION INTRAVENOUS; SUBCUTANEOUS at 05:07

## 2018-06-05 RX ADMIN — OXYCODONE HYDROCHLORIDE 5 MILLIGRAM(S): 5 TABLET ORAL at 21:46

## 2018-06-05 RX ADMIN — OXYCODONE HYDROCHLORIDE 5 MILLIGRAM(S): 5 TABLET ORAL at 21:16

## 2018-06-05 NOTE — PROGRESS NOTE ADULT - SUBJECTIVE AND OBJECTIVE BOX
Vital Signs Last 24 Hrs  T(C): 37.3 (05 Jun 2018 05:20), Max: 37.6 (04 Jun 2018 21:38)  T(F): 99.1 (05 Jun 2018 05:20), Max: 99.6 (04 Jun 2018 21:38)  HR: 63 (05 Jun 2018 05:20) (63 - 66)  BP: 100/55 (05 Jun 2018 05:20) (100/55 - 107/51)  BP(mean): --  RR: 16 (05 Jun 2018 05:20) (16 - 16)  SpO2: 100% (05 Jun 2018 05:20) (98% - 100%)    I&O's Detail                            7.6    5.9   )-----------( 262      ( 05 Jun 2018 07:36 )             24.8       06-05    140  |  108  |  4<L>  ----------------------------<  104<H>  3.3<L>   |  21<L>  |  0.40<L>    Ca    8.2<L>      05 Jun 2018 07:36  Phos  2.7     06-05  Mg     2.3     06-05    TPro  5.3<L>  /  Alb  1.9<L>  /  TBili  9.0<H>  /  DBili  x   /  AST  124<H>  /  ALT  93<H>  /  AlkPhos  752<H>  06-05      PT/INR - ( 03 Jun 2018 17:20 )   PT: 12.2 sec;   INR: 1.12 ratio         PTT - ( 03 Jun 2018 17:20 )  PTT:32.2 sec    MRCP shows a sharp cut-off of the Common bile duct    PLAN:  Pt. needs ERCP with stent placement and EUS with biopsy of the mass    I will speak to Dr. Muse

## 2018-06-05 NOTE — H&P ADULT - NSHPLABSRESULTS_GEN_ALL_CORE
LABS:                         7.6    5.9   )-----------( 262      ( 2018 07:36 )             24.8     06-05    140  |  108  |  4<L>  ----------------------------<  104<H>  3.3<L>   |  21<L>  |  0.40<L>    Ca    8.2<L>      2018 07:36  Phos  2.7     06-05  Mg     2.3     06-05    TPro  5.3<L>  /  Alb  1.9<L>  /  TBili  9.0<H>  /  DBili  x   /  AST  124<H>  /  ALT  93<H>  /  AlkPhos  752<H>  06-    PT/INR - ( 2018 17:20 )   PT: 12.2 sec;   INR: 1.12 ratio         PTT - ( 2018 17:20 )  PTT:32.2 sec  Urinalysis Basic - ( 2018 17:20 )    Color: Yellow / Appearance: Clear / S.005 / pH: x  Gluc: x / Ketone: Negative  / Bili: Negative / Urobili: Negative   Blood: x / Protein: Negative / Nitrite: Negative   Leuk Esterase: Negative / RBC: 2-5 /HPF / WBC 0-2 /HPF   Sq Epi: x / Non Sq Epi: Occasional /HPF / Bacteria: Trace /HPF      Imaging:  CT (6/3): Suspicion for possible pancreatic head mass. Severe intrahepatic and   extrahepatic biliary ductal dilatation appears slightly worsened relative to   the prior exam. Consider pancreatic protocol MRI for further evaluation.     No other interval change from prior examination. No acute findings.   Gallbladder is again distended but the inflammatory changes around it have   slightly improved.       MRCP (): Impression: Dilatation of the intrahepatic biliary ducts. The common hepatic   duct is also dilated measuring 1.5 cm in caliber with an abrupt transition;   in the area of the abrupt transition within the pancreatic head, there   appears to be a 1.4 cm enhancing mass which is better seen on delayed   postcontrast images, suspicious for an obstructive cholangiocarcinoma. A   pancreatic head cancer is also in the differential consideration. The distal   common bile duct maintains normal caliber at 6 mm. The main pancreatic duct   maintains normal caliber without dilatation. The SMV and portal veins do not   appear to be encased.     Mild ascites.

## 2018-06-05 NOTE — ACUTE INTERFACILITY TRANSFER NOTE - HOSPITAL COURSE
57 y/o F  with PMH of gastric CA, s/p partial gastrectomy 6/2017on chemotherapy presents to ED due to jaundice x 4 days . Pt had  recent admission for pneumoperitoneum managed conservatively c/o gross jaundice f. Pt noticed her urine becoming yellow and found jaundiced tinge to sclerae and skin that became progressively more intense over last 2-3 days. Abdominal CT shows shows pancreatic mass with severe intra/extra hepatic biliary dilatation.  Pt denied abdominal pain, N/V, fever/chills and is tolerating diet and having BM.     Patient will be transferred to [facility] as  MRCP w/wo IV Cont (06.04.18 @ 16:23) >"Dilatation of the intrahepatic biliary ducts. The common hepatic duct is also dilated measuring 1.5 cm in caliber with an abrupt transition; inthe area of the abrupt transition within the pancreatic head, there appears to be a 1.4 cm enhancing mass which is better seen on delayed postcontrast images, suspicious for an obstructive cholangiocarcinoma. A pancreatic head cancer is also inthe differential consideration. The distal common bile duct maintains normal caliber at 6 mm. The main pancreatic duct maintains normal caliber without dilatation. The SMV and portal veins do not appear to be encased. Mild ascites.    Patient is now been transferred to [facility] for EUS under care of Dr. Aguilar, Discussed with Attending

## 2018-06-05 NOTE — PROGRESS NOTE ADULT - SUBJECTIVE AND OBJECTIVE BOX
Subjective:   pt complains of RUQ pain radiating to back , no vomiting     Objective:    MEDICATIONS  (STANDING):  dextrose 5% + sodium chloride 0.45% with potassium chloride 10 mEq/L 1000 milliLiter(s) (125 mL/Hr) IV Continuous <Continuous>  heparin  Injectable 5000 Unit(s) SubCutaneous every 8 hours    MEDICATIONS  (PRN):        Vital Signs Last 24 Hrs  T(C): 37.3 (2018 05:20), Max: 37.6 (2018 21:38)  T(F): 99.1 (2018 05:20), Max: 99.6 (2018 21:38)  HR: 63 (2018 05:20) (63 - 66)  BP: 100/55 (2018 05:20) (100/55 - 107/51)  BP(mean): --  RR: 16 (2018 05:20) (16 - 16)  SpO2: 100% (2018 05:20) (98% - 100%)      General:  Well developed, well nourished, alert and active, no pallor, NAD.  HEENT:    Normal appearance of conjunctiva, ears, nose, lips, oropharynx, and oral mucosa, icterus   Neck:  No masses, no asymmetry.  Lymph Nodes:  No lymphadenopathy.   Cardiovascular:  RRR normal S1/S2, no murmur.  Respiratory:  CTA B/L, normal respiratory effort.   Abdominal:   soft, RUQ tenderness, normoactive BS,ND, no HSM.  Extremities:   No clubbing or cyanosis, normal capillary refill, no edema.   Skin:   No rash, jaundice, lesions, eczema.   Musculoskeletal:  No joint swelling, erythema or tenderness.   Neuro: No focal deficits.   Other:       LABS:                        7.6    5.9   )-----------( 262      ( 2018 07:36 )             24.8     06-05    140  |  108  |  4<L>  ----------------------------<  104<H>  3.3<L>   |  21<L>  |  0.40<L>    Ca    8.2<L>      2018 07:36  Phos  2.7     06-05  Mg     2.3     06-05    TPro  5.3<L>  /  Alb  1.9<L>  /  TBili  9.0<H>  /  DBili  x   /  AST  124<H>  /  ALT  93<H>  /  AlkPhos  752<H>  06-05    PT/INR - ( 2018 17:20 )   PT: 12.2 sec;   INR: 1.12 ratio         PTT - ( 2018 17:20 )  PTT:32.2 sec  Urinalysis Basic - ( 2018 17:20 )    Color: Yellow / Appearance: Clear / S.005 / pH: x  Gluc: x / Ketone: Negative  / Bili: Negative / Urobili: Negative   Blood: x / Protein: Negative / Nitrite: Negative   Leuk Esterase: Negative / RBC: 2-5 /HPF / WBC 0-2 /HPF   Sq Epi: x / Non Sq Epi: Occasional /HPF / Bacteria: Trace /HPF        RADIOLOGY & ADDITIONAL TESTS:

## 2018-06-05 NOTE — H&P ADULT - HISTORY OF PRESENT ILLNESS
Patient complains of 1 week of progressively worsening jaundice. Patient now endorses some pain in the right upper quadrant of her abdomen, which extends through her back. _ days ago, she was admitted to [facility] for work-up. She had a CT scan and MRCP. On MRCP she has a 1.4 cm mass in the pancreatic head obstructing the common hepatic duct. Transfer to [facility] was initiated in order to pursue an ERCP with gastroenterology. She denies nausea, vomiting, fever, chills.     Pertinent history: gastric cancer 6/2017 s/p partial gastrectomy with Billroth II reconstruction Patient complains of 1 week of progressively worsening jaundice. Patient now endorses some pain in the right upper quadrant of her abdomen, which extends through her back. Two days ago, she was admitted to [facility] for work-up. She had a CT scan and MRCP. On MRCP she has a 1.4 cm mass in the pancreatic head obstructing the common hepatic duct. Transfer to [facility] was initiated in order to pursue an ERCP with gastroenterology. She denies nausea, vomiting, fever, chills.     Pertinent history: gastric cancer 6/2017 s/p partial gastrectomy with Billroth II reconstruction

## 2018-06-05 NOTE — PROGRESS NOTE ADULT - ASSESSMENT
57 y/o F  with PMH of gastric CA, s/p partial gastrectomy 6/2017on chemotherapy presents to ED due to jaundice x 4 days . Pt had  recent admission for pneumoperitoneum managed conservatively c/o gross jaundice f. Pt noticed her urine becoming yellow and found jaundiced tinge to sclerae and skin that became progressively more intense over last 2-3 days. Abdominal CT shows shows pancreatic mass with severe intra/extra hepatic biliary dilatation.  Pt denies abdominal pain, N/V, fever/chills and is tolerating diet and having BM.    Obstructive Jaundice work up  MRCP results noted - suspicion for obstructive cholangiocarcinoma, suspected pancreatic head cancer  plan for transfer to [facility] for EUS and stent placement will discuss with Dr. Muse  painless jaundice   Abd CT: pancreatic mass with extra/intra hepatic dilatation  c/w pain management and trend LFTs

## 2018-06-05 NOTE — H&P ADULT - NSHPPHYSICALEXAM_GEN_ALL_CORE
Gen: NAD, appears jaundiced  HEENT: yellow sclera, EOMI, face symmetric  Chest: breathing comfortably on room air  Abdomen: soft, nondistended, tender to palpation RUQ below costal margin, pain radiates to back  LEs: WWP, nontender

## 2018-06-05 NOTE — H&P ADULT - ASSESSMENT
A/P: 58F with history of gastric cancer (6/2017) s/p partial gastrectomy with Billroth II reconstruction, presents to [facility] with painless jaundice found to have 1.4cm pancreatic head mass on MRI, transferred to [facility] for endoscopy, further workup.    -monitor CMP, CBC  -consult gastroenterology for ERCP  -IVF  -NPO  -DVT ppx  -will discuss with Dr. Aguilar A/P: 58F with history of gastric cancer (6/2017) s/p partial gastrectomy with Billroth II reconstruction, presents to [facility] with painless jaundice found to have 1.4cm pancreatic head mass on MRI, transferred to [facility] for endoscopy, further workup.    -monitor CMP, CBC  -coags, type and screen pre-procedure  -endoscopy with Dr. Bolton, gastroenterology tomorrow  -EKG for pre-procedure baseline, CXR  -regular diet  -IVF after midnight  -NPO after midnight  -DVT ppx  -will discuss with Dr. Aguilar

## 2018-06-05 NOTE — ACUTE INTERFACILITY TRANSFER NOTE - PLAN OF CARE
Patient is now being transferred to [facility] for EUS/ Patient came with complain of painless jaundice. had MRCP done that showed abrupt transition within the pancreatic head, there appears to be a 1.4 cm enhancing mass. Patient is now being transferred to [facility] For EUS

## 2018-06-06 ENCOUNTER — RESULT REVIEW (OUTPATIENT)
Age: 58
End: 2018-06-06

## 2018-06-06 LAB
ALBUMIN SERPL ELPH-MCNC: 2.3 G/DL — LOW (ref 3.3–5)
ALBUMIN SERPL ELPH-MCNC: 2.4 G/DL — LOW (ref 3.3–5)
ALP SERPL-CCNC: 723 U/L — HIGH (ref 40–120)
ALP SERPL-CCNC: 752 U/L — HIGH (ref 40–120)
ALT FLD-CCNC: 73 U/L — HIGH (ref 4–33)
ALT FLD-CCNC: 90 U/L — HIGH (ref 4–33)
APTT BLD: 34.7 SEC — SIGNIFICANT CHANGE UP (ref 27.5–37.4)
AST SERPL-CCNC: 109 U/L — HIGH (ref 4–32)
AST SERPL-CCNC: 156 U/L — HIGH (ref 4–32)
BASE EXCESS BLDA CALC-SCNC: -5.5 MMOL/L — SIGNIFICANT CHANGE UP
BASE EXCESS BLDA CALC-SCNC: -7 MMOL/L — SIGNIFICANT CHANGE UP
BASE EXCESS BLDA CALC-SCNC: -9.2 MMOL/L — SIGNIFICANT CHANGE UP
BILIRUB SERPL-MCNC: 11.7 MG/DL — HIGH (ref 0.2–1.2)
BILIRUB SERPL-MCNC: 9.5 MG/DL — HIGH (ref 0.2–1.2)
BLD GP AB SCN SERPL QL: NEGATIVE — SIGNIFICANT CHANGE UP
BUN SERPL-MCNC: 4 MG/DL — LOW (ref 7–23)
BUN SERPL-MCNC: 5 MG/DL — LOW (ref 7–23)
CA-I BLDA-SCNC: 1.07 MMOL/L — LOW (ref 1.15–1.29)
CA-I BLDA-SCNC: 1.25 MMOL/L — SIGNIFICANT CHANGE UP (ref 1.15–1.29)
CALCIUM SERPL-MCNC: 7.6 MG/DL — LOW (ref 8.4–10.5)
CALCIUM SERPL-MCNC: 8.2 MG/DL — LOW (ref 8.4–10.5)
CHLORIDE BLDA-SCNC: SIGNIFICANT CHANGE UP MMOL/L (ref 96–108)
CHLORIDE SERPL-SCNC: 104 MMOL/L — SIGNIFICANT CHANGE UP (ref 98–107)
CHLORIDE SERPL-SCNC: 104 MMOL/L — SIGNIFICANT CHANGE UP (ref 98–107)
CO2 SERPL-SCNC: 16 MMOL/L — LOW (ref 22–31)
CO2 SERPL-SCNC: 23 MMOL/L — SIGNIFICANT CHANGE UP (ref 22–31)
CREAT SERPL-MCNC: 0.49 MG/DL — LOW (ref 0.5–1.3)
CREAT SERPL-MCNC: 0.49 MG/DL — LOW (ref 0.5–1.3)
GLUCOSE BLDA-MCNC: 120 MG/DL — HIGH (ref 70–99)
GLUCOSE BLDA-MCNC: 130 MG/DL — HIGH (ref 70–99)
GLUCOSE BLDA-MCNC: 168 MG/DL — HIGH (ref 70–99)
GLUCOSE SERPL-MCNC: 162 MG/DL — HIGH (ref 70–99)
GLUCOSE SERPL-MCNC: 98 MG/DL — SIGNIFICANT CHANGE UP (ref 70–99)
HCO3 BLDA-SCNC: 17 MMOL/L — LOW (ref 22–26)
HCO3 BLDA-SCNC: 19 MMOL/L — LOW (ref 22–26)
HCO3 BLDA-SCNC: 20 MMOL/L — LOW (ref 22–26)
HCT VFR BLD CALC: 21.9 % — LOW (ref 34.5–45)
HCT VFR BLD CALC: 23.4 % — LOW (ref 34.5–45)
HCT VFR BLDA CALC: 24.4 % — LOW (ref 34.5–46.5)
HCT VFR BLDA CALC: 26.2 % — LOW (ref 34.5–46.5)
HCT VFR BLDA CALC: 31.5 % — LOW (ref 34.5–46.5)
HGB BLD-MCNC: 7.6 G/DL — LOW (ref 11.5–15.5)
HGB BLD-MCNC: 7.7 G/DL — LOW (ref 11.5–15.5)
HGB BLDA-MCNC: 10.2 G/DL — LOW (ref 11.5–15.5)
HGB BLDA-MCNC: 7.8 G/DL — LOW (ref 11.5–15.5)
HGB BLDA-MCNC: 8.4 G/DL — LOW (ref 11.5–15.5)
INR BLD: 1.14 — SIGNIFICANT CHANGE UP (ref 0.88–1.17)
LACTATE BLDA-SCNC: 2.5 MMOL/L — HIGH (ref 0.5–2)
MCHC RBC-ENTMCNC: 27.8 PG — SIGNIFICANT CHANGE UP (ref 27–34)
MCHC RBC-ENTMCNC: 28.3 PG — SIGNIFICANT CHANGE UP (ref 27–34)
MCHC RBC-ENTMCNC: 32.9 % — SIGNIFICANT CHANGE UP (ref 32–36)
MCHC RBC-ENTMCNC: 34.7 % — SIGNIFICANT CHANGE UP (ref 32–36)
MCV RBC AUTO: 80.2 FL — SIGNIFICANT CHANGE UP (ref 80–100)
MCV RBC AUTO: 86 FL — SIGNIFICANT CHANGE UP (ref 80–100)
NRBC # FLD: 0 — SIGNIFICANT CHANGE UP
NRBC # FLD: 0 — SIGNIFICANT CHANGE UP
PCO2 BLDA: 23 MMHG — LOW (ref 32–48)
PCO2 BLDA: 37 MMHG — SIGNIFICANT CHANGE UP (ref 32–48)
PCO2 BLDA: 48 MMHG — SIGNIFICANT CHANGE UP (ref 32–48)
PH BLDA: 7.19 PH — CRITICAL LOW (ref 7.35–7.45)
PH BLDA: 7.31 PH — LOW (ref 7.35–7.45)
PH BLDA: 7.48 PH — HIGH (ref 7.35–7.45)
PLATELET # BLD AUTO: 285 K/UL — SIGNIFICANT CHANGE UP (ref 150–400)
PLATELET # BLD AUTO: 331 K/UL — SIGNIFICANT CHANGE UP (ref 150–400)
PMV BLD: 10.1 FL — SIGNIFICANT CHANGE UP (ref 7–13)
PMV BLD: 10.9 FL — SIGNIFICANT CHANGE UP (ref 7–13)
PO2 BLDA: 214 MMHG — HIGH (ref 83–108)
PO2 BLDA: 248 MMHG — HIGH (ref 83–108)
PO2 BLDA: 502 MMHG — HIGH (ref 83–108)
POTASSIUM BLDA-SCNC: 3.1 MMOL/L — LOW (ref 3.4–4.5)
POTASSIUM BLDA-SCNC: 3.2 MMOL/L — LOW (ref 3.4–4.5)
POTASSIUM BLDA-SCNC: 3.7 MMOL/L — SIGNIFICANT CHANGE UP (ref 3.4–4.5)
POTASSIUM SERPL-MCNC: 3.4 MMOL/L — LOW (ref 3.5–5.3)
POTASSIUM SERPL-MCNC: 3.9 MMOL/L — SIGNIFICANT CHANGE UP (ref 3.5–5.3)
POTASSIUM SERPL-SCNC: 3.4 MMOL/L — LOW (ref 3.5–5.3)
POTASSIUM SERPL-SCNC: 3.9 MMOL/L — SIGNIFICANT CHANGE UP (ref 3.5–5.3)
PROT SERPL-MCNC: 5 G/DL — LOW (ref 6–8.3)
PROT SERPL-MCNC: 5.2 G/DL — LOW (ref 6–8.3)
PROTHROM AB SERPL-ACNC: 13.1 SEC — SIGNIFICANT CHANGE UP (ref 9.8–13.1)
RBC # BLD: 2.72 M/UL — LOW (ref 3.8–5.2)
RBC # BLD: 2.73 M/UL — LOW (ref 3.8–5.2)
RBC # FLD: 21.2 % — HIGH (ref 10.3–14.5)
RBC # FLD: 21.2 % — HIGH (ref 10.3–14.5)
RH IG SCN BLD-IMP: POSITIVE — SIGNIFICANT CHANGE UP
SAO2 % BLDA: 100 % — HIGH (ref 95–99)
SAO2 % BLDA: 99.2 % — HIGH (ref 95–99)
SAO2 % BLDA: 99.6 % — HIGH (ref 95–99)
SODIUM BLDA-SCNC: 134 MMOL/L — LOW (ref 136–146)
SODIUM BLDA-SCNC: 134 MMOL/L — LOW (ref 136–146)
SODIUM BLDA-SCNC: 135 MMOL/L — LOW (ref 136–146)
SODIUM SERPL-SCNC: 135 MMOL/L — SIGNIFICANT CHANGE UP (ref 135–145)
SODIUM SERPL-SCNC: 137 MMOL/L — SIGNIFICANT CHANGE UP (ref 135–145)
WBC # BLD: 5.43 K/UL — SIGNIFICANT CHANGE UP (ref 3.8–10.5)
WBC # BLD: 7.56 K/UL — SIGNIFICANT CHANGE UP (ref 3.8–10.5)
WBC # FLD AUTO: 5.43 K/UL — SIGNIFICANT CHANGE UP (ref 3.8–10.5)
WBC # FLD AUTO: 7.56 K/UL — SIGNIFICANT CHANGE UP (ref 3.8–10.5)

## 2018-06-06 PROCEDURE — 43239 EGD BIOPSY SINGLE/MULTIPLE: CPT | Mod: GC

## 2018-06-06 PROCEDURE — 44050 REDUCE BOWEL OBSTRUCTION: CPT | Mod: 59

## 2018-06-06 PROCEDURE — 44900 I&D APPENDIX ABSCESS OPEN: CPT

## 2018-06-06 PROCEDURE — 88309 TISSUE EXAM BY PATHOLOGIST: CPT | Mod: 26

## 2018-06-06 PROCEDURE — 88300 SURGICAL PATH GROSS: CPT | Mod: 26,59

## 2018-06-06 PROCEDURE — 99254 IP/OBS CNSLTJ NEW/EST MOD 60: CPT | Mod: 25,GC

## 2018-06-06 PROCEDURE — 88305 TISSUE EXAM BY PATHOLOGIST: CPT | Mod: 26

## 2018-06-06 PROCEDURE — 44120 REMOVAL OF SMALL INTESTINE: CPT

## 2018-06-06 RX ORDER — SODIUM CHLORIDE 9 MG/ML
1000 INJECTION, SOLUTION INTRAVENOUS
Qty: 0 | Refills: 0 | Status: DISCONTINUED | OUTPATIENT
Start: 2018-06-06 | End: 2018-06-08

## 2018-06-06 RX ORDER — FENTANYL CITRATE 50 UG/ML
0.5 INJECTION INTRAVENOUS
Qty: 2500 | Refills: 0 | Status: DISCONTINUED | OUTPATIENT
Start: 2018-06-06 | End: 2018-06-07

## 2018-06-06 RX ORDER — PROPOFOL 10 MG/ML
10 INJECTION, EMULSION INTRAVENOUS
Qty: 500 | Refills: 0 | Status: DISCONTINUED | OUTPATIENT
Start: 2018-06-06 | End: 2018-06-07

## 2018-06-06 RX ADMIN — PANTOPRAZOLE SODIUM 40 MILLIGRAM(S): 20 TABLET, DELAYED RELEASE ORAL at 12:39

## 2018-06-06 RX ADMIN — ENOXAPARIN SODIUM 40 MILLIGRAM(S): 100 INJECTION SUBCUTANEOUS at 12:39

## 2018-06-06 RX ADMIN — DEXTROSE MONOHYDRATE, SODIUM CHLORIDE, AND POTASSIUM CHLORIDE 75 MILLILITER(S): 50; .745; 4.5 INJECTION, SOLUTION INTRAVENOUS at 12:40

## 2018-06-06 RX ADMIN — DEXTROSE MONOHYDRATE, SODIUM CHLORIDE, AND POTASSIUM CHLORIDE 75 MILLILITER(S): 50; .745; 4.5 INJECTION, SOLUTION INTRAVENOUS at 01:58

## 2018-06-06 NOTE — PROGRESS NOTE ADULT - ASSESSMENT
A/P: 58F with PMH of gastric cancer s/p partial gastrectomy with Billroth II reconstruction 6/2017, now with RUQ pain and jaundice, now with possible pancreatic head mass. Awaiting ERCP and EUS today.    -ERCP, EUS today  -IVF  -pain control  -NPO  -DVT ppx with lovenox

## 2018-06-06 NOTE — CONSULT NOTE ADULT - ATTENDING COMMENTS
Pt with history of gastric adenocarcinoma and status post resection with BII. Now with jaundice, dilated CBD and a 1.5 cm mass at or near the pancreatic head.    Plan: ERCP and EUS.

## 2018-06-06 NOTE — PROGRESS NOTE ADULT - SUBJECTIVE AND OBJECTIVE BOX
S: Patient complaining of increased pain in RUQ overnight, treated with oxycodone. NPO awaiting ERCP EUS today.    O:T(C): 36.8 (06-06-18 @ 08:03), Max: 37.7 (06-05-18 @ 19:46)  HR: 62 (06-06-18 @ 08:03) (62 - 82)  BP: 114/54 (06-06-18 @ 08:03) (82/57 - 130/61)  RR: 16 (06-06-18 @ 08:03) (14 - 18)  SpO2: 99% (06-06-18 @ 08:03) (98% - 100%)  Wt(kg): --  06-05 @ 07:01  -  06-06 @ 07:00  --------------------------------------------------------  IN:    dextrose 5% + sodium chloride 0.45% with potassium chloride 20 mEq/L: 525 mL    Oral Fluid: 240 mL  Total IN: 765 mL    OUT:    Voided: 400 mL  Total OUT: 400 mL    Total NET: 365 mL      Gen: NAD, icteric  Chest: breathing comfortably on room air  Abdomen: soft nondistended TTP in RUQ      LABS:                         7.6    5.43  )-----------( 285      ( 06 Jun 2018 05:34 )             21.9     06-06    137  |  104  |  4<L>  ----------------------------<  98  3.9   |  23  |  0.49<L>    Ca    8.2<L>      06 Jun 2018 05:34  Phos  2.7     06-05  Mg     2.3     06-05    TPro  5.2<L>  /  Alb  2.3<L>  /  TBili  9.5<H>  /  DBili  x   /  AST  109<H>  /  ALT  73<H>  /  AlkPhos  723<H>  06-06    PT/INR - ( 06 Jun 2018 05:34 )   PT: 13.1 SEC;   INR: 1.14          PTT - ( 06 Jun 2018 05:34 )  PTT:34.7 SEC      RADIOLOGY, EKG & ADDITIONAL TESTS: Reviewed.

## 2018-06-06 NOTE — CHART NOTE - NSCHARTNOTEFT_GEN_A_CORE
SURGICAL ONCOLOGY EVENT NOTE    General surgery called emergently to the endoscopy suite. During endoscopy and attempt at EUS/ERCP, visible perforation created in the patient's afferent limb. Significant amount of air evacuated from abdomen by the GI team with needle decompression after it was noted to have difficulty ventilating the patient.   On my arrival in the endoscopy suite, patient intubated and not difficult to ventilate as per anesthesia. Systolic BP in the low 100s with pushes of noreen. Anesthesia placed arterial line and large peripheral IV access. HR steady in the 70s-80s. Abdomen distended. Mathis placed with dark yellow urine output.   Plan discussed with Dr. Trent as well as Dr. Rogers and the operating room. Space was made available emergently and patient's son was consented. She was taken to the operating room for an exploratory laparotomy. SURGICAL ONCOLOGY EVENT NOTE    General surgery called emergently to the endoscopy suite. During endoscopy and attempt at EUS/ERCP, visible perforation created in the patient's afferent limb. Significant amount of air evacuated from abdomen by the GI team with needle decompression after it was noted to have difficulty ventilating the patient.   On my arrival in the endoscopy suite, patient intubated and not difficult to ventilate as per anesthesia. Systolic BP in the low 100s with pushes of noreen. Anesthesia placed arterial line and large peripheral IV access. HR steady in the 70s-80s. Abdomen distended. Mathis placed with dark yellow urine output.   Plan discussed with Dr. Trent as well as Dr. Rogers and the operating room. Space was made available emergently and patient's son was consented. She was taken to the operating room for an exploratory laparotomy.    Attending addendum  Spoke with surgical team and gastroenterology team.  Large iatrogenic perforation of small bowel, unable to control endoscopically.  Consent was obtained for exlap, possible bowel resection, possible ostomy.

## 2018-06-06 NOTE — PROGRESS NOTE ADULT - SUBJECTIVE AND OBJECTIVE BOX
Vital Signs Last 24 Hrs  T(C): 36.8 (06 Jun 2018 08:03), Max: 37.7 (05 Jun 2018 19:46)  T(F): 98.2 (06 Jun 2018 08:03), Max: 99.9 (05 Jun 2018 19:46)  HR: 62 (06 Jun 2018 08:03) (62 - 82)  BP: 114/54 (06 Jun 2018 08:03) (82/57 - 130/61)  BP(mean): --  RR: 16 (06 Jun 2018 08:03) (14 - 18)  SpO2: 99% (06 Jun 2018 08:03) (98% - 100%)    I&O's Detail    05 Jun 2018 07:01  -  06 Jun 2018 07:00  --------------------------------------------------------  IN:    dextrose 5% + sodium chloride 0.45% with potassium chloride 20 mEq/L: 525 mL    Oral Fluid: 240 mL  Total IN: 765 mL    OUT:    Voided: 400 mL  Total OUT: 400 mL    Total NET: 365 mL                                7.6    5.43  )-----------( 285      ( 06 Jun 2018 05:34 )             21.9       06-06    137  |  104  |  4<L>  ----------------------------<  98  3.9   |  23  |  0.49<L>    Ca    8.2<L>      06 Jun 2018 05:34  Phos  2.7     06-05  Mg     2.3     06-05    TPro  5.2<L>  /  Alb  2.3<L>  /  TBili  9.5<H>  /  DBili  x   /  AST  109<H>  /  ALT  73<H>  /  AlkPhos  723<H>  06-06      PT/INR - ( 06 Jun 2018 05:34 )   PT: 13.1 SEC;   INR: 1.14          PTT - ( 06 Jun 2018 05:34 )  PTT:34.7 SEC    No abdominal tenderness    PLAN:  ERCP and EUS with biopsy today

## 2018-06-06 NOTE — CONSULT NOTE ADULT - SUBJECTIVE AND OBJECTIVE BOX
Chief Complaint:  Patient is a 58y old  Female who presents with a chief complaint of Painless jaundice (05 Jun 2018 14:49)      HPI: 58F with gastric adenocarcinoma (diagnosed 6/2017) s/p distal gastrectomy with Billroth II, on chemotherapy, who presents with several days of jaundice and persistent RUQ abdominal pain. She denies fever, chills, nausea, vomiting, melena, hematochezia, hematemesis.     She was transferred from [facility] after cross sectional imaging revealed dilated CBD and a 1.4 cm enhancing pancreatic head mass.     Of note, patient was hospitalized in May 2018 for free air near prior surgical site/duodenal stump. She was managed conservatively at that time.     Allergies:  No Known Allergies      Home Medications:  pantoprazole 40 mg oral delayed release tablet: 1 tab(s) orally 2 times a day (25 May 2018 15:46)      Hospital Medications:  dextrose 5% + sodium chloride 0.45% with potassium chloride 20 mEq/L 1000 milliLiter(s) IV Continuous <Continuous>  enoxaparin Injectable 40 milliGRAM(s) SubCutaneous daily  oxyCODONE    IR 5 milliGRAM(s) Oral every 4 hours PRN  oxyCODONE    IR 10 milliGRAM(s) Oral every 4 hours PRN  pantoprazole  Injectable 40 milliGRAM(s) IV Push daily      PMHX/PSHX:  Gastric cancer  Malignant neoplasm of stomach, unspecified location  No pertinent past medical history  S/P partial gastrectomy  No significant past surgical history      Family history:  No pertinent family history in first degree relatives      Social History: As above    Review of systems: Negative, except as otherwise noted above      PHYSICAL EXAM:   Vital Signs:  Vital Signs Last 24 Hrs  T(C): 36.8 (06 Jun 2018 05:50), Max: 37.7 (05 Jun 2018 19:46)  T(F): 98.2 (06 Jun 2018 05:50), Max: 99.9 (05 Jun 2018 19:46)  HR: 64 (06 Jun 2018 05:50) (64 - 82)  BP: 122/49 (06 Jun 2018 05:50) (82/57 - 130/61)  BP(mean): --  RR: 18 (06 Jun 2018 05:50) (14 - 18)  SpO2: 100% (06 Jun 2018 05:50) (98% - 100%)  Daily Height in cm: 160.02 (05 Jun 2018 17:41)    Daily     GENERAL:  No acute distress  HEENT:  Icteric, no thrush  CHEST:  Non-labored breathing, lungs clear b/l  HEART:  +s1, s2 heart sounds, no murmurs  ABDOMEN:  Soft, nontender, no rebound, no guarding  EXTREMITIES:  warm and well perfused, no edema  SKIN:  +Jaundice  NEURO:  Awake, interactive, following commands        LABS:  CBC Full  -  ( 05 Jun 2018 07:36 )  WBC Count : 5.9 K/uL  Hemoglobin : 7.6 g/dL  Hematocrit : 24.8 %  Platelet Count - Automated : 262 K/uL  Mean Cell Volume : 91.9 fl  Auto Neutrophil # : 3.7 K/uL  Auto Neutrophil % : 63.3 %    06-05 @ 07:36  Na 140 mmol/L  K 3.3 mmol/L  Cl 108 mmol/L  CO2 21 mmol/L  BUN 4 mg/dL  Creat 0.40 mg/dL  Glucose 104 mg/dL  Ca 8.2 mg/dL    Total protein 5.3 g/dL  Albumin 1.9 g/dL  T bili 9.0 mg/dL  Alk phos 752 U/L   U/L  ALT 93 U/L DA    06-03 @ 17:20  T bili 8.1 mg/dL  Alk phos 784 U/L   U/L   U/L DA          Imaging:    < from: MR MRCP w/wo IV Cont (06.04.18 @ 16:23) >    EXAM:  MR MRCP [facility] IC                            *** ADDENDUM 06/05/2018  ***    No evidence for gallstones. Layering sludge in the gallbladder. Small   cystic structure at the gallbladder fundal wall, suggestive of   adenomyomatosis.      *** END OF ADDENDUM 06/05/2018  ***      PROCEDURE DATE:  06/04/2018          INTERPRETATION:  Abdominal MR without and with IV contrast including MRCP    Indication: Painless jaundice.    Technique: Utilizing a 1.5 Emilee high-field magnet, multiplanar   multisequence MR images of the abdomen are acquired without and with IV   contrast (5.5 cc Gadavist administered, 4.5 cc discarded), supplemented   by thin and thick slab MRCP images. Postcontrast images are acquired   dynamically.    Comparison: No priorabdominal MR is available for comparison.    Findings: There is dilatation of the intrahepatic biliary ducts. The   common hepatic duct is also dilated measuring 1.5 cm in caliber with an   abrupt transition; in the area of the abrupt transition within the   pancreatic head, there appears to be a 1.4 cm enhancing mass which is   better seen on delayed postcontrast images, suspicious for an obstructive   cholangiocarcinoma. The distal common bile duct maintains normal caliber   at 6 mm. The main pancreatic duct maintains normal caliber without   dilatation. The SMV and portal veins do not appear to be encased.    A few hepatic cysts are seen measuring up to 1.7 cm. The spleen,   adrenals, and the left kidney appear unremarkable. There is a 1.6cm   right renal.     Mild perisplenic ascites. No evidence for an enlarged lymph node.    Impression: Dilatation of the intrahepatic biliary ducts. The common   hepatic duct is also dilated measuring 1.5 cm in caliber with an abrupt   transition; inthe area of the abrupt transition within the pancreatic   head, there appears to be a 1.4 cm enhancing mass which is better seen on   delayed postcontrast images, suspicious for an obstructive   cholangiocarcinoma. A pancreatic head cancer is also inthe differential   consideration. The distal common bile duct maintains normal caliber at 6   mm. The main pancreatic duct maintains normal caliber without dilatation.   The SMV and portal veins do not appear to be encased.    Mild ascites.    Other findings as above.      ***Please see the addendum at the top of this report. It may contain   additional important information or changes.****          SHIVANI MITCHELL M.D., ATTENDING RADIOLOGIST  This document has been electronically signed. Jun 4 2018 5:01PM  Addend:  SHIVANI MITCHELL M.D., ATTENDING RADIOLOGIST  This addendum was electronically signed on: Jun 5 2018 12:16PM.          < end of copied text >

## 2018-06-06 NOTE — BRIEF OPERATIVE NOTE - PROCEDURE
<<-----Click on this checkbox to enter Procedure Exploratory laparotomy  06/06/2018    Active  [id number]

## 2018-06-06 NOTE — BRIEF OPERATIVE NOTE - OPERATION/FINDINGS
Midline incision was made and abdomen carefully entered. Dense adhesions of small bowel noted underneath the midline scar which were meticulously lysed. A large 5 cm defect of the small bowel was identified with a bear claw clasped on one edge of defect. The small bowel was run and this was determined to be distal to the GJ anastomosis. Small bowel was visualized from the LOT to the GJ, and then from the GJ to the SB injury. SB was resected and anastomosed in a side-to-side, functional end to end fashion. The abdomen was then washed out with 7L of saline until clear. Fluid not noted to be bilious, only bloody.

## 2018-06-07 LAB
ALBUMIN SERPL ELPH-MCNC: 1.9 G/DL — LOW (ref 3.3–5)
ALP SERPL-CCNC: 557 U/L — HIGH (ref 40–120)
ALT FLD-CCNC: 68 U/L — HIGH (ref 4–33)
APTT BLD: 35 SEC — SIGNIFICANT CHANGE UP (ref 27.5–37.4)
AST SERPL-CCNC: 114 U/L — HIGH (ref 4–32)
BASE EXCESS BLDA CALC-SCNC: -4.7 MMOL/L — SIGNIFICANT CHANGE UP
BASE EXCESS BLDA CALC-SCNC: -5.9 MMOL/L — SIGNIFICANT CHANGE UP
BASE EXCESS BLDA CALC-SCNC: -6.8 MMOL/L — SIGNIFICANT CHANGE UP
BILIRUB DIRECT SERPL-MCNC: 9.6 MG/DL — HIGH (ref 0.1–0.2)
BILIRUB SERPL-MCNC: 10.4 MG/DL — HIGH (ref 0.2–1.2)
BUN SERPL-MCNC: 5 MG/DL — LOW (ref 7–23)
BUN SERPL-MCNC: 6 MG/DL — LOW (ref 7–23)
BUN SERPL-MCNC: 8 MG/DL — SIGNIFICANT CHANGE UP (ref 7–23)
CA-I BLDA-SCNC: 1.16 MMOL/L — SIGNIFICANT CHANGE UP (ref 1.15–1.29)
CA-I BLDA-SCNC: 1.18 MMOL/L — SIGNIFICANT CHANGE UP (ref 1.15–1.29)
CA-I BLDA-SCNC: 1.22 MMOL/L — SIGNIFICANT CHANGE UP (ref 1.15–1.29)
CALCIUM SERPL-MCNC: 7.5 MG/DL — LOW (ref 8.4–10.5)
CALCIUM SERPL-MCNC: 7.5 MG/DL — LOW (ref 8.4–10.5)
CALCIUM SERPL-MCNC: 7.7 MG/DL — LOW (ref 8.4–10.5)
CHLORIDE SERPL-SCNC: 105 MMOL/L — SIGNIFICANT CHANGE UP (ref 98–107)
CHLORIDE SERPL-SCNC: 107 MMOL/L — SIGNIFICANT CHANGE UP (ref 98–107)
CHLORIDE SERPL-SCNC: 107 MMOL/L — SIGNIFICANT CHANGE UP (ref 98–107)
CO2 SERPL-SCNC: 18 MMOL/L — LOW (ref 22–31)
CO2 SERPL-SCNC: 18 MMOL/L — LOW (ref 22–31)
CO2 SERPL-SCNC: 19 MMOL/L — LOW (ref 22–31)
CREAT SERPL-MCNC: 0.41 MG/DL — LOW (ref 0.5–1.3)
CREAT SERPL-MCNC: 0.51 MG/DL — SIGNIFICANT CHANGE UP (ref 0.5–1.3)
CREAT SERPL-MCNC: 0.6 MG/DL — SIGNIFICANT CHANGE UP (ref 0.5–1.3)
FIBRINOGEN PPP-MCNC: 518 MG/DL — HIGH (ref 310–510)
GLUCOSE BLDA-MCNC: 105 MG/DL — HIGH (ref 70–99)
GLUCOSE BLDA-MCNC: 119 MG/DL — HIGH (ref 70–99)
GLUCOSE BLDA-MCNC: 94 MG/DL — SIGNIFICANT CHANGE UP (ref 70–99)
GLUCOSE SERPL-MCNC: 111 MG/DL — HIGH (ref 70–99)
GLUCOSE SERPL-MCNC: 122 MG/DL — HIGH (ref 70–99)
GLUCOSE SERPL-MCNC: 94 MG/DL — SIGNIFICANT CHANGE UP (ref 70–99)
HCO3 BLDA-SCNC: 19 MMOL/L — LOW (ref 22–26)
HCO3 BLDA-SCNC: 20 MMOL/L — LOW (ref 22–26)
HCO3 BLDA-SCNC: 21 MMOL/L — LOW (ref 22–26)
HCT VFR BLD CALC: 20.9 % — CRITICAL LOW (ref 34.5–45)
HCT VFR BLD CALC: 22.3 % — LOW (ref 34.5–45)
HCT VFR BLD CALC: 22.9 % — LOW (ref 34.5–45)
HCT VFR BLD CALC: 24.7 % — LOW (ref 34.5–45)
HCT VFR BLDA CALC: 22.4 % — LOW (ref 34.5–46.5)
HCT VFR BLDA CALC: 24.6 % — LOW (ref 34.5–46.5)
HCT VFR BLDA CALC: 25.8 % — LOW (ref 34.5–46.5)
HGB BLD-MCNC: 7.3 G/DL — LOW (ref 11.5–15.5)
HGB BLD-MCNC: 7.7 G/DL — LOW (ref 11.5–15.5)
HGB BLD-MCNC: 7.7 G/DL — LOW (ref 11.5–15.5)
HGB BLD-MCNC: 8.6 G/DL — LOW (ref 11.5–15.5)
HGB BLDA-MCNC: 7.2 G/DL — LOW (ref 11.5–15.5)
HGB BLDA-MCNC: 7.9 G/DL — LOW (ref 11.5–15.5)
HGB BLDA-MCNC: 8.3 G/DL — LOW (ref 11.5–15.5)
INR BLD: 1.23 — HIGH (ref 0.88–1.17)
LACTATE BLDA-SCNC: 1.2 MMOL/L — SIGNIFICANT CHANGE UP (ref 0.5–2)
LACTATE BLDA-SCNC: 1.3 MMOL/L — SIGNIFICANT CHANGE UP (ref 0.5–2)
LACTATE BLDA-SCNC: 1.3 MMOL/L — SIGNIFICANT CHANGE UP (ref 0.5–2)
MAGNESIUM SERPL-MCNC: 1.7 MG/DL — SIGNIFICANT CHANGE UP (ref 1.6–2.6)
MAGNESIUM SERPL-MCNC: 2.2 MG/DL — SIGNIFICANT CHANGE UP (ref 1.6–2.6)
MAGNESIUM SERPL-MCNC: 2.6 MG/DL — SIGNIFICANT CHANGE UP (ref 1.6–2.6)
MCHC RBC-ENTMCNC: 28 PG — SIGNIFICANT CHANGE UP (ref 27–34)
MCHC RBC-ENTMCNC: 28.1 PG — SIGNIFICANT CHANGE UP (ref 27–34)
MCHC RBC-ENTMCNC: 28.7 PG — SIGNIFICANT CHANGE UP (ref 27–34)
MCHC RBC-ENTMCNC: 29.4 PG — SIGNIFICANT CHANGE UP (ref 27–34)
MCHC RBC-ENTMCNC: 33.6 % — SIGNIFICANT CHANGE UP (ref 32–36)
MCHC RBC-ENTMCNC: 34.5 % — SIGNIFICANT CHANGE UP (ref 32–36)
MCHC RBC-ENTMCNC: 34.8 % — SIGNIFICANT CHANGE UP (ref 32–36)
MCHC RBC-ENTMCNC: 34.9 % — SIGNIFICANT CHANGE UP (ref 32–36)
MCV RBC AUTO: 80.4 FL — SIGNIFICANT CHANGE UP (ref 80–100)
MCV RBC AUTO: 83.2 FL — SIGNIFICANT CHANGE UP (ref 80–100)
MCV RBC AUTO: 83.3 FL — SIGNIFICANT CHANGE UP (ref 80–100)
MCV RBC AUTO: 84.3 FL — SIGNIFICANT CHANGE UP (ref 80–100)
NRBC # FLD: 0 — SIGNIFICANT CHANGE UP
PCO2 BLDA: 27 MMHG — LOW (ref 32–48)
PCO2 BLDA: 35 MMHG — SIGNIFICANT CHANGE UP (ref 32–48)
PCO2 BLDA: 35 MMHG — SIGNIFICANT CHANGE UP (ref 32–48)
PH BLDA: 7.33 PH — LOW (ref 7.35–7.45)
PH BLDA: 7.35 PH — SIGNIFICANT CHANGE UP (ref 7.35–7.45)
PH BLDA: 7.45 PH — SIGNIFICANT CHANGE UP (ref 7.35–7.45)
PHOSPHATE SERPL-MCNC: 2.8 MG/DL — SIGNIFICANT CHANGE UP (ref 2.5–4.5)
PHOSPHATE SERPL-MCNC: 3.3 MG/DL — SIGNIFICANT CHANGE UP (ref 2.5–4.5)
PHOSPHATE SERPL-MCNC: 3.3 MG/DL — SIGNIFICANT CHANGE UP (ref 2.5–4.5)
PLATELET # BLD AUTO: 230 K/UL — SIGNIFICANT CHANGE UP (ref 150–400)
PLATELET # BLD AUTO: 254 K/UL — SIGNIFICANT CHANGE UP (ref 150–400)
PLATELET # BLD AUTO: 260 K/UL — SIGNIFICANT CHANGE UP (ref 150–400)
PLATELET # BLD AUTO: 265 K/UL — SIGNIFICANT CHANGE UP (ref 150–400)
PMV BLD: 10.2 FL — SIGNIFICANT CHANGE UP (ref 7–13)
PMV BLD: 10.2 FL — SIGNIFICANT CHANGE UP (ref 7–13)
PMV BLD: 10.3 FL — SIGNIFICANT CHANGE UP (ref 7–13)
PMV BLD: 10.4 FL — SIGNIFICANT CHANGE UP (ref 7–13)
PO2 BLDA: 157 MMHG — HIGH (ref 83–108)
PO2 BLDA: 163 MMHG — HIGH (ref 83–108)
PO2 BLDA: 232 MMHG — HIGH (ref 83–108)
POTASSIUM BLDA-SCNC: 3.4 MMOL/L — SIGNIFICANT CHANGE UP (ref 3.4–4.5)
POTASSIUM BLDA-SCNC: 3.5 MMOL/L — SIGNIFICANT CHANGE UP (ref 3.4–4.5)
POTASSIUM BLDA-SCNC: 4.1 MMOL/L — SIGNIFICANT CHANGE UP (ref 3.4–4.5)
POTASSIUM SERPL-MCNC: 3.7 MMOL/L — SIGNIFICANT CHANGE UP (ref 3.5–5.3)
POTASSIUM SERPL-MCNC: 3.8 MMOL/L — SIGNIFICANT CHANGE UP (ref 3.5–5.3)
POTASSIUM SERPL-MCNC: 4.2 MMOL/L — SIGNIFICANT CHANGE UP (ref 3.5–5.3)
POTASSIUM SERPL-SCNC: 3.7 MMOL/L — SIGNIFICANT CHANGE UP (ref 3.5–5.3)
POTASSIUM SERPL-SCNC: 3.8 MMOL/L — SIGNIFICANT CHANGE UP (ref 3.5–5.3)
POTASSIUM SERPL-SCNC: 4.2 MMOL/L — SIGNIFICANT CHANGE UP (ref 3.5–5.3)
PROT SERPL-MCNC: 4.3 G/DL — LOW (ref 6–8.3)
PROTHROM AB SERPL-ACNC: 14.2 SEC — HIGH (ref 9.8–13.1)
RBC # BLD: 2.6 M/UL — LOW (ref 3.8–5.2)
RBC # BLD: 2.68 M/UL — LOW (ref 3.8–5.2)
RBC # BLD: 2.75 M/UL — LOW (ref 3.8–5.2)
RBC # BLD: 2.93 M/UL — LOW (ref 3.8–5.2)
RBC # FLD: 19.1 % — HIGH (ref 10.3–14.5)
RBC # FLD: 19.5 % — HIGH (ref 10.3–14.5)
RBC # FLD: 19.9 % — HIGH (ref 10.3–14.5)
RBC # FLD: 19.9 % — HIGH (ref 10.3–14.5)
SAO2 % BLDA: 99.4 % — HIGH (ref 95–99)
SAO2 % BLDA: 99.5 % — HIGH (ref 95–99)
SAO2 % BLDA: 99.8 % — HIGH (ref 95–99)
SODIUM BLDA-SCNC: 132 MMOL/L — LOW (ref 136–146)
SODIUM BLDA-SCNC: 134 MMOL/L — LOW (ref 136–146)
SODIUM BLDA-SCNC: 135 MMOL/L — LOW (ref 136–146)
SODIUM SERPL-SCNC: 135 MMOL/L — SIGNIFICANT CHANGE UP (ref 135–145)
SODIUM SERPL-SCNC: 135 MMOL/L — SIGNIFICANT CHANGE UP (ref 135–145)
SODIUM SERPL-SCNC: 137 MMOL/L — SIGNIFICANT CHANGE UP (ref 135–145)
WBC # BLD: 6.66 K/UL — SIGNIFICANT CHANGE UP (ref 3.8–10.5)
WBC # BLD: 8.78 K/UL — SIGNIFICANT CHANGE UP (ref 3.8–10.5)
WBC # BLD: 9.29 K/UL — SIGNIFICANT CHANGE UP (ref 3.8–10.5)
WBC # BLD: 9.68 K/UL — SIGNIFICANT CHANGE UP (ref 3.8–10.5)
WBC # FLD AUTO: 6.66 K/UL — SIGNIFICANT CHANGE UP (ref 3.8–10.5)
WBC # FLD AUTO: 8.78 K/UL — SIGNIFICANT CHANGE UP (ref 3.8–10.5)
WBC # FLD AUTO: 9.29 K/UL — SIGNIFICANT CHANGE UP (ref 3.8–10.5)
WBC # FLD AUTO: 9.68 K/UL — SIGNIFICANT CHANGE UP (ref 3.8–10.5)

## 2018-06-07 PROCEDURE — 71045 X-RAY EXAM CHEST 1 VIEW: CPT | Mod: 26

## 2018-06-07 RX ORDER — METRONIDAZOLE 500 MG
500 TABLET ORAL EVERY 8 HOURS
Qty: 0 | Refills: 0 | Status: DISCONTINUED | OUTPATIENT
Start: 2018-06-07 | End: 2018-06-16

## 2018-06-07 RX ORDER — MAGNESIUM SULFATE 500 MG/ML
2 VIAL (ML) INJECTION ONCE
Qty: 0 | Refills: 0 | Status: COMPLETED | OUTPATIENT
Start: 2018-06-07 | End: 2018-06-07

## 2018-06-07 RX ORDER — HYDROMORPHONE HYDROCHLORIDE 2 MG/ML
0.5 INJECTION INTRAMUSCULAR; INTRAVENOUS; SUBCUTANEOUS EVERY 4 HOURS
Qty: 0 | Refills: 0 | Status: DISCONTINUED | OUTPATIENT
Start: 2018-06-07 | End: 2018-06-14

## 2018-06-07 RX ORDER — SODIUM CHLORIDE 9 MG/ML
1000 INJECTION, SOLUTION INTRAVENOUS ONCE
Qty: 0 | Refills: 0 | Status: COMPLETED | OUTPATIENT
Start: 2018-06-07 | End: 2018-06-07

## 2018-06-07 RX ORDER — PROPOFOL 10 MG/ML
10 INJECTION, EMULSION INTRAVENOUS
Qty: 1000 | Refills: 0 | Status: DISCONTINUED | OUTPATIENT
Start: 2018-06-07 | End: 2018-06-07

## 2018-06-07 RX ORDER — CHLORHEXIDINE GLUCONATE 213 G/1000ML
1 SOLUTION TOPICAL
Qty: 0 | Refills: 0 | Status: DISCONTINUED | OUTPATIENT
Start: 2018-06-07 | End: 2018-06-11

## 2018-06-07 RX ORDER — PANTOPRAZOLE SODIUM 20 MG/1
40 TABLET, DELAYED RELEASE ORAL
Qty: 0 | Refills: 0 | Status: DISCONTINUED | OUTPATIENT
Start: 2018-06-07 | End: 2018-06-10

## 2018-06-07 RX ORDER — CIPROFLOXACIN LACTATE 400MG/40ML
400 VIAL (ML) INTRAVENOUS EVERY 12 HOURS
Qty: 0 | Refills: 0 | Status: DISCONTINUED | OUTPATIENT
Start: 2018-06-07 | End: 2018-06-16

## 2018-06-07 RX ORDER — POTASSIUM CHLORIDE 20 MEQ
10 PACKET (EA) ORAL ONCE
Qty: 0 | Refills: 0 | Status: COMPLETED | OUTPATIENT
Start: 2018-06-07 | End: 2018-06-07

## 2018-06-07 RX ORDER — HYDROMORPHONE HYDROCHLORIDE 2 MG/ML
1 INJECTION INTRAMUSCULAR; INTRAVENOUS; SUBCUTANEOUS EVERY 4 HOURS
Qty: 0 | Refills: 0 | Status: DISCONTINUED | OUTPATIENT
Start: 2018-06-07 | End: 2018-06-14

## 2018-06-07 RX ADMIN — PANTOPRAZOLE SODIUM 40 MILLIGRAM(S): 20 TABLET, DELAYED RELEASE ORAL at 05:10

## 2018-06-07 RX ADMIN — Medication 100 MILLIGRAM(S): at 05:10

## 2018-06-07 RX ADMIN — FENTANYL CITRATE 2.7 MICROGRAM(S)/KG/HR: 50 INJECTION INTRAVENOUS at 00:29

## 2018-06-07 RX ADMIN — ENOXAPARIN SODIUM 40 MILLIGRAM(S): 100 INJECTION SUBCUTANEOUS at 13:01

## 2018-06-07 RX ADMIN — SODIUM CHLORIDE 125 MILLILITER(S): 9 INJECTION, SOLUTION INTRAVENOUS at 19:26

## 2018-06-07 RX ADMIN — PANTOPRAZOLE SODIUM 40 MILLIGRAM(S): 20 TABLET, DELAYED RELEASE ORAL at 18:11

## 2018-06-07 RX ADMIN — SODIUM CHLORIDE 75 MILLILITER(S): 9 INJECTION, SOLUTION INTRAVENOUS at 00:29

## 2018-06-07 RX ADMIN — FENTANYL CITRATE 0.5 MICROGRAM(S)/KG/HR: 50 INJECTION INTRAVENOUS at 00:04

## 2018-06-07 RX ADMIN — Medication 200 MILLIGRAM(S): at 04:20

## 2018-06-07 RX ADMIN — Medication 100 MILLIEQUIVALENT(S): at 10:48

## 2018-06-07 RX ADMIN — Medication 100 MILLIGRAM(S): at 22:01

## 2018-06-07 RX ADMIN — PROPOFOL 3.24 MICROGRAM(S)/KG/MIN: 10 INJECTION, EMULSION INTRAVENOUS at 01:02

## 2018-06-07 RX ADMIN — SODIUM CHLORIDE 1000 MILLILITER(S): 9 INJECTION, SOLUTION INTRAVENOUS at 12:12

## 2018-06-07 RX ADMIN — Medication 200 MILLIGRAM(S): at 16:11

## 2018-06-07 RX ADMIN — Medication 100 MILLIGRAM(S): at 14:01

## 2018-06-07 RX ADMIN — SODIUM CHLORIDE 2000 MILLILITER(S): 9 INJECTION, SOLUTION INTRAVENOUS at 16:11

## 2018-06-07 RX ADMIN — Medication 50 GRAM(S): at 01:02

## 2018-06-07 RX ADMIN — SODIUM CHLORIDE 2000 MILLILITER(S): 9 INJECTION, SOLUTION INTRAVENOUS at 21:09

## 2018-06-07 NOTE — PROGRESS NOTE ADULT - SUBJECTIVE AND OBJECTIVE BOX
Pre-Interventional Radiology Procedure Note    58y    Female    Procedure: PTC    Diagnosis/Indication: Patient is a 58y old  Female who presents with a chief complaint of Painless jaundice (05 Jun 2018 14:49)      Interventional Radiology Attending Physician: Jose Diane Attending Physician: Miley    PAST MEDICAL & SURGICAL HISTORY:  Gastric cancer  Malignant neoplasm of stomach, unspecified location  S/P partial gastrectomy: distal gastrectomy with Billroth II reconstruction, D2 lymphadenectomy, feeding jejunostomy tube placement 6/23/17       CBC Full  -  ( 07 Jun 2018 05:45 )  WBC Count : 9.68 K/uL  Hemoglobin : 7.3 g/dL  Hematocrit : 20.9 %  Platelet Count - Automated : 265 K/uL  Mean Cell Volume : 80.4 fL  Mean Cell Hemoglobin : 28.1 pg  Mean Cell Hemoglobin Concentration : 34.9 %  Auto Neutrophil # : x  Auto Lymphocyte # : x  Auto Monocyte # : x  Auto Eosinophil # : x  Auto Basophil # : x  Auto Neutrophil % : x  Auto Lymphocyte % : x  Auto Monocyte % : x  Auto Eosinophil % : x  Auto Basophil % : x    06-07    135  |  105  |  6<L>  ----------------------------<  122<H>  3.8   |  18<L>  |  0.51    Ca    7.5<L>      07 Jun 2018 05:45  Phos  3.3     06-07  Mg     2.6     06-07    TPro  4.3<L>  /  Alb  1.9<L>  /  TBili  10.4<H>  /  DBili  9.6<H>  /  AST  114<H>  /  ALT  68<H>  /  AlkPhos  557<H>  06-07    PT/INR - ( 07 Jun 2018 00:13 )   PT: 14.2 SEC;   INR: 1.23          PTT - ( 07 Jun 2018 00:13 )  PTT:35.0 SEC

## 2018-06-07 NOTE — PROGRESS NOTE ADULT - SUBJECTIVE AND OBJECTIVE BOX
Vital Signs Last 24 Hrs  T(C): 36.4 (07 Jun 2018 12:00), Max: 37.1 (06 Jun 2018 15:53)  T(F): 97.5 (07 Jun 2018 12:00), Max: 98.7 (06 Jun 2018 15:53)  HR: 70 (07 Jun 2018 14:00) (57 - 76)  BP: 102/53 (06 Jun 2018 23:34) (102/53 - 138/58)  BP(mean): 65 (06 Jun 2018 23:34) (65 - 65)  RR: 16 (07 Jun 2018 14:00) (10 - 16)  SpO2: 100% (07 Jun 2018 14:00) (97% - 100%)    I&O's Detail    06 Jun 2018 07:01  -  07 Jun 2018 07:00  --------------------------------------------------------  IN:    fentaNYL  Infusion: 18.9 mL    IV PiggyBack: 350 mL    lactated ringers.: 600 mL    propofol Infusion: 20.8 mL    propofol Infusion: 5 mL  Total IN: 994.7 mL    OUT:    Indwelling Catheter - Urethral: 115 mL    Nasoenteral Tube: 50 mL  Total OUT: 165 mL    Total NET: 829.7 mL      07 Jun 2018 07:01  -  07 Jun 2018 14:23  --------------------------------------------------------  IN:    fentaNYL  Infusion: 10.8 mL    IV PiggyBack: 1100 mL    lactated ringers.: 575 mL    Packed Red Blood Cells: 300 mL    propofol Infusion: 12.8 mL  Total IN: 1998.6 mL    OUT:    Indwelling Catheter - Urethral: 55 mL  Total OUT: 55 mL    Total NET: 1943.6 mL                                8.6    9.29  )-----------( 260      ( 07 Jun 2018 11:22 )             24.7       06-07    135  |  105  |  6<L>  ----------------------------<  122<H>  3.8   |  18<L>  |  0.51    Ca    7.5<L>      07 Jun 2018 05:45  Phos  3.3     06-07  Mg     2.6     06-07    TPro  4.3<L>  /  Alb  1.9<L>  /  TBili  10.4<H>  /  DBili  9.6<H>  /  AST  114<H>  /  ALT  68<H>  /  AlkPhos  557<H>  06-07      PT/INR - ( 07 Jun 2018 00:13 )   PT: 14.2 SEC;   INR: 1.23          PTT - ( 07 Jun 2018 00:13 )  PTT:35.0 SEC    Pt. extubated    PLAN:    PTC tomorrow  Continue N-G drainage

## 2018-06-07 NOTE — CONSULT NOTE ADULT - SUBJECTIVE AND OBJECTIVE BOX
SICU Consultation Note  =====================================================      HISTORY  58y Female  HPI:  Patient complains of 1 week of progressively worsening jaundice. Patient now endorses some pain in the right upper quadrant of her abdomen, which extends through her back. Two days ago, she was admitted to [facility] for work-up. She had a CT scan and MRCP. On MRCP she has a 1.4 cm mass in the pancreatic head obstructing the common hepatic duct. Transfer to [facility] was initiated in order to pursue an ERCP with gastroenterology. She denies nausea, vomiting, fever, chills.     Pertinent history: gastric cancer 6/2017 s/p partial gastrectomy with Billroth II reconstruction.    Pt had ERCP c/b perforation of afferent limb. Pt underwent emergent Ex-lap w/ resection of perforated bowel and stapled side to side functional end to end anastomosis. Pt remained intubated and was transferred to SICU off pressors.    Surgery Information: Ex-lap, extensive Tu[place], SBR, side to side anastomosis  Case Duration: 3hrs 	EBL: 200	IV Fluids: 3.8L in ERCP suite and OR	Blood Products: 1U PRBC	Urine Output: 200  Complications: None    Allergies:   PAST MEDICAL & SURGICAL HISTORY:  Gastric cancer  Malignant neoplasm of stomach, unspecified location  S/P partial gastrectomy: distal gastrectomy with Billroth II reconstruction, D2 lymphadenectomy, feeding jejunostomy tube placement 6/23/17    FAMILY HISTORY:  No pertinent family history in first degree relatives      SOCIAL HISTORY:  Unable to obtain    ADVANCE DIRECTIVES: Presumed Full Code    REVIEW OF SYSTEMS:   See HPI    CURRENT MEDICATIONS:   --------------------------------------------------------------------------------------  Neurologic Medications  fentaNYL   Infusion 0.5 MICROgram(s)/kG/Hr IV Continuous <Continuous>  oxyCODONE    IR 5 milliGRAM(s) Oral every 4 hours PRN Moderate Pain (4 - 6)  oxyCODONE    IR 10 milliGRAM(s) Oral every 4 hours PRN Severe Pain (7 - 10)  propofol Infusion 10 MICROgram(s)/kG/Min IV Continuous <Continuous>    Respiratory Medications    Cardiovascular Medications    Gastrointestinal Medications  lactated ringers. 1000 milliLiter(s) IV Continuous <Continuous>  pantoprazole  Injectable 40 milliGRAM(s) IV Push daily    Genitourinary Medications    Hematologic/Oncologic Medications  enoxaparin Injectable 40 milliGRAM(s) SubCutaneous daily    Antimicrobial/Immunologic Medications    Endocrine/Metabolic Medications    Topical/Other Medications    --------------------------------------------------------------------------------------    VITAL SIGNS, INS/OUTS (last 24 hours):  --------------------------------------------------------------------------------------  T(C): 35 (06-06-18 @ 23:34), Max: 37.1 (06-06-18 @ 15:53)  HR: 62 (06-07-18 @ 00:30) (60 - 75)  BP: 102/53 (06-06-18 @ 23:34) (102/53 - 138/58)  BP(mean): 65 (06-06-18 @ 23:34) (65 - 65)  ABP: 105/59 (06-07-18 @ 00:30) (101/58 - 139/80)  ABP(mean): 77 (06-07-18 @ 00:30) (74 - 104)  RR: 10 (06-07-18 @ 00:30) (10 - 18)  SpO2: 100% (06-07-18 @ 00:30) (95% - 100%)  Wt(kg): --  CVP(mm Hg): --  CI: --  CAPILLARY BLOOD GLUCOSE       N/A      06-05 @ 07:01  -  06-06 @ 07:00  --------------------------------------------------------  IN:    dextrose 5% + sodium chloride 0.45% with potassium chloride 20 mEq/L: 525 mL    Oral Fluid: 240 mL  Total IN: 765 mL    OUT:    Voided: 400 mL  Total OUT: 400 mL    Total NET: 365 mL      06-06 @ 07:01  -  06-07 @ 01:19  --------------------------------------------------------  IN:    fentaNYL  Infusion: 2.7 mL    IV PiggyBack: 50 mL    lactated ringers.: 75 mL    propofol Infusion: 5 mL  Total IN: 132.7 mL    OUT:    Indwelling Catheter - Urethral: 35 mL  Total OUT: 35 mL    Total NET: 97.7 mL        --------------------------------------------------------------------------------------    EXAM  NEUROLOGY  RASS: -2  	GCS:  3T  Exam: Sedated. No focal deficits    HEENT  Exam: Normocephalic, atraumatic, scleral icterus    RESPIRATORY  Exam: Lungs clear to auscultation, Normal expansion/effort.  Mechanical Ventilation: Mode: AC/ CMV (Assist Control/ Continuous Mandatory Ventilation), RR (machine): 10, TV (machine): 450, FiO2: 60, PEEP: 5, MAP: 7.7, PIP: 17    CARDIOVASCULAR  Exam: S1, S2.  Regular rate and rhythm.  Peripheral edema    GI/NUTRITION  Exam: Abdomen soft, Non-tender, Non-distended, incision c/d/i  Current Diet:  NPO/NGT    VASCULAR  Exam: Extremities warm, pink, well-perfused.  ***    MUSCULOSKELETAL  Exam: All extremities moving spontaneously without limitations.  ***    SKIN:  Exam: Good skin turgor, no skin breakdown.  ***    METABOLIC/FLUIDS/ELECTROLYTES  lactated ringers. 1000 milliLiter(s) IV Continuous <Continuous>      HEMATOLOGIC  [x] DVT Prophylaxis: enoxaparin Injectable 40 milliGRAM(s) SubCutaneous daily    Transfusions:	[] PRBC	[] Platelets		[] FFP	[] Cryoprecipitate    INFECTIOUS DISEASE  Antimicrobials/Immunologic Medications:    Day #  	of    ***    Tubes/Lines/Drains  ***  [x] Peripheral IV  [] Central Venous Line     	[] R	[] L	[] IJ	[] Fem	[] SC	Date Placed:   [] Arterial Line		[] R	[] L	[] Fem	[] Rad	[] Ax	Date Placed:   [] PICC:         	[] Midline		[] Mediport  [] Urinary Catheter		Date Placed:     LABS  --------------------------------------------------------------------------------------  ((Insert SICU Labs here))***  --------------------------------------------------------------------------------------    OTHER LABS    IMAGING RESULTS  Echo:   CT:   Xray:     ASSESSMENT:  58y Female ***    PLAN:  ***  Neurologic:   Respiratory:   Cardiovascular:   Gastrointestinal/Nutrition:   Renal/Genitourinary:   Hematologic:   Infectious Disease:   Tubes/Lines/Drains:   Endocrine:   Disposition:     --------------------------------------------------------------------------------------    Critical Care Diagnoses: SICU Consultation Note  =====================================================      HISTORY  58y Female  HPI:  Patient complains of 1 week of progressively worsening jaundice. Patient now endorses some pain in the right upper quadrant of her abdomen, which extends through her back. Two days ago, she was admitted to [facility] for work-up. She had a CT scan and MRCP. On MRCP she has a 1.4 cm mass in the pancreatic head obstructing the common hepatic duct. Transfer to [facility] was initiated in order to pursue an ERCP with gastroenterology. She denies nausea, vomiting, fever, chills.     Pertinent history: gastric cancer 6/2017 s/p partial gastrectomy with Billroth II reconstruction.    Pt had ERCP c/b perforation of afferent limb. Pt underwent emergent Ex-lap w/ resection of perforated bowel and stapled side to side functional end to end anastomosis. Pt remained intubated and was transferred to SICU off pressors.    Surgery Information: Ex-lap, extensive [place], SBR, side to side anastomosis  Case Duration: 3hrs 	EBL: 200	IV Fluids: 3.8L in ERCP suite and OR	Blood Products: 1U PRBC	Urine Output: 200  Complications: None    Allergies:   PAST MEDICAL & SURGICAL HISTORY:  Gastric cancer  Malignant neoplasm of stomach, unspecified location  S/P partial gastrectomy: distal gastrectomy with Billroth II reconstruction, D2 lymphadenectomy, feeding jejunostomy tube placement 6/23/17    FAMILY HISTORY:  No pertinent family history in first degree relatives      SOCIAL HISTORY:  Unable to obtain    ADVANCE DIRECTIVES: Presumed Full Code    REVIEW OF SYSTEMS:   See HPI    CURRENT MEDICATIONS:   --------------------------------------------------------------------------------------  Neurologic Medications  fentaNYL   Infusion 0.5 MICROgram(s)/kG/Hr IV Continuous <Continuous>  oxyCODONE    IR 5 milliGRAM(s) Oral every 4 hours PRN Moderate Pain (4 - 6)  oxyCODONE    IR 10 milliGRAM(s) Oral every 4 hours PRN Severe Pain (7 - 10)  propofol Infusion 10 MICROgram(s)/kG/Min IV Continuous <Continuous>    Respiratory Medications    Cardiovascular Medications    Gastrointestinal Medications  lactated ringers. 1000 milliLiter(s) IV Continuous <Continuous>  pantoprazole  Injectable 40 milliGRAM(s) IV Push daily    Genitourinary Medications    Hematologic/Oncologic Medications  enoxaparin Injectable 40 milliGRAM(s) SubCutaneous daily    Antimicrobial/Immunologic Medications    Endocrine/Metabolic Medications    Topical/Other Medications    --------------------------------------------------------------------------------------    VITAL SIGNS, INS/OUTS (last 24 hours):  --------------------------------------------------------------------------------------  T(C): 35 (06-06-18 @ 23:34), Max: 37.1 (06-06-18 @ 15:53)  HR: 62 (06-07-18 @ 00:30) (60 - 75)  BP: 102/53 (06-06-18 @ 23:34) (102/53 - 138/58)  BP(mean): 65 (06-06-18 @ 23:34) (65 - 65)  ABP: 105/59 (06-07-18 @ 00:30) (101/58 - 139/80)  ABP(mean): 77 (06-07-18 @ 00:30) (74 - 104)  RR: 10 (06-07-18 @ 00:30) (10 - 18)  SpO2: 100% (06-07-18 @ 00:30) (95% - 100%)  Wt(kg): --  CVP(mm Hg): --  CI: --  CAPILLARY BLOOD GLUCOSE       N/A      06-05 @ 07:01  -  06-06 @ 07:00  --------------------------------------------------------  IN:    dextrose 5% + sodium chloride 0.45% with potassium chloride 20 mEq/L: 525 mL    Oral Fluid: 240 mL  Total IN: 765 mL    OUT:    Voided: 400 mL  Total OUT: 400 mL    Total NET: 365 mL      06-06 @ 07:01  -  06-07 @ 01:19  --------------------------------------------------------  IN:    fentaNYL  Infusion: 2.7 mL    IV PiggyBack: 50 mL    lactated ringers.: 75 mL    propofol Infusion: 5 mL  Total IN: 132.7 mL    OUT:    Indwelling Catheter - Urethral: 35 mL  Total OUT: 35 mL    Total NET: 97.7 mL        --------------------------------------------------------------------------------------    EXAM  NEUROLOGY  RASS: -2  	GCS:  3T  Exam: Sedated. No focal deficits    HEENT  Exam: Normocephalic, atraumatic, scleral icterus    RESPIRATORY  Exam: Lungs clear to auscultation, Normal expansion/effort.  Mechanical Ventilation: Mode: AC/ CMV (Assist Control/ Continuous Mandatory Ventilation), RR (machine): 10, TV (machine): 450, FiO2: 60, PEEP: 5, MAP: 7.7, PIP: 17    CARDIOVASCULAR  Exam: S1, S2.  Regular rate and rhythm.  Peripheral edema    GI/NUTRITION  Exam: Abdomen soft, Non-tender, Non-distended, incision c/d/i  Current Diet:  NPO/NGT    VASCULAR  Exam: Extremities warm, jaundiced, well-perfused.    MUSCULOSKELETAL  Exam: All extremities moving spontaneously without limitations.    SKIN:  Exam: Good skin turgor, no skin breakdown.    METABOLIC/FLUIDS/ELECTROLYTES  lactated ringers. 1000 milliLiter(s) IV Continuous <Continuous>      HEMATOLOGIC  [x] DVT Prophylaxis: enoxaparin Injectable 40 milliGRAM(s) SubCutaneous daily    Transfusions:	[x] 1U PRBC	[] Platelets		[] FFP	[] Cryoprecipitate    INFECTIOUS DISEASE  Antimicrobials/Immunologic Medications:    Tubes/Lines/Drains  [x] Peripheral IV  [] Central Venous Line     	[] R	[] L	[] IJ	[] Fem	[] SC	Date Placed:   [x] Arterial Line		[] R	[x] L	[] Fem	[x] Rad	[] Ax	Date Placed:   [] PICC:         	[] Midline		[] Mediport  [x] Urinary Catheter		Date Placed: 6/6    LABS  --------------------------------------------------------------------------------------  CBC (06-07 @ 00:13)                              7.7<L>                         6.66    )----------------(  254        --    % Neutrophils, --    % Lymphocytes, ANC: --                                  22.9<L>              CBC (06-06 @ 19:55)                              7.7<L>                         7.56    )----------------(  331        --    % Neutrophils, --    % Lymphocytes, ANC: --                                  23.4<L>                BMP (06-07 @ 00:13)             137     |  107     |  5<L>  		Ca++ --      Ca 7.7<L>             ---------------------------------( 111<H>		Mg 1.7                3.7     |  18<L>   |  0.41<L>			Ph 2.8     BMP (06-06 @ 19:55)             135     |  104     |  5<L>  		Ca++ --      Ca 7.6<L>             ---------------------------------( 162<H>		Mg --                 3.4<L>  |  16<L>   |  0.49<L>			Ph --        LFTs (06-06 @ 19:55)      TPro 5.0<L> / Alb 2.4<L> / TBili 11.7<H> / DBili -- / <H> / ALT 90<H> / AlkPhos 752<H>  LFTs (06-06 @ 05:34)      TPro 5.2<L> / Alb 2.3<L> / TBili 9.5<H> / DBili -- / <H> / ALT 73<H> / AlkPhos 723<H>    Coags (06-07 @ 00:13)  aPTT 35.0 / INR 1.23<H> / PT 14.2<H>  Coags (06-06 @ 05:34)  aPTT 34.7 / INR 1.14 / PT 13.1    ABG (06-07 @ 00:13)     7.33<L> / 35 / 232<H> / 19<L> / -6.8 / 99.5<H>%     Lactate: 1.2    ABG (06-06 @ 22:36)     7.31<L> / 37 / 214<H> / 19<L> / -7.0 / 99.6<H>%     Lactate:         -> .Urine Clean Catch (Midstream) Culture (06-03 @ 21:40)     NG    NG    <10,000 CFU/ml  Normal Urogenital juanito present      --------------------------------------------------------------------------------------    OTHER LABS  ASSESSMENT:  58y Female w/ B2 s/p ERCP for obstructing pancreatic mass c/b afferent limb perforation s/p Ex-lap w/ SBR    PLAN:  Neuro:  - monitor mental status  - pain control w/ Fent gtt  - sedation w/ Propofol gtt    Resp:  - monitor resp status  - c/w mech vent, wean as tolerated  - F/u CXR  - Monitor ABG    CV:  - hemodynamically stable, not requiring pressor support  - q1hr VS    GI:  - NPO  - NGT to LCWS  - Protonix BID for bloody NGT output    :  - monitor UOP  - replete lytes PRN  - LR @ 75cc/hr    ID:  - c/w Cipro and Flagyl for jejunal perf  - monitor WBC and temp    Heme:  - VTE ppx w/ Lovenox  - monitor CBC    Endo:  - no active issues at this time  - monitor BG on BMP    Dispo:  - SICU  - Full code    --------------------------------------------------------------------------------------    Critical Care Diagnoses:

## 2018-06-07 NOTE — CHART NOTE - NSCHARTNOTEFT_GEN_A_CORE
STATUS POST:  Exploratory laparotomy, resection of perforated bowel and side to side functional end to end anastamosis    POST OPERATIVE DAY #: 0     SUBJECTIVE: Pt seen and examined. Remains intubated and sedated post-operatively.     Vital Signs Last 24 Hrs  T(C): 35 (06 Jun 2018 23:34), Max: 37.1 (06 Jun 2018 15:53)  T(F): 95 (06 Jun 2018 23:34), Max: 98.7 (06 Jun 2018 15:53)  HR: 62 (07 Jun 2018 00:30) (60 - 75)  BP: 102/53 (06 Jun 2018 23:34) (102/53 - 138/58)  BP(mean): 65 (06 Jun 2018 23:34) (65 - 65)  RR: 10 (07 Jun 2018 00:30) (10 - 18)  SpO2: 100% (07 Jun 2018 00:30) (95% - 100%)  I&O's Summary    05 Jun 2018 07:01  -  06 Jun 2018 07:00  --------------------------------------------------------  IN: 765 mL / OUT: 400 mL / NET: 365 mL    06 Jun 2018 07:01  -  07 Jun 2018 01:55  --------------------------------------------------------  IN: 132.7 mL / OUT: 35 mL / NET: 97.7 mL      I&O's Detail    05 Jun 2018 07:01  -  06 Jun 2018 07:00  --------------------------------------------------------  IN:    dextrose 5% + sodium chloride 0.45% with potassium chloride 20 mEq/L: 525 mL    Oral Fluid: 240 mL  Total IN: 765 mL    OUT:    Voided: 400 mL  Total OUT: 400 mL    Total NET: 365 mL      06 Jun 2018 07:01  -  07 Jun 2018 01:55  --------------------------------------------------------  IN:    fentaNYL  Infusion: 2.7 mL    IV PiggyBack: 50 mL    lactated ringers.: 75 mL    propofol Infusion: 5 mL  Total IN: 132.7 mL    OUT:    Indwelling Catheter - Urethral: 35 mL  Total OUT: 35 mL    Total NET: 97.7 mL      MEDICATIONS  (STANDING):  ciprofloxacin   IVPB 400 milliGRAM(s) IV Intermittent every 12 hours  enoxaparin Injectable 40 milliGRAM(s) SubCutaneous daily  fentaNYL   Infusion 0.5 MICROgram(s)/kG/Hr (2.7 mL/Hr) IV Continuous <Continuous>  lactated ringers. 1000 milliLiter(s) (75 mL/Hr) IV Continuous <Continuous>  metroNIDAZOLE  IVPB 500 milliGRAM(s) IV Intermittent every 8 hours  pantoprazole  Injectable 40 milliGRAM(s) IV Push two times a day  propofol Infusion 10 MICROgram(s)/kG/Min (3.24 mL/Hr) IV Continuous <Continuous>    MEDICATIONS  (PRN):      LABS:                        7.7    6.66  )-----------( 254      ( 07 Jun 2018 00:13 )             22.9     06-07    137  |  107  |  5<L>  ----------------------------<  111<H>  3.7   |  18<L>  |  0.41<L>    Ca    7.7<L>      07 Jun 2018 00:13  Phos  2.8     06-07  Mg     1.7     06-07    TPro  5.0<L>  /  Alb  2.4<L>  /  TBili  11.7<H>  /  DBili  x   /  AST  156<H>  /  ALT  90<H>  /  AlkPhos  752<H>  06-06    PT/INR - ( 07 Jun 2018 00:13 )   PT: 14.2 SEC;   INR: 1.23          PTT - ( 07 Jun 2018 00:13 )  PTT:35.0 SEC      RADIOLOGY & ADDITIONAL STUDIES:    PHYSICAL EXAM:  Constitutional: sedated intubated  CV: RRR  Resp: Mode: AC/ CMV (Assist Control/ Continuous Mandatory Ventilation)  RR (machine): 10  TV (machine): 450  FiO2: 60  PEEP: 5  MAP: 7.7  PIP: 17  Gastrointestinal: softly distended, dressing c/d/i  Genitourinary: Mathis in place              A/P: 58F w/ jaundice 2/2 possible pancreatic head mass, s/p EUS/ERCP aborted 2/2 small bowel perforation taken emergently to OR for ex lap, resection of perforated bowel, side to side anastamosis POD 0. Pt remains intubated.   -NPO  - pain control  - abx  - wean vent, sedation as tolerated  - serial abdominal exams  - f/u labs  - DVT ppx  - appreciate SICU care

## 2018-06-07 NOTE — PROGRESS NOTE ADULT - SUBJECTIVE AND OBJECTIVE BOX
GENERAL SURGERY DAILY PROGRESS NOTE      Subjective:  Patient underwent endoscopy yesterday pm with GI, c/b perforation of afferent limb. Pt taken to the OR emergently and underwent ex-lap, resection of perforated bowel, and primary anastomosis. This AM remains intubated, sedated. Not requiring pressors.         Objective:    PE:  Gen: Laying in bed, sedated  Resp: Intubated  Abd: Soft. Midline incision dressed, c/d/i      Vital Signs Last 24 Hrs  T(C): 36.2 (2018 08:00), Max: 37.1 (2018 15:53)  T(F): 97.1 (2018 08:00), Max: 98.7 (2018 15:53)  HR: 71 (2018 08:00) (57 - 76)  BP: 102/53 (2018 23:34) (102/53 - 138/58)  BP(mean): 65 (2018 23:34) (65 - 65)  RR: 12 (2018 08:00) (10 - 16)  SpO2: 100% (2018 08:00) (95% - 100%)    I&O's Detail    2018 07:01  -  2018 07:00  --------------------------------------------------------  IN:    fentaNYL  Infusion: 18.9 mL    IV PiggyBack: 350 mL    lactated ringers.: 600 mL    propofol Infusion: 20.8 mL    propofol Infusion: 5 mL  Total IN: 994.7 mL    OUT:    Indwelling Catheter - Urethral: 115 mL    Nasoenteral Tube: 50 mL  Total OUT: 165 mL    Total NET: 829.7 mL      2018 07:01  -  2018 09:34  --------------------------------------------------------  IN:    fentaNYL  Infusion: 5.4 mL    lactated ringers.: 150 mL    propofol Infusion: 6.4 mL  Total IN: 161.8 mL    OUT:    Indwelling Catheter - Urethral: 10 mL  Total OUT: 10 mL    Total NET: 151.8 mL          Daily Height in cm: 160.02 (2018 15:53)    Daily Weight in k (2018 00:00)    MEDICATIONS  (STANDING):  ciprofloxacin   IVPB 400 milliGRAM(s) IV Intermittent every 12 hours  enoxaparin Injectable 40 milliGRAM(s) SubCutaneous daily  fentaNYL   Infusion 0.5 MICROgram(s)/kG/Hr (2.7 mL/Hr) IV Continuous <Continuous>  lactated ringers. 1000 milliLiter(s) (75 mL/Hr) IV Continuous <Continuous>  metroNIDAZOLE  IVPB 500 milliGRAM(s) IV Intermittent every 8 hours  pantoprazole  Injectable 40 milliGRAM(s) IV Push two times a day  potassium chloride  10 mEq/100 mL IVPB 10 milliEquivalent(s) IV Intermittent once  propofol Infusion 10 MICROgram(s)/kG/Min (3.24 mL/Hr) IV Continuous <Continuous>    MEDICATIONS  (PRN):      LABS:                        7.3    9.68  )-----------( 265      ( 2018 05:45 )             20.9     06-07    135  |  105  |  6<L>  ----------------------------<  122<H>  3.8   |  18<L>  |  0.51    Ca    7.5<L>      2018 05:45  Phos  3.3       Mg     2.6         TPro  4.3<L>  /  Alb  1.9<L>  /  TBili  10.4<H>  /  DBili  9.6<H>  /  AST  114<H>  /  ALT  68<H>  /  AlkPhos  557<H>      PT/INR - ( 2018 00:13 )   PT: 14.2 SEC;   INR: 1.23          PTT - ( 2018 00:13 )  PTT:35.0 SEC      RADIOLOGY & ADDITIONAL STUDIES:

## 2018-06-07 NOTE — PROGRESS NOTE ADULT - SUBJECTIVE AND OBJECTIVE BOX
Chief Complaint:  Patient is a 58y old  Female who presents with a chief complaint of Painless jaundice (2018 14:49)      Interval Events: Patient underwent unsuccessful ERCP yesterday, complicated by small bowel perforation (see procedure note in [place]). She was taken to the OR for ex-lap and small bowel resection with side-to-side anastomosis. She was transferred to the SICU for further management. She is intubated and sedated this morning.     Allergies:  No Known Allergies      Hospital Medications:  ciprofloxacin   IVPB 400 milliGRAM(s) IV Intermittent every 12 hours  enoxaparin Injectable 40 milliGRAM(s) SubCutaneous daily  fentaNYL   Infusion 0.5 MICROgram(s)/kG/Hr IV Continuous <Continuous>  lactated ringers. 1000 milliLiter(s) IV Continuous <Continuous>  metroNIDAZOLE  IVPB 500 milliGRAM(s) IV Intermittent every 8 hours  pantoprazole  Injectable 40 milliGRAM(s) IV Push two times a day  propofol Infusion 10 MICROgram(s)/kG/Min IV Continuous <Continuous>      PMHX/PSHX:  Gastric cancer  Malignant neoplasm of stomach, unspecified location  No pertinent past medical history  S/P partial gastrectomy  No significant past surgical history      Family history:  No pertinent family history in first degree relatives      ROS: Negative, except as otherwise noted above    PHYSICAL EXAM:   Vital Signs:  Vital Signs Last 24 Hrs  T(C): 35.7 (2018 04:00), Max: 37.1 (2018 15:53)  T(F): 96.2 (2018 04:00), Max: 98.7 (2018 15:53)  HR: 66 (2018 07:01) (57 - 76)  BP: 102/53 (2018 23:34) (102/53 - 138/58)  BP(mean): 65 (2018 23:34) (65 - 65)  RR: 12 (2018 06:19) (10 - 16)  SpO2: 100% (2018 07:01) (95% - 100%)  Daily Height in cm: 160.02 (2018 15:53)    Daily Weight in k (2018 00:00)    GENERAL: No acute distress    HEENT:  Icteric, no thrush  CHEST:  Non-labored breathing, lungs clear b/l  HEART:  +s1, s2 heart sounds, no murmurs  ABDOMEN:  +Surgical site covered with clean/dry dressings, abdomen soft, nontender, no rebound  EXTREMITIES:  Warm and well perfused, no edema  SKIN:  +Jaundice, no rash  NEURO:   Sedated    LABS:  CBC Full  -  ( 2018 05:45 )  WBC Count : 9.68 K/uL  Hemoglobin : 7.3 g/dL  Hematocrit : 20.9 %  Platelet Count - Automated : 265 K/uL  Mean Cell Volume : 80.4 fL  Auto Neutrophil # :   Auto Neutrophil % :     Hemoglobin: 7.3 g/dL ( @ 05:45)  Hemoglobin: 7.7 g/dL ( @ 00:13)  Hemoglobin: 7.7 g/dL ( @ 19:55)  Hemoglobin: 7.6 g/dL ( @ 05:34)  Hemoglobin: 7.6 g/dL ( @ 07:36)  Hemoglobin: 8.5 g/dL ( @ 17:20)       @ 05:45  Na 135 mmol/L  K 3.8 mmol/L  Cl 105 mmol/L  CO2 18 mmol/L  BUN 6 mg/dL  Creat 0.51 mg/dL  Glucose 122 mg/dL  Ca 7.5 mg/dL    Total protein 4.3 g/dL  Albumin 1.9 g/dL  T bili 10.4 mg/dL  Alk phos 557 u/L   u/L  ALT 68 u/L     @ 19:55  T bili 11.7 mg/dL  Alk phos 752 u/L   u/L  ALT 90 u/L     @ 05:34  T bili 9.5 mg/dL  Alk phos 723 u/L   u/L  ALT 73 u/L      PT/INR - ( 2018 00:13 )   PT: 14.2 SEC;   INR: 1.23          PTT - ( 2018 00:13 )  PTT:35.0 SEC        < from: MR MRCP w/wo IV Cont (18 @ 16:23) >    EXAM:  MR MRCP WAW IC                            *** ADDENDUM 2018  ***    No evidence for gallstones. Layering sludge in the gallbladder. Small   cystic structure at the gallbladder fundal wall, suggestive of   adenomyomatosis.      *** END OF ADDENDUM 2018  ***      PROCEDURE DATE:  2018          INTERPRETATION:  Abdominal MR without and with IV contrast including MRCP    Indication: Painless jaundice.    Technique: Utilizing a 1.5 Emilee high-field magnet, multiplanar   multisequence MR images of the abdomen are acquired without and with IV   contrast (5.5 cc Gadavist administered, 4.5 cc discarded), supplemented   by thin and thick slab MRCP images. Postcontrast images are acquired   dynamically.    Comparison: No priorabdominal MR is available for comparison.    Findings: There is dilatation of the intrahepatic biliary ducts. The   common hepatic duct is also dilated measuring 1.5 cm in caliber with an   abrupt transition; in the area of the abrupt transition within the   pancreatic head, there appears to be a 1.4 cm enhancing mass which is   better seen on delayed postcontrast images, suspicious for an obstructive   cholangiocarcinoma. The distal common bile duct maintains normal caliber   at 6 mm. The main pancreatic duct maintains normal caliber without   dilatation. The SMV and portal veins do not appear to be encased.    A few hepatic cysts are seen measuring up to 1.7 cm. The spleen,   adrenals, and the left kidney appear unremarkable. There is a 1.6cm   right renal.     Mild perisplenic ascites. No evidence for an enlarged lymph node.    Impression: Dilatation of the intrahepatic biliary ducts. The common   hepatic duct is also dilated measuring 1.5 cm in caliber with an abrupt   transition; inthe area of the abrupt transition within the pancreatic   head, there appears to be a 1.4 cm enhancing mass which is better seen on   delayed postcontrast images, suspicious for an obstructive   cholangiocarcinoma. A pancreatic head cancer is also inthe differential   consideration. The distal common bile duct maintains normal caliber at 6   mm. The main pancreatic duct maintains normal caliber without dilatation.   The SMV and portal veins do not appear to be encased.    Mild ascites.    Other findings as above.      ***Please see the addendum at the top of this report. It may contain   additional important information or changes.****          SHIVANI MITCHELL M.D., ATTENDING RADIOLOGIST  This document has been electronically signed. 2018 5:01PM  Addend:  SHIVANI MITCHELL M.D., ATTENDING RADIOLOGIST  This addendum was electronically signed on: 2018 12:16PM.          < end of copied text >

## 2018-06-07 NOTE — CONSULT NOTE ADULT - ATTENDING COMMENTS
Seen and examined.  Chart and note reviewed.  Case discussed with SICU team    Sepsis secondary to perforated small bowel   a. S/P sb resection and jt placement  b.  NPO, NGT in place  c.  Hold JT feeds  d.  Continue cipro/flagyl    Respiratory failure  a.  Wean propofol gtt, fentanyl  b.  SBT this am    Anemia  a.  Stable, no need for blood tranfusion    NICOLÁS  a.  Continue IVF resuscitation.  Administer IV bolus    Spent 45 minutes in critical care Seen and examined.  Chart and note reviewed.  Case discussed with SICU team    Sepsis secondary to perforated small bowel   a. S/P sb resection and jt placement  b.  NPO, NGT in place  c.  Hold JT feeds  d.  Continue cipro/flagyl  E.  Protonix BID, on DVT prophylaxis    Obstructing pancreatic mass  a.  Trend LFTs which are slowly rising  b.  Discuss need for PTC    Respiratory failure  a.  Wean propofol gtt, fentanyl  b.  SBT this am    Anemia  a.  Stable, no need for blood tranfusion    NICOLÁS  a.  Continue IVF resuscitation.  Administer IV bolus    Spent 45 minutes in critical care

## 2018-06-07 NOTE — PROGRESS NOTE ADULT - ASSESSMENT
58F w/ jaundice 2/2 possible pancreatic head mass, s/p EUS/ERCP aborted 2/2 small bowel perforation taken emergently to OR for ex lap, resection of perforated bowel, side to side anastamosis POD 0. Pt remains intubated.    - Vent support, wean as tolerated  - Will discuss need for PTC with attending  - Continue ABX  - Appreciate care per SICU

## 2018-06-07 NOTE — PROGRESS NOTE ADULT - ASSESSMENT
Impression:  1. Unsuccessful ERCP complicated by small bowel perforation 6/6/18 s/p ex-lap with small bowel resection and anastomosis 6/6/18  2. Obstructive jaundice with dilated CBD and 1.4 cm enhancing pancreatic head mass  3. 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

## 2018-06-07 NOTE — PROGRESS NOTE ADULT - SUBJECTIVE AND OBJECTIVE BOX
POST ANESTHESIA EVALUATION    58y Female POSTOP DAY 1 S/P     MENTAL STATUS: Patient participation [  ] Awake     [  ] Arousable     [ x ] Sedated    AIRWAY PATENCY: [x  ] Satisfactory  [  ] Other:     Vital Signs Last 24 Hrs  T(C): 36.2 (07 Jun 2018 08:00), Max: 37.1 (06 Jun 2018 15:53)  T(F): 97.1 (07 Jun 2018 08:00), Max: 98.7 (06 Jun 2018 15:53)  HR: 71 (07 Jun 2018 08:00) (57 - 76)  BP: 102/53 (06 Jun 2018 23:34) (102/53 - 138/58)  BP(mean): 65 (06 Jun 2018 23:34) (65 - 65)  RR: 12 (07 Jun 2018 08:00) (10 - 16)  SpO2: 100% (07 Jun 2018 08:00) (95% - 100%)  I&O's Summary    06 Jun 2018 07:01  -  07 Jun 2018 07:00  --------------------------------------------------------  IN: 994.7 mL / OUT: 165 mL / NET: 829.7 mL    07 Jun 2018 07:01  -  07 Jun 2018 09:13  --------------------------------------------------------  IN: 161.8 mL / OUT: 10 mL / NET: 151.8 mL          NAUSEA/ VOMITTING:  [ x ] NONE  [  ] CONTROLLED [  ] OTHER     PAIN: [x  ] CONTROLLED WITH CURRENT REGIMEN  [  ] OTHER    [x  ] NO APPARENT ANESTHESIA COMPLICATIONS      Comments:  remains intubated under SICU Care.

## 2018-06-08 LAB
ALBUMIN SERPL ELPH-MCNC: 1.7 G/DL — LOW (ref 3.3–5)
ALBUMIN SERPL ELPH-MCNC: 1.8 G/DL — LOW (ref 3.3–5)
ALBUMIN SERPL ELPH-MCNC: 2 G/DL — LOW (ref 3.3–5)
ALP SERPL-CCNC: 466 U/L — HIGH (ref 40–120)
ALP SERPL-CCNC: 609 U/L — HIGH (ref 40–120)
ALP SERPL-CCNC: 642 U/L — HIGH (ref 40–120)
ALT FLD-CCNC: 50 U/L — HIGH (ref 4–33)
ALT FLD-CCNC: 51 U/L — HIGH (ref 4–33)
ALT FLD-CCNC: 61 U/L — HIGH (ref 4–33)
ANISOCYTOSIS BLD QL: SIGNIFICANT CHANGE UP
ANISOCYTOSIS BLD QL: SIGNIFICANT CHANGE UP
APTT BLD: 34.4 SEC — SIGNIFICANT CHANGE UP (ref 27.5–37.4)
APTT BLD: 35.1 SEC — SIGNIFICANT CHANGE UP (ref 27.5–37.4)
AST SERPL-CCNC: 61 U/L — HIGH (ref 4–32)
AST SERPL-CCNC: 71 U/L — HIGH (ref 4–32)
AST SERPL-CCNC: 77 U/L — HIGH (ref 4–32)
BASE EXCESS BLDA CALC-SCNC: -6.7 MMOL/L — SIGNIFICANT CHANGE UP
BASE EXCESS BLDA CALC-SCNC: -9.9 MMOL/L — SIGNIFICANT CHANGE UP
BASOPHILS # BLD AUTO: 0.02 K/UL — SIGNIFICANT CHANGE UP (ref 0–0.2)
BASOPHILS NFR BLD AUTO: 0.3 % — SIGNIFICANT CHANGE UP (ref 0–2)
BASOPHILS NFR SPEC: 0 % — SIGNIFICANT CHANGE UP (ref 0–2)
BASOPHILS NFR SPEC: 0 % — SIGNIFICANT CHANGE UP (ref 0–2)
BILIRUB SERPL-MCNC: 14.6 MG/DL — HIGH (ref 0.2–1.2)
BILIRUB SERPL-MCNC: 16.4 MG/DL — HIGH (ref 0.2–1.2)
BILIRUB SERPL-MCNC: 9.7 MG/DL — HIGH (ref 0.2–1.2)
BUN SERPL-MCNC: 11 MG/DL — SIGNIFICANT CHANGE UP (ref 7–23)
BUN SERPL-MCNC: 11 MG/DL — SIGNIFICANT CHANGE UP (ref 7–23)
BUN SERPL-MCNC: 8 MG/DL — SIGNIFICANT CHANGE UP (ref 7–23)
BURR CELLS BLD QL SMEAR: PRESENT — SIGNIFICANT CHANGE UP
BURR CELLS BLD QL SMEAR: PRESENT — SIGNIFICANT CHANGE UP
CA-I BLD-SCNC: 1.09 MMOL/L — SIGNIFICANT CHANGE UP (ref 1.03–1.23)
CALCIUM SERPL-MCNC: 7.4 MG/DL — LOW (ref 8.4–10.5)
CALCIUM SERPL-MCNC: 7.7 MG/DL — LOW (ref 8.4–10.5)
CALCIUM SERPL-MCNC: 8 MG/DL — LOW (ref 8.4–10.5)
CHLORIDE BLDA-SCNC: 113 MMOL/L — HIGH (ref 96–108)
CHLORIDE BLDA-SCNC: 114 MMOL/L — HIGH (ref 96–108)
CHLORIDE SERPL-SCNC: 102 MMOL/L — SIGNIFICANT CHANGE UP (ref 98–107)
CHLORIDE SERPL-SCNC: 103 MMOL/L — SIGNIFICANT CHANGE UP (ref 98–107)
CHLORIDE SERPL-SCNC: 107 MMOL/L — SIGNIFICANT CHANGE UP (ref 98–107)
CO2 SERPL-SCNC: 14 MMOL/L — LOW (ref 22–31)
CO2 SERPL-SCNC: 16 MMOL/L — LOW (ref 22–31)
CO2 SERPL-SCNC: 19 MMOL/L — LOW (ref 22–31)
CREAT SERPL-MCNC: 0.55 MG/DL — SIGNIFICANT CHANGE UP (ref 0.5–1.3)
CREAT SERPL-MCNC: 0.6 MG/DL — SIGNIFICANT CHANGE UP (ref 0.5–1.3)
CREAT SERPL-MCNC: 0.63 MG/DL — SIGNIFICANT CHANGE UP (ref 0.5–1.3)
EOSINOPHIL # BLD AUTO: 0.01 K/UL — SIGNIFICANT CHANGE UP (ref 0–0.5)
EOSINOPHIL NFR BLD AUTO: 0.1 % — SIGNIFICANT CHANGE UP (ref 0–6)
EOSINOPHIL NFR FLD: 0 % — SIGNIFICANT CHANGE UP (ref 0–6)
EOSINOPHIL NFR FLD: 0 % — SIGNIFICANT CHANGE UP (ref 0–6)
GIANT PLATELETS BLD QL SMEAR: PRESENT — SIGNIFICANT CHANGE UP
GIANT PLATELETS BLD QL SMEAR: PRESENT — SIGNIFICANT CHANGE UP
GLUCOSE BLDA-MCNC: 72 MG/DL — SIGNIFICANT CHANGE UP (ref 70–99)
GLUCOSE BLDA-MCNC: 94 MG/DL — SIGNIFICANT CHANGE UP (ref 70–99)
GLUCOSE SERPL-MCNC: 68 MG/DL — LOW (ref 70–99)
GLUCOSE SERPL-MCNC: 85 MG/DL — SIGNIFICANT CHANGE UP (ref 70–99)
GLUCOSE SERPL-MCNC: 88 MG/DL — SIGNIFICANT CHANGE UP (ref 70–99)
GRAM STN WND: SIGNIFICANT CHANGE UP
HCO3 BLDA-SCNC: 17 MMOL/L — LOW (ref 22–26)
HCO3 BLDA-SCNC: 19 MMOL/L — LOW (ref 22–26)
HCT VFR BLD CALC: 21.5 % — LOW (ref 34.5–45)
HCT VFR BLD CALC: 30.3 % — LOW (ref 34.5–45)
HCT VFR BLD CALC: 30.3 % — LOW (ref 34.5–45)
HCT VFR BLD CALC: 32.2 % — LOW (ref 34.5–45)
HCT VFR BLD CALC: 34.5 % — SIGNIFICANT CHANGE UP (ref 34.5–45)
HCT VFR BLDA CALC: 35.3 % — SIGNIFICANT CHANGE UP (ref 34.5–46.5)
HCT VFR BLDA CALC: 39.7 % — SIGNIFICANT CHANGE UP (ref 34.5–46.5)
HGB BLD-MCNC: 10.9 G/DL — LOW (ref 11.5–15.5)
HGB BLD-MCNC: 10.9 G/DL — LOW (ref 11.5–15.5)
HGB BLD-MCNC: 11.4 G/DL — LOW (ref 11.5–15.5)
HGB BLD-MCNC: 12.1 G/DL — SIGNIFICANT CHANGE UP (ref 11.5–15.5)
HGB BLD-MCNC: 7.7 G/DL — LOW (ref 11.5–15.5)
HGB BLDA-MCNC: 11.5 G/DL — SIGNIFICANT CHANGE UP (ref 11.5–15.5)
HGB BLDA-MCNC: 12.9 G/DL — SIGNIFICANT CHANGE UP (ref 11.5–15.5)
HYPOCHROMIA BLD QL: SLIGHT — SIGNIFICANT CHANGE UP
HYPOCHROMIA BLD QL: SLIGHT — SIGNIFICANT CHANGE UP
IMM GRANULOCYTES # BLD AUTO: 0.05 # — SIGNIFICANT CHANGE UP
IMM GRANULOCYTES NFR BLD AUTO: 0.7 % — SIGNIFICANT CHANGE UP (ref 0–1.5)
INR BLD: 1.26 — HIGH (ref 0.88–1.17)
INR BLD: 1.28 — HIGH (ref 0.88–1.17)
LACTATE BLDA-SCNC: 3 MMOL/L — HIGH (ref 0.5–2)
LACTATE BLDA-SCNC: 4.6 MMOL/L — CRITICAL HIGH (ref 0.5–2)
LYMPHOCYTES # BLD AUTO: 0.15 K/UL — LOW (ref 1–3.3)
LYMPHOCYTES # BLD AUTO: 2.2 % — LOW (ref 13–44)
LYMPHOCYTES NFR SPEC AUTO: 0.9 % — LOW (ref 13–44)
LYMPHOCYTES NFR SPEC AUTO: 0.9 % — LOW (ref 13–44)
MACROCYTES BLD QL: SLIGHT — SIGNIFICANT CHANGE UP
MACROCYTES BLD QL: SLIGHT — SIGNIFICANT CHANGE UP
MAGNESIUM SERPL-MCNC: 2 MG/DL — SIGNIFICANT CHANGE UP (ref 1.6–2.6)
MAGNESIUM SERPL-MCNC: 2.1 MG/DL — SIGNIFICANT CHANGE UP (ref 1.6–2.6)
MAGNESIUM SERPL-MCNC: 2.3 MG/DL — SIGNIFICANT CHANGE UP (ref 1.6–2.6)
MANUAL SMEAR VERIFICATION: SIGNIFICANT CHANGE UP
MANUAL SMEAR VERIFICATION: SIGNIFICANT CHANGE UP
MCHC RBC-ENTMCNC: 28.6 PG — SIGNIFICANT CHANGE UP (ref 27–34)
MCHC RBC-ENTMCNC: 28.9 PG — SIGNIFICANT CHANGE UP (ref 27–34)
MCHC RBC-ENTMCNC: 29 PG — SIGNIFICANT CHANGE UP (ref 27–34)
MCHC RBC-ENTMCNC: 29.3 PG — SIGNIFICANT CHANGE UP (ref 27–34)
MCHC RBC-ENTMCNC: 29.3 PG — SIGNIFICANT CHANGE UP (ref 27–34)
MCHC RBC-ENTMCNC: 35.1 % — SIGNIFICANT CHANGE UP (ref 32–36)
MCHC RBC-ENTMCNC: 35.4 % — SIGNIFICANT CHANGE UP (ref 32–36)
MCHC RBC-ENTMCNC: 35.8 % — SIGNIFICANT CHANGE UP (ref 32–36)
MCHC RBC-ENTMCNC: 36 % — SIGNIFICANT CHANGE UP (ref 32–36)
MCHC RBC-ENTMCNC: 36 % — SIGNIFICANT CHANGE UP (ref 32–36)
MCV RBC AUTO: 79.9 FL — LOW (ref 80–100)
MCV RBC AUTO: 81.5 FL — SIGNIFICANT CHANGE UP (ref 80–100)
MCV RBC AUTO: 82.7 FL — SIGNIFICANT CHANGE UP (ref 80–100)
MICROCYTES BLD QL: SIGNIFICANT CHANGE UP
MICROCYTES BLD QL: SIGNIFICANT CHANGE UP
MONOCYTES # BLD AUTO: 0.03 K/UL — SIGNIFICANT CHANGE UP (ref 0–0.9)
MONOCYTES NFR BLD AUTO: 0.4 % — LOW (ref 2–14)
MONOCYTES NFR BLD: 0.9 % — LOW (ref 2–9)
MONOCYTES NFR BLD: 0.9 % — LOW (ref 2–9)
NEUTROPHIL AB SER-ACNC: 94.7 % — HIGH (ref 43–77)
NEUTROPHIL AB SER-ACNC: 94.7 % — HIGH (ref 43–77)
NEUTROPHILS # BLD AUTO: 6.43 K/UL — SIGNIFICANT CHANGE UP (ref 1.8–7.4)
NEUTROPHILS NFR BLD AUTO: 96.3 % — HIGH (ref 43–77)
NEUTS BAND # BLD: 3.5 % — SIGNIFICANT CHANGE UP (ref 0–6)
NEUTS BAND # BLD: 3.5 % — SIGNIFICANT CHANGE UP (ref 0–6)
NRBC # FLD: 0 — SIGNIFICANT CHANGE UP
NRBC # FLD: 0 — SIGNIFICANT CHANGE UP
NRBC # FLD: 0.04 — SIGNIFICANT CHANGE UP
NRBC # FLD: 0.05 — SIGNIFICANT CHANGE UP
NRBC # FLD: 0.05 — SIGNIFICANT CHANGE UP
NRBC FLD-RTO: 1.9 — SIGNIFICANT CHANGE UP
PCO2 BLDA: 29 MMHG — LOW (ref 32–48)
PCO2 BLDA: 30 MMHG — LOW (ref 32–48)
PH BLDA: 7.32 PH — LOW (ref 7.35–7.45)
PH BLDA: 7.39 PH — SIGNIFICANT CHANGE UP (ref 7.35–7.45)
PHOSPHATE SERPL-MCNC: 3.1 MG/DL — SIGNIFICANT CHANGE UP (ref 2.5–4.5)
PHOSPHATE SERPL-MCNC: 3.2 MG/DL — SIGNIFICANT CHANGE UP (ref 2.5–4.5)
PHOSPHATE SERPL-MCNC: 3.6 MG/DL — SIGNIFICANT CHANGE UP (ref 2.5–4.5)
PLATELET # BLD AUTO: 202 K/UL — SIGNIFICANT CHANGE UP (ref 150–400)
PLATELET # BLD AUTO: 202 K/UL — SIGNIFICANT CHANGE UP (ref 150–400)
PLATELET # BLD AUTO: 230 K/UL — SIGNIFICANT CHANGE UP (ref 150–400)
PLATELET # BLD AUTO: 244 K/UL — SIGNIFICANT CHANGE UP (ref 150–400)
PLATELET # BLD AUTO: 274 K/UL — SIGNIFICANT CHANGE UP (ref 150–400)
PLATELET COUNT - ESTIMATE: NORMAL — SIGNIFICANT CHANGE UP
PLATELET COUNT - ESTIMATE: NORMAL — SIGNIFICANT CHANGE UP
PMV BLD: 10 FL — SIGNIFICANT CHANGE UP (ref 7–13)
PMV BLD: 10.3 FL — SIGNIFICANT CHANGE UP (ref 7–13)
PMV BLD: 9.9 FL — SIGNIFICANT CHANGE UP (ref 7–13)
PO2 BLDA: 131 MMHG — HIGH (ref 83–108)
PO2 BLDA: 66 MMHG — LOW (ref 83–108)
POIKILOCYTOSIS BLD QL AUTO: SIGNIFICANT CHANGE UP
POIKILOCYTOSIS BLD QL AUTO: SIGNIFICANT CHANGE UP
POLYCHROMASIA BLD QL SMEAR: SLIGHT — SIGNIFICANT CHANGE UP
POLYCHROMASIA BLD QL SMEAR: SLIGHT — SIGNIFICANT CHANGE UP
POTASSIUM BLDA-SCNC: 3.3 MMOL/L — LOW (ref 3.4–4.5)
POTASSIUM BLDA-SCNC: 3.6 MMOL/L — SIGNIFICANT CHANGE UP (ref 3.4–4.5)
POTASSIUM SERPL-MCNC: 3.6 MMOL/L — SIGNIFICANT CHANGE UP (ref 3.5–5.3)
POTASSIUM SERPL-MCNC: 4 MMOL/L — SIGNIFICANT CHANGE UP (ref 3.5–5.3)
POTASSIUM SERPL-MCNC: 4.3 MMOL/L — SIGNIFICANT CHANGE UP (ref 3.5–5.3)
POTASSIUM SERPL-SCNC: 3.6 MMOL/L — SIGNIFICANT CHANGE UP (ref 3.5–5.3)
POTASSIUM SERPL-SCNC: 4 MMOL/L — SIGNIFICANT CHANGE UP (ref 3.5–5.3)
POTASSIUM SERPL-SCNC: 4.3 MMOL/L — SIGNIFICANT CHANGE UP (ref 3.5–5.3)
PROT SERPL-MCNC: 4 G/DL — LOW (ref 6–8.3)
PROT SERPL-MCNC: 4.2 G/DL — LOW (ref 6–8.3)
PROT SERPL-MCNC: 4.9 G/DL — LOW (ref 6–8.3)
PROTHROM AB SERPL-ACNC: 14.5 SEC — HIGH (ref 9.8–13.1)
PROTHROM AB SERPL-ACNC: 14.8 SEC — HIGH (ref 9.8–13.1)
RBC # BLD: 2.69 M/UL — LOW (ref 3.8–5.2)
RBC # BLD: 3.72 M/UL — LOW (ref 3.8–5.2)
RBC # BLD: 3.72 M/UL — LOW (ref 3.8–5.2)
RBC # BLD: 3.95 M/UL — SIGNIFICANT CHANGE UP (ref 3.8–5.2)
RBC # BLD: 4.17 M/UL — SIGNIFICANT CHANGE UP (ref 3.8–5.2)
RBC # FLD: 18.8 % — HIGH (ref 10.3–14.5)
RBC # FLD: 19.7 % — HIGH (ref 10.3–14.5)
RBC # FLD: 19.8 % — HIGH (ref 10.3–14.5)
RBC # FLD: 19.8 % — HIGH (ref 10.3–14.5)
RBC # FLD: 19.9 % — HIGH (ref 10.3–14.5)
SAO2 % BLDA: 91.9 % — LOW (ref 95–99)
SAO2 % BLDA: 98.9 % — SIGNIFICANT CHANGE UP (ref 95–99)
SODIUM BLDA-SCNC: 129 MMOL/L — LOW (ref 136–146)
SODIUM BLDA-SCNC: 130 MMOL/L — LOW (ref 136–146)
SODIUM SERPL-SCNC: 135 MMOL/L — SIGNIFICANT CHANGE UP (ref 135–145)
SODIUM SERPL-SCNC: 135 MMOL/L — SIGNIFICANT CHANGE UP (ref 135–145)
SODIUM SERPL-SCNC: 137 MMOL/L — SIGNIFICANT CHANGE UP (ref 135–145)
SPECIMEN SOURCE: SIGNIFICANT CHANGE UP
WBC # BLD: 11.2 K/UL — HIGH (ref 3.8–10.5)
WBC # BLD: 2.07 K/UL — LOW (ref 3.8–10.5)
WBC # BLD: 6.69 K/UL — SIGNIFICANT CHANGE UP (ref 3.8–10.5)
WBC # BLD: 6.69 K/UL — SIGNIFICANT CHANGE UP (ref 3.8–10.5)
WBC # BLD: 8.39 K/UL — SIGNIFICANT CHANGE UP (ref 3.8–10.5)
WBC # FLD AUTO: 11.2 K/UL — HIGH (ref 3.8–10.5)
WBC # FLD AUTO: 2.07 K/UL — LOW (ref 3.8–10.5)
WBC # FLD AUTO: 6.69 K/UL — SIGNIFICANT CHANGE UP (ref 3.8–10.5)
WBC # FLD AUTO: 6.69 K/UL — SIGNIFICANT CHANGE UP (ref 3.8–10.5)
WBC # FLD AUTO: 8.39 K/UL — SIGNIFICANT CHANGE UP (ref 3.8–10.5)

## 2018-06-08 PROCEDURE — 47543 ENDOLUMINAL BX BILIARY TREE: CPT

## 2018-06-08 PROCEDURE — 47534 PLMT BILIARY DRAINAGE CATH: CPT

## 2018-06-08 PROCEDURE — 99233 SBSQ HOSP IP/OBS HIGH 50: CPT | Mod: GC

## 2018-06-08 PROCEDURE — 99232 SBSQ HOSP IP/OBS MODERATE 35: CPT | Mod: GC

## 2018-06-08 PROCEDURE — 71045 X-RAY EXAM CHEST 1 VIEW: CPT | Mod: 26

## 2018-06-08 RX ORDER — ACETAMINOPHEN 500 MG
800 TABLET ORAL ONCE
Qty: 0 | Refills: 0 | Status: COMPLETED | OUTPATIENT
Start: 2018-06-08 | End: 2018-06-08

## 2018-06-08 RX ORDER — DEXTROSE 50 % IN WATER 50 %
50 SYRINGE (ML) INTRAVENOUS ONCE
Qty: 0 | Refills: 0 | Status: COMPLETED | OUTPATIENT
Start: 2018-06-08 | End: 2018-06-08

## 2018-06-08 RX ORDER — ONDANSETRON 8 MG/1
4 TABLET, FILM COATED ORAL ONCE
Qty: 0 | Refills: 0 | Status: COMPLETED | OUTPATIENT
Start: 2018-06-08 | End: 2018-06-08

## 2018-06-08 RX ORDER — SODIUM CHLORIDE 9 MG/ML
1000 INJECTION, SOLUTION INTRAVENOUS
Qty: 0 | Refills: 0 | Status: DISCONTINUED | OUTPATIENT
Start: 2018-06-08 | End: 2018-06-11

## 2018-06-08 RX ADMIN — PANTOPRAZOLE SODIUM 40 MILLIGRAM(S): 20 TABLET, DELAYED RELEASE ORAL at 06:35

## 2018-06-08 RX ADMIN — Medication 800 MILLIGRAM(S): at 04:30

## 2018-06-08 RX ADMIN — SODIUM CHLORIDE 125 MILLILITER(S): 9 INJECTION, SOLUTION INTRAVENOUS at 15:27

## 2018-06-08 RX ADMIN — PANTOPRAZOLE SODIUM 40 MILLIGRAM(S): 20 TABLET, DELAYED RELEASE ORAL at 18:41

## 2018-06-08 RX ADMIN — HYDROMORPHONE HYDROCHLORIDE 0.5 MILLIGRAM(S): 2 INJECTION INTRAMUSCULAR; INTRAVENOUS; SUBCUTANEOUS at 14:20

## 2018-06-08 RX ADMIN — SODIUM CHLORIDE 125 MILLILITER(S): 9 INJECTION, SOLUTION INTRAVENOUS at 22:19

## 2018-06-08 RX ADMIN — Medication 200 MILLIGRAM(S): at 15:27

## 2018-06-08 RX ADMIN — ONDANSETRON 4 MILLIGRAM(S): 8 TABLET, FILM COATED ORAL at 15:27

## 2018-06-08 RX ADMIN — CHLORHEXIDINE GLUCONATE 1 APPLICATION(S): 213 SOLUTION TOPICAL at 06:47

## 2018-06-08 RX ADMIN — Medication 100 MILLIGRAM(S): at 06:35

## 2018-06-08 RX ADMIN — HYDROMORPHONE HYDROCHLORIDE 0.5 MILLIGRAM(S): 2 INJECTION INTRAMUSCULAR; INTRAVENOUS; SUBCUTANEOUS at 06:50

## 2018-06-08 RX ADMIN — Medication 50 MILLILITER(S): at 22:40

## 2018-06-08 RX ADMIN — HYDROMORPHONE HYDROCHLORIDE 0.5 MILLIGRAM(S): 2 INJECTION INTRAMUSCULAR; INTRAVENOUS; SUBCUTANEOUS at 20:45

## 2018-06-08 RX ADMIN — HYDROMORPHONE HYDROCHLORIDE 0.5 MILLIGRAM(S): 2 INJECTION INTRAMUSCULAR; INTRAVENOUS; SUBCUTANEOUS at 14:05

## 2018-06-08 RX ADMIN — HYDROMORPHONE HYDROCHLORIDE 0.5 MILLIGRAM(S): 2 INJECTION INTRAMUSCULAR; INTRAVENOUS; SUBCUTANEOUS at 21:00

## 2018-06-08 RX ADMIN — Medication 200 MILLIGRAM(S): at 03:58

## 2018-06-08 RX ADMIN — Medication 100 MILLIGRAM(S): at 22:19

## 2018-06-08 RX ADMIN — Medication 100 MILLIGRAM(S): at 14:05

## 2018-06-08 RX ADMIN — HYDROMORPHONE HYDROCHLORIDE 0.5 MILLIGRAM(S): 2 INJECTION INTRAMUSCULAR; INTRAVENOUS; SUBCUTANEOUS at 06:35

## 2018-06-08 RX ADMIN — SODIUM CHLORIDE 125 MILLILITER(S): 9 INJECTION, SOLUTION INTRAVENOUS at 22:55

## 2018-06-08 RX ADMIN — Medication 320 MILLIGRAM(S): at 03:58

## 2018-06-08 NOTE — PROGRESS NOTE ADULT - ATTENDING COMMENTS
Pt stable, extubated, had percutaneous drain placed. Continues on antibiotics.    Endoscopic perforation secondary to strictured and angulated B2 limb.

## 2018-06-08 NOTE — PROGRESS NOTE ADULT - ATTENDING COMMENTS
Current Issues:  K63.1 Bowel perforation   - stable exam. extubated successfully yesterday  E80.6 Hyperbilirubinemia   - plan for IR percutaneous biliary drainage  Z91.89 At risk for malnutrition   - starting CLD as per primary team recs  D64.9 Anemia, unspecified type   - no signs of bleeding on exam.  continue to monitor

## 2018-06-08 NOTE — PROGRESS NOTE ADULT - ASSESSMENT
Impression:  1. Unsuccessful ERCP complicated by small bowel perforation 6/6/18 s/p ex-lap with small bowel resection and anastomosis 6/6/18  2. Obstructive jaundice with dilated CBD and 1.4 cm enhancing pancreatic head mass  3. 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

## 2018-06-08 NOTE — PROGRESS NOTE ADULT - SUBJECTIVE AND OBJECTIVE BOX
Chief Complaint:  Patient is a 58y old  Female who presents with a chief complaint of Painless jaundice (05 Jun 2018 14:49)      Interval Events:     Allergies:  No Known Allergies      Hospital Medications:  chlorhexidine 4% Liquid 1 Application(s) Topical <User Schedule>  ciprofloxacin   IVPB 400 milliGRAM(s) IV Intermittent every 12 hours  enoxaparin Injectable 40 milliGRAM(s) SubCutaneous daily  HYDROmorphone  Injectable 0.5 milliGRAM(s) IV Push every 4 hours PRN  HYDROmorphone  Injectable 1 milliGRAM(s) IV Push every 4 hours PRN  lactated ringers. 1000 milliLiter(s) IV Continuous <Continuous>  metroNIDAZOLE  IVPB 500 milliGRAM(s) IV Intermittent every 8 hours  pantoprazole  Injectable 40 milliGRAM(s) IV Push two times a day      PMHX/PSHX:  Gastric cancer  Malignant neoplasm of stomach, unspecified location  No pertinent past medical history  S/P partial gastrectomy  No significant past surgical history      Family history:  No pertinent family history in first degree relatives      ROS: Negative, except as otherwise noted above    PHYSICAL EXAM:   Vital Signs:  Vital Signs Last 24 Hrs  T(C): 36.7 (08 Jun 2018 04:00), Max: 36.7 (08 Jun 2018 04:00)  T(F): 98.1 (08 Jun 2018 04:00), Max: 98.1 (08 Jun 2018 04:00)  HR: 72 (08 Jun 2018 05:00) (58 - 79)  BP: 81/40 (07 Jun 2018 20:34) (81/40 - 81/40)  BP(mean): 51 (07 Jun 2018 20:34) (51 - 51)  RR: 16 (08 Jun 2018 05:00) (12 - 28)  SpO2: 95% (08 Jun 2018 05:00) (95% - 100%)  Daily     Daily     GENERAL: No acute distress    HEENT:  Anicteric, no thrush  CHEST:  Non-labored breathing, lungs clear b/l  HEART:  +s1, s2 heart sounds, no murmurs  ABDOMEN:    EXTREMITIES:  Warm and well perfused, no edema  SKIN:    NEURO:       LABS:  CBC Full  -  ( 08 Jun 2018 03:00 )  WBC Count : 8.39 K/uL  Hemoglobin : 7.7 g/dL  Hematocrit : 21.5 %  Platelet Count - Automated : 244 K/uL  Mean Cell Volume : 79.9 fL  Auto Neutrophil # :   Auto Neutrophil % :     06-08 @ 03:00  Na 137 mmol/L  K 4.3 mmol/L  Cl 107 mmol/L  CO2 19 mmol/L  BUN 8 mg/dL  Creat 0.60 mg/dL  Glucose 85 mg/dL  Ca 7.4 mg/dL    Total protein 4.0 g/dL  Albumin 1.7 g/dL  T bili 9.7 mg/dL  Alk phos 466 u/L  AST 71 u/L  ALT 51 u/L    PT/INR - ( 08 Jun 2018 03:00 )   PT: 14.5 SEC;   INR: 1.26          PTT - ( 08 Jun 2018 03:00 )  PTT:35.1 SEC Chief Complaint:  Patient is a 58y old  Female who presents with a chief complaint of Painless jaundice (05 Jun 2018 14:49)      Interval Events: Patient was extubated yesterday. She feels okay this morning - she recently received pain medications and is tired. Her son is in the room with her this morning.     Allergies:  No Known Allergies      Hospital Medications:  chlorhexidine 4% Liquid 1 Application(s) Topical <User Schedule>  ciprofloxacin   IVPB 400 milliGRAM(s) IV Intermittent every 12 hours  enoxaparin Injectable 40 milliGRAM(s) SubCutaneous daily  HYDROmorphone  Injectable 0.5 milliGRAM(s) IV Push every 4 hours PRN  HYDROmorphone  Injectable 1 milliGRAM(s) IV Push every 4 hours PRN  lactated ringers. 1000 milliLiter(s) IV Continuous <Continuous>  metroNIDAZOLE  IVPB 500 milliGRAM(s) IV Intermittent every 8 hours  pantoprazole  Injectable 40 milliGRAM(s) IV Push two times a day      PMHX/PSHX:  Gastric cancer  Malignant neoplasm of stomach, unspecified location  No pertinent past medical history  S/P partial gastrectomy  No significant past surgical history      Family history:  No pertinent family history in first degree relatives      ROS: Negative, except as otherwise noted above    PHYSICAL EXAM:   Vital Signs:  Vital Signs Last 24 Hrs  T(C): 36.7 (08 Jun 2018 04:00), Max: 36.7 (08 Jun 2018 04:00)  T(F): 98.1 (08 Jun 2018 04:00), Max: 98.1 (08 Jun 2018 04:00)  HR: 72 (08 Jun 2018 05:00) (58 - 79)  BP: 81/40 (07 Jun 2018 20:34) (81/40 - 81/40)  BP(mean): 51 (07 Jun 2018 20:34) (51 - 51)  RR: 16 (08 Jun 2018 05:00) (12 - 28)  SpO2: 95% (08 Jun 2018 05:00) (95% - 100%)  Daily     Daily     GENERAL: No acute distress    HEENT:  Icteric, no thrush  CHEST:  Non-labored breathing, lungs clear b/l  HEART:  +s1, s2 heart sounds, no murmurs  ABDOMEN:  Soft, nontender, no rebound, no guarding, +surgical incision covered  EXTREMITIES:  Warm and well perfused, no edema  SKIN:  Jaundice  NEURO: Moving extremities, interactive, following commands      LABS:  CBC Full  -  ( 08 Jun 2018 03:00 )  WBC Count : 8.39 K/uL  Hemoglobin : 7.7 g/dL  Hematocrit : 21.5 %  Platelet Count - Automated : 244 K/uL  Mean Cell Volume : 79.9 fL  Auto Neutrophil # :   Auto Neutrophil % :     06-08 @ 03:00  Na 137 mmol/L  K 4.3 mmol/L  Cl 107 mmol/L  CO2 19 mmol/L  BUN 8 mg/dL  Creat 0.60 mg/dL  Glucose 85 mg/dL  Ca 7.4 mg/dL    Total protein 4.0 g/dL  Albumin 1.7 g/dL  T bili 9.7 mg/dL  Alk phos 466 u/L  AST 71 u/L  ALT 51 u/L    06-08 @ 03:00  T bili 9.7 mg/dL  Alk phos 466 u/L  AST 71 u/L  ALT 51 u/L  06-07 @ 05:45  T bili 10.4 mg/dL  Alk phos 557 u/L   u/L  ALT 68 u/L  06-06 @ 19:55  T bili 11.7 mg/dL  Alk phos 752 u/L   u/L  ALT 90 u/L      PT/INR - ( 08 Jun 2018 03:00 )   PT: 14.5 SEC;   INR: 1.26          PTT - ( 08 Jun 2018 03:00 )  PTT:35.1 SEC

## 2018-06-08 NOTE — PROGRESS NOTE ADULT - SUBJECTIVE AND OBJECTIVE BOX
D Team SURGERY DAILY PROGRESS NOTE    Patient seen on am rounds - currently OOB to chair. Extubated yesterday, doing well from respiratory standpoint. Pain well-controlled. No nausea / vomiting.     MEDICATIONS  (STANDING):  chlorhexidine 4% Liquid 1 Application(s) Topical <User Schedule>  ciprofloxacin   IVPB 400 milliGRAM(s) IV Intermittent every 12 hours  enoxaparin Injectable 40 milliGRAM(s) SubCutaneous daily  lactated ringers. 1000 milliLiter(s) (125 mL/Hr) IV Continuous <Continuous>  metroNIDAZOLE  IVPB 500 milliGRAM(s) IV Intermittent every 8 hours  pantoprazole  Injectable 40 milliGRAM(s) IV Push two times a day    MEDICATIONS  (PRN):  HYDROmorphone  Injectable 0.5 milliGRAM(s) IV Push every 4 hours PRN Moderate Pain (4 - 6)  HYDROmorphone  Injectable 1 milliGRAM(s) IV Push every 4 hours PRN Severe Pain (7 - 10)      ICU Vital Signs Last 24 Hrs  T(C): 36.7 (08 Jun 2018 04:00), Max: 36.7 (08 Jun 2018 04:00)  T(F): 98.1 (08 Jun 2018 04:00), Max: 98.1 (08 Jun 2018 04:00)  HR: 74 (08 Jun 2018 06:00) (58 - 79)  BP: 81/40 (07 Jun 2018 20:34) (81/40 - 81/40)  BP(mean): 51 (07 Jun 2018 20:34) (51 - 51)  ABP: 120/65 (08 Jun 2018 06:00) (79/42 - 120/65)  ABP(mean): 86 (08 Jun 2018 06:00) (57 - 86)  RR: 23 (08 Jun 2018 06:00) (12 - 28)  SpO2: 96% (08 Jun 2018 06:00) (95% - 100%)    I&O's Summary    07 Jun 2018 07:01  -  08 Jun 2018 07:00  --------------------------------------------------------  IN: 6573.6 mL / OUT: 770 mL / NET: 5803.6 mL      Physical Exam:  Gen: NAD, AOx3. No focal deficits. Following commands.   Resp: few scattered rhonchi.   Abd: Soft. Midline incision dressed, c/d/i. minimal drainage at lower portion of incision.       LABS:                         7.7    8.39  )-----------( 244      ( 08 Jun 2018 03:00 )             21.5     06-08    137  |  107  |  8   ----------------------------<  85  4.3   |  19<L>  |  0.60    Ca    7.4<L>      08 Jun 2018 03:00  Phos  3.1     06-08  Mg     2.3     06-08    TPro  4.0<L>  /  Alb  1.7<L>  /  TBili  9.7<H>  /  DBili  x   /  AST  71<H>  /  ALT  51<H>  /  AlkPhos  466<H>  06-08

## 2018-06-08 NOTE — PROGRESS NOTE ADULT - ASSESSMENT
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 No acute issues    CV: Hemodynamically stable, not requiring pressor support. UOP improved overnight.    GI: s/p small bowel resection and primary anastomosis.   - NPO  - NGT to LCWS  - Protonix BID for bloody NGT output  - Awaiting PTC drain placement    Renal: No acute issues  - monitor UOP  - replete lytes PRN  - LR currently at 125. May switch over to maintenance fluids today @ 100    ID: Afebrile. No leukocytosis  - c/w Cipro and Flagyl for jejunal perf for  4 days after source control    Heme: No acute signs of bleeding. Was given 1 U of pRBCs on  6/ 7 for Hct of 20.9.   - VTE ppx w/ Lovenox  - monitor CBC    Endo: No active issues at this time  - monitor BG on BMP    Dispo:  - SICU  - Full code

## 2018-06-08 NOTE — PROVIDER CONTACT NOTE (OTHER) - ASSESSMENT
Patient has sanguinous drainage from nasogastric tube, IR biliary drain, and midline abdominal incision. Patient c/o generalized pain 6/10 Vitals BP 80/50 (63) HR 97 RR18 SpO2 99% on 3L NC Patient has sanguinous drainage from nasogastric tube, IR biliary drain, and midline abdominal incision. Patient c/o generalized pain 6/10 Vitals BP 80/50 (63) HR 97 RR18 SpO2 99% on 3L NC.

## 2018-06-08 NOTE — PROGRESS NOTE ADULT - ASSESSMENT
58F w/ jaundice 2/2 possible pancreatic head mass, s/p EUS/ERCP aborted 2/2 small bowel perf, now s/p ex lap, resection of perforated bowel with side to side anastamosis, POD #1    - plan for PTC drain by IR today.   - NPO / IVF  - pain control   - Continue ABX  - Appreciate care per SICU        D Team Surgery  [id number]

## 2018-06-08 NOTE — PROGRESS NOTE ADULT - SUBJECTIVE AND OBJECTIVE BOX
SICU Daily Progress Note  =====================================================  Interval/Overnight Events: IR consulted for PTC drain placement which is scheduled for 6/8. Patient was extubated uneventfully. Throughout the course of the day, she was not requiring pressors but continued to be oliguric. She was given a total of 3L fluid boluses and 1U pRBCs.     POD # 2         	SICU Day #    2    HPI:  Patient complains of 1 week of progressively worsening jaundice. Patient now endorses some pain in the right upper quadrant of her abdomen, which extends through her back. Two days ago, she was admitted to [facility] for work-up. She had a CT scan and MRCP. On MRCP she has a 1.4 cm mass in the pancreatic head obstructing the common hepatic duct. Transfer to [facility] was initiated in order to pursue an ERCP with gastroenterology. She denies nausea, vomiting, fever, chills.     Pertinent history: gastric cancer 6/2017 s/p partial gastrectomy with Billroth II reconstruction.    Pt had ERCP c/b perforation of afferent limb. Pt underwent emergent Ex-lap w/ resection of perforated bowel and stapled side to side functional end to end anastomosis. Pt remained intubated and was transferred to SICU off pressors.    Surgery Information: Ex-lap, extensive Puerto Rican, SBR, side to side anastomosis  Case Duration: 3hrs 	EBL: 200	IV Fluids: 3.8L in ERCP suite and OR	Blood Products: 1U PRBC	Urine Output: 200  Complications: None    PAST MEDICAL & SURGICAL HISTORY:  Gastric cancer  Malignant neoplasm of stomach, unspecified location  S/P partial gastrectomy: distal gastrectomy with Billroth II reconstruction, D2 lymphadenectomy, feeding jejunostomy tube placement 6/23/17    Allergies: No Known Allergies      MEDICATIONS:   --------------------------------------------------------------------------------------  Neurologic Medications  acetaminophen  IVPB. 800 milliGRAM(s) IV Intermittent once  HYDROmorphone  Injectable 0.5 milliGRAM(s) IV Push every 4 hours PRN Moderate Pain (4 - 6)  HYDROmorphone  Injectable 1 milliGRAM(s) IV Push every 4 hours PRN Severe Pain (7 - 10)    Respiratory Medications    Cardiovascular Medications    Gastrointestinal Medications  lactated ringers. 1000 milliLiter(s) IV Continuous <Continuous>  pantoprazole  Injectable 40 milliGRAM(s) IV Push two times a day    Genitourinary Medications    Hematologic/Oncologic Medications  enoxaparin Injectable 40 milliGRAM(s) SubCutaneous daily    Antimicrobial/Immunologic Medications  ciprofloxacin   IVPB 400 milliGRAM(s) IV Intermittent every 12 hours  metroNIDAZOLE  IVPB 500 milliGRAM(s) IV Intermittent every 8 hours    Endocrine/Metabolic Medications    Topical/Other Medications  chlorhexidine 4% Liquid 1 Application(s) Topical <User Schedule>    --------------------------------------------------------------------------------------    VITAL SIGNS, INS/OUTS (last 24 hours):  --------------------------------------------------------------------------------------  Vital Signs Last 24 Hrs  T(C): 36.3 (08 Jun 2018 00:00), Max: 36.6 (07 Jun 2018 16:00)  T(F): 97.3 (08 Jun 2018 00:00), Max: 97.9 (07 Jun 2018 16:00)  HR: 79 (08 Jun 2018 03:00) (58 - 79)  BP: 81/40 (07 Jun 2018 20:34) (81/40 - 81/40)  BP(mean): 51 (07 Jun 2018 20:34) (51 - 51)  RR: 28 (08 Jun 2018 03:00) (10 - 28)  SpO2: 97% (08 Jun 2018 03:00) (95% - 100%)    I&O's Detail    06 Jun 2018 07:01  -  07 Jun 2018 07:00  --------------------------------------------------------  IN:    fentaNYL  Infusion: 18.9 mL    IV PiggyBack: 350 mL    lactated ringers.: 600 mL    propofol Infusion: 20.8 mL    propofol Infusion: 5 mL  Total IN: 994.7 mL    OUT:    Indwelling Catheter - Urethral: 115 mL    Nasoenteral Tube: 50 mL  Total OUT: 165 mL    Total NET: 829.7 mL      07 Jun 2018 07:01  -  08 Jun 2018 04:02  --------------------------------------------------------  IN:    fentaNYL  Infusion: 10.8 mL    IV PiggyBack: 2400 mL    Lactated Ringers IV Bolus: 1000 mL    lactated ringers.: 2200 mL    Packed Red Blood Cells: 300 mL    propofol Infusion: 12.8 mL  Total IN: 5923.6 mL    OUT:    Indwelling Catheter - Urethral: 405 mL    Nasoenteral Tube: 150 mL  Total OUT: 555 mL    Total NET: 5368.6 mL    --------------------------------------------------------------------------------------  EXAM  NEUROLOGY  RASS: -2  	GCS:  3T  Exam: Sedated. No focal deficits    HEENT  Exam: Normocephalic, atraumatic, scleral icterus    RESPIRATORY  Exam: Lungs clear to auscultation, Normal expansion/effort.  Mechanical Ventilation: Mode: AC/ CMV (Assist Control/ Continuous Mandatory Ventilation), RR (machine): 10, TV (machine): 450, FiO2: 60, PEEP: 5, MAP: 7.7, PIP: 17    CARDIOVASCULAR  Exam: S1, S2.  Regular rate and rhythm.  Peripheral edema    GI/NUTRITION  Exam: Abdomen soft, Non-tender, Non-distended, incision c/d/i  Current Diet:  NPO/NGT    VASCULAR  Exam: Extremities warm, jaundiced, well-perfused.    MUSCULOSKELETAL  Exam: All extremities moving spontaneously without limitations.    SKIN:  Exam: Good skin turgor, no skin breakdown.    METABOLIC/FLUIDS/ELECTROLYTES  lactated ringers. 1000 milliLiter(s) IV Continuous <Continuous>      HEMATOLOGIC  [x] DVT Prophylaxis: enoxaparin Injectable 40 milliGRAM(s) SubCutaneous daily    Transfusions:	[x] 1U PRBC	[] Platelets		[] FFP	[] Cryoprecipitate    INFECTIOUS DISEASE  Antimicrobials/Immunologic Medications:    Tubes/Lines/Drains  [x] Peripheral IV  [] Central Venous Line     	[] R	[] L	[] IJ	[] Fem	[] SC	Date Placed:   [x] Arterial Line		[] R	[x] L	[] Fem	[x] Rad	[] Ax	Date Placed:   [] PICC:         	[] Midline		[] Mediport  [x] Urinary Catheter		Date Placed: 6/6    LABS  --------------------------------------------------------------------------------------  CBC Full  -  ( 08 Jun 2018 03:00 )  WBC Count : 8.39 K/uL  Hemoglobin : 7.7 g/dL  Hematocrit : 21.5 %  Platelet Count - Automated : 244 K/uL  Mean Cell Volume : 79.9 fL  Mean Cell Hemoglobin : 28.6 pg  Mean Cell Hemoglobin Concentration : 35.8 %  Auto Neutrophil # : x  Auto Lymphocyte # : x  Auto Monocyte # : x  Auto Eosinophil # : x  Auto Basophil # : x  Auto Neutrophil % : x  Auto Lymphocyte % : x  Auto Monocyte % : x  Auto Eosinophil % : x  Auto Basophil % : x    06-08    137  |  107  |  8   ----------------------------<  85  4.3   |  19<L>  |  0.60    Ca    7.4<L>      08 Jun 2018 03:00  Phos  3.1     06-08  Mg     2.3     06-08    TPro  4.0<L>  /  Alb  1.7<L>  /  TBili  9.7<H>  /  DBili  x   /  AST  71<H>  /  ALT  51<H>  /  AlkPhos  466<H>  06-08    LIVER FUNCTIONS - ( 08 Jun 2018 03:00 )  Alb: 1.7 g/dL / Pro: 4.0 g/dL / ALK PHOS: 466 u/L / ALT: 51 u/L / AST: 71 u/L / GGT: x           PT/INR - ( 08 Jun 2018 03:00 )   PT: 14.5 SEC;   INR: 1.26          PTT - ( 08 Jun 2018 03:00 )  PTT:35.1 SEC    -------------------------------------------------------------------------------------- SICU Daily Progress Note  =====================================================  Interval/Overnight Events: IR consulted for PTC drain placement which is scheduled for 6/8. Patient was extubated uneventfully. Throughout the course of the day, she was not requiring pressors but continued to be oliguric. She was given a total of 3L fluid boluses and 1U pRBCs. Was complaining of right sided shoulder/chest pain that was reproducible. Given IV Tylenol.    POD # 2         	SICU Day #    2    HPI:  Patient complains of 1 week of progressively worsening jaundice. Patient now endorses some pain in the right upper quadrant of her abdomen, which extends through her back. Two days ago, she was admitted to [facility] for work-up. She had a CT scan and MRCP. On MRCP she has a 1.4 cm mass in the pancreatic head obstructing the common hepatic duct. Transfer to [facility] was initiated in order to pursue an ERCP with gastroenterology. She denies nausea, vomiting, fever, chills.     Pertinent history: gastric cancer 6/2017 s/p partial gastrectomy with Billroth II reconstruction.    Pt had ERCP c/b perforation of afferent limb. Pt underwent emergent Ex-lap w/ resection of perforated bowel and stapled side to side functional end to end anastomosis. Pt remained intubated and was transferred to SICU off pressors.    Surgery Information: Ex-lap, extensive Icelandic, SBR, side to side anastomosis  Case Duration: 3hrs 	EBL: 200	IV Fluids: 3.8L in ERCP suite and OR	Blood Products: 1U PRBC	Urine Output: 200  Complications: None    PAST MEDICAL & SURGICAL HISTORY:  Gastric cancer  Malignant neoplasm of stomach, unspecified location  S/P partial gastrectomy: distal gastrectomy with Billroth II reconstruction, D2 lymphadenectomy, feeding jejunostomy tube placement 6/23/17    Allergies: No Known Allergies      MEDICATIONS:   --------------------------------------------------------------------------------------  Neurologic Medications  acetaminophen  IVPB. 800 milliGRAM(s) IV Intermittent once  HYDROmorphone  Injectable 0.5 milliGRAM(s) IV Push every 4 hours PRN Moderate Pain (4 - 6)  HYDROmorphone  Injectable 1 milliGRAM(s) IV Push every 4 hours PRN Severe Pain (7 - 10)    Respiratory Medications    Cardiovascular Medications    Gastrointestinal Medications  lactated ringers. 1000 milliLiter(s) IV Continuous <Continuous>  pantoprazole  Injectable 40 milliGRAM(s) IV Push two times a day    Genitourinary Medications    Hematologic/Oncologic Medications  enoxaparin Injectable 40 milliGRAM(s) SubCutaneous daily    Antimicrobial/Immunologic Medications  ciprofloxacin   IVPB 400 milliGRAM(s) IV Intermittent every 12 hours  metroNIDAZOLE  IVPB 500 milliGRAM(s) IV Intermittent every 8 hours    Endocrine/Metabolic Medications    Topical/Other Medications  chlorhexidine 4% Liquid 1 Application(s) Topical <User Schedule>    --------------------------------------------------------------------------------------    VITAL SIGNS, INS/OUTS (last 24 hours):  --------------------------------------------------------------------------------------  Vital Signs Last 24 Hrs  T(C): 36.3 (08 Jun 2018 00:00), Max: 36.6 (07 Jun 2018 16:00)  T(F): 97.3 (08 Jun 2018 00:00), Max: 97.9 (07 Jun 2018 16:00)  HR: 79 (08 Jun 2018 03:00) (58 - 79)  BP: 81/40 (07 Jun 2018 20:34) (81/40 - 81/40)  BP(mean): 51 (07 Jun 2018 20:34) (51 - 51)  RR: 28 (08 Jun 2018 03:00) (10 - 28)  SpO2: 97% (08 Jun 2018 03:00) (95% - 100%)    I&O's Detail    06 Jun 2018 07:01  -  07 Jun 2018 07:00  --------------------------------------------------------  IN:    fentaNYL  Infusion: 18.9 mL    IV PiggyBack: 350 mL    lactated ringers.: 600 mL    propofol Infusion: 20.8 mL    propofol Infusion: 5 mL  Total IN: 994.7 mL    OUT:    Indwelling Catheter - Urethral: 115 mL    Nasoenteral Tube: 50 mL  Total OUT: 165 mL    Total NET: 829.7 mL      07 Jun 2018 07:01  -  08 Jun 2018 04:02  --------------------------------------------------------  IN:    fentaNYL  Infusion: 10.8 mL    IV PiggyBack: 2400 mL    Lactated Ringers IV Bolus: 1000 mL    lactated ringers.: 2200 mL    Packed Red Blood Cells: 300 mL    propofol Infusion: 12.8 mL  Total IN: 5923.6 mL    OUT:    Indwelling Catheter - Urethral: 405 mL    Nasoenteral Tube: 150 mL  Total OUT: 555 mL    Total NET: 5368.6 mL    --------------------------------------------------------------------------------------  EXAM  NEUROLOGY  RASS: -2  	GCS:  3T  Exam: Sedated. No focal deficits    HEENT  Exam: Normocephalic, atraumatic, scleral icterus    RESPIRATORY  Exam: Lungs clear to auscultation, Normal expansion/effort.  Mechanical Ventilation: Mode: AC/ CMV (Assist Control/ Continuous Mandatory Ventilation), RR (machine): 10, TV (machine): 450, FiO2: 60, PEEP: 5, MAP: 7.7, PIP: 17    CARDIOVASCULAR  Exam: S1, S2.  Regular rate and rhythm.  Peripheral edema    GI/NUTRITION  Exam: Abdomen soft, Non-tender, Non-distended, incision c/d/i  Current Diet:  NPO/NGT    VASCULAR  Exam: Extremities warm, jaundiced, well-perfused.    MUSCULOSKELETAL  Exam: All extremities moving spontaneously without limitations.    SKIN:  Exam: Good skin turgor, no skin breakdown.    METABOLIC/FLUIDS/ELECTROLYTES  lactated ringers. 1000 milliLiter(s) IV Continuous <Continuous>      HEMATOLOGIC  [x] DVT Prophylaxis: enoxaparin Injectable 40 milliGRAM(s) SubCutaneous daily    Transfusions:	[x] 1U PRBC	[] Platelets		[] FFP	[] Cryoprecipitate    INFECTIOUS DISEASE  Antimicrobials/Immunologic Medications:    Tubes/Lines/Drains  [x] Peripheral IV  [] Central Venous Line     	[] R	[] L	[] IJ	[] Fem	[] SC	Date Placed:   [x] Arterial Line		[] R	[x] L	[] Fem	[x] Rad	[] Ax	Date Placed:   [] PICC:         	[] Midline		[] Mediport  [x] Urinary Catheter		Date Placed: 6/6    LABS  --------------------------------------------------------------------------------------  CBC Full  -  ( 08 Jun 2018 03:00 )  WBC Count : 8.39 K/uL  Hemoglobin : 7.7 g/dL  Hematocrit : 21.5 %  Platelet Count - Automated : 244 K/uL  Mean Cell Volume : 79.9 fL  Mean Cell Hemoglobin : 28.6 pg  Mean Cell Hemoglobin Concentration : 35.8 %  Auto Neutrophil # : x  Auto Lymphocyte # : x  Auto Monocyte # : x  Auto Eosinophil # : x  Auto Basophil # : x  Auto Neutrophil % : x  Auto Lymphocyte % : x  Auto Monocyte % : x  Auto Eosinophil % : x  Auto Basophil % : x    06-08    137  |  107  |  8   ----------------------------<  85  4.3   |  19<L>  |  0.60    Ca    7.4<L>      08 Jun 2018 03:00  Phos  3.1     06-08  Mg     2.3     06-08    TPro  4.0<L>  /  Alb  1.7<L>  /  TBili  9.7<H>  /  DBili  x   /  AST  71<H>  /  ALT  51<H>  /  AlkPhos  466<H>  06-08    LIVER FUNCTIONS - ( 08 Jun 2018 03:00 )  Alb: 1.7 g/dL / Pro: 4.0 g/dL / ALK PHOS: 466 u/L / ALT: 51 u/L / AST: 71 u/L / GGT: x           PT/INR - ( 08 Jun 2018 03:00 )   PT: 14.5 SEC;   INR: 1.26          PTT - ( 08 Jun 2018 03:00 )  PTT:35.1 SEC    -------------------------------------------------------------------------------------- SICU Daily Progress Note  =====================================================  Interval/Overnight Events: IR consulted for PTC drain placement which is scheduled for 6/8. Patient was extubated uneventfully. Throughout the course of the day, she was not requiring pressors but continued to be oliguric. She was given a total of 3L fluid boluses and 1U pRBCs. Was complaining of right sided shoulder/chest pain that was reproducible. Given IV Tylenol.    POD # 2         	SICU Day #    2    HPI:  Patient complains of 1 week of progressively worsening jaundice. Patient now endorses some pain in the right upper quadrant of her abdomen, which extends through her back. Two days ago, she was admitted to [facility] for work-up. She had a CT scan and MRCP. On MRCP she has a 1.4 cm mass in the pancreatic head obstructing the common hepatic duct. Transfer to [facility] was initiated in order to pursue an ERCP with gastroenterology. She denies nausea, vomiting, fever, chills.     Pertinent history: gastric cancer 6/2017 s/p partial gastrectomy with Billroth II reconstruction.    Pt had ERCP c/b perforation of afferent limb. Pt underwent emergent Ex-lap w/ resection of perforated bowel and stapled side to side functional end to end anastomosis. Pt remained intubated and was transferred to SICU off pressors.    Surgery Information: Ex-lap, extensive Pashto, SBR, side to side anastomosis  Case Duration: 3hrs 	EBL: 200	IV Fluids: 3.8L in ERCP suite and OR	Blood Products: 1U PRBC	Urine Output: 200  Complications: None    PAST MEDICAL & SURGICAL HISTORY:  Gastric cancer  Malignant neoplasm of stomach, unspecified location  S/P partial gastrectomy: distal gastrectomy with Billroth II reconstruction, D2 lymphadenectomy, feeding jejunostomy tube placement 6/23/17    Allergies: No Known Allergies      MEDICATIONS:   --------------------------------------------------------------------------------------  Neurologic Medications  acetaminophen  IVPB. 800 milliGRAM(s) IV Intermittent once  HYDROmorphone  Injectable 0.5 milliGRAM(s) IV Push every 4 hours PRN Moderate Pain (4 - 6)  HYDROmorphone  Injectable 1 milliGRAM(s) IV Push every 4 hours PRN Severe Pain (7 - 10)    Respiratory Medications    Cardiovascular Medications    Gastrointestinal Medications  lactated ringers. 1000 milliLiter(s) IV Continuous <Continuous>  pantoprazole  Injectable 40 milliGRAM(s) IV Push two times a day    Genitourinary Medications    Hematologic/Oncologic Medications  enoxaparin Injectable 40 milliGRAM(s) SubCutaneous daily    Antimicrobial/Immunologic Medications  ciprofloxacin   IVPB 400 milliGRAM(s) IV Intermittent every 12 hours  metroNIDAZOLE  IVPB 500 milliGRAM(s) IV Intermittent every 8 hours    Endocrine/Metabolic Medications    Topical/Other Medications  chlorhexidine 4% Liquid 1 Application(s) Topical <User Schedule>    --------------------------------------------------------------------------------------    VITAL SIGNS, INS/OUTS (last 24 hours):  --------------------------------------------------------------------------------------  Vital Signs Last 24 Hrs  T(C): 36.3 (08 Jun 2018 00:00), Max: 36.6 (07 Jun 2018 16:00)  T(F): 97.3 (08 Jun 2018 00:00), Max: 97.9 (07 Jun 2018 16:00)  HR: 79 (08 Jun 2018 03:00) (58 - 79)  BP: 81/40 (07 Jun 2018 20:34) (81/40 - 81/40)  BP(mean): 51 (07 Jun 2018 20:34) (51 - 51)  RR: 28 (08 Jun 2018 03:00) (10 - 28)  SpO2: 97% (08 Jun 2018 03:00) (95% - 100%)    I&O's Detail    06 Jun 2018 07:01  -  07 Jun 2018 07:00  --------------------------------------------------------  IN:    fentaNYL  Infusion: 18.9 mL    IV PiggyBack: 350 mL    lactated ringers.: 600 mL    propofol Infusion: 20.8 mL    propofol Infusion: 5 mL  Total IN: 994.7 mL    OUT:    Indwelling Catheter - Urethral: 115 mL    Nasoenteral Tube: 50 mL  Total OUT: 165 mL    Total NET: 829.7 mL      07 Jun 2018 07:01  -  08 Jun 2018 04:02  --------------------------------------------------------  IN:    fentaNYL  Infusion: 10.8 mL    IV PiggyBack: 2400 mL    Lactated Ringers IV Bolus: 1000 mL    lactated ringers.: 2200 mL    Packed Red Blood Cells: 300 mL    propofol Infusion: 12.8 mL  Total IN: 5923.6 mL    OUT:    Indwelling Catheter - Urethral: 405 mL    Nasoenteral Tube: 150 mL  Total OUT: 555 mL    Total NET: 5368.6 mL    --------------------------------------------------------------------------------------  EXAM  NEUROLOGY  Exam: NAD, alert and oriented     HEENT  Exam: Normocephalic, atraumatic, scleral icterus    RESPIRATORY  Exam: Lungs clear to auscultation, Normal expansion/effort.    CARDIOVASCULAR  Exam: S1, S2.  Regular rate and rhythm.     GI/NUTRITION  Exam: Abdomen soft, Non-tender, Non-distended, incision c/d/i.   Current Diet:  NPO/NGT    VASCULAR  Exam: Extremities warm, jaundiced, well-perfused.    MUSCULOSKELETAL  Exam: All extremities moving spontaneously without limitations.    SKIN:  Exam: Good skin turgor, no skin breakdown.    METABOLIC/FLUIDS/ELECTROLYTES  lactated ringers. 1000 milliLiter(s) IV Continuous <Continuous>      HEMATOLOGIC  [x] DVT Prophylaxis: enoxaparin Injectable 40 milliGRAM(s) SubCutaneous daily    Transfusions:	[x] 1U PRBC	[] Platelets		[] FFP	[] Cryoprecipitate    INFECTIOUS DISEASE  Antimicrobials/Immunologic Medications:    Tubes/Lines/Drains  [x] Peripheral IV  [] Central Venous Line     	[] R	[] L	[] IJ	[] Fem	[] SC	Date Placed:   [x] Arterial Line		[] R	[x] L	[] Fem	[x] Rad	[] Ax	Date Placed:   [] PICC:         	[] Midline		[] Mediport  [x] Urinary Catheter		Date Placed: 6/6    LABS  --------------------------------------------------------------------------------------  CBC Full  -  ( 08 Jun 2018 03:00 )  WBC Count : 8.39 K/uL  Hemoglobin : 7.7 g/dL  Hematocrit : 21.5 %  Platelet Count - Automated : 244 K/uL  Mean Cell Volume : 79.9 fL  Mean Cell Hemoglobin : 28.6 pg  Mean Cell Hemoglobin Concentration : 35.8 %  Auto Neutrophil # : x  Auto Lymphocyte # : x  Auto Monocyte # : x  Auto Eosinophil # : x  Auto Basophil # : x  Auto Neutrophil % : x  Auto Lymphocyte % : x  Auto Monocyte % : x  Auto Eosinophil % : x  Auto Basophil % : x    06-08    137  |  107  |  8   ----------------------------<  85  4.3   |  19<L>  |  0.60    Ca    7.4<L>      08 Jun 2018 03:00  Phos  3.1     06-08  Mg     2.3     06-08    TPro  4.0<L>  /  Alb  1.7<L>  /  TBili  9.7<H>  /  DBili  x   /  AST  71<H>  /  ALT  51<H>  /  AlkPhos  466<H>  06-08    LIVER FUNCTIONS - ( 08 Jun 2018 03:00 )  Alb: 1.7 g/dL / Pro: 4.0 g/dL / ALK PHOS: 466 u/L / ALT: 51 u/L / AST: 71 u/L / GGT: x           PT/INR - ( 08 Jun 2018 03:00 )   PT: 14.5 SEC;   INR: 1.26          PTT - ( 08 Jun 2018 03:00 )  PTT:35.1 SEC    --------------------------------------------------------------------------------------

## 2018-06-09 LAB
ALBUMIN SERPL ELPH-MCNC: 1.7 G/DL — LOW (ref 3.3–5)
ALP SERPL-CCNC: 473 U/L — HIGH (ref 40–120)
ALT FLD-CCNC: 46 U/L — HIGH (ref 4–33)
AMYLASE P1 CFR SERPL: 489 U/L — HIGH (ref 25–125)
ANISOCYTOSIS BLD QL: SIGNIFICANT CHANGE UP
ANISOCYTOSIS BLD QL: SIGNIFICANT CHANGE UP
AST SERPL-CCNC: 59 U/L — HIGH (ref 4–32)
BASOPHILS # BLD AUTO: 0.03 K/UL — SIGNIFICANT CHANGE UP (ref 0–0.2)
BASOPHILS NFR BLD AUTO: 0.1 % — SIGNIFICANT CHANGE UP (ref 0–2)
BASOPHILS NFR SPEC: 0 % — SIGNIFICANT CHANGE UP (ref 0–2)
BASOPHILS NFR SPEC: 0 % — SIGNIFICANT CHANGE UP (ref 0–2)
BILIRUB SERPL-MCNC: 14 MG/DL — HIGH (ref 0.2–1.2)
BLD GP AB SCN SERPL QL: NEGATIVE — SIGNIFICANT CHANGE UP
BUN SERPL-MCNC: 14 MG/DL — SIGNIFICANT CHANGE UP (ref 7–23)
BURR CELLS BLD QL SMEAR: PRESENT — SIGNIFICANT CHANGE UP
BURR CELLS BLD QL SMEAR: PRESENT — SIGNIFICANT CHANGE UP
CA-I BLD-SCNC: 1.06 MMOL/L — SIGNIFICANT CHANGE UP (ref 1.03–1.23)
CALCIUM SERPL-MCNC: 7.5 MG/DL — LOW (ref 8.4–10.5)
CHLORIDE SERPL-SCNC: 104 MMOL/L — SIGNIFICANT CHANGE UP (ref 98–107)
CO2 SERPL-SCNC: 17 MMOL/L — LOW (ref 22–31)
CREAT SERPL-MCNC: 0.8 MG/DL — SIGNIFICANT CHANGE UP (ref 0.5–1.3)
EOSINOPHIL # BLD AUTO: 0.01 K/UL — SIGNIFICANT CHANGE UP (ref 0–0.5)
EOSINOPHIL NFR BLD AUTO: 0 % — SIGNIFICANT CHANGE UP (ref 0–6)
EOSINOPHIL NFR FLD: 0 % — SIGNIFICANT CHANGE UP (ref 0–6)
EOSINOPHIL NFR FLD: 0 % — SIGNIFICANT CHANGE UP (ref 0–6)
GIANT PLATELETS BLD QL SMEAR: PRESENT — SIGNIFICANT CHANGE UP
GIANT PLATELETS BLD QL SMEAR: PRESENT — SIGNIFICANT CHANGE UP
GLUCOSE SERPL-MCNC: 110 MG/DL — HIGH (ref 70–99)
HCT VFR BLD CALC: 31.7 % — LOW (ref 34.5–45)
HCT VFR BLD CALC: 31.7 % — LOW (ref 34.5–45)
HGB BLD-MCNC: 11.4 G/DL — LOW (ref 11.5–15.5)
HGB BLD-MCNC: 11.4 G/DL — LOW (ref 11.5–15.5)
IMM GRANULOCYTES # BLD AUTO: 0.32 # — SIGNIFICANT CHANGE UP
IMM GRANULOCYTES NFR BLD AUTO: 1.5 % — SIGNIFICANT CHANGE UP (ref 0–1.5)
LIDOCAIN IGE QN: > 600 U/L — HIGH (ref 7–60)
LYMPHOCYTES # BLD AUTO: 0.24 K/UL — LOW (ref 1–3.3)
LYMPHOCYTES # BLD AUTO: 1.1 % — LOW (ref 13–44)
LYMPHOCYTES NFR SPEC AUTO: 2.6 % — LOW (ref 13–44)
LYMPHOCYTES NFR SPEC AUTO: 2.6 % — LOW (ref 13–44)
MACROCYTES BLD QL: SIGNIFICANT CHANGE UP
MACROCYTES BLD QL: SIGNIFICANT CHANGE UP
MAGNESIUM SERPL-MCNC: 2 MG/DL — SIGNIFICANT CHANGE UP (ref 1.6–2.6)
MCHC RBC-ENTMCNC: 29.6 PG — SIGNIFICANT CHANGE UP (ref 27–34)
MCHC RBC-ENTMCNC: 29.6 PG — SIGNIFICANT CHANGE UP (ref 27–34)
MCHC RBC-ENTMCNC: 36 % — SIGNIFICANT CHANGE UP (ref 32–36)
MCHC RBC-ENTMCNC: 36 % — SIGNIFICANT CHANGE UP (ref 32–36)
MCV RBC AUTO: 82.3 FL — SIGNIFICANT CHANGE UP (ref 80–100)
MCV RBC AUTO: 82.3 FL — SIGNIFICANT CHANGE UP (ref 80–100)
MONOCYTES # BLD AUTO: 0.2 K/UL — SIGNIFICANT CHANGE UP (ref 0–0.9)
MONOCYTES NFR BLD AUTO: 1 % — LOW (ref 2–14)
MONOCYTES NFR BLD: 1.8 % — LOW (ref 2–9)
MONOCYTES NFR BLD: 1.8 % — LOW (ref 2–9)
NEUTROPHIL AB SER-ACNC: 92.1 % — HIGH (ref 43–77)
NEUTROPHIL AB SER-ACNC: 92.1 % — HIGH (ref 43–77)
NEUTROPHILS # BLD AUTO: 20.16 K/UL — HIGH (ref 1.8–7.4)
NEUTROPHILS NFR BLD AUTO: 96.3 % — HIGH (ref 43–77)
NEUTS BAND # BLD: 3.5 % — SIGNIFICANT CHANGE UP (ref 0–6)
NEUTS BAND # BLD: 3.5 % — SIGNIFICANT CHANGE UP (ref 0–6)
NRBC # FLD: 0.03 — SIGNIFICANT CHANGE UP
NRBC # FLD: 0.03 — SIGNIFICANT CHANGE UP
PHOSPHATE SERPL-MCNC: 2.9 MG/DL — SIGNIFICANT CHANGE UP (ref 2.5–4.5)
PLATELET # BLD AUTO: 212 K/UL — SIGNIFICANT CHANGE UP (ref 150–400)
PLATELET # BLD AUTO: 212 K/UL — SIGNIFICANT CHANGE UP (ref 150–400)
PLATELET COUNT - ESTIMATE: NORMAL — SIGNIFICANT CHANGE UP
PLATELET COUNT - ESTIMATE: NORMAL — SIGNIFICANT CHANGE UP
PMV BLD: 10.1 FL — SIGNIFICANT CHANGE UP (ref 7–13)
PMV BLD: 10.1 FL — SIGNIFICANT CHANGE UP (ref 7–13)
POIKILOCYTOSIS BLD QL AUTO: SIGNIFICANT CHANGE UP
POIKILOCYTOSIS BLD QL AUTO: SIGNIFICANT CHANGE UP
POLYCHROMASIA BLD QL SMEAR: SIGNIFICANT CHANGE UP
POLYCHROMASIA BLD QL SMEAR: SIGNIFICANT CHANGE UP
POTASSIUM SERPL-MCNC: 3.5 MMOL/L — SIGNIFICANT CHANGE UP (ref 3.5–5.3)
POTASSIUM SERPL-SCNC: 3.5 MMOL/L — SIGNIFICANT CHANGE UP (ref 3.5–5.3)
PROT SERPL-MCNC: 4.1 G/DL — LOW (ref 6–8.3)
RBC # BLD: 3.85 M/UL — SIGNIFICANT CHANGE UP (ref 3.8–5.2)
RBC # BLD: 3.85 M/UL — SIGNIFICANT CHANGE UP (ref 3.8–5.2)
RBC # FLD: 19.1 % — HIGH (ref 10.3–14.5)
RBC # FLD: 19.1 % — HIGH (ref 10.3–14.5)
RH IG SCN BLD-IMP: POSITIVE — SIGNIFICANT CHANGE UP
SODIUM SERPL-SCNC: 136 MMOL/L — SIGNIFICANT CHANGE UP (ref 135–145)
WBC # BLD: 20.96 K/UL — HIGH (ref 3.8–10.5)
WBC # BLD: 20.96 K/UL — HIGH (ref 3.8–10.5)
WBC # FLD AUTO: 20.96 K/UL — HIGH (ref 3.8–10.5)
WBC # FLD AUTO: 20.96 K/UL — HIGH (ref 3.8–10.5)

## 2018-06-09 PROCEDURE — 99233 SBSQ HOSP IP/OBS HIGH 50: CPT | Mod: GC

## 2018-06-09 PROCEDURE — 99232 SBSQ HOSP IP/OBS MODERATE 35: CPT | Mod: GC

## 2018-06-09 RX ORDER — BENZOCAINE AND MENTHOL 5; 1 G/100ML; G/100ML
1 LIQUID ORAL EVERY 6 HOURS
Qty: 0 | Refills: 0 | Status: DISCONTINUED | OUTPATIENT
Start: 2018-06-09 | End: 2018-06-21

## 2018-06-09 RX ORDER — ALBUMIN HUMAN 25 %
250 VIAL (ML) INTRAVENOUS
Qty: 0 | Refills: 0 | Status: COMPLETED | OUTPATIENT
Start: 2018-06-09 | End: 2018-06-09

## 2018-06-09 RX ORDER — ONDANSETRON 8 MG/1
4 TABLET, FILM COATED ORAL ONCE
Qty: 0 | Refills: 0 | Status: COMPLETED | OUTPATIENT
Start: 2018-06-09 | End: 2018-06-09

## 2018-06-09 RX ADMIN — HYDROMORPHONE HYDROCHLORIDE 0.5 MILLIGRAM(S): 2 INJECTION INTRAMUSCULAR; INTRAVENOUS; SUBCUTANEOUS at 13:53

## 2018-06-09 RX ADMIN — BENZOCAINE AND MENTHOL 1 LOZENGE: 5; 1 LIQUID ORAL at 15:38

## 2018-06-09 RX ADMIN — CHLORHEXIDINE GLUCONATE 1 APPLICATION(S): 213 SOLUTION TOPICAL at 13:00

## 2018-06-09 RX ADMIN — Medication 125 MILLILITER(S): at 15:39

## 2018-06-09 RX ADMIN — SODIUM CHLORIDE 125 MILLILITER(S): 9 INJECTION, SOLUTION INTRAVENOUS at 08:37

## 2018-06-09 RX ADMIN — SODIUM CHLORIDE 125 MILLILITER(S): 9 INJECTION, SOLUTION INTRAVENOUS at 21:20

## 2018-06-09 RX ADMIN — ENOXAPARIN SODIUM 40 MILLIGRAM(S): 100 INJECTION SUBCUTANEOUS at 13:00

## 2018-06-09 RX ADMIN — Medication 125 MILLILITER(S): at 17:35

## 2018-06-09 RX ADMIN — Medication 100 MILLIGRAM(S): at 06:47

## 2018-06-09 RX ADMIN — ONDANSETRON 4 MILLIGRAM(S): 8 TABLET, FILM COATED ORAL at 13:38

## 2018-06-09 RX ADMIN — SODIUM CHLORIDE 125 MILLILITER(S): 9 INJECTION, SOLUTION INTRAVENOUS at 06:47

## 2018-06-09 RX ADMIN — HYDROMORPHONE HYDROCHLORIDE 0.5 MILLIGRAM(S): 2 INJECTION INTRAMUSCULAR; INTRAVENOUS; SUBCUTANEOUS at 13:38

## 2018-06-09 RX ADMIN — Medication 200 MILLIGRAM(S): at 04:02

## 2018-06-09 RX ADMIN — PANTOPRAZOLE SODIUM 40 MILLIGRAM(S): 20 TABLET, DELAYED RELEASE ORAL at 06:47

## 2018-06-09 RX ADMIN — Medication 100 MILLIGRAM(S): at 21:19

## 2018-06-09 RX ADMIN — HYDROMORPHONE HYDROCHLORIDE 0.5 MILLIGRAM(S): 2 INJECTION INTRAMUSCULAR; INTRAVENOUS; SUBCUTANEOUS at 21:15

## 2018-06-09 RX ADMIN — Medication 100 MILLIGRAM(S): at 13:38

## 2018-06-09 RX ADMIN — HYDROMORPHONE HYDROCHLORIDE 0.5 MILLIGRAM(S): 2 INJECTION INTRAMUSCULAR; INTRAVENOUS; SUBCUTANEOUS at 08:37

## 2018-06-09 RX ADMIN — HYDROMORPHONE HYDROCHLORIDE 0.5 MILLIGRAM(S): 2 INJECTION INTRAMUSCULAR; INTRAVENOUS; SUBCUTANEOUS at 21:30

## 2018-06-09 RX ADMIN — PANTOPRAZOLE SODIUM 40 MILLIGRAM(S): 20 TABLET, DELAYED RELEASE ORAL at 17:18

## 2018-06-09 RX ADMIN — HYDROMORPHONE HYDROCHLORIDE 0.5 MILLIGRAM(S): 2 INJECTION INTRAMUSCULAR; INTRAVENOUS; SUBCUTANEOUS at 08:52

## 2018-06-09 RX ADMIN — Medication 200 MILLIGRAM(S): at 15:44

## 2018-06-09 NOTE — PROGRESS NOTE ADULT - SUBJECTIVE AND OBJECTIVE BOX
GENERAL SURGERY DAILY PROGRESS NOTE      Subjective:  Patient recieved 2uPRBC yesterday prior to IR procedure. Had PTC placed.         Objective:    PE:  Gen: NAD, AOx3. No focal deficits. Following commands.   Resp: few scattered rhonchi.   Abd: Soft. Midline incision dressed, c/d/i. minimal drainage at lower portion of incision. PTC in place      Vital Signs Last 24 Hrs  T(C): 37.4 (2018 21:45), Max: 37.4 (2018 20:00)  T(F): 99.4 (2018 21:45), Max: 99.4 (2018 20:00)  HR: 96 (2018 23:00) (65 - 113)  BP: 102/50 (2018 20:30) (102/50 - 177/100)  BP(mean): 61 (2018 20:30) (61 - 118)  RR: 13 (2018 23:00) (12 - 31)  SpO2: 97% (2018 23:00) (90% - 99%)    I&O's Detail    2018 07:  -  2018 07:00  --------------------------------------------------------  IN:    fentaNYL  Infusion: 10.8 mL    IV PiggyBack: 2800 mL    Lactated Ringers IV Bolus: 1000 mL    lactated ringers.: 2450 mL    Packed Red Blood Cells: 300 mL    propofol Infusion: 12.8 mL  Total IN: 6573.6 mL    OUT:    Indwelling Catheter - Urethral: 520 mL    Nasoenteral Tube: 250 mL  Total OUT: 770 mL    Total NET: 5803.6 mL      2018 07:  -  2018 00:43  --------------------------------------------------------  IN:    dextrose 5% + lactated ringers.: 125 mL    IV PiggyBack: 100 mL    lactated ringers.: 1625 mL    Packed Red Blood Cells: 900 mL  Total IN: 2750 mL    OUT:    Drain: 30 mL    Indwelling Catheter - Urethral: 340 mL    Nasoenteral Tube: 200 mL  Total OUT: 570 mL    Total NET: 2180 mL          Daily     Daily Weight in k.5 (2018 06:00)    MEDICATIONS  (STANDING):  chlorhexidine 4% Liquid 1 Application(s) Topical <User Schedule>  ciprofloxacin   IVPB 400 milliGRAM(s) IV Intermittent every 12 hours  dextrose 5% + lactated ringers. 1000 milliLiter(s) (125 mL/Hr) IV Continuous <Continuous>  enoxaparin Injectable 40 milliGRAM(s) SubCutaneous daily  metroNIDAZOLE  IVPB 500 milliGRAM(s) IV Intermittent every 8 hours  pantoprazole  Injectable 40 milliGRAM(s) IV Push two times a day    MEDICATIONS  (PRN):  HYDROmorphone  Injectable 0.5 milliGRAM(s) IV Push every 4 hours PRN Moderate Pain (4 - 6)  HYDROmorphone  Injectable 1 milliGRAM(s) IV Push every 4 hours PRN Severe Pain (7 - 10)      LABS:                        10.9   6.69  )-----------( 202      ( 2018 21:00 )             30.3     06-08    135  |  103  |  11  ----------------------------<  68<L>  3.6   |  16<L>  |  0.63    Ca    7.7<L>      2018 21:00  Phos  3.2     06-  Mg     2.0         TPro  4.2<L>  /  Alb  1.8<L>  /  TBili  14.6<H>  /  DBili  x   /  AST  61<H>  /  ALT  50<H>  /  AlkPhos  642<H>      PT/INR - ( 2018 18:52 )   PT: 14.8 SEC;   INR: 1.28          PTT - ( 2018 18:52 )  PTT:34.4 SEC      RADIOLOGY & ADDITIONAL STUDIES:

## 2018-06-09 NOTE — PROGRESS NOTE ADULT - ASSESSMENT
Impression:  1. Unsuccessful ERCP complicated by small bowel perforation 6/6/18 s/p ex-lap with small bowel resection and anastomosis 6/6/18  2. Obstructive jaundice with dilated CBD and 1.4 cm enhancing pancreatic head mass s/p percutaneous draining  3. 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 post op ileus  5. elevated lipase  Plan:  - IVF, NPO  - Monitor CBC, CMP, INR  - Continue antibiotics  - Supportive care per SICU  - Appreciate Surgical Oncology and SICU care

## 2018-06-09 NOTE — PROGRESS NOTE ADULT - SUBJECTIVE AND OBJECTIVE BOX
Patient is a 58y old  Female followed for pancreatic head mass, perforated bowel      SUBJECTIVE / OVERNIGHT EVENTS:  No events. NG tube draining bilious contents  MEDICATIONS  (STANDING):  chlorhexidine 4% Liquid 1 Application(s) Topical <User Schedule>  ciprofloxacin   IVPB 400 milliGRAM(s) IV Intermittent every 12 hours  dextrose 5% + lactated ringers. 1000 milliLiter(s) (125 mL/Hr) IV Continuous <Continuous>  enoxaparin Injectable 40 milliGRAM(s) SubCutaneous daily  metroNIDAZOLE  IVPB 500 milliGRAM(s) IV Intermittent every 8 hours  ondansetron Injectable 4 milliGRAM(s) IV Push once  pantoprazole  Injectable 40 milliGRAM(s) IV Push two times a day    MEDICATIONS  (PRN):  HYDROmorphone  Injectable 0.5 milliGRAM(s) IV Push every 4 hours PRN Moderate Pain (4 - 6)  HYDROmorphone  Injectable 1 milliGRAM(s) IV Push every 4 hours PRN Severe Pain (7 - 10)              PHYSICAL EXAM:  GENERAL: NAD,  HEAD:  Atraumatic, Normocephalic  EYES: icteric  NECK: Supple, No JVD  CHEST/LUNG: Clear to auscultation bilaterally; No wheeze  HEART: Regular rate and rhythm; No murmurs, rubs, or gallops  ABDOMEN: Soft, Nontender, Nondistended; Bowel sounds present, no hepatosplenomegaly, no rebound or guarding, bandaged surgical wound  EXTREMITIES:  2+ Peripheral Pulses, No clubbing, cyanosis, or edema  PSYCH: AAOx3  NEUROLOGY: non-focal, no asterixis  SKIN: jaundice    LABS:                        11.4   20.96 )-----------( 212      ( 09 Jun 2018 02:35 )             31.7     06-09    136  |  104  |  14  ----------------------------<  110<H>  3.5   |  17<L>  |  0.80    Ca    7.5<L>      09 Jun 2018 02:35  Phos  2.9     06-09  Mg     2.0     06-09    TPro  4.1<L>  /  Alb  1.7<L>  /  TBili  14.0<H>  /  DBili  x   /  AST  59<H>  /  ALT  46<H>  /  AlkPhos  473<H>  06-09    LIVER FUNCTIONS - ( 09 Jun 2018 02:35 )  Alb: 1.7 g/dL / Pro: 4.1 g/dL / ALK PHOS: 473 u/L / ALT: 46 u/L / AST: 59 u/L / GGT: x           PT/INR - ( 08 Jun 2018 18:52 )   PT: 14.8 SEC;   INR: 1.28          PTT - ( 08 Jun 2018 18:52 )  PTT:34.4 SEC          RADIOLOGY & ADDITIONAL TESTS:

## 2018-06-09 NOTE — PROGRESS NOTE ADULT - SUBJECTIVE AND OBJECTIVE BOX
SICU Daily Progress Note  =====================================================  Interval/Overnight Events: Went to IR for PTC. Post procedure patient was noted to have dark NGT output. Protonix was increased to BID. Recieved 1u of PRBC for hypotension and oozing around IR puncture site, as well as dark NGT output. Systolic BPs remained in high 80s, MAP >65. Patient also noted by son to be acting lethargic. Blood glucose 68. Patient given 1amp D50 with improvement in mentation. Fluids switched to D5LR.     POD # 3         	SICU Day #  3    HPI:  Patient complains of 1 week of progressively worsening jaundice. Patient now endorses some pain in the right upper quadrant of her abdomen, which extends through her back. Two days ago, she was admitted to [facility] for work-up. She had a CT scan and MRCP. On MRCP she has a 1.4 cm mass in the pancreatic head obstructing the common hepatic duct. Transfer to [facility] was initiated in order to pursue an ERCP with gastroenterology. She denies nausea, vomiting, fever, chills.     Pertinent history: gastric cancer 6/2017 s/p partial gastrectomy with Billroth II reconstruction.    Pt had ERCP c/b perforation of afferent limb. Pt underwent emergent Ex-lap w/ resection of perforated bowel and stapled side to side functional end to end anastomosis. Pt remained intubated and was transferred to SICU off pressors.    Surgery Information: Ex-lap, extensive Faroese, SBR, side to side anastomosis  Case Duration: 3hrs 	EBL: 200	IV Fluids: 3.8L in ERCP suite and OR	Blood Products: 1U PRBC	Urine Output: 200  Complications: None    PAST MEDICAL & SURGICAL HISTORY:  Gastric cancer  Malignant neoplasm of stomach, unspecified location  S/P partial gastrectomy: distal gastrectomy with Billroth II reconstruction, D2 lymphadenectomy, feeding jejunostomy tube placement 6/23/17    Allergies: No Known Allergies    MEDICATIONS:   --------------------------------------------------------------------------------------  Neurologic Medications  HYDROmorphone  Injectable 0.5 milliGRAM(s) IV Push every 4 hours PRN Moderate Pain (4 - 6)  HYDROmorphone  Injectable 1 milliGRAM(s) IV Push every 4 hours PRN Severe Pain (7 - 10)    Respiratory Medications    Cardiovascular Medications    Gastrointestinal Medications  dextrose 5% + lactated ringers. 1000 milliLiter(s) IV Continuous <Continuous>  pantoprazole  Injectable 40 milliGRAM(s) IV Push two times a day    Genitourinary Medications    Hematologic/Oncologic Medications  enoxaparin Injectable 40 milliGRAM(s) SubCutaneous daily    Antimicrobial/Immunologic Medications  ciprofloxacin   IVPB 400 milliGRAM(s) IV Intermittent every 12 hours  metroNIDAZOLE  IVPB 500 milliGRAM(s) IV Intermittent every 8 hours    Endocrine/Metabolic Medications    Topical/Other Medications  chlorhexidine 4% Liquid 1 Application(s) Topical <User Schedule>    --------------------------------------------------------------------------------------    VITAL SIGNS, INS/OUTS (last 24 hours):  --------------------------------------------------------------------------------------  ((Insert SICU Vitals/Is+Os here))***  --------------------------------------------------------------------------------------    EXAM  NEUROLOGY  Exam: NAD, alert and oriented     HEENT  Exam: Normocephalic, atraumatic, scleral icterus    RESPIRATORY  Exam: Lungs clear to auscultation, Normal expansion/effort.    CARDIOVASCULAR  Exam: S1, S2.  Regular rate and rhythm.     GI/NUTRITION  Exam: Abdomen soft, Non-tender, Non-distended, incision c/d/i.   Current Diet:  NPO/NGT    VASCULAR  Exam: Extremities warm, jaundiced, well-perfused.    MUSCULOSKELETAL  Exam: All extremities moving spontaneously without limitations.    SKIN:  Exam: Good skin turgor, no skin breakdown.    METABOLIC/FLUIDS/ELECTROLYTES  dextrose 5% + lactated ringers. 1000 milliLiter(s) IV Continuous <Continuous>    HEMATOLOGIC  [x] VTE Prophylaxis: enoxaparin Injectable 40 milliGRAM(s) SubCutaneous daily    Transfusions:	[] PRBC	[] Platelets		[] FFP	[] Cryoprecipitate    INFECTIOUS DISEASE  Antimicrobials/Immunologic Medications:  ciprofloxacin   IVPB 400 milliGRAM(s) IV Intermittent every 12 hours  metroNIDAZOLE  IVPB 500 milliGRAM(s) IV Intermittent every 8 hours    Tubes/Lines/Drains    [x] Peripheral IV  [] Central Venous Line     	[] R	[] L	[] IJ	[] Fem	[] SC	Date Placed:   [] Arterial Line		[] R	[] L	[] Fem	[] Rad	[] Ax	Date Placed:   [] PICC		[] Midline		[] Mediport  [x] Urinary Catheter		Date Placed:   [x] Necessity of urinary, arterial, and venous catheters discussed    LABS  --------------------------------------------------------------------------------------  ((Insert SICU Labs here))***  --------------------------------------------------------------------------------------    OTHER LABORATORY:     IMAGING STUDIES:   CXR:     ASSESSMENT:  58y [id number] Female w/ B2 s/p ERCP for obstructing pancreatic mass c/b afferent limb perforation s/p Ex-lap w/ SBR, s/p IR PTC drain 6/9    PLAN:  Neuro:  - Pain control as needed  Respiratory:  -Extubated on 6/7. No acute issues, saturating well on room air  -OOB/IS  CV:   - Currently off pressors, follow blood pressure  GI: s/p small bowel resection and primary anastomosis.   - NPO  - NGT to LCWS  - Protonix BID for bloody NGT output  - s/p PTC drain   Renal:   - monitor UOP  - replete lytes PRN  - continue D5LR @ 125ml/hr  ID: Afebrile. No leukocytosis  - c/w Cipro and Flagyl for jejunal perf for 4 days after source control  - bile growing GNR   Heme: No acute signs of bleeding. Was given 1 U of pRBCs on  6/ 7 for Hct of 20.9.   - VTE ppx w/ Lovenox  - monitor CBC  Endo: No active issues at this time  - Episode of hypoglycemia, fluids changed to D5LR  - Monitor finger sticks q 6hrs   Dispo:  - SICU  - Full code

## 2018-06-09 NOTE — PROCEDURE NOTE - NSPROCDETAILS_GEN_ALL_CORE
location identified, draped/prepped, sterile technique used/sterile dressing applied/ultrasound guidance/sterile technique, catheter placed

## 2018-06-09 NOTE — PROGRESS NOTE ADULT - ATTENDING COMMENTS
Current Issues:  K63.1 Bowel perforation   - stable exam.  awaiting return of bowel function  E80.6 Hyperbilirubinemia   - trending down c/w yesterday.  continue to monitor. S/p IR placement of internal/external drain  Z91.89 At risk for malnutrition   - on D5 IV while NPO.  awaiting return of bowel function  D64.9 Anemia, unspecified type   - stable h/h now after initial drop post-procedure.  Bloody output from drain and NGT decreasing.

## 2018-06-09 NOTE — PROGRESS NOTE ADULT - ASSESSMENT
58F w/ jaundice 2/2 possible pancreatic head mass, s/p EUS/ERCP aborted 2/2 small bowel perf, now s/p ex lap, resection of perforated bowel with side to side anastamosis    - Monitor PTC drain output  - Consider advancing diet to CLD today  - pain control   - Continue ABX  - Appreciate care per SICU

## 2018-06-09 NOTE — PROGRESS NOTE ADULT - ATTENDING COMMENTS
Post ERCP perforation, surgery, perc drain of the liver for obstructive jaundice  Post operative ileus  Continue supportive care per surgery

## 2018-06-10 LAB
ALBUMIN SERPL ELPH-MCNC: 2.1 G/DL — LOW (ref 3.3–5)
ALP SERPL-CCNC: 355 U/L — HIGH (ref 40–120)
ALT FLD-CCNC: 36 U/L — HIGH (ref 4–33)
AMYLASE P1 CFR SERPL: 630 U/L — HIGH (ref 25–125)
APTT BLD: 39.8 SEC — HIGH (ref 27.5–37.4)
AST SERPL-CCNC: 42 U/L — HIGH (ref 4–32)
BASE EXCESS BLDA CALC-SCNC: -2.8 MMOL/L — SIGNIFICANT CHANGE UP
BASOPHILS # BLD AUTO: 0.04 K/UL — SIGNIFICANT CHANGE UP (ref 0–0.2)
BASOPHILS NFR BLD AUTO: 0.2 % — SIGNIFICANT CHANGE UP (ref 0–2)
BILIRUB DIRECT SERPL-MCNC: 9.4 MG/DL — HIGH (ref 0.1–0.2)
BILIRUB SERPL-MCNC: 11 MG/DL — HIGH (ref 0.2–1.2)
BUN SERPL-MCNC: 13 MG/DL — SIGNIFICANT CHANGE UP (ref 7–23)
CA-I BLDA-SCNC: 1.14 MMOL/L — LOW (ref 1.15–1.29)
CALCIUM SERPL-MCNC: 7.6 MG/DL — LOW (ref 8.4–10.5)
CHLORIDE SERPL-SCNC: 103 MMOL/L — SIGNIFICANT CHANGE UP (ref 98–107)
CO2 SERPL-SCNC: 20 MMOL/L — LOW (ref 22–31)
CREAT SERPL-MCNC: 0.54 MG/DL — SIGNIFICANT CHANGE UP (ref 0.5–1.3)
EOSINOPHIL # BLD AUTO: 0.17 K/UL — SIGNIFICANT CHANGE UP (ref 0–0.5)
EOSINOPHIL NFR BLD AUTO: 1 % — SIGNIFICANT CHANGE UP (ref 0–6)
GLUCOSE BLDA-MCNC: 114 MG/DL — HIGH (ref 70–99)
GLUCOSE SERPL-MCNC: 113 MG/DL — HIGH (ref 70–99)
HCO3 BLDA-SCNC: 22 MMOL/L — SIGNIFICANT CHANGE UP (ref 22–26)
HCT VFR BLD CALC: 30.8 % — LOW (ref 34.5–45)
HCT VFR BLDA CALC: 36.2 % — SIGNIFICANT CHANGE UP (ref 34.5–46.5)
HGB BLD-MCNC: 11.3 G/DL — LOW (ref 11.5–15.5)
HGB BLDA-MCNC: 11.8 G/DL — SIGNIFICANT CHANGE UP (ref 11.5–15.5)
IMM GRANULOCYTES # BLD AUTO: 0.14 # — SIGNIFICANT CHANGE UP
IMM GRANULOCYTES NFR BLD AUTO: 0.8 % — SIGNIFICANT CHANGE UP (ref 0–1.5)
INR BLD: 1.4 — HIGH (ref 0.88–1.17)
LACTATE BLDA-SCNC: 1.7 MMOL/L — SIGNIFICANT CHANGE UP (ref 0.5–2)
LIDOCAIN IGE QN: > 600 U/L — HIGH (ref 7–60)
LYMPHOCYTES # BLD AUTO: 0.8 K/UL — LOW (ref 1–3.3)
LYMPHOCYTES # BLD AUTO: 4.5 % — LOW (ref 13–44)
MAGNESIUM SERPL-MCNC: 2.2 MG/DL — SIGNIFICANT CHANGE UP (ref 1.6–2.6)
MCHC RBC-ENTMCNC: 29.8 PG — SIGNIFICANT CHANGE UP (ref 27–34)
MCHC RBC-ENTMCNC: 36.7 % — HIGH (ref 32–36)
MCV RBC AUTO: 81.3 FL — SIGNIFICANT CHANGE UP (ref 80–100)
MONOCYTES # BLD AUTO: 0.59 K/UL — SIGNIFICANT CHANGE UP (ref 0–0.9)
MONOCYTES NFR BLD AUTO: 3.3 % — SIGNIFICANT CHANGE UP (ref 2–14)
NEUTROPHILS # BLD AUTO: 16.03 K/UL — HIGH (ref 1.8–7.4)
NEUTROPHILS NFR BLD AUTO: 90.2 % — HIGH (ref 43–77)
NRBC # FLD: 0.02 — SIGNIFICANT CHANGE UP
PCO2 BLDA: 34 MMHG — SIGNIFICANT CHANGE UP (ref 32–48)
PH BLDA: 7.41 PH — SIGNIFICANT CHANGE UP (ref 7.35–7.45)
PHOSPHATE SERPL-MCNC: 2 MG/DL — LOW (ref 2.5–4.5)
PLATELET # BLD AUTO: 215 K/UL — SIGNIFICANT CHANGE UP (ref 150–400)
PMV BLD: 10 FL — SIGNIFICANT CHANGE UP (ref 7–13)
PO2 BLDA: 65 MMHG — LOW (ref 83–108)
POTASSIUM BLDA-SCNC: 3 MMOL/L — LOW (ref 3.4–4.5)
POTASSIUM SERPL-MCNC: 3.3 MMOL/L — LOW (ref 3.5–5.3)
POTASSIUM SERPL-SCNC: 3.3 MMOL/L — LOW (ref 3.5–5.3)
PROT SERPL-MCNC: 4.5 G/DL — LOW (ref 6–8.3)
PROTHROM AB SERPL-ACNC: 16.2 SEC — HIGH (ref 9.8–13.1)
RBC # BLD: 3.79 M/UL — LOW (ref 3.8–5.2)
RBC # FLD: 19.9 % — HIGH (ref 10.3–14.5)
SAO2 % BLDA: 94.1 % — LOW (ref 95–99)
SODIUM BLDA-SCNC: 131 MMOL/L — LOW (ref 136–146)
SODIUM SERPL-SCNC: 135 MMOL/L — SIGNIFICANT CHANGE UP (ref 135–145)
WBC # BLD: 17.77 K/UL — HIGH (ref 3.8–10.5)
WBC # FLD AUTO: 17.77 K/UL — HIGH (ref 3.8–10.5)

## 2018-06-10 PROCEDURE — 99232 SBSQ HOSP IP/OBS MODERATE 35: CPT | Mod: GC

## 2018-06-10 PROCEDURE — 99233 SBSQ HOSP IP/OBS HIGH 50: CPT | Mod: GC

## 2018-06-10 RX ORDER — POTASSIUM PHOSPHATE, MONOBASIC POTASSIUM PHOSPHATE, DIBASIC 236; 224 MG/ML; MG/ML
15 INJECTION, SOLUTION INTRAVENOUS ONCE
Qty: 0 | Refills: 0 | Status: COMPLETED | OUTPATIENT
Start: 2018-06-10 | End: 2018-06-10

## 2018-06-10 RX ORDER — ONDANSETRON 8 MG/1
4 TABLET, FILM COATED ORAL ONCE
Qty: 0 | Refills: 0 | Status: COMPLETED | OUTPATIENT
Start: 2018-06-10 | End: 2018-06-10

## 2018-06-10 RX ORDER — CALCIUM GLUCONATE 100 MG/ML
2 VIAL (ML) INTRAVENOUS ONCE
Qty: 0 | Refills: 0 | Status: COMPLETED | OUTPATIENT
Start: 2018-06-10 | End: 2018-06-10

## 2018-06-10 RX ORDER — POTASSIUM CHLORIDE 20 MEQ
10 PACKET (EA) ORAL
Qty: 0 | Refills: 0 | Status: COMPLETED | OUTPATIENT
Start: 2018-06-10 | End: 2018-06-10

## 2018-06-10 RX ORDER — PANTOPRAZOLE SODIUM 20 MG/1
40 TABLET, DELAYED RELEASE ORAL DAILY
Qty: 0 | Refills: 0 | Status: DISCONTINUED | OUTPATIENT
Start: 2018-06-10 | End: 2018-06-21

## 2018-06-10 RX ADMIN — SODIUM CHLORIDE 125 MILLILITER(S): 9 INJECTION, SOLUTION INTRAVENOUS at 05:25

## 2018-06-10 RX ADMIN — HYDROMORPHONE HYDROCHLORIDE 1 MILLIGRAM(S): 2 INJECTION INTRAMUSCULAR; INTRAVENOUS; SUBCUTANEOUS at 12:15

## 2018-06-10 RX ADMIN — Medication 200 GRAM(S): at 05:25

## 2018-06-10 RX ADMIN — ONDANSETRON 4 MILLIGRAM(S): 8 TABLET, FILM COATED ORAL at 06:14

## 2018-06-10 RX ADMIN — POTASSIUM PHOSPHATE, MONOBASIC POTASSIUM PHOSPHATE, DIBASIC 62.5 MILLIMOLE(S): 236; 224 INJECTION, SOLUTION INTRAVENOUS at 05:25

## 2018-06-10 RX ADMIN — Medication 200 MILLIGRAM(S): at 03:08

## 2018-06-10 RX ADMIN — SODIUM CHLORIDE 125 MILLILITER(S): 9 INJECTION, SOLUTION INTRAVENOUS at 08:15

## 2018-06-10 RX ADMIN — HYDROMORPHONE HYDROCHLORIDE 1 MILLIGRAM(S): 2 INJECTION INTRAMUSCULAR; INTRAVENOUS; SUBCUTANEOUS at 14:02

## 2018-06-10 RX ADMIN — Medication 100 MILLIGRAM(S): at 21:07

## 2018-06-10 RX ADMIN — ONDANSETRON 4 MILLIGRAM(S): 8 TABLET, FILM COATED ORAL at 02:57

## 2018-06-10 RX ADMIN — Medication 100 MILLIEQUIVALENT(S): at 05:25

## 2018-06-10 RX ADMIN — PANTOPRAZOLE SODIUM 40 MILLIGRAM(S): 20 TABLET, DELAYED RELEASE ORAL at 05:25

## 2018-06-10 RX ADMIN — Medication 100 MILLIGRAM(S): at 05:25

## 2018-06-10 RX ADMIN — Medication 100 MILLIGRAM(S): at 14:01

## 2018-06-10 RX ADMIN — ENOXAPARIN SODIUM 40 MILLIGRAM(S): 100 INJECTION SUBCUTANEOUS at 12:14

## 2018-06-10 RX ADMIN — Medication 100 MILLIEQUIVALENT(S): at 07:50

## 2018-06-10 RX ADMIN — HYDROMORPHONE HYDROCHLORIDE 1 MILLIGRAM(S): 2 INJECTION INTRAMUSCULAR; INTRAVENOUS; SUBCUTANEOUS at 17:53

## 2018-06-10 RX ADMIN — HYDROMORPHONE HYDROCHLORIDE 1 MILLIGRAM(S): 2 INJECTION INTRAMUSCULAR; INTRAVENOUS; SUBCUTANEOUS at 18:05

## 2018-06-10 RX ADMIN — PANTOPRAZOLE SODIUM 40 MILLIGRAM(S): 20 TABLET, DELAYED RELEASE ORAL at 12:14

## 2018-06-10 RX ADMIN — CHLORHEXIDINE GLUCONATE 1 APPLICATION(S): 213 SOLUTION TOPICAL at 10:22

## 2018-06-10 RX ADMIN — Medication 100 MILLIEQUIVALENT(S): at 06:14

## 2018-06-10 RX ADMIN — HYDROMORPHONE HYDROCHLORIDE 0.5 MILLIGRAM(S): 2 INJECTION INTRAMUSCULAR; INTRAVENOUS; SUBCUTANEOUS at 03:00

## 2018-06-10 RX ADMIN — Medication 200 MILLIGRAM(S): at 16:20

## 2018-06-10 RX ADMIN — HYDROMORPHONE HYDROCHLORIDE 0.5 MILLIGRAM(S): 2 INJECTION INTRAMUSCULAR; INTRAVENOUS; SUBCUTANEOUS at 02:45

## 2018-06-10 NOTE — PROGRESS NOTE ADULT - SUBJECTIVE AND OBJECTIVE BOX
SICU Daily Progress Note  =====================================================  Interval/Overnight Events:  Pt remained stable throughout the day. No acute events overnight. Listed for the floor.    HPI: Patient complains of 1 week of progressively worsening jaundice. Patient now endorses some pain in the right upper quadrant of her abdomen, which extends through her back. Two days ago, she was admitted to [facility] for work-up. She had a CT scan and MRCP. On MRCP she has a 1.4 cm mass in the pancreatic head obstructing the common hepatic duct. Transfer to [facility] was initiated in order to pursue an ERCP with gastroenterology. She denies nausea, vomiting, fever, chills.     Pertinent history: gastric cancer 6/2017 s/p partial gastrectomy with Billroth II reconstruction.    Pt had ERCP c/b perforation of afferent limb. Pt underwent emergent Ex-lap w/ resection of perforated bowel and stapled side to side functional end to end anastomosis. Pt remained intubated and was transferred to SICU off pressors.    Surgery Information: Ex-lap, extensive Lithuanian, SBR, side to side anastomosis  Case Duration: 3hrs 	EBL: 200	IV Fluids: 3.8L in ERCP suite and OR	Blood Products: 1U PRBC	Urine Output: 200  Complications: None    PMH:  Gastric cancer  Malignant neoplasm of stomach, unspecified location  S/P partial gastrectomy: distal gastrectomy with Billroth II reconstruction, D2 lymphadenectomy, feeding jejunostomy tube placement 6/23/17    Allergies: No Known Allergies      MEDICATIONS:   --------------------------------------------------------------------------------------  Neurologic Medications  HYDROmorphone  Injectable 0.5 milliGRAM(s) IV Push every 4 hours PRN Moderate Pain (4 - 6)  HYDROmorphone  Injectable 1 milliGRAM(s) IV Push every 4 hours PRN Severe Pain (7 - 10)    Respiratory Medications    Cardiovascular Medications    Gastrointestinal Medications  calcium gluconate IVPB 2 Gram(s) IV Intermittent once  dextrose 5% + lactated ringers. 1000 milliLiter(s) IV Continuous <Continuous>  pantoprazole  Injectable 40 milliGRAM(s) IV Push two times a day  potassium chloride  10 mEq/100 mL IVPB 10 milliEquivalent(s) IV Intermittent every 1 hour  potassium phosphate IVPB 15 milliMole(s) IV Intermittent once    Genitourinary Medications    Hematologic/Oncologic Medications  enoxaparin Injectable 40 milliGRAM(s) SubCutaneous daily    Antimicrobial/Immunologic Medications  ciprofloxacin   IVPB 400 milliGRAM(s) IV Intermittent every 12 hours  metroNIDAZOLE  IVPB 500 milliGRAM(s) IV Intermittent every 8 hours    Endocrine/Metabolic Medications    Topical/Other Medications  benzocaine 15 mG/menthol 3.6 mG Lozenge 1 Lozenge Oral every 6 hours PRN Sore Throat  chlorhexidine 4% Liquid 1 Application(s) Topical <User Schedule>    --------------------------------------------------------------------------------------    VITAL SIGNS, INS/OUTS (last 24 hours):  --------------------------------------------------------------------------------------  T(C): 36.7 (06-10-18 @ 04:00), Max: 36.7 (06-10-18 @ 04:00)  HR: 63 (06-10-18 @ 05:00) (63 - 79)  BP: 92/53 (06-09-18 @ 08:00) (92/53 - 92/53)  BP(mean): 62 (06-09-18 @ 08:00) (62 - 62)  ABP: 136/76 (06-10-18 @ 05:00) (98/59 - 144/78)  ABP(mean): 100 (06-10-18 @ 05:00) (75 - 105)  RR: 10 (06-10-18 @ 05:00) (10 - 23)  SpO2: 98% (06-10-18 @ 05:00) (95% - 99%)  Wt(kg): --  CVP(mm Hg): --  CI: --  CAPILLARY BLOOD GLUCOSE      POCT Blood Glucose.: 110 mg/dL (10 Maynor 2018 00:15)  POCT Blood Glucose.: 114 mg/dL (09 Jun 2018 06:55)   N/A      06-08 @ 07:01  -  06-09 @ 07:00  --------------------------------------------------------  IN:    dextrose 5% + lactated ringers.: 1125 mL    IV PiggyBack: 300 mL    lactated ringers.: 1625 mL    Packed Red Blood Cells: 900 mL  Total IN: 3950 mL    OUT:    Drain: 130 mL    Indwelling Catheter - Urethral: 435 mL    Nasoenteral Tube: 400 mL  Total OUT: 965 mL    Total NET: 2985 mL      06-09 @ 07:01  -  06-10 @ 05:11  --------------------------------------------------------  IN:    Albumin 5%  - 250 mL: 500 mL    dextrose 5% + lactated ringers.: 2875 mL    IV PiggyBack: 400 mL  Total IN: 3775 mL    OUT:    Drain: 200 mL    Indwelling Catheter - Urethral: 760 mL    Nasoenteral Tube: 300 mL  Total OUT: 1260 mL    Total NET: 2515 mL        --------------------------------------------------------------------------------------    EXAM  NEUROLOGY  Exam: NAD, alert and oriented     HEENT  Exam: Normocephalic, atraumatic, scleral icterus    RESPIRATORY  Exam: Lungs clear to auscultation, Normal expansion/effort.    CARDIOVASCULAR  Exam: S1, S2.  Regular rate and rhythm.     GI/NUTRITION  Exam: Abdomen soft, Non-tender, Non-distended, incision c/d/i. PTC drain in place w/ bilious drainage  Current Diet:  NPO/NGT    VASCULAR  Exam: Extremities warm, jaundiced, well-perfused.    MUSCULOSKELETAL  Exam: All extremities moving spontaneously without limitations.    SKIN:  Exam: Good skin turgor, no skin breakdown.    METABOLIC/FLUIDS/ELECTROLYTES  calcium gluconate IVPB 2 Gram(s) IV Intermittent once  dextrose 5% + lactated ringers. 1000 milliLiter(s) IV Continuous <Continuous>  potassium chloride  10 mEq/100 mL IVPB 10 milliEquivalent(s) IV Intermittent every 1 hour  potassium phosphate IVPB 15 milliMole(s) IV Intermittent once      HEMATOLOGIC  [x] VTE Prophylaxis: enoxaparin Injectable 40 milliGRAM(s) SubCutaneous daily    Transfusions:	[] PRBC	[] Platelets		[] FFP	[] Cryoprecipitate    INFECTIOUS DISEASE  Antimicrobials/Immunologic Medications:  ciprofloxacin   IVPB 400 milliGRAM(s) IV Intermittent every 12 hours  metroNIDAZOLE  IVPB 500 milliGRAM(s) IV Intermittent every 8 hours    Tubes/Lines/Drains  [x] Peripheral IV  [] Central Venous Line     	[] R	[] L	[] IJ	[] Fem	[] SC	Date Placed:   [x] Arterial Line		[] R	[x] L	[] Fem	[x] Rad	[] Ax	Date Placed: 6/6  [] PICC		[] Midline		[] Mediport  [x] Urinary Catheter		Date Placed: 6/6  [x] Necessity of urinary, arterial, and venous catheters discussed    LABS  --------------------------------------------------------------------------------------  CBC (06-10 @ 02:55)                              11.3<L>                         17.77<H>  )----------------(  215        90.2<H>% Neutrophils, 4.5<L>% Lymphocytes, ANC: 16.03<H>                              30.8<L>              CBC (06-09 @ 02:35)                              11.4<L>                         20.96<H>  )----------------(  212        96.3<H>% Neutrophils, 1.1<L>% Lymphocytes, ANC: 20.16<H>                              31.7<L>                BMP (06-10 @ 02:55)             135     |  103     |  13    		Ca++ --      Ca 7.6<L>             ---------------------------------( 113<H>		Mg 2.2                3.3<L>  |  20<L>   |  0.54  			Ph 2.0<L>  BMP (06-09 @ 02:35)             136     |  104     |  14    		Ca++ 1.06    Ca 7.5<L>             ---------------------------------( 110<H>		Mg 2.0                3.5     |  17<L>   |  0.80  			Ph 2.9       LFTs (06-10 @ 02:55)      TPro 4.5<L> / Alb 2.1<L> / TBili 11.0<H> / DBili 9.4<H> / AST 42<H> / ALT 36<H> / AlkPhos 355<H>  LFTs (06-09 @ 02:35)      TPro 4.1<L> / Alb 1.7<L> / TBili 14.0<H> / DBili -- / AST 59<H> / ALT 46<H> / AlkPhos 473<H>    Coags (06-10 @ 02:55)  aPTT 39.8<H> / INR 1.40<H> / PT 16.2<H>  Coags (06-08 @ 18:52)  aPTT 34.4 / INR 1.28<H> / PT 14.8<H>    ABG (06-10 @ 02:55)     7.41 / 34 / 65<L> / 22 / -2.8 / 94.1<L>%     Lactate: 1.7    ABG (06-08 @ 21:00)     7.39 / 29<L> / 131<H> / 19<L> / -6.7 / 98.9%     Lactate: 3.0<H>        -> BILE Culture (06-08 @ 18:47)       WBC^White Blood Cells  QNTY CELLS IN GRAM STAIN: FEW (2+)  GNR^Gram Neg Rods  QUANTITY OF BACTERIA SEEN: MANY (4+)    NG  NG    -> .Urine Clean Catch (Midstream) Culture (06-03 @ 21:40)     NG    NG    <10,000 CFU/ml  Normal Urogenital juanito present      --------------------------------------------------------------------------------------    OTHER LABORATORY:     IMAGING STUDIES:   CXR:     ASSESSMENT:  58y [id number] Female w/ B2 s/p ERCP for obstructing pancreatic mass c/b afferent limb perforation s/p Ex-lap w/ SBR, s/p IR PTC drain 6/9    PLAN:  Neuro:  - Pain control as needed    Respiratory:  -Extubated on 6/7. No acute issues, saturating well on room air  -OOB/IS    CV:   - Currently off pressors  - Monitor VS    GI:  - s/p small bowel resection and primary anastomosis.   - NPO  - NGT to LCWS  - Protonix BID for bloody NGT output  - s/p PTC drain  - Monitor LFTs and Lipase    Renal:   - monitor UOP  - replete lytes PRN  - continue D5LR @ 125ml/hr    ID:  - Afebrile. No leukocytosis  - c/w Cipro and Flagyl for jejunal perf for 4 days after source control  - bile growing GNR    Heme:  - No acute signs of bleeding. Was given 1 U of pRBCs on  6/ 7 for Hct of 20.9.   - VTE ppx w/ Lovenox  - monitor CBC    Endo:  - No active issues at this time  - Episode of hypoglycemia, fluids changed to D5LR  - Monitor finger sticks q 6hrs     Dispo:  - SICU  - Listed for floor    --------------------------------------------------------------------------------------    Critical Care Diagnoses:

## 2018-06-10 NOTE — PROGRESS NOTE ADULT - SUBJECTIVE AND OBJECTIVE BOX
Patient is a 58y old  Female who presents with a chief complaint of Painless jaundice (05 Jun 2018 14:49), followed for perforated bowel      SUBJECTIVE / OVERNIGHT EVENTS:  no flatus  MEDICATIONS  (STANDING):  chlorhexidine 4% Liquid 1 Application(s) Topical <User Schedule>  ciprofloxacin   IVPB 400 milliGRAM(s) IV Intermittent every 12 hours  dextrose 5% + lactated ringers. 1000 milliLiter(s) (125 mL/Hr) IV Continuous <Continuous>  enoxaparin Injectable 40 milliGRAM(s) SubCutaneous daily  metroNIDAZOLE  IVPB 500 milliGRAM(s) IV Intermittent every 8 hours  pantoprazole  Injectable 40 milliGRAM(s) IV Push daily    MEDICATIONS  (PRN):  benzocaine 15 mG/menthol 3.6 mG Lozenge 1 Lozenge Oral every 6 hours PRN Sore Throat  HYDROmorphone  Injectable 0.5 milliGRAM(s) IV Push every 4 hours PRN Moderate Pain (4 - 6)  HYDROmorphone  Injectable 1 milliGRAM(s) IV Push every 4 hours PRN Severe Pain (7 - 10)              PHYSICAL EXAM:  GENERAL: NAD, well-developed  HEAD:  Atraumatic, Normocephalic, NG in place  EYES: EOMI, PERRLA, conjunctiva and sclera anicteric  NECK: Supple, No JVD  CHEST/LUNG: Clear to auscultation bilaterally; No wheeze  HEART: Regular rate and rhythm; No murmurs, rubs, or gallops  ABDOMEN: Soft, Nontender, slightly distended; Bowel sounds present, no hepatosplenomegaly, no rebound or guarding, Perc drain w bile  EXTREMITIES:  2+ Peripheral Pulses, No clubbing, cyanosis, or edema  PSYCH: AAOx3  NEUROLOGY: non-focal, no asterixis  SKIN: No rashes or lesion    LABS:                        11.3   17.77 )-----------( 215      ( 10 Maynor 2018 02:55 )             30.8     06-10    135  |  103  |  13  ----------------------------<  113<H>  3.3<L>   |  20<L>  |  0.54    Ca    7.6<L>      10 Maynor 2018 02:55  Phos  2.0     06-10  Mg     2.2     06-10    TPro  4.5<L>  /  Alb  2.1<L>  /  TBili  11.0<H>  /  DBili  9.4<H>  /  AST  42<H>  /  ALT  36<H>  /  AlkPhos  355<H>  06-10    LIVER FUNCTIONS - ( 10 Maynor 2018 02:55 )  Alb: 2.1 g/dL / Pro: 4.5 g/dL / ALK PHOS: 355 u/L / ALT: 36 u/L / AST: 42 u/L / GGT: x           PT/INR - ( 10 Maynor 2018 02:55 )   PT: 16.2 SEC;   INR: 1.40          PTT - ( 10 Maynor 2018 02:55 )  PTT:39.8 SEC          RADIOLOGY & ADDITIONAL TESTS:

## 2018-06-10 NOTE — PROGRESS NOTE ADULT - ATTENDING COMMENTS
Post ERCP perf, surgery  Percutaneous drain with declining bili  Continue post op management per surg

## 2018-06-10 NOTE — PROGRESS NOTE ADULT - ATTENDING COMMENTS
Current Issues:  K63.1 Bowel perforation  - stable exam.  continuing on abx.  wbc trending down.  afebrile.  HD stable  E80.6 Hyperbilirubinemia   - continue to monitor.  Z91.89 At risk for malnutrition   - NPO.  on D5 IV.  Possibly will need TPN, but will defer to primary team as patient being transferred out of ICU  D64.9 Anemia, unspecified type   - no signs of bleeding on exam.  H/H stable, continue to monitor

## 2018-06-10 NOTE — DIETITIAN INITIAL EVALUATION ADULT. - OTHER INFO
Nutrition assessment initiated for critical care LOS. Pt. alert, oriented, family @ bedside , per pt. & family pt. was on PO nutrition prior to this admission, per wt. hx from previous admission EMR flow sheet - 57.6 kg on July 2017; 53.5 kg on 5/21/18 . Noted stable wt. as of this admission . Per EMR pt. to be initiated on PN .  Pt. with  generalized edema .

## 2018-06-10 NOTE — PROGRESS NOTE ADULT - ASSESSMENT
Impression:  1. Unsuccessful ERCP complicated by small bowel perforation 6/6/18 s/p ex-lap with small bowel resection and anastomosis 6/6/18  2. Obstructive jaundice with dilated CBD and 1.4 cm enhancing pancreatic head mass s/p percutaneous draining. LFT improved  3. 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 post op ileus  5. elevated lipase  Plan:  - IVF, NPO  - Monitor CBC, CMP, INR  - Continue antibiotics  - Supportive care per SICU  - Appreciate Surgical Oncology and SICU care

## 2018-06-10 NOTE — DIETITIAN INITIAL EVALUATION ADULT. - NS FNS REASON FOR WEIGHT CHANG
gastric cancer , s/p partial gastrectomy, repeated hospitalizations , acute illness of current admission/altered GI function (specify)

## 2018-06-10 NOTE — PROGRESS NOTE ADULT - SUBJECTIVE AND OBJECTIVE BOX
S: No acute events overnight, increased drainage from abdominal incision (serous fluid). Pt seen and examined  O:  T(C): 35.8 (06-10-18 @ 08:00), Max: 36.7 (06-10-18 @ 04:00)  HR: 68 (06-10-18 @ 09:00) (63 - 79)  BP: 129/73 (06-10-18 @ 09:00) (117/66 - 129/84)  RR: 16 (06-10-18 @ 09:00) (10 - 23)  SpO2: 98% (06-10-18 @ 09:00) (95% - 99%)  Wt(kg): --  06-09 @ 07:01  -  06-10 @ 07:00  --------------------------------------------------------  IN:    Albumin 5%  - 250 mL: 500 mL    dextrose 5% + lactated ringers.: 3000 mL    IV PiggyBack: 400 mL  Total IN: 3900 mL    OUT:    Drain: 350 mL    Indwelling Catheter - Urethral: 840 mL    Nasoenteral Tube: 300 mL  Total OUT: 1490 mL    Total NET: 2410 mL      06-10 @ 07:01  -  06-10 @ 10:09  --------------------------------------------------------  IN:    dextrose 5% + lactated ringers.: 250 mL  Total IN: 250 mL    OUT:    Indwelling Catheter - Urethral: 210 mL  Total OUT: 210 mL    Total NET: 40 mL      Gen: NAD, pt appears to have widespread edema S: No acute events overnight, increased drainage from abdominal incision (serous fluid). Pt seen and examined  O:  T(C): 35.8 (06-10-18 @ 08:00), Max: 36.7 (06-10-18 @ 04:00)  HR: 68 (06-10-18 @ 09:00) (63 - 79)  BP: 129/73 (06-10-18 @ 09:00) (117/66 - 129/84)  RR: 16 (06-10-18 @ 09:00) (10 - 23)  SpO2: 98% (06-10-18 @ 09:00) (95% - 99%)  Wt(kg): --  06-09 @ 07:01  -  06-10 @ 07:00  --------------------------------------------------------  IN:    Albumin 5%  - 250 mL: 500 mL    dextrose 5% + lactated ringers.: 3000 mL    IV PiggyBack: 400 mL  Total IN: 3900 mL    OUT:    Drain: 350 mL    Indwelling Catheter - Urethral: 840 mL    Nasoenteral Tube: 300 mL  Total OUT: 1490 mL    Total NET: 2410 mL      06-10 @ 07:01  -  06-10 @ 10:09  --------------------------------------------------------  IN:    dextrose 5% + lactated ringers.: 250 mL  Total IN: 250 mL    OUT:    Indwelling Catheter - Urethral: 210 mL  Total OUT: 210 mL    Total NET: 40 mL      Gen: NAD, pt appears to have widespread edema  Abdomen: dressing c/d/i, softly distended, drain bilious    .  LABS:                         11.3   17.77 )-----------( 215      ( 10 Maynor 2018 02:55 )             30.8     06-10    135  |  103  |  13  ----------------------------<  113<H>  3.3<L>   |  20<L>  |  0.54    Ca    7.6<L>      10 Maynor 2018 02:55  Phos  2.0     06-10  Mg     2.2     06-10    TPro  4.5<L>  /  Alb  2.1<L>  /  TBili  11.0<H>  /  DBili  9.4<H>  /  AST  42<H>  /  ALT  36<H>  /  AlkPhos  355<H>  06-10    PT/INR - ( 10 Maynor 2018 02:55 )   PT: 16.2 SEC;   INR: 1.40          PTT - ( 10 Maynor 2018 02:55 )  PTT:39.8 SEC          RADIOLOGY, EKG & ADDITIONAL TESTS: Reviewed.

## 2018-06-10 NOTE — PROGRESS NOTE ADULT - ASSESSMENT
A/P:  58F s/p ERCP EUS aborted 2/2 small bowel perforation, s/p ex lap resection of small bowel side to side anastamosis. POD4. Recovering in SICU, ARBF.    -NPO  -plan for PICC tomorrow, plan for TPN after  -pain control  -continue PTC  -transfer to floor  -lovenox for dvt ppx

## 2018-06-11 LAB
ALBUMIN SERPL ELPH-MCNC: 1.9 G/DL — LOW (ref 3.3–5)
ALP SERPL-CCNC: 344 U/L — HIGH (ref 40–120)
ALT FLD-CCNC: 26 U/L — SIGNIFICANT CHANGE UP (ref 4–33)
AMYLASE P1 CFR SERPL: 69 U/L — SIGNIFICANT CHANGE UP (ref 25–125)
APTT BLD: 38.8 SEC — HIGH (ref 27.5–37.4)
AST SERPL-CCNC: 27 U/L — SIGNIFICANT CHANGE UP (ref 4–32)
BILIRUB SERPL-MCNC: 10 MG/DL — HIGH (ref 0.2–1.2)
BUN SERPL-MCNC: 12 MG/DL — SIGNIFICANT CHANGE UP (ref 7–23)
CA-I BLD-SCNC: 1.13 MMOL/L — SIGNIFICANT CHANGE UP (ref 1.03–1.23)
CALCIUM SERPL-MCNC: 7.7 MG/DL — LOW (ref 8.4–10.5)
CHLORIDE SERPL-SCNC: 101 MMOL/L — SIGNIFICANT CHANGE UP (ref 98–107)
CO2 SERPL-SCNC: 19 MMOL/L — LOW (ref 22–31)
CREAT SERPL-MCNC: 0.47 MG/DL — LOW (ref 0.5–1.3)
GLUCOSE SERPL-MCNC: 105 MG/DL — HIGH (ref 70–99)
GRAM STN WND: SIGNIFICANT CHANGE UP
HCT VFR BLD CALC: 34.2 % — LOW (ref 34.5–45)
HGB BLD-MCNC: 12.1 G/DL — SIGNIFICANT CHANGE UP (ref 11.5–15.5)
INR BLD: 1.77 — HIGH (ref 0.88–1.17)
LIDOCAIN IGE QN: 99.7 U/L — HIGH (ref 7–60)
MAGNESIUM SERPL-MCNC: 2.2 MG/DL — SIGNIFICANT CHANGE UP (ref 1.6–2.6)
MCHC RBC-ENTMCNC: 29.6 PG — SIGNIFICANT CHANGE UP (ref 27–34)
MCHC RBC-ENTMCNC: 35.4 % — SIGNIFICANT CHANGE UP (ref 32–36)
MCV RBC AUTO: 83.6 FL — SIGNIFICANT CHANGE UP (ref 80–100)
METHOD TYPE: SIGNIFICANT CHANGE UP
NRBC # FLD: 0 — SIGNIFICANT CHANGE UP
ORGANISM # SPEC MICROSCOPIC CNT: SIGNIFICANT CHANGE UP
PHOSPHATE SERPL-MCNC: 1.9 MG/DL — LOW (ref 2.5–4.5)
PLATELET # BLD AUTO: 205 K/UL — SIGNIFICANT CHANGE UP (ref 150–400)
PMV BLD: 10.6 FL — SIGNIFICANT CHANGE UP (ref 7–13)
POTASSIUM SERPL-MCNC: 3.4 MMOL/L — LOW (ref 3.5–5.3)
POTASSIUM SERPL-SCNC: 3.4 MMOL/L — LOW (ref 3.5–5.3)
PROT SERPL-MCNC: 4.6 G/DL — LOW (ref 6–8.3)
PROTHROM AB SERPL-ACNC: 19.9 SEC — HIGH (ref 9.8–13.1)
RBC # BLD: 4.09 M/UL — SIGNIFICANT CHANGE UP (ref 3.8–5.2)
RBC # FLD: 20.9 % — HIGH (ref 10.3–14.5)
SODIUM SERPL-SCNC: 134 MMOL/L — LOW (ref 135–145)
WBC # BLD: 14.66 K/UL — HIGH (ref 3.8–10.5)
WBC # FLD AUTO: 14.66 K/UL — HIGH (ref 3.8–10.5)

## 2018-06-11 PROCEDURE — 99232 SBSQ HOSP IP/OBS MODERATE 35: CPT | Mod: GC

## 2018-06-11 PROCEDURE — 76937 US GUIDE VASCULAR ACCESS: CPT | Mod: 26

## 2018-06-11 PROCEDURE — 99254 IP/OBS CNSLTJ NEW/EST MOD 60: CPT

## 2018-06-11 PROCEDURE — 77001 FLUOROGUIDE FOR VEIN DEVICE: CPT | Mod: 26,GC

## 2018-06-11 PROCEDURE — 36569 INSJ PICC 5 YR+ W/O IMAGING: CPT

## 2018-06-11 RX ORDER — ELECTROLYTE SOLUTION,INJ
1 VIAL (ML) INTRAVENOUS
Qty: 0 | Refills: 0 | Status: DISCONTINUED | OUTPATIENT
Start: 2018-06-11 | End: 2018-06-11

## 2018-06-11 RX ORDER — SODIUM CHLORIDE 9 MG/ML
1000 INJECTION, SOLUTION INTRAVENOUS
Qty: 0 | Refills: 0 | Status: DISCONTINUED | OUTPATIENT
Start: 2018-06-11 | End: 2018-06-12

## 2018-06-11 RX ORDER — I.V. FAT EMULSION 20 G/100ML
6.6 EMULSION INTRAVENOUS
Qty: 16 | Refills: 0 | Status: DISCONTINUED | OUTPATIENT
Start: 2018-06-11 | End: 2018-06-13

## 2018-06-11 RX ORDER — POTASSIUM PHOSPHATE, MONOBASIC POTASSIUM PHOSPHATE, DIBASIC 236; 224 MG/ML; MG/ML
15 INJECTION, SOLUTION INTRAVENOUS ONCE
Qty: 0 | Refills: 0 | Status: COMPLETED | OUTPATIENT
Start: 2018-06-11 | End: 2018-06-11

## 2018-06-11 RX ADMIN — HYDROMORPHONE HYDROCHLORIDE 0.5 MILLIGRAM(S): 2 INJECTION INTRAMUSCULAR; INTRAVENOUS; SUBCUTANEOUS at 12:50

## 2018-06-11 RX ADMIN — Medication 100 MILLIGRAM(S): at 21:56

## 2018-06-11 RX ADMIN — Medication 63.75 MILLIMOLE(S): at 18:23

## 2018-06-11 RX ADMIN — POTASSIUM PHOSPHATE, MONOBASIC POTASSIUM PHOSPHATE, DIBASIC 62.5 MILLIMOLE(S): 236; 224 INJECTION, SOLUTION INTRAVENOUS at 11:15

## 2018-06-11 RX ADMIN — PANTOPRAZOLE SODIUM 40 MILLIGRAM(S): 20 TABLET, DELAYED RELEASE ORAL at 13:18

## 2018-06-11 RX ADMIN — I.V. FAT EMULSION 6.6 ML/HR: 20 EMULSION INTRAVENOUS at 17:29

## 2018-06-11 RX ADMIN — HYDROMORPHONE HYDROCHLORIDE 1 MILLIGRAM(S): 2 INJECTION INTRAMUSCULAR; INTRAVENOUS; SUBCUTANEOUS at 06:23

## 2018-06-11 RX ADMIN — Medication 100 MILLIGRAM(S): at 06:22

## 2018-06-11 RX ADMIN — Medication 1 EACH: at 17:29

## 2018-06-11 RX ADMIN — HYDROMORPHONE HYDROCHLORIDE 1 MILLIGRAM(S): 2 INJECTION INTRAMUSCULAR; INTRAVENOUS; SUBCUTANEOUS at 17:07

## 2018-06-11 RX ADMIN — SODIUM CHLORIDE 125 MILLILITER(S): 9 INJECTION, SOLUTION INTRAVENOUS at 11:15

## 2018-06-11 RX ADMIN — Medication 100 MILLIGRAM(S): at 15:14

## 2018-06-11 RX ADMIN — ENOXAPARIN SODIUM 40 MILLIGRAM(S): 100 INJECTION SUBCUTANEOUS at 13:18

## 2018-06-11 RX ADMIN — HYDROMORPHONE HYDROCHLORIDE 1 MILLIGRAM(S): 2 INJECTION INTRAMUSCULAR; INTRAVENOUS; SUBCUTANEOUS at 22:45

## 2018-06-11 RX ADMIN — HYDROMORPHONE HYDROCHLORIDE 1 MILLIGRAM(S): 2 INJECTION INTRAMUSCULAR; INTRAVENOUS; SUBCUTANEOUS at 17:23

## 2018-06-11 RX ADMIN — HYDROMORPHONE HYDROCHLORIDE 1 MILLIGRAM(S): 2 INJECTION INTRAMUSCULAR; INTRAVENOUS; SUBCUTANEOUS at 22:31

## 2018-06-11 RX ADMIN — Medication 200 MILLIGRAM(S): at 16:11

## 2018-06-11 RX ADMIN — HYDROMORPHONE HYDROCHLORIDE 0.5 MILLIGRAM(S): 2 INJECTION INTRAMUSCULAR; INTRAVENOUS; SUBCUTANEOUS at 11:47

## 2018-06-11 RX ADMIN — Medication 200 MILLIGRAM(S): at 05:00

## 2018-06-11 NOTE — PROGRESS NOTE ADULT - SUBJECTIVE AND OBJECTIVE BOX
GENERAL SURGERY DAILY PROGRESS NOTE:       Subjective:  No acute events overnight. This AM pt doing ok overall. Pain well controlled. Has not had nausea or emesis.         Objective:    PE:  Gen: NAD, pt appears to have widespread edema  Abdomen: dressing c/d/i, softly distended, drain bilious      Vital Signs Last 24 Hrs  T(C): 36.6 (11 Jun 2018 14:49), Max: 36.8 (11 Jun 2018 05:00)  T(F): 97.8 (11 Jun 2018 14:49), Max: 98.2 (11 Jun 2018 05:00)  HR: 79 (11 Jun 2018 14:49) (71 - 79)  BP: 125/72 (11 Jun 2018 14:49) (113/68 - 129/69)  BP(mean): --  RR: 18 (11 Jun 2018 14:49) (16 - 18)  SpO2: 98% (11 Jun 2018 14:49) (95% - 100%)    I&O's Detail    10 Maynor 2018 07:01  -  11 Jun 2018 07:00  --------------------------------------------------------  IN:    dextrose 5% + lactated ringers.: 250 mL    dextrose 5% + sodium chloride 0.45%.: 125 mL  Total IN: 375 mL    OUT:    Drain: 335 mL    Indwelling Catheter - Urethral: 210 mL    Nasoenteral Tube: 250 mL    Voided: 925 mL  Total OUT: 1720 mL    Total NET: -1345 mL      11 Jun 2018 07:01  -  11 Jun 2018 16:19  --------------------------------------------------------  IN:    dextrose 5% + sodium chloride 0.45%.: 500 mL  Total IN: 500 mL    OUT:    Drain: 100 mL    Nasoenteral Tube: 50 mL    Voided: 100 mL  Total OUT: 250 mL    Total NET: 250 mL          Daily     Daily     MEDICATIONS  (STANDING):  ciprofloxacin   IVPB 400 milliGRAM(s) IV Intermittent every 12 hours  dextrose 5% + sodium chloride 0.45%. 1000 milliLiter(s) (125 mL/Hr) IV Continuous <Continuous>  enoxaparin Injectable 40 milliGRAM(s) SubCutaneous daily  fat emulsion (Fish Oil and Plant Based) 20% Infusion 6.6 mL/Hr (6.6 mL/Hr) IV Continuous <Continuous>  metroNIDAZOLE  IVPB 500 milliGRAM(s) IV Intermittent every 8 hours  pantoprazole  Injectable 40 milliGRAM(s) IV Push daily  Parenteral Nutrition - Adult 1 Each (42 mL/Hr) TPN Continuous <Continuous>  sodium phosphate IVPB 15 milliMole(s) IV Intermittent once    MEDICATIONS  (PRN):  benzocaine 15 mG/menthol 3.6 mG Lozenge 1 Lozenge Oral every 6 hours PRN Sore Throat  HYDROmorphone  Injectable 0.5 milliGRAM(s) IV Push every 4 hours PRN Moderate Pain (4 - 6)  HYDROmorphone  Injectable 1 milliGRAM(s) IV Push every 4 hours PRN Severe Pain (7 - 10)      LABS:                        12.1   14.66 )-----------( 205      ( 11 Jun 2018 05:38 )             34.2     06-11    134<L>  |  101  |  12  ----------------------------<  105<H>  3.4<L>   |  19<L>  |  0.47<L>    Ca    7.7<L>      11 Jun 2018 05:38  Phos  1.9     06-11  Mg     2.2     06-11    TPro  4.6<L>  /  Alb  1.9<L>  /  TBili  10.0<H>  /  DBili  x   /  AST  27  /  ALT  26  /  AlkPhos  344<H>  06-11    PT/INR - ( 11 Jun 2018 05:38 )   PT: 19.9 SEC;   INR: 1.77          PTT - ( 11 Jun 2018 05:38 )  PTT:38.8 SEC      RADIOLOGY & ADDITIONAL STUDIES:

## 2018-06-11 NOTE — CHART NOTE - NSCHARTNOTEFT_GEN_A_CORE
Pre-Interventional Radiology Procedure Note    58y    Female    Procedure: DOUBLE LUMEN PICC FOR TPN    Diagnosis/Indication: Patient is a 58y old  Female who presents with a chief complaint of Painless jaundice (05 Jun 2018 14:49)      Interventional Radiology Attending Physician:    Ordering Attending Physician: DR. SOLARES    PAST MEDICAL & SURGICAL HISTORY:  Gastric cancer  Malignant neoplasm of stomach, unspecified location  S/P partial gastrectomy: distal gastrectomy with Billroth II reconstruction, D2 lymphadenectomy, feeding jejunostomy tube placement 6/23/17       CBC Full  -  ( 11 Jun 2018 05:38 )  WBC Count : 14.66 K/uL  Hemoglobin : 12.1 g/dL  Hematocrit : 34.2 %  Platelet Count - Automated : 205 K/uL  Mean Cell Volume : 83.6 fL  Mean Cell Hemoglobin : 29.6 pg  Mean Cell Hemoglobin Concentration : 35.4 %  Auto Neutrophil # : x  Auto Lymphocyte # : x  Auto Monocyte # : x  Auto Eosinophil # : x  Auto Basophil # : x  Auto Neutrophil % : x  Auto Lymphocyte % : x  Auto Monocyte % : x  Auto Eosinophil % : x  Auto Basophil % : x    06-11    134<L>  |  101  |  12  ----------------------------<  105<H>  3.4<L>   |  19<L>  |  0.47<L>    Ca    7.7<L>      11 Jun 2018 05:38  Phos  1.9     06-11  Mg     2.2     06-11    TPro  4.6<L>  /  Alb  1.9<L>  /  TBili  10.0<H>  /  DBili  x   /  AST  27  /  ALT  26  /  AlkPhos  344<H>  06-11    PT/INR - ( 11 Jun 2018 05:38 )   PT: 19.9 SEC;   INR: 1.77          PTT - ( 11 Jun 2018 05:38 )  PTT:38.8 SEC

## 2018-06-11 NOTE — CONSULT NOTE ADULT - ATTENDING COMMENTS
57 y/o female s/p ERCP c/b perforation of afferent limb. Pt underwent emergent Ex-lap w/ resection of perforated bowel and stapled side to side functional end to end anastomosis. Pt remained intubated and was transferred to SICU off pressors. s/p extubation, now transferred to floor with NGT in place. Patient emets criteria for severe protein calorie malnutrition requiring TPN for nutritional support. Patient now with post op ileus, awaiting GI function.     Plan to initiate TPN/lipids today with 1L infusion volume, increase to 2L tomorrow.  Monitor fingersticks q6 hours, obtian daily weights.    Will follow up with primary team on plan.  Dx. Severe calorie protein malunutrition  Plan:  [ x ] Initiate TPN formula after PICC line placed  Carbohydrates:___200___grams, Amino Acid:___78___grams  SMOF Lipids:____32___ grams, Total volume:___2000____mL.  1. Dedicated Central line must be placed for TPN.  2. Strict Intake and Output.  3. Weights three times a week  4. Monitor BMP, Mg+, Ionized Ca++, Phosphorus daily  5. Monitor Triglycerides monthly  6. Pre-albumin weekly.  7. Fingersticks to monitor glucose every 6 hours until stable then may be decreased to twice a day.    I have seen and examined the patient, reviewed the laboratory and radiologic data and agree with practitioner's history, physical assessment, plan and reviewed, discussed with other consultants, House staff and PA's.  and edited where appropriate.  I spent  45   min in total providing critical care for the diagnoses, assessment and plan above.       Time involved in performance of separately billable procedures was not counted toward my critical care time.  There is no overlap.

## 2018-06-11 NOTE — PROGRESS NOTE ADULT - SUBJECTIVE AND OBJECTIVE BOX
Chief Complaint:  Patient is a 58y old  Female who presents with a chief complaint of Painless jaundice (2018 14:49)      Interval Events: Patient reports abdominal discomfort that is improved with pain medications. She has slight pruritus. She denies vomiting. She reports no bowel movements overnight/this AM.     Allergies:  No Known Allergies      Hospital Medications:  benzocaine 15 mG/menthol 3.6 mG Lozenge 1 Lozenge Oral every 6 hours PRN  chlorhexidine 4% Liquid 1 Application(s) Topical <User Schedule>  ciprofloxacin   IVPB 400 milliGRAM(s) IV Intermittent every 12 hours  dextrose 5% + sodium chloride 0.45%. 1000 milliLiter(s) IV Continuous <Continuous>  enoxaparin Injectable 40 milliGRAM(s) SubCutaneous daily  HYDROmorphone  Injectable 0.5 milliGRAM(s) IV Push every 4 hours PRN  HYDROmorphone  Injectable 1 milliGRAM(s) IV Push every 4 hours PRN  metroNIDAZOLE  IVPB 500 milliGRAM(s) IV Intermittent every 8 hours  pantoprazole  Injectable 40 milliGRAM(s) IV Push daily      PMHX/PSHX:  Gastric cancer  Malignant neoplasm of stomach, unspecified location  No pertinent past medical history  S/P partial gastrectomy  No significant past surgical history      Family history:  No pertinent family history in first degree relatives      ROS: Negative, except as otherwise noted above    PHYSICAL EXAM:   Vital Signs:  Vital Signs Last 24 Hrs  T(C): 36.8 (2018 05:00), Max: 36.8 (2018 05:00)  T(F): 98.2 (2018 05:00), Max: 98.2 (2018 05:00)  HR: 72 (2018 05:00) (68 - 74)  BP: 129/69 (2018 05:00) (117/66 - 130/72)  BP(mean): 87 (10 Maynor 2018 09:00) (78 - 96)  RR: 18 (2018 05:00) (16 - 18)  SpO2: 100% (2018 05:00) (95% - 100%)  Daily     Daily Weight in k (10 Maynor 2018 14:07)    GENERAL: Chronically ill appearing  HEENT:  +Jaundice, +NGT  CHEST:  Non-labored breathing, lungs clear b/l  HEART:  +s1, s2 heart sounds, no murmurs  ABDOMEN:  +Surgical incision covered with clean/dry gauze, soft, mildly tender diffusely, no rebound, +drain  EXTREMITIES:  Warm and well perfused  SKIN:  Jaundice  NEURO:   Awake, interactive, following commands    LABS:  CBC Full  -  ( 2018 05:38 )  WBC Count : 14.66 K/uL  Hemoglobin : 12.1 g/dL  Hematocrit : 34.2 %  Platelet Count - Automated : 205 K/uL  Mean Cell Volume : 83.6 fL  Auto Neutrophil # :   Auto Neutrophil % :      @ 05:38  Na 134 mmol/L  K 3.4 mmol/L  Cl 101 mmol/L  CO2 19 mmol/L  BUN 12 mg/dL  Creat 0.47 mg/dL  Glucose 105 mg/dL  Ca 7.7 mg/dL    Total protein 4.6 g/dL  Albumin 1.9 g/dL  T bili 10.0 mg/dL  Alk phos 344 u/L  AST 27 u/L  ALT 26 u/L    06-10 @ 02:55  T bili 11.0 mg/dL  Alk phos 355 u/L  AST 42 u/L  ALT 36 u/L     @ 02:35  T bili 14.0 mg/dL  Alk phos 473 u/L  AST 59 u/L  ALT 46 u/L      PT/INR - ( 2018 05:38 )   PT: 19.9 SEC;   INR: 1.77          PTT - ( 2018 05:38 )  PTT:38.8 SEC

## 2018-06-11 NOTE — CONSULT NOTE ADULT - SUBJECTIVE AND OBJECTIVE BOX
Nutrition Support Consult Note    HPI:  57 y/o female s/p ERCP c/b perforation of afferent limb. Pt underwent emergent Ex-lap w/ resection of perforated bowel and stapled side to side functional end to end anastomosis. Pt remained intubated and was transferred to SICU off pressors. s/p extubation, now transferred to floor with NGT in place.     Nutrition support consult requested for prolonged NPO and initiation of TPN for nutritional optimization.  Awaiting PICC placement.     Allergies  No Known Allergies    MEDICATIONS  (STANDING):  ciprofloxacin   IVPB 400 milliGRAM(s) IV Intermittent every 12 hours  dextrose 5% + sodium chloride 0.45%. 1000 milliLiter(s) (125 mL/Hr) IV Continuous <Continuous>  enoxaparin Injectable 40 milliGRAM(s) SubCutaneous daily  fat emulsion (Fish Oil and Plant Based) 20% Infusion 6.6 mL/Hr (6.6 mL/Hr) IV Continuous <Continuous>  metroNIDAZOLE  IVPB 500 milliGRAM(s) IV Intermittent every 8 hours  pantoprazole  Injectable 40 milliGRAM(s) IV Push daily  Parenteral Nutrition - Adult 1 Each (42 mL/Hr) TPN Continuous <Continuous>  potassium phosphate IVPB 15 milliMole(s) IV Intermittent once  sodium phosphate IVPB 15 milliMole(s) IV Intermittent once    MEDICATIONS  (PRN):  benzocaine 15 mG/menthol 3.6 mG Lozenge 1 Lozenge Oral every 6 hours PRN Sore Throat  HYDROmorphone  Injectable 0.5 milliGRAM(s) IV Push every 4 hours PRN Moderate Pain (4 - 6)  HYDROmorphone  Injectable 1 milliGRAM(s) IV Push every 4 hours PRN Severe Pain (7 - 10)    PAST MEDICAL & SURGICAL HISTORY:  Gastric cancer  Malignant neoplasm of stomach, unspecified location  S/P partial gastrectomy: distal gastrectomy with Billroth II reconstruction, D2 lymphadenectomy, feeding jejunostomy tube placement 6/23/17    FAMILY HISTORY:  No pertinent family history in first degree relatives    Vital Signs Last 24 Hrs  T(C): 36.6 (11 Jun 2018 09:00), Max: 36.8 (11 Jun 2018 05:00)  T(F): 97.9 (11 Jun 2018 09:00), Max: 98.2 (11 Jun 2018 05:00)  HR: 72 (11 Jun 2018 09:00) (71 - 74)  BP: 113/68 (11 Jun 2018 09:00) (113/68 - 129/69)  RR: 16 (11 Jun 2018 09:00) (16 - 18)  SpO2: 97% (11 Jun 2018 09:00) (95% - 100%)    I&O's Detail    10 Maynor 2018 07:01  -  11 Jun 2018 07:00  --------------------------------------------------------  IN:    dextrose 5% + lactated ringers.: 250 mL  Total IN: 250 mL    OUT:    Drain: 335 mL    Indwelling Catheter - Urethral: 210 mL    Nasoenteral Tube: 250 mL    Voided: 925 mL  Total OUT: 1720 mL    Total NET: -1470 mL    PHYSICAL EXAM:  Constitutional: no acute distress, jaundice   Respiratory: nonlabored respirations   Gastrointestinal: soft, mildly tender, NGT in place   Extremities: warm     LABS:                        12.1   14.66 )-----------( 205      ( 11 Jun 2018 05:38 )             34.2     06-11    134<L>  |  101  |  12  ----------------------------<  105<H>  3.4<L>   |  19<L>  |  0.47<L>    Ca    7.7<L>      11 Jun 2018 05:38  Phos  1.9     06-11  Mg     2.2     06-11    TPro  4.6<L>  /  Alb  1.9<L>  /  TBili  10.0<H>  /  DBili  x   /  AST  27  /  ALT  26  /  AlkPhos  344<H>  06-11    POCT Blood Glucose.: 92 mg/dL (10 Maynor 2018 18:09)    PT/INR - ( 11 Jun 2018 05:38 )   PT: 19.9 SEC;   INR: 1.77     PTT - ( 11 Jun 2018 05:38 )  PTT:38.8 SEC  ABG - ( 10 Maynor 2018 02:55 )  pH, Arterial: 7.41  pH, Blood: x     /  pCO2: 34    /  pO2: 65    / HCO3: 22    / Base Excess: -2.8  /  SaO2: 94.1      Current Weight: 54kG  Height: 160 cm  Ideal Body Weight: 52 kG  BMI:  21.1  Current Diet: [ x ]NPO  Appetite: [x  ]Poor [  ]Adequate [  ]Good    CLINICAL INDICATORS  MALNUTRITION IN CONTEXT OF ACUTE ILLNESS OR INJURY  SEVERE MALNUTRITION  Weight Loss  [  ] >2% in 1 week  [  ] >5% in 1 month  [  ] >7.5% in 3 months    Caloric Intake  [x  ] <50% of nutrition needs >= 5 days    Metabolic Requirements:  The patient will require:  _______25______ kilocalories / kg / day  Diagnosis:  [  ] Mild protein malnutrition  [  ] Moderate protein calorie malnutrition in acute illness/ injury  [x  ] Severe protein calorie malnutrition in acute illness/ injury  [  ] Moderate protein calorie malnutrition in chronic illness  [  ] Severe protein calorie malnutrition in chronic illness      Nutritional Requirements  Carbohydrates: Total grams / kG / day: _3.4__ Total Calories: _692__  Lipids: Total grams / kG / day: _9__ Total Calories: _288__  Proteins: Total grams / kG / day: _4__ Total Calories: _312__    Assessment:  57 y/o female s/p ERCP c/b perforation of afferent limb. Pt underwent emergent Ex-lap w/ resection of perforated bowel and stapled side to side functional end to end anastomosis. Pt remained intubated and was transferred to SICU off pressors. s/p extubation, now transferred to floor with NGT in place. Patient emets criteria for severe protein calorie malnutrition requiring TPN for nutritional support. Patient now with post op ileus, awaiting GI function.     Plan to initiate TPN/lipids today with 1L infusion volume, increase to 2L tomorrow.  Monitor fingersticks q6 hours, obtian daily weights.    Will follow up with primary team on plan.    Plan:  [ x ] Initiate TPN formula after PICC line placed  Carbohydrates:___200___grams, Amino Acid:___78___grams  SMOF Lipids:____32___ grams, Total volume:___2000____mL.  1. Dedicated Central line must be placed for TPN.  2. Strict Intake and Output.  3. Weights three times a week  4. Monitor BMP, Mg+, Ionized Ca++, Phosphorus daily  5. Monitor Triglycerides monthly  6. Pre-albumin weekly.  7. Fingersticks to monitor glucose every 6 hours until stable then may be decreased to twice a day.    Nutrition support pager [phone]

## 2018-06-11 NOTE — PROGRESS NOTE ADULT - ATTENDING COMMENTS
Decrease in abdominal pain on pain meds. Slight decrease in bilirubin since drain was placed. NG tube still in position.

## 2018-06-11 NOTE — PROGRESS NOTE ADULT - ASSESSMENT
Impression:  1. Unsuccessful ERCP complicated by small bowel perforation 6/6/18 s/p ex-lap with small bowel resection and anastomosis 6/6/18  2. Obstructive jaundice with dilated CBD and 1.4 cm enhancing pancreatic head mass s/p percutaneous draining. T bili downtrending  3. 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 Post op ileus  5. Elevated lipase    Plan:  - OOB as tolerated  - Monitor electrolytes and correct derangements  - IVF as appropriate  - Monitor CBC, CMP, INR  - Continue antibiotics  - Supportive care per primary team

## 2018-06-12 LAB
-  AMIKACIN: SIGNIFICANT CHANGE UP
-  AMPICILLIN/SULBACTAM: SIGNIFICANT CHANGE UP
-  AMPICILLIN: SIGNIFICANT CHANGE UP
-  AMPICILLIN: SIGNIFICANT CHANGE UP
-  AZTREONAM: SIGNIFICANT CHANGE UP
-  CEFAZOLIN: SIGNIFICANT CHANGE UP
-  CEFEPIME: SIGNIFICANT CHANGE UP
-  CEFOXITIN: SIGNIFICANT CHANGE UP
-  CEFTAZIDIME: SIGNIFICANT CHANGE UP
-  CEFTRIAXONE: SIGNIFICANT CHANGE UP
-  CIPROFLOXACIN: SIGNIFICANT CHANGE UP
-  CIPROFLOXACIN: SIGNIFICANT CHANGE UP
-  DAPTOMYCIN: SIGNIFICANT CHANGE UP
-  ERTAPENEM: SIGNIFICANT CHANGE UP
-  GENTAMICIN: SIGNIFICANT CHANGE UP
-  IMIPENEM: SIGNIFICANT CHANGE UP
-  LEVOFLOXACIN: SIGNIFICANT CHANGE UP
-  LINEZOLID: SIGNIFICANT CHANGE UP
-  MEROPENEM: SIGNIFICANT CHANGE UP
-  PIPERACILLIN/TAZOBACTAM: SIGNIFICANT CHANGE UP
-  TETRACYCLINE: SIGNIFICANT CHANGE UP
-  TIGECYCLINE: SIGNIFICANT CHANGE UP
-  TOBRAMYCIN: SIGNIFICANT CHANGE UP
-  TRIMETHOPRIM/SULFAMETHOXAZOLE: SIGNIFICANT CHANGE UP
-  VANCOMYCIN: SIGNIFICANT CHANGE UP
ALBUMIN SERPL ELPH-MCNC: 1.9 G/DL — LOW (ref 3.3–5)
ALP SERPL-CCNC: 306 U/L — HIGH (ref 40–120)
ALT FLD-CCNC: 22 U/L — SIGNIFICANT CHANGE UP (ref 4–33)
APTT BLD: 34.6 SEC — SIGNIFICANT CHANGE UP (ref 27.5–37.4)
AST SERPL-CCNC: 28 U/L — SIGNIFICANT CHANGE UP (ref 4–32)
BASOPHILS # BLD AUTO: 0.09 K/UL — SIGNIFICANT CHANGE UP (ref 0–0.2)
BASOPHILS NFR BLD AUTO: 0.5 % — SIGNIFICANT CHANGE UP (ref 0–2)
BILIRUB SERPL-MCNC: 9.8 MG/DL — HIGH (ref 0.2–1.2)
BUN SERPL-MCNC: 7 MG/DL — SIGNIFICANT CHANGE UP (ref 7–23)
BUN SERPL-MCNC: 7 MG/DL — SIGNIFICANT CHANGE UP (ref 7–23)
CA-I BLD-SCNC: 1.08 MMOL/L — SIGNIFICANT CHANGE UP (ref 1.03–1.23)
CALCIUM SERPL-MCNC: 7.4 MG/DL — LOW (ref 8.4–10.5)
CALCIUM SERPL-MCNC: 7.4 MG/DL — LOW (ref 8.4–10.5)
CHLORIDE SERPL-SCNC: 100 MMOL/L — SIGNIFICANT CHANGE UP (ref 98–107)
CHLORIDE SERPL-SCNC: 100 MMOL/L — SIGNIFICANT CHANGE UP (ref 98–107)
CO2 SERPL-SCNC: 20 MMOL/L — LOW (ref 22–31)
CO2 SERPL-SCNC: 20 MMOL/L — LOW (ref 22–31)
CREAT SERPL-MCNC: 0.4 MG/DL — LOW (ref 0.5–1.3)
CREAT SERPL-MCNC: 0.4 MG/DL — LOW (ref 0.5–1.3)
CULTURE - SURGICAL SITE: SIGNIFICANT CHANGE UP
EOSINOPHIL # BLD AUTO: 0.18 K/UL — SIGNIFICANT CHANGE UP (ref 0–0.5)
EOSINOPHIL NFR BLD AUTO: 1.1 % — SIGNIFICANT CHANGE UP (ref 0–6)
GLUCOSE SERPL-MCNC: 73 MG/DL — SIGNIFICANT CHANGE UP (ref 70–99)
GLUCOSE SERPL-MCNC: 73 MG/DL — SIGNIFICANT CHANGE UP (ref 70–99)
HCT VFR BLD CALC: 32.4 % — LOW (ref 34.5–45)
HCT VFR BLD CALC: 32.4 % — LOW (ref 34.5–45)
HGB BLD-MCNC: 11.4 G/DL — LOW (ref 11.5–15.5)
HGB BLD-MCNC: 11.4 G/DL — LOW (ref 11.5–15.5)
IMM GRANULOCYTES # BLD AUTO: 0.35 # — SIGNIFICANT CHANGE UP
IMM GRANULOCYTES NFR BLD AUTO: 2.1 % — HIGH (ref 0–1.5)
INR BLD: 1.44 — HIGH (ref 0.88–1.17)
LIDOCAIN IGE QN: 25.2 U/L — SIGNIFICANT CHANGE UP (ref 7–60)
LYMPHOCYTES # BLD AUTO: 1.38 K/UL — SIGNIFICANT CHANGE UP (ref 1–3.3)
LYMPHOCYTES # BLD AUTO: 8.2 % — LOW (ref 13–44)
MAGNESIUM SERPL-MCNC: 1.9 MG/DL — SIGNIFICANT CHANGE UP (ref 1.6–2.6)
MCHC RBC-ENTMCNC: 29.5 PG — SIGNIFICANT CHANGE UP (ref 27–34)
MCHC RBC-ENTMCNC: 29.5 PG — SIGNIFICANT CHANGE UP (ref 27–34)
MCHC RBC-ENTMCNC: 35.2 % — SIGNIFICANT CHANGE UP (ref 32–36)
MCHC RBC-ENTMCNC: 35.2 % — SIGNIFICANT CHANGE UP (ref 32–36)
MCV RBC AUTO: 83.7 FL — SIGNIFICANT CHANGE UP (ref 80–100)
MCV RBC AUTO: 83.7 FL — SIGNIFICANT CHANGE UP (ref 80–100)
METHOD TYPE: SIGNIFICANT CHANGE UP
MONOCYTES # BLD AUTO: 1.04 K/UL — HIGH (ref 0–0.9)
MONOCYTES NFR BLD AUTO: 6.2 % — SIGNIFICANT CHANGE UP (ref 2–14)
NEUTROPHILS # BLD AUTO: 13.76 K/UL — HIGH (ref 1.8–7.4)
NEUTROPHILS NFR BLD AUTO: 81.9 % — HIGH (ref 43–77)
NRBC # FLD: 0 — SIGNIFICANT CHANGE UP
NRBC # FLD: 0 — SIGNIFICANT CHANGE UP
ORGANISM # SPEC MICROSCOPIC CNT: SIGNIFICANT CHANGE UP
ORGANISM # SPEC MICROSCOPIC CNT: SIGNIFICANT CHANGE UP
PHOSPHATE SERPL-MCNC: 2.6 MG/DL — SIGNIFICANT CHANGE UP (ref 2.5–4.5)
PLATELET # BLD AUTO: 167 K/UL — SIGNIFICANT CHANGE UP (ref 150–400)
PLATELET # BLD AUTO: 167 K/UL — SIGNIFICANT CHANGE UP (ref 150–400)
PMV BLD: 10.7 FL — SIGNIFICANT CHANGE UP (ref 7–13)
PMV BLD: 10.7 FL — SIGNIFICANT CHANGE UP (ref 7–13)
POTASSIUM SERPL-MCNC: 4.2 MMOL/L — SIGNIFICANT CHANGE UP (ref 3.5–5.3)
POTASSIUM SERPL-MCNC: 4.2 MMOL/L — SIGNIFICANT CHANGE UP (ref 3.5–5.3)
POTASSIUM SERPL-SCNC: 4.2 MMOL/L — SIGNIFICANT CHANGE UP (ref 3.5–5.3)
POTASSIUM SERPL-SCNC: 4.2 MMOL/L — SIGNIFICANT CHANGE UP (ref 3.5–5.3)
PREALB SERPL-MCNC: 5 MG/DL — LOW (ref 20–40)
PROT SERPL-MCNC: 4.4 G/DL — LOW (ref 6–8.3)
PROTHROM AB SERPL-ACNC: 16.7 SEC — HIGH (ref 9.8–13.1)
RBC # BLD: 3.87 M/UL — SIGNIFICANT CHANGE UP (ref 3.8–5.2)
RBC # BLD: 3.87 M/UL — SIGNIFICANT CHANGE UP (ref 3.8–5.2)
RBC # FLD: 21.4 % — HIGH (ref 10.3–14.5)
RBC # FLD: 21.4 % — HIGH (ref 10.3–14.5)
SODIUM SERPL-SCNC: 133 MMOL/L — LOW (ref 135–145)
SODIUM SERPL-SCNC: 133 MMOL/L — LOW (ref 135–145)
SPECIMEN SOURCE: SIGNIFICANT CHANGE UP
TRIGL SERPL-MCNC: 112 MG/DL — SIGNIFICANT CHANGE UP (ref 10–149)
WBC # BLD: 16.8 K/UL — HIGH (ref 3.8–10.5)
WBC # BLD: 16.8 K/UL — HIGH (ref 3.8–10.5)
WBC # FLD AUTO: 16.8 K/UL — HIGH (ref 3.8–10.5)
WBC # FLD AUTO: 16.8 K/UL — HIGH (ref 3.8–10.5)

## 2018-06-12 PROCEDURE — 99232 SBSQ HOSP IP/OBS MODERATE 35: CPT | Mod: 24

## 2018-06-12 PROCEDURE — 99232 SBSQ HOSP IP/OBS MODERATE 35: CPT | Mod: GC

## 2018-06-12 PROCEDURE — 99233 SBSQ HOSP IP/OBS HIGH 50: CPT

## 2018-06-12 RX ORDER — HYDROMORPHONE HYDROCHLORIDE 2 MG/ML
0.5 INJECTION INTRAMUSCULAR; INTRAVENOUS; SUBCUTANEOUS ONCE
Qty: 0 | Refills: 0 | Status: DISCONTINUED | OUTPATIENT
Start: 2018-06-12 | End: 2018-06-12

## 2018-06-12 RX ORDER — I.V. FAT EMULSION 20 G/100ML
13.3 EMULSION INTRAVENOUS
Qty: 32 | Refills: 0 | Status: DISCONTINUED | OUTPATIENT
Start: 2018-06-12 | End: 2018-06-14

## 2018-06-12 RX ORDER — ELECTROLYTE SOLUTION,INJ
1 VIAL (ML) INTRAVENOUS
Qty: 0 | Refills: 0 | Status: DISCONTINUED | OUTPATIENT
Start: 2018-06-12 | End: 2018-06-12

## 2018-06-12 RX ADMIN — Medication 100 MILLIGRAM(S): at 05:21

## 2018-06-12 RX ADMIN — Medication 100 MILLIGRAM(S): at 14:39

## 2018-06-12 RX ADMIN — Medication 100 MILLIGRAM(S): at 21:24

## 2018-06-12 RX ADMIN — HYDROMORPHONE HYDROCHLORIDE 0.5 MILLIGRAM(S): 2 INJECTION INTRAMUSCULAR; INTRAVENOUS; SUBCUTANEOUS at 22:41

## 2018-06-12 RX ADMIN — HYDROMORPHONE HYDROCHLORIDE 0.5 MILLIGRAM(S): 2 INJECTION INTRAMUSCULAR; INTRAVENOUS; SUBCUTANEOUS at 23:11

## 2018-06-12 RX ADMIN — Medication 200 MILLIGRAM(S): at 05:10

## 2018-06-12 RX ADMIN — HYDROMORPHONE HYDROCHLORIDE 1 MILLIGRAM(S): 2 INJECTION INTRAMUSCULAR; INTRAVENOUS; SUBCUTANEOUS at 05:35

## 2018-06-12 RX ADMIN — PANTOPRAZOLE SODIUM 40 MILLIGRAM(S): 20 TABLET, DELAYED RELEASE ORAL at 11:52

## 2018-06-12 RX ADMIN — Medication 1 EACH: at 17:27

## 2018-06-12 RX ADMIN — HYDROMORPHONE HYDROCHLORIDE 1 MILLIGRAM(S): 2 INJECTION INTRAMUSCULAR; INTRAVENOUS; SUBCUTANEOUS at 05:50

## 2018-06-12 RX ADMIN — I.V. FAT EMULSION 13.3 ML/HR: 20 EMULSION INTRAVENOUS at 17:28

## 2018-06-12 RX ADMIN — HYDROMORPHONE HYDROCHLORIDE 0.5 MILLIGRAM(S): 2 INJECTION INTRAMUSCULAR; INTRAVENOUS; SUBCUTANEOUS at 07:12

## 2018-06-12 RX ADMIN — HYDROMORPHONE HYDROCHLORIDE 0.5 MILLIGRAM(S): 2 INJECTION INTRAMUSCULAR; INTRAVENOUS; SUBCUTANEOUS at 06:59

## 2018-06-12 RX ADMIN — ENOXAPARIN SODIUM 40 MILLIGRAM(S): 100 INJECTION SUBCUTANEOUS at 11:52

## 2018-06-12 RX ADMIN — HYDROMORPHONE HYDROCHLORIDE 0.5 MILLIGRAM(S): 2 INJECTION INTRAMUSCULAR; INTRAVENOUS; SUBCUTANEOUS at 14:38

## 2018-06-12 RX ADMIN — Medication 200 MILLIGRAM(S): at 16:41

## 2018-06-12 RX ADMIN — HYDROMORPHONE HYDROCHLORIDE 0.5 MILLIGRAM(S): 2 INJECTION INTRAMUSCULAR; INTRAVENOUS; SUBCUTANEOUS at 14:08

## 2018-06-12 NOTE — PROGRESS NOTE ADULT - SUBJECTIVE AND OBJECTIVE BOX
GENERAL SURGERY DAILY PROGRESS NOTE:       Subjective:  No acute events overnight. This AM pt feeling OK overall. Pain well controlled.         Objective:    PE:  Gen: NAD, pt appears to have widespread edema  Abdomen: dressing c/d/i, softly distended, drain bilious    Vital Signs Last 24 Hrs  T(C): 36.8 (2018 10:24), Max: 36.8 (2018 16:35)  T(F): 98.3 (2018 10:24), Max: 98.3 (2018 16:35)  HR: 76 (2018 10:24) (74 - 79)  BP: 122/62 (2018 10:24) (118/66 - 130/73)  BP(mean): --  RR: 16 (2018 10:24) (16 - 18)  SpO2: 95% (2018 10:24) (95% - 98%)    I&O's Detail    2018 07:01  -  2018 07:00  --------------------------------------------------------  IN:    dextrose 5% + sodium chloride 0.45%.: 2500 mL    fat emulsion (Fish Oil and Plant Based) 20% Infusion: 66 mL    IV PiggyBack: 350 mL    TPN (Total Parenteral Nutrition): 504 mL  Total IN: 3420 mL    OUT:    Drain: 335 mL    Nasoenteral Tube: 150 mL    Voided: 950 mL  Total OUT: 1435 mL    Total NET: 1985 mL      2018 07:01  -  2018 13:28  --------------------------------------------------------  IN:    dextrose 5% + sodium chloride 0.45%.: 250 mL    TPN (Total Parenteral Nutrition): 84 mL  Total IN: 334 mL    OUT:    Drain: 100 mL    Nasoenteral Tube: 50 mL  Total OUT: 150 mL    Total NET: 184 mL          Daily     Daily Weight in k (2018 05:10)    MEDICATIONS  (STANDING):  ciprofloxacin   IVPB 400 milliGRAM(s) IV Intermittent every 12 hours  enoxaparin Injectable 40 milliGRAM(s) SubCutaneous daily  fat emulsion (Fish Oil and Plant Based) 20% Infusion 6.6 mL/Hr (6.6 mL/Hr) IV Continuous <Continuous>  fat emulsion (Fish Oil and Plant Based) 20% Infusion 13.3 mL/Hr (13.3 mL/Hr) IV Continuous <Continuous>  metroNIDAZOLE  IVPB 500 milliGRAM(s) IV Intermittent every 8 hours  pantoprazole  Injectable 40 milliGRAM(s) IV Push daily  Parenteral Nutrition - Adult 1 Each (63 mL/Hr) TPN Continuous <Continuous>  Parenteral Nutrition - Adult 1 Each (42 mL/Hr) TPN Continuous <Continuous>    MEDICATIONS  (PRN):  benzocaine 15 mG/menthol 3.6 mG Lozenge 1 Lozenge Oral every 6 hours PRN Sore Throat  HYDROmorphone  Injectable 0.5 milliGRAM(s) IV Push every 4 hours PRN Moderate Pain (4 - 6)  HYDROmorphone  Injectable 1 milliGRAM(s) IV Push every 4 hours PRN Severe Pain (7 - 10)      LABS:                        11.4   16.80 )-----------( 167      ( 2018 07:49 )             32.4     06-12    133<L>  |  100  |  7   ----------------------------<  73  4.2   |  20<L>  |  0.40<L>    Ca    7.4<L>      2018 07:49  Phos  2.6     -12  Mg     1.9     12    TPro  4.4<L>  /  Alb  1.9<L>  /  TBili  9.8<H>  /  DBili  x   /  AST  28  /  ALT  22  /  AlkPhos  306<H>  06-12    PT/INR - ( 2018 07:49 )   PT: 16.7 SEC;   INR: 1.44          PTT - ( 2018 07:49 )  PTT:34.6 SEC      RADIOLOGY & ADDITIONAL STUDIES:

## 2018-06-12 NOTE — PROGRESS NOTE ADULT - SUBJECTIVE AND OBJECTIVE BOX
NUTRITION NOTE  [id number] CHOKYI  ===============================    Interval events  Patient was seen and examined at bedside, no acute events overnight.   PICC placed and TPN/lipids initiated on 18, patient is tolerating without any issues.  Remains NPO, TPN infusion volume increased to 1.5L.    Vital Signs Last 24 Hrs  T(C): 36.8 (2018 10:24), Max: 36.8 (2018 16:35)  T(F): 98.3 (2018 10:24), Max: 98.3 (2018 16:35)  HR: 76 (2018 10:24) (74 - 79)  BP: 122/62 (2018 10:24) (118/66 - 130/73)  RR: 16 (2018 10:24) (16 - 18)  SpO2: 95% (2018 10:24) (95% - 98%)    MEDICATIONS  (STANDING):  ciprofloxacin   IVPB 400 milliGRAM(s) IV Intermittent every 12 hours  enoxaparin Injectable 40 milliGRAM(s) SubCutaneous daily  fat emulsion (Fish Oil and Plant Based) 20% Infusion 6.6 mL/Hr (6.6 mL/Hr) IV Continuous <Continuous>  fat emulsion (Fish Oil and Plant Based) 20% Infusion 13.3 mL/Hr (13.3 mL/Hr) IV Continuous <Continuous>  metroNIDAZOLE  IVPB 500 milliGRAM(s) IV Intermittent every 8 hours  pantoprazole  Injectable 40 milliGRAM(s) IV Push daily  Parenteral Nutrition - Adult 1 Each (63 mL/Hr) TPN Continuous <Continuous>  Parenteral Nutrition - Adult 1 Each (42 mL/Hr) TPN Continuous <Continuous>    MEDICATIONS  (PRN):  benzocaine 15 mG/menthol 3.6 mG Lozenge 1 Lozenge Oral every 6 hours PRN Sore Throat  HYDROmorphone  Injectable 0.5 milliGRAM(s) IV Push every 4 hours PRN Moderate Pain (4 - 6)  HYDROmorphone  Injectable 1 milliGRAM(s) IV Push every 4 hours PRN Severe Pain (7 - 10)    I&O's Detail    2018 07:01  -  2018 07:00  --------------------------------------------------------  IN:    dextrose 5% + sodium chloride 0.45%.: 2500 mL    fat emulsion (Fish Oil and Plant Based) 20% Infusion: 66 mL    IV PiggyBack: 350 mL    TPN (Total Parenteral Nutrition): 504 mL  Total IN: 3420 mL    OUT:    Drain: 335 mL    Nasoenteral Tube: 150 mL    Voided: 950 mL  Total OUT: 1435 mL    Total NET: 1985 mL      2018 07:01  -  2018 10:46  --------------------------------------------------------  IN:    dextrose 5% + sodium chloride 0.45%.: 250 mL    TPN (Total Parenteral Nutrition): 84 mL  Total IN: 334 mL    OUT:    Drain: 100 mL    Nasoenteral Tube: 50 mL  Total OUT: 150 mL    Total NET: 184 mL    Daily Weight in k (2018 05:10)    Drug Dosing Weight  Height (cm): 160.02 (2018 15:53)  Weight (kg): 54 (2018 15:53)  BMI (kg/m2): 21.1 (2018 15:53)  BSA (m2): 1.55 (2018 15:53)    POCT Blood Glucose.: 133 mg/dL (2018 05:56)  POCT Blood Glucose.: 118 mg/dL (2018 00:37)  POCT Blood Glucose.: 104 mg/dL (2018 18:05)    PHYSICAL EXAM   Constitutional: no acute distress, jaundice   Respiratory: nonlabored respirations   Gastrointestinal: soft, mildly tender, NGT in place   Extremities: warm   PICC Site: C/D/I    Diet: NPO and TPN     LABORATORY                        11.4   16.80 )-----------( 167      ( 2018 07:49 )             32.4   06-12    133<L>  |  100  |  7   ----------------------------<  73  4.2   |  20<L>  |  0.40<L>    Ca    7.4<L>      2018 07:49  Phos  2.6       Mg     1.9         TPro  4.4<L>  /  Alb  1.9<L>  /  TBili  9.8<H>  /  DBili  x   /  AST  28  /  ALT  22  /  AlkPhos  306<H>      LIVER FUNCTIONS - ( 2018 07:49 )  Alb: 1.9 g/dL / Pro: 4.4 g/dL / ALK PHOS: 306 u/L / ALT: 22 u/L / AST: 28 u/L / GGT: x            Chol -- LDL -- HDL -- Trig 112, - Chol 156  HDL 8<L> Trig 85, - Chol 122 LDL 73 HDL 33<L> Trig 78    Prealbumin /18: 5    ASSESSMENT/PLAN:  57 y/o female s/p ERCP c/b perforation of afferent limb. Pt underwent emergent Ex-lap w/ resection of perforated bowel and stapled side to side functional end to end anastomosis. Pt remained intubated and was transferred to SICU off pressors. s/p extubation, now transferred to floor with NGT in place. Patient meets criteria for severe protein calorie malnutrition requiring TPN for nutritional support. Patient now with post op ileus, awaiting GI function.     TPN infusion volume increased to 1.5L - TPN is providing 1312 kcal/day.  Monitor fingersticks q6 hours, obtain daily weights.  Labs reviewed - increased sodium and phos content in TPN     Will follow up with primary team on plan.     1. Protein calorie malnutrition being optimized with TPN: CHO [200 ] gm.  AA [78 ] gm. SMOF Lipids [ 32] gm. lipids will be administered over 12 hours   2.  Hyperglycemia managed with: [ ] units of regular insulin    3.  Check fluid balance daily.  Strict I/O  [ ] [ ]   4.  Daily BMP, Ionized Calcium, Magnesium and Phosphorous   5.  Triglycerides at initiation of TPN and monthly [ ] [ ]   6.  Pepcid in TPN for Gi prophylaxis  [ ]     Nutrition support pager [phone]

## 2018-06-12 NOTE — PROGRESS NOTE ADULT - SUBJECTIVE AND OBJECTIVE BOX
Chief Complaint:  Patient is a 58y old  Female who presents with a chief complaint of Painless jaundice (2018 14:49)      Interval Events: Patient had PICC placed yesterday. She still has NGT in place. She complains of some abdominal pain, improved with pain medications.     Allergies:  No Known Allergies      Hospital Medications:  benzocaine 15 mG/menthol 3.6 mG Lozenge 1 Lozenge Oral every 6 hours PRN  ciprofloxacin   IVPB 400 milliGRAM(s) IV Intermittent every 12 hours  dextrose 5% + sodium chloride 0.45%. 1000 milliLiter(s) IV Continuous <Continuous>  enoxaparin Injectable 40 milliGRAM(s) SubCutaneous daily  fat emulsion (Fish Oil and Plant Based) 20% Infusion 6.6 mL/Hr IV Continuous <Continuous>  HYDROmorphone  Injectable 0.5 milliGRAM(s) IV Push every 4 hours PRN  HYDROmorphone  Injectable 1 milliGRAM(s) IV Push every 4 hours PRN  metroNIDAZOLE  IVPB 500 milliGRAM(s) IV Intermittent every 8 hours  pantoprazole  Injectable 40 milliGRAM(s) IV Push daily  Parenteral Nutrition - Adult 1 Each TPN Continuous <Continuous>      PMHX/PSHX:  Gastric cancer  Malignant neoplasm of stomach, unspecified location  No pertinent past medical history  S/P partial gastrectomy  No significant past surgical history      Family history:  No pertinent family history in first degree relatives      ROS: Negative, except as otherwise noted above    PHYSICAL EXAM:   Vital Signs:  Vital Signs Last 24 Hrs  T(C): 36.8 (2018 05:10), Max: 36.8 (2018 16:35)  T(F): 98.3 (2018 05:10), Max: 98.3 (2018 16:35)  HR: 74 (2018 05:10) (72 - 79)  BP: 128/73 (2018 05:10) (113/68 - 130/73)  BP(mean): --  RR: 18 (2018 05:10) (16 - 18)  SpO2: 97% (2018 05:10) (96% - 98%)  Daily     Daily Weight in k (2018 05:10)    GENERAL: Chronically ill appearing  HEENT:  Icteric, no thrush, +NGT  CHEST:  Non-labored breathing, lungs clear b/l  HEART:  +s1, s2 heart sounds, no murmurs  ABDOMEN:  Soft, +surgical site covered with clean/dry dressings, no rebound, no guarding, +biliary drain  EXTREMITIES:  Warm and well perfused, no edema  SKIN:  +Jaundice, no rash  NEURO:   Awake, interactive, following commands    LABS:  CBC Full  -  ( 2018 07:49 )  WBC Count : 16.80 K/uL  Hemoglobin : 11.4 g/dL  Hematocrit : 32.4 %  Platelet Count - Automated : 167 K/uL  Mean Cell Volume : 83.7 fL  Auto Neutrophil # :   Auto Neutrophil % :       PT/INR - ( 2018 05:38 )   PT: 19.9 SEC;   INR: 1.77          PTT - ( 2018 05:38 )  PTT:38.8 SEC

## 2018-06-12 NOTE — PROGRESS NOTE ADULT - ASSESSMENT
58F s/p ERCP EUS aborted 2/2 small bowel perforation, s/p ex lap resection of small bowel side to side anastamosis. currently hemodynamically stable on the floor    - Clamp trial today  - NPO/TPN  - Continue PTC, monitor output  - Monitor GI function  - DVT ppx

## 2018-06-13 LAB
ALBUMIN SERPL ELPH-MCNC: 1.8 G/DL — LOW (ref 3.3–5)
ALP SERPL-CCNC: 277 U/L — HIGH (ref 40–120)
ALT FLD-CCNC: 18 U/L — SIGNIFICANT CHANGE UP (ref 4–33)
APPEARANCE UR: SIGNIFICANT CHANGE UP
AST SERPL-CCNC: 20 U/L — SIGNIFICANT CHANGE UP (ref 4–32)
BACTERIA # UR AUTO: SIGNIFICANT CHANGE UP
BASOPHILS # BLD AUTO: 0.06 K/UL — SIGNIFICANT CHANGE UP (ref 0–0.2)
BASOPHILS NFR BLD AUTO: 0.4 % — SIGNIFICANT CHANGE UP (ref 0–2)
BILIRUB SERPL-MCNC: 10.2 MG/DL — HIGH (ref 0.2–1.2)
BILIRUB UR-MCNC: HIGH
BLOOD UR QL VISUAL: HIGH
BUN SERPL-MCNC: 10 MG/DL — SIGNIFICANT CHANGE UP (ref 7–23)
CA-I BLD-SCNC: 1.02 MMOL/L — LOW (ref 1.03–1.23)
CALCIUM SERPL-MCNC: 7.2 MG/DL — LOW (ref 8.4–10.5)
CHLORIDE SERPL-SCNC: 98 MMOL/L — SIGNIFICANT CHANGE UP (ref 98–107)
CO2 SERPL-SCNC: 23 MMOL/L — SIGNIFICANT CHANGE UP (ref 22–31)
COLOR SPEC: HIGH
CREAT SERPL-MCNC: 0.36 MG/DL — LOW (ref 0.5–1.3)
EOSINOPHIL # BLD AUTO: 0.15 K/UL — SIGNIFICANT CHANGE UP (ref 0–0.5)
EOSINOPHIL NFR BLD AUTO: 1 % — SIGNIFICANT CHANGE UP (ref 0–6)
GLUCOSE SERPL-MCNC: 102 MG/DL — HIGH (ref 70–99)
GLUCOSE UR-MCNC: NEGATIVE — SIGNIFICANT CHANGE UP
HCT VFR BLD CALC: 30.9 % — LOW (ref 34.5–45)
HGB BLD-MCNC: 11 G/DL — LOW (ref 11.5–15.5)
IMM GRANULOCYTES # BLD AUTO: 0.33 # — SIGNIFICANT CHANGE UP
IMM GRANULOCYTES NFR BLD AUTO: 2.2 % — HIGH (ref 0–1.5)
KETONES UR-MCNC: NEGATIVE — SIGNIFICANT CHANGE UP
LEUKOCYTE ESTERASE UR-ACNC: HIGH
LYMPHOCYTES # BLD AUTO: 1.32 K/UL — SIGNIFICANT CHANGE UP (ref 1–3.3)
LYMPHOCYTES # BLD AUTO: 8.7 % — LOW (ref 13–44)
MCHC RBC-ENTMCNC: 30.1 PG — SIGNIFICANT CHANGE UP (ref 27–34)
MCHC RBC-ENTMCNC: 35.6 % — SIGNIFICANT CHANGE UP (ref 32–36)
MCV RBC AUTO: 84.7 FL — SIGNIFICANT CHANGE UP (ref 80–100)
MONOCYTES # BLD AUTO: 1.11 K/UL — HIGH (ref 0–0.9)
MONOCYTES NFR BLD AUTO: 7.3 % — SIGNIFICANT CHANGE UP (ref 2–14)
MUCOUS THREADS # UR AUTO: SIGNIFICANT CHANGE UP
NEUTROPHILS # BLD AUTO: 12.16 K/UL — HIGH (ref 1.8–7.4)
NEUTROPHILS NFR BLD AUTO: 80.4 % — HIGH (ref 43–77)
NITRITE UR-MCNC: NEGATIVE — SIGNIFICANT CHANGE UP
NON-SQ EPI CELLS # UR AUTO: <1 — SIGNIFICANT CHANGE UP
NRBC # FLD: 0 — SIGNIFICANT CHANGE UP
PH UR: 6.5 — SIGNIFICANT CHANGE UP (ref 4.6–8)
PLATELET # BLD AUTO: 153 K/UL — SIGNIFICANT CHANGE UP (ref 150–400)
PMV BLD: 10.4 FL — SIGNIFICANT CHANGE UP (ref 7–13)
POTASSIUM SERPL-MCNC: 3.4 MMOL/L — LOW (ref 3.5–5.3)
POTASSIUM SERPL-SCNC: 3.4 MMOL/L — LOW (ref 3.5–5.3)
PROT SERPL-MCNC: 4.6 G/DL — LOW (ref 6–8.3)
PROT UR-MCNC: 30 MG/DL — HIGH
RBC # BLD: 3.65 M/UL — LOW (ref 3.8–5.2)
RBC # FLD: 21.4 % — HIGH (ref 10.3–14.5)
RBC CASTS # UR COMP ASSIST: HIGH (ref 0–?)
SODIUM SERPL-SCNC: 132 MMOL/L — LOW (ref 135–145)
SP GR SPEC: 1.02 — SIGNIFICANT CHANGE UP (ref 1–1.04)
SQUAMOUS # UR AUTO: SIGNIFICANT CHANGE UP
UROBILINOGEN FLD QL: NORMAL MG/DL — SIGNIFICANT CHANGE UP
WBC # BLD: 15.13 K/UL — HIGH (ref 3.8–10.5)
WBC # FLD AUTO: 15.13 K/UL — HIGH (ref 3.8–10.5)
WBC UR QL: HIGH (ref 0–?)

## 2018-06-13 PROCEDURE — 99233 SBSQ HOSP IP/OBS HIGH 50: CPT

## 2018-06-13 PROCEDURE — 99232 SBSQ HOSP IP/OBS MODERATE 35: CPT | Mod: GC

## 2018-06-13 PROCEDURE — 71045 X-RAY EXAM CHEST 1 VIEW: CPT | Mod: 26

## 2018-06-13 RX ORDER — I.V. FAT EMULSION 20 G/100ML
13.3 EMULSION INTRAVENOUS
Qty: 32 | Refills: 0 | Status: DISCONTINUED | OUTPATIENT
Start: 2018-06-13 | End: 2018-06-15

## 2018-06-13 RX ORDER — ELECTROLYTE SOLUTION,INJ
1 VIAL (ML) INTRAVENOUS
Qty: 0 | Refills: 0 | Status: DISCONTINUED | OUTPATIENT
Start: 2018-06-13 | End: 2018-06-13

## 2018-06-13 RX ORDER — FUROSEMIDE 40 MG
10 TABLET ORAL ONCE
Qty: 0 | Refills: 0 | Status: COMPLETED | OUTPATIENT
Start: 2018-06-13 | End: 2018-06-13

## 2018-06-13 RX ADMIN — HYDROMORPHONE HYDROCHLORIDE 1 MILLIGRAM(S): 2 INJECTION INTRAMUSCULAR; INTRAVENOUS; SUBCUTANEOUS at 07:33

## 2018-06-13 RX ADMIN — I.V. FAT EMULSION 13.3 ML/HR: 20 EMULSION INTRAVENOUS at 17:23

## 2018-06-13 RX ADMIN — Medication 1 EACH: at 17:22

## 2018-06-13 RX ADMIN — Medication 10 MILLIGRAM(S): at 17:19

## 2018-06-13 RX ADMIN — PANTOPRAZOLE SODIUM 40 MILLIGRAM(S): 20 TABLET, DELAYED RELEASE ORAL at 12:42

## 2018-06-13 RX ADMIN — HYDROMORPHONE HYDROCHLORIDE 1 MILLIGRAM(S): 2 INJECTION INTRAMUSCULAR; INTRAVENOUS; SUBCUTANEOUS at 20:11

## 2018-06-13 RX ADMIN — Medication 200 MILLIGRAM(S): at 15:14

## 2018-06-13 RX ADMIN — Medication 100 MILLIGRAM(S): at 21:38

## 2018-06-13 RX ADMIN — HYDROMORPHONE HYDROCHLORIDE 0.5 MILLIGRAM(S): 2 INJECTION INTRAMUSCULAR; INTRAVENOUS; SUBCUTANEOUS at 15:45

## 2018-06-13 RX ADMIN — HYDROMORPHONE HYDROCHLORIDE 0.5 MILLIGRAM(S): 2 INJECTION INTRAMUSCULAR; INTRAVENOUS; SUBCUTANEOUS at 15:15

## 2018-06-13 RX ADMIN — Medication 200 MILLIGRAM(S): at 04:08

## 2018-06-13 RX ADMIN — HYDROMORPHONE HYDROCHLORIDE 1 MILLIGRAM(S): 2 INJECTION INTRAMUSCULAR; INTRAVENOUS; SUBCUTANEOUS at 08:00

## 2018-06-13 RX ADMIN — HYDROMORPHONE HYDROCHLORIDE 0.5 MILLIGRAM(S): 2 INJECTION INTRAMUSCULAR; INTRAVENOUS; SUBCUTANEOUS at 03:05

## 2018-06-13 RX ADMIN — HYDROMORPHONE HYDROCHLORIDE 1 MILLIGRAM(S): 2 INJECTION INTRAMUSCULAR; INTRAVENOUS; SUBCUTANEOUS at 20:40

## 2018-06-13 RX ADMIN — Medication 100 MILLIGRAM(S): at 06:13

## 2018-06-13 RX ADMIN — HYDROMORPHONE HYDROCHLORIDE 0.5 MILLIGRAM(S): 2 INJECTION INTRAMUSCULAR; INTRAVENOUS; SUBCUTANEOUS at 03:36

## 2018-06-13 RX ADMIN — ENOXAPARIN SODIUM 40 MILLIGRAM(S): 100 INJECTION SUBCUTANEOUS at 12:42

## 2018-06-13 RX ADMIN — Medication 100 MILLIGRAM(S): at 14:10

## 2018-06-13 NOTE — PROGRESS NOTE ADULT - SUBJECTIVE AND OBJECTIVE BOX
GENERAL SURGERY DAILY PROGRESS NOTE:   Subjective:  No acute events overnight. This AM pt feeling OK overall. Pain well controlled.     Objective:  T(C): 37 (18 @ 03:21), Max: 37.2 (18 @ 15:53)  HR: 84 (18 @ 03:21) (76 - 85)  BP: 129/66 (18 @ 03:21) (117/64 - 129/66)  RR: 18 (18 @ 03:21) (16 - 18)  SpO2: 96% (18 @ 03:21) (95% - 98%)  Wt(kg): --   @ 07:01  -   @ 07:00  --------------------------------------------------------  IN:    dextrose 5% + sodium chloride 0.45%.: 250 mL    fat emulsion (Fish Oil and Plant Based) 20% Infusion: 13.3 mL    TPN (Total Parenteral Nutrition): 441 mL  Total IN: 704.3 mL    OUT:    Drain: 340 mL    Nasoenteral Tube: 215 mL    Voided: 250 mL  Total OUT: 805 mL    Total NET: -100.7 mL      PE:  Gen: NAD, widespread edema  Abdomen: dressing c/d/i, decreased serous drainage from midline incision this AM, left open to air, abd softly distended, drain bilious    MEDICATIONS  (STANDING):  ciprofloxacin   IVPB 400 milliGRAM(s) IV Intermittent every 12 hours  enoxaparin Injectable 40 milliGRAM(s) SubCutaneous daily  fat emulsion (Fish Oil and Plant Based) 20% Infusion 6.6 mL/Hr (6.6 mL/Hr) IV Continuous <Continuous>  fat emulsion (Fish Oil and Plant Based) 20% Infusion 13.3 mL/Hr (13.3 mL/Hr) IV Continuous <Continuous>  metroNIDAZOLE  IVPB 500 milliGRAM(s) IV Intermittent every 8 hours  pantoprazole  Injectable 40 milliGRAM(s) IV Push daily  Parenteral Nutrition - Adult 1 Each (63 mL/Hr) TPN Continuous <Continuous>    MEDICATIONS  (PRN):  benzocaine 15 mG/menthol 3.6 mG Lozenge 1 Lozenge Oral every 6 hours PRN Sore Throat  HYDROmorphone  Injectable 0.5 milliGRAM(s) IV Push every 4 hours PRN Moderate Pain (4 - 6)  HYDROmorphone  Injectable 1 milliGRAM(s) IV Push every 4 hours PRN Severe Pain (7 - 10)      LABS:                         11.0   15.13 )-----------( 153      ( 2018 06:19 )             30.9         132<L>  |  98  |  10  ----------------------------<  102<H>  3.4<L>   |  23  |  0.36<L>    Ca    7.2<L>      2018 06:19  Phos  2.6     12  Mg     1.9         TPro  4.6<L>  /  Alb  1.8<L>  /  TBili  10.2<H>  /  DBili  x   /  AST  20  /  ALT  18  /  AlkPhos  277<H>  13    PT/INR - ( 2018 07:49 )   PT: 16.7 SEC;   INR: 1.44          PTT - ( 2018 07:49 )  PTT:34.6 SEC  Urinalysis Basic - ( 2018 02:30 )    Color: BROWN / Appearance: HAZY / S.017 / pH: 6.5  Gluc: NEGATIVE / Ketone: NEGATIVE  / Bili: MODERATE / Urobili: NORMAL mg/dL   Blood: TRACE / Protein: 30 mg/dL / Nitrite: NEGATIVE   Leuk Esterase: SMALL / RBC: 5-10 / WBC 5-10   Sq Epi: FEW / Non Sq Epi: x / Bacteria: FEW            RADIOLOGY, EKG & ADDITIONAL TESTS: Reviewed.

## 2018-06-13 NOTE — PROGRESS NOTE ADULT - ATTENDING COMMENTS
59 y/o female s/p ERCP c/b perforation of afferent limb. Pt underwent emergent Ex-lap w/ resection of perforated bowel and stapled side to side functional end to end anastomosis. Pt remained intubated and was transferred to SICU off pressors. s/p extubation, now transferred to floor with NGT in place. Patient meets criteria for severe protein calorie malnutrition requiring TPN for nutritional support. Patient now with diarrhea, f/u c.diff. Clear liquids started this morning.     TPN infusion volume increased to 1.5L - TPN is providing 1312 kcal/day.  Monitor fingersticks q 12 hours, obtain daily weights.  Labs reviewed - increased sodium and potassium content in TPN     Will follow up with primary team on plan.     1. Protein calorie malnutrition being optimized with TPN: CHO [200 ] gm.  AA [78 ] gm. SMOF Lipids [ 32] gm. lipids will be administered over 12 hours   2.  Hyperglycemia managed with: [ ] units of regular insulin    3.  Check fluid balance daily.  Strict I/O  [ ] [ ]   4.  Daily BMP, Ionized Calcium, Magnesium and Phosphorous   5.  Triglycerides at initiation of TPN and monthly [ ] [ ]   6.  Pepcid in TPN for Gi prophylaxis  [ ]   I have seen and examined the patient, reviewed the laboratory and radiologic data and agree with practitioner's history, physical assessment, plan.  Reviewed and discussed with other consultants, House staff and PA's.  The note is edited where appropriate.   I spent  45  minutes in total providing diagnoses, assessment and plan.       Time involved in performance of separately billable procedures was not counted toward my critical care time.  There is no overlap.

## 2018-06-13 NOTE — PROGRESS NOTE ADULT - SUBJECTIVE AND OBJECTIVE BOX
NUTRITION NOTE  [id number] CHOKYI  ===============================    Interval events  Patient was seen and examined at bedside.  PICC placed and TPN/lipids initiated on 18, patient is tolerating without any issues.  Remains NPO, TPN infusion volume increased to 1.5L.  Patient had diarrhea overnight, f/u c.diff. NGT in place.   Clear liquids started this morning.     Vital Signs Last 24 Hrs  T(C): 37.1 (2018 09:19), Max: 37.2 (2018 15:53)  T(F): 98.7 (2018 09:19), Max: 98.9 (2018 15:53)  HR: 83 (2018 09:19) (76 - 85)  BP: 130/74 (2018 09:19) (117/64 - 130/74)  RR: 16 (2018 09:19) (16 - 18)  SpO2: 95% (2018 09:19) (95% - 98%)    MEDICATIONS  (STANDING):  ciprofloxacin   IVPB 400 milliGRAM(s) IV Intermittent every 12 hours  enoxaparin Injectable 40 milliGRAM(s) SubCutaneous daily  fat emulsion (Fish Oil and Plant Based) 20% Infusion 13.3 mL/Hr (13.3 mL/Hr) IV Continuous <Continuous>  fat emulsion (Fish Oil and Plant Based) 20% Infusion 13.3 mL/Hr (13.3 mL/Hr) IV Continuous <Continuous>  metroNIDAZOLE  IVPB 500 milliGRAM(s) IV Intermittent every 8 hours  pantoprazole  Injectable 40 milliGRAM(s) IV Push daily  Parenteral Nutrition - Adult 1 Each (63 mL/Hr) TPN Continuous <Continuous>  Parenteral Nutrition - Adult 1 Each (63 mL/Hr) TPN Continuous <Continuous>    MEDICATIONS  (PRN):  benzocaine 15 mG/menthol 3.6 mG Lozenge 1 Lozenge Oral every 6 hours PRN Sore Throat  HYDROmorphone  Injectable 0.5 milliGRAM(s) IV Push every 4 hours PRN Moderate Pain (4 - 6)  HYDROmorphone  Injectable 1 milliGRAM(s) IV Push every 4 hours PRN Severe Pain (7 - 10)    I&O's Detail    2018 07:01  -  2018 07:00  --------------------------------------------------------  IN:    dextrose 5% + sodium chloride 0.45%.: 250 mL    fat emulsion (Fish Oil and Plant Based) 20% Infusion: 13.3 mL    TPN (Total Parenteral Nutrition): 441 mL  Total IN: 704.3 mL    OUT:    Drain: 340 mL    Nasoenteral Tube: 215 mL    Voided: 250 mL  Total OUT: 805 mL    Total NET: -100.7 mL    POCT Blood Glucose.: 100 mg/dL (2018 06:20)  POCT Blood Glucose.: 109 mg/dL (2018 23:53)  POCT Blood Glucose.: 105 mg/dL (2018 11:37)    Daily Weight in k (2018 05:10)    Drug Dosing Weight  Height (cm): 160.02 (2018 15:53)  Weight (kg): 54 (2018 15:53)  BMI (kg/m2): 21.1 (2018 15:53)  BSA (m2): 1.55 (2018 15:53)    PHYSICAL EXAM   Constitutional: no acute distress, jaundice   Respiratory: nonlabored respirations   Gastrointestinal: soft, mildly tender, NGT in place   Extremities: warm   PICC Site: C/D/I    Diet: clears and TPN     LABORATORY                        11.0   15.13 )-----------( 153      ( 2018 06:19 )             30.9   06-13    132<L>  |  98  |  10  ----------------------------<  102<H>  3.4<L>   |  23  |  0.36<L>    Ca    7.2<L>      2018 06:19  Phos  2.6     06-12  Mg     1.9     06-12    TPro  4.6<L>  /  Alb  1.8<L>  /  TBili  10.2<H>  /  DBili  x   /  AST  20  /  ALT  18  /  AlkPhos  277<H>      LIVER FUNCTIONS - ( 2018 07:49 )  Alb: 1.9 g/dL / Pro: 4.4 g/dL / ALK PHOS: 306 u/L / ALT: 22 u/L / AST: 28 u/L / GGT: x            Chol -- LDL -- HDL -- Trig 112, 06-05 Chol 156  HDL 8<L> Trig 85, - Chol 122 LDL 73 HDL 33<L> Trig 78    Prealbumin /18: 5    ASSESSMENT/PLAN:  57 y/o female s/p ERCP c/b perforation of afferent limb. Pt underwent emergent Ex-lap w/ resection of perforated bowel and stapled side to side functional end to end anastomosis. Pt remained intubated and was transferred to SICU off pressors. s/p extubation, now transferred to floor with NGT in place. Patient meets criteria for severe protein calorie malnutrition requiring TPN for nutritional support. Patient now with diarrhea, f/u c.diff. Clear liquids started this morning.     TPN infusion volume increased to 1.5L - TPN is providing 1312 kcal/day.  Monitor fingersticks q 12 hours, obtain daily weights.  Labs reviewed - increased sodium and potassium content in TPN     Will follow up with primary team on plan.     1. Protein calorie malnutrition being optimized with TPN: CHO [200 ] gm.  AA [78 ] gm. SMOF Lipids [ 32] gm. lipids will be administered over 12 hours   2.  Hyperglycemia managed with: [ ] units of regular insulin    3.  Check fluid balance daily.  Strict I/O  [ ] [ ]   4.  Daily BMP, Ionized Calcium, Magnesium and Phosphorous   5.  Triglycerides at initiation of TPN and monthly [ ] [ ]   6.  Pepcid in TPN for Gi prophylaxis  [ ]     Nutrition support pager [phone]

## 2018-06-13 NOTE — PROGRESS NOTE ADULT - SUBJECTIVE AND OBJECTIVE BOX
Chief Complaint:  Patient is a 58y old  Female who presents with a chief complaint of Painless jaundice (05 Jun 2018 14:49)      Interval Events: Patient reports pain is slightly improved this morning. She reports minimal nausea and denies vomiting.     Allergies:  No Known Allergies      Hospital Medications:  benzocaine 15 mG/menthol 3.6 mG Lozenge 1 Lozenge Oral every 6 hours PRN  ciprofloxacin   IVPB 400 milliGRAM(s) IV Intermittent every 12 hours  enoxaparin Injectable 40 milliGRAM(s) SubCutaneous daily  fat emulsion (Fish Oil and Plant Based) 20% Infusion 13.3 mL/Hr IV Continuous <Continuous>  fat emulsion (Fish Oil and Plant Based) 20% Infusion 13.3 mL/Hr IV Continuous <Continuous>  HYDROmorphone  Injectable 0.5 milliGRAM(s) IV Push every 4 hours PRN  HYDROmorphone  Injectable 1 milliGRAM(s) IV Push every 4 hours PRN  metroNIDAZOLE  IVPB 500 milliGRAM(s) IV Intermittent every 8 hours  pantoprazole  Injectable 40 milliGRAM(s) IV Push daily  Parenteral Nutrition - Adult 1 Each TPN Continuous <Continuous>  Parenteral Nutrition - Adult 1 Each TPN Continuous <Continuous>      PMHX/PSHX:  Gastric cancer  Malignant neoplasm of stomach, unspecified location  No pertinent past medical history  S/P partial gastrectomy  No significant past surgical history      Family history:  No pertinent family history in first degree relatives      ROS: Negative, except as otherwise noted above    PHYSICAL EXAM:   Vital Signs:  Vital Signs Last 24 Hrs  T(C): 37.1 (13 Jun 2018 09:19), Max: 37.2 (12 Jun 2018 15:53)  T(F): 98.7 (13 Jun 2018 09:19), Max: 98.9 (12 Jun 2018 15:53)  HR: 83 (13 Jun 2018 09:19) (76 - 85)  BP: 130/74 (13 Jun 2018 09:19) (117/64 - 130/74)  BP(mean): --  RR: 16 (13 Jun 2018 09:19) (16 - 18)  SpO2: 95% (13 Jun 2018 09:19) (95% - 98%)  Daily     Daily     GENERAL: Chronically ill appearing  HEENT:  Icteric, no thrush, +NGT  CHEST:  Non-labored breathing, lungs clear b/l  HEART:  +s1, s2 heart sounds, no murmurs  ABDOMEN:  Soft, no rebound, no guarding, +biliary drain  EXTREMITIES:  Warm and well perfused, +PICC  SKIN:  +Jaundice, no rash  NEURO:   Awake, interactive, following commands    LABS:  CBC Full  -  ( 13 Jun 2018 06:19 )  WBC Count : 15.13 K/uL  Hemoglobin : 11.0 g/dL  Hematocrit : 30.9 %  Platelet Count - Automated : 153 K/uL  Mean Cell Volume : 84.7 fL  Auto Neutrophil # : 12.16 K/uL  Auto Neutrophil % : 80.4 %    06-13 @ 06:19  Na 132 mmol/L  K 3.4 mmol/L  Cl 98 mmol/L  CO2 23 mmol/L  BUN 10 mg/dL  Creat 0.36 mg/dL  Glucose 102 mg/dL  Ca 7.2 mg/dL    Total protein 4.6 g/dL  Albumin 1.8 g/dL  T bili 10.2 mg/dL  Alk phos 277 u/L  AST 20 u/L  ALT 18 u/L    06-12 @ 07:49  T bili 9.8 mg/dL  Alk phos 306 u/L  AST 28 u/L  ALT 22 u/L    06-11 @ 05:38  T bili 10.0 mg/dL  Alk phos 344 u/L  AST 27 u/L  ALT 26 u/L      PT/INR - ( 12 Jun 2018 07:49 )   PT: 16.7 SEC;   INR: 1.44          PTT - ( 12 Jun 2018 07:49 )  PTT:34.6 SEC

## 2018-06-13 NOTE — PROGRESS NOTE ADULT - ASSESSMENT
58F s/p ERCP EUS aborted 2/2 small bowel perforation, s/p ex lap resection of small bowel side to side anastamosis. currently hemodynamically stable on the floor. Now with diarrhea.     - f/u c diff  - Clamp trial today, residual this AM 50cc. Will discuss plan for NGT with attending  - NPO/TPN  - Continue PTC, monitor output  - Monitor GI function  - DVT ppx

## 2018-06-14 LAB
ALBUMIN SERPL ELPH-MCNC: 1.8 G/DL — LOW (ref 3.3–5)
ALP SERPL-CCNC: 278 U/L — HIGH (ref 40–120)
ALT FLD-CCNC: 14 U/L — SIGNIFICANT CHANGE UP (ref 4–33)
AST SERPL-CCNC: 23 U/L — SIGNIFICANT CHANGE UP (ref 4–32)
BASOPHILS # BLD AUTO: 0.05 K/UL — SIGNIFICANT CHANGE UP (ref 0–0.2)
BASOPHILS NFR BLD AUTO: 0.4 % — SIGNIFICANT CHANGE UP (ref 0–2)
BILIRUB DIRECT SERPL-MCNC: 7.2 MG/DL — HIGH (ref 0.1–0.2)
BILIRUB SERPL-MCNC: 9.5 MG/DL — HIGH (ref 0.2–1.2)
BUN SERPL-MCNC: 13 MG/DL — SIGNIFICANT CHANGE UP (ref 7–23)
CA-I BLD-SCNC: 1.08 MMOL/L — SIGNIFICANT CHANGE UP (ref 1.03–1.23)
CALCIUM SERPL-MCNC: 7.3 MG/DL — LOW (ref 8.4–10.5)
CHLORIDE SERPL-SCNC: 100 MMOL/L — SIGNIFICANT CHANGE UP (ref 98–107)
CO2 SERPL-SCNC: 26 MMOL/L — SIGNIFICANT CHANGE UP (ref 22–31)
CREAT SERPL-MCNC: 0.37 MG/DL — LOW (ref 0.5–1.3)
EOSINOPHIL # BLD AUTO: 0.2 K/UL — SIGNIFICANT CHANGE UP (ref 0–0.5)
EOSINOPHIL NFR BLD AUTO: 1.6 % — SIGNIFICANT CHANGE UP (ref 0–6)
GLUCOSE SERPL-MCNC: 104 MG/DL — HIGH (ref 70–99)
HCT VFR BLD CALC: 30.5 % — LOW (ref 34.5–45)
HGB BLD-MCNC: 10.7 G/DL — LOW (ref 11.5–15.5)
IMM GRANULOCYTES # BLD AUTO: 0.23 # — SIGNIFICANT CHANGE UP
IMM GRANULOCYTES NFR BLD AUTO: 1.9 % — HIGH (ref 0–1.5)
LYMPHOCYTES # BLD AUTO: 1.06 K/UL — SIGNIFICANT CHANGE UP (ref 1–3.3)
LYMPHOCYTES # BLD AUTO: 8.7 % — LOW (ref 13–44)
MAGNESIUM SERPL-MCNC: 1.8 MG/DL — SIGNIFICANT CHANGE UP (ref 1.6–2.6)
MCHC RBC-ENTMCNC: 30.1 PG — SIGNIFICANT CHANGE UP (ref 27–34)
MCHC RBC-ENTMCNC: 35.1 % — SIGNIFICANT CHANGE UP (ref 32–36)
MCV RBC AUTO: 85.7 FL — SIGNIFICANT CHANGE UP (ref 80–100)
MONOCYTES # BLD AUTO: 0.85 K/UL — SIGNIFICANT CHANGE UP (ref 0–0.9)
MONOCYTES NFR BLD AUTO: 7 % — SIGNIFICANT CHANGE UP (ref 2–14)
NEUTROPHILS # BLD AUTO: 9.75 K/UL — HIGH (ref 1.8–7.4)
NEUTROPHILS NFR BLD AUTO: 80.4 % — HIGH (ref 43–77)
NRBC # FLD: 0 — SIGNIFICANT CHANGE UP
PHOSPHATE SERPL-MCNC: 3 MG/DL — SIGNIFICANT CHANGE UP (ref 2.5–4.5)
PLATELET # BLD AUTO: 184 K/UL — SIGNIFICANT CHANGE UP (ref 150–400)
PMV BLD: 10.9 FL — SIGNIFICANT CHANGE UP (ref 7–13)
POTASSIUM SERPL-MCNC: 3.3 MMOL/L — LOW (ref 3.5–5.3)
POTASSIUM SERPL-SCNC: 3.3 MMOL/L — LOW (ref 3.5–5.3)
PROT SERPL-MCNC: 4.5 G/DL — LOW (ref 6–8.3)
RBC # BLD: 3.56 M/UL — LOW (ref 3.8–5.2)
RBC # FLD: 22 % — HIGH (ref 10.3–14.5)
SODIUM SERPL-SCNC: 136 MMOL/L — SIGNIFICANT CHANGE UP (ref 135–145)
WBC # BLD: 12.14 K/UL — HIGH (ref 3.8–10.5)
WBC # FLD AUTO: 12.14 K/UL — HIGH (ref 3.8–10.5)

## 2018-06-14 PROCEDURE — 99233 SBSQ HOSP IP/OBS HIGH 50: CPT

## 2018-06-14 RX ORDER — POTASSIUM PHOSPHATE, MONOBASIC POTASSIUM PHOSPHATE, DIBASIC 236; 224 MG/ML; MG/ML
15 INJECTION, SOLUTION INTRAVENOUS ONCE
Qty: 0 | Refills: 0 | Status: DISCONTINUED | OUTPATIENT
Start: 2018-06-14 | End: 2018-06-14

## 2018-06-14 RX ORDER — FUROSEMIDE 40 MG
20 TABLET ORAL ONCE
Qty: 0 | Refills: 0 | Status: COMPLETED | OUTPATIENT
Start: 2018-06-14 | End: 2018-06-14

## 2018-06-14 RX ORDER — MAGNESIUM SULFATE 500 MG/ML
2 VIAL (ML) INJECTION ONCE
Qty: 0 | Refills: 0 | Status: DISCONTINUED | OUTPATIENT
Start: 2018-06-14 | End: 2018-06-14

## 2018-06-14 RX ORDER — I.V. FAT EMULSION 20 G/100ML
13.3 EMULSION INTRAVENOUS
Qty: 32 | Refills: 0 | Status: DISCONTINUED | OUTPATIENT
Start: 2018-06-14 | End: 2018-06-17

## 2018-06-14 RX ORDER — ALBUMIN HUMAN 25 %
50 VIAL (ML) INTRAVENOUS ONCE
Qty: 0 | Refills: 0 | Status: COMPLETED | OUTPATIENT
Start: 2018-06-14 | End: 2018-06-14

## 2018-06-14 RX ORDER — ELECTROLYTE SOLUTION,INJ
1 VIAL (ML) INTRAVENOUS
Qty: 0 | Refills: 0 | Status: DISCONTINUED | OUTPATIENT
Start: 2018-06-14 | End: 2018-06-14

## 2018-06-14 RX ORDER — POTASSIUM CHLORIDE 20 MEQ
10 PACKET (EA) ORAL
Qty: 0 | Refills: 0 | Status: DISCONTINUED | OUTPATIENT
Start: 2018-06-14 | End: 2018-06-14

## 2018-06-14 RX ADMIN — Medication 100 MILLIGRAM(S): at 06:35

## 2018-06-14 RX ADMIN — PANTOPRAZOLE SODIUM 40 MILLIGRAM(S): 20 TABLET, DELAYED RELEASE ORAL at 11:59

## 2018-06-14 RX ADMIN — Medication 1 EACH: at 17:06

## 2018-06-14 RX ADMIN — I.V. FAT EMULSION 13.3 ML/HR: 20 EMULSION INTRAVENOUS at 17:08

## 2018-06-14 RX ADMIN — HYDROMORPHONE HYDROCHLORIDE 1 MILLIGRAM(S): 2 INJECTION INTRAMUSCULAR; INTRAVENOUS; SUBCUTANEOUS at 18:49

## 2018-06-14 RX ADMIN — Medication 50 MILLILITER(S): at 12:39

## 2018-06-14 RX ADMIN — Medication 20 MILLIGRAM(S): at 12:38

## 2018-06-14 RX ADMIN — HYDROMORPHONE HYDROCHLORIDE 1 MILLIGRAM(S): 2 INJECTION INTRAMUSCULAR; INTRAVENOUS; SUBCUTANEOUS at 21:58

## 2018-06-14 RX ADMIN — Medication 100 MILLIGRAM(S): at 21:58

## 2018-06-14 RX ADMIN — HYDROMORPHONE HYDROCHLORIDE 0.5 MILLIGRAM(S): 2 INJECTION INTRAMUSCULAR; INTRAVENOUS; SUBCUTANEOUS at 04:24

## 2018-06-14 RX ADMIN — HYDROMORPHONE HYDROCHLORIDE 0.5 MILLIGRAM(S): 2 INJECTION INTRAMUSCULAR; INTRAVENOUS; SUBCUTANEOUS at 13:22

## 2018-06-14 RX ADMIN — HYDROMORPHONE HYDROCHLORIDE 0.5 MILLIGRAM(S): 2 INJECTION INTRAMUSCULAR; INTRAVENOUS; SUBCUTANEOUS at 13:50

## 2018-06-14 RX ADMIN — HYDROMORPHONE HYDROCHLORIDE 1 MILLIGRAM(S): 2 INJECTION INTRAMUSCULAR; INTRAVENOUS; SUBCUTANEOUS at 17:54

## 2018-06-14 RX ADMIN — HYDROMORPHONE HYDROCHLORIDE 0.5 MILLIGRAM(S): 2 INJECTION INTRAMUSCULAR; INTRAVENOUS; SUBCUTANEOUS at 09:20

## 2018-06-14 RX ADMIN — Medication 100 MILLIGRAM(S): at 13:22

## 2018-06-14 RX ADMIN — HYDROMORPHONE HYDROCHLORIDE 0.5 MILLIGRAM(S): 2 INJECTION INTRAMUSCULAR; INTRAVENOUS; SUBCUTANEOUS at 05:00

## 2018-06-14 RX ADMIN — HYDROMORPHONE HYDROCHLORIDE 0.5 MILLIGRAM(S): 2 INJECTION INTRAMUSCULAR; INTRAVENOUS; SUBCUTANEOUS at 08:49

## 2018-06-14 RX ADMIN — Medication 200 MILLIGRAM(S): at 04:23

## 2018-06-14 RX ADMIN — Medication 200 MILLIGRAM(S): at 16:50

## 2018-06-14 RX ADMIN — I.V. FAT EMULSION 13.3 ML/HR: 20 EMULSION INTRAVENOUS at 17:07

## 2018-06-14 RX ADMIN — ENOXAPARIN SODIUM 40 MILLIGRAM(S): 100 INJECTION SUBCUTANEOUS at 11:59

## 2018-06-14 RX ADMIN — HYDROMORPHONE HYDROCHLORIDE 1 MILLIGRAM(S): 2 INJECTION INTRAMUSCULAR; INTRAVENOUS; SUBCUTANEOUS at 22:15

## 2018-06-14 NOTE — PROGRESS NOTE ADULT - SUBJECTIVE AND OBJECTIVE BOX
NUTRITION NOTE  [id number] CHOKYI  ===============================    Interval events  Patient was seen and examined at bedside, no acute overnight events.   PICC placed and TPN/lipids initiated on 18, patient is tolerating without any issues.  Advanced to clear liquids yesterday  NGT removed     Vital Signs Last 24 Hrs  T(C): 36.9 (2018 04:21), Max: 36.9 (2018 04:21)  T(F): 98.4 (2018 04:21), Max: 98.4 (2018 04:21)  HR: 84 (2018 04:21) (77 - 84)  BP: 127/64 (2018 04:21) (123/71 - 134/79)  RR: 18 (2018 04:21) (17 - 18)  SpO2: 95% (2018 04:21) (95% - 100%)    MEDICATIONS  (STANDING):  ciprofloxacin   IVPB 400 milliGRAM(s) IV Intermittent every 12 hours  enoxaparin Injectable 40 milliGRAM(s) SubCutaneous daily  fat emulsion (Fish Oil and Plant Based) 20% Infusion 13.3 mL/Hr (13.3 mL/Hr) IV Continuous <Continuous>  fat emulsion (Fish Oil and Plant Based) 20% Infusion 13.3 mL/Hr (13.3 mL/Hr) IV Continuous <Continuous>  metroNIDAZOLE  IVPB 500 milliGRAM(s) IV Intermittent every 8 hours  pantoprazole  Injectable 40 milliGRAM(s) IV Push daily  Parenteral Nutrition - Adult 1 Each (63 mL/Hr) TPN Continuous <Continuous>  Parenteral Nutrition - Adult 1 Each (63 mL/Hr) TPN Continuous <Continuous>    MEDICATIONS  (PRN):  benzocaine 15 mG/menthol 3.6 mG Lozenge 1 Lozenge Oral every 6 hours PRN Sore Throat  HYDROmorphone  Injectable 0.5 milliGRAM(s) IV Push every 4 hours PRN Moderate Pain (4 - 6)  HYDROmorphone  Injectable 1 milliGRAM(s) IV Push every 4 hours PRN Severe Pain (7 - 10)    I&O's Detail    2018 07:01  -  2018 07:00  --------------------------------------------------------  IN:    IV PiggyBack: 300 mL    Oral Fluid: 50 mL    TPN (Total Parenteral Nutrition): 630 mL  Total IN: 980 mL    OUT:    Drain: 415 mL    Indwelling Catheter - Urethral: 600 mL    Intermittent Catheterization - Urethral: 1560 mL    Voided: 550 mL  Total OUT: 3125 mL    Total NET: -2145 mL      2018 07:01  -  2018 10:08  --------------------------------------------------------  IN:  Total IN: 0 mL    OUT:    Indwelling Catheter - Urethral: 400 mL  Total OUT: 400 mL    Total NET: -400 mL    POCT Blood Glucose.: 107 mg/dL (2018 06:28)  POCT Blood Glucose.: 120 mg/dL (2018 23:54)  POCT Blood Glucose.: 97 mg/dL (2018 17:51)  POCT Blood Glucose.: 109 mg/dL (2018 11:40)    Daily Weight in k (2018 05:10)    Drug Dosing Weight  Height (cm): 160.02 (2018 15:53)  Weight (kg): 54 (2018 15:53)  BMI (kg/m2): 21.1 (2018 15:53)  BSA (m2): 1.55 (2018 15:53)    PHYSICAL EXAM   Constitutional: no acute distress, resting comfortably   Respiratory: nonlabored respirations   Gastrointestinal: soft, mildly tender  Extremities: warm   PICC Site: C/D/I    Diet: clears and TPN     LABORATORY                        10.7   12.14 )-----------( 184      ( 2018 06:27 )             30.5         136  |  100  |  13  ----------------------------<  104<H>  3.3<L>   |  26  |  0.37<L>    Ca    7.3<L>      2018 06:27  Phos  3.0       Mg     1.8         TPro  4.5<L>  /  Alb  1.8<L>  /  TBili  9.5<H>  /  DBili  7.2<H>  /  AST  23  /  ALT  14  /  AlkPhos  278<H>      LIVER FUNCTIONS - ( 2018 07:49 )  Alb: 1.9 g/dL / Pro: 4.4 g/dL / ALK PHOS: 306 u/L / ALT: 22 u/L / AST: 28 u/L / GGT: x            Chol -- LDL -- HDL -- Trig 112,  Chol 156  HDL 8<L> Trig 85,  Chol 122 LDL 73 HDL 33<L> Trig 78    Prealbumin /18: 5    ASSESSMENT/PLAN:  59 y/o female s/p ERCP c/b perforation of afferent limb. Pt underwent emergent Ex-lap w/ resection of perforated bowel and stapled side to side functional end to end anastomosis. Pt remained intubated and was transferred to SICU off pressors. s/p extubation, now transferred to floor. Patient meets criteria for severe protein calorie malnutrition requiring TPN for nutritional support. Patient now with diarrhea, f/u c.diff. Clear liquids started.    TPN infusion volume increased to 1.5L - TPN is providing 1312 kcal/day.  Monitor fingersticks q 12 hours, obtain daily weights.  Labs reviewed - hypokalemia noted, increased potassium content in TPN     Will follow up with primary team on plan.     1. Protein calorie malnutrition being optimized with TPN: CHO [200 ] gm.  AA [78 ] gm. SMOF Lipids [ 32] gm. lipids will be administered over 12 hours   2.  Hyperglycemia managed with: [ ] units of regular insulin    3.  Check fluid balance daily.  Strict I/O  [ ] [ ]   4.  Daily BMP, Ionized Calcium, Magnesium and Phosphorous   5.  Triglycerides at initiation of TPN and monthly [ ] [ ]   6.  Pepcid in TPN for Gi prophylaxis  [ ]     Nutrition support pager [phone]

## 2018-06-14 NOTE — PROGRESS NOTE ADULT - ASSESSMENT
58F s/p ERCP EUS aborted 2/2 small bowel perforation, s/p ex lap resection of small bowel side to side anastamosis. currently hemodynamically stable on the floor.     -50cc 25% albumin followed by 20mg IV lasix  -CLD  - Continue PTC, monitor output  - Monitor GI function  - DVT ppx

## 2018-06-14 NOTE — PROGRESS NOTE ADULT - ATTENDING COMMENTS
59 y/o female s/p ERCP c/b perforation of afferent limb. Pt underwent emergent Ex-lap w/ resection of perforated bowel and stapled side to side functional end to end anastomosis. Pt remained intubated and was transferred to SICU off pressors. s/p extubation, now transferred to floor. Patient meets criteria for severe protein calorie malnutrition requiring TPN for nutritional support. Patient now with diarrhea, f/u c.diff. Clear liquids started.    TPN infusion volume increased to 1.5L - TPN is providing 1312 kcal/day.  Monitor fingersticks q 12 hours, obtain daily weights.  Labs reviewed - hypokalemia noted, increased potassium content in TPN     Will follow up with primary team on plan.     1. Protein calorie malnutrition being optimized with TPN: CHO [200 ] gm.  AA [78 ] gm. SMOF Lipids [ 32] gm. lipids will be administered over 12 hours   2.  Hyperglycemia managed with: [ ] units of regular insulin    3.  Check fluid balance daily.  Strict I/O  [ ] [ ]   4.  Daily BMP, Ionized Calcium, Magnesium and Phosphorous   5.  Triglycerides at initiation of TPN and monthly [ ] [ ]   6.  Pepcid in TPN for Gi prophylaxis  [ ]   I have seen and examined the patient, reviewed the laboratory and radiologic data and agree with practitioner's history, physical assessment, plan.  Reviewed and discussed with other consultants, House staff and PA's.  The note is edited where appropriate.   I spent  45  minutes in total providing diagnoses, assessment and plan.       Time involved in performance of separately billable procedures was not counted toward my critical care time.  There is no overlap.

## 2018-06-14 NOTE — CONSULT NOTE ADULT - ATTENDING COMMENTS
I have reviewed the residents note including the history, exam, assessment, and plan.  I agree with the residents assessment and plan.    Maddie Sabillon MD

## 2018-06-14 NOTE — CONSULT NOTE ADULT - SUBJECTIVE AND OBJECTIVE BOX
[facility] Ophthalmology Consult Note    HPI: 58F pmh gastric cancer presented with nausea/vomiting, found to have a 1.4 cm mass in the pancreatic head obstructing the common hepatic duct. S/p ERCP EUS aborted 2/2 small bowel perforation, s/p ex lap resection of small bowel side to side anastamosis. currently hemodynamically stable on the floor. Ophthalmology was consulted as patient has a red, right eye since yesterday. Patient states that she noticed her eye was red yesterday while looking in the mirror. Patient states that her vision is unchanged and at her baseline, she normally wears glasses but does not have them with her in the hospital. Patient denies any eye pain or tearing, patient denies headache or blurry vision.       PMH: Gastric cancer s/p resection 6//2017  Meds: No current home medications as of June 6th (admission date), patient had previously taken Pantoprazole   POcHx (including surgeries/lasers/trauma):  None, wears glasses   Drops: None  FamHx: None  Social Hx: None  Allergies: NKDA    ROS:  General (neg), Vision (per HPI), Head and Neck (neg), Pulm (neg), CV (neg), GI (neg),  (neg), Musculoskeletal (neg), Skin/Integ (neg), Neuro (neg), Endocrine (neg), Heme (neg), All/Immuno (neg)    Mood and Affect Appropriate ( x ),  Oriented to Time, Place, and Person x 3 ( x )    Ophthalmology Exam    Visual acuity (CC at near): 20/60 PH 20/40 OD, 20/40 PH 20/25-2  Pupils: R+R OU, no APD  Ttono: 15 OU  Extraocular movements (EOMs): Full OU, no pain, no diplopia   Confrontational Visual Field (CVF):  Full OU      Pen Light Exam (PLE)  External:  Flat OU  Lids/Lashes/Lacrimal Ducts: Flat OU    Sclera/Conjunctiva:  Inferior subconjunctival hemorrhage, trace diffuse injection. trace jaundice OD. Trace diffuse injection. Trace jaundice  OS.   Cornea: SPK OU  Anterior Chamber: D+F OU  Iris:  Flat OU  Lens:  NS and CC cataract OD, NS cataract OS    Fundus Exam: dilated with 1% tropicamide and 2.5% phenylephrine  Approval obtained from primary team for dilation  Patient aware that pupils can remained dilated for at least 4-6 hours  Exam performed with 20D lens    Vitreous: wnl OU  Disc, cup/disc: sharp and pink, 0.4 OU  Macula:  wnl OU  Vessels:  wnl OU  Periphery: wnl OU    Diagnostic Testing:    Labs: Total bilirubin 9.5.   INR 1.44    A/P: 57 y/o f w/ hx of gastric cancer found to have mass obstructing CBD. Patient had a spontaneous right eye subconjunctival hemorrhage in the setting of increased INR. Patient at her baseline visual acuity with cataracts OU.    -Artificial Tears QID to both eyes  -Continued management per medical and surgical teams   -Please reconsult Ophthalmology as needed.        Follow-Up:  Patient should follow up his/her ophthalmologist or in the [facility] Ophthalmology Practice within 2-3 weeks of discharge.  [address]  [place], [place] [id number] [phone] [facility] Ophthalmology Consult Note    HPI: 58F pmh gastric cancer presented with nausea/vomiting, found to have a 1.4 cm mass in the pancreatic head obstructing the common hepatic duct. S/p ERCP EUS aborted 2/2 small bowel perforation, s/p ex lap resection of small bowel side to side anastamosis. currently hemodynamically stable on the floor. Ophthalmology was consulted as patient has a red, right eye since yesterday. Patient states that she noticed her eye was red yesterday while looking in the mirror. Patient states that her vision is unchanged and at her baseline, she normally wears glasses but does not have them with her in the hospital. Patient denies any eye pain or tearing, patient denies headache or blurry vision.       PMH: Gastric cancer s/p resection 6//2017  Meds: No current home medications as of June 6th (admission date), patient had previously taken Pantoprazole   POcHx (including surgeries/lasers/trauma):  None, wears glasses   Drops: None  FamHx: None  Social Hx: None  Allergies: NKDA    ROS:  General (neg), Vision (per HPI), Head and Neck (neg), Pulm (neg), CV (neg), GI (neg),  (neg), Musculoskeletal (neg), Skin/Integ (neg), Neuro (neg), Endocrine (neg), Heme (neg), All/Immuno (neg)    Mood and Affect Appropriate ( x ),  Oriented to Time, Place, and Person x 3 ( x )    Ophthalmology Exam    Visual acuity (CC at near): 20/60 PH 20/40 OD, 20/40 PH 20/25-2  Pupils: R+R OU, no APD  Ttono: 15 OU  Extraocular movements (EOMs): Full OU, no pain, no diplopia   Confrontational Visual Field (CVF):  Full OU      Pen Light Exam (PLE)  External:  Flat OU  Lids/Lashes/Lacrimal Ducts: Flat OU    Sclera/Conjunctiva:  Inferior subconjunctival hemorrhage, trace diffuse injection. trace icterus OD. Trace diffuse injection. Trace icterus OS.   Cornea: SPK OU  Anterior Chamber: D+F OU  Iris:  Flat OU  Lens:  NS and CC cataract OD, NS cataract OS    Fundus Exam: dilated with 1% tropicamide and 2.5% phenylephrine  Approval obtained from primary team for dilation  Patient aware that pupils can remained dilated for at least 4-6 hours  Exam performed with 20D lens    Vitreous: wnl OU  Disc, cup/disc: sharp and pink, 0.4 OU  Macula:  wnl OU  Vessels:  wnl OU  Periphery: wnl OU    Diagnostic Testing:    Labs: Total bilirubin 9.5.   INR 1.44    A/P: 57 y/o f w/ hx of gastric cancer found to have mass obstructing CBD. Patient had a spontaneous right eye subconjunctival hemorrhage in the setting of increased INR. Patient at her baseline visual acuity with cataracts OU.    -Artificial Tears QID to both eyes  -Continued management per medical and surgical teams   -Please reconsult Ophthalmology as needed.        Follow-Up:  Patient should follow up his/her ophthalmologist or in the [facility] Ophthalmology Practice within 2-3 weeks of discharge.  [address]  [place] [address] [phone]

## 2018-06-14 NOTE — PROGRESS NOTE ADULT - SUBJECTIVE AND OBJECTIVE BOX
GENERAL SURGERY DAILY PROGRESS NOTE:   Subjective:  No acute events overnight. No complaints this morning. Patient seen and examined.    Objective:  T(C): 36.9 (18 @ 04:21), Max: 36.9 (18 @ 04:21)  HR: 84 (18 @ 04:21) (77 - 84)  BP: 127/64 (18 @ 04:21) (123/71 - 134/79)  RR: 18 (18 @ 04:21) (17 - 18)  SpO2: 95% (18 @ 04:21) (95% - 100%)  Wt(kg): --   @ 07:01  -   @ 07:00  --------------------------------------------------------  IN:    IV PiggyBack: 300 mL    Oral Fluid: 50 mL    TPN (Total Parenteral Nutrition): 630 mL  Total IN: 980 mL    OUT:    Drain: 415 mL    Indwelling Catheter - Urethral: 600 mL    Intermittent Catheterization - Urethral: 1560 mL    Voided: 550 mL  Total OUT: 3125 mL    Total NET: -2145 mL       @ 07:01  -   @ 11:16  --------------------------------------------------------  IN:  Total IN: 0 mL    OUT:    Indwelling Catheter - Urethral: 400 mL  Total OUT: 400 mL    Total NET: -400 mL          PE:  Gen: NAD, widespread edema, improving  Abdomen: incision c/d/i, abd softly distended, drain bilious    MEDICATIONS  (STANDING):  ciprofloxacin   IVPB 400 milliGRAM(s) IV Intermittent every 12 hours  enoxaparin Injectable 40 milliGRAM(s) SubCutaneous daily  fat emulsion (Fish Oil and Plant Based) 20% Infusion 13.3 mL/Hr (13.3 mL/Hr) IV Continuous <Continuous>  fat emulsion (Fish Oil and Plant Based) 20% Infusion 13.3 mL/Hr (13.3 mL/Hr) IV Continuous <Continuous>  metroNIDAZOLE  IVPB 500 milliGRAM(s) IV Intermittent every 8 hours  pantoprazole  Injectable 40 milliGRAM(s) IV Push daily  Parenteral Nutrition - Adult 1 Each (63 mL/Hr) TPN Continuous <Continuous>  Parenteral Nutrition - Adult 1 Each (63 mL/Hr) TPN Continuous <Continuous>    MEDICATIONS  (PRN):  benzocaine 15 mG/menthol 3.6 mG Lozenge 1 Lozenge Oral every 6 hours PRN Sore Throat  HYDROmorphone  Injectable 0.5 milliGRAM(s) IV Push every 4 hours PRN Moderate Pain (4 - 6)  HYDROmorphone  Injectable 1 milliGRAM(s) IV Push every 4 hours PRN Severe Pain (7 - 10)    LABS:                         10.7   12.14 )-----------( 184      ( 2018 06:27 )             30.5     06-14    136  |  100  |  13  ----------------------------<  104<H>  3.3<L>   |  26  |  0.37<L>    Ca    7.3<L>      2018 06:27  Phos  3.0       Mg     1.8         TPro  4.5<L>  /  Alb  1.8<L>  /  TBili  9.5<H>  /  DBili  7.2<H>  /  AST  23  /  ALT  14  /  AlkPhos  278<H>  14      Urinalysis Basic - ( 2018 02:30 )    Color: BROWN / Appearance: HAZY / S.017 / pH: 6.5  Gluc: NEGATIVE / Ketone: NEGATIVE  / Bili: MODERATE / Urobili: NORMAL mg/dL   Blood: TRACE / Protein: 30 mg/dL / Nitrite: NEGATIVE   Leuk Esterase: SMALL / RBC: 5-10 / WBC 5-10   Sq Epi: FEW / Non Sq Epi: x / Bacteria: FEW            RADIOLOGY, EKG & ADDITIONAL TESTS: Reviewed.

## 2018-06-15 LAB
ALBUMIN SERPL ELPH-MCNC: 2.2 G/DL — LOW (ref 3.3–5)
ALP SERPL-CCNC: 323 U/L — HIGH (ref 40–120)
ALT FLD-CCNC: 14 U/L — SIGNIFICANT CHANGE UP (ref 4–33)
AST SERPL-CCNC: 28 U/L — SIGNIFICANT CHANGE UP (ref 4–32)
BASOPHILS # BLD AUTO: 0.06 K/UL — SIGNIFICANT CHANGE UP (ref 0–0.2)
BASOPHILS NFR BLD AUTO: 0.5 % — SIGNIFICANT CHANGE UP (ref 0–2)
BILIRUB SERPL-MCNC: 10.9 MG/DL — HIGH (ref 0.2–1.2)
BUN SERPL-MCNC: 14 MG/DL — SIGNIFICANT CHANGE UP (ref 7–23)
CA-I BLD-SCNC: 0.96 MMOL/L — LOW (ref 1.03–1.23)
CALCIUM SERPL-MCNC: 7.7 MG/DL — LOW (ref 8.4–10.5)
CHLORIDE SERPL-SCNC: 100 MMOL/L — SIGNIFICANT CHANGE UP (ref 98–107)
CO2 SERPL-SCNC: 26 MMOL/L — SIGNIFICANT CHANGE UP (ref 22–31)
CREAT SERPL-MCNC: 0.36 MG/DL — LOW (ref 0.5–1.3)
EOSINOPHIL # BLD AUTO: 0.25 K/UL — SIGNIFICANT CHANGE UP (ref 0–0.5)
EOSINOPHIL NFR BLD AUTO: 2.2 % — SIGNIFICANT CHANGE UP (ref 0–6)
GLUCOSE SERPL-MCNC: 96 MG/DL — SIGNIFICANT CHANGE UP (ref 70–99)
HCT VFR BLD CALC: 31.8 % — LOW (ref 34.5–45)
HGB BLD-MCNC: 11.1 G/DL — LOW (ref 11.5–15.5)
IMM GRANULOCYTES # BLD AUTO: 0.19 # — SIGNIFICANT CHANGE UP
IMM GRANULOCYTES NFR BLD AUTO: 1.6 % — HIGH (ref 0–1.5)
LYMPHOCYTES # BLD AUTO: 1.5 K/UL — SIGNIFICANT CHANGE UP (ref 1–3.3)
LYMPHOCYTES # BLD AUTO: 13 % — SIGNIFICANT CHANGE UP (ref 13–44)
MAGNESIUM SERPL-MCNC: 1.7 MG/DL — SIGNIFICANT CHANGE UP (ref 1.6–2.6)
MCHC RBC-ENTMCNC: 29.2 PG — SIGNIFICANT CHANGE UP (ref 27–34)
MCHC RBC-ENTMCNC: 34.9 % — SIGNIFICANT CHANGE UP (ref 32–36)
MCV RBC AUTO: 83.7 FL — SIGNIFICANT CHANGE UP (ref 80–100)
MONOCYTES # BLD AUTO: 0.95 K/UL — HIGH (ref 0–0.9)
MONOCYTES NFR BLD AUTO: 8.2 % — SIGNIFICANT CHANGE UP (ref 2–14)
NEUTROPHILS # BLD AUTO: 8.57 K/UL — HIGH (ref 1.8–7.4)
NEUTROPHILS NFR BLD AUTO: 74.5 % — SIGNIFICANT CHANGE UP (ref 43–77)
NRBC # FLD: 0 — SIGNIFICANT CHANGE UP
PHOSPHATE SERPL-MCNC: 2.7 MG/DL — SIGNIFICANT CHANGE UP (ref 2.5–4.5)
PLATELET # BLD AUTO: 234 K/UL — SIGNIFICANT CHANGE UP (ref 150–400)
PMV BLD: 10.9 FL — SIGNIFICANT CHANGE UP (ref 7–13)
POTASSIUM SERPL-MCNC: 3.6 MMOL/L — SIGNIFICANT CHANGE UP (ref 3.5–5.3)
POTASSIUM SERPL-SCNC: 3.6 MMOL/L — SIGNIFICANT CHANGE UP (ref 3.5–5.3)
PROT SERPL-MCNC: 4.9 G/DL — LOW (ref 6–8.3)
RBC # BLD: 3.8 M/UL — SIGNIFICANT CHANGE UP (ref 3.8–5.2)
RBC # FLD: 22.6 % — HIGH (ref 10.3–14.5)
SODIUM SERPL-SCNC: 135 MMOL/L — SIGNIFICANT CHANGE UP (ref 135–145)
SURGICAL PATHOLOGY STUDY: SIGNIFICANT CHANGE UP
WBC # BLD: 11.52 K/UL — HIGH (ref 3.8–10.5)
WBC # FLD AUTO: 11.52 K/UL — HIGH (ref 3.8–10.5)

## 2018-06-15 PROCEDURE — 99232 SBSQ HOSP IP/OBS MODERATE 35: CPT | Mod: GC

## 2018-06-15 PROCEDURE — 99233 SBSQ HOSP IP/OBS HIGH 50: CPT

## 2018-06-15 PROCEDURE — 99232 SBSQ HOSP IP/OBS MODERATE 35: CPT

## 2018-06-15 PROCEDURE — 74019 RADEX ABDOMEN 2 VIEWS: CPT | Mod: 26

## 2018-06-15 RX ORDER — ACETAMINOPHEN 500 MG
1000 TABLET ORAL ONCE
Qty: 0 | Refills: 0 | Status: COMPLETED | OUTPATIENT
Start: 2018-06-15 | End: 2018-06-15

## 2018-06-15 RX ORDER — I.V. FAT EMULSION 20 G/100ML
13.3 EMULSION INTRAVENOUS
Qty: 32 | Refills: 0 | Status: DISCONTINUED | OUTPATIENT
Start: 2018-06-15 | End: 2018-06-17

## 2018-06-15 RX ORDER — HYDROMORPHONE HYDROCHLORIDE 2 MG/ML
0.5 INJECTION INTRAMUSCULAR; INTRAVENOUS; SUBCUTANEOUS EVERY 4 HOURS
Qty: 0 | Refills: 0 | Status: DISCONTINUED | OUTPATIENT
Start: 2018-06-15 | End: 2018-06-21

## 2018-06-15 RX ORDER — ELECTROLYTE SOLUTION,INJ
1 VIAL (ML) INTRAVENOUS
Qty: 0 | Refills: 0 | Status: DISCONTINUED | OUTPATIENT
Start: 2018-06-15 | End: 2018-06-15

## 2018-06-15 RX ORDER — HYDROMORPHONE HYDROCHLORIDE 2 MG/ML
1 INJECTION INTRAMUSCULAR; INTRAVENOUS; SUBCUTANEOUS EVERY 4 HOURS
Qty: 0 | Refills: 0 | Status: DISCONTINUED | OUTPATIENT
Start: 2018-06-15 | End: 2018-06-21

## 2018-06-15 RX ADMIN — Medication 1 EACH: at 17:14

## 2018-06-15 RX ADMIN — HYDROMORPHONE HYDROCHLORIDE 1 MILLIGRAM(S): 2 INJECTION INTRAMUSCULAR; INTRAVENOUS; SUBCUTANEOUS at 16:04

## 2018-06-15 RX ADMIN — HYDROMORPHONE HYDROCHLORIDE 0.5 MILLIGRAM(S): 2 INJECTION INTRAMUSCULAR; INTRAVENOUS; SUBCUTANEOUS at 07:57

## 2018-06-15 RX ADMIN — HYDROMORPHONE HYDROCHLORIDE 0.5 MILLIGRAM(S): 2 INJECTION INTRAMUSCULAR; INTRAVENOUS; SUBCUTANEOUS at 08:10

## 2018-06-15 RX ADMIN — HYDROMORPHONE HYDROCHLORIDE 1 MILLIGRAM(S): 2 INJECTION INTRAMUSCULAR; INTRAVENOUS; SUBCUTANEOUS at 02:37

## 2018-06-15 RX ADMIN — Medication 1 DROP(S): at 17:33

## 2018-06-15 RX ADMIN — ENOXAPARIN SODIUM 40 MILLIGRAM(S): 100 INJECTION SUBCUTANEOUS at 11:56

## 2018-06-15 RX ADMIN — Medication 100 MILLIGRAM(S): at 14:43

## 2018-06-15 RX ADMIN — HYDROMORPHONE HYDROCHLORIDE 1 MILLIGRAM(S): 2 INJECTION INTRAMUSCULAR; INTRAVENOUS; SUBCUTANEOUS at 11:57

## 2018-06-15 RX ADMIN — Medication 100 MILLIGRAM(S): at 06:14

## 2018-06-15 RX ADMIN — Medication 100 MILLIGRAM(S): at 21:33

## 2018-06-15 RX ADMIN — I.V. FAT EMULSION 13.3 ML/HR: 20 EMULSION INTRAVENOUS at 17:14

## 2018-06-15 RX ADMIN — Medication 200 MILLIGRAM(S): at 05:12

## 2018-06-15 RX ADMIN — HYDROMORPHONE HYDROCHLORIDE 1 MILLIGRAM(S): 2 INJECTION INTRAMUSCULAR; INTRAVENOUS; SUBCUTANEOUS at 22:00

## 2018-06-15 RX ADMIN — PANTOPRAZOLE SODIUM 40 MILLIGRAM(S): 20 TABLET, DELAYED RELEASE ORAL at 11:57

## 2018-06-15 RX ADMIN — HYDROMORPHONE HYDROCHLORIDE 1 MILLIGRAM(S): 2 INJECTION INTRAMUSCULAR; INTRAVENOUS; SUBCUTANEOUS at 12:20

## 2018-06-15 RX ADMIN — Medication 1 DROP(S): at 00:02

## 2018-06-15 RX ADMIN — Medication 1 DROP(S): at 11:57

## 2018-06-15 RX ADMIN — Medication 1000 MILLIGRAM(S): at 09:50

## 2018-06-15 RX ADMIN — HYDROMORPHONE HYDROCHLORIDE 1 MILLIGRAM(S): 2 INJECTION INTRAMUSCULAR; INTRAVENOUS; SUBCUTANEOUS at 21:33

## 2018-06-15 RX ADMIN — HYDROMORPHONE HYDROCHLORIDE 1 MILLIGRAM(S): 2 INJECTION INTRAMUSCULAR; INTRAVENOUS; SUBCUTANEOUS at 02:17

## 2018-06-15 RX ADMIN — Medication 400 MILLIGRAM(S): at 09:26

## 2018-06-15 RX ADMIN — Medication 1 DROP(S): at 06:14

## 2018-06-15 RX ADMIN — Medication 200 MILLIGRAM(S): at 15:53

## 2018-06-15 NOTE — PROGRESS NOTE ADULT - ASSESSMENT
57 y/o F with gastric adenocarcinoma (diagnosed 6/2017) s/p distal gastrectomy with Billroth II, on chemotherapy, who presents with several days of jaundice and persistent RUQ abdominal pain.    Impression:  1) Unsuccessful ERCP complicated by small bowel perforation 6/6/18 s/p ex-lap with small bowel resection and anastomosis 6/6/18  2) Obstructive jaundice with dilated CBD and 1.4 cm enhancing pancreatic head mass s/p percutaneous draining  3) Gastric adenocarcinoma (diagnosed 6/2017) s/p distal gastrectomy with Billroth II, on chemotherapy  4) Post op ileus    Plan:  - Monitor biliary drain output; if decreases and bili not downtrending would consider tube check  - OOB as tolerated  - Monitor electrolytes and correct derangements  - IVF as appropriate  - Monitor CBC, CMP, INR  - Continue antibiotics  - Nutrition per Surgery  - Supportive care per primary team

## 2018-06-15 NOTE — PROGRESS NOTE ADULT - SUBJECTIVE AND OBJECTIVE BOX
GENERAL SURGERY DAILY PROGRESS NOTE:       Subjective:  No acute events overnight. Seen by Opthamology for red eye. Endorses some abdominal pain, which is well controlled. Did have some emesis yesterday but states she only has it after drinking ensure, which she is not fond of. Tolerating water.        Objective:    PE:  Gen: NAD, widespread edema, improving  Abdomen: incision c/d/i, abd softly distended, drain bilious    Vital Signs Last 24 Hrs  T(C): 36.9 (15 Maynor 2018 05:11), Max: 37.2 (2018 16:16)  T(F): 98.5 (15 Maynor 2018 05:11), Max: 99 (2018 16:16)  HR: 80 (15 Maynor 2018 05:11) (78 - 88)  BP: 123/69 (15 Maynor 2018 05:11) (120/63 - 129/68)  BP(mean): --  RR: 18 (15 Maynor 2018 05:11) (18 - 18)  SpO2: 97% (15 Maynor 2018 05:11) (94% - 97%)    I&O's Detail    2018 07:01  -  15 Maynor 2018 07:00  --------------------------------------------------------  IN:    fat emulsion (Fish Oil and Plant Based) 20% Infusion: 133 mL    Oral Fluid: 140 mL    TPN (Total Parenteral Nutrition): 1449 mL  Total IN: 1722 mL    OUT:    Drain: 767 mL    Indwelling Catheter - Urethral: 3380 mL  Total OUT: 4147 mL    Total NET: -2425 mL          Daily     Daily Weight in k.8 (15 Maynor 2018 05:11)    MEDICATIONS  (STANDING):  artificial  tears Solution 1 Drop(s) Both EYES four times a day  ciprofloxacin   IVPB 400 milliGRAM(s) IV Intermittent every 12 hours  enoxaparin Injectable 40 milliGRAM(s) SubCutaneous daily  fat emulsion (Fish Oil and Plant Based) 20% Infusion 13.3 mL/Hr (13.3 mL/Hr) IV Continuous <Continuous>  fat emulsion (Fish Oil and Plant Based) 20% Infusion 13.3 mL/Hr (13.3 mL/Hr) IV Continuous <Continuous>  metroNIDAZOLE  IVPB 500 milliGRAM(s) IV Intermittent every 8 hours  pantoprazole  Injectable 40 milliGRAM(s) IV Push daily  Parenteral Nutrition - Adult 1 Each (63 mL/Hr) TPN Continuous <Continuous>    MEDICATIONS  (PRN):  benzocaine 15 mG/menthol 3.6 mG Lozenge 1 Lozenge Oral every 6 hours PRN Sore Throat  HYDROmorphone  Injectable 0.5 milliGRAM(s) IV Push every 4 hours PRN Moderate Pain (4 - 6)  HYDROmorphone  Injectable 1 milliGRAM(s) IV Push every 4 hours PRN Severe Pain (7 - 10)      LABS:                        11.1   11.52 )-----------( 234      ( 15 Maynor 2018 06:00 )             31.8     06-15    135  |  100  |  14  ----------------------------<  96  3.6   |  26  |  0.36<L>    Ca    7.7<L>      15 Maynor 2018 06:00  Phos  2.7     06-15  Mg     1.7     06-15    TPro  4.9<L>  /  Alb  2.2<L>  /  TBili  10.9<H>  /  DBili  x   /  AST  28  /  ALT  14  /  AlkPhos  323<H>  06-15          RADIOLOGY & ADDITIONAL STUDIES:

## 2018-06-15 NOTE — PROGRESS NOTE ADULT - SUBJECTIVE AND OBJECTIVE BOX
SUBJECTIVE:  - Pt reports abdominal pain at the site of the drain, and her back  - No fever or chills  - No nausea or vomiting  - on TPN and tolerating small amounts of clear liquids    OBJECTIVE:    Vital Signs Last 24 Hrs  T(C): 36.9 (15 Maynor 2018 05:11), Max: 37.2 (14 Jun 2018 16:16)  T(F): 98.5 (15 Maynor 2018 05:11), Max: 99 (14 Jun 2018 16:16)  HR: 80 (15 Maynor 2018 05:11) (78 - 88)  BP: 123/69 (15 Maynor 2018 05:11) (120/63 - 129/68)  BP(mean): --  RR: 18 (15 Maynor 2018 05:11) (18 - 18)  SpO2: 97% (15 Maynor 2018 05:11) (94% - 97%)    PHYSICAL EXAM:  Constitutional: no acute distress  Eyes: no icterus  Neck: no masses, no LAD  Respiratory: normal inspiratory effort; no wheezing or crackles  Cardiovascular: RRR, normal S1/S2, no murmurs/rubs/gallops  Gastrointestinal: soft, nondistended, nontender, +BS; biliary drain in place  Extremities: no LE edema  Neurological: AAOx3, no asterixis  Skin: no rashes, bruises, petechiae    LABS:                        10.7   12.14 )-----------( 184      ( 14 Jun 2018 06:27 )             30.5     136  |  100  |  13  ----------------------------<  104<H>  3.3<L>   |  26  |  0.37<L>  Ca    7.3<L>      14 Jun 2018 06:27  Phos  3.0     06-14  Mg     1.8     06-14  TPro  4.5<L>  /  Alb  1.8<L>  /  TBili  9.5<H>  /  DBili  7.2<H>  /  AST  23  /  ALT  14  /  AlkPhos  278<H>  06-14  LIVER FUNCTIONS - ( 14 Jun 2018 06:27 )  Alb: 1.8 g/dL / Pro: 4.5 g/dL / ALK PHOS: 278 u/L / ALT: 14 u/L / AST: 23 u/L / GGT:

## 2018-06-15 NOTE — PROGRESS NOTE ADULT - ASSESSMENT
58F s/p ERCP EUS aborted 2/2 small bowel perforation, s/p ex lap resection of small bowel side to side anastomosis currently hemodynamically stable on the floor.     - Continue CLD, counseled pt that she can avoid ensure  - Per Optho: Artificial tears and outpt f/u  - Continue PTC, monitor output  - Monitor GI function  - DVT ppx

## 2018-06-15 NOTE — PROGRESS NOTE ADULT - ATTENDING COMMENTS
59 y/o female s/p ERCP c/b perforation of afferent limb. Pt underwent emergent Ex-lap w/ resection of perforated bowel and stapled side to side functional end to end anastomosis. Pt remained intubated and was transferred to SICU off pressors. s/p extubation, now transferred to floor. Patient meets criteria for severe protein calorie malnutrition requiring TPN for nutritional support. Patient now with diarrhea, f/u c.diff. Advanced to regular diet this morning.     TPN infusion volume increased to 1.5L - TPN is providing 1312 kcal/day.  Monitor fingersticks q 12 hours, obtain daily weights.  Labs reviewed - increased calcium gluconate  to 6 meq in TPN     Plan to wean TPN after patient is tolerating PO intake. Will follow up with primary team on plan.     1. Protein calorie malnutrition being optimized with TPN: CHO [200 ] gm.  AA [78 ] gm. SMOF Lipids [ 32] gm. lipids will be administered over 12 hours   2.  Hyperglycemia managed with: [0 ] units of regular insulin    3.  Check fluid balance daily.  Strict I/O  [ ] [ ]   4.  Daily BMP, Ionized Calcium, Magnesium and Phosphorous   5.  Triglycerides at initiation of TPN and monthly [ ] [ ]   6.  Pepcid in TPN for Gi prophylaxis  [ ]     I have seen and examined the patient, reviewed the laboratory and radiologic data and agree with practitioner's history, physical assessment, plan.  Reviewed and discussed with other consultants, House staff and PA's.  The note is edited where appropriate.   I spent  45  minutes in total providing diagnoses, assessment and plan.       Time involved in performance of separately billable procedures was not counted toward my critical care time.  There is no overlap.

## 2018-06-15 NOTE — PROGRESS NOTE ADULT - SUBJECTIVE AND OBJECTIVE BOX
NUTRITION NOTE  [id number] CHOKYI  ===============================    Interval events  Patient was seen and examined at bedside, no acute overnight events.   PICC placed and TPN/lipids initiated on 18, patient is tolerating without any issues.  Advanced to regular diet this morning.     Vital Signs Last 24 Hrs  T(C): 37 (15 Maynor 2018 09:25), Max: 37.2 (2018 16:16)  T(F): 98.6 (15 Maynor 2018 09:25), Max: 99 (2018 16:16)  HR: 79 (15 Maynor 2018 09:25) (78 - 88)  BP: 127/68 (15 Maynor 2018 09:25) (120/63 - 129/68)  RR: 18 (15 Maynor 2018 09:25) (18 - 18)  SpO2: 97% (15 Maynor 2018 09:25) (94% - 97%)    MEDICATIONS  (STANDING):  artificial  tears Solution 1 Drop(s) Both EYES four times a day  ciprofloxacin   IVPB 400 milliGRAM(s) IV Intermittent every 12 hours  enoxaparin Injectable 40 milliGRAM(s) SubCutaneous daily  fat emulsion (Fish Oil and Plant Based) 20% Infusion 13.3 mL/Hr (13.3 mL/Hr) IV Continuous <Continuous>  fat emulsion (Fish Oil and Plant Based) 20% Infusion 13.3 mL/Hr (13.3 mL/Hr) IV Continuous <Continuous>  metroNIDAZOLE  IVPB 500 milliGRAM(s) IV Intermittent every 8 hours  pantoprazole  Injectable 40 milliGRAM(s) IV Push daily  Parenteral Nutrition - Adult 1 Each (63 mL/Hr) TPN Continuous <Continuous>  Parenteral Nutrition - Adult 1 Each (63 mL/Hr) TPN Continuous <Continuous>    MEDICATIONS  (PRN):  benzocaine 15 mG/menthol 3.6 mG Lozenge 1 Lozenge Oral every 6 hours PRN Sore Throat  HYDROmorphone  Injectable 0.5 milliGRAM(s) IV Push every 4 hours PRN Moderate Pain (4 - 6)  HYDROmorphone  Injectable 1 milliGRAM(s) IV Push every 4 hours PRN Severe Pain (7 - 10)    I&O's Detail    2018 07:01  -  15 Maynor 2018 07:00  --------------------------------------------------------  IN:    fat emulsion (Fish Oil and Plant Based) 20% Infusion: 133 mL    Oral Fluid: 140 mL    TPN (Total Parenteral Nutrition): 1449 mL  Total IN: 1722 mL    OUT:    Drain: 767 mL    Indwelling Catheter - Urethral: 3380 mL  Total OUT: 4147 mL    Total NET: -2425 mL      15 Maynor 2018 07:  -  15 Maynor 2018 10:51  --------------------------------------------------------  IN:  Total IN: 0 mL    OUT:    Drain: 20 mL    Indwelling Catheter - Urethral: 200 mL  Total OUT: 220 mL    Total NET: -220 mL    POCT Blood Glucose.: 128 mg/dL (15 Maynor 2018 06:08)  POCT Blood Glucose.: 101 mg/dL (2018 23:57)  POCT Blood Glucose.: 100 mg/dL (2018 18:26)  POCT Blood Glucose.: 107 mg/dL (2018 11:43)    Daily Weight in k.8 (15 Maynor 2018 05:11)    Drug Dosing Weight  Height (cm): 160.02 (2018 15:53)  Weight (kg): 54 (2018 15:53)  BMI (kg/m2): 21.1 (2018 15:53)  BSA (m2): 1.55 (2018 15:53)    PHYSICAL EXAM   Constitutional: no acute distress, sitting comfortably in chair   Respiratory: nonlabored respirations   Gastrointestinal: soft, mildly tender  Extremities: warm, generalized edema    PICC Site: C/D/I    Diet: regular diet and TPN     LABORATORY                                   11.1   11.52 )-----------( 234      ( 15 Maynor 2018 06:00 )             31.8     -15    135  |  100  |  14  ----------------------------<  96  3.6   |  26  |  0.36<L>    Ca    7.7<L>      15 Maynor 2018 06:00  Phos  2.7     -15  Mg     1.7     06-15    TPro  4.9<L>  /  Alb  2.2<L>  /  TBili  10.9<H>  /  DBili  x   /  AST  28  /  ALT  14  /  AlkPhos  323<H>  06-15    LIVER FUNCTIONS - ( 2018 07:49 )  Alb: 1.9 g/dL / Pro: 4.4 g/dL / ALK PHOS: 306 u/L / ALT: 22 u/L / AST: 28 u/L / GGT: x            Chol -- LDL -- HDL -- Trig 112, - Chol 156  HDL 8<L> Trig 85,  Chol 122 LDL 73 HDL 33<L> Trig 78    Prealbumin /18: 5    ASSESSMENT/PLAN:  59 y/o female s/p ERCP c/b perforation of afferent limb. Pt underwent emergent Ex-lap w/ resection of perforated bowel and stapled side to side functional end to end anastomosis. Pt remained intubated and was transferred to SICU off pressors. s/p extubation, now transferred to floor. Patient meets criteria for severe protein calorie malnutrition requiring TPN for nutritional support. Patient now with diarrhea, f/u c.diff. Advanced to regular diet this morning.     TPN infusion volume increased to 1.5L - TPN is providing 1312 kcal/day.  Monitor fingersticks q 12 hours, obtain daily weights.  Labs reviewed - increased calcium gluconate  to 6 meq in TPN     Plan to wean TPN after patient is tolerating PO intake. Will follow up with primary team on plan.     1. Protein calorie malnutrition being optimized with TPN: CHO [200 ] gm.  AA [78 ] gm. SMOF Lipids [ 32] gm. lipids will be administered over 12 hours   2.  Hyperglycemia managed with: [0 ] units of regular insulin    3.  Check fluid balance daily.  Strict I/O  [ ] [ ]   4.  Daily BMP, Ionized Calcium, Magnesium and Phosphorous   5.  Triglycerides at initiation of TPN and monthly [ ] [ ]   6.  Pepcid in TPN for Gi prophylaxis  [ ]     Nutrition support pager [phone]

## 2018-06-15 NOTE — CHART NOTE - NSCHARTNOTEFT_GEN_A_CORE
Source: Patient, Family and EMR    Diet : Regular and TPN 63 ml/hr     Patient reports small amount of PO , family @ bed side translated , noted diet advanced today , awaiting Regular foods , will monitor tolerance. Pt. continue on PN . Noted 1+ generalized edema and 2+ L & R leg edema , wt. increased from admission , may be 2/2 edema .      Parenteral Nutrition:  1312 kcal, 200 gm dextrose, 78 gm amino acids, SMOFLIPIDS 20% 32 GM/D       Current Weight: -65.8 kg on 6/15/18; Admit wt. - 55 kg     Pertinent Medications: MEDICATIONS  (STANDING):  artificial  tears Solution 1 Drop(s) Both EYES four times a day  ciprofloxacin   IVPB 400 milliGRAM(s) IV Intermittent every 12 hours  enoxaparin Injectable 40 milliGRAM(s) SubCutaneous daily  fat emulsion (Fish Oil and Plant Based) 20% Infusion 13.3 mL/Hr (13.3 mL/Hr) IV Continuous <Continuous>  fat emulsion (Fish Oil and Plant Based) 20% Infusion 13.3 mL/Hr (13.3 mL/Hr) IV Continuous <Continuous>  metroNIDAZOLE  IVPB 500 milliGRAM(s) IV Intermittent every 8 hours  pantoprazole  Injectable 40 milliGRAM(s) IV Push daily  Parenteral Nutrition - Adult 1 Each (63 mL/Hr) TPN Continuous <Continuous>  Parenteral Nutrition - Adult 1 Each (63 mL/Hr) TPN Continuous <Continuous>    MEDICATIONS  (PRN):  benzocaine 15 mG/menthol 3.6 mG Lozenge 1 Lozenge Oral every 6 hours PRN Sore Throat  HYDROmorphone  Injectable 0.5 milliGRAM(s) IV Push every 4 hours PRN Moderate Pain (4 - 6)  HYDROmorphone  Injectable 1 milliGRAM(s) IV Push every 4 hours PRN Severe Pain (7 - 10)    Pertinent Labs:  06-15 Na135 mmol/L Glu 96 mg/dL K+ 3.6 mmol/L Cr  0.36 mg/dL<L> BUN 14 mg/dL 06-15 Phos 2.7 mg/dL 06-15 Alb 2.2 g/dL<L> 06-12 PAB 5 mg/dL<L> 05-19 [id number] 5.2 % 06-12 Chol --    LDL --    HDL --    Trig 112 mg/dL      Skin: intact    Estimated Needs:  no change since previous assessment    Previous Nutrition Diagnosis:  Malnutrition      Nutrition Diagnosis is-  ongoing       Nutrition intake optimized w/ PN       Recommend Soft diet with small six meals ( post gastrectomy diet ) received written diet education     Monitoring and Evaluation:  PO intake , Tolerance to diet prescription , weights and follow up per protocol

## 2018-06-16 LAB
ALBUMIN SERPL ELPH-MCNC: 2.2 G/DL — LOW (ref 3.3–5)
ALP SERPL-CCNC: 308 U/L — HIGH (ref 40–120)
ALT FLD-CCNC: 15 U/L — SIGNIFICANT CHANGE UP (ref 4–33)
AST SERPL-CCNC: 56 U/L — HIGH (ref 4–32)
BILIRUB SERPL-MCNC: 11 MG/DL — HIGH (ref 0.2–1.2)
BUN SERPL-MCNC: 15 MG/DL — SIGNIFICANT CHANGE UP (ref 7–23)
BUN SERPL-MCNC: 15 MG/DL — SIGNIFICANT CHANGE UP (ref 7–23)
CA-I BLD-SCNC: 1.08 MMOL/L — SIGNIFICANT CHANGE UP (ref 1.03–1.23)
CALCIUM SERPL-MCNC: 7.6 MG/DL — LOW (ref 8.4–10.5)
CALCIUM SERPL-MCNC: 7.8 MG/DL — LOW (ref 8.4–10.5)
CHLORIDE SERPL-SCNC: 101 MMOL/L — SIGNIFICANT CHANGE UP (ref 98–107)
CHLORIDE SERPL-SCNC: 99 MMOL/L — SIGNIFICANT CHANGE UP (ref 98–107)
CO2 SERPL-SCNC: 24 MMOL/L — SIGNIFICANT CHANGE UP (ref 22–31)
CO2 SERPL-SCNC: 26 MMOL/L — SIGNIFICANT CHANGE UP (ref 22–31)
CREAT SERPL-MCNC: 0.32 MG/DL — LOW (ref 0.5–1.3)
CREAT SERPL-MCNC: 0.32 MG/DL — LOW (ref 0.5–1.3)
GLUCOSE SERPL-MCNC: 120 MG/DL — HIGH (ref 70–99)
GLUCOSE SERPL-MCNC: 82 MG/DL — SIGNIFICANT CHANGE UP (ref 70–99)
HCT VFR BLD CALC: 31.9 % — LOW (ref 34.5–45)
HGB BLD-MCNC: 11 G/DL — LOW (ref 11.5–15.5)
MAGNESIUM SERPL-MCNC: 1.7 MG/DL — SIGNIFICANT CHANGE UP (ref 1.6–2.6)
MCHC RBC-ENTMCNC: 30.1 PG — SIGNIFICANT CHANGE UP (ref 27–34)
MCHC RBC-ENTMCNC: 34.5 % — SIGNIFICANT CHANGE UP (ref 32–36)
MCV RBC AUTO: 87.2 FL — SIGNIFICANT CHANGE UP (ref 80–100)
NRBC # FLD: 0 — SIGNIFICANT CHANGE UP
PHOSPHATE SERPL-MCNC: 2.6 MG/DL — SIGNIFICANT CHANGE UP (ref 2.5–4.5)
PLATELET # BLD AUTO: 207 K/UL — SIGNIFICANT CHANGE UP (ref 150–400)
PMV BLD: 10.8 FL — SIGNIFICANT CHANGE UP (ref 7–13)
POTASSIUM SERPL-MCNC: 4 MMOL/L — SIGNIFICANT CHANGE UP (ref 3.5–5.3)
POTASSIUM SERPL-MCNC: 5.7 MMOL/L — HIGH (ref 3.5–5.3)
POTASSIUM SERPL-SCNC: 4 MMOL/L — SIGNIFICANT CHANGE UP (ref 3.5–5.3)
POTASSIUM SERPL-SCNC: 5.7 MMOL/L — HIGH (ref 3.5–5.3)
PROT SERPL-MCNC: 5.3 G/DL — LOW (ref 6–8.3)
RBC # BLD: 3.66 M/UL — LOW (ref 3.8–5.2)
RBC # FLD: 23.2 % — HIGH (ref 10.3–14.5)
SODIUM SERPL-SCNC: 134 MMOL/L — LOW (ref 135–145)
SODIUM SERPL-SCNC: 136 MMOL/L — SIGNIFICANT CHANGE UP (ref 135–145)
WBC # BLD: 14.59 K/UL — HIGH (ref 3.8–10.5)
WBC # FLD AUTO: 14.59 K/UL — HIGH (ref 3.8–10.5)

## 2018-06-16 PROCEDURE — 74177 CT ABD & PELVIS W/CONTRAST: CPT | Mod: 26

## 2018-06-16 PROCEDURE — 99232 SBSQ HOSP IP/OBS MODERATE 35: CPT | Mod: 24

## 2018-06-16 PROCEDURE — 99233 SBSQ HOSP IP/OBS HIGH 50: CPT

## 2018-06-16 RX ORDER — ACETAMINOPHEN 500 MG
1000 TABLET ORAL ONCE
Qty: 0 | Refills: 0 | Status: COMPLETED | OUTPATIENT
Start: 2018-06-16 | End: 2018-06-16

## 2018-06-16 RX ORDER — PIPERACILLIN AND TAZOBACTAM 4; .5 G/20ML; G/20ML
3.38 INJECTION, POWDER, LYOPHILIZED, FOR SOLUTION INTRAVENOUS EVERY 8 HOURS
Qty: 0 | Refills: 0 | Status: DISCONTINUED | OUTPATIENT
Start: 2018-06-16 | End: 2018-06-20

## 2018-06-16 RX ORDER — ELECTROLYTE SOLUTION,INJ
1 VIAL (ML) INTRAVENOUS
Qty: 0 | Refills: 0 | Status: DISCONTINUED | OUTPATIENT
Start: 2018-06-16 | End: 2018-06-16

## 2018-06-16 RX ORDER — ELECTROLYTE SOLUTION,INJ
1 VIAL (ML) INTRAVENOUS
Qty: 0 | Refills: 0 | Status: DISCONTINUED | OUTPATIENT
Start: 2018-06-16 | End: 2018-06-17

## 2018-06-16 RX ORDER — I.V. FAT EMULSION 20 G/100ML
13.3 EMULSION INTRAVENOUS
Qty: 32 | Refills: 0 | Status: DISCONTINUED | OUTPATIENT
Start: 2018-06-16 | End: 2018-06-17

## 2018-06-16 RX ADMIN — HYDROMORPHONE HYDROCHLORIDE 1 MILLIGRAM(S): 2 INJECTION INTRAMUSCULAR; INTRAVENOUS; SUBCUTANEOUS at 01:41

## 2018-06-16 RX ADMIN — HYDROMORPHONE HYDROCHLORIDE 1 MILLIGRAM(S): 2 INJECTION INTRAMUSCULAR; INTRAVENOUS; SUBCUTANEOUS at 06:00

## 2018-06-16 RX ADMIN — Medication 1 EACH: at 18:33

## 2018-06-16 RX ADMIN — Medication 1 DROP(S): at 16:33

## 2018-06-16 RX ADMIN — PIPERACILLIN AND TAZOBACTAM 25 GRAM(S): 4; .5 INJECTION, POWDER, LYOPHILIZED, FOR SOLUTION INTRAVENOUS at 16:32

## 2018-06-16 RX ADMIN — PIPERACILLIN AND TAZOBACTAM 25 GRAM(S): 4; .5 INJECTION, POWDER, LYOPHILIZED, FOR SOLUTION INTRAVENOUS at 11:03

## 2018-06-16 RX ADMIN — ENOXAPARIN SODIUM 40 MILLIGRAM(S): 100 INJECTION SUBCUTANEOUS at 11:03

## 2018-06-16 RX ADMIN — Medication 1 DROP(S): at 05:46

## 2018-06-16 RX ADMIN — HYDROMORPHONE HYDROCHLORIDE 1 MILLIGRAM(S): 2 INJECTION INTRAMUSCULAR; INTRAVENOUS; SUBCUTANEOUS at 21:09

## 2018-06-16 RX ADMIN — HYDROMORPHONE HYDROCHLORIDE 1 MILLIGRAM(S): 2 INJECTION INTRAMUSCULAR; INTRAVENOUS; SUBCUTANEOUS at 21:38

## 2018-06-16 RX ADMIN — HYDROMORPHONE HYDROCHLORIDE 0.5 MILLIGRAM(S): 2 INJECTION INTRAMUSCULAR; INTRAVENOUS; SUBCUTANEOUS at 13:20

## 2018-06-16 RX ADMIN — HYDROMORPHONE HYDROCHLORIDE 0.5 MILLIGRAM(S): 2 INJECTION INTRAMUSCULAR; INTRAVENOUS; SUBCUTANEOUS at 13:50

## 2018-06-16 RX ADMIN — PANTOPRAZOLE SODIUM 40 MILLIGRAM(S): 20 TABLET, DELAYED RELEASE ORAL at 11:03

## 2018-06-16 RX ADMIN — Medication 1 DROP(S): at 11:03

## 2018-06-16 RX ADMIN — Medication 400 MILLIGRAM(S): at 15:57

## 2018-06-16 RX ADMIN — Medication 200 MILLIGRAM(S): at 04:09

## 2018-06-16 RX ADMIN — HYDROMORPHONE HYDROCHLORIDE 1 MILLIGRAM(S): 2 INJECTION INTRAMUSCULAR; INTRAVENOUS; SUBCUTANEOUS at 02:00

## 2018-06-16 RX ADMIN — HYDROMORPHONE HYDROCHLORIDE 1 MILLIGRAM(S): 2 INJECTION INTRAMUSCULAR; INTRAVENOUS; SUBCUTANEOUS at 05:46

## 2018-06-16 RX ADMIN — Medication 1000 MILLIGRAM(S): at 16:33

## 2018-06-16 RX ADMIN — I.V. FAT EMULSION 13.3 ML/HR: 20 EMULSION INTRAVENOUS at 18:32

## 2018-06-16 RX ADMIN — Medication 100 MILLIGRAM(S): at 05:46

## 2018-06-16 NOTE — PROGRESS NOTE ADULT - SUBJECTIVE AND OBJECTIVE BOX
S: pt seen and examined. no acute events overnight    O:  T(C): 37.1 (06-16-18 @ 05:25), Max: 37.1 (06-16-18 @ 05:25)  HR: 94 (06-16-18 @ 05:25) (73 - 95)  BP: 131/67 (06-16-18 @ 05:25) (121/63 - 139/64)  RR: 18 (06-16-18 @ 05:25) (18 - 18)  SpO2: 94% (06-16-18 @ 05:25) (94% - 98%)  Wt(kg): --  06-15 @ 07:01  -  06-16 @ 07:00  --------------------------------------------------------  IN:    TPN (Total Parenteral Nutrition): 693 mL  Total IN: 693 mL    OUT:    Drain: 425 mL    Indwelling Catheter - Urethral: 1675 mL  Total OUT: 2100 mL    Total NET: -1407 mL      Gen: NAD  Chest: breathing comfortably on room air  Abd: soft, with fluid collection beneath staple line, staple removed, serous fluid from incision, no fascia encountered with cotton tip applicator probe      LABS:                         11.0   14.59 )-----------( 207      ( 16 Jun 2018 05:40 )             31.9     06-16    134<L>  |  99  |  15  ----------------------------<  82  5.7<H>   |  26  |  0.32<L>    Ca    7.8<L>      16 Jun 2018 05:40  Phos  2.6     06-16  Mg     1.7     06-16    TPro  5.3<L>  /  Alb  2.2<L>  /  TBili  11.0<H>  /  DBili  x   /  AST  56<H>  /  ALT  15  /  AlkPhos  308<H>  06-16      Bcx: GNR        RADIOLOGY, EKG & ADDITIONAL TESTS: Reviewed

## 2018-06-16 NOTE — PROGRESS NOTE ADULT - ATTENDING COMMENTS
59 y/o female s/p ERCP c/b perforation of afferent limb. Pt underwent emergent Ex-lap w/ resection of perforated bowel and stapled side to side functional end to end anastomosis. Pt remained intubated and was transferred to SICU off pressors. s/p extubation, now transferred to floor. Patient meets criteria for severe protein calorie malnutrition requiring TPN for nutritional support. Patient now with possible wound dehiscence. CT scan abd/pelvis ordered.    K moderately hemolyzed. All other electrolytes stable. F/u CT scan. Continue TPN at current rate.    1.  Protein calorie malnutrition being optimized with TPN: CHO [200 ] gm.  AA [78 ] gm. Lipids [32 ] gm.  2.  Hyperglycemia managed with: [0 ] units of regular insulin    3.  Check fluid balance daily.  Strict I/O  [ ] [ ]   4.  Daily BMP, Ionized Calcium, Magnesium and Phosphorous   5.  Triglycerides at initiation of TPN and monthly [ ] [ ]   6.  Protonix for GI prophylaxis  [ ]     I have seen and examined the patient, reviewed the laboratory and radiologic data and agree with practitioner's history, physical assessment, plan.  Reviewed and discussed with other consultants, House staff and PA's.  The note is edited where appropriate.   I spent  45  minutes in total providing diagnoses, assessment and plan.       Time involved in performance of separately billable procedures was not counted toward my critical care time.  There is no overlap.

## 2018-06-16 NOTE — PROGRESS NOTE ADULT - SUBJECTIVE AND OBJECTIVE BOX
NUTRITION NOTE  [id number] CHOKYI  ===============================    Interval events: Patient seen and examined at bedside. Family also at bedside. Patient was on a regular diet, but is now NPO for CT scan of Abd/pelvis to r/o dehiscence. Patient c/o abdominal pain. Pouch was placed by nursing staff over area of drainage from wound.  Patient currently has no flatus and minimal bowel movements.     VITAL SIGNS:  T(C): 37.1 (18 @ 05:25), Max: 37.1 (18 @ 05:25)  HR: 94 (18 @ 05:25) (73 - 95)  BP: 131/67 (18 @ 05:25) (121/63 - 139/64)  ABP: --  ABP(mean): --  RR: 18 (18 @ 05:25) (18 - 18)  SpO2: 94% (18 @ 05:25) (94% - 98%)  CVP(mm Hg): --  15 @ 07:01  -  16 @ 07:00  --------------------------------------------------------  IN: 693 mL / OUT: 2100 mL / NET: -1407 mL    Glucose...118-137    Neuro: Patient alert, responsive with family at the bedside    CV: s1, s2 RRR    Pulm: CTA b/l non labored breathing    GI/Nutrition: soft, minimal tenderness to palpation, midline pouch with large amount of serosanguinous drainage drain in place    Extremity: Warm, lower extremity edema    Skin: icteric sclera and skin is yellow (T bili 11)    PICC Site: Left PICC site C/D/I    Metabolic/FLUIDS/ELECTROLYTES/NUTRITION:  Gastrointestinal Medications:  fat emulsion (Fish Oil and Plant Based) 20% Infusion 13.3 mL/Hr IV Continuous <Continuous>  fat emulsion (Fish Oil and Plant Based) 20% Infusion 13.3 mL/Hr IV Continuous <Continuous>  pantoprazole  Injectable 40 milliGRAM(s) IV Push daily  Parenteral Nutrition - Adult 1 Each TPN Continuous <Continuous>    I&O's Detail  58y  Daily Weight in k.8 (15 Maynor 2018 05:11)      134<L>  |  99  |  15  ----------------------------<  82  5.7<H>   |  26  |  0.32<L>    Ca    7.8<L>      2018 05:40  Phos  2.6       Mg     1.7         TPro  5.3<L>  /  Alb  2.2<L>  /  TBili  11.0<H>  /  DBili  x   /  AST  56<H>  /  ALT  15  /  AlkPhos  308<H>      Diet: NPO/TPN    INFECTIOUS DISEASE:  Antimicrobials/Immunologic Medications:  piperacillin/tazobactam IVPB. 3.375 Gram(s) IV Intermittent every 8 hours    RECENT CULTURES:    OTHER MEDICATIONS:  Endocrine/Metabolic Medications:    Genitourinary Medications:    Topical/Other Medications:  artificial  tears Solution 1 Drop(s) Both EYES four times a day  benzocaine 15 mG/menthol 3.6 mG Lozenge 1 Lozenge Oral every 6 hours PRN    IMAGING STUDIES:    LABORATORY      ASSESSMENT/PLAN:  59 y/o female s/p ERCP c/b perforation of afferent limb. Pt underwent emergent Ex-lap w/ resection of perforated bowel and stapled side to side functional end to end anastomosis. Pt remained intubated and was transferred to SICU off pressors. s/p extubation, now transferred to floor. Patient meets criteria for severe protein calorie malnutrition requiring TPN for nutritional support. Patient now with possible wound dehiscence. CT scan abd/pelvis ordered.    K moderately hemolyzed. All other electrolytes stable. F/u CT scan. Continue TPN at current rate.    1.  Protein calorie malnutrition being optimized with TPN: CHO [200 ] gm.  AA [78 ] gm. Lipids [32 ] gm.  2.  Hyperglycemia managed with: [0 ] units of regular insulin    3.  Check fluid balance daily.  Strict I/O  [ ] [ ]   4.  Daily BMP, Ionized Calcium, Magnesium and Phosphorous   5.  Triglycerides at initiation of TPN and monthly [ ] [ ]   6.  Protonix for GI prophylaxis  [ ]     Nutrition Support [id number]

## 2018-06-16 NOTE — PROGRESS NOTE ADULT - ASSESSMENT
58F s/p ERCP EUS aborted 2/2 small bowel perforation, s/p ex lap resection of small bowel side to side anastomosis currently hemodynamically stable on the floor. Now with possible dehisence  - CT A/P  - NPO  - Per Optho: Artificial tears and outpt f/u  - Continue PTC, monitor output  - Monitor GI function  - DVT ppx

## 2018-06-16 NOTE — PROGRESS NOTE ADULT - SUBJECTIVE AND OBJECTIVE BOX
GENERAL SURGERY POST-OP NOTE:   Patient seen and examined. Patient stable.     MEDICATIONS  (STANDING):  artificial  tears Solution 1 Drop(s) Both EYES four times a day  enoxaparin Injectable 40 milliGRAM(s) SubCutaneous daily  fat emulsion (Fish Oil and Plant Based) 20% Infusion 13.3 mL/Hr (13.3 mL/Hr) IV Continuous <Continuous>  fat emulsion (Fish Oil and Plant Based) 20% Infusion 13.3 mL/Hr (13.3 mL/Hr) IV Continuous <Continuous>  fat emulsion (Fish Oil and Plant Based) 20% Infusion 13.3 mL/Hr (13.3 mL/Hr) IV Continuous <Continuous>  pantoprazole  Injectable 40 milliGRAM(s) IV Push daily  Parenteral Nutrition - Adult 1 Each (63 mL/Hr) TPN Continuous <Continuous>  Parenteral Nutrition - Adult 1 Each (63 mL/Hr) TPN Continuous <Continuous>  piperacillin/tazobactam IVPB. 3.375 Gram(s) IV Intermittent every 8 hours    MEDICATIONS  (PRN):  benzocaine 15 mG/menthol 3.6 mG Lozenge 1 Lozenge Oral every 6 hours PRN Sore Throat  HYDROmorphone  Injectable 0.5 milliGRAM(s) IV Push every 4 hours PRN Moderate Pain (4 - 6)  HYDROmorphone  Injectable 1 milliGRAM(s) IV Push every 4 hours PRN Severe Pain (7 - 10)      Vital Signs Last 24 Hrs  T(C): 36.7 (16 Jun 2018 16:40), Max: 37.1 (16 Jun 2018 05:25)  T(F): 98 (16 Jun 2018 16:40), Max: 98.8 (16 Jun 2018 05:25)  HR: 69 (16 Jun 2018 16:40) (66 - 95)  BP: 136/50 (16 Jun 2018 16:40) (121/64 - 139/64)  BP(mean): --  RR: 18 (16 Jun 2018 16:40) (18 - 20)  SpO2: 98% (16 Jun 2018 16:40) (94% - 100%)    I&O's Detail    15 Maynor 2018 07:01  -  16 Jun 2018 07:00  --------------------------------------------------------  IN:    TPN (Total Parenteral Nutrition): 693 mL  Total IN: 693 mL    OUT:    Drain: 425 mL    Indwelling Catheter - Urethral: 1675 mL  Total OUT: 2100 mL    Total NET: -1407 mL      16 Jun 2018 07:01  -  16 Jun 2018 20:35  --------------------------------------------------------  IN:    TPN (Total Parenteral Nutrition): 756 mL  Total IN: 756 mL    OUT:    Drain: 1540 mL    Drain: 430 mL    Indwelling Catheter - Urethral: 850 mL  Total OUT: 2820 mL    Total NET: -2064 mL          Daily     Daily     LABS:                        11.0   14.59 )-----------( 207      ( 16 Jun 2018 05:40 )             31.9     06-16    136  |  101  |  15  ----------------------------<  120<H>  4.0   |  24  |  0.32<L>    Ca    7.6<L>      16 Jun 2018 11:41  Phos  2.6     06-16  Mg     1.7     06-16    TPro  5.3<L>  /  Alb  2.2<L>  /  TBili  11.0<H>  /  DBili  x   /  AST  56<H>  /  ALT  15  /  AlkPhos  308<H>  06-16          PHYSICAL EXAM:  abd- soft, NT  midline incision- with copius serosanginous fluid- suggestive of fascial dehiscence, no evidence of obvious evisceration  LUE- edematous compared to right. b/l LE- pitting edema 2+  PTC with bile        Plan:   -CT abd- no evidence of bowel obstruction or intrabd abscess. Fascial dehiscence but no evisceration  - LUE and b/l LE Duplex  - Resume diet

## 2018-06-17 LAB
ALBUMIN SERPL ELPH-MCNC: 1.9 G/DL — LOW (ref 3.3–5)
ALP SERPL-CCNC: 253 U/L — HIGH (ref 40–120)
ALT FLD-CCNC: 12 U/L — SIGNIFICANT CHANGE UP (ref 4–33)
AST SERPL-CCNC: 42 U/L — HIGH (ref 4–32)
BASOPHILS # BLD AUTO: 0.05 K/UL — SIGNIFICANT CHANGE UP (ref 0–0.2)
BASOPHILS NFR BLD AUTO: 0.4 % — SIGNIFICANT CHANGE UP (ref 0–2)
BILIRUB DIRECT SERPL-MCNC: 6.6 MG/DL — HIGH (ref 0.1–0.2)
BILIRUB SERPL-MCNC: 10.1 MG/DL — HIGH (ref 0.2–1.2)
BUN SERPL-MCNC: 14 MG/DL — SIGNIFICANT CHANGE UP (ref 7–23)
CA-I BLD-SCNC: 1.09 MMOL/L — SIGNIFICANT CHANGE UP (ref 1.03–1.23)
CALCIUM SERPL-MCNC: 7.7 MG/DL — LOW (ref 8.4–10.5)
CHLORIDE SERPL-SCNC: 100 MMOL/L — SIGNIFICANT CHANGE UP (ref 98–107)
CO2 SERPL-SCNC: 25 MMOL/L — SIGNIFICANT CHANGE UP (ref 22–31)
CREAT SERPL-MCNC: 0.31 MG/DL — LOW (ref 0.5–1.3)
EOSINOPHIL # BLD AUTO: 0.21 K/UL — SIGNIFICANT CHANGE UP (ref 0–0.5)
EOSINOPHIL NFR BLD AUTO: 1.6 % — SIGNIFICANT CHANGE UP (ref 0–6)
GLUCOSE SERPL-MCNC: 83 MG/DL — SIGNIFICANT CHANGE UP (ref 70–99)
HCT VFR BLD CALC: 29.5 % — LOW (ref 34.5–45)
HGB BLD-MCNC: 10.1 G/DL — LOW (ref 11.5–15.5)
IMM GRANULOCYTES # BLD AUTO: 0.11 # — SIGNIFICANT CHANGE UP
IMM GRANULOCYTES NFR BLD AUTO: 0.8 % — SIGNIFICANT CHANGE UP (ref 0–1.5)
LYMPHOCYTES # BLD AUTO: 1.39 K/UL — SIGNIFICANT CHANGE UP (ref 1–3.3)
LYMPHOCYTES # BLD AUTO: 10.4 % — LOW (ref 13–44)
MAGNESIUM SERPL-MCNC: 1.7 MG/DL — SIGNIFICANT CHANGE UP (ref 1.6–2.6)
MCHC RBC-ENTMCNC: 29.8 PG — SIGNIFICANT CHANGE UP (ref 27–34)
MCHC RBC-ENTMCNC: 34.2 % — SIGNIFICANT CHANGE UP (ref 32–36)
MCV RBC AUTO: 87 FL — SIGNIFICANT CHANGE UP (ref 80–100)
MONOCYTES # BLD AUTO: 0.93 K/UL — HIGH (ref 0–0.9)
MONOCYTES NFR BLD AUTO: 7 % — SIGNIFICANT CHANGE UP (ref 2–14)
NEUTROPHILS # BLD AUTO: 10.66 K/UL — HIGH (ref 1.8–7.4)
NEUTROPHILS NFR BLD AUTO: 79.8 % — HIGH (ref 43–77)
NRBC # FLD: 0 — SIGNIFICANT CHANGE UP
PHOSPHATE SERPL-MCNC: 2.7 MG/DL — SIGNIFICANT CHANGE UP (ref 2.5–4.5)
PLATELET # BLD AUTO: 238 K/UL — SIGNIFICANT CHANGE UP (ref 150–400)
PMV BLD: 10.3 FL — SIGNIFICANT CHANGE UP (ref 7–13)
POTASSIUM SERPL-MCNC: 5 MMOL/L — SIGNIFICANT CHANGE UP (ref 3.5–5.3)
POTASSIUM SERPL-SCNC: 5 MMOL/L — SIGNIFICANT CHANGE UP (ref 3.5–5.3)
PROT SERPL-MCNC: 4.9 G/DL — LOW (ref 6–8.3)
RBC # BLD: 3.39 M/UL — LOW (ref 3.8–5.2)
RBC # FLD: 23.4 % — HIGH (ref 10.3–14.5)
SODIUM SERPL-SCNC: 134 MMOL/L — LOW (ref 135–145)
WBC # BLD: 13.35 K/UL — HIGH (ref 3.8–10.5)
WBC # FLD AUTO: 13.35 K/UL — HIGH (ref 3.8–10.5)

## 2018-06-17 PROCEDURE — 99232 SBSQ HOSP IP/OBS MODERATE 35: CPT | Mod: 24

## 2018-06-17 PROCEDURE — 99233 SBSQ HOSP IP/OBS HIGH 50: CPT

## 2018-06-17 RX ORDER — CHLORHEXIDINE GLUCONATE 213 G/1000ML
1 SOLUTION TOPICAL ONCE
Qty: 0 | Refills: 0 | Status: COMPLETED | OUTPATIENT
Start: 2018-06-17 | End: 2018-06-17

## 2018-06-17 RX ORDER — I.V. FAT EMULSION 20 G/100ML
13.3 EMULSION INTRAVENOUS
Qty: 32 | Refills: 0 | Status: DISCONTINUED | OUTPATIENT
Start: 2018-06-17 | End: 2018-06-19

## 2018-06-17 RX ORDER — ELECTROLYTE SOLUTION,INJ
1 VIAL (ML) INTRAVENOUS
Qty: 0 | Refills: 0 | Status: DISCONTINUED | OUTPATIENT
Start: 2018-06-17 | End: 2018-06-17

## 2018-06-17 RX ORDER — ALBUMIN HUMAN 25 %
50 VIAL (ML) INTRAVENOUS ONCE
Qty: 0 | Refills: 0 | Status: COMPLETED | OUTPATIENT
Start: 2018-06-17 | End: 2018-06-17

## 2018-06-17 RX ADMIN — Medication 1 DROP(S): at 11:07

## 2018-06-17 RX ADMIN — ENOXAPARIN SODIUM 40 MILLIGRAM(S): 100 INJECTION SUBCUTANEOUS at 11:03

## 2018-06-17 RX ADMIN — PIPERACILLIN AND TAZOBACTAM 25 GRAM(S): 4; .5 INJECTION, POWDER, LYOPHILIZED, FOR SOLUTION INTRAVENOUS at 23:27

## 2018-06-17 RX ADMIN — HYDROMORPHONE HYDROCHLORIDE 1 MILLIGRAM(S): 2 INJECTION INTRAMUSCULAR; INTRAVENOUS; SUBCUTANEOUS at 23:27

## 2018-06-17 RX ADMIN — HYDROMORPHONE HYDROCHLORIDE 1 MILLIGRAM(S): 2 INJECTION INTRAMUSCULAR; INTRAVENOUS; SUBCUTANEOUS at 11:27

## 2018-06-17 RX ADMIN — HYDROMORPHONE HYDROCHLORIDE 1 MILLIGRAM(S): 2 INJECTION INTRAMUSCULAR; INTRAVENOUS; SUBCUTANEOUS at 06:25

## 2018-06-17 RX ADMIN — PANTOPRAZOLE SODIUM 40 MILLIGRAM(S): 20 TABLET, DELAYED RELEASE ORAL at 11:03

## 2018-06-17 RX ADMIN — PIPERACILLIN AND TAZOBACTAM 25 GRAM(S): 4; .5 INJECTION, POWDER, LYOPHILIZED, FOR SOLUTION INTRAVENOUS at 15:15

## 2018-06-17 RX ADMIN — CHLORHEXIDINE GLUCONATE 1 APPLICATION(S): 213 SOLUTION TOPICAL at 06:42

## 2018-06-17 RX ADMIN — Medication 1 DROP(S): at 23:29

## 2018-06-17 RX ADMIN — HYDROMORPHONE HYDROCHLORIDE 1 MILLIGRAM(S): 2 INJECTION INTRAMUSCULAR; INTRAVENOUS; SUBCUTANEOUS at 06:45

## 2018-06-17 RX ADMIN — HYDROMORPHONE HYDROCHLORIDE 1 MILLIGRAM(S): 2 INJECTION INTRAMUSCULAR; INTRAVENOUS; SUBCUTANEOUS at 01:48

## 2018-06-17 RX ADMIN — HYDROMORPHONE HYDROCHLORIDE 1 MILLIGRAM(S): 2 INJECTION INTRAMUSCULAR; INTRAVENOUS; SUBCUTANEOUS at 19:26

## 2018-06-17 RX ADMIN — PIPERACILLIN AND TAZOBACTAM 25 GRAM(S): 4; .5 INJECTION, POWDER, LYOPHILIZED, FOR SOLUTION INTRAVENOUS at 08:43

## 2018-06-17 RX ADMIN — HYDROMORPHONE HYDROCHLORIDE 1 MILLIGRAM(S): 2 INJECTION INTRAMUSCULAR; INTRAVENOUS; SUBCUTANEOUS at 15:15

## 2018-06-17 RX ADMIN — HYDROMORPHONE HYDROCHLORIDE 1 MILLIGRAM(S): 2 INJECTION INTRAMUSCULAR; INTRAVENOUS; SUBCUTANEOUS at 19:56

## 2018-06-17 RX ADMIN — HYDROMORPHONE HYDROCHLORIDE 1 MILLIGRAM(S): 2 INJECTION INTRAMUSCULAR; INTRAVENOUS; SUBCUTANEOUS at 02:15

## 2018-06-17 RX ADMIN — PIPERACILLIN AND TAZOBACTAM 25 GRAM(S): 4; .5 INJECTION, POWDER, LYOPHILIZED, FOR SOLUTION INTRAVENOUS at 00:47

## 2018-06-17 RX ADMIN — Medication 1 DROP(S): at 00:48

## 2018-06-17 RX ADMIN — I.V. FAT EMULSION 13.3 ML/HR: 20 EMULSION INTRAVENOUS at 16:35

## 2018-06-17 RX ADMIN — Medication 50 MILLILITER(S): at 12:29

## 2018-06-17 RX ADMIN — HYDROMORPHONE HYDROCHLORIDE 1 MILLIGRAM(S): 2 INJECTION INTRAMUSCULAR; INTRAVENOUS; SUBCUTANEOUS at 10:59

## 2018-06-17 RX ADMIN — Medication 1 EACH: at 16:35

## 2018-06-17 RX ADMIN — HYDROMORPHONE HYDROCHLORIDE 1 MILLIGRAM(S): 2 INJECTION INTRAMUSCULAR; INTRAVENOUS; SUBCUTANEOUS at 15:31

## 2018-06-17 NOTE — PROGRESS NOTE ADULT - SUBJECTIVE AND OBJECTIVE BOX
NUTRITION NOTE  [id number] CHOKYI  ===============================    Interval events: Patient had CT scan of abd/pelvis yesterday which revealed a fascial dehiscence.  Patient was placed back on a regular diet, but as per patient and family, she is not eating much.  No complaints of pain at this time.    VITAL SIGNS:  T(C): 36.7 (18 @ 08:29), Max: 37.1 (18 @ 10:43)  HR: 83 (18 @ 08:29) (66 - 83)  BP: 124/63 (18 @ 08:29) (121/64 - 138/78)  ABP: --  ABP(mean): --  RR: 19 (18 @ 08:29) (18 - 20)  SpO2: 98% (18 @ 08:29) (95% - 100%)  CVP(mm Hg): --   @ 07:01  -   @ 07:00  --------------------------------------------------------  IN: 756 mL / OUT: 4520 mL / NET: -3764 mL     @ 07:01  -   @ 09:58  --------------------------------------------------------  IN: 0 mL / OUT: 220 mL / NET: -220 mL    Glucose     Neuro: Patient alert, responsive, son at bedside    CV: s1, s2 RRR    Pulm: CTA b/l    GI/Nutrition: soft, nontender, non distended, midline pouch in place draining serosanguinous drainage, but becoming more serous appearing now, biliary drain in place    Extremity: warm LE, 2+ edema    PICC Site: LUE PICC site C/D/I    Metabolic/FLUIDS/ELECTROLYTES/NUTRITION:  Gastrointestinal Medications:  albumin human 25% IVPB 50 milliLiter(s) IV Intermittent once  fat emulsion (Fish Oil and Plant Based) 20% Infusion 13.3 mL/Hr IV Continuous <Continuous>  fat emulsion (Fish Oil and Plant Based) 20% Infusion 13.3 mL/Hr IV Continuous <Continuous>  fat emulsion (Fish Oil and Plant Based) 20% Infusion 13.3 mL/Hr IV Continuous <Continuous>  pantoprazole  Injectable 40 milliGRAM(s) IV Push daily  Parenteral Nutrition - Adult 1 Each TPN Continuous <Continuous>    I&O's Detail  58y  Daily Weight in k.8 (15 Maynor 2018 05:11)      134<L>  |  100  |  14  ----------------------------<  83  5.0   |  25  |  0.31<L>    Ca    7.7<L>      2018 05:25  Phos  2.7       Mg     1.7         TPro  4.9<L>  /  Alb  1.9<L>  /  TBili  10.1<H>  /  DBili  6.6<H>  /  AST  42<H>  /  ALT  12  /  AlkPhos  253<H>      Diet: Regular c Ensure/TPN    INFECTIOUS DISEASE:  Antimicrobials/Immunologic Medications:  piperacillin/tazobactam IVPB. 3.375 Gram(s) IV Intermittent every 8 hours    RECENT CULTURES:      OTHER MEDICATIONS:  Endocrine/Metabolic Medications:    Genitourinary Medications:    Topical/Other Medications:  artificial  tears Solution 1 Drop(s) Both EYES four times a day  benzocaine 15 mG/menthol 3.6 mG Lozenge 1 Lozenge Oral every 6 hours PRN      IMAGING STUDIES:    LABORATORY      ASSESSMENT/PLAN:  59 y/o female s/p ERCP c/b perforation of afferent limb. Pt underwent emergent Ex-lap w/ resection of perforated bowel and stapled side to side functional end to end anastomosis. Pt remained intubated and was transferred to SICU off pressors. s/p extubation, now transferred to floor. Patient meets criteria for severe protein calorie malnutrition requiring TPN for nutritional support. CT scan of abd/pelvis revealed fascial dehiscence. Patient placed back on regular diet, but will continue TPN at this time.     K 5.0, will adjust Potassium in TPN    1.  Protein calorie malnutrition being optimized with TPN: CHO [200 ] gm.  AA [78 ] gm. Lipids [32 ] gm.  2.  Hyperglycemia managed with: [0 ] units of regular insulin    3.  Check fluid balance daily.  Strict I/O  [ ] [ ]   4.  Daily BMP, Ionized Calcium, Magnesium and Phosphorous   5.  Triglycerides at initiation of TPN and monthly [ ] [ ]   6.  Protonix for GI prophylaxis  [ ]     Nutrition support [id number]

## 2018-06-17 NOTE — PROGRESS NOTE ADULT - ASSESSMENT
58F s/p ERCP EUS aborted 2/2 small bowel perforation, s/p ex lap resection of small bowel side to side anastomosis currently hemodynamically stable on the floor.     - F/U CT Abd/pelvis final read  - NPO/ TPN  - Per Optho: Artificial tears and outpt f/u  - T bili remains elevated, will speak to IR re: poss Tube check tomorrow  - Monitor drainage from midline incision - has pouch covering entire incision as it is diffusely oozing between staples  - Monitor GI function  - DVT ppx

## 2018-06-17 NOTE — PROGRESS NOTE ADULT - SUBJECTIVE AND OBJECTIVE BOX
SURGICAL ONCOLGY  Patient seen and examined.     MEDICATIONS  (STANDING):  artificial  tears Solution 1 Drop(s) Both EYES four times a day  enoxaparin Injectable 40 milliGRAM(s) SubCutaneous daily  fat emulsion (Fish Oil and Plant Based) 20% Infusion 13.3 mL/Hr (13.3 mL/Hr) IV Continuous <Continuous>  pantoprazole  Injectable 40 milliGRAM(s) IV Push daily  Parenteral Nutrition - Adult 1 Each (63 mL/Hr) TPN Continuous <Continuous>  piperacillin/tazobactam IVPB. 3.375 Gram(s) IV Intermittent every 8 hours    MEDICATIONS  (PRN):  benzocaine 15 mG/menthol 3.6 mG Lozenge 1 Lozenge Oral every 6 hours PRN Sore Throat  HYDROmorphone  Injectable 0.5 milliGRAM(s) IV Push every 4 hours PRN Moderate Pain (4 - 6)  HYDROmorphone  Injectable 1 milliGRAM(s) IV Push every 4 hours PRN Severe Pain (7 - 10)      Vital Signs Last 24 Hrs  T(C): 36.7 (17 Jun 2018 15:29), Max: 37 (17 Jun 2018 00:46)  T(F): 98.1 (17 Jun 2018 15:29), Max: 98.6 (17 Jun 2018 00:46)  HR: 77 (17 Jun 2018 15:29) (69 - 83)  BP: 124/62 (17 Jun 2018 15:29) (121/62 - 138/78)  BP(mean): --  RR: 19 (17 Jun 2018 15:29) (16 - 19)  SpO2: 99% (17 Jun 2018 15:29) (95% - 100%)    I&O's Detail    16 Jun 2018 07:01  -  17 Jun 2018 07:00  --------------------------------------------------------  IN:    TPN (Total Parenteral Nutrition): 756 mL  Total IN: 756 mL    OUT:    Drain: 680 mL    Drain: 1940 mL    Indwelling Catheter - Urethral: 1900 mL  Total OUT: 4520 mL    Total NET: -3764 mL      17 Jun 2018 07:01  -  17 Jun 2018 15:31  --------------------------------------------------------  IN:  Total IN: 0 mL    OUT:    Drain: 695 mL    Drain: 480 mL    Indwelling Catheter - Urethral: 575 mL  Total OUT: 1750 mL    Total NET: -1750 mL          Daily     Daily     LABS:                        10.1   13.35 )-----------( 238      ( 17 Jun 2018 05:25 )             29.5     06-17    134<L>  |  100  |  14  ----------------------------<  83  5.0   |  25  |  0.31<L>    Ca    7.7<L>      17 Jun 2018 05:25  Phos  2.7     06-17  Mg     1.7     06-17    TPro  4.9<L>  /  Alb  1.9<L>  /  TBili  10.1<H>  /  DBili  6.6<H>  /  AST  42<H>  /  ALT  12  /  AlkPhos  253<H>  06-17        PHYSICAL EXAM:  Gen:   Abd: soft, midline puch with serous fluid -       Plan:   - Clears today  - Replace 1:0.5 of ascitic fluid loss with albumin  - LUE and LE duplex pending

## 2018-06-17 NOTE — PROGRESS NOTE ADULT - SUBJECTIVE AND OBJECTIVE BOX
GENERAL SURGERY DAILY PROGRESS NOTE:       Subjective:  No acute events overnight. This AM pt feels well overall. Continues to have serous drainage from midline incision. Pain well controlled.         Objective:    PE:  Gen: NAD  Chest: breathing comfortably on room air  Abd: soft, midline incision pouched with serous fluid in bag    Vital Signs Last 24 Hrs  T(C): 36.9 (17 Jun 2018 11:27), Max: 37 (17 Jun 2018 00:46)  T(F): 98.4 (17 Jun 2018 11:27), Max: 98.6 (17 Jun 2018 00:46)  HR: 83 (17 Jun 2018 11:27) (66 - 83)  BP: 121/62 (17 Jun 2018 11:27) (121/62 - 138/78)  BP(mean): --  RR: 16 (17 Jun 2018 11:27) (16 - 20)  SpO2: 100% (17 Jun 2018 11:27) (95% - 100%)    I&O's Detail    16 Jun 2018 07:01  -  17 Jun 2018 07:00  --------------------------------------------------------  IN:    TPN (Total Parenteral Nutrition): 756 mL  Total IN: 756 mL    OUT:    Drain: 680 mL    Drain: 1940 mL    Indwelling Catheter - Urethral: 1900 mL  Total OUT: 4520 mL    Total NET: -3764 mL      17 Jun 2018 07:01  -  17 Jun 2018 12:22  --------------------------------------------------------  IN:  Total IN: 0 mL    OUT:    Drain: 130 mL    Drain: 590 mL    Indwelling Catheter - Urethral: 325 mL  Total OUT: 1045 mL    Total NET: -1045 mL          Daily     Daily     MEDICATIONS  (STANDING):  albumin human 25% IVPB 50 milliLiter(s) IV Intermittent once  artificial  tears Solution 1 Drop(s) Both EYES four times a day  enoxaparin Injectable 40 milliGRAM(s) SubCutaneous daily  fat emulsion (Fish Oil and Plant Based) 20% Infusion 13.3 mL/Hr (13.3 mL/Hr) IV Continuous <Continuous>  pantoprazole  Injectable 40 milliGRAM(s) IV Push daily  Parenteral Nutrition - Adult 1 Each (63 mL/Hr) TPN Continuous <Continuous>  piperacillin/tazobactam IVPB. 3.375 Gram(s) IV Intermittent every 8 hours    MEDICATIONS  (PRN):  benzocaine 15 mG/menthol 3.6 mG Lozenge 1 Lozenge Oral every 6 hours PRN Sore Throat  HYDROmorphone  Injectable 0.5 milliGRAM(s) IV Push every 4 hours PRN Moderate Pain (4 - 6)  HYDROmorphone  Injectable 1 milliGRAM(s) IV Push every 4 hours PRN Severe Pain (7 - 10)      LABS:                        10.1   13.35 )-----------( 238      ( 17 Jun 2018 05:25 )             29.5     06-17    134<L>  |  100  |  14  ----------------------------<  83  5.0   |  25  |  0.31<L>    Ca    7.7<L>      17 Jun 2018 05:25  Phos  2.7     06-17  Mg     1.7     06-17    TPro  4.9<L>  /  Alb  1.9<L>  /  TBili  10.1<H>  /  DBili  6.6<H>  /  AST  42<H>  /  ALT  12  /  AlkPhos  253<H>  06-17          RADIOLOGY & ADDITIONAL STUDIES:

## 2018-06-17 NOTE — PROGRESS NOTE ADULT - ATTENDING COMMENTS
59 y/o female s/p ERCP c/b perforation of afferent limb. Pt underwent emergent Ex-lap w/ resection of perforated bowel and stapled side to side functional end to end anastomosis. Pt remained intubated and was transferred to SICU off pressors. s/p extubation, now transferred to floor. Patient meets criteria for severe protein calorie malnutrition requiring TPN for nutritional support. CT scan of abd/pelvis revealed fascial dehiscence. Patient placed back on regular diet, but will continue TPN at this time.     K 5.0, will adjust Potassium in TPN    1.  Protein calorie malnutrition being optimized with TPN: CHO [200 ] gm.  AA [78 ] gm. Lipids [32 ] gm.  2.  Hyperglycemia managed with: [0 ] units of regular insulin    3.  Check fluid balance daily.  Strict I/O  [ ] [ ]   4.  Daily BMP, Ionized Calcium, Magnesium and Phosphorous   5.  Triglycerides at initiation of TPN and monthly [ ] [ ]   6.  Protonix for GI prophylaxis  [ ]   I have seen and examined the patient, reviewed the laboratory and radiologic data and agree with practitioner's history, physical assessment, plan.  Reviewed and discussed with other consultants, House staff and PA's.  The note is edited where appropriate.   I spent  45  minutes in total providing diagnoses, assessment and plan.       Time involved in performance of separately billable procedures was not counted toward my critical care time.  There is no overlap.

## 2018-06-18 LAB
ALBUMIN SERPL ELPH-MCNC: 2 G/DL — LOW (ref 3.3–5)
ALP SERPL-CCNC: 280 U/L — HIGH (ref 40–120)
ALT FLD-CCNC: 13 U/L — SIGNIFICANT CHANGE UP (ref 4–33)
APTT BLD: 33.5 SEC — SIGNIFICANT CHANGE UP (ref 27.5–37.4)
APTT BLD: 61 SEC — HIGH (ref 27.5–37.4)
AST SERPL-CCNC: 32 U/L — SIGNIFICANT CHANGE UP (ref 4–32)
BASOPHILS # BLD AUTO: 0.09 K/UL — SIGNIFICANT CHANGE UP (ref 0–0.2)
BASOPHILS NFR BLD AUTO: 0.5 % — SIGNIFICANT CHANGE UP (ref 0–2)
BILIRUB SERPL-MCNC: 9.6 MG/DL — HIGH (ref 0.2–1.2)
BUN SERPL-MCNC: 13 MG/DL — SIGNIFICANT CHANGE UP (ref 7–23)
CA-I BLD-SCNC: 1.05 MMOL/L — SIGNIFICANT CHANGE UP (ref 1.03–1.23)
CALCIUM SERPL-MCNC: 7.7 MG/DL — LOW (ref 8.4–10.5)
CHLORIDE SERPL-SCNC: 100 MMOL/L — SIGNIFICANT CHANGE UP (ref 98–107)
CO2 SERPL-SCNC: 23 MMOL/L — SIGNIFICANT CHANGE UP (ref 22–31)
CREAT SERPL-MCNC: 0.31 MG/DL — LOW (ref 0.5–1.3)
EOSINOPHIL # BLD AUTO: 0.16 K/UL — SIGNIFICANT CHANGE UP (ref 0–0.5)
EOSINOPHIL NFR BLD AUTO: 0.9 % — SIGNIFICANT CHANGE UP (ref 0–6)
GLUCOSE SERPL-MCNC: 108 MG/DL — HIGH (ref 70–99)
HCT VFR BLD CALC: 30.1 % — LOW (ref 34.5–45)
HCT VFR BLD CALC: 30.4 % — LOW (ref 34.5–45)
HGB BLD-MCNC: 10.3 G/DL — LOW (ref 11.5–15.5)
HGB BLD-MCNC: 10.5 G/DL — LOW (ref 11.5–15.5)
IMM GRANULOCYTES # BLD AUTO: 0.16 # — SIGNIFICANT CHANGE UP
IMM GRANULOCYTES NFR BLD AUTO: 0.9 % — SIGNIFICANT CHANGE UP (ref 0–1.5)
INR BLD: 1.13 — SIGNIFICANT CHANGE UP (ref 0.88–1.17)
LYMPHOCYTES # BLD AUTO: 1.14 K/UL — SIGNIFICANT CHANGE UP (ref 1–3.3)
LYMPHOCYTES # BLD AUTO: 6.4 % — LOW (ref 13–44)
MAGNESIUM SERPL-MCNC: 1.8 MG/DL — SIGNIFICANT CHANGE UP (ref 1.6–2.6)
MCHC RBC-ENTMCNC: 29.8 PG — SIGNIFICANT CHANGE UP (ref 27–34)
MCHC RBC-ENTMCNC: 30 PG — SIGNIFICANT CHANGE UP (ref 27–34)
MCHC RBC-ENTMCNC: 33.9 % — SIGNIFICANT CHANGE UP (ref 32–36)
MCHC RBC-ENTMCNC: 34.9 % — SIGNIFICANT CHANGE UP (ref 32–36)
MCV RBC AUTO: 85.5 FL — SIGNIFICANT CHANGE UP (ref 80–100)
MCV RBC AUTO: 88.6 FL — SIGNIFICANT CHANGE UP (ref 80–100)
MONOCYTES # BLD AUTO: 0.97 K/UL — HIGH (ref 0–0.9)
MONOCYTES NFR BLD AUTO: 5.5 % — SIGNIFICANT CHANGE UP (ref 2–14)
NEUTROPHILS # BLD AUTO: 15.19 K/UL — HIGH (ref 1.8–7.4)
NEUTROPHILS NFR BLD AUTO: 85.8 % — HIGH (ref 43–77)
NRBC # FLD: 0 — SIGNIFICANT CHANGE UP
NRBC # FLD: 0 — SIGNIFICANT CHANGE UP
PHOSPHATE SERPL-MCNC: 2.8 MG/DL — SIGNIFICANT CHANGE UP (ref 2.5–4.5)
PLATELET # BLD AUTO: 311 K/UL — SIGNIFICANT CHANGE UP (ref 150–400)
PLATELET # BLD AUTO: 333 K/UL — SIGNIFICANT CHANGE UP (ref 150–400)
PMV BLD: 10.7 FL — SIGNIFICANT CHANGE UP (ref 7–13)
PMV BLD: 11.1 FL — SIGNIFICANT CHANGE UP (ref 7–13)
POTASSIUM SERPL-MCNC: 3.2 MMOL/L — LOW (ref 3.5–5.3)
POTASSIUM SERPL-SCNC: 3.2 MMOL/L — LOW (ref 3.5–5.3)
PREALB SERPL-MCNC: 10 MG/DL — LOW (ref 20–40)
PROT SERPL-MCNC: 4.8 G/DL — LOW (ref 6–8.3)
PROTHROM AB SERPL-ACNC: 13 SEC — SIGNIFICANT CHANGE UP (ref 9.8–13.1)
RBC # BLD: 3.43 M/UL — LOW (ref 3.8–5.2)
RBC # BLD: 3.52 M/UL — LOW (ref 3.8–5.2)
RBC # FLD: 23.8 % — HIGH (ref 10.3–14.5)
RBC # FLD: 24.3 % — HIGH (ref 10.3–14.5)
SODIUM SERPL-SCNC: 134 MMOL/L — LOW (ref 135–145)
WBC # BLD: 16.53 K/UL — HIGH (ref 3.8–10.5)
WBC # BLD: 17.71 K/UL — HIGH (ref 3.8–10.5)
WBC # FLD AUTO: 16.53 K/UL — HIGH (ref 3.8–10.5)
WBC # FLD AUTO: 17.71 K/UL — HIGH (ref 3.8–10.5)

## 2018-06-18 PROCEDURE — 99232 SBSQ HOSP IP/OBS MODERATE 35: CPT | Mod: 24

## 2018-06-18 PROCEDURE — 99233 SBSQ HOSP IP/OBS HIGH 50: CPT

## 2018-06-18 PROCEDURE — 93971 EXTREMITY STUDY: CPT | Mod: 26,59

## 2018-06-18 PROCEDURE — 99232 SBSQ HOSP IP/OBS MODERATE 35: CPT | Mod: GC

## 2018-06-18 PROCEDURE — 93970 EXTREMITY STUDY: CPT | Mod: 26

## 2018-06-18 PROCEDURE — 47536 EXCHANGE BILIARY DRG CATH: CPT

## 2018-06-18 RX ORDER — SUCRALFATE 1 G
1 TABLET ORAL
Qty: 0 | Refills: 0 | Status: DISCONTINUED | OUTPATIENT
Start: 2018-06-18 | End: 2018-06-21

## 2018-06-18 RX ORDER — HEPARIN SODIUM 5000 [USP'U]/ML
INJECTION INTRAVENOUS; SUBCUTANEOUS
Qty: 25000 | Refills: 0 | Status: DISCONTINUED | OUTPATIENT
Start: 2018-06-18 | End: 2018-06-21

## 2018-06-18 RX ORDER — METOCLOPRAMIDE HCL 10 MG
10 TABLET ORAL EVERY 8 HOURS
Qty: 0 | Refills: 0 | Status: DISCONTINUED | OUTPATIENT
Start: 2018-06-18 | End: 2018-06-20

## 2018-06-18 RX ORDER — IBUPROFEN 200 MG
600 TABLET ORAL EVERY 6 HOURS
Qty: 0 | Refills: 0 | Status: DISCONTINUED | OUTPATIENT
Start: 2018-06-18 | End: 2018-06-23

## 2018-06-18 RX ORDER — ELECTROLYTE SOLUTION,INJ
1 VIAL (ML) INTRAVENOUS
Qty: 0 | Refills: 0 | Status: DISCONTINUED | OUTPATIENT
Start: 2018-06-18 | End: 2018-06-18

## 2018-06-18 RX ORDER — I.V. FAT EMULSION 20 G/100ML
13.3 EMULSION INTRAVENOUS
Qty: 32 | Refills: 0 | Status: DISCONTINUED | OUTPATIENT
Start: 2018-06-18 | End: 2018-06-20

## 2018-06-18 RX ADMIN — Medication 1 GRAM(S): at 23:07

## 2018-06-18 RX ADMIN — HYDROMORPHONE HYDROCHLORIDE 1 MILLIGRAM(S): 2 INJECTION INTRAMUSCULAR; INTRAVENOUS; SUBCUTANEOUS at 11:36

## 2018-06-18 RX ADMIN — Medication 10 MILLIGRAM(S): at 23:06

## 2018-06-18 RX ADMIN — HYDROMORPHONE HYDROCHLORIDE 1 MILLIGRAM(S): 2 INJECTION INTRAMUSCULAR; INTRAVENOUS; SUBCUTANEOUS at 03:59

## 2018-06-18 RX ADMIN — Medication 1 DROP(S): at 23:07

## 2018-06-18 RX ADMIN — HYDROMORPHONE HYDROCHLORIDE 1 MILLIGRAM(S): 2 INJECTION INTRAMUSCULAR; INTRAVENOUS; SUBCUTANEOUS at 00:00

## 2018-06-18 RX ADMIN — HYDROMORPHONE HYDROCHLORIDE 1 MILLIGRAM(S): 2 INJECTION INTRAMUSCULAR; INTRAVENOUS; SUBCUTANEOUS at 08:40

## 2018-06-18 RX ADMIN — I.V. FAT EMULSION 13.3 ML/HR: 20 EMULSION INTRAVENOUS at 17:01

## 2018-06-18 RX ADMIN — Medication 1 DROP(S): at 16:30

## 2018-06-18 RX ADMIN — HYDROMORPHONE HYDROCHLORIDE 1 MILLIGRAM(S): 2 INJECTION INTRAMUSCULAR; INTRAVENOUS; SUBCUTANEOUS at 03:34

## 2018-06-18 RX ADMIN — ENOXAPARIN SODIUM 40 MILLIGRAM(S): 100 INJECTION SUBCUTANEOUS at 11:36

## 2018-06-18 RX ADMIN — PIPERACILLIN AND TAZOBACTAM 25 GRAM(S): 4; .5 INJECTION, POWDER, LYOPHILIZED, FOR SOLUTION INTRAVENOUS at 23:07

## 2018-06-18 RX ADMIN — Medication 1 GRAM(S): at 11:36

## 2018-06-18 RX ADMIN — PANTOPRAZOLE SODIUM 40 MILLIGRAM(S): 20 TABLET, DELAYED RELEASE ORAL at 11:36

## 2018-06-18 RX ADMIN — Medication 600 MILLIGRAM(S): at 23:58

## 2018-06-18 RX ADMIN — Medication 10 MILLIGRAM(S): at 11:36

## 2018-06-18 RX ADMIN — HEPARIN SODIUM 1000 UNIT(S)/HR: 5000 INJECTION INTRAVENOUS; SUBCUTANEOUS at 22:57

## 2018-06-18 RX ADMIN — PIPERACILLIN AND TAZOBACTAM 25 GRAM(S): 4; .5 INJECTION, POWDER, LYOPHILIZED, FOR SOLUTION INTRAVENOUS at 15:52

## 2018-06-18 RX ADMIN — HYDROMORPHONE HYDROCHLORIDE 1 MILLIGRAM(S): 2 INJECTION INTRAMUSCULAR; INTRAVENOUS; SUBCUTANEOUS at 19:57

## 2018-06-18 RX ADMIN — PIPERACILLIN AND TAZOBACTAM 25 GRAM(S): 4; .5 INJECTION, POWDER, LYOPHILIZED, FOR SOLUTION INTRAVENOUS at 07:23

## 2018-06-18 RX ADMIN — Medication 600 MILLIGRAM(S): at 12:24

## 2018-06-18 RX ADMIN — Medication 600 MILLIGRAM(S): at 23:06

## 2018-06-18 RX ADMIN — HYDROMORPHONE HYDROCHLORIDE 1 MILLIGRAM(S): 2 INJECTION INTRAMUSCULAR; INTRAVENOUS; SUBCUTANEOUS at 08:11

## 2018-06-18 RX ADMIN — Medication 600 MILLIGRAM(S): at 11:36

## 2018-06-18 RX ADMIN — HYDROMORPHONE HYDROCHLORIDE 1 MILLIGRAM(S): 2 INJECTION INTRAMUSCULAR; INTRAVENOUS; SUBCUTANEOUS at 20:35

## 2018-06-18 RX ADMIN — Medication 1 EACH: at 16:24

## 2018-06-18 RX ADMIN — HEPARIN SODIUM 1000 UNIT(S)/HR: 5000 INJECTION INTRAVENOUS; SUBCUTANEOUS at 16:01

## 2018-06-18 RX ADMIN — Medication 1 GRAM(S): at 16:32

## 2018-06-18 RX ADMIN — HYDROMORPHONE HYDROCHLORIDE 1 MILLIGRAM(S): 2 INJECTION INTRAMUSCULAR; INTRAVENOUS; SUBCUTANEOUS at 12:10

## 2018-06-18 RX ADMIN — Medication 1 DROP(S): at 05:37

## 2018-06-18 NOTE — PROGRESS NOTE ADULT - ASSESSMENT
58F s/p ERCP EUS aborted 2/2 small bowel perforation, s/p ex lap resection of small bowel side to side anastomosis currently hemodynamically stable on the floor.     - NPO/ TPN  - T bili remains elevated, will speak to IR re: poss Tube check today  - Monitor drainage from midline incision - will attempt to place stitch to keep midline incision from draining excessively  - F/U TOV  - Monitor GI function  - DVT ppx

## 2018-06-18 NOTE — CHART NOTE - NSCHARTNOTEFT_GEN_A_CORE
Pre-Interventional Radiology Procedure Note    58y    Female    Procedure: tube check and possible exchange    Diagnosis/Indication: Patient is a 58y old  Female who presents with a chief complaint of Painless jaundice (05 Jun 2018 14:49) s/p PTC. Continued to have elevated t.rk      Interventional Radiology Attending Physician: Neymar    Ordering Attending Physician: Niharika    PAST MEDICAL & SURGICAL HISTORY:  Gastric cancer  Malignant neoplasm of stomach, unspecified location  S/P partial gastrectomy: distal gastrectomy with Billroth II reconstruction, D2 lymphadenectomy, feeding jejunostomy tube placement 6/23/17       CBC Full  -  ( 18 Jun 2018 07:05 )  WBC Count : 17.71 K/uL  Hemoglobin : 10.5 g/dL  Hematocrit : 30.1 %  Platelet Count - Automated : 311 K/uL  Mean Cell Volume : 85.5 fL  Mean Cell Hemoglobin : 29.8 pg  Mean Cell Hemoglobin Concentration : 34.9 %  Auto Neutrophil # : 15.19 K/uL  Auto Lymphocyte # : 1.14 K/uL  Auto Monocyte # : 0.97 K/uL  Auto Eosinophil # : 0.16 K/uL  Auto Basophil # : 0.09 K/uL  Auto Neutrophil % : 85.8 %  Auto Lymphocyte % : 6.4 %  Auto Monocyte % : 5.5 %  Auto Eosinophil % : 0.9 %  Auto Basophil % : 0.5 %    06-18    134<L>  |  100  |  13  ----------------------------<  108<H>  3.2<L>   |  23  |  0.31<L>    Ca    7.7<L>      18 Jun 2018 07:05  Phos  2.8     06-18  Mg     1.8     06-18    TPro  4.8<L>  /  Alb  2.0<L>  /  TBili  9.6<H>  /  DBili  x   /  AST  32  /  ALT  13  /  AlkPhos  280<H>  06-18    PT/INR - ( 18 Jun 2018 07:05 )   PT: 13.0 SEC;   INR: 1.13          PTT - ( 18 Jun 2018 07:05 )  PTT:33.5 SEC

## 2018-06-18 NOTE — PROGRESS NOTE ADULT - SUBJECTIVE AND OBJECTIVE BOX
GENERAL SURGERY DAILY PROGRESS NOTE:       Subjective:  No acute events overnight. This AM pt feels ok overall, endorsing some pain which pt is requesting standing analgesics for. Mathis discontinued at MN, pending TOV this AM.       Objective:    PE:  Gen: NAD  Chest: breathing comfortably on room air  Abd: soft, midline incision pouched with serous fluid in bag    Vital Signs Last 24 Hrs  T(C): 37.2 (2018 07:19), Max: 37.2 (2018 00:00)  T(F): 98.9 (2018 07:19), Max: 98.9 (2018 00:00)  HR: 80 (2018 07:19) (77 - 89)  BP: 134/64 (2018 07:19) (119/58 - 142/73)  BP(mean): --  RR: 19 (2018 07:19) (16 - 19)  SpO2: 98% (2018 07:19) (96% - 100%)    I&O's Detail    2018 07:01  -  2018 07:00  --------------------------------------------------------  IN:    fat emulsion (Fish Oil and Plant Based) 20% Infusion: 26.6 mL    TPN (Total Parenteral Nutrition): 693 mL  Total IN: 719.6 mL    OUT:    Drain: 1345 mL    Drain: 765 mL    Indwelling Catheter - Urethral: 1345 mL  Total OUT: 3455 mL    Total NET: -2735.4 mL          Daily     Daily Weight in k.6 (2018 05:36)    MEDICATIONS  (STANDING):  artificial  tears Solution 1 Drop(s) Both EYES four times a day  enoxaparin Injectable 40 milliGRAM(s) SubCutaneous daily  fat emulsion (Fish Oil and Plant Based) 20% Infusion 13.3 mL/Hr (13.3 mL/Hr) IV Continuous <Continuous>  pantoprazole  Injectable 40 milliGRAM(s) IV Push daily  Parenteral Nutrition - Adult 1 Each (63 mL/Hr) TPN Continuous <Continuous>  piperacillin/tazobactam IVPB. 3.375 Gram(s) IV Intermittent every 8 hours    MEDICATIONS  (PRN):  benzocaine 15 mG/menthol 3.6 mG Lozenge 1 Lozenge Oral every 6 hours PRN Sore Throat  HYDROmorphone  Injectable 0.5 milliGRAM(s) IV Push every 4 hours PRN Moderate Pain (4 - 6)  HYDROmorphone  Injectable 1 milliGRAM(s) IV Push every 4 hours PRN Severe Pain (7 - 10)      LABS:                        10.1   13.35 )-----------( 238      ( 2018 05:25 )             29.5     06-17    134<L>  |  100  |  14  ----------------------------<  83  5.0   |  25  |  0.31<L>    Ca    7.7<L>      2018 05:25  Phos  2.7       Mg     1.7         TPro  4.9<L>  /  Alb  1.9<L>  /  TBili  10.1<H>  /  DBili  6.6<H>  /  AST  42<H>  /  ALT  12  /  AlkPhos  253<H>            RADIOLOGY & ADDITIONAL STUDIES:      CT Abdomen and Pelvis w/ Oral Cont and w/ IV Cont (18 @ 18:09)  Enteritis.   Redemonstration of diffuse thickening of the pancreatic head, concerning   for neoplastic process.     New subcapsular soft tissue lesion measuring up to 2.4 cm. ill-defined   small area of soft tissue measuring 1.8 cm x 1.3 cm right lateral   abdomen. Enhancement of the peritoneum within the cul-de-sac.  Ill-defined heterogeneous density at the right anterior abdominal   wall/anterior right abdomen and ill-defined heterogeneous density at the   left anterior abdominal wall likely postoperative change. Recommend   follow-up.    Ventral abdominalwall hernia containing fat and a small portion of the   outer loop of adjacent large bowel. Associated small fluid within the   incision.    Moderate ascites with no discrete walled off collection.    Interval placement of percutaneous biliary drainswith associated   postsurgical changes including pneumobilia.    Noncalcified right middle lobe nodule measuring up to 0.4cm, short-term   interval imaging can be obtained to evaluate for stability.     Cannot exclude mild edematous wall thickening of the left and   rectosigmoid colon.    Central uterine hypodensity in this 58-year-old female. This was not   appreciated on 6/3/2018.

## 2018-06-18 NOTE — PROGRESS NOTE ADULT - ASSESSMENT
Impression:  1. Unsuccessful ERCP complicated by small bowel perforation 6/6/18 s/p ex-lap with small bowel resection and anastomosis 6/6/18  2. Obstructive jaundice with dilated CBD and 1.4 cm enhancing pancreatic head mass s/p percutaneous drainage   3. 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

## 2018-06-18 NOTE — CHART NOTE - NSCHARTNOTEFT_GEN_A_CORE
After chlorhexidine prep and infiltration of skin with local anesthetic (1% lidocaine with epinephrine), two 3-0 Nylon sutures were placed in the skin at site of drainage from midline abdominal wound with good control of drainage. Patient tolerated procedure well.    QUANG Ring, R3  [id number]

## 2018-06-18 NOTE — PROGRESS NOTE ADULT - ATTENDING COMMENTS
57 y/o female s/p ERCP c/b perforation of afferent limb. Pt underwent emergent Ex-lap w/ resection of perforated bowel and stapled side to side functional end to end anastomosis. Pt remained intubated and was transferred to SICU off pressors. s/p extubation, now transferred to floor. Patient meets criteria for severe protein calorie malnutrition requiring TPN for nutritional support.  Remains on full TPN.    TPN infusion volume increased to 1.5L - TPN is providing 1312 kcal/day.  Monitor fingersticks q 12 hours, obtain daily weights.  Labs reviewed - electrolytes adjusted in TPN   Plan to wean TPN after patient is tolerating PO intake. Will follow up with primary team on plan.     1. Protein calorie malnutrition being optimized with TPN: CHO [200 ] gm.  AA [78 ] gm. SMOF Lipids [ 32] gm. lipids will be administered over 12 hours   2.  Hyperglycemia managed with: [0 ] units of regular insulin    3.  Check fluid balance daily.  Strict I/O  [ ] [ ]   4.  Daily BMP, Ionized Calcium, Magnesium and Phosphorous   5.  Triglycerides at initiation of TPN and monthly [ ] [ ]   6.  Pepcid in TPN for Gi prophylaxis  [ ]   I have seen and examined the patient, reviewed the laboratory and radiologic data and agree with the history, physical assessment and plan.  I reviewed and discussed with all consultants, house staff and PA's.  The note is edited where appropriate.   I spent  45  minutes in total; providing diagnoses, assessment and plan.       Time involved in performance of separately billable procedures was not counted toward my critical care time.  There is no overlap.

## 2018-06-18 NOTE — PROGRESS NOTE ADULT - SUBJECTIVE AND OBJECTIVE BOX
NUTRITION NOTE  [id number] CHOKYI  ===============================    Interval events  Patient was seen and examined at bedside, no acute overnight events.   PICC placed and TPN/lipids initiated on 18, patient is tolerating without any issues.  NPO today, remains on full TPN     Vital Signs Last 24 Hrs  T(C): 37.2 (2018 07:19), Max: 37.2 (2018 00:00)  T(F): 98.9 (2018 07:19), Max: 98.9 (2018 00:00)  HR: 80 (2018 07:19) (77 - 89)  BP: 134/64 (2018 07:19) (119/58 - 142/73)  RR: 19 (2018 07:19) (17 - 19)  SpO2: 98% (2018 07:19) (96% - 99%)    MEDICATIONS  (STANDING):  artificial  tears Solution 1 Drop(s) Both EYES four times a day  fat emulsion (Fish Oil and Plant Based) 20% Infusion 13.3 mL/Hr (13.3 mL/Hr) IV Continuous <Continuous>  fat emulsion (Fish Oil and Plant Based) 20% Infusion 13.3 mL/Hr (13.3 mL/Hr) IV Continuous <Continuous>  heparin  Infusion.  Unit(s)/Hr (10 mL/Hr) IV Continuous <Continuous>  ibuprofen  Tablet 600 milliGRAM(s) Oral every 6 hours  lidocaine 1%/EPINEPHrine 1:100,000 Inj 10 milliLiter(s) Local Injection once  metoclopramide Injectable 10 milliGRAM(s) IV Push every 8 hours  pantoprazole  Injectable 40 milliGRAM(s) IV Push daily  Parenteral Nutrition - Adult 1 Each (63 mL/Hr) TPN Continuous <Continuous>  Parenteral Nutrition - Adult 1 Each (63 mL/Hr) TPN Continuous <Continuous>  piperacillin/tazobactam IVPB. 3.375 Gram(s) IV Intermittent every 8 hours  sucralfate 1 Gram(s) Oral four times a day    MEDICATIONS  (PRN):  benzocaine 15 mG/menthol 3.6 mG Lozenge 1 Lozenge Oral every 6 hours PRN Sore Throat  HYDROmorphone  Injectable 0.5 milliGRAM(s) IV Push every 4 hours PRN Moderate Pain (4 - 6)  HYDROmorphone  Injectable 1 milliGRAM(s) IV Push every 4 hours PRN Severe Pain (7 - 10)    I&O's Detail    2018 07:01  -  2018 07:00  --------------------------------------------------------  IN:    fat emulsion (Fish Oil and Plant Based) 20% Infusion: 26.6 mL    TPN (Total Parenteral Nutrition): 693 mL  Total IN: 719.6 mL    OUT:    Drain: 1345 mL    Drain: 765 mL    Indwelling Catheter - Urethral: 1345 mL  Total OUT: 3455 mL    Total NET: -2735.4 mL      2018 07:01  -  2018 12:32  --------------------------------------------------------  IN:  Total IN: 0 mL    OUT:    Drain: 115 mL    Drain: 350 mL    Indwelling Catheter - Urethral: 875 mL  Total OUT: 1340 mL    Total NET: -1340 mL    POCT Blood Glucose.: 107 mg/dL (2018 05:30)  POCT Blood Glucose.: 124 mg/dL (2018 23:23)  POCT Blood Glucose.: 119 mg/dL (2018 17:51)    Daily Weight in k.6 (2018 05:36)    Drug Dosing Weight  Height (cm): 160.02 (2018 15:53)  Weight (kg): 54 (2018 15:53)  BMI (kg/m2): 21.1 (2018 15:53)  BSA (m2): 1.55 (2018 15:53)    PHYSICAL EXAM   Constitutional: no acute distress, sitting comfortably in chair   Respiratory: nonlabored respirations   Gastrointestinal: soft, mildly tender  Extremities: warm, generalized edema    PICC Site: C/D/I    Diet: regular diet and TPN     LABORATORY                        10.5   17.71 )-----------( 311      ( 2018 07:05 )             30.1       134<L>  |  100  |  13  ----------------------------<  108<H>  3.2<L>   |  23  |  0.31<L>    Ca    7.7<L>      2018 07:05  Phos  2.8       Mg     1.8         TPro  4.8<L>  /  Alb  2.0<L>  /  TBili  9.6<H>  /  DBili  x   /  AST  32  /  ALT  13  /  AlkPhos  280<H>      LIVER FUNCTIONS - ( 2018 07:05 )  Alb: 2.0 g/dL / Pro: 4.8 g/dL / ALK PHOS: 280 u/L / ALT: 13 u/L / AST: 32 u/L / GGT: x            Chol -- LDL -- HDL -- Trig 112,  Chol 156  HDL 8<L> Trig 85,  Chol 122 LDL 73 HDL 33<L> Trig 78    Prealbumin 18: 10    ASSESSMENT/PLAN:  59 y/o female s/p ERCP c/b perforation of afferent limb. Pt underwent emergent Ex-lap w/ resection of perforated bowel and stapled side to side functional end to end anastomosis. Pt remained intubated and was transferred to SICU off pressors. s/p extubation, now transferred to floor. Patient meets criteria for severe protein calorie malnutrition requiring TPN for nutritional support.  Remains on full TPN.    TPN infusion volume increased to 1.5L - TPN is providing 1312 kcal/day.  Monitor fingersticks q 12 hours, obtain daily weights.  Labs reviewed - electrolytes adjusted in TPN   Plan to wean TPN after patient is tolerating PO intake. Will follow up with primary team on plan.     1. Protein calorie malnutrition being optimized with TPN: CHO [200 ] gm.  AA [78 ] gm. SMOF Lipids [ 32] gm. lipids will be administered over 12 hours   2.  Hyperglycemia managed with: [0 ] units of regular insulin    3.  Check fluid balance daily.  Strict I/O  [ ] [ ]   4.  Daily BMP, Ionized Calcium, Magnesium and Phosphorous   5.  Triglycerides at initiation of TPN and monthly [ ] [ ]   6.  Pepcid in TPN for Gi prophylaxis  [ ]     Nutrition support pager [phone]

## 2018-06-18 NOTE — PROGRESS NOTE ADULT - SUBJECTIVE AND OBJECTIVE BOX
Chief Complaint:  Patient is a 58y old  Female who presents with a chief complaint of Painless jaundice (2018 14:49)      Interval Events: Patient still reports abdominal pain (generalized - primarily at surgical site), somewhat improved with pain medications. She has been tolerating PO and has had nausea. She had a small bowel movement.     Allergies:  No Known Allergies      Hospital Medications:  artificial  tears Solution 1 Drop(s) Both EYES four times a day  benzocaine 15 mG/menthol 3.6 mG Lozenge 1 Lozenge Oral every 6 hours PRN  enoxaparin Injectable 40 milliGRAM(s) SubCutaneous daily  fat emulsion (Fish Oil and Plant Based) 20% Infusion 13.3 mL/Hr IV Continuous <Continuous>  HYDROmorphone  Injectable 0.5 milliGRAM(s) IV Push every 4 hours PRN  HYDROmorphone  Injectable 1 milliGRAM(s) IV Push every 4 hours PRN  ibuprofen  Tablet 600 milliGRAM(s) Oral every 6 hours  lidocaine 1%/EPINEPHrine 1:100,000 Inj 10 milliLiter(s) Local Injection once  metoclopramide Injectable 10 milliGRAM(s) IV Push every 8 hours  pantoprazole  Injectable 40 milliGRAM(s) IV Push daily  Parenteral Nutrition - Adult 1 Each TPN Continuous <Continuous>  piperacillin/tazobactam IVPB. 3.375 Gram(s) IV Intermittent every 8 hours  sucralfate 1 Gram(s) Oral four times a day      PMHX/PSHX:  Gastric cancer  Malignant neoplasm of stomach, unspecified location  No pertinent past medical history  S/P partial gastrectomy  No significant past surgical history      Family history:  No pertinent family history in first degree relatives      ROS: Negative, except as otherwise noted above    PHYSICAL EXAM:   Vital Signs:  Vital Signs Last 24 Hrs  T(C): 37.2 (2018 07:19), Max: 37.2 (2018 00:00)  T(F): 98.9 (2018 07:19), Max: 98.9 (2018 00:00)  HR: 80 (2018 07:19) (77 - 89)  BP: 134/64 (2018 07:19) (119/58 - 142/73)  BP(mean): --  RR: 19 (2018 07:19) (16 - 19)  SpO2: 98% (2018 07:19) (96% - 100%)  Daily     Daily Weight in k.6 (2018 05:36)    GENERAL: No acute distress    HEENT:  Icteric, no thrush  CHEST:  Non-labored breathing, lungs clear b/l  HEART:  +s1, s2 heart sounds, no murmurs  ABDOMEN:  +Midline surgical incision with drainage, +biliary drainage catheter, mildly tender diffusely  EXTREMITIES:  Warm and well perfused, +edema  SKIN:  No rash  NEURO:   Awake, interactive, following commands    LABS:  CBC Full  -  ( 2018 07:05 )  WBC Count : 17.71 K/uL  Hemoglobin : 10.5 g/dL  Hematocrit : 30.1 %  Platelet Count - Automated : 311 K/uL  Mean Cell Volume : 85.5 fL  Auto Neutrophil # : 15.19 K/uL  Auto Neutrophil % : 85.8 %      PT/INR - ( 2018 07:05 )   PT: 13.0 SEC;   INR: 1.13          PTT - ( 2018 07:05 )  PTT:33.5 SEC

## 2018-06-18 NOTE — PROGRESS NOTE ADULT - ATTENDING COMMENTS
P t seen and examined   Abd- soft, nT ND  midline wound partially open with serous fluid drainage- fascial dehiscence, no evisceration    Plan:  Abdominal binder for comfort  IR for PTC check

## 2018-06-19 LAB
ALBUMIN SERPL ELPH-MCNC: 2 G/DL — LOW (ref 3.3–5)
ALP SERPL-CCNC: 271 U/L — HIGH (ref 40–120)
ALT FLD-CCNC: 14 U/L — SIGNIFICANT CHANGE UP (ref 4–33)
APTT BLD: 82.2 SEC — HIGH (ref 27.5–37.4)
AST SERPL-CCNC: 36 U/L — HIGH (ref 4–32)
BASOPHILS # BLD AUTO: 0.09 K/UL — SIGNIFICANT CHANGE UP (ref 0–0.2)
BASOPHILS NFR BLD AUTO: 0.6 % — SIGNIFICANT CHANGE UP (ref 0–2)
BILIRUB SERPL-MCNC: 9.7 MG/DL — HIGH (ref 0.2–1.2)
BUN SERPL-MCNC: 13 MG/DL — SIGNIFICANT CHANGE UP (ref 7–23)
CA-I BLD-SCNC: 1.08 MMOL/L — SIGNIFICANT CHANGE UP (ref 1.03–1.23)
CALCIUM SERPL-MCNC: 7.6 MG/DL — LOW (ref 8.4–10.5)
CHLORIDE SERPL-SCNC: 102 MMOL/L — SIGNIFICANT CHANGE UP (ref 98–107)
CO2 SERPL-SCNC: 24 MMOL/L — SIGNIFICANT CHANGE UP (ref 22–31)
CREAT SERPL-MCNC: 0.35 MG/DL — LOW (ref 0.5–1.3)
EOSINOPHIL # BLD AUTO: 0.2 K/UL — SIGNIFICANT CHANGE UP (ref 0–0.5)
EOSINOPHIL NFR BLD AUTO: 1.3 % — SIGNIFICANT CHANGE UP (ref 0–6)
GLUCOSE SERPL-MCNC: 119 MG/DL — HIGH (ref 70–99)
HCT VFR BLD CALC: 28.4 % — LOW (ref 34.5–45)
HCT VFR BLD CALC: 28.4 % — LOW (ref 34.5–45)
HGB BLD-MCNC: 9.8 G/DL — LOW (ref 11.5–15.5)
HGB BLD-MCNC: 9.8 G/DL — LOW (ref 11.5–15.5)
IMM GRANULOCYTES # BLD AUTO: 0.14 # — SIGNIFICANT CHANGE UP
IMM GRANULOCYTES NFR BLD AUTO: 0.9 % — SIGNIFICANT CHANGE UP (ref 0–1.5)
INR BLD: 1.17 — SIGNIFICANT CHANGE UP (ref 0.88–1.17)
LYMPHOCYTES # BLD AUTO: 0.94 K/UL — LOW (ref 1–3.3)
LYMPHOCYTES # BLD AUTO: 6 % — LOW (ref 13–44)
MAGNESIUM SERPL-MCNC: 2.1 MG/DL — SIGNIFICANT CHANGE UP (ref 1.6–2.6)
MCHC RBC-ENTMCNC: 30.2 PG — SIGNIFICANT CHANGE UP (ref 27–34)
MCHC RBC-ENTMCNC: 30.2 PG — SIGNIFICANT CHANGE UP (ref 27–34)
MCHC RBC-ENTMCNC: 34.5 % — SIGNIFICANT CHANGE UP (ref 32–36)
MCHC RBC-ENTMCNC: 34.5 % — SIGNIFICANT CHANGE UP (ref 32–36)
MCV RBC AUTO: 87.4 FL — SIGNIFICANT CHANGE UP (ref 80–100)
MCV RBC AUTO: 87.4 FL — SIGNIFICANT CHANGE UP (ref 80–100)
MONOCYTES # BLD AUTO: 0.74 K/UL — SIGNIFICANT CHANGE UP (ref 0–0.9)
MONOCYTES NFR BLD AUTO: 4.7 % — SIGNIFICANT CHANGE UP (ref 2–14)
NEUTROPHILS # BLD AUTO: 13.59 K/UL — HIGH (ref 1.8–7.4)
NEUTROPHILS NFR BLD AUTO: 86.5 % — HIGH (ref 43–77)
NRBC # FLD: 0 — SIGNIFICANT CHANGE UP
NRBC # FLD: 0 — SIGNIFICANT CHANGE UP
PHOSPHATE SERPL-MCNC: 3 MG/DL — SIGNIFICANT CHANGE UP (ref 2.5–4.5)
PLATELET # BLD AUTO: 303 K/UL — SIGNIFICANT CHANGE UP (ref 150–400)
PLATELET # BLD AUTO: 303 K/UL — SIGNIFICANT CHANGE UP (ref 150–400)
PMV BLD: 10.6 FL — SIGNIFICANT CHANGE UP (ref 7–13)
PMV BLD: 10.6 FL — SIGNIFICANT CHANGE UP (ref 7–13)
POTASSIUM SERPL-MCNC: 3 MMOL/L — LOW (ref 3.5–5.3)
POTASSIUM SERPL-SCNC: 3 MMOL/L — LOW (ref 3.5–5.3)
PROT SERPL-MCNC: 4.7 G/DL — LOW (ref 6–8.3)
PROTHROM AB SERPL-ACNC: 13 SEC — SIGNIFICANT CHANGE UP (ref 9.8–13.1)
RBC # BLD: 3.25 M/UL — LOW (ref 3.8–5.2)
RBC # BLD: 3.25 M/UL — LOW (ref 3.8–5.2)
RBC # FLD: 23.9 % — HIGH (ref 10.3–14.5)
RBC # FLD: 23.9 % — HIGH (ref 10.3–14.5)
SODIUM SERPL-SCNC: 135 MMOL/L — SIGNIFICANT CHANGE UP (ref 135–145)
WBC # BLD: 15.7 K/UL — HIGH (ref 3.8–10.5)
WBC # BLD: 15.7 K/UL — HIGH (ref 3.8–10.5)
WBC # FLD AUTO: 15.7 K/UL — HIGH (ref 3.8–10.5)
WBC # FLD AUTO: 15.7 K/UL — HIGH (ref 3.8–10.5)

## 2018-06-19 PROCEDURE — 71045 X-RAY EXAM CHEST 1 VIEW: CPT | Mod: 26

## 2018-06-19 PROCEDURE — 99233 SBSQ HOSP IP/OBS HIGH 50: CPT

## 2018-06-19 RX ORDER — FUROSEMIDE 40 MG
10 TABLET ORAL ONCE
Qty: 0 | Refills: 0 | Status: COMPLETED | OUTPATIENT
Start: 2018-06-19 | End: 2018-06-19

## 2018-06-19 RX ORDER — I.V. FAT EMULSION 20 G/100ML
14.5 EMULSION INTRAVENOUS
Qty: 35 | Refills: 0 | Status: DISCONTINUED | OUTPATIENT
Start: 2018-06-19 | End: 2018-06-21

## 2018-06-19 RX ORDER — ELECTROLYTE SOLUTION,INJ
1 VIAL (ML) INTRAVENOUS
Qty: 0 | Refills: 0 | Status: DISCONTINUED | OUTPATIENT
Start: 2018-06-19 | End: 2018-06-19

## 2018-06-19 RX ADMIN — Medication 10 MILLIGRAM(S): at 05:04

## 2018-06-19 RX ADMIN — Medication 1 DROP(S): at 05:04

## 2018-06-19 RX ADMIN — HEPARIN SODIUM 1000 UNIT(S)/HR: 5000 INJECTION INTRAVENOUS; SUBCUTANEOUS at 06:34

## 2018-06-19 RX ADMIN — I.V. FAT EMULSION 14.5 ML/HR: 20 EMULSION INTRAVENOUS at 16:30

## 2018-06-19 RX ADMIN — Medication 600 MILLIGRAM(S): at 15:35

## 2018-06-19 RX ADMIN — HYDROMORPHONE HYDROCHLORIDE 0.5 MILLIGRAM(S): 2 INJECTION INTRAMUSCULAR; INTRAVENOUS; SUBCUTANEOUS at 08:41

## 2018-06-19 RX ADMIN — Medication 10 MILLIGRAM(S): at 23:13

## 2018-06-19 RX ADMIN — HYDROMORPHONE HYDROCHLORIDE 0.5 MILLIGRAM(S): 2 INJECTION INTRAMUSCULAR; INTRAVENOUS; SUBCUTANEOUS at 03:15

## 2018-06-19 RX ADMIN — Medication 10 MILLIGRAM(S): at 16:41

## 2018-06-19 RX ADMIN — PIPERACILLIN AND TAZOBACTAM 25 GRAM(S): 4; .5 INJECTION, POWDER, LYOPHILIZED, FOR SOLUTION INTRAVENOUS at 23:12

## 2018-06-19 RX ADMIN — HYDROMORPHONE HYDROCHLORIDE 0.5 MILLIGRAM(S): 2 INJECTION INTRAMUSCULAR; INTRAVENOUS; SUBCUTANEOUS at 03:31

## 2018-06-19 RX ADMIN — Medication 1 GRAM(S): at 16:36

## 2018-06-19 RX ADMIN — Medication 10 MILLIGRAM(S): at 12:51

## 2018-06-19 RX ADMIN — PIPERACILLIN AND TAZOBACTAM 25 GRAM(S): 4; .5 INJECTION, POWDER, LYOPHILIZED, FOR SOLUTION INTRAVENOUS at 08:20

## 2018-06-19 RX ADMIN — Medication 1 EACH: at 16:31

## 2018-06-19 RX ADMIN — Medication 600 MILLIGRAM(S): at 20:17

## 2018-06-19 RX ADMIN — HYDROMORPHONE HYDROCHLORIDE 1 MILLIGRAM(S): 2 INJECTION INTRAMUSCULAR; INTRAVENOUS; SUBCUTANEOUS at 23:50

## 2018-06-19 RX ADMIN — Medication 600 MILLIGRAM(S): at 15:01

## 2018-06-19 RX ADMIN — PANTOPRAZOLE SODIUM 40 MILLIGRAM(S): 20 TABLET, DELAYED RELEASE ORAL at 12:51

## 2018-06-19 RX ADMIN — Medication 600 MILLIGRAM(S): at 21:17

## 2018-06-19 RX ADMIN — Medication 1 GRAM(S): at 23:12

## 2018-06-19 RX ADMIN — Medication 1 DROP(S): at 23:13

## 2018-06-19 RX ADMIN — HYDROMORPHONE HYDROCHLORIDE 1 MILLIGRAM(S): 2 INJECTION INTRAMUSCULAR; INTRAVENOUS; SUBCUTANEOUS at 18:27

## 2018-06-19 RX ADMIN — Medication 1 GRAM(S): at 05:04

## 2018-06-19 RX ADMIN — Medication 600 MILLIGRAM(S): at 05:04

## 2018-06-19 RX ADMIN — PIPERACILLIN AND TAZOBACTAM 25 GRAM(S): 4; .5 INJECTION, POWDER, LYOPHILIZED, FOR SOLUTION INTRAVENOUS at 16:35

## 2018-06-19 RX ADMIN — HYDROMORPHONE HYDROCHLORIDE 1 MILLIGRAM(S): 2 INJECTION INTRAMUSCULAR; INTRAVENOUS; SUBCUTANEOUS at 13:06

## 2018-06-19 RX ADMIN — HYDROMORPHONE HYDROCHLORIDE 1 MILLIGRAM(S): 2 INJECTION INTRAMUSCULAR; INTRAVENOUS; SUBCUTANEOUS at 12:43

## 2018-06-19 RX ADMIN — HYDROMORPHONE HYDROCHLORIDE 1 MILLIGRAM(S): 2 INJECTION INTRAMUSCULAR; INTRAVENOUS; SUBCUTANEOUS at 19:20

## 2018-06-19 RX ADMIN — Medication 1 DROP(S): at 16:36

## 2018-06-19 RX ADMIN — HYDROMORPHONE HYDROCHLORIDE 0.5 MILLIGRAM(S): 2 INJECTION INTRAMUSCULAR; INTRAVENOUS; SUBCUTANEOUS at 08:20

## 2018-06-19 RX ADMIN — Medication 600 MILLIGRAM(S): at 05:45

## 2018-06-19 RX ADMIN — Medication 1 GRAM(S): at 12:51

## 2018-06-19 NOTE — PROGRESS NOTE ADULT - SUBJECTIVE AND OBJECTIVE BOX
NUTRITION NOTE  [id number] CHOKYI  ===============================    Interval events  Patient was seen and examined at bedside, no acute overnight events.   PICC placed and TPN/lipids initiated on 18, patient is tolerating without any issues.  NPO today, remains on full TPN     Vital Signs Last 24 Hrs  T(C): 36.8 (2018 08:16), Max: 36.9 (2018 12:34)  T(F): 98.2 (2018 08:16), Max: 98.4 (2018 12:34)  HR: 77 (2018 08:16) (67 - 79)  BP: 111/73 (2018 08:16) (111/70 - 128/65)  RR: 19 (2018 08:16) (16 - 19)  SpO2: 98% (2018 08:16) (97% - 99%)    MEDICATIONS  (STANDING):  artificial  tears Solution 1 Drop(s) Both EYES four times a day  fat emulsion (Fish Oil and Plant Based) 20% Infusion 13.3 mL/Hr (13.3 mL/Hr) IV Continuous <Continuous>  fat emulsion (Fish Oil and Plant Based) 20% Infusion 14.5 mL/Hr (14.5 mL/Hr) IV Continuous <Continuous>  heparin  Infusion.  Unit(s)/Hr (10 mL/Hr) IV Continuous <Continuous>  ibuprofen  Tablet 600 milliGRAM(s) Oral every 6 hours  metoclopramide Injectable 10 milliGRAM(s) IV Push every 8 hours  pantoprazole  Injectable 40 milliGRAM(s) IV Push daily  Parenteral Nutrition - Adult 1 Each (63 mL/Hr) TPN Continuous <Continuous>  Parenteral Nutrition - Adult 1 Each (63 mL/Hr) TPN Continuous <Continuous>  piperacillin/tazobactam IVPB. 3.375 Gram(s) IV Intermittent every 8 hours  sucralfate 1 Gram(s) Oral four times a day    MEDICATIONS  (PRN):  benzocaine 15 mG/menthol 3.6 mG Lozenge 1 Lozenge Oral every 6 hours PRN Sore Throat  HYDROmorphone  Injectable 0.5 milliGRAM(s) IV Push every 4 hours PRN Moderate Pain (4 - 6)  HYDROmorphone  Injectable 1 milliGRAM(s) IV Push every 4 hours PRN Severe Pain (7 - 10)    I&O's Detail    2018 07:01  -  2018 07:00  --------------------------------------------------------  IN:    fat emulsion (Fish Oil and Plant Based) 20% Infusion: 119.7 mL    heparin  Infusion.: 90 mL    TPN (Total Parenteral Nutrition): 567 mL  Total IN: 776.7 mL    OUT:    Drain: 350 mL    Drain: 385 mL    Indwelling Catheter - Urethral: 1855 mL  Total OUT: 2590 mL    Total NET: -1813.3 mL      2018 07:01  -  2018 11:31  --------------------------------------------------------  IN:  Total IN: 0 mL    OUT:    Drain: 30 mL    Indwelling Catheter - Urethral: 150 mL  Total OUT: 180 mL    Total NET: -180 mL    POCT Blood Glucose.: 131 mg/dL (2018 05:12)  POCT Blood Glucose.: 125 mg/dL (2018 00:37)  POCT Blood Glucose.: 97 mg/dL (2018 18:05)  POCT Blood Glucose.: 114 mg/dL (2018 12:32)    Daily Weight in k.7 (2018 05:00)    Drug Dosing Weight  Height (cm): 160.02 (2018 15:53)  Weight (kg): 54 (2018 15:53)  BMI (kg/m2): 21.1 (2018 15:53)  BSA (m2): 1.55 (2018 15:53)    PHYSICAL EXAM   Constitutional: no acute distress, sitting comfortably in chair   Respiratory: nonlabored respirations   Gastrointestinal: soft, mildly tender  Extremities: warm, generalized edema    PICC Site: C/D/I    Diet: NPO and TPN     LABORATORY                        9.8    15.70 )-----------( 303      ( 2018 04:55 )             28.4       135  |  102  |  13  ----------------------------<  119<H>  3.0<L>   |  24  |  0.35<L>    Ca    7.6<L>      2018 04:55  Phos  3.0       Mg     2.1         TPro  4.7<L>  /  Alb  2.0<L>  /  TBili  9.7<H>  /  DBili  x   /  AST  36<H>  /  ALT  14  /  AlkPhos  271<H>      LIVER FUNCTIONS - ( 2018 07:05 )  Alb: 2.0 g/dL / Pro: 4.8 g/dL / ALK PHOS: 280 u/L / ALT: 13 u/L / AST: 32 u/L / GGT: x            Chol -- LDL -- HDL -- Trig 112, - Chol 156  HDL 8<L> Trig 85,  Chol 122 LDL 73 HDL 33<L> Trig 78    Prealbumin /18: 10    ASSESSMENT/PLAN:  57 y/o female s/p ERCP c/b perforation of afferent limb. Pt underwent emergent Ex-lap w/ resection of perforated bowel and stapled side to side functional end to end anastomosis. Pt remained intubated and was transferred to SICU off pressors. s/p extubation, now transferred to floor. Patient meets criteria for severe protein calorie malnutrition requiring TPN for nutritional support.  Remains on full TPN.    TPN infusion volume increased to 1.5L - TPN is providing 1384 kcal/day - increased calories to meet nutritional needs   Monitor fingersticks q 12 hours, obtain daily weights.  Labs reviewed - increased NaCl and KCl in TPN   Continue TPN and lipids. Will follow up with primary team on plan.     1. Protein calorie malnutrition being optimized with TPN: CHO [210 ] gm.  AA [80 ] gm. SMOF Lipids [ 35] gm. lipids will be administered over 12 hours   2.  Hyperglycemia managed with: [0 ] units of regular insulin    3.  Check fluid balance daily.  Strict I/O  [ ] [ ]   4.  Daily BMP, Ionized Calcium, Magnesium and Phosphorous   5.  Triglycerides at initiation of TPN and monthly [ ] [ ]   6.  Pepcid in TPN for Gi prophylaxis  [ ]     Nutrition support pager [phone]    Nutrition support pager [phone]

## 2018-06-19 NOTE — PROGRESS NOTE ADULT - ATTENDING COMMENTS
59 y/o female s/p ERCP c/b perforation of afferent limb. Pt underwent emergent Ex-lap w/ resection of perforated bowel and stapled side to side functional end to end anastomosis. Pt remained intubated and was transferred to SICU off pressors. s/p extubation, now transferred to floor. Patient meets criteria for severe protein calorie malnutrition requiring TPN for nutritional support.  Remains on full TPN.    TPN infusion volume increased to 1.5L - TPN is providing 1384 kcal/day - increased calories to meet nutritional needs   Monitor fingersticks q 12 hours, obtain daily weights.  Labs reviewed - increased NaCl and KCl in TPN   Continue TPN and lipids. Will follow up with primary team on plan.     1. Protein calorie malnutrition being optimized with TPN: CHO [210 ] gm.  AA [80 ] gm. SMOF Lipids [ 35] gm. lipids will be administered over 12 hours   2.  Hyperglycemia managed with: [0 ] units of regular insulin    3.  Check fluid balance daily.  Strict I/O  [ ] [ ]   4.  Daily BMP, Ionized Calcium, Magnesium and Phosphorous   5.  Triglycerides at initiation of TPN and monthly [ ] [ ]   6.  Pepcid in TPN for Gi prophylaxis  [ ]     I have seen and examined the patient, reviewed the laboratory and radiologic data and agree with the history, physical assessment and plan.  I reviewed and discussed with all consultants, house staff and PA's.  The note is edited where appropriate. The Nutrition Support Team (NST) discusses on an ongoing basis with the primary team and all consultants, House staff and PA's to have a permanent risk benefit analyses on all decisions.  I spent  45  minutes in total; providing diagnoses, assessment and plan.

## 2018-06-19 NOTE — PROGRESS NOTE ADULT - ASSESSMENT
58F s/p ERCP EUS aborted 2/2 small bowel perforation, s/p ex lap resection of small bowel side to side anastomosis currently hemodynamically stable on the floor.     - NPO/TPN  - PICC to be exchanged today  - Monitor midline incision for further drainage  - Continue martinez - pt is retaining and did not pass TOV yesterday AM  - Monitor GI function  - DVT ppx

## 2018-06-19 NOTE — PROVIDER CONTACT NOTE (OTHER) - ASSESSMENT
A&O x4. Pt complaining of "not being able to take big breathes" . Pt also complaining of abd pain. Pt O2 sat 98 on room air

## 2018-06-19 NOTE — PROGRESS NOTE ADULT - SUBJECTIVE AND OBJECTIVE BOX
Chief Complaint:  Patient is a 58y old  Female who presents with a chief complaint of Painless jaundice (2018 14:49)      Interval Events: Patient feels about the same this morning. She mostly complains of distension/swelling at the surgical site, which was sutured yesterday. She had no drainage from the site overnight.     Allergies:  No Known Allergies      Hospital Medications:  artificial  tears Solution 1 Drop(s) Both EYES four times a day  benzocaine 15 mG/menthol 3.6 mG Lozenge 1 Lozenge Oral every 6 hours PRN  fat emulsion (Fish Oil and Plant Based) 20% Infusion 13.3 mL/Hr IV Continuous <Continuous>  fat emulsion (Fish Oil and Plant Based) 20% Infusion 14.5 mL/Hr IV Continuous <Continuous>  heparin  Infusion.  Unit(s)/Hr IV Continuous <Continuous>  HYDROmorphone  Injectable 0.5 milliGRAM(s) IV Push every 4 hours PRN  HYDROmorphone  Injectable 1 milliGRAM(s) IV Push every 4 hours PRN  ibuprofen  Tablet 600 milliGRAM(s) Oral every 6 hours  metoclopramide Injectable 10 milliGRAM(s) IV Push every 8 hours  pantoprazole  Injectable 40 milliGRAM(s) IV Push daily  Parenteral Nutrition - Adult 1 Each TPN Continuous <Continuous>  Parenteral Nutrition - Adult 1 Each TPN Continuous <Continuous>  piperacillin/tazobactam IVPB. 3.375 Gram(s) IV Intermittent every 8 hours  sucralfate 1 Gram(s) Oral four times a day      PMHX/PSHX:  Gastric cancer  Malignant neoplasm of stomach, unspecified location  No pertinent past medical history  S/P partial gastrectomy  No significant past surgical history      Family history:  No pertinent family history in first degree relatives      ROS: Negative, except as otherwise noted above    PHYSICAL EXAM:   Vital Signs:  Vital Signs Last 24 Hrs  T(C): 36.9 (2018 11:54), Max: 36.9 (2018 05:00)  T(F): 98.5 (2018 11:54), Max: 98.5 (2018 11:54)  HR: 84 (2018 11:54) (67 - 84)  BP: 130/80 (2018 11:54) (111/73 - 130/80)  BP(mean): --  RR: 20 (2018 11:54) (16 - 20)  SpO2: 99% (2018 11:54) (97% - 99%)  Daily     Daily Weight in k.7 (2018 05:00)    GENERAL: No acute distress    HEENT:  Icteric, no thrush  CHEST:  Non-labored breathing, lungs clear b/l  HEART:  +s1, s2 heart sounds, no murmurs  ABDOMEN:  +Midline surgical incision with drainage, +biliary drainage catheter, mildly tender diffusely  EXTREMITIES:  Warm and well perfused, +edema  SKIN:  No rash  NEURO:   Awake, interactive, following commands    LABS:  CBC Full  -  ( 2018 04:55 )  WBC Count : 15.70 K/uL  Hemoglobin : 9.8 g/dL  Hematocrit : 28.4 %  Platelet Count - Automated : 303 K/uL  Mean Cell Volume : 87.4 fL  Auto Neutrophil # : 13.59 K/uL  Auto Neutrophil % : 86.5 %    -19 @ 04:55  Na 135 mmol/L  K 3.0 mmol/L  Cl 102 mmol/L  CO2 24 mmol/L  BUN 13 mg/dL  Creat 0.35 mg/dL  Glucose 119 mg/dL  Ca 7.6 mg/dL    Total protein 4.7 g/dL  Albumin 2.0 g/dL  T bili 9.7 mg/dL  Alk phos 271 u/L  AST 36 u/L  ALT 14 u/L    PT/INR - ( 2018 04:55 )   PT: 13.0 SEC;   INR: 1.17          PTT - ( 2018 04:55 )  PTT:82.2 SEC

## 2018-06-19 NOTE — PROVIDER CONTACT NOTE (OTHER) - ASSESSMENT
A&O x4. Pt stating pain where the bulge is, stating she is short of breath . O2 sat on room air is 98%

## 2018-06-19 NOTE — PROGRESS NOTE ADULT - SUBJECTIVE AND OBJECTIVE BOX
GENERAL SURGERY DAILY PROGRESS NOTE:       Subjective:  Pt underwent tube check abd drain upsized yesterday, tolerated procedure well. Had midline incision sutured - adequately stopped drainage from incision. No acute events overnight. This AM pt feels ok overall.        Objective:    PE:  Gen: NAD  Chest: breathing comfortably on room air  Abd: soft, midline incision pouched with serous fluid in bag    Vital Signs Last 24 Hrs  T(C): 36.9 (2018 05:00), Max: 37.2 (2018 07:19)  T(F): 98.4 (2018 05:00), Max: 98.9 (2018 07:19)  HR: 78 (2018 05:00) (67 - 80)  BP: 124/65 (2018 05:00) (111/70 - 134/64)  BP(mean): --  RR: 18 (2018 05:00) (16 - 19)  SpO2: 97% (2018 05:00) (97% - 99%)    I&O's Detail    2018 07:01  -  2018 07:00  --------------------------------------------------------  IN:    fat emulsion (Fish Oil and Plant Based) 20% Infusion: 26.6 mL    TPN (Total Parenteral Nutrition): 693 mL  Total IN: 719.6 mL    OUT:    Drain: 1345 mL    Drain: 765 mL    Indwelling Catheter - Urethral: 1345 mL  Total OUT: 3455 mL    Total NET: -2735.4 mL      2018 07:01  -  2018 06:58  --------------------------------------------------------  IN:    fat emulsion (Fish Oil and Plant Based) 20% Infusion: 119.7 mL    heparin  Infusion.: 90 mL    TPN (Total Parenteral Nutrition): 567 mL  Total IN: 776.7 mL    OUT:    Drain: 350 mL    Drain: 385 mL    Indwelling Catheter - Urethral: 1855 mL  Total OUT: 2590 mL    Total NET: -1813.3 mL          Daily     Daily Weight in k.7 (2018 05:00)    MEDICATIONS  (STANDING):  artificial  tears Solution 1 Drop(s) Both EYES four times a day  fat emulsion (Fish Oil and Plant Based) 20% Infusion 13.3 mL/Hr (13.3 mL/Hr) IV Continuous <Continuous>  fat emulsion (Fish Oil and Plant Based) 20% Infusion 13.3 mL/Hr (13.3 mL/Hr) IV Continuous <Continuous>  heparin  Infusion.  Unit(s)/Hr (10 mL/Hr) IV Continuous <Continuous>  ibuprofen  Tablet 600 milliGRAM(s) Oral every 6 hours  metoclopramide Injectable 10 milliGRAM(s) IV Push every 8 hours  pantoprazole  Injectable 40 milliGRAM(s) IV Push daily  Parenteral Nutrition - Adult 1 Each (63 mL/Hr) TPN Continuous <Continuous>  piperacillin/tazobactam IVPB. 3.375 Gram(s) IV Intermittent every 8 hours  sucralfate 1 Gram(s) Oral four times a day    MEDICATIONS  (PRN):  benzocaine 15 mG/menthol 3.6 mG Lozenge 1 Lozenge Oral every 6 hours PRN Sore Throat  HYDROmorphone  Injectable 0.5 milliGRAM(s) IV Push every 4 hours PRN Moderate Pain (4 - 6)  HYDROmorphone  Injectable 1 milliGRAM(s) IV Push every 4 hours PRN Severe Pain (7 - 10)      LABS:                        9.8    15.70 )-----------( 303      ( 2018 04:55 )             28.4         135  |  102  |  13  ----------------------------<  119<H>  3.0<L>   |  24  |  0.35<L>    Ca    7.6<L>      2018 04:55  Phos  3.0       Mg     2.1         TPro  4.7<L>  /  Alb  2.0<L>  /  TBili  9.7<H>  /  DBili  x   /  AST  36<H>  /  ALT  14  /  AlkPhos  271<H>      PT/INR - ( 2018 07:05 )   PT: 13.0 SEC;   INR: 1.13          PTT - ( 2018 04:55 )  PTT:82.2 SEC      RADIOLOGY & ADDITIONAL STUDIES:

## 2018-06-20 LAB
ALBUMIN SERPL ELPH-MCNC: 1.8 G/DL — LOW (ref 3.3–5)
ALP SERPL-CCNC: 239 U/L — HIGH (ref 40–120)
ALT FLD-CCNC: 17 U/L — SIGNIFICANT CHANGE UP (ref 4–33)
APTT BLD: 100.7 SEC — HIGH (ref 27.5–37.4)
APTT BLD: 75 SEC — HIGH (ref 27.5–37.4)
AST SERPL-CCNC: 34 U/L — HIGH (ref 4–32)
BASOPHILS # BLD AUTO: 0.09 K/UL — SIGNIFICANT CHANGE UP (ref 0–0.2)
BASOPHILS NFR BLD AUTO: 0.7 % — SIGNIFICANT CHANGE UP (ref 0–2)
BILIRUB SERPL-MCNC: 9 MG/DL — HIGH (ref 0.2–1.2)
BUN SERPL-MCNC: 12 MG/DL — SIGNIFICANT CHANGE UP (ref 7–23)
CA-I BLD-SCNC: 1.04 MMOL/L — SIGNIFICANT CHANGE UP (ref 1.03–1.23)
CALCIUM SERPL-MCNC: 7.3 MG/DL — LOW (ref 8.4–10.5)
CHLORIDE SERPL-SCNC: 104 MMOL/L — SIGNIFICANT CHANGE UP (ref 98–107)
CO2 SERPL-SCNC: 25 MMOL/L — SIGNIFICANT CHANGE UP (ref 22–31)
CREAT SERPL-MCNC: 0.33 MG/DL — LOW (ref 0.5–1.3)
EOSINOPHIL # BLD AUTO: 0.17 K/UL — SIGNIFICANT CHANGE UP (ref 0–0.5)
EOSINOPHIL NFR BLD AUTO: 1.2 % — SIGNIFICANT CHANGE UP (ref 0–6)
GLUCOSE SERPL-MCNC: 101 MG/DL — HIGH (ref 70–99)
HCT VFR BLD CALC: 25.2 % — LOW (ref 34.5–45)
HGB BLD-MCNC: 8.8 G/DL — LOW (ref 11.5–15.5)
IMM GRANULOCYTES # BLD AUTO: 0.13 # — SIGNIFICANT CHANGE UP
IMM GRANULOCYTES NFR BLD AUTO: 1 % — SIGNIFICANT CHANGE UP (ref 0–1.5)
INR BLD: 1.12 — SIGNIFICANT CHANGE UP (ref 0.88–1.17)
LYMPHOCYTES # BLD AUTO: 1.17 K/UL — SIGNIFICANT CHANGE UP (ref 1–3.3)
LYMPHOCYTES # BLD AUTO: 8.6 % — LOW (ref 13–44)
MAGNESIUM SERPL-MCNC: 2.2 MG/DL — SIGNIFICANT CHANGE UP (ref 1.6–2.6)
MCHC RBC-ENTMCNC: 29.8 PG — SIGNIFICANT CHANGE UP (ref 27–34)
MCHC RBC-ENTMCNC: 34.9 % — SIGNIFICANT CHANGE UP (ref 32–36)
MCV RBC AUTO: 85.4 FL — SIGNIFICANT CHANGE UP (ref 80–100)
MONOCYTES # BLD AUTO: 0.78 K/UL — SIGNIFICANT CHANGE UP (ref 0–0.9)
MONOCYTES NFR BLD AUTO: 5.7 % — SIGNIFICANT CHANGE UP (ref 2–14)
NEUTROPHILS # BLD AUTO: 11.31 K/UL — HIGH (ref 1.8–7.4)
NEUTROPHILS NFR BLD AUTO: 82.8 % — HIGH (ref 43–77)
NRBC # FLD: 0 — SIGNIFICANT CHANGE UP
PHOSPHATE SERPL-MCNC: 2.7 MG/DL — SIGNIFICANT CHANGE UP (ref 2.5–4.5)
PLATELET # BLD AUTO: 313 K/UL — SIGNIFICANT CHANGE UP (ref 150–400)
PMV BLD: 11.4 FL — SIGNIFICANT CHANGE UP (ref 7–13)
POTASSIUM SERPL-MCNC: 3.2 MMOL/L — LOW (ref 3.5–5.3)
POTASSIUM SERPL-SCNC: 3.2 MMOL/L — LOW (ref 3.5–5.3)
PROT SERPL-MCNC: 4.8 G/DL — LOW (ref 6–8.3)
PROTHROM AB SERPL-ACNC: 12.9 SEC — SIGNIFICANT CHANGE UP (ref 9.8–13.1)
RBC # BLD: 2.95 M/UL — LOW (ref 3.8–5.2)
RBC # FLD: 24.4 % — HIGH (ref 10.3–14.5)
SODIUM SERPL-SCNC: 138 MMOL/L — SIGNIFICANT CHANGE UP (ref 135–145)
TRIGL SERPL-MCNC: 138 MG/DL — SIGNIFICANT CHANGE UP (ref 10–149)
WBC # BLD: 13.65 K/UL — HIGH (ref 3.8–10.5)
WBC # FLD AUTO: 13.65 K/UL — HIGH (ref 3.8–10.5)

## 2018-06-20 PROCEDURE — 99232 SBSQ HOSP IP/OBS MODERATE 35: CPT | Mod: GC

## 2018-06-20 PROCEDURE — 99233 SBSQ HOSP IP/OBS HIGH 50: CPT

## 2018-06-20 RX ORDER — CHLORHEXIDINE GLUCONATE 213 G/1000ML
1 SOLUTION TOPICAL ONCE
Qty: 0 | Refills: 0 | Status: DISCONTINUED | OUTPATIENT
Start: 2018-06-20 | End: 2018-07-06

## 2018-06-20 RX ORDER — ENOXAPARIN SODIUM 100 MG/ML
65 INJECTION SUBCUTANEOUS
Qty: 0 | Refills: 0 | Status: DISCONTINUED | OUTPATIENT
Start: 2018-06-20 | End: 2018-06-22

## 2018-06-20 RX ORDER — I.V. FAT EMULSION 20 G/100ML
7 EMULSION INTRAVENOUS
Qty: 17 | Refills: 0 | Status: DISCONTINUED | OUTPATIENT
Start: 2018-06-20 | End: 2018-06-22

## 2018-06-20 RX ORDER — ELECTROLYTE SOLUTION,INJ
1 VIAL (ML) INTRAVENOUS
Qty: 0 | Refills: 0 | Status: DISCONTINUED | OUTPATIENT
Start: 2018-06-20 | End: 2018-06-20

## 2018-06-20 RX ADMIN — Medication 1 DROP(S): at 12:12

## 2018-06-20 RX ADMIN — I.V. FAT EMULSION 7 ML/HR: 20 EMULSION INTRAVENOUS at 17:01

## 2018-06-20 RX ADMIN — HYDROMORPHONE HYDROCHLORIDE 0.5 MILLIGRAM(S): 2 INJECTION INTRAMUSCULAR; INTRAVENOUS; SUBCUTANEOUS at 10:33

## 2018-06-20 RX ADMIN — HYDROMORPHONE HYDROCHLORIDE 1 MILLIGRAM(S): 2 INJECTION INTRAMUSCULAR; INTRAVENOUS; SUBCUTANEOUS at 04:35

## 2018-06-20 RX ADMIN — HYDROMORPHONE HYDROCHLORIDE 1 MILLIGRAM(S): 2 INJECTION INTRAMUSCULAR; INTRAVENOUS; SUBCUTANEOUS at 04:16

## 2018-06-20 RX ADMIN — HYDROMORPHONE HYDROCHLORIDE 0.5 MILLIGRAM(S): 2 INJECTION INTRAMUSCULAR; INTRAVENOUS; SUBCUTANEOUS at 09:54

## 2018-06-20 RX ADMIN — PANTOPRAZOLE SODIUM 40 MILLIGRAM(S): 20 TABLET, DELAYED RELEASE ORAL at 12:12

## 2018-06-20 RX ADMIN — HYDROMORPHONE HYDROCHLORIDE 1 MILLIGRAM(S): 2 INJECTION INTRAMUSCULAR; INTRAVENOUS; SUBCUTANEOUS at 19:30

## 2018-06-20 RX ADMIN — Medication 600 MILLIGRAM(S): at 08:45

## 2018-06-20 RX ADMIN — Medication 600 MILLIGRAM(S): at 08:01

## 2018-06-20 RX ADMIN — Medication 600 MILLIGRAM(S): at 03:42

## 2018-06-20 RX ADMIN — ENOXAPARIN SODIUM 65 MILLIGRAM(S): 100 INJECTION SUBCUTANEOUS at 19:38

## 2018-06-20 RX ADMIN — Medication 1 GRAM(S): at 05:47

## 2018-06-20 RX ADMIN — Medication 600 MILLIGRAM(S): at 02:42

## 2018-06-20 RX ADMIN — HEPARIN SODIUM 1000 UNIT(S)/HR: 5000 INJECTION INTRAVENOUS; SUBCUTANEOUS at 14:09

## 2018-06-20 RX ADMIN — HYDROMORPHONE HYDROCHLORIDE 1 MILLIGRAM(S): 2 INJECTION INTRAMUSCULAR; INTRAVENOUS; SUBCUTANEOUS at 00:00

## 2018-06-20 RX ADMIN — HYDROMORPHONE HYDROCHLORIDE 1 MILLIGRAM(S): 2 INJECTION INTRAMUSCULAR; INTRAVENOUS; SUBCUTANEOUS at 14:37

## 2018-06-20 RX ADMIN — HYDROMORPHONE HYDROCHLORIDE 1 MILLIGRAM(S): 2 INJECTION INTRAMUSCULAR; INTRAVENOUS; SUBCUTANEOUS at 14:09

## 2018-06-20 RX ADMIN — Medication 600 MILLIGRAM(S): at 21:45

## 2018-06-20 RX ADMIN — PIPERACILLIN AND TAZOBACTAM 25 GRAM(S): 4; .5 INJECTION, POWDER, LYOPHILIZED, FOR SOLUTION INTRAVENOUS at 08:01

## 2018-06-20 RX ADMIN — Medication 10 MILLIGRAM(S): at 05:47

## 2018-06-20 RX ADMIN — Medication 1 GRAM(S): at 17:02

## 2018-06-20 RX ADMIN — Medication 600 MILLIGRAM(S): at 20:53

## 2018-06-20 RX ADMIN — Medication 1 DROP(S): at 05:47

## 2018-06-20 RX ADMIN — HYDROMORPHONE HYDROCHLORIDE 1 MILLIGRAM(S): 2 INJECTION INTRAMUSCULAR; INTRAVENOUS; SUBCUTANEOUS at 18:52

## 2018-06-20 RX ADMIN — Medication 1 EACH: at 17:01

## 2018-06-20 RX ADMIN — Medication 1 GRAM(S): at 12:12

## 2018-06-20 NOTE — PROGRESS NOTE ADULT - ASSESSMENT
Impression:  1. Unsuccessful ERCP complicated by small bowel perforation 6/6/18 s/p ex-lap with small bowel resection and anastomosis 6/6/18  2. Obstructive jaundice with dilated CBD and 1.4 cm enhancing pancreatic head mass s/p percutaneous drainage   3. 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

## 2018-06-20 NOTE — PROGRESS NOTE ADULT - SUBJECTIVE AND OBJECTIVE BOX
GENERAL SURGERY DAILY PROGRESS NOTE:       Subjective:  No acute events overnight. This AM pt feels OK overall. Pain well controlled. Tolerating regular diet. Denies N/V.         Objective:    PE:  Gen: NAD  Chest: breathing comfortably on room air  Abd: soft, midline incision pouched with serous fluid in bag    Vital Signs Last 24 Hrs  T(C): 36.8 (20 Jun 2018 08:00), Max: 36.9 (19 Jun 2018 11:54)  T(F): 98.3 (20 Jun 2018 08:00), Max: 98.5 (19 Jun 2018 11:54)  HR: 81 (20 Jun 2018 08:00) (76 - 89)  BP: 119/54 (20 Jun 2018 08:00) (116/76 - 138/57)  BP(mean): 69 (20 Jun 2018 08:00) (69 - 69)  RR: 18 (20 Jun 2018 08:00) (18 - 20)  SpO2: 95% (20 Jun 2018 08:00) (95% - 99%)    I&O's Detail    19 Jun 2018 07:01  -  20 Jun 2018 07:00  --------------------------------------------------------  IN:    fat emulsion (Fish Oil and Plant Based) 20% Infusion: 159.5 mL    heparin  Infusion.: 90 mL    TPN (Total Parenteral Nutrition): 819 mL  Total IN: 1068.5 mL    OUT:    Drain: 220 mL    Indwelling Catheter - Urethral: 2800 mL  Total OUT: 3020 mL    Total NET: -1951.5 mL      20 Jun 2018 07:01  -  20 Jun 2018 11:11  --------------------------------------------------------  IN:  Total IN: 0 mL    OUT:    Indwelling Catheter - Urethral: 200 mL  Total OUT: 200 mL    Total NET: -200 mL          Daily     Daily     MEDICATIONS  (STANDING):  artificial  tears Solution 1 Drop(s) Both EYES four times a day  chlorhexidine 4% Liquid 1 Application(s) Topical once  fat emulsion (Fish Oil and Plant Based) 20% Infusion 14.5 mL/Hr (14.5 mL/Hr) IV Continuous <Continuous>  heparin  Infusion.  Unit(s)/Hr (10 mL/Hr) IV Continuous <Continuous>  ibuprofen  Tablet 600 milliGRAM(s) Oral every 6 hours  metoclopramide Injectable 10 milliGRAM(s) IV Push every 8 hours  pantoprazole  Injectable 40 milliGRAM(s) IV Push daily  Parenteral Nutrition - Adult 1 Each (63 mL/Hr) TPN Continuous <Continuous>  piperacillin/tazobactam IVPB. 3.375 Gram(s) IV Intermittent every 8 hours  sucralfate 1 Gram(s) Oral four times a day    MEDICATIONS  (PRN):  benzocaine 15 mG/menthol 3.6 mG Lozenge 1 Lozenge Oral every 6 hours PRN Sore Throat  HYDROmorphone  Injectable 0.5 milliGRAM(s) IV Push every 4 hours PRN Moderate Pain (4 - 6)  HYDROmorphone  Injectable 1 milliGRAM(s) IV Push every 4 hours PRN Severe Pain (7 - 10)      LABS:                        8.8    13.65 )-----------( 313      ( 20 Jun 2018 05:55 )             25.2     06-20    138  |  104  |  12  ----------------------------<  101<H>  3.2<L>   |  25  |  0.33<L>    Ca    7.3<L>      20 Jun 2018 05:55  Phos  2.7     06-20  Mg     2.2     06-20    TPro  4.8<L>  /  Alb  1.8<L>  /  TBili  9.0<H>  /  DBili  x   /  AST  34<H>  /  ALT  17  /  AlkPhos  239<H>  06-20    PT/INR - ( 20 Jun 2018 05:55 )   PT: 12.9 SEC;   INR: 1.12          PTT - ( 20 Jun 2018 08:21 )  PTT:75.0 SEC      RADIOLOGY & ADDITIONAL STUDIES:

## 2018-06-20 NOTE — PROGRESS NOTE ADULT - SUBJECTIVE AND OBJECTIVE BOX
NUTRITION NOTE  [id number] CHOKYI  ===============================    Interval events  Patient was seen and examined at bedside, no acute overnight events.   PICC placed and TPN/lipids initiated on 18, patient is tolerating without any issues.  Advanced to regular diet, 1/2 TPN volume and calories today     Vital Signs Last 24 Hrs  T(C): 36.8 (2018 08:00), Max: 36.9 (2018 11:54)  T(F): 98.3 (2018 08:00), Max: 98.5 (2018 11:54)  HR: 81 (2018 08:00) (76 - 89)  BP: 119/54 (2018 08:00) (116/76 - 138/57)  BP(mean): 69 (2018 08:00) (69 - 69)  RR: 18 (2018 08:00) (18 - 20)  SpO2: 95% (2018 08:00) (95% - 99%)    MEDICATIONS  (STANDING):  artificial  tears Solution 1 Drop(s) Both EYES four times a day  chlorhexidine 4% Liquid 1 Application(s) Topical once  fat emulsion (Fish Oil and Plant Based) 20% Infusion 14.5 mL/Hr (14.5 mL/Hr) IV Continuous <Continuous>  fat emulsion (Fish Oil and Plant Based) 20% Infusion 7 mL/Hr (7 mL/Hr) IV Continuous <Continuous>  heparin  Infusion.  Unit(s)/Hr (10 mL/Hr) IV Continuous <Continuous>  ibuprofen  Tablet 600 milliGRAM(s) Oral every 6 hours  metoclopramide Injectable 10 milliGRAM(s) IV Push every 8 hours  pantoprazole  Injectable 40 milliGRAM(s) IV Push daily  Parenteral Nutrition - Adult 1 Each (63 mL/Hr) TPN Continuous <Continuous>  Parenteral Nutrition - Adult 1 Each (42 mL/Hr) TPN Continuous <Continuous>  piperacillin/tazobactam IVPB. 3.375 Gram(s) IV Intermittent every 8 hours  sucralfate 1 Gram(s) Oral four times a day    MEDICATIONS  (PRN):  benzocaine 15 mG/menthol 3.6 mG Lozenge 1 Lozenge Oral every 6 hours PRN Sore Throat  HYDROmorphone  Injectable 0.5 milliGRAM(s) IV Push every 4 hours PRN Moderate Pain (4 - 6)  HYDROmorphone  Injectable 1 milliGRAM(s) IV Push every 4 hours PRN Severe Pain (7 - 10)    I&O's Detail    2018 07:01  -  2018 07:00  --------------------------------------------------------  IN:    fat emulsion (Fish Oil and Plant Based) 20% Infusion: 159.5 mL    heparin  Infusion.: 90 mL    TPN (Total Parenteral Nutrition): 819 mL  Total IN: 1068.5 mL    OUT:    Drain: 220 mL    Indwelling Catheter - Urethral: 2800 mL  Total OUT: 3020 mL    Total NET: -1951.5 mL      2018 07:01  -  2018 11:16  --------------------------------------------------------  IN:  Total IN: 0 mL    OUT:    Indwelling Catheter - Urethral: 200 mL  Total OUT: 200 mL    Total NET: -200 mL    POCT Blood Glucose.: 143 mg/dL (2018 06:43)  POCT Blood Glucose.: 121 mg/dL (2018 00:14)  POCT Blood Glucose.: 135 mg/dL (2018 18:01)  POCT Blood Glucose.: 101 mg/dL (2018 11:52)    Daily Weight in k.7 (2018 05:00)    Drug Dosing Weight  Height (cm): 160.02 (2018 15:53)  Weight (kg): 54 (2018 15:53)  BMI (kg/m2): 21.1 (2018 15:53)  BSA (m2): 1.55 (2018 15:53)    PHYSICAL EXAM   Constitutional: no acute distress, sitting comfortably in chair   Respiratory: nonlabored respirations   Gastrointestinal: soft, mildly tender  Extremities: warm, generalized edema    PICC Site: C/D/I    Diet: regular diet and TPN     LABORATORY                                   8.8    13.65 )-----------( 313      ( 2018 05:55 )             25.2   06-20    138  |  104  |  12  ----------------------------<  101<H>  3.2<L>   |  25  |  0.33<L>    Ca    7.3<L>      2018 05:55  Phos  2.7     -  Mg     2.2         TPro  4.8<L>  /  Alb  1.8<L>  /  TBili  9.0<H>  /  DBili  x   /  AST  34<H>  /  ALT  17  /  AlkPhos  239<H>      LIVER FUNCTIONS - ( 2018 07:05 )  Alb: 2.0 g/dL / Pro: 4.8 g/dL / ALK PHOS: 280 u/L / ALT: 13 u/L / AST: 32 u/L / GGT: x            Chol -- LDL -- HDL -- Trig 138, 12 Chol -- LDL -- HDL -- Trig 112, 06-05 Chol 156  HDL 8<L> Trig 85, 05-19 Chol 122 LDL 73 HDL 33<L> Trig 78    Prealbumin /18: 10    ASSESSMENT/PLAN:  57 y/o female s/p ERCP c/b perforation of afferent limb. Pt underwent emergent Ex-lap w/ resection of perforated bowel and stapled side to side functional end to end anastomosis. Pt remained intubated and was transferred to SICU off pressors. s/p extubation, now transferred to floor. Patient meets criteria for severe protein calorie malnutrition requiring TPN for nutritional support.  Patient is tolerating regular diet at this time.     TPN decreased to 1 L today with 1/2 calories to encourage increased PO intake and observe tolerance.    Monitor fingersticks q 12 hours, obtain daily weights.    Labs reviewed - electrolytes adjusted in TPN     Continue TPN and lipids. Will follow up with primary team on plan, plan to wean TPN and lipids tomorrow if patient continues to tolerate PO intake     1. Protein calorie malnutrition being optimized with TPN: CHO [105 ] gm.  AA [40] gm. SMOF Lipids [17] gm. lipids will be administered over 12 hours   2.  Hyperglycemia managed with: [0 ] units of regular insulin    3.  Check fluid balance daily.  Strict I/O  [ ] [ ]   4.  Daily BMP, Ionized Calcium, Magnesium and Phosphorous   5.  Triglycerides at initiation of TPN and monthly [ ] [ ]   6.  Pepcid in TPN for Gi prophylaxis  [ ]     Nutrition support pager [phone]

## 2018-06-20 NOTE — PROGRESS NOTE ADULT - ATTENDING COMMENTS
59 y/o female s/p ERCP c/b perforation of afferent limb. Pt underwent emergent Ex-lap w/ resection of perforated bowel and stapled side to side functional end to end anastomosis. Pt remained intubated and was transferred to SICU off pressors. s/p extubation, now transferred to floor. Patient meets criteria for severe protein calorie malnutrition requiring TPN for nutritional support.  Patient is tolerating regular diet at this time.     TPN decreased to 1 L today with 1/2 calories to encourage increased PO intake and observe tolerance.    Monitor fingersticks q 12 hours, obtain daily weights.    Labs reviewed - electrolytes adjusted in TPN     Continue TPN and lipids. Will follow up with primary team on plan, plan to wean TPN and lipids tomorrow if patient continues to tolerate PO intake     1. Protein calorie malnutrition being optimized with TPN: CHO [105 ] gm.  AA [40] gm. SMOF Lipids [17] gm. lipids will be administered over 12 hours   2.  Hyperglycemia managed with: [0 ] units of regular insulin    3.  Check fluid balance daily.  Strict I/O  [ ] [ ]   4.  Daily BMP, Ionized Calcium, Magnesium and Phosphorous   5.  Triglycerides at initiation of TPN and monthly [ ] [ ]   6.  Pepcid in TPN for Gi prophylaxis  [ ]   I have seen and examined the patient, reviewed the laboratory and radiologic data and agree with the history, physical assessment and plan.  I reviewed and discussed with all consultants, house staff and PA's.  The note is edited where appropriate. The Nutrition Support Team (NST) discusses on an ongoing basis with the primary team and all consultants, House staff and PA's to have a permanent risk benefit analyses on all decisions.  I spent  45  minutes in total; providing diagnoses, assessment and plan.

## 2018-06-20 NOTE — PROGRESS NOTE ADULT - SUBJECTIVE AND OBJECTIVE BOX
Chief Complaint:  Patient is a 58y old  Female who presents with a chief complaint of Painless jaundice (05 Jun 2018 14:49)      Interval Events: Patient feels about the same this morning. She is sitting in chair. She had a small brown bowel movement overnight.     Allergies:  No Known Allergies      Hospital Medications:  artificial  tears Solution 1 Drop(s) Both EYES four times a day  benzocaine 15 mG/menthol 3.6 mG Lozenge 1 Lozenge Oral every 6 hours PRN  chlorhexidine 4% Liquid 1 Application(s) Topical once  fat emulsion (Fish Oil and Plant Based) 20% Infusion 14.5 mL/Hr IV Continuous <Continuous>  fat emulsion (Fish Oil and Plant Based) 20% Infusion 13.3 mL/Hr IV Continuous <Continuous>  heparin  Infusion.  Unit(s)/Hr IV Continuous <Continuous>  HYDROmorphone  Injectable 0.5 milliGRAM(s) IV Push every 4 hours PRN  HYDROmorphone  Injectable 1 milliGRAM(s) IV Push every 4 hours PRN  ibuprofen  Tablet 600 milliGRAM(s) Oral every 6 hours  metoclopramide Injectable 10 milliGRAM(s) IV Push every 8 hours  pantoprazole  Injectable 40 milliGRAM(s) IV Push daily  Parenteral Nutrition - Adult 1 Each TPN Continuous <Continuous>  piperacillin/tazobactam IVPB. 3.375 Gram(s) IV Intermittent every 8 hours  sucralfate 1 Gram(s) Oral four times a day      PMHX/PSHX:  Gastric cancer  Malignant neoplasm of stomach, unspecified location  No pertinent past medical history  S/P partial gastrectomy  No significant past surgical history      Family history:  No pertinent family history in first degree relatives      ROS: Negative, except as otherwise noted above    PHYSICAL EXAM:   Vital Signs:  Vital Signs Last 24 Hrs  T(C): 36.8 (20 Jun 2018 05:45), Max: 36.9 (19 Jun 2018 11:54)  T(F): 98.3 (20 Jun 2018 05:45), Max: 98.5 (19 Jun 2018 11:54)  HR: 80 (20 Jun 2018 05:45) (76 - 89)  BP: 138/57 (20 Jun 2018 05:45) (111/73 - 138/57)  BP(mean): --  RR: 18 (20 Jun 2018 05:45) (18 - 20)  SpO2: 96% (20 Jun 2018 05:45) (95% - 99%)  Daily     Daily     GENERAL: No acute distress    HEENT:  Icteric, no thrush  CHEST:  Non-labored breathing, lungs clear b/l  HEART:  +s1, s2 heart sounds, no murmurs  ABDOMEN:  +Midline surgical incision, +biliary drainage catheter, mildly tender diffusely  EXTREMITIES:  Warm and well perfused  SKIN:  No rash  NEURO:   Awake, interactive, following commands    LABS:  CBC Full  -  ( 20 Jun 2018 05:55 )  WBC Count : 13.65 K/uL  Hemoglobin : 8.8 g/dL  Hematocrit : 25.2 %  Platelet Count - Automated : 313 K/uL  Mean Cell Volume : 85.4 fL  Auto Neutrophil # : 11.31 K/uL  Auto Neutrophil % : 82.8 %    06-20 @ 05:55  Na 138 mmol/L  K 3.2 mmol/L  Cl 104 mmol/L  CO2 25 mmol/L  BUN 12 mg/dL  Creat 0.33 mg/dL  Glucose 101 mg/dL  Ca 7.3 mg/dL    Total protein 4.8 g/dL  Albumin 1.8 g/dL  T bili 9.0 mg/dL  Alk phos 239 u/L  AST 34 u/L  ALT 17 u/L    PT/INR - ( 20 Jun 2018 05:55 )   PT: 12.9 SEC;   INR: 1.12          PTT - ( 20 Jun 2018 05:55 )  PTT:100.7 SEC

## 2018-06-20 NOTE — PROGRESS NOTE ADULT - ASSESSMENT
58F s/p ERCP EUS aborted 2/2 small bowel perforation, s/p ex lap resection of small bowel side to side anastomosis currently hemodynamically stable on the floor.     - NPO/TPN  - Monitor midline incision for further drainage  - Continue martinez  - DC abx today  - Monitor GI function  - DVT ppx  - Dispo: DC planning

## 2018-06-20 NOTE — CHART NOTE - NSCHARTNOTEFT_GEN_A_CORE
Source: Patient ,    Family , nursing and EMR     Diet : Regular- Supplement Ensure Clear 3/d     Patient alert , oriented , reports dislike to Ensure Clear 2/2 it is too acidic to her , requesting other flavor but not available. Pt. and family re instructed on therapeutic diet . Noted 3+ L & R leg edema . Pt. reports improved PO but not @ 50% of the melas .      Parenteral Nutrition: 63 ml/hr 1384 kcal , 210 gm dextrose, 80 gm amino acid, 20% SMOFLIPID 35 gm/d       Current Weight: - 65.7 kg on 6/19/18; Admit wt. - 55 kg ; 65.8 kg on 6/15/18    Pertinent Medications: MEDICATIONS  (STANDING):  artificial  tears Solution 1 Drop(s) Both EYES four times a day  chlorhexidine 4% Liquid 1 Application(s) Topical once  enoxaparin Injectable 65 milliGRAM(s) SubCutaneous <User Schedule>  fat emulsion (Fish Oil and Plant Based) 20% Infusion 7 mL/Hr (7 mL/Hr) IV Continuous <Continuous>  fat emulsion (Fish Oil and Plant Based) 20% Infusion 14.5 mL/Hr (14.5 mL/Hr) IV Continuous <Continuous>  heparin  Infusion.  Unit(s)/Hr (10 mL/Hr) IV Continuous <Continuous>  ibuprofen  Tablet 600 milliGRAM(s) Oral every 6 hours  pantoprazole  Injectable 40 milliGRAM(s) IV Push daily  Parenteral Nutrition - Adult 1 Each (63 mL/Hr) TPN Continuous <Continuous>  Parenteral Nutrition - Adult 1 Each (42 mL/Hr) TPN Continuous <Continuous>  sucralfate 1 Gram(s) Oral four times a day    MEDICATIONS  (PRN):  benzocaine 15 mG/menthol 3.6 mG Lozenge 1 Lozenge Oral every 6 hours PRN Sore Throat  HYDROmorphone  Injectable 0.5 milliGRAM(s) IV Push every 4 hours PRN Moderate Pain (4 - 6)  HYDROmorphone  Injectable 1 milliGRAM(s) IV Push every 4 hours PRN Severe Pain (7 - 10)    Pertinent Labs:  06-20 Na138 mmol/L Glu 101 mg/dL<H> K+ 3.2 mmol/L<L> Cr  0.33 mg/dL<L> BUN 12 mg/dL 06-20 Phos 2.7 mg/dL 06-20 Alb 1.8 g/dL<L> 06-18 PAB 10 mg/dL<L> 06-20 Chol --    LDL --    HDL --    Trig 138 mg/dL      Skin: intact    Estimated Needs:   no change since previous assessment. Nutrition optimized w/ PN .     Recommend Soft , phase 2 Bariatric diet ; d/c Po supplement     Monitoring and Evaluation:  PO intake, Tolerance to diet prescription , weights and follow up per protocol

## 2018-06-21 LAB
BUN SERPL-MCNC: 11 MG/DL — SIGNIFICANT CHANGE UP (ref 7–23)
CA-I BLD-SCNC: 1.13 MMOL/L — SIGNIFICANT CHANGE UP (ref 1.03–1.23)
CALCIUM SERPL-MCNC: 7.3 MG/DL — LOW (ref 8.4–10.5)
CHLORIDE SERPL-SCNC: 107 MMOL/L — SIGNIFICANT CHANGE UP (ref 98–107)
CO2 SERPL-SCNC: 24 MMOL/L — SIGNIFICANT CHANGE UP (ref 22–31)
CREAT SERPL-MCNC: 0.33 MG/DL — LOW (ref 0.5–1.3)
GLUCOSE SERPL-MCNC: 128 MG/DL — HIGH (ref 70–99)
HCT VFR BLD CALC: 22.9 % — LOW (ref 34.5–45)
HGB BLD-MCNC: 7.7 G/DL — LOW (ref 11.5–15.5)
MAGNESIUM SERPL-MCNC: 2.3 MG/DL — SIGNIFICANT CHANGE UP (ref 1.6–2.6)
MCHC RBC-ENTMCNC: 30.3 PG — SIGNIFICANT CHANGE UP (ref 27–34)
MCHC RBC-ENTMCNC: 33.6 % — SIGNIFICANT CHANGE UP (ref 32–36)
MCV RBC AUTO: 90.2 FL — SIGNIFICANT CHANGE UP (ref 80–100)
NRBC # FLD: 0 — SIGNIFICANT CHANGE UP
PHOSPHATE SERPL-MCNC: 2.7 MG/DL — SIGNIFICANT CHANGE UP (ref 2.5–4.5)
PLATELET # BLD AUTO: 305 K/UL — SIGNIFICANT CHANGE UP (ref 150–400)
PMV BLD: 11.1 FL — SIGNIFICANT CHANGE UP (ref 7–13)
POTASSIUM SERPL-MCNC: 3.9 MMOL/L — SIGNIFICANT CHANGE UP (ref 3.5–5.3)
POTASSIUM SERPL-SCNC: 3.9 MMOL/L — SIGNIFICANT CHANGE UP (ref 3.5–5.3)
RBC # BLD: 2.54 M/UL — LOW (ref 3.8–5.2)
RBC # FLD: 25 % — HIGH (ref 10.3–14.5)
SODIUM SERPL-SCNC: 139 MMOL/L — SIGNIFICANT CHANGE UP (ref 135–145)
WBC # BLD: 10.93 K/UL — HIGH (ref 3.8–10.5)
WBC # FLD AUTO: 10.93 K/UL — HIGH (ref 3.8–10.5)

## 2018-06-21 PROCEDURE — 99233 SBSQ HOSP IP/OBS HIGH 50: CPT

## 2018-06-21 RX ORDER — CYCLOBENZAPRINE HYDROCHLORIDE 10 MG/1
5 TABLET, FILM COATED ORAL THREE TIMES A DAY
Qty: 0 | Refills: 0 | Status: DISCONTINUED | OUTPATIENT
Start: 2018-06-21 | End: 2018-06-29

## 2018-06-21 RX ORDER — SUCRALFATE 1 G
1 TABLET ORAL
Qty: 0 | Refills: 0 | Status: DISCONTINUED | OUTPATIENT
Start: 2018-06-21 | End: 2018-07-11

## 2018-06-21 RX ORDER — ELECTROLYTE SOLUTION,INJ
1 VIAL (ML) INTRAVENOUS
Qty: 0 | Refills: 0 | Status: DISCONTINUED | OUTPATIENT
Start: 2018-06-21 | End: 2018-06-21

## 2018-06-21 RX ORDER — I.V. FAT EMULSION 20 G/100ML
7 EMULSION INTRAVENOUS
Qty: 17 | Refills: 0 | Status: DISCONTINUED | OUTPATIENT
Start: 2018-06-21 | End: 2018-06-25

## 2018-06-21 RX ORDER — HYDROMORPHONE HYDROCHLORIDE 2 MG/ML
4 INJECTION INTRAMUSCULAR; INTRAVENOUS; SUBCUTANEOUS EVERY 4 HOURS
Qty: 0 | Refills: 0 | Status: DISCONTINUED | OUTPATIENT
Start: 2018-06-21 | End: 2018-06-22

## 2018-06-21 RX ORDER — PANTOPRAZOLE SODIUM 20 MG/1
40 TABLET, DELAYED RELEASE ORAL
Qty: 0 | Refills: 0 | Status: DISCONTINUED | OUTPATIENT
Start: 2018-06-21 | End: 2018-07-02

## 2018-06-21 RX ORDER — OXYCODONE AND ACETAMINOPHEN 5; 325 MG/1; MG/1
2 TABLET ORAL EVERY 4 HOURS
Qty: 0 | Refills: 0 | Status: DISCONTINUED | OUTPATIENT
Start: 2018-06-21 | End: 2018-06-21

## 2018-06-21 RX ORDER — OXYCODONE AND ACETAMINOPHEN 5; 325 MG/1; MG/1
1 TABLET ORAL EVERY 4 HOURS
Qty: 0 | Refills: 0 | Status: DISCONTINUED | OUTPATIENT
Start: 2018-06-21 | End: 2018-06-21

## 2018-06-21 RX ORDER — MORPHINE SULFATE 50 MG/1
2 CAPSULE, EXTENDED RELEASE ORAL ONCE
Qty: 0 | Refills: 0 | Status: DISCONTINUED | OUTPATIENT
Start: 2018-06-21 | End: 2018-06-22

## 2018-06-21 RX ORDER — HYDROMORPHONE HYDROCHLORIDE 2 MG/ML
2 INJECTION INTRAMUSCULAR; INTRAVENOUS; SUBCUTANEOUS EVERY 4 HOURS
Qty: 0 | Refills: 0 | Status: DISCONTINUED | OUTPATIENT
Start: 2018-06-21 | End: 2018-06-22

## 2018-06-21 RX ORDER — METOCLOPRAMIDE HCL 10 MG
5 TABLET ORAL EVERY 8 HOURS
Qty: 0 | Refills: 0 | Status: DISCONTINUED | OUTPATIENT
Start: 2018-06-21 | End: 2018-07-11

## 2018-06-21 RX ADMIN — HYDROMORPHONE HYDROCHLORIDE 0.5 MILLIGRAM(S): 2 INJECTION INTRAMUSCULAR; INTRAVENOUS; SUBCUTANEOUS at 03:30

## 2018-06-21 RX ADMIN — I.V. FAT EMULSION 7 ML/HR: 20 EMULSION INTRAVENOUS at 17:42

## 2018-06-21 RX ADMIN — HYDROMORPHONE HYDROCHLORIDE 2 MILLIGRAM(S): 2 INJECTION INTRAMUSCULAR; INTRAVENOUS; SUBCUTANEOUS at 16:20

## 2018-06-21 RX ADMIN — Medication 600 MILLIGRAM(S): at 04:30

## 2018-06-21 RX ADMIN — Medication 1 GRAM(S): at 05:06

## 2018-06-21 RX ADMIN — ENOXAPARIN SODIUM 65 MILLIGRAM(S): 100 INJECTION SUBCUTANEOUS at 07:52

## 2018-06-21 RX ADMIN — HYDROMORPHONE HYDROCHLORIDE 4 MILLIGRAM(S): 2 INJECTION INTRAMUSCULAR; INTRAVENOUS; SUBCUTANEOUS at 20:55

## 2018-06-21 RX ADMIN — PANTOPRAZOLE SODIUM 40 MILLIGRAM(S): 20 TABLET, DELAYED RELEASE ORAL at 12:08

## 2018-06-21 RX ADMIN — Medication 600 MILLIGRAM(S): at 05:10

## 2018-06-21 RX ADMIN — Medication 600 MILLIGRAM(S): at 23:28

## 2018-06-21 RX ADMIN — ENOXAPARIN SODIUM 65 MILLIGRAM(S): 100 INJECTION SUBCUTANEOUS at 19:48

## 2018-06-21 RX ADMIN — Medication 5 MILLIGRAM(S): at 23:28

## 2018-06-21 RX ADMIN — HYDROMORPHONE HYDROCHLORIDE 0.5 MILLIGRAM(S): 2 INJECTION INTRAMUSCULAR; INTRAVENOUS; SUBCUTANEOUS at 03:01

## 2018-06-21 RX ADMIN — HYDROMORPHONE HYDROCHLORIDE 1 MILLIGRAM(S): 2 INJECTION INTRAMUSCULAR; INTRAVENOUS; SUBCUTANEOUS at 08:30

## 2018-06-21 RX ADMIN — Medication 600 MILLIGRAM(S): at 14:36

## 2018-06-21 RX ADMIN — HYDROMORPHONE HYDROCHLORIDE 1 MILLIGRAM(S): 2 INJECTION INTRAMUSCULAR; INTRAVENOUS; SUBCUTANEOUS at 08:00

## 2018-06-21 RX ADMIN — Medication 600 MILLIGRAM(S): at 09:44

## 2018-06-21 RX ADMIN — Medication 600 MILLIGRAM(S): at 15:00

## 2018-06-21 RX ADMIN — Medication 1 EACH: at 17:42

## 2018-06-21 RX ADMIN — Medication 1 GRAM(S): at 23:28

## 2018-06-21 RX ADMIN — Medication 1 GRAM(S): at 12:08

## 2018-06-21 RX ADMIN — HYDROMORPHONE HYDROCHLORIDE 4 MILLIGRAM(S): 2 INJECTION INTRAMUSCULAR; INTRAVENOUS; SUBCUTANEOUS at 21:50

## 2018-06-21 RX ADMIN — Medication 600 MILLIGRAM(S): at 10:42

## 2018-06-21 RX ADMIN — HYDROMORPHONE HYDROCHLORIDE 2 MILLIGRAM(S): 2 INJECTION INTRAMUSCULAR; INTRAVENOUS; SUBCUTANEOUS at 16:45

## 2018-06-21 NOTE — PHYSICAL THERAPY INITIAL EVALUATION ADULT - PERTINENT HX OF CURRENT PROBLEM, REHAB EVAL
Patient complains of 1 week of progressively worsening jaundice. Patient now endorses some pain in the right upper quadrant of her abdomen, which extends through her back. Two days ago, she was admitted to [facility] for work-up. She had a CT scan and MRCP. On MRCP she has a 1.4 cm mass in the pancreatic head obstructing the common hepatic duct.

## 2018-06-21 NOTE — PHYSICAL THERAPY INITIAL EVALUATION ADULT - GENERAL OBSERVATIONS, REHAB EVAL
Patient found semi reclined in bed in NAD; +zeinab bag; +jaundiced; +martinez. Patient found semi reclined in bed in NAD; +jaundiced; +IR biliary drain; +martinez.

## 2018-06-21 NOTE — PROGRESS NOTE ADULT - SUBJECTIVE AND OBJECTIVE BOX
NUTRITION NOTE  [id number] CHOKYI  ===============================    Interval events  Patient was seen and examined at bedside, no acute overnight events.   PICC placed and TPN/lipids initiated on 18, patient is tolerating without any issues.  Advanced to regular diet, 1/2 TPN volume and calories today   Patient is tolerating broth and some food, does not feel like eating too much. Denies any n/v with po intake.     Vital Signs Last 24 Hrs  T(C): 36.7 (2018 07:51), Max: 37.2 (2018 00:08)  T(F): 98.1 (2018 07:51), Max: 99 (2018 00:08)  HR: 85 (2018 07:51) (79 - 90)  BP: 113/61 (2018 07:51) (113/56 - 128/61)  BP(mean): 73 (2018 07:51) (73 - 73)  RR: 16 (2018 07:51) (16 - 19)  SpO2: 99% (2018 07:51) (95% - 99%)    MEDICATIONS  (STANDING):  artificial  tears Solution 1 Drop(s) Both EYES four times a day  chlorhexidine 4% Liquid 1 Application(s) Topical once  enoxaparin Injectable 65 milliGRAM(s) SubCutaneous <User Schedule>  fat emulsion (Fish Oil and Plant Based) 20% Infusion 7 mL/Hr (7 mL/Hr) IV Continuous <Continuous>  fat emulsion (Fish Oil and Plant Based) 20% Infusion 7 mL/Hr (7 mL/Hr) IV Continuous <Continuous>  ibuprofen  Tablet 600 milliGRAM(s) Oral every 6 hours  pantoprazole  Injectable 40 milliGRAM(s) IV Push daily  Parenteral Nutrition - Adult 1 Each (42 mL/Hr) TPN Continuous <Continuous>  Parenteral Nutrition - Adult 1 Each (42 mL/Hr) TPN Continuous <Continuous>  sucralfate 1 Gram(s) Oral four times a day    MEDICATIONS  (PRN):  benzocaine 15 mG/menthol 3.6 mG Lozenge 1 Lozenge Oral every 6 hours PRN Sore Throat  HYDROmorphone  Injectable 0.5 milliGRAM(s) IV Push every 4 hours PRN Moderate Pain (4 - 6)  HYDROmorphone  Injectable 1 milliGRAM(s) IV Push every 4 hours PRN Severe Pain (7 - 10)    I&O's Detail    2018 07:01  -  2018 07:00  --------------------------------------------------------  IN:    fat emulsion (Fish Oil and Plant Based) 20% Infusion: 84 mL    heparin  Infusion.: 100 mL    TPN (Total Parenteral Nutrition): 1092 mL  Total IN: 1276 mL    OUT:    Drain: 305 mL    Indwelling Catheter - Urethral: 1725 mL  Total OUT: 2030 mL    Total NET: -754 mL      2018 07:01  -  2018 11:07  --------------------------------------------------------  IN:    TPN (Total Parenteral Nutrition): 42 mL  Total IN: 42 mL    OUT:    Indwelling Catheter - Urethral: 300 mL  Total OUT: 300 mL    Total NET: -258 mL    POCT Blood Glucose.: 124 mg/dL (2018 04:59)  POCT Blood Glucose.: 119 mg/dL (2018 00:04)  POCT Blood Glucose.: 95 mg/dL (2018 18:00)  POCT Blood Glucose.: 116 mg/dL (2018 12:02)    Daily Weight in k.6 (2018 04:51)    Drug Dosing Weight  Height (cm): 160.02 (2018 15:53)  Weight (kg): 54 (2018 15:53)  BMI (kg/m2): 21.1 (2018 15:53)  BSA (m2): 1.55 (2018 15:53)    PHYSICAL EXAM   Constitutional: no acute distress, sitting comfortably in chair   Respiratory: nonlabored respirations   Gastrointestinal: soft, mildly tender  Extremities: warm, generalized edema    PICC Site: C/D/I    Diet: regular diet and TPN     LABORATORY                                   7.7    10.93 )-----------( 305      ( 2018 05:20 )             22.9   06-    139  |  107  |  11  ----------------------------<  128<H>  3.9   |  24  |  0.33<L>    Ca    7.3<L>      2018 05:20  Phos  2.7       Mg     2.3         TPro  4.8<L>  /  Alb  1.8<L>  /  TBili  9.0<H>  /  DBili  x   /  AST  34<H>  /  ALT  17  /  AlkPhos  239<H>      LIVER FUNCTIONS - ( 2018 07:05 )  Alb: 2.0 g/dL / Pro: 4.8 g/dL / ALK PHOS: 280 u/L / ALT: 13 u/L / AST: 32 u/L / GGT: x            Chol -- LDL -- HDL -- Trig 138,  Chol -- LDL -- HDL -- Trig 112, 06-05 Chol 156  HDL 8<L> Trig 85, 05-19 Chol 122 LDL 73 HDL 33<L> Trig 78    Prealbumin /18: 10    ASSESSMENT/PLAN:  59 y/o female s/p ERCP c/b perforation of afferent limb. Pt underwent emergent Ex-lap w/ resection of perforated bowel and stapled side to side functional end to end anastomosis. Pt remained intubated and was transferred to SICU off pressors. s/p extubation, now transferred to floor. Patient meets criteria for severe protein calorie malnutrition requiring TPN for nutritional support.  Patient is tolerating regular diet at this time.     TPN decreased to 1 L today with 1/2 calories to encourage increased PO intake and observe tolerance - continue same today    Monitor fingersticks q 12 hours, obtain daily weights.    Labs reviewed  Continue TPN and lipids. Will follow up with primary team on plan, plan to wean TPN and lipids tomorrow if patient continues to tolerate PO intake     1. Protein calorie malnutrition being optimized with TPN: CHO [105 ] gm.  AA [40] gm. SMOF Lipids [17] gm. lipids will be administered over 12 hours   2.  Hyperglycemia managed with: [0 ] units of regular insulin    3.  Check fluid balance daily.  Strict I/O  [ ] [ ]   4.  Daily BMP, Ionized Calcium, Magnesium and Phosphorous   5.  Triglycerides at initiation of TPN and monthly [ ] [ ]   6.  Pepcid in TPN for Gi prophylaxis  [ ]     Nutrition support pager [phone]

## 2018-06-21 NOTE — PROGRESS NOTE ADULT - SUBJECTIVE AND OBJECTIVE BOX
S: no acute events overnight, pt seen and examined.    O:  T(C): 36.8 (06-21-18 @ 12:14), Max: 37.2 (06-21-18 @ 00:08)  HR: 79 (06-21-18 @ 12:14) (79 - 90)  BP: 119/63 (06-21-18 @ 12:14) (113/56 - 126/64)  RR: 18 (06-21-18 @ 12:14) (16 - 18)  SpO2: 100% (06-21-18 @ 12:14) (95% - 100%)  Wt(kg): --  06-20 @ 07:01  -  06-21 @ 07:00  --------------------------------------------------------  IN:    fat emulsion (Fish Oil and Plant Based) 20% Infusion: 84 mL    heparin  Infusion.: 100 mL    TPN (Total Parenteral Nutrition): 1092 mL  Total IN: 1276 mL    OUT:    Drain: 305 mL    Indwelling Catheter - Urethral: 1725 mL  Total OUT: 2030 mL    Total NET: -754 mL      06-21 @ 07:01  -  06-21 @ 13:05  --------------------------------------------------------  IN:    TPN (Total Parenteral Nutrition): 42 mL  Total IN: 42 mL    OUT:    Drain: 125 mL    Indwelling Catheter - Urethral: 575 mL  Total OUT: 700 mL    Total NET: -658 mL    Gen: NAD  Chest: breathing comfortably  Abd: incision c/d/i with staple fluid collection beneath midline incision    .  LABS:                         7.7    10.93 )-----------( 305      ( 21 Jun 2018 05:20 )             22.9     06-21    139  |  107  |  11  ----------------------------<  128<H>  3.9   |  24  |  0.33<L>    Ca    7.3<L>      21 Jun 2018 05:20  Phos  2.7     06-21  Mg     2.3     06-21    TPro  4.8<L>  /  Alb  1.8<L>  /  TBili  9.0<H>  /  DBili  x   /  AST  34<H>  /  ALT  17  /  AlkPhos  239<H>  06-20    PT/INR - ( 20 Jun 2018 05:55 )   PT: 12.9 SEC;   INR: 1.12          PTT - ( 20 Jun 2018 08:21 )  PTT:75.0 SEC          RADIOLOGY, EKG & ADDITIONAL TESTS: Reviewed.

## 2018-06-21 NOTE — PROGRESS NOTE ADULT - ASSESSMENT
Impression:  1. Unsuccessful ERCP complicated by small bowel perforation 6/6/18 s/p ex-lap with small bowel resection and anastomosis 6/6/18  2. Obstructive jaundice with dilated CBD and 1.4 cm enhancing pancreatic head mass s/p percutaneous drainage   3. 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

## 2018-06-21 NOTE — PHYSICAL THERAPY INITIAL EVALUATION ADULT - PLANNED THERAPY INTERVENTIONS, PT EVAL
bed mobility training/balance training/transfer training/strengthening/gait training/Patient left semi reclined in bed in NAD; call bell in reach; all lines intact; family at bedside; MITZI Lugo aware.

## 2018-06-21 NOTE — PROGRESS NOTE ADULT - SUBJECTIVE AND OBJECTIVE BOX
Chief Complaint:  Patient is a 58y old  Female who presents with a chief complaint of Painless jaundice (2018 14:49)      Interval Events: Patient feels about the same - still has abdominal discomfort. She denies fever, chills, vomiting, melena, hematochezia.     Allergies:  No Known Allergies      Hospital Medications:  artificial  tears Solution 1 Drop(s) Both EYES four times a day  benzocaine 15 mG/menthol 3.6 mG Lozenge 1 Lozenge Oral every 6 hours PRN  chlorhexidine 4% Liquid 1 Application(s) Topical once  enoxaparin Injectable 65 milliGRAM(s) SubCutaneous <User Schedule>  fat emulsion (Fish Oil and Plant Based) 20% Infusion 7 mL/Hr IV Continuous <Continuous>  HYDROmorphone  Injectable 0.5 milliGRAM(s) IV Push every 4 hours PRN  HYDROmorphone  Injectable 1 milliGRAM(s) IV Push every 4 hours PRN  ibuprofen  Tablet 600 milliGRAM(s) Oral every 6 hours  pantoprazole  Injectable 40 milliGRAM(s) IV Push daily  Parenteral Nutrition - Adult 1 Each TPN Continuous <Continuous>  sucralfate 1 Gram(s) Oral four times a day      PMHX/PSHX:  Gastric cancer  Malignant neoplasm of stomach, unspecified location  No pertinent past medical history  S/P partial gastrectomy  No significant past surgical history      Family history:  No pertinent family history in first degree relatives      ROS: Negative, except as otherwise noted above    PHYSICAL EXAM:   Vital Signs:  Vital Signs Last 24 Hrs  T(C): 36.7 (2018 07:51), Max: 37.2 (2018 00:08)  T(F): 98.1 (2018 07:51), Max: 99 (2018 00:08)  HR: 85 (2018 07:51) (79 - 90)  BP: 113/61 (2018 07:51) (113/56 - 128/61)  BP(mean): 73 (2018 07:51) (73 - 73)  RR: 16 (2018 07:51) (16 - 19)  SpO2: 99% (2018 07:51) (95% - 99%)  Daily     Daily Weight in k.6 (2018 04:51)    GENERAL: No acute distress    HEENT:  Icteric, no thrush  CHEST:  Non-labored breathing, lungs clear b/l  HEART:  +s1, s2 heart sounds, no murmurs  ABDOMEN:  +Midline surgical incision, +biliary drainage catheter, mildly tender diffusely  EXTREMITIES:  Warm and well perfused  SKIN:  No rash  NEURO:   Awake, interactive, following commands       LABS:  CBC Full  -  ( 2018 05:20 )  WBC Count : 10.93 K/uL  Hemoglobin : 7.7 g/dL  Hematocrit : 22.9 %  Platelet Count - Automated : 305 K/uL  Mean Cell Volume : 90.2 fL  Auto Neutrophil # :   Auto Neutrophil % :     06-21 @ 05:20  Na 139 mmol/L  K 3.9 mmol/L  Cl 107 mmol/L  CO2 24 mmol/L  BUN 11 mg/dL  Creat 0.33 mg/dL  Glucose 128 mg/dL  Ca 7.3 mg/dL    Total protein --  Albumin --  T bili --  Alk phos --  AST --  ALT --    PT/INR - ( 2018 05:55 )   PT: 12.9 SEC;   INR: 1.12          PTT - ( 2018 08:21 )  PTT:75.0 SEC

## 2018-06-21 NOTE — PROGRESS NOTE ADULT - ASSESSMENT
58F s/p ERCP EUS aborted 2/2 small bowel perforation, s/p ex lap resection of small bowel side to side anastomosis currently hemodynamically stable on the floor.     - Reg diet/TPN, calorie count to gauge future TPN needs  - Continue martinez  - Monitor GI function  - DVT ppx  - f/u PT  - Dispo: DC planning

## 2018-06-21 NOTE — PHYSICAL THERAPY INITIAL EVALUATION ADULT - DID THE PATIENT HAVE SURGERY?
yes/s/p unsuccessful ERCP complicated by small bowel perforation; s/p expl lap with small bowel resection and anastomosis

## 2018-06-21 NOTE — PROGRESS NOTE ADULT - ATTENDING COMMENTS
57 y/o female s/p ERCP c/b perforation of afferent limb. Pt underwent emergent Ex-lap w/ resection of perforated bowel and stapled side to side functional end to end anastomosis. Pt remained intubated and was transferred to SICU off pressors. s/p extubation, now transferred to floor. Patient meets criteria for severe protein calorie malnutrition requiring TPN for nutritional support.  Patient is tolerating regular diet at this time.     TPN decreased to 1 L today with 1/2 calories to encourage increased PO intake and observe tolerance - continue same today    Monitor fingersticks q 12 hours, obtain daily weights.    Labs reviewed  Continue TPN and lipids. Will follow up with primary team on plan, plan to wean TPN and lipids tomorrow if patient continues to tolerate PO intake     1. Protein calorie malnutrition being optimized with TPN: CHO [105 ] gm.  AA [40] gm. SMOF Lipids [17] gm. lipids will be administered over 12 hours   2.  Hyperglycemia managed with: [0 ] units of regular insulin    3.  Check fluid balance daily.  Strict I/O  [ ] [ ]   4.  Daily BMP, Ionized Calcium, Magnesium and Phosphorous   5.  Triglycerides at initiation of TPN and monthly [ ] [ ]   6.  Pepcid in TPN for Gi prophylaxis  [ ]     I have seen and examined the patient, reviewed the laboratory and radiologic data and agree with the history, physical assessment and plan.  I reviewed and discussed with all consultants, house staff and PA's.  The note is edited where appropriate. The Nutrition Support Team (NST) discusses on an ongoing basis with the primary team and all consultants, House staff and PA's to have a permanent risk benefit analyses on all decisions.  I spent  45  minutes in total; providing diagnoses, assessment and plan.

## 2018-06-22 LAB
ALBUMIN SERPL ELPH-MCNC: 1.9 G/DL — LOW (ref 3.3–5)
ALP SERPL-CCNC: 177 U/L — HIGH (ref 40–120)
ALT FLD-CCNC: 19 U/L — SIGNIFICANT CHANGE UP (ref 4–33)
APTT BLD: 29.8 SEC — SIGNIFICANT CHANGE UP (ref 27.5–37.4)
AST SERPL-CCNC: 36 U/L — HIGH (ref 4–32)
BILIRUB SERPL-MCNC: 8.9 MG/DL — HIGH (ref 0.2–1.2)
BLD GP AB SCN SERPL QL: NEGATIVE — SIGNIFICANT CHANGE UP
BUN SERPL-MCNC: 10 MG/DL — SIGNIFICANT CHANGE UP (ref 7–23)
CALCIUM SERPL-MCNC: 7.4 MG/DL — LOW (ref 8.4–10.5)
CHLORIDE SERPL-SCNC: 107 MMOL/L — SIGNIFICANT CHANGE UP (ref 98–107)
CO2 SERPL-SCNC: 23 MMOL/L — SIGNIFICANT CHANGE UP (ref 22–31)
CREAT SERPL-MCNC: 0.32 MG/DL — LOW (ref 0.5–1.3)
GLUCOSE SERPL-MCNC: 92 MG/DL — SIGNIFICANT CHANGE UP (ref 70–99)
HCT VFR BLD CALC: 19.3 % — CRITICAL LOW (ref 34.5–45)
HCT VFR BLD CALC: 27.2 % — LOW (ref 34.5–45)
HGB BLD-MCNC: 6.6 G/DL — CRITICAL LOW (ref 11.5–15.5)
HGB BLD-MCNC: 9.4 G/DL — LOW (ref 11.5–15.5)
INR BLD: 1.03 — SIGNIFICANT CHANGE UP (ref 0.88–1.17)
MAGNESIUM SERPL-MCNC: 2.4 MG/DL — SIGNIFICANT CHANGE UP (ref 1.6–2.6)
MCHC RBC-ENTMCNC: 30.4 PG — SIGNIFICANT CHANGE UP (ref 27–34)
MCHC RBC-ENTMCNC: 31.1 PG — SIGNIFICANT CHANGE UP (ref 27–34)
MCHC RBC-ENTMCNC: 34.2 % — SIGNIFICANT CHANGE UP (ref 32–36)
MCHC RBC-ENTMCNC: 34.6 % — SIGNIFICANT CHANGE UP (ref 32–36)
MCV RBC AUTO: 88 FL — SIGNIFICANT CHANGE UP (ref 80–100)
MCV RBC AUTO: 91 FL — SIGNIFICANT CHANGE UP (ref 80–100)
NRBC # FLD: 0 — SIGNIFICANT CHANGE UP
NRBC # FLD: 0 — SIGNIFICANT CHANGE UP
PHOSPHATE SERPL-MCNC: 3.3 MG/DL — SIGNIFICANT CHANGE UP (ref 2.5–4.5)
PLATELET # BLD AUTO: 333 K/UL — SIGNIFICANT CHANGE UP (ref 150–400)
PLATELET # BLD AUTO: 336 K/UL — SIGNIFICANT CHANGE UP (ref 150–400)
PMV BLD: 10.8 FL — SIGNIFICANT CHANGE UP (ref 7–13)
PMV BLD: 11 FL — SIGNIFICANT CHANGE UP (ref 7–13)
POTASSIUM SERPL-MCNC: 4.6 MMOL/L — SIGNIFICANT CHANGE UP (ref 3.5–5.3)
POTASSIUM SERPL-SCNC: 4.6 MMOL/L — SIGNIFICANT CHANGE UP (ref 3.5–5.3)
PROT SERPL-MCNC: 4.7 G/DL — LOW (ref 6–8.3)
PROTHROM AB SERPL-ACNC: 11.9 SEC — SIGNIFICANT CHANGE UP (ref 9.8–13.1)
RBC # BLD: 2.12 M/UL — LOW (ref 3.8–5.2)
RBC # BLD: 3.09 M/UL — LOW (ref 3.8–5.2)
RBC # FLD: 21 % — HIGH (ref 10.3–14.5)
RBC # FLD: 25.5 % — HIGH (ref 10.3–14.5)
RH IG SCN BLD-IMP: POSITIVE — SIGNIFICANT CHANGE UP
SODIUM SERPL-SCNC: 137 MMOL/L — SIGNIFICANT CHANGE UP (ref 135–145)
WBC # BLD: 13.37 K/UL — HIGH (ref 3.8–10.5)
WBC # BLD: 13.69 K/UL — HIGH (ref 3.8–10.5)
WBC # FLD AUTO: 13.37 K/UL — HIGH (ref 3.8–10.5)
WBC # FLD AUTO: 13.69 K/UL — HIGH (ref 3.8–10.5)

## 2018-06-22 PROCEDURE — 99232 SBSQ HOSP IP/OBS MODERATE 35: CPT | Mod: GC

## 2018-06-22 PROCEDURE — 99233 SBSQ HOSP IP/OBS HIGH 50: CPT

## 2018-06-22 RX ORDER — HYDROMORPHONE HYDROCHLORIDE 2 MG/ML
2 INJECTION INTRAMUSCULAR; INTRAVENOUS; SUBCUTANEOUS
Qty: 0 | Refills: 0 | Status: DISCONTINUED | OUTPATIENT
Start: 2018-06-22 | End: 2018-06-28

## 2018-06-22 RX ORDER — MORPHINE SULFATE 50 MG/1
4 CAPSULE, EXTENDED RELEASE ORAL
Qty: 0 | Refills: 0 | Status: DISCONTINUED | OUTPATIENT
Start: 2018-06-22 | End: 2018-06-29

## 2018-06-22 RX ORDER — ELECTROLYTE SOLUTION,INJ
1 VIAL (ML) INTRAVENOUS
Qty: 0 | Refills: 0 | Status: DISCONTINUED | OUTPATIENT
Start: 2018-06-22 | End: 2018-06-22

## 2018-06-22 RX ORDER — I.V. FAT EMULSION 20 G/100ML
7 EMULSION INTRAVENOUS
Qty: 17 | Refills: 0 | Status: DISCONTINUED | OUTPATIENT
Start: 2018-06-22 | End: 2018-06-25

## 2018-06-22 RX ORDER — MORPHINE SULFATE 50 MG/1
4 CAPSULE, EXTENDED RELEASE ORAL EVERY 4 HOURS
Qty: 0 | Refills: 0 | Status: DISCONTINUED | OUTPATIENT
Start: 2018-06-22 | End: 2018-06-22

## 2018-06-22 RX ADMIN — Medication 1 GRAM(S): at 17:40

## 2018-06-22 RX ADMIN — Medication 600 MILLIGRAM(S): at 00:00

## 2018-06-22 RX ADMIN — PANTOPRAZOLE SODIUM 40 MILLIGRAM(S): 20 TABLET, DELAYED RELEASE ORAL at 05:50

## 2018-06-22 RX ADMIN — MORPHINE SULFATE 2 MILLIGRAM(S): 50 CAPSULE, EXTENDED RELEASE ORAL at 00:02

## 2018-06-22 RX ADMIN — Medication 600 MILLIGRAM(S): at 05:50

## 2018-06-22 RX ADMIN — Medication 5 MILLIGRAM(S): at 14:34

## 2018-06-22 RX ADMIN — Medication 600 MILLIGRAM(S): at 23:07

## 2018-06-22 RX ADMIN — Medication 600 MILLIGRAM(S): at 06:50

## 2018-06-22 RX ADMIN — MORPHINE SULFATE 4 MILLIGRAM(S): 50 CAPSULE, EXTENDED RELEASE ORAL at 09:35

## 2018-06-22 RX ADMIN — MORPHINE SULFATE 4 MILLIGRAM(S): 50 CAPSULE, EXTENDED RELEASE ORAL at 22:00

## 2018-06-22 RX ADMIN — MORPHINE SULFATE 2 MILLIGRAM(S): 50 CAPSULE, EXTENDED RELEASE ORAL at 00:38

## 2018-06-22 RX ADMIN — MORPHINE SULFATE 4 MILLIGRAM(S): 50 CAPSULE, EXTENDED RELEASE ORAL at 21:43

## 2018-06-22 RX ADMIN — Medication 600 MILLIGRAM(S): at 18:00

## 2018-06-22 RX ADMIN — Medication 5 MILLIGRAM(S): at 05:49

## 2018-06-22 RX ADMIN — Medication 1 GRAM(S): at 12:28

## 2018-06-22 RX ADMIN — MORPHINE SULFATE 4 MILLIGRAM(S): 50 CAPSULE, EXTENDED RELEASE ORAL at 18:00

## 2018-06-22 RX ADMIN — I.V. FAT EMULSION 7 ML/HR: 20 EMULSION INTRAVENOUS at 17:40

## 2018-06-22 RX ADMIN — MORPHINE SULFATE 4 MILLIGRAM(S): 50 CAPSULE, EXTENDED RELEASE ORAL at 17:40

## 2018-06-22 RX ADMIN — MORPHINE SULFATE 4 MILLIGRAM(S): 50 CAPSULE, EXTENDED RELEASE ORAL at 13:38

## 2018-06-22 RX ADMIN — Medication 1 GRAM(S): at 23:32

## 2018-06-22 RX ADMIN — Medication 600 MILLIGRAM(S): at 17:43

## 2018-06-22 RX ADMIN — Medication 600 MILLIGRAM(S): at 12:28

## 2018-06-22 RX ADMIN — MORPHINE SULFATE 4 MILLIGRAM(S): 50 CAPSULE, EXTENDED RELEASE ORAL at 13:15

## 2018-06-22 RX ADMIN — Medication 1 EACH: at 17:40

## 2018-06-22 RX ADMIN — MORPHINE SULFATE 4 MILLIGRAM(S): 50 CAPSULE, EXTENDED RELEASE ORAL at 09:16

## 2018-06-22 RX ADMIN — Medication 1 GRAM(S): at 05:50

## 2018-06-22 NOTE — PROGRESS NOTE ADULT - ATTENDING COMMENTS
59 y/o female s/p ERCP c/b perforation of afferent limb. Pt underwent emergent Ex-lap w/ resection of perforated bowel and stapled side to side functional end to end anastomosis. Pt remained intubated and was transferred to SICU off pressors. s/p extubation, now transferred to floor. Patient meets criteria for severe protein calorie malnutrition requiring TPN for nutritional support.  Patient states that she has some nausea with po intake.     TPN decreased to 1 L today with 1/2 calories to encourage increased PO intake and observe tolerance - continue same today    Monitor fingersticks q 12 hours, obtain daily weights.    Labs reviewed    Continue calorie count   Continue TPN and lipids. Will follow up with primary team on plan, plan to wean TPN and lipids tomorrow if patient continues to tolerate PO intake     1. Protein calorie malnutrition being optimized with TPN: CHO [105 ] gm.  AA [40] gm. SMOF Lipids [17] gm. lipids will be administered over 12 hours   2.  Hyperglycemia managed with: [0 ] units of regular insulin    3.  Check fluid balance daily.  Strict I/O  [ ] [ ]   4.  Daily BMP, Ionized Calcium, Magnesium and Phosphorous   5.  Triglycerides at initiation of TPN and monthly [ ] [ ]   6.  Pepcid in TPN for Gi prophylaxis  [ ]     I have seen and examined the patient, reviewed the laboratory and radiologic data and agree with the history, physical assessment and plan.  I reviewed and discussed with all consultants, house staff and PA's.  The note is edited where appropriate. The Nutrition Support Team (NST) discusses on an ongoing basis with the primary team and all consultants, House staff and PA's to have a permanent risk benefit analyses on all decisions.  I spent  45  minutes in total; providing diagnoses, assessment and plan.

## 2018-06-22 NOTE — PROGRESS NOTE ADULT - ASSESSMENT
58F s/p ERCP EUS aborted 2/2 small bowel perforation, s/p ex lap resection of small bowel side to side anastomosis currently hemodynamically stable on the floor.     -transfuse 2 u pRBCs  - f/u post transfusion CBC  - Reg diet/TPN, calorie count to gauge future TPN needs  - Continue martinez  - Monitor GI function  - holding AC for drop in H/H  - f/u PT  - Dispo: DC planning

## 2018-06-22 NOTE — PROGRESS NOTE ADULT - SUBJECTIVE AND OBJECTIVE BOX
S: low h/h this morning, held AM AC, transfusing 2 units, pt seen and examined.    O:  T(C): 37.1 (06-22-18 @ 12:50), Max: 37.1 (06-21-18 @ 23:26)  HR: 96 (06-22-18 @ 12:50) (84 - 98)  BP: 125/71 (06-22-18 @ 12:50) (107/61 - 130/70)  RR: 16 (06-22-18 @ 12:50) (16 - 18)  SpO2: 100% (06-22-18 @ 12:50) (96% - 100%)  Wt(kg): --  06-21 @ 07:01  -  06-22 @ 07:00  --------------------------------------------------------  IN:    fat emulsion (Fish Oil and Plant Based) 20% Infusion: 21 mL    Oral Fluid: 500 mL    TPN (Total Parenteral Nutrition): 378 mL  Total IN: 899 mL    OUT:    Drain: 275 mL    Indwelling Catheter - Urethral: 2100 mL  Total OUT: 2375 mL    Total NET: -1476 mL      06-22 @ 07:01  -  06-22 @ 15:33  --------------------------------------------------------  IN:    TPN (Total Parenteral Nutrition): 336 mL  Total IN: 336 mL    OUT:    Drain: 70 mL    Indwelling Catheter - Urethral: 900 mL  Total OUT: 970 mL    Total NET: -634 mL          Gen: NAD  Chest: breathing comfortably  Abd: incision c/d/i with staple fluid collection beneath midline incision    .  LABS:                         6.6    13.69 )-----------( 333      ( 22 Jun 2018 05:20 )             19.3     06-22    137  |  107  |  10  ----------------------------<  92  4.6   |  23  |  0.32<L>    Ca    7.4<L>      22 Jun 2018 05:20  Phos  3.3     06-22  Mg     2.4     06-22    TPro  4.7<L>  /  Alb  1.9<L>  /  TBili  8.9<H>  /  DBili  x   /  AST  36<H>  /  ALT  19  /  AlkPhos  177<H>  06-22    PT/INR - ( 22 Jun 2018 06:52 )   PT: 11.9 SEC;   INR: 1.03          PTT - ( 22 Jun 2018 06:52 )  PTT:29.8 SEC          RADIOLOGY, EKG & ADDITIONAL TESTS: Reviewed.

## 2018-06-22 NOTE — PROGRESS NOTE ADULT - ASSESSMENT
Impression:  1. Unsuccessful ERCP complicated by small bowel perforation 6/6/18 s/p ex-lap with small bowel resection and anastomosis 6/6/18  2. Obstructive jaundice with dilated CBD and 1.4 cm enhancing pancreatic head mass s/p percutaneous drainage, with persistent hyperbilirubinemia   3. 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

## 2018-06-22 NOTE — PROGRESS NOTE ADULT - SUBJECTIVE AND OBJECTIVE BOX
NUTRITION NOTE  [id number] CHOKYI  ===============================    Interval events  Patient was seen and examined at bedside, downtrending h/h.   TPN/lipids initiated on 18, patient is tolerating without any issues.  Advanced to regular diet, 1/2 TPN volume and calories today   Patient is tolerating broth and some food, does not feel like eating too much.  Patient reports some nausea with PO intake.     Vital Signs Last 24 Hrs  T(C): 37 (2018 05:42), Max: 37.1 (2018 23:26)  T(F): 98.6 (2018 05:42), Max: 98.8 (2018 23:26)  HR: 98 (2018 05:42) (79 - 98)  BP: 112/65 (2018 05:42) (112/65 - 130/70)  RR: 18 (2018 05:42) (18 - 18)  SpO2: 97% (2018 05:42) (96% - 100%)    MEDICATIONS  (STANDING):  chlorhexidine 4% Liquid 1 Application(s) Topical once  fat emulsion (Fish Oil and Plant Based) 20% Infusion 7 mL/Hr (7 mL/Hr) IV Continuous <Continuous>  fat emulsion (Fish Oil and Plant Based) 20% Infusion 7 mL/Hr (7 mL/Hr) IV Continuous <Continuous>  ibuprofen  Tablet 600 milliGRAM(s) Oral every 6 hours  metoclopramide 5 milliGRAM(s) Oral every 8 hours  pantoprazole    Tablet 40 milliGRAM(s) Oral before breakfast  Parenteral Nutrition - Adult 1 Each (42 mL/Hr) TPN Continuous <Continuous>  Parenteral Nutrition - Adult 1 Each (42 mL/Hr) TPN Continuous <Continuous>  sucralfate suspension 1 Gram(s) Oral four times a day    MEDICATIONS  (PRN):  cyclobenzaprine 5 milliGRAM(s) Oral three times a day PRN Muscle Spasm  HYDROmorphone   Tablet 2 milliGRAM(s) Oral every 4 hours PRN Moderate Pain (4 - 6)  morphine  - Injectable 4 milliGRAM(s) IV Push every 4 hours PRN Severe Pain (7 - 10)    I&O's Detail    2018 07:01  -  2018 07:00  --------------------------------------------------------  IN:    fat emulsion (Fish Oil and Plant Based) 20% Infusion: 21 mL    Oral Fluid: 500 mL    TPN (Total Parenteral Nutrition): 378 mL  Total IN: 899 mL    OUT:    Drain: 275 mL    Indwelling Catheter - Urethral: 2100 mL  Total OUT: 2375 mL    Total NET: -1476 mL    POCT Blood Glucose.: 121 mg/dL (2018 06:28)  POCT Blood Glucose.: 135 mg/dL (2018 23:56)  POCT Blood Glucose.: 103 mg/dL (2018 18:27)  POCT Blood Glucose.: 132 mg/dL (2018 12:25)    Daily Weight in k.6 (2018 04:51)    Drug Dosing Weight  Height (cm): 160.02 (2018 15:53)  Weight (kg): 54 (2018 15:53)  BMI (kg/m2): 21.1 (2018 15:53)  BSA (m2): 1.55 (2018 15:53)    PHYSICAL EXAM   Constitutional: no acute distress, sitting comfortably in chair   Respiratory: nonlabored respirations   Gastrointestinal: soft, mildly tender  Extremities: warm, generalized edema    PICC Site: C/D/I    Diet: regular diet and TPN     LABORATORY                        6.6    13.69 )-----------( 333      ( 2018 05:20 )             19.3       137  |  107  |  10  ----------------------------<  92  4.6   |  23  |  0.32<L>    Ca    7.4<L>      2018 05:20  Phos  3.3       Mg     2.4         TPro  4.7<L>  /  Alb  1.9<L>  /  TBili  8.9<H>  /  DBili  x   /  AST  36<H>  /  ALT  19  /  AlkPhos  177<H>  06-22    LIVER FUNCTIONS - ( 2018 07:05 )  Alb: 2.0 g/dL / Pro: 4.8 g/dL / ALK PHOS: 280 u/L / ALT: 13 u/L / AST: 32 u/L / GGT: x           06-20 Chol -- LDL -- HDL -- Trig 138, 06-12 Chol -- LDL -- HDL -- Trig 112, 06-05 Chol 156  HDL 8<L> Trig 85, 05-19 Chol 122 LDL 73 HDL 33<L> Trig 78    Prealbumin /18: 10    ASSESSMENT/PLAN:  59 y/o female s/p ERCP c/b perforation of afferent limb. Pt underwent emergent Ex-lap w/ resection of perforated bowel and stapled side to side functional end to end anastomosis. Pt remained intubated and was transferred to SICU off pressors. s/p extubation, now transferred to floor. Patient meets criteria for severe protein calorie malnutrition requiring TPN for nutritional support.  Patient states that she has some nausea with po intake.     TPN decreased to 1 L today with 1/2 calories to encourage increased PO intake and observe tolerance - continue same today    Monitor fingersticks q 12 hours, obtain daily weights.    Labs reviewed    Continue calorie count   Continue TPN and lipids. Will follow up with primary team on plan, plan to wean TPN and lipids tomorrow if patient continues to tolerate PO intake     1. Protein calorie malnutrition being optimized with TPN: CHO [105 ] gm.  AA [40] gm. SMOF Lipids [17] gm. lipids will be administered over 12 hours   2.  Hyperglycemia managed with: [0 ] units of regular insulin    3.  Check fluid balance daily.  Strict I/O  [ ] [ ]   4.  Daily BMP, Ionized Calcium, Magnesium and Phosphorous   5.  Triglycerides at initiation of TPN and monthly [ ] [ ]   6.  Pepcid in TPN for Gi prophylaxis  [ ]     Nutrition support pager [phone]

## 2018-06-22 NOTE — PROGRESS NOTE ADULT - SUBJECTIVE AND OBJECTIVE BOX
Chief Complaint:  Patient is a 58y old  Female who presents with a chief complaint of Painless jaundice (05 Jun 2018 14:49)      Interval Events: Patient reports persistent abdominal swelling and discomfort. She is tolerating PO diet but reports nausea. She denies melena, hematochezia, hematemesis.    Allergies:  No Known Allergies      Hospital Medications:  chlorhexidine 4% Liquid 1 Application(s) Topical once  cyclobenzaprine 5 milliGRAM(s) Oral three times a day PRN  fat emulsion (Fish Oil and Plant Based) 20% Infusion 7 mL/Hr IV Continuous <Continuous>  fat emulsion (Fish Oil and Plant Based) 20% Infusion 7 mL/Hr IV Continuous <Continuous>  HYDROmorphone   Tablet 2 milliGRAM(s) Oral every 4 hours PRN  HYDROmorphone   Tablet 4 milliGRAM(s) Oral every 4 hours PRN  ibuprofen  Tablet 600 milliGRAM(s) Oral every 6 hours  metoclopramide 5 milliGRAM(s) Oral every 8 hours  pantoprazole    Tablet 40 milliGRAM(s) Oral before breakfast  Parenteral Nutrition - Adult 1 Each TPN Continuous <Continuous>  sucralfate suspension 1 Gram(s) Oral four times a day      PMHX/PSHX:  Gastric cancer  Malignant neoplasm of stomach, unspecified location  No pertinent past medical history  S/P partial gastrectomy  No significant past surgical history      Family history:  No pertinent family history in first degree relatives      ROS: Negative, except as otherwise noted above    PHYSICAL EXAM:   Vital Signs:  Vital Signs Last 24 Hrs  T(C): 37 (22 Jun 2018 05:42), Max: 37.1 (21 Jun 2018 23:26)  T(F): 98.6 (22 Jun 2018 05:42), Max: 98.8 (21 Jun 2018 23:26)  HR: 98 (22 Jun 2018 05:42) (79 - 98)  BP: 112/65 (22 Jun 2018 05:42) (112/65 - 130/70)  BP(mean): --  RR: 18 (22 Jun 2018 05:42) (18 - 18)  SpO2: 97% (22 Jun 2018 05:42) (96% - 100%)  Daily     Daily     GENERAL: No acute distress    HEENT:  Icteric, no thrush  CHEST:  Non-labored breathing, lungs clear b/l  HEART:  +s1, s2 heart sounds, no murmurs  ABDOMEN:  +Midline surgical incision, +biliary drainage catheter, mildly tender diffusely  EXTREMITIES:  Warm and well perfused  SKIN:  No rash  NEURO:   Awake, interactive, following commands     LABS:  CBC Full  -  ( 22 Jun 2018 05:20 )  WBC Count : 13.69 K/uL  Hemoglobin : 6.6 g/dL  Hematocrit : 19.3 %  Platelet Count - Automated : 333 K/uL  Mean Cell Volume : 91.0 fL  Auto Neutrophil # :   Auto Neutrophil % :     06-22 @ 05:20  Na 137 mmol/L  K 4.6 mmol/L  Cl 107 mmol/L  CO2 23 mmol/L  BUN 10 mg/dL  Creat 0.32 mg/dL  Glucose 92 mg/dL  Ca 7.4 mg/dL    Total protein 4.7 g/dL  Albumin 1.9 g/dL  T bili 8.9 mg/dL  Alk phos 177 u/L  AST 36 u/L  ALT 19 u/L    PT/INR - ( 22 Jun 2018 06:52 )   PT: 11.9 SEC;   INR: 1.03          PTT - ( 22 Jun 2018 06:52 )  PTT:29.8 SEC

## 2018-06-23 LAB
BASOPHILS # BLD AUTO: 0.11 K/UL — SIGNIFICANT CHANGE UP (ref 0–0.2)
BASOPHILS NFR BLD AUTO: 0.8 % — SIGNIFICANT CHANGE UP (ref 0–2)
BUN SERPL-MCNC: 10 MG/DL — SIGNIFICANT CHANGE UP (ref 7–23)
CA-I BLD-SCNC: 1.16 MMOL/L — SIGNIFICANT CHANGE UP (ref 1.03–1.23)
CALCIUM SERPL-MCNC: 7.8 MG/DL — LOW (ref 8.4–10.5)
CHLORIDE SERPL-SCNC: 104 MMOL/L — SIGNIFICANT CHANGE UP (ref 98–107)
CO2 SERPL-SCNC: 23 MMOL/L — SIGNIFICANT CHANGE UP (ref 22–31)
CREAT SERPL-MCNC: 0.35 MG/DL — LOW (ref 0.5–1.3)
EOSINOPHIL # BLD AUTO: 0.15 K/UL — SIGNIFICANT CHANGE UP (ref 0–0.5)
EOSINOPHIL NFR BLD AUTO: 1 % — SIGNIFICANT CHANGE UP (ref 0–6)
GLUCOSE SERPL-MCNC: 93 MG/DL — SIGNIFICANT CHANGE UP (ref 70–99)
HCT VFR BLD CALC: 25.6 % — LOW (ref 34.5–45)
HGB BLD-MCNC: 9.2 G/DL — LOW (ref 11.5–15.5)
IMM GRANULOCYTES # BLD AUTO: 0.1 # — SIGNIFICANT CHANGE UP
IMM GRANULOCYTES NFR BLD AUTO: 0.7 % — SIGNIFICANT CHANGE UP (ref 0–1.5)
LYMPHOCYTES # BLD AUTO: 1.19 K/UL — SIGNIFICANT CHANGE UP (ref 1–3.3)
LYMPHOCYTES # BLD AUTO: 8.3 % — LOW (ref 13–44)
MAGNESIUM SERPL-MCNC: 2.4 MG/DL — SIGNIFICANT CHANGE UP (ref 1.6–2.6)
MCHC RBC-ENTMCNC: 30.6 PG — SIGNIFICANT CHANGE UP (ref 27–34)
MCHC RBC-ENTMCNC: 35.9 % — SIGNIFICANT CHANGE UP (ref 32–36)
MCV RBC AUTO: 85 FL — SIGNIFICANT CHANGE UP (ref 80–100)
MONOCYTES # BLD AUTO: 0.98 K/UL — HIGH (ref 0–0.9)
MONOCYTES NFR BLD AUTO: 6.8 % — SIGNIFICANT CHANGE UP (ref 2–14)
NEUTROPHILS # BLD AUTO: 11.83 K/UL — HIGH (ref 1.8–7.4)
NEUTROPHILS NFR BLD AUTO: 82.4 % — HIGH (ref 43–77)
NRBC # FLD: 0 — SIGNIFICANT CHANGE UP
PHOSPHATE SERPL-MCNC: 4 MG/DL — SIGNIFICANT CHANGE UP (ref 2.5–4.5)
PLATELET # BLD AUTO: 290 K/UL — SIGNIFICANT CHANGE UP (ref 150–400)
PMV BLD: 10.7 FL — SIGNIFICANT CHANGE UP (ref 7–13)
POTASSIUM SERPL-MCNC: 5 MMOL/L — SIGNIFICANT CHANGE UP (ref 3.5–5.3)
POTASSIUM SERPL-SCNC: 5 MMOL/L — SIGNIFICANT CHANGE UP (ref 3.5–5.3)
RBC # BLD: 3.01 M/UL — LOW (ref 3.8–5.2)
RBC # FLD: 21.5 % — HIGH (ref 10.3–14.5)
SODIUM SERPL-SCNC: 136 MMOL/L — SIGNIFICANT CHANGE UP (ref 135–145)
WBC # BLD: 14.36 K/UL — HIGH (ref 3.8–10.5)
WBC # FLD AUTO: 14.36 K/UL — HIGH (ref 3.8–10.5)

## 2018-06-23 PROCEDURE — 99233 SBSQ HOSP IP/OBS HIGH 50: CPT

## 2018-06-23 PROCEDURE — 99232 SBSQ HOSP IP/OBS MODERATE 35: CPT | Mod: GC

## 2018-06-23 RX ORDER — DIBUCAINE 1 %
1 OINTMENT (GRAM) RECTAL DAILY
Qty: 0 | Refills: 0 | Status: DISCONTINUED | OUTPATIENT
Start: 2018-06-23 | End: 2018-07-11

## 2018-06-23 RX ORDER — I.V. FAT EMULSION 20 G/100ML
7 EMULSION INTRAVENOUS
Qty: 17 | Refills: 0 | Status: DISCONTINUED | OUTPATIENT
Start: 2018-06-23 | End: 2018-06-25

## 2018-06-23 RX ORDER — PHENYLEPHRINE-SHARK LIVER OIL-MINERAL OIL-PETROLATUM RECTAL OINTMENT
1 OINTMENT (GRAM) RECTAL DAILY
Qty: 0 | Refills: 0 | Status: DISCONTINUED | OUTPATIENT
Start: 2018-06-23 | End: 2018-06-23

## 2018-06-23 RX ORDER — ELECTROLYTE SOLUTION,INJ
1 VIAL (ML) INTRAVENOUS
Qty: 0 | Refills: 0 | Status: DISCONTINUED | OUTPATIENT
Start: 2018-06-23 | End: 2018-06-23

## 2018-06-23 RX ORDER — CHLORHEXIDINE GLUCONATE 213 G/1000ML
1 SOLUTION TOPICAL
Qty: 0 | Refills: 0 | Status: DISCONTINUED | OUTPATIENT
Start: 2018-06-23 | End: 2018-07-06

## 2018-06-23 RX ORDER — HEPARIN SODIUM 5000 [USP'U]/ML
5000 INJECTION INTRAVENOUS; SUBCUTANEOUS EVERY 8 HOURS
Qty: 0 | Refills: 0 | Status: DISCONTINUED | OUTPATIENT
Start: 2018-06-23 | End: 2018-06-24

## 2018-06-23 RX ADMIN — MORPHINE SULFATE 4 MILLIGRAM(S): 50 CAPSULE, EXTENDED RELEASE ORAL at 17:33

## 2018-06-23 RX ADMIN — MORPHINE SULFATE 4 MILLIGRAM(S): 50 CAPSULE, EXTENDED RELEASE ORAL at 10:50

## 2018-06-23 RX ADMIN — HYDROMORPHONE HYDROCHLORIDE 2 MILLIGRAM(S): 2 INJECTION INTRAMUSCULAR; INTRAVENOUS; SUBCUTANEOUS at 16:40

## 2018-06-23 RX ADMIN — MORPHINE SULFATE 4 MILLIGRAM(S): 50 CAPSULE, EXTENDED RELEASE ORAL at 03:15

## 2018-06-23 RX ADMIN — HEPARIN SODIUM 5000 UNIT(S): 5000 INJECTION INTRAVENOUS; SUBCUTANEOUS at 14:02

## 2018-06-23 RX ADMIN — Medication 5 MILLIGRAM(S): at 14:02

## 2018-06-23 RX ADMIN — Medication 1 EACH: at 17:36

## 2018-06-23 RX ADMIN — HYDROMORPHONE HYDROCHLORIDE 2 MILLIGRAM(S): 2 INJECTION INTRAMUSCULAR; INTRAVENOUS; SUBCUTANEOUS at 20:09

## 2018-06-23 RX ADMIN — MORPHINE SULFATE 4 MILLIGRAM(S): 50 CAPSULE, EXTENDED RELEASE ORAL at 10:23

## 2018-06-23 RX ADMIN — HEPARIN SODIUM 5000 UNIT(S): 5000 INJECTION INTRAVENOUS; SUBCUTANEOUS at 21:34

## 2018-06-23 RX ADMIN — HYDROMORPHONE HYDROCHLORIDE 2 MILLIGRAM(S): 2 INJECTION INTRAMUSCULAR; INTRAVENOUS; SUBCUTANEOUS at 11:47

## 2018-06-23 RX ADMIN — Medication 1 GRAM(S): at 11:47

## 2018-06-23 RX ADMIN — Medication 600 MILLIGRAM(S): at 05:11

## 2018-06-23 RX ADMIN — MORPHINE SULFATE 4 MILLIGRAM(S): 50 CAPSULE, EXTENDED RELEASE ORAL at 14:02

## 2018-06-23 RX ADMIN — HYDROMORPHONE HYDROCHLORIDE 2 MILLIGRAM(S): 2 INJECTION INTRAMUSCULAR; INTRAVENOUS; SUBCUTANEOUS at 20:40

## 2018-06-23 RX ADMIN — Medication 600 MILLIGRAM(S): at 05:56

## 2018-06-23 RX ADMIN — Medication 5 MILLIGRAM(S): at 05:11

## 2018-06-23 RX ADMIN — MORPHINE SULFATE 4 MILLIGRAM(S): 50 CAPSULE, EXTENDED RELEASE ORAL at 23:05

## 2018-06-23 RX ADMIN — Medication 1 GRAM(S): at 05:10

## 2018-06-23 RX ADMIN — Medication 600 MILLIGRAM(S): at 00:00

## 2018-06-23 RX ADMIN — HYDROMORPHONE HYDROCHLORIDE 2 MILLIGRAM(S): 2 INJECTION INTRAMUSCULAR; INTRAVENOUS; SUBCUTANEOUS at 16:11

## 2018-06-23 RX ADMIN — MORPHINE SULFATE 4 MILLIGRAM(S): 50 CAPSULE, EXTENDED RELEASE ORAL at 05:57

## 2018-06-23 RX ADMIN — MORPHINE SULFATE 4 MILLIGRAM(S): 50 CAPSULE, EXTENDED RELEASE ORAL at 17:50

## 2018-06-23 RX ADMIN — MORPHINE SULFATE 4 MILLIGRAM(S): 50 CAPSULE, EXTENDED RELEASE ORAL at 22:49

## 2018-06-23 RX ADMIN — I.V. FAT EMULSION 7 ML/HR: 20 EMULSION INTRAVENOUS at 17:35

## 2018-06-23 RX ADMIN — MORPHINE SULFATE 4 MILLIGRAM(S): 50 CAPSULE, EXTENDED RELEASE ORAL at 14:20

## 2018-06-23 RX ADMIN — Medication 5 MILLIGRAM(S): at 21:35

## 2018-06-23 RX ADMIN — PANTOPRAZOLE SODIUM 40 MILLIGRAM(S): 20 TABLET, DELAYED RELEASE ORAL at 05:10

## 2018-06-23 RX ADMIN — HYDROMORPHONE HYDROCHLORIDE 2 MILLIGRAM(S): 2 INJECTION INTRAMUSCULAR; INTRAVENOUS; SUBCUTANEOUS at 12:15

## 2018-06-23 RX ADMIN — MORPHINE SULFATE 4 MILLIGRAM(S): 50 CAPSULE, EXTENDED RELEASE ORAL at 02:55

## 2018-06-23 RX ADMIN — MORPHINE SULFATE 4 MILLIGRAM(S): 50 CAPSULE, EXTENDED RELEASE ORAL at 06:54

## 2018-06-23 RX ADMIN — Medication 1 GRAM(S): at 17:33

## 2018-06-23 NOTE — PROGRESS NOTE ADULT - ATTENDING COMMENTS
57 y/o female s/p ERCP c/b perforation of afferent limb. Pt underwent emergent Ex-lap w/ resection of perforated bowel and stapled side to side functional end to end anastomosis. Pt remained intubated and was transferred to SICU off pressors. s/p extubation, now transferred to floor. Patient meets criteria for severe protein calorie malnutrition requiring TPN for nutritional support.      TPN decreased to 1 L today with 1/2 calories to encourage increased PO intake and observe tolerance - continue same today    Monitor fingersticks q 12 hours, obtain daily weights.    Labs reviewed    Continue calorie count   Continue TPN and lipids. Will follow up with primary team on plan, plan to wean TPN and lipids tomorrow if patient continues to tolerate PO intake   Potassium chloride decreased and potassium phosphorus decreased.     1. Protein calorie malnutrition being optimized with TPN: CHO [105 ] gm.  AA [40] gm. SMOF Lipids [17] gm. lipids will be administered over 12 hours   2.  Hyperglycemia managed with: [0 ] units of regular insulin    3.  Check fluid balance daily.  Strict I/O  [ ] [ ]   4.  Daily BMP, Ionized Calcium, Magnesium and Phosphorous   5.  Triglycerides at initiation of TPN and monthly [ ] [ ]   6.  Pepcid in TPN for Gi prophylaxis  [ ]     I have seen and examined the patient, reviewed the laboratory and radiologic data and agree with the history, physical assessment and plan.  I reviewed and discussed with all consultants, house staff and PA's.  The note is edited where appropriate. The Nutrition Support Team (NST) discusses on an ongoing basis with the primary team and all consultants, House staff and PA's to have a permanent risk benefit analyses on all decisions.  I spent  45  minutes in total; providing diagnoses, assessment and plan.

## 2018-06-23 NOTE — PROGRESS NOTE ADULT - SUBJECTIVE AND OBJECTIVE BOX
NUTRITION NOTE  [id number] FERMIN    Interval events    Interval events  Patient was seen and examined at bedside, downtrending h/h yesterday for which pt received 2U PRBC.   TPN/lipids initiated on 18, patient is tolerating without any issues.  Advanced to regular diet, 1/2 TPN volume and calories today   Patient is tolerating broth and some food but small amounts at a time. She does not feel like eating too much.  Patient reports some nausea with PO intake.     VITAL SIGNS:  T(C): 36.9 (18 @ 07:38), Max: 37.1 (18 @ 12:50)  HR: 84 (18 @ 07:38) (84 - 96)  BP: 120/64 (18 @ 07:38) (120/62 - 128/62)  RR: 18 (18 @ 07:38) (16 - 18)  SpO2: 99% (18 @ 07:38) (95% - 100%)-   @ 07:01  -   @ 07:00  --------------------------------------------------------  IN: 462 mL / OUT: 2475 mL / NET: -2013 mL    PHYSICAL EXAM   Constitutional: no acute distress, sitting comfortably in chair   Respiratory: nonlabored respirations   Gastrointestinal: soft, mildly tender  Extremities: warm, generalized edema    PICC Site: C/D/I    Diet: regular diet and TPN     Metabolic/FLUIDS/ELECTROLYTES/NUTRITION:  Gastrointestinal Medications:  fat emulsion (Fish Oil and Plant Based) 20% Infusion 7 mL/Hr IV Continuous <Continuous>  fat emulsion (Fish Oil and Plant Based) 20% Infusion 7 mL/Hr IV Continuous <Continuous>  fat emulsion (Fish Oil and Plant Based) 20% Infusion 7 mL/Hr IV Continuous <Continuous>  metoclopramide 5 milliGRAM(s) Oral every 8 hours  pantoprazole    Tablet 40 milliGRAM(s) Oral before breakfast  Parenteral Nutrition - Adult 1 Each TPN Continuous <Continuous>  Parenteral Nutrition - Adult 1 Each TPN Continuous <Continuous>  sucralfate suspension 1 Gram(s) Oral four times a day    I&O's Detail  58y  Daily Weight in k.6 (2018 04:51)      136  |  104  |  10  ----------------------------<  93  5.0   |  23  |  0.35<L>    Ca    7.8<L>      2018 05:00  Phos  4.0       Mg     2.4         TPro  4.7<L>  /  Alb  1.9<L>  /  TBili  8.9<H>  /  DBili  x   /  AST  36<H>  /  ALT  19  /  AlkPhos  177<H>          HEMATOLOGIC:  Hematologic/Oncologic Medications:  heparin  Injectable 5000 Unit(s) SubCutaneous every 8 hours                                                9.2                   Neurophils% (auto):   82.4   ( @ 05:00):    14.36)-----------(290          Lymphocytes% (auto):  8.3                                           25.6                   Eosinphils% (auto):   1.0          ASSESSMENT/PLAN:  59 y/o female s/p ERCP c/b perforation of afferent limb. Pt underwent emergent Ex-lap w/ resection of perforated bowel and stapled side to side functional end to end anastomosis. Pt remained intubated and was transferred to SICU off pressors. s/p extubation, now transferred to floor. Patient meets criteria for severe protein calorie malnutrition requiring TPN for nutritional support.      TPN decreased to 1 L today with 1/2 calories to encourage increased PO intake and observe tolerance - continue same today    Monitor fingersticks q 12 hours, obtain daily weights.    Labs reviewed    Continue calorie count   Continue TPN and lipids. Will follow up with primary team on plan, plan to wean TPN and lipids tomorrow if patient continues to tolerate PO intake   Potassium chloride decreased and potassium phosphorus decreased.     1. Protein calorie malnutrition being optimized with TPN: CHO [105 ] gm.  AA [40] gm. SMOF Lipids [17] gm. lipids will be administered over 12 hours   2.  Hyperglycemia managed with: [0 ] units of regular insulin    3.  Check fluid balance daily.  Strict I/O  [ ] [ ]   4.  Daily BMP, Ionized Calcium, Magnesium and Phosphorous   5.  Triglycerides at initiation of TPN and monthly [ ] [ ]   6.  Pepcid in TPN for Gi prophylaxis  [ ]     Nutrition support pager [phone]

## 2018-06-23 NOTE — PROGRESS NOTE ADULT - ATTENDING COMMENTS
58-year-old lady who had a small bowel perforation during attempted ERCP for biliary obstruction.  PTC catheter.        Afebrile with stable vital signs.  She is on TPN  No oral intake record for yesterday.    Urine output 2050 cc yesterday.  Biliary catheter draining 275 cc.        WBC remains slightly elevated 13.4 (13.7).  Hematocrit 27%.  No significant electrolyte abnormalities.  Total bilirubin 8.9 (9.0).  Transaminases and alkaline phosphatase slightly elevated    No acute problems overnight.  Continue present management. 58-year-old lady who had a small bowel perforation during attempted ERCP for biliary obstruction.  PTC catheter.    Son at bedside  She feels bloated, but no N/V    Afebrile with stable vital signs.  She is on TPN  No oral intake record for yesterday.    Abd is softly distended and NT    Urine output 2050 cc yesterday.  Biliary catheter drained 275 cc.    WBC remains slightly elevated 13.4 (13.7).  Hematocrit 27%.  No significant electrolyte abnormalities.  Total bilirubin 8.9 (9.0).  Transaminases and alkaline phosphatase slightly elevated    No acute problems overnight.  Continue present management.

## 2018-06-23 NOTE — PROGRESS NOTE ADULT - SUBJECTIVE AND OBJECTIVE BOX
Patient is a 58y old  Female who presents with a chief complaint of Painless jaundice (05 Jun 2018 14:49)      SUBJECTIVE / OVERNIGHT EVENTS:  increased abdominal distention and discomfort  MEDICATIONS  (STANDING):  chlorhexidine 4% Liquid 1 Application(s) Topical once  fat emulsion (Fish Oil and Plant Based) 20% Infusion 7 mL/Hr (7 mL/Hr) IV Continuous <Continuous>  fat emulsion (Fish Oil and Plant Based) 20% Infusion 7 mL/Hr (7 mL/Hr) IV Continuous <Continuous>  fat emulsion (Fish Oil and Plant Based) 20% Infusion 7 mL/Hr (7 mL/Hr) IV Continuous <Continuous>  ibuprofen  Tablet 600 milliGRAM(s) Oral every 6 hours  metoclopramide 5 milliGRAM(s) Oral every 8 hours  pantoprazole    Tablet 40 milliGRAM(s) Oral before breakfast  Parenteral Nutrition - Adult 1 Each (42 mL/Hr) TPN Continuous <Continuous>  Parenteral Nutrition - Adult 1 Each (42 mL/Hr) TPN Continuous <Continuous>  sucralfate suspension 1 Gram(s) Oral four times a day    MEDICATIONS  (PRN):  cyclobenzaprine 5 milliGRAM(s) Oral three times a day PRN Muscle Spasm  HYDROmorphone   Tablet 2 milliGRAM(s) Oral every 3 hours PRN Moderate Pain (4 - 6)  morphine  - Injectable 4 milliGRAM(s) IV Push every 3 hours PRN Severe Pain (7 - 10)              PHYSICAL EXAM:  GENERAL: NAD,   HEAD:  Atraumatic, Normocephalic  EYES: icteric  NECK: Supple, No JVD  CHEST/LUNG: Clear to auscultation bilaterally; No wheeze  HEART: Regular rate and rhythm; No murmurs, rubs, or gallops  ABDOMEN: Soft, tender, distended; midline surgical incision, Bowel sounds present, no hepatosplenomegaly, no rebound or guarding  EXTREMITIES:  2+ Peripheral Pulses, No clubbing, cyanosis, or edema  PSYCH: AAO  NEUROLOGY: non-focal, no asterixis  SKIN: jaundice    LABS:                        9.2    14.36 )-----------( 290      ( 23 Jun 2018 05:00 )             25.6     06-23    136  |  104  |  10  ----------------------------<  93  5.0   |  23  |  0.35<L>    Ca    7.8<L>      23 Jun 2018 05:00  Phos  4.0     06-23  Mg     2.4     06-23    TPro  4.7<L>  /  Alb  1.9<L>  /  TBili  8.9<H>  /  DBili  x   /  AST  36<H>  /  ALT  19  /  AlkPhos  177<H>  06-22    LIVER FUNCTIONS - ( 22 Jun 2018 05:20 )  Alb: 1.9 g/dL / Pro: 4.7 g/dL / ALK PHOS: 177 u/L / ALT: 19 u/L / AST: 36 u/L / GGT: x           PT/INR - ( 22 Jun 2018 06:52 )   PT: 11.9 SEC;   INR: 1.03          PTT - ( 22 Jun 2018 06:52 )  PTT:29.8 SEC          RADIOLOGY & ADDITIONAL TESTS:

## 2018-06-23 NOTE — PROGRESS NOTE ADULT - ASSESSMENT
Impression:  1. Unsuccessful ERCP complicated by small bowel perforation 6/6/18 s/p ex-lap with small bowel resection and anastomosis 6/6/18  2. Obstructive jaundice with dilated CBD and 1.4 cm enhancing pancreatic head mass s/p percutaneous drainage, with persistent hyperbilirubinemia   3. 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 Iikely Ileus/narcotic bowel  5. drop in hemoglobin of 3g w/o overt bleed, with appropriate response to transfusion    Plan:  - Monitor CBC, CMP, INR  - consider d/c ibuprofen in the setting of downtrending hgb, could precipitate ulcer disease  - may want to add miralax to help with bowel movements  - OOB as tolerated  - would obtain XR abdomen to assess for ileus  - Monitor electrolytes and correct derangements  - IVF as appropriate  - Continue antibiotics  - Nutrition per Surgery  - Supportive care per primary team

## 2018-06-24 LAB
BASOPHILS # BLD AUTO: 0.08 K/UL — SIGNIFICANT CHANGE UP (ref 0–0.2)
BASOPHILS NFR BLD AUTO: 0.6 % — SIGNIFICANT CHANGE UP (ref 0–2)
BUN SERPL-MCNC: 9 MG/DL — SIGNIFICANT CHANGE UP (ref 7–23)
BUN SERPL-MCNC: 9 MG/DL — SIGNIFICANT CHANGE UP (ref 7–23)
CA-I BLD-SCNC: 1.09 MMOL/L — SIGNIFICANT CHANGE UP (ref 1.03–1.23)
CALCIUM SERPL-MCNC: 7.6 MG/DL — LOW (ref 8.4–10.5)
CALCIUM SERPL-MCNC: 7.6 MG/DL — LOW (ref 8.4–10.5)
CHLORIDE SERPL-SCNC: 103 MMOL/L — SIGNIFICANT CHANGE UP (ref 98–107)
CHLORIDE SERPL-SCNC: 103 MMOL/L — SIGNIFICANT CHANGE UP (ref 98–107)
CO2 SERPL-SCNC: 21 MMOL/L — LOW (ref 22–31)
CO2 SERPL-SCNC: 21 MMOL/L — LOW (ref 22–31)
CREAT SERPL-MCNC: 0.31 MG/DL — LOW (ref 0.5–1.3)
CREAT SERPL-MCNC: 0.31 MG/DL — LOW (ref 0.5–1.3)
EOSINOPHIL # BLD AUTO: 0.12 K/UL — SIGNIFICANT CHANGE UP (ref 0–0.5)
EOSINOPHIL NFR BLD AUTO: 0.9 % — SIGNIFICANT CHANGE UP (ref 0–6)
GLUCOSE SERPL-MCNC: 89 MG/DL — SIGNIFICANT CHANGE UP (ref 70–99)
GLUCOSE SERPL-MCNC: 89 MG/DL — SIGNIFICANT CHANGE UP (ref 70–99)
HCT VFR BLD CALC: 26.9 % — LOW (ref 34.5–45)
HCT VFR BLD CALC: 26.9 % — LOW (ref 34.5–45)
HGB BLD-MCNC: 8.9 G/DL — LOW (ref 11.5–15.5)
HGB BLD-MCNC: 8.9 G/DL — LOW (ref 11.5–15.5)
IMM GRANULOCYTES # BLD AUTO: 0.09 # — SIGNIFICANT CHANGE UP
IMM GRANULOCYTES NFR BLD AUTO: 0.7 % — SIGNIFICANT CHANGE UP (ref 0–1.5)
LYMPHOCYTES # BLD AUTO: 1.3 K/UL — SIGNIFICANT CHANGE UP (ref 1–3.3)
LYMPHOCYTES # BLD AUTO: 9.9 % — LOW (ref 13–44)
MAGNESIUM SERPL-MCNC: 2.2 MG/DL — SIGNIFICANT CHANGE UP (ref 1.6–2.6)
MCHC RBC-ENTMCNC: 30.8 PG — SIGNIFICANT CHANGE UP (ref 27–34)
MCHC RBC-ENTMCNC: 30.8 PG — SIGNIFICANT CHANGE UP (ref 27–34)
MCHC RBC-ENTMCNC: 33.1 % — SIGNIFICANT CHANGE UP (ref 32–36)
MCHC RBC-ENTMCNC: 33.1 % — SIGNIFICANT CHANGE UP (ref 32–36)
MCV RBC AUTO: 93.1 FL — SIGNIFICANT CHANGE UP (ref 80–100)
MCV RBC AUTO: 93.1 FL — SIGNIFICANT CHANGE UP (ref 80–100)
MONOCYTES # BLD AUTO: 0.92 K/UL — HIGH (ref 0–0.9)
MONOCYTES NFR BLD AUTO: 7 % — SIGNIFICANT CHANGE UP (ref 2–14)
NEUTROPHILS # BLD AUTO: 10.63 K/UL — HIGH (ref 1.8–7.4)
NEUTROPHILS NFR BLD AUTO: 80.9 % — HIGH (ref 43–77)
NRBC # FLD: 0 — SIGNIFICANT CHANGE UP
NRBC # FLD: 0 — SIGNIFICANT CHANGE UP
PHOSPHATE SERPL-MCNC: 3.5 MG/DL — SIGNIFICANT CHANGE UP (ref 2.5–4.5)
PLATELET # BLD AUTO: 370 K/UL — SIGNIFICANT CHANGE UP (ref 150–400)
PLATELET # BLD AUTO: 370 K/UL — SIGNIFICANT CHANGE UP (ref 150–400)
PMV BLD: 11.2 FL — SIGNIFICANT CHANGE UP (ref 7–13)
PMV BLD: 11.2 FL — SIGNIFICANT CHANGE UP (ref 7–13)
POTASSIUM SERPL-MCNC: 4.3 MMOL/L — SIGNIFICANT CHANGE UP (ref 3.5–5.3)
POTASSIUM SERPL-MCNC: 4.3 MMOL/L — SIGNIFICANT CHANGE UP (ref 3.5–5.3)
POTASSIUM SERPL-SCNC: 4.3 MMOL/L — SIGNIFICANT CHANGE UP (ref 3.5–5.3)
POTASSIUM SERPL-SCNC: 4.3 MMOL/L — SIGNIFICANT CHANGE UP (ref 3.5–5.3)
RBC # BLD: 2.89 M/UL — LOW (ref 3.8–5.2)
RBC # BLD: 2.89 M/UL — LOW (ref 3.8–5.2)
RBC # FLD: 21.8 % — HIGH (ref 10.3–14.5)
RBC # FLD: 21.8 % — HIGH (ref 10.3–14.5)
SODIUM SERPL-SCNC: 136 MMOL/L — SIGNIFICANT CHANGE UP (ref 135–145)
SODIUM SERPL-SCNC: 136 MMOL/L — SIGNIFICANT CHANGE UP (ref 135–145)
WBC # BLD: 13.14 K/UL — HIGH (ref 3.8–10.5)
WBC # BLD: 13.14 K/UL — HIGH (ref 3.8–10.5)
WBC # FLD AUTO: 13.14 K/UL — HIGH (ref 3.8–10.5)
WBC # FLD AUTO: 13.14 K/UL — HIGH (ref 3.8–10.5)

## 2018-06-24 PROCEDURE — 99233 SBSQ HOSP IP/OBS HIGH 50: CPT

## 2018-06-24 RX ORDER — ELECTROLYTE SOLUTION,INJ
1 VIAL (ML) INTRAVENOUS
Qty: 0 | Refills: 0 | Status: DISCONTINUED | OUTPATIENT
Start: 2018-06-24 | End: 2018-06-24

## 2018-06-24 RX ORDER — ENOXAPARIN SODIUM 100 MG/ML
65 INJECTION SUBCUTANEOUS
Qty: 0 | Refills: 0 | Status: COMPLETED | OUTPATIENT
Start: 2018-06-24 | End: 2018-07-01

## 2018-06-24 RX ORDER — I.V. FAT EMULSION 20 G/100ML
7 EMULSION INTRAVENOUS
Qty: 17 | Refills: 0 | Status: DISCONTINUED | OUTPATIENT
Start: 2018-06-24 | End: 2018-06-26

## 2018-06-24 RX ADMIN — PANTOPRAZOLE SODIUM 40 MILLIGRAM(S): 20 TABLET, DELAYED RELEASE ORAL at 05:33

## 2018-06-24 RX ADMIN — Medication 5 MILLIGRAM(S): at 13:10

## 2018-06-24 RX ADMIN — MORPHINE SULFATE 4 MILLIGRAM(S): 50 CAPSULE, EXTENDED RELEASE ORAL at 13:10

## 2018-06-24 RX ADMIN — HYDROMORPHONE HYDROCHLORIDE 2 MILLIGRAM(S): 2 INJECTION INTRAMUSCULAR; INTRAVENOUS; SUBCUTANEOUS at 20:30

## 2018-06-24 RX ADMIN — MORPHINE SULFATE 4 MILLIGRAM(S): 50 CAPSULE, EXTENDED RELEASE ORAL at 06:05

## 2018-06-24 RX ADMIN — HYDROMORPHONE HYDROCHLORIDE 2 MILLIGRAM(S): 2 INJECTION INTRAMUSCULAR; INTRAVENOUS; SUBCUTANEOUS at 14:40

## 2018-06-24 RX ADMIN — Medication 5 MILLIGRAM(S): at 05:33

## 2018-06-24 RX ADMIN — HYDROMORPHONE HYDROCHLORIDE 2 MILLIGRAM(S): 2 INJECTION INTRAMUSCULAR; INTRAVENOUS; SUBCUTANEOUS at 02:50

## 2018-06-24 RX ADMIN — HYDROMORPHONE HYDROCHLORIDE 2 MILLIGRAM(S): 2 INJECTION INTRAMUSCULAR; INTRAVENOUS; SUBCUTANEOUS at 03:30

## 2018-06-24 RX ADMIN — HYDROMORPHONE HYDROCHLORIDE 2 MILLIGRAM(S): 2 INJECTION INTRAMUSCULAR; INTRAVENOUS; SUBCUTANEOUS at 11:09

## 2018-06-24 RX ADMIN — HEPARIN SODIUM 5000 UNIT(S): 5000 INJECTION INTRAVENOUS; SUBCUTANEOUS at 05:33

## 2018-06-24 RX ADMIN — Medication 1 GRAM(S): at 05:33

## 2018-06-24 RX ADMIN — MORPHINE SULFATE 4 MILLIGRAM(S): 50 CAPSULE, EXTENDED RELEASE ORAL at 18:21

## 2018-06-24 RX ADMIN — Medication 1 GRAM(S): at 13:10

## 2018-06-24 RX ADMIN — MORPHINE SULFATE 4 MILLIGRAM(S): 50 CAPSULE, EXTENDED RELEASE ORAL at 18:59

## 2018-06-24 RX ADMIN — ENOXAPARIN SODIUM 65 MILLIGRAM(S): 100 INJECTION SUBCUTANEOUS at 19:48

## 2018-06-24 RX ADMIN — Medication 1 GRAM(S): at 18:21

## 2018-06-24 RX ADMIN — HYDROMORPHONE HYDROCHLORIDE 2 MILLIGRAM(S): 2 INJECTION INTRAMUSCULAR; INTRAVENOUS; SUBCUTANEOUS at 15:57

## 2018-06-24 RX ADMIN — HYDROMORPHONE HYDROCHLORIDE 2 MILLIGRAM(S): 2 INJECTION INTRAMUSCULAR; INTRAVENOUS; SUBCUTANEOUS at 08:40

## 2018-06-24 RX ADMIN — Medication 1 GRAM(S): at 00:31

## 2018-06-24 RX ADMIN — Medication 1 EACH: at 18:49

## 2018-06-24 RX ADMIN — I.V. FAT EMULSION 7 ML/HR: 20 EMULSION INTRAVENOUS at 18:46

## 2018-06-24 RX ADMIN — MORPHINE SULFATE 4 MILLIGRAM(S): 50 CAPSULE, EXTENDED RELEASE ORAL at 05:49

## 2018-06-24 RX ADMIN — MORPHINE SULFATE 4 MILLIGRAM(S): 50 CAPSULE, EXTENDED RELEASE ORAL at 14:36

## 2018-06-24 RX ADMIN — CHLORHEXIDINE GLUCONATE 1 APPLICATION(S): 213 SOLUTION TOPICAL at 12:59

## 2018-06-24 NOTE — PROGRESS NOTE ADULT - ATTENDING COMMENTS
58F s/p ERCP EUS aborted 2/2 small bowel perforation, s/p ex lap resection of small bowel side to side anastomosis currently hemodynamically stable on the floor.     Continue TPN     1. Protein calorie malnutrition being optimized with TPN: CHO [ ] gm.  AA [ ] gm. Lipids [ ] gm.  2.  Hyperglycemia managed with: [ ] units of regular insulin    3.  Check fluid balance daily.  Strict I/O  [ ] [ ]   4.  Daily BMP, Ionized Calcium, Magnesium and Phosphorous   5.  Triglycerides at initiation of TPN and monthly [ ] [ ]   6.  Pepcid in TPN for Gi prophylaxis  [ ]   I have seen and examined the patient, reviewed the laboratory and radiologic data and agree with the history, physical assessment and plan.  I reviewed and discussed with all consultants, house staff and PA's.  The note is edited where appropriate. The Nutrition Support Team (NST) discusses on an ongoing basis with the primary team and all consultants, House staff and PA's to have a permanent risk benefit analyses on all decisions.  I spent  45  minutes in total; providing diagnoses, assessment and plan.

## 2018-06-24 NOTE — PROGRESS NOTE ADULT - SUBJECTIVE AND OBJECTIVE BOX
Patient is a 58y old  Female who presents with a chief complaint of Painless jaundice (05 Jun 2018 14:49)      SUBJECTIVE / OVERNIGHT EVENTS:  passing flatus  MEDICATIONS  (STANDING):  chlorhexidine 4% Liquid 1 Application(s) Topical once  fat emulsion (Fish Oil and Plant Based) 20% Infusion 7 mL/Hr (7 mL/Hr) IV Continuous <Continuous>  fat emulsion (Fish Oil and Plant Based) 20% Infusion 7 mL/Hr (7 mL/Hr) IV Continuous <Continuous>  fat emulsion (Fish Oil and Plant Based) 20% Infusion 7 mL/Hr (7 mL/Hr) IV Continuous <Continuous>  ibuprofen  Tablet 600 milliGRAM(s) Oral every 6 hours  metoclopramide 5 milliGRAM(s) Oral every 8 hours  pantoprazole    Tablet 40 milliGRAM(s) Oral before breakfast  Parenteral Nutrition - Adult 1 Each (42 mL/Hr) TPN Continuous <Continuous>  Parenteral Nutrition - Adult 1 Each (42 mL/Hr) TPN Continuous <Continuous>  sucralfate suspension 1 Gram(s) Oral four times a day    MEDICATIONS  (PRN):  cyclobenzaprine 5 milliGRAM(s) Oral three times a day PRN Muscle Spasm  HYDROmorphone   Tablet 2 milliGRAM(s) Oral every 3 hours PRN Moderate Pain (4 - 6)  morphine  - Injectable 4 milliGRAM(s) IV Push every 3 hours PRN Severe Pain (7 - 10)              PHYSICAL EXAM:  GENERAL: NAD,   HEAD:  Atraumatic, Normocephalic  EYES: icteric  NECK: Supple, No JVD  CHEST/LUNG: Clear to auscultation bilaterally; No wheeze  HEART: Regular rate and rhythm; No murmurs, rubs, or gallops  ABDOMEN: Soft, tender, distended; midline surgical incision, Bowel sounds present, no hepatosplenomegaly, no rebound or guarding  EXTREMITIES:  2+ Peripheral Pulses, No clubbing, cyanosis, or edema  PSYCH: AAO  NEUROLOGY: non-focal, no asterixis  SKIN: jaundice    LABS:                        9.2    14.36 )-----------( 290      ( 23 Jun 2018 05:00 )             25.6     06-23    136  |  104  |  10  ----------------------------<  93  5.0   |  23  |  0.35<L>    Ca    7.8<L>      23 Jun 2018 05:00  Phos  4.0     06-23  Mg     2.4     06-23    TPro  4.7<L>  /  Alb  1.9<L>  /  TBili  8.9<H>  /  DBili  x   /  AST  36<H>  /  ALT  19  /  AlkPhos  177<H>  06-22    LIVER FUNCTIONS - ( 22 Jun 2018 05:20 )  Alb: 1.9 g/dL / Pro: 4.7 g/dL / ALK PHOS: 177 u/L / ALT: 19 u/L / AST: 36 u/L / GGT: x           PT/INR - ( 22 Jun 2018 06:52 )   PT: 11.9 SEC;   INR: 1.03          PTT - ( 22 Jun 2018 06:52 )  PTT:29.8 SEC          RADIOLOGY & ADDITIONAL TESTS:

## 2018-06-24 NOTE — PROGRESS NOTE ADULT - SUBJECTIVE AND OBJECTIVE BOX
GENERAL SURGERY DAILY PROGRESS NOTE:       Subjective:  No acute events overnight. This AM pt feels well overall. Pain well controlled.       Objective:    PE:  Gen: NAD  Chest: breathing comfortably  Abd: incision c/d/i with staple fluid collection beneath midline incision    .  Vital Signs Last 24 Hrs  T(C): 37.2 (2018 07:20), Max: 37.6 (2018 20:02)  T(F): 99 (2018 07:20), Max: 99.6 (2018 20:02)  HR: 98 (2018 07:20) (90 - 104)  BP: 120/71 (2018 07:20) (120/58 - 133/62)  BP(mean): --  RR: 17 (2018 07:20) (17 - 18)  SpO2: 97% (2018 07:20) (97% - 100%)    I&O's Detail    2018 07:01  -  2018 07:00  --------------------------------------------------------  IN:    fat emulsion (Fish Oil and Plant Based) 20% Infusion: 84 mL    Free Water: 10 mL    TPN (Total Parenteral Nutrition): 1008 mL  Total IN: 1102 mL    OUT:    Drain: 270 mL    Indwelling Catheter - Urethral: 3250 mL  Total OUT: 3520 mL    Total NET: -2418 mL          Daily     Daily Weight in k.6 (2018 05:26)    MEDICATIONS  (STANDING):  chlorhexidine 4% Liquid 1 Application(s) Topical once  chlorhexidine 4% Liquid 1 Application(s) Topical <User Schedule>  enoxaparin Injectable 65 milliGRAM(s) SubCutaneous <User Schedule>  fat emulsion (Fish Oil and Plant Based) 20% Infusion 7 mL/Hr (7 mL/Hr) IV Continuous <Continuous>  fat emulsion (Fish Oil and Plant Based) 20% Infusion 7 mL/Hr (7 mL/Hr) IV Continuous <Continuous>  fat emulsion (Fish Oil and Plant Based) 20% Infusion 7 mL/Hr (7 mL/Hr) IV Continuous <Continuous>  fat emulsion (Fish Oil and Plant Based) 20% Infusion 7 mL/Hr (7 mL/Hr) IV Continuous <Continuous>  metoclopramide 5 milliGRAM(s) Oral every 8 hours  pantoprazole    Tablet 40 milliGRAM(s) Oral before breakfast  Parenteral Nutrition - Adult 1 Each (42 mL/Hr) TPN Continuous <Continuous>  Parenteral Nutrition - Adult 1 Each (42 mL/Hr) TPN Continuous <Continuous>  sucralfate suspension 1 Gram(s) Oral four times a day    MEDICATIONS  (PRN):  cyclobenzaprine 5 milliGRAM(s) Oral three times a day PRN Muscle Spasm  dibucaine 1% Ointment 1 Application(s) Topical daily PRN hemorrhoids  HYDROmorphone   Tablet 2 milliGRAM(s) Oral every 3 hours PRN Moderate Pain (4 - 6)  morphine  - Injectable 4 milliGRAM(s) IV Push every 3 hours PRN Severe Pain (7 - 10)      LABS:                        8.9    13.14 )-----------( 370      ( 2018 05:40 )             26.9         136  |  103  |  9   ----------------------------<  89  4.3   |  21<L>  |  0.31<L>    Ca    7.6<L>      2018 05:40  Phos  3.5       Mg     2.2                 RADIOLOGY & ADDITIONAL STUDIES:

## 2018-06-24 NOTE — PROGRESS NOTE ADULT - ATTENDING COMMENTS
58-year-old lady surgery for perforation afebrile and (after prior gastrectomy with gastroduodenostomy) during attempted ERCP for biliary decompression.        Tm: 99.6°.  Vital signs stable.  On TPN.  Urine output 900 cc overnight.  PTC drainage 50 cc overnight.        Morning labs still pending.    Clinically stable.  Continue present management. 58-year-old lady surgery for perforation of her efferent jejunal limb (after prior gastrectomy with gastrojejunostomy) during attempted ERCP for biliary decompression.    Comfortable    Tm: 99.6°.  Vital signs stable.  On TPN.  Urine output 900 cc overnight.  Abd catheter drained 50 cc overnight.    Abd: soft and NT    Morning labs still pending.    Clinically stable.  Continue present management.

## 2018-06-24 NOTE — PROGRESS NOTE ADULT - ASSESSMENT
58F s/p ERCP EUS aborted 2/2 small bowel perforation, s/p ex lap resection of small bowel side to side anastomosis currently hemodynamically stable on the floor.     - H/H stable this AM  - Diet changed to soft phase 2 Bariatric per nutrtition  - Continue TPN  - Continue martinez  - Monitor GI function  - Resume T Lovenox as CBC was stable this AM  - Dispo: DC planning

## 2018-06-24 NOTE — PROGRESS NOTE ADULT - SUBJECTIVE AND OBJECTIVE BOX
NUTRITION NOTE  [id number] CHOKYI  ===============================    Interval events none overnight     VITAL SIGNS:  T(C): 37.2 (18 @ 07:20), Max: 37.6 (18 @ 20:02)  HR: 98 (18 @ 07:20) (90 - 104)  BP: 120/71 (18 @ 07:20) (120/58 - 133/62)  ABP: --  ABP(mean): --  RR: 17 (18 @ 07:20) (17 - 18)  SpO2: 97% (18 @ 07:20) (97% - 100%)  CVP(mm Hg): --   @ 07:01  -   @ 07:00  --------------------------------------------------------  IN: 1102 mL / OUT: 3520 mL / NET: -2418 mL      PE:  Gen: NAD  Chest: breathing comfortably  Abd: incision c/d/i with staple fluid collection beneath midline incision  PICC Site: C/D/I     Metabolic/FLUIDS/ELECTROLYTES/NUTRITION:  Gastrointestinal Medications:  fat emulsion (Fish Oil and Plant Based) 20% Infusion 7 mL/Hr IV Continuous <Continuous>  fat emulsion (Fish Oil and Plant Based) 20% Infusion 7 mL/Hr IV Continuous <Continuous>  fat emulsion (Fish Oil and Plant Based) 20% Infusion 7 mL/Hr IV Continuous <Continuous>  fat emulsion (Fish Oil and Plant Based) 20% Infusion 7 mL/Hr IV Continuous <Continuous>  metoclopramide 5 milliGRAM(s) Oral every 8 hours  pantoprazole    Tablet 40 milliGRAM(s) Oral before breakfast  Parenteral Nutrition - Adult 1 Each TPN Continuous <Continuous>  Parenteral Nutrition - Adult 1 Each TPN Continuous <Continuous>  sucralfate suspension 1 Gram(s) Oral four times a day    I&O's Detail  58y  Daily Weight in k.6 (2018 05:26)      136  |  103  |  9   ----------------------------<  89  4.3   |  21<L>  |  0.31<L>    Ca    7.6<L>      2018 05:40  Phos  3.5       Mg     2.2         Diet: Soft diet     ASSESSMENT/PLAN:  58F s/p ERCP EUS aborted 2/2 small bowel perforation, s/p ex lap resection of small bowel side to side anastomosis currently hemodynamically stable on the floor.     Continue TPN     1. Protein calorie malnutrition being optimized with TPN: CHO [ ] gm.  AA [ ] gm. Lipids [ ] gm.  2.  Hyperglycemia managed with: [ ] units of regular insulin    3.  Check fluid balance daily.  Strict I/O  [ ] [ ]   4.  Daily BMP, Ionized Calcium, Magnesium and Phosphorous   5.  Triglycerides at initiation of TPN and monthly [ ] [ ]   6.  Pepcid in TPN for Gi prophylaxis  [ ]

## 2018-06-25 LAB
ALBUMIN SERPL ELPH-MCNC: 2 G/DL — LOW (ref 3.3–5)
ALP SERPL-CCNC: 179 U/L — HIGH (ref 40–120)
ALT FLD-CCNC: 21 U/L — SIGNIFICANT CHANGE UP (ref 4–33)
APPEARANCE UR: CLEAR — SIGNIFICANT CHANGE UP
AST SERPL-CCNC: 29 U/L — SIGNIFICANT CHANGE UP (ref 4–32)
BACTERIA # UR AUTO: SIGNIFICANT CHANGE UP
BASOPHILS # BLD AUTO: 0.05 K/UL — SIGNIFICANT CHANGE UP (ref 0–0.2)
BASOPHILS NFR BLD AUTO: 0.5 % — SIGNIFICANT CHANGE UP (ref 0–2)
BILIRUB SERPL-MCNC: 12.7 MG/DL — HIGH (ref 0.2–1.2)
BILIRUB UR-MCNC: HIGH
BLOOD UR QL VISUAL: HIGH
BUN SERPL-MCNC: 10 MG/DL — SIGNIFICANT CHANGE UP (ref 7–23)
CA-I BLD-SCNC: 0.91 MMOL/L — LOW (ref 1.03–1.23)
CALCIUM SERPL-MCNC: 7.2 MG/DL — LOW (ref 8.4–10.5)
CHLORIDE SERPL-SCNC: 105 MMOL/L — SIGNIFICANT CHANGE UP (ref 98–107)
CO2 SERPL-SCNC: 24 MMOL/L — SIGNIFICANT CHANGE UP (ref 22–31)
COLOR SPEC: HIGH
CREAT SERPL-MCNC: 0.31 MG/DL — LOW (ref 0.5–1.3)
EOSINOPHIL # BLD AUTO: 0.17 K/UL — SIGNIFICANT CHANGE UP (ref 0–0.5)
EOSINOPHIL NFR BLD AUTO: 1.7 % — SIGNIFICANT CHANGE UP (ref 0–6)
GLUCOSE SERPL-MCNC: 109 MG/DL — HIGH (ref 70–99)
GLUCOSE UR-MCNC: NEGATIVE — SIGNIFICANT CHANGE UP
HCT VFR BLD CALC: 22.3 % — LOW (ref 34.5–45)
HCT VFR BLD CALC: 23.9 % — LOW (ref 34.5–45)
HCT VFR BLD CALC: 23.9 % — LOW (ref 34.5–45)
HGB BLD-MCNC: 7.6 G/DL — LOW (ref 11.5–15.5)
HGB BLD-MCNC: 8.4 G/DL — LOW (ref 11.5–15.5)
HGB BLD-MCNC: 8.4 G/DL — LOW (ref 11.5–15.5)
IMM GRANULOCYTES # BLD AUTO: 0.15 # — SIGNIFICANT CHANGE UP
IMM GRANULOCYTES NFR BLD AUTO: 1.5 % — SIGNIFICANT CHANGE UP (ref 0–1.5)
KETONES UR-MCNC: NEGATIVE — SIGNIFICANT CHANGE UP
LEUKOCYTE ESTERASE UR-ACNC: SIGNIFICANT CHANGE UP
LYMPHOCYTES # BLD AUTO: 0.78 K/UL — LOW (ref 1–3.3)
LYMPHOCYTES # BLD AUTO: 7.6 % — LOW (ref 13–44)
MAGNESIUM SERPL-MCNC: 2 MG/DL — SIGNIFICANT CHANGE UP (ref 1.6–2.6)
MCHC RBC-ENTMCNC: 30.5 PG — SIGNIFICANT CHANGE UP (ref 27–34)
MCHC RBC-ENTMCNC: 34.1 % — SIGNIFICANT CHANGE UP (ref 32–36)
MCHC RBC-ENTMCNC: 34.6 PG — HIGH (ref 27–34)
MCHC RBC-ENTMCNC: 34.6 PG — HIGH (ref 27–34)
MCHC RBC-ENTMCNC: 35.1 % — SIGNIFICANT CHANGE UP (ref 32–36)
MCHC RBC-ENTMCNC: 35.1 % — SIGNIFICANT CHANGE UP (ref 32–36)
MCV RBC AUTO: 89.6 FL — SIGNIFICANT CHANGE UP (ref 80–100)
MCV RBC AUTO: 98.4 FL — SIGNIFICANT CHANGE UP (ref 80–100)
MCV RBC AUTO: 98.4 FL — SIGNIFICANT CHANGE UP (ref 80–100)
MONOCYTES # BLD AUTO: 0.68 K/UL — SIGNIFICANT CHANGE UP (ref 0–0.9)
MONOCYTES NFR BLD AUTO: 6.6 % — SIGNIFICANT CHANGE UP (ref 2–14)
MUCOUS THREADS # UR AUTO: SIGNIFICANT CHANGE UP
NEUTROPHILS # BLD AUTO: 8.41 K/UL — HIGH (ref 1.8–7.4)
NEUTROPHILS NFR BLD AUTO: 82.1 % — HIGH (ref 43–77)
NITRITE UR-MCNC: NEGATIVE — SIGNIFICANT CHANGE UP
NRBC # FLD: 0 — SIGNIFICANT CHANGE UP
PH UR: 6.5 — SIGNIFICANT CHANGE UP (ref 4.6–8)
PHOSPHATE SERPL-MCNC: 2.7 MG/DL — SIGNIFICANT CHANGE UP (ref 2.5–4.5)
PLATELET # BLD AUTO: 298 K/UL — SIGNIFICANT CHANGE UP (ref 150–400)
PLATELET # BLD AUTO: 298 K/UL — SIGNIFICANT CHANGE UP (ref 150–400)
PLATELET # BLD AUTO: 340 K/UL — SIGNIFICANT CHANGE UP (ref 150–400)
PMV BLD: 10.4 FL — SIGNIFICANT CHANGE UP (ref 7–13)
PMV BLD: 12.9 FL — SIGNIFICANT CHANGE UP (ref 7–13)
PMV BLD: 12.9 FL — SIGNIFICANT CHANGE UP (ref 7–13)
POTASSIUM SERPL-MCNC: 3.7 MMOL/L — SIGNIFICANT CHANGE UP (ref 3.5–5.3)
POTASSIUM SERPL-SCNC: 3.7 MMOL/L — SIGNIFICANT CHANGE UP (ref 3.5–5.3)
PROT SERPL-MCNC: 4.9 G/DL — LOW (ref 6–8.3)
PROT UR-MCNC: 30 MG/DL — HIGH
RBC # BLD: 2.43 M/UL — LOW (ref 3.8–5.2)
RBC # BLD: 2.43 M/UL — LOW (ref 3.8–5.2)
RBC # BLD: 2.49 M/UL — LOW (ref 3.8–5.2)
RBC # FLD: 21.2 % — HIGH (ref 10.3–14.5)
RBC # FLD: 22.5 % — HIGH (ref 10.3–14.5)
RBC # FLD: 22.5 % — HIGH (ref 10.3–14.5)
RBC CASTS # UR COMP ASSIST: HIGH (ref 0–?)
SODIUM SERPL-SCNC: 137 MMOL/L — SIGNIFICANT CHANGE UP (ref 135–145)
SP GR SPEC: 1.03 — SIGNIFICANT CHANGE UP (ref 1–1.04)
UROBILINOGEN FLD QL: 1 MG/DL — SIGNIFICANT CHANGE UP
WBC # BLD: 10.24 K/UL — SIGNIFICANT CHANGE UP (ref 3.8–10.5)
WBC # BLD: 10.24 K/UL — SIGNIFICANT CHANGE UP (ref 3.8–10.5)
WBC # BLD: 10.25 K/UL — SIGNIFICANT CHANGE UP (ref 3.8–10.5)
WBC # FLD AUTO: 10.24 K/UL — SIGNIFICANT CHANGE UP (ref 3.8–10.5)
WBC # FLD AUTO: 10.24 K/UL — SIGNIFICANT CHANGE UP (ref 3.8–10.5)
WBC # FLD AUTO: 10.25 K/UL — SIGNIFICANT CHANGE UP (ref 3.8–10.5)
WBC UR QL: HIGH (ref 0–?)

## 2018-06-25 PROCEDURE — 71260 CT THORAX DX C+: CPT | Mod: 26

## 2018-06-25 PROCEDURE — 99232 SBSQ HOSP IP/OBS MODERATE 35: CPT | Mod: GC

## 2018-06-25 PROCEDURE — 74177 CT ABD & PELVIS W/CONTRAST: CPT | Mod: 26

## 2018-06-25 PROCEDURE — 99233 SBSQ HOSP IP/OBS HIGH 50: CPT

## 2018-06-25 RX ORDER — I.V. FAT EMULSION 20 G/100ML
7 EMULSION INTRAVENOUS
Qty: 17 | Refills: 0 | Status: DISCONTINUED | OUTPATIENT
Start: 2018-06-25 | End: 2018-06-27

## 2018-06-25 RX ORDER — ELECTROLYTE SOLUTION,INJ
1 VIAL (ML) INTRAVENOUS
Qty: 0 | Refills: 0 | Status: DISCONTINUED | OUTPATIENT
Start: 2018-06-25 | End: 2018-06-25

## 2018-06-25 RX ORDER — IBUPROFEN 200 MG
600 TABLET ORAL ONCE
Qty: 0 | Refills: 0 | Status: COMPLETED | OUTPATIENT
Start: 2018-06-25 | End: 2018-06-25

## 2018-06-25 RX ADMIN — CYCLOBENZAPRINE HYDROCHLORIDE 5 MILLIGRAM(S): 10 TABLET, FILM COATED ORAL at 17:40

## 2018-06-25 RX ADMIN — Medication 600 MILLIGRAM(S): at 20:23

## 2018-06-25 RX ADMIN — Medication 1 GRAM(S): at 05:05

## 2018-06-25 RX ADMIN — Medication 1 GRAM(S): at 00:21

## 2018-06-25 RX ADMIN — MORPHINE SULFATE 4 MILLIGRAM(S): 50 CAPSULE, EXTENDED RELEASE ORAL at 00:20

## 2018-06-25 RX ADMIN — MORPHINE SULFATE 4 MILLIGRAM(S): 50 CAPSULE, EXTENDED RELEASE ORAL at 03:20

## 2018-06-25 RX ADMIN — Medication 1 EACH: at 18:45

## 2018-06-25 RX ADMIN — HYDROMORPHONE HYDROCHLORIDE 2 MILLIGRAM(S): 2 INJECTION INTRAMUSCULAR; INTRAVENOUS; SUBCUTANEOUS at 17:39

## 2018-06-25 RX ADMIN — MORPHINE SULFATE 4 MILLIGRAM(S): 50 CAPSULE, EXTENDED RELEASE ORAL at 01:00

## 2018-06-25 RX ADMIN — PANTOPRAZOLE SODIUM 40 MILLIGRAM(S): 20 TABLET, DELAYED RELEASE ORAL at 05:05

## 2018-06-25 RX ADMIN — Medication 1 GRAM(S): at 18:18

## 2018-06-25 RX ADMIN — Medication 1 GRAM(S): at 13:38

## 2018-06-25 RX ADMIN — Medication 5 MILLIGRAM(S): at 13:39

## 2018-06-25 RX ADMIN — Medication 5 MILLIGRAM(S): at 23:38

## 2018-06-25 RX ADMIN — Medication 1 GRAM(S): at 23:57

## 2018-06-25 RX ADMIN — Medication 600 MILLIGRAM(S): at 20:55

## 2018-06-25 RX ADMIN — I.V. FAT EMULSION 7 ML/HR: 20 EMULSION INTRAVENOUS at 18:45

## 2018-06-25 RX ADMIN — HYDROMORPHONE HYDROCHLORIDE 2 MILLIGRAM(S): 2 INJECTION INTRAMUSCULAR; INTRAVENOUS; SUBCUTANEOUS at 13:38

## 2018-06-25 RX ADMIN — MORPHINE SULFATE 4 MILLIGRAM(S): 50 CAPSULE, EXTENDED RELEASE ORAL at 18:18

## 2018-06-25 RX ADMIN — HYDROMORPHONE HYDROCHLORIDE 2 MILLIGRAM(S): 2 INJECTION INTRAMUSCULAR; INTRAVENOUS; SUBCUTANEOUS at 03:08

## 2018-06-25 RX ADMIN — MORPHINE SULFATE 4 MILLIGRAM(S): 50 CAPSULE, EXTENDED RELEASE ORAL at 04:00

## 2018-06-25 RX ADMIN — MORPHINE SULFATE 4 MILLIGRAM(S): 50 CAPSULE, EXTENDED RELEASE ORAL at 18:44

## 2018-06-25 RX ADMIN — Medication 5 MILLIGRAM(S): at 00:21

## 2018-06-25 RX ADMIN — CHLORHEXIDINE GLUCONATE 1 APPLICATION(S): 213 SOLUTION TOPICAL at 13:36

## 2018-06-25 RX ADMIN — ENOXAPARIN SODIUM 65 MILLIGRAM(S): 100 INJECTION SUBCUTANEOUS at 19:57

## 2018-06-25 RX ADMIN — HYDROMORPHONE HYDROCHLORIDE 2 MILLIGRAM(S): 2 INJECTION INTRAMUSCULAR; INTRAVENOUS; SUBCUTANEOUS at 03:30

## 2018-06-25 RX ADMIN — Medication 5 MILLIGRAM(S): at 05:05

## 2018-06-25 RX ADMIN — HYDROMORPHONE HYDROCHLORIDE 2 MILLIGRAM(S): 2 INJECTION INTRAMUSCULAR; INTRAVENOUS; SUBCUTANEOUS at 08:45

## 2018-06-25 RX ADMIN — ENOXAPARIN SODIUM 65 MILLIGRAM(S): 100 INJECTION SUBCUTANEOUS at 07:36

## 2018-06-25 NOTE — PROGRESS NOTE ADULT - SUBJECTIVE AND OBJECTIVE BOX
Chief Complaint:  Patient is a 58y old  Female who presents with a chief complaint of Painless jaundice (05 Jun 2018 14:49)      Interval Events: Patient reports abdominal discomfort, improved with pain medications. She is passing flatus, and had scant clear liquid per rectum, but without stool for past two days.     Allergies:  No Known Allergies      Hospital Medications:  chlorhexidine 4% Liquid 1 Application(s) Topical once  chlorhexidine 4% Liquid 1 Application(s) Topical <User Schedule>  cyclobenzaprine 5 milliGRAM(s) Oral three times a day PRN  dibucaine 1% Ointment 1 Application(s) Topical daily PRN  enoxaparin Injectable 65 milliGRAM(s) SubCutaneous <User Schedule>  fat emulsion (Fish Oil and Plant Based) 20% Infusion 7 mL/Hr IV Continuous <Continuous>  fat emulsion (Fish Oil and Plant Based) 20% Infusion 7 mL/Hr IV Continuous <Continuous>  fat emulsion (Fish Oil and Plant Based) 20% Infusion 7 mL/Hr IV Continuous <Continuous>  fat emulsion (Fish Oil and Plant Based) 20% Infusion 7 mL/Hr IV Continuous <Continuous>  HYDROmorphone   Tablet 2 milliGRAM(s) Oral every 3 hours PRN  metoclopramide 5 milliGRAM(s) Oral every 8 hours  morphine  - Injectable 4 milliGRAM(s) IV Push every 3 hours PRN  pantoprazole    Tablet 40 milliGRAM(s) Oral before breakfast  Parenteral Nutrition - Adult 1 Each TPN Continuous <Continuous>  sucralfate suspension 1 Gram(s) Oral four times a day      PMHX/PSHX:  Gastric cancer  Malignant neoplasm of stomach, unspecified location  No pertinent past medical history  S/P partial gastrectomy  No significant past surgical history      Family history:  No pertinent family history in first degree relatives      ROS: Negative, except as otherwise noted above    PHYSICAL EXAM:   Vital Signs:  Vital Signs Last 24 Hrs  T(C): 37.8 (25 Jun 2018 04:43), Max: 38.1 (25 Jun 2018 00:45)  T(F): 100 (25 Jun 2018 04:43), Max: 100.6 (25 Jun 2018 00:45)  HR: 103 (25 Jun 2018 04:43) (98 - 107)  BP: 113/64 (25 Jun 2018 04:43) (113/64 - 127/53)  BP(mean): --  RR: 18 (25 Jun 2018 04:43) (17 - 18)  SpO2: 98% (25 Jun 2018 04:43) (95% - 98%)  Daily     Daily     GENERAL: No acute distress    HEENT:  Icteric, no thrush  CHEST:  Non-labored breathing, lungs clear b/l  HEART:  +s1, s2 heart sounds, no murmurs  ABDOMEN:  +Midline surgical incision, +biliary drainage catheter, mildly tender diffusely  EXTREMITIES:  Warm and well perfused  SKIN:  No rash  NEURO:   Awake, interactive, following commands     LABS:  CBC Full  -  ( 24 Jun 2018 05:40 )  WBC Count : 13.14 K/uL  Hemoglobin : 8.9 g/dL  Hematocrit : 26.9 %  Platelet Count - Automated : 370 K/uL  Mean Cell Volume : 93.1 fL  Auto Neutrophil # : 10.63 K/uL  Auto Neutrophil % : 80.9 %

## 2018-06-25 NOTE — PROGRESS NOTE ADULT - SUBJECTIVE AND OBJECTIVE BOX
GENERAL SURGERY DAILY PROGRESS NOTE:       Subjective:  Patient febrile to 100.6 overnight. Asymptomatic, resolved without intervention. Infectious work up sent this AM. This AM pt feels OK overall, pain well controlled. Has been having BM which are clear, "Mucus-like". Has been tolerating some PO. Denies N/V.         Objective:    PE:  Gen: NAD  Chest: breathing comfortably  Abd: incision c/d/i with staple fluid collection beneath midline incision    Vital Signs Last 24 Hrs  T(C): 37.8 (25 Jun 2018 04:43), Max: 38.1 (25 Jun 2018 00:45)  T(F): 100 (25 Jun 2018 04:43), Max: 100.6 (25 Jun 2018 00:45)  HR: 103 (25 Jun 2018 04:43) (98 - 107)  BP: 113/64 (25 Jun 2018 04:43) (113/64 - 127/53)  BP(mean): --  RR: 18 (25 Jun 2018 04:43) (17 - 18)  SpO2: 98% (25 Jun 2018 04:43) (95% - 98%)    I&O's Detail    24 Jun 2018 07:01  -  25 Jun 2018 07:00  --------------------------------------------------------  IN:    fat emulsion (Fish Oil and Plant Based) 20% Infusion: 77 mL    Free Water: 10 mL    TPN (Total Parenteral Nutrition): 462 mL  Total IN: 549 mL    OUT:    Drain: 145 mL    Indwelling Catheter - Urethral: 2875 mL  Total OUT: 3020 mL    Total NET: -2471 mL          Daily     Daily     MEDICATIONS  (STANDING):  chlorhexidine 4% Liquid 1 Application(s) Topical once  chlorhexidine 4% Liquid 1 Application(s) Topical <User Schedule>  enoxaparin Injectable 65 milliGRAM(s) SubCutaneous <User Schedule>  fat emulsion (Fish Oil and Plant Based) 20% Infusion 7 mL/Hr (7 mL/Hr) IV Continuous <Continuous>  fat emulsion (Fish Oil and Plant Based) 20% Infusion 7 mL/Hr (7 mL/Hr) IV Continuous <Continuous>  fat emulsion (Fish Oil and Plant Based) 20% Infusion 7 mL/Hr (7 mL/Hr) IV Continuous <Continuous>  fat emulsion (Fish Oil and Plant Based) 20% Infusion 7 mL/Hr (7 mL/Hr) IV Continuous <Continuous>  metoclopramide 5 milliGRAM(s) Oral every 8 hours  pantoprazole    Tablet 40 milliGRAM(s) Oral before breakfast  Parenteral Nutrition - Adult 1 Each (42 mL/Hr) TPN Continuous <Continuous>  sucralfate suspension 1 Gram(s) Oral four times a day    MEDICATIONS  (PRN):  cyclobenzaprine 5 milliGRAM(s) Oral three times a day PRN Muscle Spasm  dibucaine 1% Ointment 1 Application(s) Topical daily PRN hemorrhoids  HYDROmorphone   Tablet 2 milliGRAM(s) Oral every 3 hours PRN Moderate Pain (4 - 6)  morphine  - Injectable 4 milliGRAM(s) IV Push every 3 hours PRN Severe Pain (7 - 10)      LABS:                        8.9    13.14 )-----------( 370      ( 24 Jun 2018 05:40 )             26.9     06-24    136  |  103  |  9   ----------------------------<  89  4.3   |  21<L>  |  0.31<L>    Ca    7.6<L>      24 Jun 2018 05:40  Phos  3.5     06-24  Mg     2.2     06-24            RADIOLOGY & ADDITIONAL STUDIES:

## 2018-06-25 NOTE — PROGRESS NOTE ADULT - ASSESSMENT
Impression:  1. Unsuccessful ERCP complicated by small bowel perforation 6/6/18 s/p ex-lap with small bowel resection and anastomosis 6/6/18  2. Obstructive jaundice with dilated CBD and 1.4 cm enhancing pancreatic head mass s/p percutaneous drainage, with persistent hyperbilirubinemia   3. 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 Drop in hemoglobin of 3g w/o overt bleed, with appropriate response to transfusion    Plan:  - Monitor CBC, CMP, INR  - Miralax as needed  - OOB as tolerated  - Monitor electrolytes and correct derangements  - IVF as appropriate  - Continue antibiotics  - Nutrition per Surgery  - Supportive care per primary team

## 2018-06-25 NOTE — PROGRESS NOTE ADULT - SUBJECTIVE AND OBJECTIVE BOX
NUTRITION NOTE  [id number] CHOKYI  ===============================    Interval events  Patient was seen and examined at bedside.  TPN/lipids initiated on 18, patient is tolerating without any issues.  Advanced to regular diet, 1/2 TPN volume and calories today   Patient is tolerating broth and some food, does not feel like eating too much.  Febrile upto 100.6 overnight.     Vital Signs Last 24 Hrs  T(C): 37.2 (2018 08:33), Max: 38.1 (2018 00:45)  T(F): 99 (2018 08:33), Max: 100.6 (2018 00:45)  HR: 99 (2018 08:33) (98 - 107)  BP: 112/62 (2018 08:33) (112/62 - 127/53)  RR: 18 (2018 08:33) (17 - 18)  SpO2: 98% (2018 08:33) (95% - 98%)    MEDICATIONS  (STANDING):  chlorhexidine 4% Liquid 1 Application(s) Topical once  chlorhexidine 4% Liquid 1 Application(s) Topical <User Schedule>  enoxaparin Injectable 65 milliGRAM(s) SubCutaneous <User Schedule>  fat emulsion (Fish Oil and Plant Based) 20% Infusion 7 mL/Hr (7 mL/Hr) IV Continuous <Continuous>  fat emulsion (Fish Oil and Plant Based) 20% Infusion 7 mL/Hr (7 mL/Hr) IV Continuous <Continuous>  metoclopramide 5 milliGRAM(s) Oral every 8 hours  pantoprazole    Tablet 40 milliGRAM(s) Oral before breakfast  Parenteral Nutrition - Adult 1 Each (42 mL/Hr) TPN Continuous <Continuous>  Parenteral Nutrition - Adult 1 Each (42 mL/Hr) TPN Continuous <Continuous>  sucralfate suspension 1 Gram(s) Oral four times a day    MEDICATIONS  (PRN):  cyclobenzaprine 5 milliGRAM(s) Oral three times a day PRN Muscle Spasm  dibucaine 1% Ointment 1 Application(s) Topical daily PRN hemorrhoids  HYDROmorphone   Tablet 2 milliGRAM(s) Oral every 3 hours PRN Moderate Pain (4 - 6)  morphine  - Injectable 4 milliGRAM(s) IV Push every 3 hours PRN Severe Pain (7 - 10)    I&O's Detail    2018 07:01  -  2018 07:00  --------------------------------------------------------  IN:    fat emulsion (Fish Oil and Plant Based) 20% Infusion: 77 mL    Free Water: 10 mL    TPN (Total Parenteral Nutrition): 462 mL  Total IN: 549 mL    OUT:    Drain: 205 mL    Indwelling Catheter - Urethral: 3400 mL  Total OUT: 3605 mL    Total NET: -3056 mL    POCT Blood Glucose.: 138 mg/dL (2018 06:10)  POCT Blood Glucose.: 132 mg/dL (2018 01:03)  POCT Blood Glucose.: 115 mg/dL (2018 16:52)  POCT Blood Glucose.: 129 mg/dL (2018 12:25)    Daily Weight in k.6 (2018 04:51)    Drug Dosing Weight  Height (cm): 160.02 (2018 15:53)  Weight (kg): 54 (2018 15:53)  BMI (kg/m2): 21.1 (2018 15:53)  BSA (m2): 1.55 (2018 15:53)    PHYSICAL EXAM   Constitutional: no acute distress, sitting comfortably in chair   Respiratory: nonlabored respirations   Gastrointestinal: soft, mildly tender  Extremities: warm, generalized edema    PICC Site: C/D/I    Diet: soft diet and TPN     LABORATORY                        8.4    10.24 )-----------( 298      ( 2018 06:28 )             23.9   06-25    137  |  105  |  10  ----------------------------<  109<H>  3.7   |  24  |  0.31<L>    Ca    7.2<L>      2018 10:20  Phos  2.7     06-25  Mg     2.0     -25    TPro  4.9<L>  /  Alb  2.0<L>  /  TBili  12.7<H>  /  DBili  x   /  AST  29  /  ALT  21  /  AlkPhos  179<H>  06-25    LIVER FUNCTIONS - ( 2018 07:05 )  Alb: 2.0 g/dL / Pro: 4.8 g/dL / ALK PHOS: 280 u/L / ALT: 13 u/L / AST: 32 u/L / GGT: x           06-20 Chol -- LDL -- HDL -- Trig 138, 06-12 Chol -- LDL -- HDL -- Trig 112, 06-05 Chol 156  HDL 8<L> Trig 85, 05-19 Chol 122 LDL 73 HDL 33<L> Trig 78    Prealbumin /18: 10    ASSESSMENT/PLAN:  59 y/o female s/p ERCP c/b perforation of afferent limb. Pt underwent emergent Ex-lap w/ resection of perforated bowel and stapled side to side functional end to end anastomosis. Pt remained intubated and was transferred to SICU off pressors. s/p extubation, now transferred to floor. Patient meets criteria for severe protein calorie malnutrition requiring TPN for nutritional support.  Patient was febrile upto 100.6 overnight.     TPN decreased to 1 L today with 1/2 calories to encourage increased PO intake and observe tolerance - continue same today    Monitor fingersticks q 12 hours, obtain daily weights.    Labs reviewed - electrolytes adjusted in TPN     Continue calorie count   Continue TPN and lipids. Will follow up with primary team on plan, plan to wean TPN and lipids tomorrow if patient continues to tolerate PO intake     1. Protein calorie malnutrition being optimized with TPN: CHO [105 ] gm.  AA [40] gm. SMOF Lipids [17] gm. lipids will be administered over 12 hours   2.  Hyperglycemia managed with: [0 ] units of regular insulin    3.  Check fluid balance daily.  Strict I/O  [ ] [ ]   4.  Daily BMP, Ionized Calcium, Magnesium and Phosphorous   5.  Triglycerides at initiation of TPN and monthly [ ] [ ]   6.  Pepcid in TPN for Gi prophylaxis  [ ]     Nutrition support pager [phone]

## 2018-06-25 NOTE — PROGRESS NOTE ADULT - ATTENDING COMMENTS
59 y/o female s/p ERCP c/b perforation of afferent limb. Pt underwent emergent Ex-lap w/ resection of perforated bowel and stapled side to side functional end to end anastomosis. Pt remained intubated and was transferred to SICU off pressors. s/p extubation, now transferred to floor. Patient meets criteria for severe protein calorie malnutrition requiring TPN for nutritional support.  Patient was febrile upto 100.6 overnight.     TPN decreased to 1 L today with 1/2 calories to encourage increased PO intake and observe tolerance - continue same today    Monitor fingersticks q 12 hours, obtain daily weights.    Labs reviewed - electrolytes adjusted in TPN     Continue calorie count   Continue TPN and lipids. Will follow up with primary team on plan, plan to wean TPN and lipids tomorrow if patient continues to tolerate PO intake     1. Protein calorie malnutrition being optimized with TPN: CHO [105 ] gm.  AA [40] gm. SMOF Lipids [17] gm. lipids will be administered over 12 hours   2.  Hyperglycemia managed with: [0 ] units of regular insulin    3.  Check fluid balance daily.  Strict I/O  [ ] [ ]   4.  Daily BMP, Ionized Calcium, Magnesium and Phosphorous   5.  Triglycerides at initiation of TPN and monthly [ ] [ ]   6.  Pepcid in TPN for Gi prophylaxis  [ ]   I have seen and examined the patient, reviewed the laboratory and radiologic data and agree with the history, physical assessment and plan.  I reviewed and discussed with all consultants, house staff and PA's.  The note is edited where appropriate. The Nutrition Support Team (NST) discusses on an ongoing basis with the primary team and all consultants, House staff and PA's to have a permanent risk benefit analyses on all decisions.  I spent  45  minutes in total; providing diagnoses, assessment and plan.

## 2018-06-25 NOTE — PROGRESS NOTE ADULT - ASSESSMENT
58F s/p ERCP EUS aborted 2/2 small bowel perforation, s/p ex lap resection of small bowel side to side anastomosis currently hemodynamically stable on the floor.     - F/U Fever work up - WBC also rising  - Diet changed to soft phase 2 Bariatric per nutrition  - Continue TPN  - Continue martinez  - Monitor GI function  - Continue T Lovenox  - Dispo: DC planning

## 2018-06-26 LAB
ALBUMIN SERPL ELPH-MCNC: 1.8 G/DL — LOW (ref 3.3–5)
ALP SERPL-CCNC: 187 U/L — HIGH (ref 40–120)
ALT FLD-CCNC: 22 U/L — SIGNIFICANT CHANGE UP (ref 4–33)
AST SERPL-CCNC: 35 U/L — HIGH (ref 4–32)
BASOPHILS # BLD AUTO: 0.05 K/UL — SIGNIFICANT CHANGE UP (ref 0–0.2)
BASOPHILS NFR BLD AUTO: 0.5 % — SIGNIFICANT CHANGE UP (ref 0–2)
BILIRUB DIRECT SERPL-MCNC: 11.1 MG/DL — HIGH (ref 0.1–0.2)
BILIRUB SERPL-MCNC: 14 MG/DL — HIGH (ref 0.2–1.2)
BUN SERPL-MCNC: 12 MG/DL — SIGNIFICANT CHANGE UP (ref 7–23)
CA-I BLD-SCNC: 1.13 MMOL/L — SIGNIFICANT CHANGE UP (ref 1.03–1.23)
CALCIUM SERPL-MCNC: 7.6 MG/DL — LOW (ref 8.4–10.5)
CHLORIDE SERPL-SCNC: 101 MMOL/L — SIGNIFICANT CHANGE UP (ref 98–107)
CO2 SERPL-SCNC: 23 MMOL/L — SIGNIFICANT CHANGE UP (ref 22–31)
CREAT SERPL-MCNC: 0.29 MG/DL — LOW (ref 0.5–1.3)
EOSINOPHIL # BLD AUTO: 0.19 K/UL — SIGNIFICANT CHANGE UP (ref 0–0.5)
EOSINOPHIL NFR BLD AUTO: 1.9 % — SIGNIFICANT CHANGE UP (ref 0–6)
GLUCOSE SERPL-MCNC: 96 MG/DL — SIGNIFICANT CHANGE UP (ref 70–99)
HCT VFR BLD CALC: 25.6 % — LOW (ref 34.5–45)
HCT VFR BLD CALC: 28.1 % — LOW (ref 34.5–45)
HGB BLD-MCNC: 8.6 G/DL — LOW (ref 11.5–15.5)
HGB BLD-MCNC: 9.3 G/DL — LOW (ref 11.5–15.5)
IMM GRANULOCYTES # BLD AUTO: 0.12 # — SIGNIFICANT CHANGE UP
IMM GRANULOCYTES NFR BLD AUTO: 1.2 % — SIGNIFICANT CHANGE UP (ref 0–1.5)
LYMPHOCYTES # BLD AUTO: 0.77 K/UL — LOW (ref 1–3.3)
LYMPHOCYTES # BLD AUTO: 7.7 % — LOW (ref 13–44)
MAGNESIUM SERPL-MCNC: 2.1 MG/DL — SIGNIFICANT CHANGE UP (ref 1.6–2.6)
MCHC RBC-ENTMCNC: 30.7 PG — SIGNIFICANT CHANGE UP (ref 27–34)
MCHC RBC-ENTMCNC: 31.2 PG — SIGNIFICANT CHANGE UP (ref 27–34)
MCHC RBC-ENTMCNC: 33.1 % — SIGNIFICANT CHANGE UP (ref 32–36)
MCHC RBC-ENTMCNC: 33.6 % — SIGNIFICANT CHANGE UP (ref 32–36)
MCV RBC AUTO: 92.7 FL — SIGNIFICANT CHANGE UP (ref 80–100)
MCV RBC AUTO: 92.8 FL — SIGNIFICANT CHANGE UP (ref 80–100)
MONOCYTES # BLD AUTO: 0.73 K/UL — SIGNIFICANT CHANGE UP (ref 0–0.9)
MONOCYTES NFR BLD AUTO: 7.3 % — SIGNIFICANT CHANGE UP (ref 2–14)
NEUTROPHILS # BLD AUTO: 8.09 K/UL — HIGH (ref 1.8–7.4)
NEUTROPHILS NFR BLD AUTO: 81.4 % — HIGH (ref 43–77)
NRBC # FLD: 0 — SIGNIFICANT CHANGE UP
NRBC # FLD: 0 — SIGNIFICANT CHANGE UP
PHOSPHATE SERPL-MCNC: 3 MG/DL — SIGNIFICANT CHANGE UP (ref 2.5–4.5)
PLATELET # BLD AUTO: 293 K/UL — SIGNIFICANT CHANGE UP (ref 150–400)
PLATELET # BLD AUTO: 538 K/UL — HIGH (ref 150–400)
PMV BLD: 10.2 FL — SIGNIFICANT CHANGE UP (ref 7–13)
PMV BLD: 10.9 FL — SIGNIFICANT CHANGE UP (ref 7–13)
POTASSIUM SERPL-MCNC: 3.3 MMOL/L — LOW (ref 3.5–5.3)
POTASSIUM SERPL-SCNC: 3.3 MMOL/L — LOW (ref 3.5–5.3)
PROT SERPL-MCNC: 5.2 G/DL — LOW (ref 6–8.3)
RBC # BLD: 2.76 M/UL — LOW (ref 3.8–5.2)
RBC # BLD: 3.03 M/UL — LOW (ref 3.8–5.2)
RBC # FLD: 21.2 % — HIGH (ref 10.3–14.5)
RBC # FLD: 21.5 % — HIGH (ref 10.3–14.5)
SODIUM SERPL-SCNC: 133 MMOL/L — LOW (ref 135–145)
SPECIMEN SOURCE: SIGNIFICANT CHANGE UP
WBC # BLD: 12.38 K/UL — HIGH (ref 3.8–10.5)
WBC # BLD: 9.95 K/UL — SIGNIFICANT CHANGE UP (ref 3.8–10.5)
WBC # FLD AUTO: 12.38 K/UL — HIGH (ref 3.8–10.5)
WBC # FLD AUTO: 9.95 K/UL — SIGNIFICANT CHANGE UP (ref 3.8–10.5)

## 2018-06-26 PROCEDURE — 71045 X-RAY EXAM CHEST 1 VIEW: CPT | Mod: 26

## 2018-06-26 PROCEDURE — 99233 SBSQ HOSP IP/OBS HIGH 50: CPT

## 2018-06-26 RX ORDER — I.V. FAT EMULSION 20 G/100ML
13.3 EMULSION INTRAVENOUS
Qty: 32 | Refills: 0 | Status: DISCONTINUED | OUTPATIENT
Start: 2018-06-26 | End: 2018-06-28

## 2018-06-26 RX ORDER — ACETAMINOPHEN 500 MG
1000 TABLET ORAL ONCE
Qty: 0 | Refills: 0 | Status: DISCONTINUED | OUTPATIENT
Start: 2018-06-26 | End: 2018-06-26

## 2018-06-26 RX ORDER — ELECTROLYTE SOLUTION,INJ
1 VIAL (ML) INTRAVENOUS
Qty: 0 | Refills: 0 | Status: DISCONTINUED | OUTPATIENT
Start: 2018-06-26 | End: 2018-06-26

## 2018-06-26 RX ORDER — IBUPROFEN 200 MG
600 TABLET ORAL ONCE
Qty: 0 | Refills: 0 | Status: COMPLETED | OUTPATIENT
Start: 2018-06-26 | End: 2018-06-26

## 2018-06-26 RX ADMIN — HYDROMORPHONE HYDROCHLORIDE 2 MILLIGRAM(S): 2 INJECTION INTRAMUSCULAR; INTRAVENOUS; SUBCUTANEOUS at 17:38

## 2018-06-26 RX ADMIN — MORPHINE SULFATE 4 MILLIGRAM(S): 50 CAPSULE, EXTENDED RELEASE ORAL at 17:46

## 2018-06-26 RX ADMIN — HYDROMORPHONE HYDROCHLORIDE 2 MILLIGRAM(S): 2 INJECTION INTRAMUSCULAR; INTRAVENOUS; SUBCUTANEOUS at 06:07

## 2018-06-26 RX ADMIN — Medication 1 GRAM(S): at 23:11

## 2018-06-26 RX ADMIN — I.V. FAT EMULSION 13.3 ML/HR: 20 EMULSION INTRAVENOUS at 17:57

## 2018-06-26 RX ADMIN — Medication 1 EACH: at 17:56

## 2018-06-26 RX ADMIN — ENOXAPARIN SODIUM 65 MILLIGRAM(S): 100 INJECTION SUBCUTANEOUS at 17:46

## 2018-06-26 RX ADMIN — Medication 1 GRAM(S): at 17:46

## 2018-06-26 RX ADMIN — MORPHINE SULFATE 4 MILLIGRAM(S): 50 CAPSULE, EXTENDED RELEASE ORAL at 09:46

## 2018-06-26 RX ADMIN — HYDROMORPHONE HYDROCHLORIDE 2 MILLIGRAM(S): 2 INJECTION INTRAMUSCULAR; INTRAVENOUS; SUBCUTANEOUS at 16:54

## 2018-06-26 RX ADMIN — MORPHINE SULFATE 4 MILLIGRAM(S): 50 CAPSULE, EXTENDED RELEASE ORAL at 23:10

## 2018-06-26 RX ADMIN — Medication 5 MILLIGRAM(S): at 21:23

## 2018-06-26 RX ADMIN — Medication 5 MILLIGRAM(S): at 05:05

## 2018-06-26 RX ADMIN — HYDROMORPHONE HYDROCHLORIDE 2 MILLIGRAM(S): 2 INJECTION INTRAMUSCULAR; INTRAVENOUS; SUBCUTANEOUS at 06:40

## 2018-06-26 RX ADMIN — PANTOPRAZOLE SODIUM 40 MILLIGRAM(S): 20 TABLET, DELAYED RELEASE ORAL at 05:05

## 2018-06-26 RX ADMIN — MORPHINE SULFATE 4 MILLIGRAM(S): 50 CAPSULE, EXTENDED RELEASE ORAL at 10:00

## 2018-06-26 RX ADMIN — MORPHINE SULFATE 4 MILLIGRAM(S): 50 CAPSULE, EXTENDED RELEASE ORAL at 15:00

## 2018-06-26 RX ADMIN — MORPHINE SULFATE 4 MILLIGRAM(S): 50 CAPSULE, EXTENDED RELEASE ORAL at 14:42

## 2018-06-26 RX ADMIN — MORPHINE SULFATE 4 MILLIGRAM(S): 50 CAPSULE, EXTENDED RELEASE ORAL at 23:40

## 2018-06-26 RX ADMIN — CHLORHEXIDINE GLUCONATE 1 APPLICATION(S): 213 SOLUTION TOPICAL at 11:44

## 2018-06-26 RX ADMIN — MORPHINE SULFATE 4 MILLIGRAM(S): 50 CAPSULE, EXTENDED RELEASE ORAL at 18:07

## 2018-06-26 RX ADMIN — Medication 1 GRAM(S): at 05:05

## 2018-06-26 NOTE — PROGRESS NOTE ADULT - SUBJECTIVE AND OBJECTIVE BOX
Spoke with patient and family.  Would hold off on any further intervention at this time even though T bili increasing.  PTC seems to be draining, albeit more gastric-type output  Will trend LFTs  Increase PO intake / TPN  Offered palliative care consultation for discussion of options, son would like to hold off for now pending the trend on the T bili

## 2018-06-26 NOTE — PROGRESS NOTE ADULT - ASSESSMENT
Impression:  1. Unsuccessful ERCP complicated by small bowel perforation 6/6/18 s/p ex-lap with small bowel resection and anastomosis 6/6/18  2. Obstructive jaundice with dilated CBD and 1.4 cm enhancing pancreatic head mass s/p percutaneous drainage, with persistent hyperbilirubinemia   3. 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 Acute blood loss anemia with abdominal wall hematoma and hemoperitoneum noted on CTAP    Plan:  - Some degree of elevated bilirubin may be attributed to resolving hematoma; however would consider repeat tube check   - Repeat direct/indirect bilirubin  - Monitor CBC, CMP, INR  - Miralax as needed  - OOB as tolerated  - Monitor electrolytes and correct derangements  - IVF as appropriate  - Continue antibiotics  - Nutrition per Surgery  - Supportive care per primary team

## 2018-06-26 NOTE — PROGRESS NOTE ADULT - ATTENDING COMMENTS
57 y/o female s/p ERCP c/b perforation of afferent limb. Pt underwent emergent Ex-lap w/ resection of perforated bowel and stapled side to side functional end to end anastomosis. Pt remained intubated and was transferred to SICU off pressors. s/p extubation, now transferred to floor. Patient meets criteria for severe protein calorie malnutrition requiring TPN for nutritional support. TPN increased back to full volume today.     TPN infusion increased to 1.5 L today, TPN is providing 1312 kcal/day     Monitor fingersticks q 12 hours, obtain daily weights.    Labs reviewed - electrolytes adjusted in TPN     Continue TPN and lipids. Will follow up with primary team on plan.     1. Protein calorie malnutrition being optimized with TPN: CHO [200 ] gm.  AA [78] gm. SMOF Lipids [20] gm. lipids will be administered over 12 hours   2.  Hyperglycemia managed with: [0 ] units of regular insulin    3.  Check fluid balance daily.  Strict I/O  [ ] [ ]   4.  Daily BMP, Ionized Calcium, Magnesium and Phosphorous   5.  Triglycerides at initiation of TPN and monthly [ ] [ ]   6.  Pepcid in TPN for Gi prophylaxis  [ ]   I have seen and examined the patient, reviewed the laboratory and radiologic data and agree with the history, physical assessment and plan.  I reviewed and discussed with all consultants, house staff and PA's.  The note is edited where appropriate. The Nutrition Support Team (NST) discusses on an ongoing basis with the primary team and all consultants, House staff and PA's to have a permanent risk benefit analyses on all decisions.  I spent  45  minutes in total; providing diagnoses, assessment and plan.

## 2018-06-26 NOTE — PROGRESS NOTE ADULT - SUBJECTIVE AND OBJECTIVE BOX
NUTRITION NOTE  [id number] CHOKYI  ===============================    Interval events  Patient was seen and examined at bedside.  TPN/lipids initiated on 18, patient is tolerating without any issues.  NPO today for planned IR procedure, TPN/lipids increased back to full    Vital Signs Last 24 Hrs  T(C): 37.1 (2018 07:47), Max: 37.3 (2018 17:04)  T(F): 98.7 (2018 07:47), Max: 99.2 (2018 17:04)  HR: 88 (2018 07:47) (81 - 98)  BP: 123/65 (2018 07:47) (115/60 - 126/68)  RR: 18 (2018 07:47) (17 - 18)  SpO2: 99% (2018 07:47) (95% - 99%)    MEDICATIONS  (STANDING):  chlorhexidine 4% Liquid 1 Application(s) Topical once  chlorhexidine 4% Liquid 1 Application(s) Topical <User Schedule>  enoxaparin Injectable 65 milliGRAM(s) SubCutaneous <User Schedule>  fat emulsion (Fish Oil and Plant Based) 20% Infusion 13.3 mL/Hr (13.3 mL/Hr) IV Continuous <Continuous>  fat emulsion (Fish Oil and Plant Based) 20% Infusion 7 mL/Hr (7 mL/Hr) IV Continuous <Continuous>  metoclopramide 5 milliGRAM(s) Oral every 8 hours  pantoprazole    Tablet 40 milliGRAM(s) Oral before breakfast  Parenteral Nutrition - Adult 1 Each (63 mL/Hr) TPN Continuous <Continuous>  Parenteral Nutrition - Adult 1 Each (42 mL/Hr) TPN Continuous <Continuous>  sucralfate suspension 1 Gram(s) Oral four times a day    MEDICATIONS  (PRN):  cyclobenzaprine 5 milliGRAM(s) Oral three times a day PRN Muscle Spasm  dibucaine 1% Ointment 1 Application(s) Topical daily PRN hemorrhoids  HYDROmorphone   Tablet 2 milliGRAM(s) Oral every 3 hours PRN Moderate Pain (4 - 6)  morphine  - Injectable 4 milliGRAM(s) IV Push every 3 hours PRN Severe Pain (7 - 10)    I&O's Detail    2018 07:01  -  2018 07:00  --------------------------------------------------------  IN:    Free Water: 10 mL  Total IN: 10 mL    OUT:    Drain: 290 mL    Indwelling Catheter - Urethral: 1520 mL  Total OUT: 1810 mL    Total NET: -1800 mL      2018 07:01  -  2018 10:57  --------------------------------------------------------  IN:    Enteral Tube Flush: 10 mL  Total IN: 10 mL    OUT:    Drain: 30 mL    Indwelling Catheter - Urethral: 150 mL  Total OUT: 180 mL    Total NET: -170 mL    POCT Blood Glucose.: 109 mg/dL (2018 06:02)  POCT Blood Glucose.: 122 mg/dL (2018 00:13)  POCT Blood Glucose.: 114 mg/dL (2018 18:04)  POCT Blood Glucose.: 121 mg/dL (2018 12:13)    Daily Weight in k.8 (2018 00:32)    Drug Dosing Weight  Height (cm): 160.02 (2018 15:53)  Weight (kg): 54 (2018 15:53)  BMI (kg/m2): 21.1 (2018 15:53)  BSA (m2): 1.55 (2018 15:53)    PHYSICAL EXAM   Constitutional: no acute distress, resting in bed   Respiratory: nonlabored respirations   Gastrointestinal: soft, mildly tender  Extremities: warm, generalized edema    PICC Site: C/D/I    Diet: soft diet and TPN, NPO today for planned IR procedure      LABORATORY                                   8.6    9.95  )-----------( 293      ( 2018 05:21 )               25.6   06-26    133<L>  |  101  |  12  ----------------------------<  96  3.3<L>   |  23  |  0.29<L>    Ca    7.6<L>      2018 05:21  Phos  3.0       Mg     2.1         TPro  5.2<L>  /  Alb  1.8<L>  /  TBili  14.0<H>  /  DBili  11.1<H>  /  AST  35<H>  /  ALT  22  /  AlkPhos  187<H>      LIVER FUNCTIONS - ( 2018 05:21 )  Alb: 1.8 g/dL / Pro: 5.2 g/dL / ALK PHOS: 187 u/L / ALT: 22 u/L / AST: 35 u/L / GGT: x            Chol -- LDL -- HDL -- Trig 138,  Chol -- LDL -- HDL -- Trig 112,  Chol 156  HDL 8<L> Trig 85,  Chol 122 LDL 73 HDL 33<L> Trig 78    Prealbumin /18: 10    ASSESSMENT/PLAN:  59 y/o female s/p ERCP c/b perforation of afferent limb. Pt underwent emergent Ex-lap w/ resection of perforated bowel and stapled side to side functional end to end anastomosis. Pt remained intubated and was transferred to SICU off pressors. s/p extubation, now transferred to floor. Patient meets criteria for severe protein calorie malnutrition requiring TPN for nutritional support. TPN increased back to full volume today.     TPN infusion increased to 1.5 L today, TPN is providing 1312 kcal/day     Monitor fingersticks q 12 hours, obtain daily weights.    Labs reviewed - electrolytes adjusted in TPN     Continue TPN and lipids. Will follow up with primary team on plan.     1. Protein calorie malnutrition being optimized with TPN: CHO [200 ] gm.  AA [78] gm. SMOF Lipids [20] gm. lipids will be administered over 12 hours   2.  Hyperglycemia managed with: [0 ] units of regular insulin    3.  Check fluid balance daily.  Strict I/O  [ ] [ ]   4.  Daily BMP, Ionized Calcium, Magnesium and Phosphorous   5.  Triglycerides at initiation of TPN and monthly [ ] [ ]   6.  Pepcid in TPN for Gi prophylaxis  [ ]     Nutrition support pager [phone]

## 2018-06-26 NOTE — PROGRESS NOTE ADULT - SUBJECTIVE AND OBJECTIVE BOX
Chief Complaint:  Patient is a 58y old  Female who presents with a chief complaint of Painless jaundice (2018 14:49)      Interval Events: Patient feels about the same this morning. She denies fever, chills, vomiting, melena, hematochezia, hematemesis.     Allergies:  No Known Allergies      Hospital Medications:  chlorhexidine 4% Liquid 1 Application(s) Topical once  chlorhexidine 4% Liquid 1 Application(s) Topical <User Schedule>  cyclobenzaprine 5 milliGRAM(s) Oral three times a day PRN  dibucaine 1% Ointment 1 Application(s) Topical daily PRN  enoxaparin Injectable 65 milliGRAM(s) SubCutaneous <User Schedule>  fat emulsion (Fish Oil and Plant Based) 20% Infusion 13.3 mL/Hr IV Continuous <Continuous>  fat emulsion (Fish Oil and Plant Based) 20% Infusion 7 mL/Hr IV Continuous <Continuous>  HYDROmorphone   Tablet 2 milliGRAM(s) Oral every 3 hours PRN  metoclopramide 5 milliGRAM(s) Oral every 8 hours  morphine  - Injectable 4 milliGRAM(s) IV Push every 3 hours PRN  pantoprazole    Tablet 40 milliGRAM(s) Oral before breakfast  Parenteral Nutrition - Adult 1 Each TPN Continuous <Continuous>  Parenteral Nutrition - Adult 1 Each TPN Continuous <Continuous>  sucralfate suspension 1 Gram(s) Oral four times a day      PMHX/PSHX:  Gastric cancer  Malignant neoplasm of stomach, unspecified location  No pertinent past medical history  S/P partial gastrectomy  No significant past surgical history      Family history:  No pertinent family history in first degree relatives      ROS: Negative, except as otherwise noted above    PHYSICAL EXAM:   Vital Signs:  Vital Signs Last 24 Hrs  T(C): 37 (2018 11:43), Max: 37.3 (2018 17:04)  T(F): 98.6 (2018 11:43), Max: 99.2 (2018 17:04)  HR: 91 (2018 11:43) (81 - 98)  BP: 116/57 (2018 11:43) (116/57 - 126/68)  BP(mean): --  RR: 18 (2018 11:43) (18 - 18)  SpO2: 99% (2018 11:43) (95% - 99%)  Daily     Daily Weight in k.8 (2018 00:32)    GENERAL: No acute distress    HEENT:  Icteric, no thrush  CHEST:  Non-labored breathing, lungs clear b/l  HEART:  +s1, s2 heart sounds, no murmurs  ABDOMEN:  +Midline surgical incision, +biliary drainage catheter, mildly tender diffusely  EXTREMITIES:  Warm and well perfused  SKIN:  No rash  NEURO:   Awake, interactive, following commands       LABS:  CBC Full  -  ( 2018 05:21 )  WBC Count : 9.95 K/uL  Hemoglobin : 8.6 g/dL  Hematocrit : 25.6 %  Platelet Count - Automated : 293 K/uL  Mean Cell Volume : 92.8 fL  Auto Neutrophil # : 8.09 K/uL  Auto Neutrophil % : 81.4 %     @ 05:21  Na 133 mmol/L  K 3.3 mmol/L  Cl 101 mmol/L  CO2 23 mmol/L  BUN 12 mg/dL  Creat 0.29 mg/dL  Glucose 96 mg/dL  Ca 7.6 mg/dL    Total protein 5.2 g/dL  Albumin 1.8 g/dL  T bili 14.0 mg/dL  Alk phos 187 u/L  AST 35 u/L  ALT 22 u/L

## 2018-06-27 LAB
ALBUMIN SERPL ELPH-MCNC: 1.8 G/DL — LOW (ref 3.3–5)
ALP SERPL-CCNC: 198 U/L — HIGH (ref 40–120)
ALT FLD-CCNC: 27 U/L — SIGNIFICANT CHANGE UP (ref 4–33)
APPEARANCE UR: SIGNIFICANT CHANGE UP
AST SERPL-CCNC: 31 U/L — SIGNIFICANT CHANGE UP (ref 4–32)
BACTERIA # UR AUTO: HIGH
BASOPHILS # BLD AUTO: 0.07 K/UL — SIGNIFICANT CHANGE UP (ref 0–0.2)
BASOPHILS NFR BLD AUTO: 0.6 % — SIGNIFICANT CHANGE UP (ref 0–2)
BILIRUB DIRECT SERPL-MCNC: 11.6 MG/DL — HIGH (ref 0.1–0.2)
BILIRUB SERPL-MCNC: 14 MG/DL — HIGH (ref 0.2–1.2)
BILIRUB UR-MCNC: HIGH
BLOOD UR QL VISUAL: HIGH
BUN SERPL-MCNC: 13 MG/DL — SIGNIFICANT CHANGE UP (ref 7–23)
CA-I BLD-SCNC: 1.15 MMOL/L — SIGNIFICANT CHANGE UP (ref 1.03–1.23)
CALCIUM SERPL-MCNC: 7.8 MG/DL — LOW (ref 8.4–10.5)
CHLORIDE SERPL-SCNC: 104 MMOL/L — SIGNIFICANT CHANGE UP (ref 98–107)
CO2 SERPL-SCNC: 23 MMOL/L — SIGNIFICANT CHANGE UP (ref 22–31)
COLOR SPEC: HIGH
CREAT SERPL-MCNC: 0.34 MG/DL — LOW (ref 0.5–1.3)
EOSINOPHIL # BLD AUTO: 0.1 K/UL — SIGNIFICANT CHANGE UP (ref 0–0.5)
EOSINOPHIL NFR BLD AUTO: 0.9 % — SIGNIFICANT CHANGE UP (ref 0–6)
GLUCOSE SERPL-MCNC: 108 MG/DL — HIGH (ref 70–99)
GLUCOSE UR-MCNC: NEGATIVE — SIGNIFICANT CHANGE UP
HCT VFR BLD CALC: 25.3 % — LOW (ref 34.5–45)
HGB BLD-MCNC: 8.4 G/DL — LOW (ref 11.5–15.5)
IMM GRANULOCYTES # BLD AUTO: 0.17 # — SIGNIFICANT CHANGE UP
IMM GRANULOCYTES NFR BLD AUTO: 1.5 % — SIGNIFICANT CHANGE UP (ref 0–1.5)
KETONES UR-MCNC: NEGATIVE — SIGNIFICANT CHANGE UP
LEUKOCYTE ESTERASE UR-ACNC: HIGH
LYMPHOCYTES # BLD AUTO: 1.02 K/UL — SIGNIFICANT CHANGE UP (ref 1–3.3)
LYMPHOCYTES # BLD AUTO: 9.1 % — LOW (ref 13–44)
MAGNESIUM SERPL-MCNC: 2 MG/DL — SIGNIFICANT CHANGE UP (ref 1.6–2.6)
MCHC RBC-ENTMCNC: 30.9 PG — SIGNIFICANT CHANGE UP (ref 27–34)
MCHC RBC-ENTMCNC: 33.2 % — SIGNIFICANT CHANGE UP (ref 32–36)
MCV RBC AUTO: 93 FL — SIGNIFICANT CHANGE UP (ref 80–100)
METHOD TYPE: SIGNIFICANT CHANGE UP
MONOCYTES # BLD AUTO: 0.95 K/UL — HIGH (ref 0–0.9)
MONOCYTES NFR BLD AUTO: 8.5 % — SIGNIFICANT CHANGE UP (ref 2–14)
MUCOUS THREADS # UR AUTO: SIGNIFICANT CHANGE UP
NEUTROPHILS # BLD AUTO: 8.87 K/UL — HIGH (ref 1.8–7.4)
NEUTROPHILS NFR BLD AUTO: 79.4 % — HIGH (ref 43–77)
NITRITE UR-MCNC: NEGATIVE — SIGNIFICANT CHANGE UP
NRBC # FLD: 0 — SIGNIFICANT CHANGE UP
ORGANISM # SPEC MICROSCOPIC CNT: SIGNIFICANT CHANGE UP
ORGANISM # SPEC MICROSCOPIC CNT: SIGNIFICANT CHANGE UP
PH UR: 6.5 — SIGNIFICANT CHANGE UP (ref 4.6–8)
PHOSPHATE SERPL-MCNC: 2.9 MG/DL — SIGNIFICANT CHANGE UP (ref 2.5–4.5)
PLATELET # BLD AUTO: 380 K/UL — SIGNIFICANT CHANGE UP (ref 150–400)
PMV BLD: 11.4 FL — SIGNIFICANT CHANGE UP (ref 7–13)
POTASSIUM SERPL-MCNC: 4.1 MMOL/L — SIGNIFICANT CHANGE UP (ref 3.5–5.3)
POTASSIUM SERPL-SCNC: 4.1 MMOL/L — SIGNIFICANT CHANGE UP (ref 3.5–5.3)
PROT SERPL-MCNC: 5.1 G/DL — LOW (ref 6–8.3)
PROT UR-MCNC: 30 MG/DL — HIGH
RBC # BLD: 2.72 M/UL — LOW (ref 3.8–5.2)
RBC # FLD: 21.2 % — HIGH (ref 10.3–14.5)
RBC CASTS # UR COMP ASSIST: >50 — HIGH (ref 0–?)
SODIUM SERPL-SCNC: 138 MMOL/L — SIGNIFICANT CHANGE UP (ref 135–145)
SP GR SPEC: 1.01 — SIGNIFICANT CHANGE UP (ref 1–1.04)
SPECIMEN SOURCE: SIGNIFICANT CHANGE UP
SPECIMEN SOURCE: SIGNIFICANT CHANGE UP
UROBILINOGEN FLD QL: NORMAL MG/DL — SIGNIFICANT CHANGE UP
WBC # BLD: 11.18 K/UL — HIGH (ref 3.8–10.5)
WBC # FLD AUTO: 11.18 K/UL — HIGH (ref 3.8–10.5)
WBC CLUMPS #/AREA URNS HPF: PRESENT — HIGH (ref 0–?)
WBC UR QL: SIGNIFICANT CHANGE UP (ref 0–?)

## 2018-06-27 PROCEDURE — 47531 INJECTION FOR CHOLANGIOGRAM: CPT

## 2018-06-27 PROCEDURE — 99232 SBSQ HOSP IP/OBS MODERATE 35: CPT | Mod: GC

## 2018-06-27 PROCEDURE — 99233 SBSQ HOSP IP/OBS HIGH 50: CPT

## 2018-06-27 RX ORDER — ELECTROLYTE SOLUTION,INJ
1 VIAL (ML) INTRAVENOUS
Qty: 0 | Refills: 0 | Status: DISCONTINUED | OUTPATIENT
Start: 2018-06-27 | End: 2018-06-27

## 2018-06-27 RX ORDER — PIPERACILLIN AND TAZOBACTAM 4; .5 G/20ML; G/20ML
3.38 INJECTION, POWDER, LYOPHILIZED, FOR SOLUTION INTRAVENOUS EVERY 8 HOURS
Qty: 0 | Refills: 0 | Status: DISCONTINUED | OUTPATIENT
Start: 2018-06-27 | End: 2018-07-06

## 2018-06-27 RX ORDER — I.V. FAT EMULSION 20 G/100ML
13.3 EMULSION INTRAVENOUS
Qty: 32 | Refills: 0 | Status: DISCONTINUED | OUTPATIENT
Start: 2018-06-27 | End: 2018-06-29

## 2018-06-27 RX ADMIN — Medication 1 EACH: at 18:41

## 2018-06-27 RX ADMIN — ENOXAPARIN SODIUM 65 MILLIGRAM(S): 100 INJECTION SUBCUTANEOUS at 18:23

## 2018-06-27 RX ADMIN — PANTOPRAZOLE SODIUM 40 MILLIGRAM(S): 20 TABLET, DELAYED RELEASE ORAL at 05:07

## 2018-06-27 RX ADMIN — Medication 1 GRAM(S): at 11:41

## 2018-06-27 RX ADMIN — MORPHINE SULFATE 4 MILLIGRAM(S): 50 CAPSULE, EXTENDED RELEASE ORAL at 08:59

## 2018-06-27 RX ADMIN — MORPHINE SULFATE 4 MILLIGRAM(S): 50 CAPSULE, EXTENDED RELEASE ORAL at 09:25

## 2018-06-27 RX ADMIN — CYCLOBENZAPRINE HYDROCHLORIDE 5 MILLIGRAM(S): 10 TABLET, FILM COATED ORAL at 18:42

## 2018-06-27 RX ADMIN — Medication 5 MILLIGRAM(S): at 05:08

## 2018-06-27 RX ADMIN — MORPHINE SULFATE 4 MILLIGRAM(S): 50 CAPSULE, EXTENDED RELEASE ORAL at 16:14

## 2018-06-27 RX ADMIN — MORPHINE SULFATE 4 MILLIGRAM(S): 50 CAPSULE, EXTENDED RELEASE ORAL at 12:02

## 2018-06-27 RX ADMIN — Medication 1 GRAM(S): at 18:23

## 2018-06-27 RX ADMIN — HYDROMORPHONE HYDROCHLORIDE 2 MILLIGRAM(S): 2 INJECTION INTRAMUSCULAR; INTRAVENOUS; SUBCUTANEOUS at 19:30

## 2018-06-27 RX ADMIN — CHLORHEXIDINE GLUCONATE 1 APPLICATION(S): 213 SOLUTION TOPICAL at 11:08

## 2018-06-27 RX ADMIN — Medication 1 GRAM(S): at 23:02

## 2018-06-27 RX ADMIN — MORPHINE SULFATE 4 MILLIGRAM(S): 50 CAPSULE, EXTENDED RELEASE ORAL at 06:16

## 2018-06-27 RX ADMIN — PIPERACILLIN AND TAZOBACTAM 25 GRAM(S): 4; .5 INJECTION, POWDER, LYOPHILIZED, FOR SOLUTION INTRAVENOUS at 13:39

## 2018-06-27 RX ADMIN — Medication 1 GRAM(S): at 05:08

## 2018-06-27 RX ADMIN — HYDROMORPHONE HYDROCHLORIDE 2 MILLIGRAM(S): 2 INJECTION INTRAMUSCULAR; INTRAVENOUS; SUBCUTANEOUS at 22:56

## 2018-06-27 RX ADMIN — HYDROMORPHONE HYDROCHLORIDE 2 MILLIGRAM(S): 2 INJECTION INTRAMUSCULAR; INTRAVENOUS; SUBCUTANEOUS at 02:11

## 2018-06-27 RX ADMIN — HYDROMORPHONE HYDROCHLORIDE 2 MILLIGRAM(S): 2 INJECTION INTRAMUSCULAR; INTRAVENOUS; SUBCUTANEOUS at 19:23

## 2018-06-27 RX ADMIN — MORPHINE SULFATE 4 MILLIGRAM(S): 50 CAPSULE, EXTENDED RELEASE ORAL at 12:30

## 2018-06-27 RX ADMIN — ENOXAPARIN SODIUM 65 MILLIGRAM(S): 100 INJECTION SUBCUTANEOUS at 06:50

## 2018-06-27 RX ADMIN — HYDROMORPHONE HYDROCHLORIDE 2 MILLIGRAM(S): 2 INJECTION INTRAMUSCULAR; INTRAVENOUS; SUBCUTANEOUS at 23:50

## 2018-06-27 RX ADMIN — PIPERACILLIN AND TAZOBACTAM 25 GRAM(S): 4; .5 INJECTION, POWDER, LYOPHILIZED, FOR SOLUTION INTRAVENOUS at 23:00

## 2018-06-27 RX ADMIN — HYDROMORPHONE HYDROCHLORIDE 2 MILLIGRAM(S): 2 INJECTION INTRAMUSCULAR; INTRAVENOUS; SUBCUTANEOUS at 02:45

## 2018-06-27 RX ADMIN — MORPHINE SULFATE 4 MILLIGRAM(S): 50 CAPSULE, EXTENDED RELEASE ORAL at 16:50

## 2018-06-27 RX ADMIN — I.V. FAT EMULSION 13.3 ML/HR: 20 EMULSION INTRAVENOUS at 18:41

## 2018-06-27 RX ADMIN — Medication 5 MILLIGRAM(S): at 22:35

## 2018-06-27 RX ADMIN — Medication 5 MILLIGRAM(S): at 11:42

## 2018-06-27 RX ADMIN — MORPHINE SULFATE 4 MILLIGRAM(S): 50 CAPSULE, EXTENDED RELEASE ORAL at 06:53

## 2018-06-27 NOTE — PROGRESS NOTE ADULT - SUBJECTIVE AND OBJECTIVE BOX
NUTRITION NOTE  [id number] CHOKYI  ===============================    Interval events  Patient was seen and examined at bedside, febrile upto 101.2 last night.   TPN/lipids initiated on 18, patient is tolerating without any issues.  Patient states that is trying to eat small amounts and continues to drink Ensure Clear.   TPN/lipids at full volume     Vital Signs Last 24 Hrs  T(C): 37.4 (2018 08:48), Max: 38.4 (2018 21:40)  T(F): 99.3 (2018 08:48), Max: 101.2 (2018 21:40)  HR: 108 (2018 08:48) (91 - 108)  BP: 137/64 (2018 08:48) (116/57 - 137/64)  RR: 19 (2018 08:48) (18 - 19)  SpO2: 98% (2018 08:48) (96% - 100%)    MEDICATIONS  (STANDING):  chlorhexidine 4% Liquid 1 Application(s) Topical once  chlorhexidine 4% Liquid 1 Application(s) Topical <User Schedule>  enoxaparin Injectable 65 milliGRAM(s) SubCutaneous <User Schedule>  fat emulsion (Fish Oil and Plant Based) 20% Infusion 13.3 mL/Hr (13.3 mL/Hr) IV Continuous <Continuous>  fat emulsion (Fish Oil and Plant Based) 20% Infusion 13.3 mL/Hr (13.3 mL/Hr) IV Continuous <Continuous>  metoclopramide 5 milliGRAM(s) Oral every 8 hours  pantoprazole    Tablet 40 milliGRAM(s) Oral before breakfast  Parenteral Nutrition - Adult 1 Each (63 mL/Hr) TPN Continuous <Continuous>  Parenteral Nutrition - Adult 1 Each (63 mL/Hr) TPN Continuous <Continuous>  sucralfate suspension 1 Gram(s) Oral four times a day    MEDICATIONS  (PRN):  cyclobenzaprine 5 milliGRAM(s) Oral three times a day PRN Muscle Spasm  dibucaine 1% Ointment 1 Application(s) Topical daily PRN hemorrhoids  HYDROmorphone   Tablet 2 milliGRAM(s) Oral every 3 hours PRN Moderate Pain (4 - 6)  morphine  - Injectable 4 milliGRAM(s) IV Push every 3 hours PRN Severe Pain (7 - 10)    I&O's Detail    2018 07:01  -  2018 07:00  --------------------------------------------------------  IN:    Enteral Tube Flush: 10 mL  Total IN: 10 mL    OUT:    Drain: 390 mL    Indwelling Catheter - Urethral: 1980 mL  Total OUT: 2370 mL    Total NET: -2360 mL      2018 07:01  -  2018 10:13  --------------------------------------------------------  IN:  Total IN: 0 mL    OUT:    Drain: 75 mL  Total OUT: 75 mL    Total NET: -75 mL    POCT Blood Glucose.: 138 mg/dL (2018 06:11)  POCT Blood Glucose.: 137 mg/dL (2018 23:37)  POCT Blood Glucose.: 107 mg/dL (2018 16:49)  POCT Blood Glucose.: 112 mg/dL (2018 11:52)    Daily Weight in k.8 (2018 00:32)    Drug Dosing Weight  Height (cm): 160.02 (2018 15:53)  Weight (kg): 54 (2018 15:53)  BMI (kg/m2): 21.1 (2018 15:53)  BSA (m2): 1.55 (2018 15:53)    PHYSICAL EXAM   Constitutional: no acute distress, resting in bed   Respiratory: nonlabored respirations   Gastrointestinal: soft, mildly tender  Extremities: warm, generalized edema    PICC Site: C/D/I    Diet: soft diet and TPN    Culture - Blood (collected 2018 19:59)  Source: BLOOD  Preliminary Report (2018 09:33):    BCPCR^BLOOD CULTURE PCR    ***Blood Panel PCR results on this specimen are available    approximately 3 hours after the Gram stain result***    Gram stain, PCR, and/or culture results may not always    correspond due to difference in methodologies    ------------------------------------------------------------    This PCR assay was performed using [employer].  The    following targets are tested for:  Enterococcus, vancomycin    resistant enterococci, Listeria monocytogenes,  coagulase    negative staphylococci, S. aureus, methicillin resistant S.    aureus, Streptococcus agalactiae (Group B), S. pneumoniae,    S. pyogenes (Group A), Acinetobacter baumannii, Enterobacter    cloacae, E. coli, Klebsiella oxytoca, K. pneumoniae, Proteus    sp., Serratia marcescens, Haemophilus influenzae, Neisseria    meningitidis, Pseudomonas aeruginosa, Candida albicans, C.    glabrata, C. krusei, C. parapsilosis, C. tropicalis and the    KPC resistance gene.    **NOTE: Due to technical problems, Proteus sp. will NOT be    reported as part of the BCID panel until further notice.  Preliminary Report (2018 19:59):    NO ORGANISMS ISOLATED    NO ORGANISMS ISOLATED AT 24 HOURS      LABORATORY                                  8.4    11.18 )-----------( 380      ( 2018 05:45 )             25.3       138  |  104  |  13  ----------------------------<  108<H>  4.1   |  23  |  0.34<L>    Ca    7.8<L>      2018 05:45  Phos  2.9       Mg     2.0         TPro  5.1<L>  /  Alb  1.8<L>  /  TBili  14.0<H>  /  DBili  11.6<H>  /  AST  31  /  ALT  27  /  AlkPhos  198<H>      LIVER FUNCTIONS - ( 2018 05:45 )  Alb: 1.8 g/dL / Pro: 5.1 g/dL / ALK PHOS: 198 u/L / ALT: 27 u/L / AST: 31 u/L / GGT: x           06-20 Chol -- LDL -- HDL -- Trig 138, 06-12 Chol -- LDL -- HDL -- Trig 112, 06- Chol 156  HDL 8<L> Trig 85, - Chol 122 LDL 73 HDL 33<L> Trig 78    Prealbumin 18: 10    ASSESSMENT/PLAN:  57 y/o female s/p ERCP c/b perforation of afferent limb. Pt underwent emergent Ex-lap w/ resection of perforated bowel and stapled side to side functional end to end anastomosis. Pt remained intubated and was transferred to SICU off pressors. s/p extubation, now transferred to floor. Patient meets criteria for severe protein calorie malnutrition requiring TPN for nutritional support. TPN increased back to full volume on 18. Patient continues to have intermittent fevers, follow up fever work up.     TPN infusion increased to 1.5 L today, TPN is providing 1312 kcal/day     Monitor fingersticks q 12 hours, obtain daily weights.    Labs reviewed - electrolytes adjusted in TPN     Continue TPN and lipids. Will follow up with primary team on plan.   Continue to encourage increased PO intake and monitor tolerance.     1. Protein calorie malnutrition being optimized with TPN: CHO [200 ] gm.  AA [78] gm. SMOF Lipids [32] gm. lipids will be administered over 12 hours   2.  Hyperglycemia managed with: [0 ] units of regular insulin    3.  Check fluid balance daily.  Strict I/O  [ ] [ ]   4.  Daily BMP, Ionized Calcium, Magnesium and Phosphorous   5.  Triglycerides at initiation of TPN and monthly [ ] [ ]   6.  Pepcid in TPN for Gi prophylaxis  [ ]     Nutrition support pager [phone]

## 2018-06-27 NOTE — PROGRESS NOTE ADULT - ASSESSMENT
Impression:  1. Unsuccessful ERCP complicated by small bowel perforation 6/6/18 s/p ex-lap with small bowel resection and anastomosis 6/6/18  2. Obstructive jaundice with dilated CBD and 1.4 cm enhancing pancreatic head mass s/p percutaneous drainage, with persistent hyperbilirubinemia   3. 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 Acute blood loss anemia with abdominal wall hematoma and hemoperitoneum noted on CTAP    Plan:  - Will discuss persistent hyperbilirubinemia with Hepatology  - Monitor CBC, CMP, INR  - Miralax as needed  - OOB as tolerated  - Monitor electrolytes and correct derangements  - IVF as appropriate  - Continue antibiotics  - Nutrition per Surgery  - Supportive care per primary team

## 2018-06-27 NOTE — PROGRESS NOTE ADULT - SUBJECTIVE AND OBJECTIVE BOX
Chief Complaint:  Patient is a 58y old  Female who presents with a chief complaint of Painless jaundice (05 Jun 2018 14:49)      Interval Events: Patient feels about the same today, still with abdominal discomfort but controlled with pain medications.     Allergies:  No Known Allergies      Hospital Medications:  chlorhexidine 4% Liquid 1 Application(s) Topical once  chlorhexidine 4% Liquid 1 Application(s) Topical <User Schedule>  cyclobenzaprine 5 milliGRAM(s) Oral three times a day PRN  dibucaine 1% Ointment 1 Application(s) Topical daily PRN  enoxaparin Injectable 65 milliGRAM(s) SubCutaneous <User Schedule>  fat emulsion (Fish Oil and Plant Based) 20% Infusion 13.3 mL/Hr IV Continuous <Continuous>  fat emulsion (Fish Oil and Plant Based) 20% Infusion 13.3 mL/Hr IV Continuous <Continuous>  HYDROmorphone   Tablet 2 milliGRAM(s) Oral every 3 hours PRN  metoclopramide 5 milliGRAM(s) Oral every 8 hours  morphine  - Injectable 4 milliGRAM(s) IV Push every 3 hours PRN  pantoprazole    Tablet 40 milliGRAM(s) Oral before breakfast  Parenteral Nutrition - Adult 1 Each TPN Continuous <Continuous>  Parenteral Nutrition - Adult 1 Each TPN Continuous <Continuous>  sucralfate suspension 1 Gram(s) Oral four times a day      PMHX/PSHX:  Gastric cancer  Malignant neoplasm of stomach, unspecified location  No pertinent past medical history  S/P partial gastrectomy  No significant past surgical history      Family history:  No pertinent family history in first degree relatives      ROS: Negative, except as otherwise noted above    PHYSICAL EXAM:   Vital Signs:  Vital Signs Last 24 Hrs  T(C): 37.4 (27 Jun 2018 08:48), Max: 38.4 (26 Jun 2018 21:40)  T(F): 99.3 (27 Jun 2018 08:48), Max: 101.2 (26 Jun 2018 21:40)  HR: 108 (27 Jun 2018 08:48) (91 - 108)  BP: 137/64 (27 Jun 2018 08:48) (116/57 - 137/64)  BP(mean): --  RR: 19 (27 Jun 2018 08:48) (18 - 19)  SpO2: 98% (27 Jun 2018 08:48) (96% - 100%)  Daily     Daily     GENERAL: No acute distress    HEENT:  Icteric, no thrush  CHEST:  Non-labored breathing, lungs clear b/l  HEART:  +s1, s2 heart sounds, no murmurs  ABDOMEN:  +Midline surgical incision, +biliary drainage catheter, mildly tender diffusely  EXTREMITIES:  Warm and well perfused  SKIN:  No rash  NEURO:   Awake, interactive, following commands       LABS:  CBC Full  -  ( 27 Jun 2018 05:45 )  WBC Count : 11.18 K/uL  Hemoglobin : 8.4 g/dL  Hematocrit : 25.3 %  Platelet Count - Automated : 380 K/uL  Mean Cell Volume : 93.0 fL  Auto Neutrophil # : 8.87 K/uL  Auto Neutrophil % : 79.4 %    06-27 @ 05:45  Na 138 mmol/L  K 4.1 mmol/L  Cl 104 mmol/L  CO2 23 mmol/L  BUN 13 mg/dL  Creat 0.34 mg/dL  Glucose 108 mg/dL  Ca 7.8 mg/dL    Total protein 5.1 g/dL  Albumin 1.8 g/dL  T bili 14.0 mg/dL  Alk phos 198 u/L  AST 31 u/L  ALT 27 u/L

## 2018-06-27 NOTE — PROGRESS NOTE ADULT - ASSESSMENT
58F s/p ERCP EUS aborted 2/2 small bowel perforation, s/p ex lap resection of small bowel side to side anastomosis currently hemodynamically stable on the floor.     - F/U Fever work up  - Continue diet/TPN  - Continue martinez, monitor UOP  - Monitor GI function  - Continue T Lovenox  - Dispo: DC planning

## 2018-06-27 NOTE — PROGRESS NOTE ADULT - SUBJECTIVE AND OBJECTIVE BOX
GENERAL SURGERY DAILY PROGRESS NOTE:       Subjective:  Patient febrile to 101.2 overnight. Fever work up sent. Fever resolved without intervention. This AM pt feels ok overall. Pain well controlled.         Objective:    PE:  Gen: Jaundiced, Laying in bed in NAD  Resp: airway patent, respirations unlabored, no increased WoB  Abd: soft, midline incision c/d/i, palpable fullness directly underlying staples. No bleeding or discharge   Ext: no edema, WWP  Neuro: AAOx3, no focal deficits    Vital Signs Last 24 Hrs  T(C): 37.4 (2018 08:48), Max: 38.4 (2018 21:40)  T(F): 99.3 (2018 08:48), Max: 101.2 (2018 21:40)  HR: 108 (2018 08:48) (91 - 108)  BP: 137/64 (2018 08:48) (116/57 - 137/64)  BP(mean): --  RR: 19 (2018 08:48) (18 - 19)  SpO2: 98% (2018 08:48) (96% - 100%)    I&O's Detail    2018 07:01  -  2018 07:00  --------------------------------------------------------  IN:    Enteral Tube Flush: 10 mL  Total IN: 10 mL    OUT:    Drain: 390 mL    Indwelling Catheter - Urethral: 1980 mL  Total OUT: 2370 mL    Total NET: -2360 mL      2018 07:01  -  2018 11:32  --------------------------------------------------------  IN:  Total IN: 0 mL    OUT:    Drain: 75 mL  Total OUT: 75 mL    Total NET: -75 mL          Daily     Daily     MEDICATIONS  (STANDING):  chlorhexidine 4% Liquid 1 Application(s) Topical once  chlorhexidine 4% Liquid 1 Application(s) Topical <User Schedule>  enoxaparin Injectable 65 milliGRAM(s) SubCutaneous <User Schedule>  fat emulsion (Fish Oil and Plant Based) 20% Infusion 13.3 mL/Hr (13.3 mL/Hr) IV Continuous <Continuous>  fat emulsion (Fish Oil and Plant Based) 20% Infusion 13.3 mL/Hr (13.3 mL/Hr) IV Continuous <Continuous>  metoclopramide 5 milliGRAM(s) Oral every 8 hours  pantoprazole    Tablet 40 milliGRAM(s) Oral before breakfast  Parenteral Nutrition - Adult 1 Each (63 mL/Hr) TPN Continuous <Continuous>  Parenteral Nutrition - Adult 1 Each (63 mL/Hr) TPN Continuous <Continuous>  sucralfate suspension 1 Gram(s) Oral four times a day    MEDICATIONS  (PRN):  cyclobenzaprine 5 milliGRAM(s) Oral three times a day PRN Muscle Spasm  dibucaine 1% Ointment 1 Application(s) Topical daily PRN hemorrhoids  HYDROmorphone   Tablet 2 milliGRAM(s) Oral every 3 hours PRN Moderate Pain (4 - 6)  morphine  - Injectable 4 milliGRAM(s) IV Push every 3 hours PRN Severe Pain (7 - 10)      LABS:                        8.4    11.18 )-----------( 380      ( 2018 05:45 )             25.3     06-    138  |  104  |  13  ----------------------------<  108<H>  4.1   |  23  |  0.34<L>    Ca    7.8<L>      2018 05:45  Phos  2.9       Mg     2.0         TPro  5.1<L>  /  Alb  1.8<L>  /  TBili  14.0<H>  /  DBili  11.6<H>  /  AST  31  /  ALT  27  /  AlkPhos  198<H>        Urinalysis Basic - ( 2018 02:15 )    Color: BROWN / Appearance: HAZY / S.014 / pH: 6.5  Gluc: NEGATIVE / Ketone: NEGATIVE  / Bili: LARGE / Urobili: NORMAL mg/dL   Blood: MODERATE / Protein: 30 mg/dL / Nitrite: NEGATIVE   Leuk Esterase: LARGE / RBC: >50 / WBC 10-25   Sq Epi: x / Non Sq Epi: x / Bacteria: MANY        RADIOLOGY & ADDITIONAL STUDIES:

## 2018-06-27 NOTE — PROGRESS NOTE ADULT - ATTENDING COMMENTS
57 y/o female s/p ERCP c/b perforation of afferent limb. Pt underwent emergent Ex-lap w/ resection of perforated bowel and stapled side to side functional end to end anastomosis. Pt remained intubated and was transferred to SICU off pressors. s/p extubation, now transferred to floor. Patient meets criteria for severe protein calorie malnutrition requiring TPN for nutritional support. TPN increased back to full volume on 6/26/18. Patient continues to have intermittent fevers, follow up fever work up.     TPN infusion increased to 1.5 L today, TPN is providing 1312 kcal/day     Monitor fingersticks q 12 hours, obtain daily weights.    Labs reviewed - electrolytes adjusted in TPN     Continue TPN and lipids. Will follow up with primary team on plan.   Continue to encourage increased PO intake and monitor tolerance.     1. Protein calorie malnutrition being optimized with TPN: CHO [200 ] gm.  AA [78] gm. SMOF Lipids [32] gm. lipids will be administered over 12 hours   2.  Hyperglycemia managed with: [0 ] units of regular insulin    3.  Check fluid balance daily.  Strict I/O  [ ] [ ]   4.  Daily BMP, Ionized Calcium, Magnesium and Phosphorous   5.  Triglycerides at initiation of TPN and monthly [ ] [ ]   6.  Pepcid in TPN for Gi prophylaxis  [ ]     I have seen and examined the patient, reviewed the laboratory and radiologic data and agree with the history, physical assessment and plan.  I reviewed and discussed with all consultants, house staff and PA's.  The note is edited where appropriate. The Nutrition Support Team (NST) discusses on an ongoing basis with the primary team and all consultants, House staff and PA's to have a permanent risk benefit analyses on all decisions.  I spent  45  minutes in total; providing diagnoses, assessment and plan.

## 2018-06-28 LAB
ALBUMIN SERPL ELPH-MCNC: 2 G/DL — LOW (ref 3.3–5)
ALP SERPL-CCNC: 189 U/L — HIGH (ref 40–120)
ALT FLD-CCNC: 24 U/L — SIGNIFICANT CHANGE UP (ref 4–33)
AST SERPL-CCNC: 31 U/L — SIGNIFICANT CHANGE UP (ref 4–32)
BASOPHILS # BLD AUTO: 0.09 K/UL — SIGNIFICANT CHANGE UP (ref 0–0.2)
BASOPHILS NFR BLD AUTO: 0.6 % — SIGNIFICANT CHANGE UP (ref 0–2)
BILIRUB SERPL-MCNC: 14.5 MG/DL — HIGH (ref 0.2–1.2)
BUN SERPL-MCNC: 15 MG/DL — SIGNIFICANT CHANGE UP (ref 7–23)
CA-I BLD-SCNC: 1.14 MMOL/L — SIGNIFICANT CHANGE UP (ref 1.03–1.23)
CALCIUM SERPL-MCNC: 7.6 MG/DL — LOW (ref 8.4–10.5)
CHLORIDE SERPL-SCNC: 104 MMOL/L — SIGNIFICANT CHANGE UP (ref 98–107)
CO2 SERPL-SCNC: 23 MMOL/L — SIGNIFICANT CHANGE UP (ref 22–31)
CREAT SERPL-MCNC: 0.33 MG/DL — LOW (ref 0.5–1.3)
EOSINOPHIL # BLD AUTO: 0.1 K/UL — SIGNIFICANT CHANGE UP (ref 0–0.5)
EOSINOPHIL NFR BLD AUTO: 0.7 % — SIGNIFICANT CHANGE UP (ref 0–6)
GLUCOSE SERPL-MCNC: 112 MG/DL — HIGH (ref 70–99)
HCT VFR BLD CALC: 25.2 % — LOW (ref 34.5–45)
HGB BLD-MCNC: 8.4 G/DL — LOW (ref 11.5–15.5)
IMM GRANULOCYTES # BLD AUTO: 0.37 # — SIGNIFICANT CHANGE UP
IMM GRANULOCYTES NFR BLD AUTO: 2.5 % — HIGH (ref 0–1.5)
LYMPHOCYTES # BLD AUTO: 1.1 K/UL — SIGNIFICANT CHANGE UP (ref 1–3.3)
LYMPHOCYTES # BLD AUTO: 7.3 % — LOW (ref 13–44)
MAGNESIUM SERPL-MCNC: 2.2 MG/DL — SIGNIFICANT CHANGE UP (ref 1.6–2.6)
MCHC RBC-ENTMCNC: 30.3 PG — SIGNIFICANT CHANGE UP (ref 27–34)
MCHC RBC-ENTMCNC: 33.3 % — SIGNIFICANT CHANGE UP (ref 32–36)
MCV RBC AUTO: 91 FL — SIGNIFICANT CHANGE UP (ref 80–100)
MONOCYTES # BLD AUTO: 1.14 K/UL — HIGH (ref 0–0.9)
MONOCYTES NFR BLD AUTO: 7.6 % — SIGNIFICANT CHANGE UP (ref 2–14)
NEUTROPHILS # BLD AUTO: 12.17 K/UL — HIGH (ref 1.8–7.4)
NEUTROPHILS NFR BLD AUTO: 81.3 % — HIGH (ref 43–77)
NRBC # FLD: 0 — SIGNIFICANT CHANGE UP
ORGANISM # SPEC MICROSCOPIC CNT: SIGNIFICANT CHANGE UP
PHOSPHATE SERPL-MCNC: 2.9 MG/DL — SIGNIFICANT CHANGE UP (ref 2.5–4.5)
PLATELET # BLD AUTO: 340 K/UL — SIGNIFICANT CHANGE UP (ref 150–400)
PMV BLD: 11.4 FL — SIGNIFICANT CHANGE UP (ref 7–13)
POTASSIUM SERPL-MCNC: 3.8 MMOL/L — SIGNIFICANT CHANGE UP (ref 3.5–5.3)
POTASSIUM SERPL-SCNC: 3.8 MMOL/L — SIGNIFICANT CHANGE UP (ref 3.5–5.3)
PROT SERPL-MCNC: 5.4 G/DL — LOW (ref 6–8.3)
RBC # BLD: 2.77 M/UL — LOW (ref 3.8–5.2)
RBC # FLD: 21.6 % — HIGH (ref 10.3–14.5)
SODIUM SERPL-SCNC: 137 MMOL/L — SIGNIFICANT CHANGE UP (ref 135–145)
SPECIMEN SOURCE: SIGNIFICANT CHANGE UP
WBC # BLD: 14.97 K/UL — HIGH (ref 3.8–10.5)
WBC # FLD AUTO: 14.97 K/UL — HIGH (ref 3.8–10.5)

## 2018-06-28 PROCEDURE — 99233 SBSQ HOSP IP/OBS HIGH 50: CPT

## 2018-06-28 PROCEDURE — 99223 1ST HOSP IP/OBS HIGH 75: CPT | Mod: GC

## 2018-06-28 PROCEDURE — 99232 SBSQ HOSP IP/OBS MODERATE 35: CPT | Mod: 24

## 2018-06-28 RX ORDER — ACETAMINOPHEN 500 MG
1000 TABLET ORAL ONCE
Qty: 0 | Refills: 0 | Status: COMPLETED | OUTPATIENT
Start: 2018-06-28 | End: 2018-06-28

## 2018-06-28 RX ORDER — ELECTROLYTE SOLUTION,INJ
1 VIAL (ML) INTRAVENOUS
Qty: 0 | Refills: 0 | Status: DISCONTINUED | OUTPATIENT
Start: 2018-06-28 | End: 2018-06-28

## 2018-06-28 RX ORDER — MORPHINE SULFATE 50 MG/1
2 CAPSULE, EXTENDED RELEASE ORAL
Qty: 0 | Refills: 0 | Status: DISCONTINUED | OUTPATIENT
Start: 2018-06-28 | End: 2018-07-03

## 2018-06-28 RX ORDER — HYDROMORPHONE HYDROCHLORIDE 2 MG/ML
2 INJECTION INTRAMUSCULAR; INTRAVENOUS; SUBCUTANEOUS
Qty: 0 | Refills: 0 | Status: DISCONTINUED | OUTPATIENT
Start: 2018-06-28 | End: 2018-07-03

## 2018-06-28 RX ORDER — I.V. FAT EMULSION 20 G/100ML
13.3 EMULSION INTRAVENOUS
Qty: 32 | Refills: 0 | Status: DISCONTINUED | OUTPATIENT
Start: 2018-06-28 | End: 2018-07-01

## 2018-06-28 RX ORDER — MORPHINE SULFATE 50 MG/1
2 CAPSULE, EXTENDED RELEASE ORAL
Qty: 0 | Refills: 0 | Status: DISCONTINUED | OUTPATIENT
Start: 2018-06-28 | End: 2018-06-28

## 2018-06-28 RX ADMIN — Medication 1 EACH: at 18:18

## 2018-06-28 RX ADMIN — Medication 1 GRAM(S): at 06:36

## 2018-06-28 RX ADMIN — Medication 5 MILLIGRAM(S): at 13:01

## 2018-06-28 RX ADMIN — ENOXAPARIN SODIUM 65 MILLIGRAM(S): 100 INJECTION SUBCUTANEOUS at 08:23

## 2018-06-28 RX ADMIN — CYCLOBENZAPRINE HYDROCHLORIDE 5 MILLIGRAM(S): 10 TABLET, FILM COATED ORAL at 21:08

## 2018-06-28 RX ADMIN — Medication 1 GRAM(S): at 13:01

## 2018-06-28 RX ADMIN — MORPHINE SULFATE 4 MILLIGRAM(S): 50 CAPSULE, EXTENDED RELEASE ORAL at 17:55

## 2018-06-28 RX ADMIN — CYCLOBENZAPRINE HYDROCHLORIDE 5 MILLIGRAM(S): 10 TABLET, FILM COATED ORAL at 02:25

## 2018-06-28 RX ADMIN — HYDROMORPHONE HYDROCHLORIDE 2 MILLIGRAM(S): 2 INJECTION INTRAMUSCULAR; INTRAVENOUS; SUBCUTANEOUS at 08:30

## 2018-06-28 RX ADMIN — CHLORHEXIDINE GLUCONATE 1 APPLICATION(S): 213 SOLUTION TOPICAL at 13:21

## 2018-06-28 RX ADMIN — I.V. FAT EMULSION 13.3 ML/HR: 20 EMULSION INTRAVENOUS at 18:18

## 2018-06-28 RX ADMIN — HYDROMORPHONE HYDROCHLORIDE 2 MILLIGRAM(S): 2 INJECTION INTRAMUSCULAR; INTRAVENOUS; SUBCUTANEOUS at 18:34

## 2018-06-28 RX ADMIN — Medication 1000 MILLIGRAM(S): at 18:50

## 2018-06-28 RX ADMIN — MORPHINE SULFATE 2 MILLIGRAM(S): 50 CAPSULE, EXTENDED RELEASE ORAL at 22:51

## 2018-06-28 RX ADMIN — MORPHINE SULFATE 4 MILLIGRAM(S): 50 CAPSULE, EXTENDED RELEASE ORAL at 07:20

## 2018-06-28 RX ADMIN — PIPERACILLIN AND TAZOBACTAM 25 GRAM(S): 4; .5 INJECTION, POWDER, LYOPHILIZED, FOR SOLUTION INTRAVENOUS at 06:36

## 2018-06-28 RX ADMIN — MORPHINE SULFATE 4 MILLIGRAM(S): 50 CAPSULE, EXTENDED RELEASE ORAL at 06:36

## 2018-06-28 RX ADMIN — HYDROMORPHONE HYDROCHLORIDE 2 MILLIGRAM(S): 2 INJECTION INTRAMUSCULAR; INTRAVENOUS; SUBCUTANEOUS at 19:00

## 2018-06-28 RX ADMIN — HYDROMORPHONE HYDROCHLORIDE 2 MILLIGRAM(S): 2 INJECTION INTRAMUSCULAR; INTRAVENOUS; SUBCUTANEOUS at 12:59

## 2018-06-28 RX ADMIN — MORPHINE SULFATE 2 MILLIGRAM(S): 50 CAPSULE, EXTENDED RELEASE ORAL at 23:10

## 2018-06-28 RX ADMIN — HYDROMORPHONE HYDROCHLORIDE 2 MILLIGRAM(S): 2 INJECTION INTRAMUSCULAR; INTRAVENOUS; SUBCUTANEOUS at 03:10

## 2018-06-28 RX ADMIN — PANTOPRAZOLE SODIUM 40 MILLIGRAM(S): 20 TABLET, DELAYED RELEASE ORAL at 06:36

## 2018-06-28 RX ADMIN — Medication 5 MILLIGRAM(S): at 06:35

## 2018-06-28 RX ADMIN — HYDROMORPHONE HYDROCHLORIDE 2 MILLIGRAM(S): 2 INJECTION INTRAMUSCULAR; INTRAVENOUS; SUBCUTANEOUS at 02:05

## 2018-06-28 RX ADMIN — PIPERACILLIN AND TAZOBACTAM 25 GRAM(S): 4; .5 INJECTION, POWDER, LYOPHILIZED, FOR SOLUTION INTRAVENOUS at 13:01

## 2018-06-28 RX ADMIN — MORPHINE SULFATE 4 MILLIGRAM(S): 50 CAPSULE, EXTENDED RELEASE ORAL at 16:23

## 2018-06-28 RX ADMIN — Medication 400 MILLIGRAM(S): at 17:53

## 2018-06-28 RX ADMIN — Medication 1 GRAM(S): at 17:14

## 2018-06-28 RX ADMIN — Medication 5 MILLIGRAM(S): at 21:08

## 2018-06-28 RX ADMIN — ENOXAPARIN SODIUM 65 MILLIGRAM(S): 100 INJECTION SUBCUTANEOUS at 18:21

## 2018-06-28 RX ADMIN — HYDROMORPHONE HYDROCHLORIDE 2 MILLIGRAM(S): 2 INJECTION INTRAMUSCULAR; INTRAVENOUS; SUBCUTANEOUS at 02:18

## 2018-06-28 RX ADMIN — MORPHINE SULFATE 4 MILLIGRAM(S): 50 CAPSULE, EXTENDED RELEASE ORAL at 10:46

## 2018-06-28 RX ADMIN — HYDROMORPHONE HYDROCHLORIDE 2 MILLIGRAM(S): 2 INJECTION INTRAMUSCULAR; INTRAVENOUS; SUBCUTANEOUS at 09:30

## 2018-06-28 RX ADMIN — PIPERACILLIN AND TAZOBACTAM 25 GRAM(S): 4; .5 INJECTION, POWDER, LYOPHILIZED, FOR SOLUTION INTRAVENOUS at 21:08

## 2018-06-28 RX ADMIN — MORPHINE SULFATE 4 MILLIGRAM(S): 50 CAPSULE, EXTENDED RELEASE ORAL at 12:10

## 2018-06-28 NOTE — PROGRESS NOTE ADULT - SUBJECTIVE AND OBJECTIVE BOX
SURGICAL ONCOLGY  Patient seen and examined.     MEDICATIONS  (STANDING):  chlorhexidine 4% Liquid 1 Application(s) Topical once  chlorhexidine 4% Liquid 1 Application(s) Topical <User Schedule>  enoxaparin Injectable 65 milliGRAM(s) SubCutaneous <User Schedule>  fat emulsion (Fish Oil and Plant Based) 20% Infusion 13.3 mL/Hr (13.3 mL/Hr) IV Continuous <Continuous>  fat emulsion (Fish Oil and Plant Based) 20% Infusion 13.3 mL/Hr (13.3 mL/Hr) IV Continuous <Continuous>  metoclopramide 5 milliGRAM(s) Oral every 8 hours  pantoprazole    Tablet 40 milliGRAM(s) Oral before breakfast  Parenteral Nutrition - Adult 1 Each (63 mL/Hr) TPN Continuous <Continuous>  Parenteral Nutrition - Adult 1 Each (63 mL/Hr) TPN Continuous <Continuous>  piperacillin/tazobactam IVPB. 3.375 Gram(s) IV Intermittent every 8 hours  sucralfate suspension 1 Gram(s) Oral four times a day    MEDICATIONS  (PRN):  cyclobenzaprine 5 milliGRAM(s) Oral three times a day PRN Muscle Spasm  dibucaine 1% Ointment 1 Application(s) Topical daily PRN hemorrhoids  HYDROmorphone   Tablet 2 milliGRAM(s) Oral every 3 hours PRN Moderate Pain (4 - 6)  morphine  - Injectable 4 milliGRAM(s) IV Push every 3 hours PRN Severe Pain (7 - 10)      Vital Signs Last 24 Hrs  T(C): 37.4 (2018 12:03), Max: 37.8 (2018 20:19)  T(F): 99.3 (2018 12:03), Max: 100.1 (2018 20:19)  HR: 110 (2018 12:03) (110 - 119)  BP: 123/61 (2018 12:03) (123/61 - 137/73)  BP(mean): --  RR: 16 (2018 12:03) (16 - 18)  SpO2: 97% (2018 12:03) (96% - 99%)    I&O's Detail    2018 07:01  -  2018 07:00  --------------------------------------------------------  IN:  Total IN: 0 mL    OUT:    Drain: 295 mL    Indwelling Catheter - Urethral: 1800 mL  Total OUT: 2095 mL    Total NET: -2095 mL      2018 07:01  -  2018 15:01  --------------------------------------------------------  IN:    Oral Fluid: 120 mL  Total IN: 120 mL    OUT:    Drain: 100 mL  Total OUT: 100 mL    Total NET: 20 mL          Daily     Daily     LABS:                        8.4    14.97 )-----------( 340      ( 2018 06:02 )             25.2     06-28    137  |  104  |  15  ----------------------------<  112<H>  3.8   |  23  |  0.33<L>    Ca    7.6<L>      2018 06:02  Phos  2.9       Mg     2.2         TPro  5.4<L>  /  Alb  2.0<L>  /  TBili  14.5<H>  /  DBili  x   /  AST  31  /  ALT  24  /  AlkPhos  189<H>  28      Urinalysis Basic - ( 2018 02:15 )    Color: BROWN / Appearance: HAZY / S.014 / pH: 6.5  Gluc: NEGATIVE / Ketone: NEGATIVE  / Bili: LARGE / Urobili: NORMAL mg/dL   Blood: MODERATE / Protein: 30 mg/dL / Nitrite: NEGATIVE   Leuk Esterase: LARGE / RBC: >50 / WBC 10-25   Sq Epi: x / Non Sq Epi: x / Bacteria: MANY        Chief complaint: jaundice  PHYSICAL EXAM:  Gen:   Abd: softly distended  Wound healing well    58yFemale s/p SBR for perforation  Plan:   - cont diet as santana  - TPN for now  - PTC with bile

## 2018-06-28 NOTE — PROGRESS NOTE ADULT - ATTENDING COMMENTS
57 y/o female s/p ERCP c/b perforation of afferent limb. Pt underwent emergent Ex-lap w/ resection of perforated bowel and stapled side to side functional end to end anastomosis. Pt remained intubated and was transferred to SICU off pressors. s/p extubation, now transferred to floor. Patient meets criteria for severe protein calorie malnutrition requiring TPN for nutritional support. TPN increased back to full volume on 6/26/18.  s/p IR tube check on 6/27/18    TPN infusion increased to 1.5 L today, TPN is providing 1311 kcal/day - increased protein and decreased dextrose calories in TPN     Monitor fingersticks q 12 hours, obtain daily weights.    Labs reviewed - increased KCl to 100 meq in TPN     Continue TPN and lipids. Will follow up with primary team on plan.   Continue to encourage increased PO intake and monitor tolerance.     1. Protein calorie malnutrition being optimized with TPN: CHO [195] gm.  AA [82] gm. SMOF Lipids [32] gm. lipids will be administered over 12 hours   2.  Hyperglycemia managed with: [0 ] units of regular insulin    3.  Check fluid balance daily.  Strict I/O  [ ] [ ]   4.  Daily BMP, Ionized Calcium, Magnesium and Phosphorous   5.  Triglycerides at initiation of TPN and monthly [ ] [ ]   6.  Pepcid in TPN for Gi prophylaxis  [ ]   I have seen and examined the patient, reviewed the laboratory and radiologic data and agree with the history, physical assessment and plan.  I reviewed and discussed with all consultants, house staff and PA's.  The note is edited where appropriate. The Nutrition Support Team (NST) discusses on an ongoing basis with the primary team and all consultants, House staff and PA's to have a permanent risk benefit analyses on all decisions.  I spent  45  minutes in total; providing diagnoses, assessment and plan.

## 2018-06-28 NOTE — PROGRESS NOTE ADULT - ASSESSMENT
58F s/p ERCP EUS aborted 2/2 small bowel perforation, s/p ex lap resection of small bowel side to side anastomosis currently hemodynamically stable on the floor.     - F/U Fever work up, no growth from bcx 6/26, bcx from 6/25 with coag neg staph likely contaminant  - continue zosyn, f/u urine cx  - Continue diet/TPN  - Continue martinez, monitor UOP  - Continue T Lovenox  - Dispo: DC planning

## 2018-06-28 NOTE — PROGRESS NOTE ADULT - SUBJECTIVE AND OBJECTIVE BOX
Chief Complaint:  Patient is a 58y old  Female who presents with a chief complaint of Painless jaundice (05 Jun 2018 14:49)      Interval Events: Patient feels about the same. She has some abdominal discomfort that is controlled with pain medications. She denies vomiting, hematemesis, melena, hematochezia. She had IR tube check yesterday - widely patent and adequately positioned internal/external biliary drain.     Allergies:  No Known Allergies      Hospital Medications:  chlorhexidine 4% Liquid 1 Application(s) Topical once  chlorhexidine 4% Liquid 1 Application(s) Topical <User Schedule>  cyclobenzaprine 5 milliGRAM(s) Oral three times a day PRN  dibucaine 1% Ointment 1 Application(s) Topical daily PRN  enoxaparin Injectable 65 milliGRAM(s) SubCutaneous <User Schedule>  fat emulsion (Fish Oil and Plant Based) 20% Infusion 13.3 mL/Hr IV Continuous <Continuous>  fat emulsion (Fish Oil and Plant Based) 20% Infusion 13.3 mL/Hr IV Continuous <Continuous>  HYDROmorphone   Tablet 2 milliGRAM(s) Oral every 3 hours PRN  metoclopramide 5 milliGRAM(s) Oral every 8 hours  morphine  - Injectable 4 milliGRAM(s) IV Push every 3 hours PRN  pantoprazole    Tablet 40 milliGRAM(s) Oral before breakfast  Parenteral Nutrition - Adult 1 Each TPN Continuous <Continuous>  piperacillin/tazobactam IVPB. 3.375 Gram(s) IV Intermittent every 8 hours  sucralfate suspension 1 Gram(s) Oral four times a day      PMHX/PSHX:  Gastric cancer  Malignant neoplasm of stomach, unspecified location  No pertinent past medical history  S/P partial gastrectomy  No significant past surgical history      Family history:  No pertinent family history in first degree relatives      ROS: Negative, except as otherwise noted above    PHYSICAL EXAM:   Vital Signs:  Vital Signs Last 24 Hrs  T(C): 37 (28 Jun 2018 05:59), Max: 37.8 (27 Jun 2018 20:19)  T(F): 98.6 (28 Jun 2018 05:59), Max: 100.1 (27 Jun 2018 20:19)  HR: 111 (28 Jun 2018 05:59) (99 - 117)  BP: 131/75 (28 Jun 2018 05:59) (129/70 - 137/73)  BP(mean): --  RR: 18 (28 Jun 2018 05:59) (17 - 19)  SpO2: 96% (28 Jun 2018 05:59) (96% - 99%)  Daily     Daily     GENERAL: No acute distress    HEENT:  Icteric, no thrush  CHEST:  Non-labored breathing, lungs clear b/l  HEART:  +s1, s2 heart sounds, no murmurs  ABDOMEN:  +Midline surgical incision, +biliary drainage catheter, mildly tender diffusely  EXTREMITIES:  Warm and well perfused  SKIN:  No rash  NEURO:   Awake, interactive, following commands       LABS:  CBC Full  -  ( 27 Jun 2018 05:45 )  WBC Count : 11.18 K/uL  Hemoglobin : 8.4 g/dL  Hematocrit : 25.3 %  Platelet Count - Automated : 380 K/uL  Mean Cell Volume : 93.0 fL  Auto Neutrophil # : 8.87 K/uL  Auto Neutrophil % : 79.4 %

## 2018-06-28 NOTE — CONSULT NOTE ADULT - SUBJECTIVE AND OBJECTIVE BOX
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 Patient underwent percutaneous biliary drainage.  Patient's course complicated by and intra-abdominal fluid collection at the laparotomy site as well as fever of unclear ideology. Currently on antibiotics. Pt has been on TPN since . Patient underwent IR tube check yesterday which demonstrated a patent biliary catheter which was correctly position . Patient denied abd pain, pruritis, abnormal stools.    Allergies:  No Known Allergies      Home Medications:    Hospital Medications:  chlorhexidine 4% Liquid 1 Application(s) Topical once  chlorhexidine 4% Liquid 1 Application(s) Topical <User Schedule>  cyclobenzaprine 5 milliGRAM(s) Oral three times a day PRN  dibucaine 1% Ointment 1 Application(s) Topical daily PRN  enoxaparin Injectable 65 milliGRAM(s) SubCutaneous <User Schedule>  fat emulsion (Fish Oil and Plant Based) 20% Infusion 13.3 mL/Hr IV Continuous <Continuous>  fat emulsion (Fish Oil and Plant Based) 20% Infusion 13.3 mL/Hr IV Continuous <Continuous>  HYDROmorphone   Tablet 2 milliGRAM(s) Oral every 3 hours PRN  metoclopramide 5 milliGRAM(s) Oral every 8 hours  morphine  - Injectable 4 milliGRAM(s) IV Push every 3 hours PRN  pantoprazole    Tablet 40 milliGRAM(s) Oral before breakfast  Parenteral Nutrition - Adult 1 Each TPN Continuous <Continuous>  Parenteral Nutrition - Adult 1 Each TPN Continuous <Continuous>  piperacillin/tazobactam IVPB. 3.375 Gram(s) IV Intermittent every 8 hours  sucralfate suspension 1 Gram(s) Oral four times a day      PMHX/PSHX:  Gastric cancer  Malignant neoplasm of stomach, unspecified location  No pertinent past medical history  S/P partial gastrectomy  No significant past surgical history      Family history:  No pertinent family history in first degree relatives      Social History:     ROS:     General:  No wt loss, fevers, chills, night sweats, fatigue,   Eyes:  Good vision, no reported pain  ENT:  No sore throat, pain, runny nose, dysphagia  CV:  No pain, palpitations, hypo/hypertension  Resp:  No dyspnea, cough, tachypnea, wheezing  GI:  See HPI  :  No pain, bleeding, incontinence, nocturia  Muscle:  No pain, weakness  Neuro:  No weakness, tingling, memory problems  Psych:  No fatigue, insomnia, mood problems, depression  Endocrine:  No polyuria, polydipsia, cold/heat intolerance  Heme:  No petechiae, ecchymosis, easy bruisability  Skin:  No rash, edema      PHYSICAL EXAM:     GENERAL:  Appears stated age, well-groomed, well-nourished, no distress  HEENT:  NC/AT,  conjunctivae clear and pink,  no JVD  CHEST:  Full & symmetric excursion, no increased effort, breath sounds clear  HEART:  Regular rhythm, S1, S2, no murmur/rub/S3/S4, no abdominal bruit, no edema  ABDOMEN:  Soft, non-tender, non-distended, normoactive bowel sounds,  no masses , no hepatosplenomegaly  EXTREMITIES:  no cyanosis,clubbing or edema  SKIN:  No rash/erythema/ecchymoses/petechiae/wounds/abscess/warm/dry  NEURO:  Alert, oriented    Vital Signs:  Vital Signs Last 24 Hrs  T(C): 37.3 (2018 08:25), Max: 37.8 (2018 20:19)  T(F): 99.2 (2018 08:25), Max: 100.1 (2018 20:19)  HR: 119 (2018 08:25) (99 - 119)  BP: 137/60 (2018 08:25) (129/70 - 137/73)  BP(mean): --  RR: 16 (2018 08:25) (16 - 18)  SpO2: 98% (2018 08:25) (96% - 99%)  Daily     Daily     LABS:                        8.4    14.97 )-----------( 340      ( 2018 06:02 )             25.2     -    137  |  104  |  15  ----------------------------<  112<H>  3.8   |  23  |  0.33<L>    Ca    7.6<L>      2018 06:02  Phos  2.9       Mg     2.2         TPro  5.4<L>  /  Alb  2.0<L>  /  TBili  14.5<H>  /  DBili  x   /  AST  31  /  ALT  24  /  AlkPhos  189<H>      LIVER FUNCTIONS - ( 2018 06:02 )  Alb: 2.0 g/dL / Pro: 5.4 g/dL / ALK PHOS: 189 u/L / ALT: 24 u/L / AST: 31 u/L / GGT: x             Urinalysis Basic - ( 2018 02:15 )    Color: BROWN / Appearance: HAZY / S.014 / pH: 6.5  Gluc: NEGATIVE / Ketone: NEGATIVE  / Bili: LARGE / Urobili: NORMAL mg/dL   Blood: MODERATE / Protein: 30 mg/dL / Nitrite: NEGATIVE   Leuk Esterase: LARGE / RBC: >50 / WBC 10-25   Sq Epi: x / Non Sq Epi: x / Bacteria: MANY          Imaging: Chief Complaint:  Patient is a 58y old  Female who presents with a chief complaint of Painless jaundice (2018 14:49)      HPI:  58 year old female with a history of gastric Ca s/p Bilroth II gastrectomy are presented with jaundice, 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 Patient underwent percutaneous biliary drainage.  Patient's course complicated by and intra-abdominal fluid hematoma at the laparotomy site as well as fever of unclear etiology. Currently on antibiotics. Pt has been on TPN since . Patient underwent IR tube check yesterday which demonstrated a patent biliary catheter which was correctly position . Patient denied abd pain, pruritis, abnormal stools. She has no known history of liver disease.     Allergies:  No Known Allergies      Home Medications:    Hospital Medications:  chlorhexidine 4% Liquid 1 Application(s) Topical once  chlorhexidine 4% Liquid 1 Application(s) Topical <User Schedule>  cyclobenzaprine 5 milliGRAM(s) Oral three times a day PRN  dibucaine 1% Ointment 1 Application(s) Topical daily PRN  enoxaparin Injectable 65 milliGRAM(s) SubCutaneous <User Schedule>  fat emulsion (Fish Oil and Plant Based) 20% Infusion 13.3 mL/Hr IV Continuous <Continuous>  fat emulsion (Fish Oil and Plant Based) 20% Infusion 13.3 mL/Hr IV Continuous <Continuous>  HYDROmorphone   Tablet 2 milliGRAM(s) Oral every 3 hours PRN  metoclopramide 5 milliGRAM(s) Oral every 8 hours  morphine  - Injectable 4 milliGRAM(s) IV Push every 3 hours PRN  pantoprazole    Tablet 40 milliGRAM(s) Oral before breakfast  Parenteral Nutrition - Adult 1 Each TPN Continuous <Continuous>  Parenteral Nutrition - Adult 1 Each TPN Continuous <Continuous>  piperacillin/tazobactam IVPB. 3.375 Gram(s) IV Intermittent every 8 hours  sucralfate suspension 1 Gram(s) Oral four times a day      PMHX/PSHX:  Gastric cancer  Malignant neoplasm of stomach, unspecified location  No pertinent past medical history  S/P partial gastrectomy  No significant past surgical history      Family history:  No fhx of liver disease      Social History:   nondrinker  ROS:     General:  No wt loss, fevers, chills, night sweats, fatigue,   Eyes:  Good vision, no reported pain  ENT:  No sore throat, pain, runny nose, dysphagia  CV:  No pain, palpitations, hypo/hypertension  Resp:  No dyspnea, cough, tachypnea, wheezing  GI:  See HPI  :  No pain, bleeding, incontinence, nocturia  Muscle:  No pain, weakness  Neuro:  No weakness, tingling, memory problems  Psych:  No fatigue, insomnia, mood problems, depression  Endocrine:  No polyuria, polydipsia, cold/heat intolerance  Heme:  No petechiae, ecchymosis, easy bruisability  Skin:  No rash, edema      PHYSICAL EXAM:     GENERAL:  ill  HEENT: icterus  CHEST:  Full & symmetric excursion, no increased effort, breath sounds clear  HEART:  Regular rhythm, S1, S2, no murmur/rub/S3/S4, no abdominal bruit, no edema  ABDOMEN:  Soft, non-tender, staple line markdly distended, normoactive bowel sounds,  no masses , no hepatosplenomegaly  EXTREMITIES:  bipedal edema  SKIN:  jaundice  NEURO:  Alert, oriented    Vital Signs:  Vital Signs Last 24 Hrs  T(C): 37.3 (2018 08:25), Max: 37.8 (2018 20:19)  T(F): 99.2 (2018 08:25), Max: 100.1 (2018 20:19)  HR: 119 (2018 08:25) (99 - 119)  BP: 137/60 (2018 08:25) (129/70 - 137/73)  BP(mean): --  RR: 16 (2018 08:25) (16 - 18)  SpO2: 98% (2018 08:25) (96% - 99%)  Daily     Daily     LABS:                        8.4    14.97 )-----------( 340      ( 2018 06:02 )             25.2     06-28    137  |  104  |  15  ----------------------------<  112<H>  3.8   |  23  |  0.33<L>    Ca    7.6<L>      2018 06:02  Phos  2.9     -28  Mg     2.2     -28    TPro  5.4<L>  /  Alb  2.0<L>  /  TBili  14.5<H>  /  DBili  x   /  AST  31  /  ALT  24  /  AlkPhos  189<H>      LIVER FUNCTIONS - ( 2018 06:02 )  Alb: 2.0 g/dL / Pro: 5.4 g/dL / ALK PHOS: 189 u/L / ALT: 24 u/L / AST: 31 u/L / GGT: x             Urinalysis Basic - ( 2018 02:15 )    Color: BROWN / Appearance: HAZY / S.014 / pH: 6.5  Gluc: NEGATIVE / Ketone: NEGATIVE  / Bili: LARGE / Urobili: NORMAL mg/dL   Blood: MODERATE / Protein: 30 mg/dL / Nitrite: NEGATIVE   Leuk Esterase: LARGE / RBC: >50 / WBC 10-25   Sq Epi: x / Non Sq Epi: x / Bacteria: MANY          Imaging:

## 2018-06-28 NOTE — PROGRESS NOTE ADULT - SUBJECTIVE AND OBJECTIVE BOX
NUTRITION NOTE  [id number] CHOKYI  ===============================    Interval events  Patient was seen and examined at bedside, no acute overnight events.   TPN/lipids initiated on 18, patient is tolerating without any issues.  Patient states that is trying to eat small amounts and continues to drink Ensure Clears throughout the sanya.  She has neem able to tolerate some chicken soup.  TPN/lipids at full volume for nutritional support     Vital Signs Last 24 Hrs  T(C): 37.3 (2018 08:25), Max: 37.8 (2018 20:19)  T(F): 99.2 (2018 08:25), Max: 100.1 (2018 20:19)  HR: 119 (2018 08:25) (99 - 119)  BP: 137/60 (2018 08:25) (129/70 - 137/73)  RR: 16 (2018 08:25) (16 - 18)  SpO2: 98% (2018 08:25) (96% - 99%)    MEDICATIONS  (STANDING):  chlorhexidine 4% Liquid 1 Application(s) Topical once  chlorhexidine 4% Liquid 1 Application(s) Topical <User Schedule>  enoxaparin Injectable 65 milliGRAM(s) SubCutaneous <User Schedule>  fat emulsion (Fish Oil and Plant Based) 20% Infusion 13.3 mL/Hr (13.3 mL/Hr) IV Continuous <Continuous>  fat emulsion (Fish Oil and Plant Based) 20% Infusion 13.3 mL/Hr (13.3 mL/Hr) IV Continuous <Continuous>  metoclopramide 5 milliGRAM(s) Oral every 8 hours  pantoprazole    Tablet 40 milliGRAM(s) Oral before breakfast  Parenteral Nutrition - Adult 1 Each (63 mL/Hr) TPN Continuous <Continuous>  Parenteral Nutrition - Adult 1 Each (63 mL/Hr) TPN Continuous <Continuous>  piperacillin/tazobactam IVPB. 3.375 Gram(s) IV Intermittent every 8 hours  sucralfate suspension 1 Gram(s) Oral four times a day    MEDICATIONS  (PRN):  cyclobenzaprine 5 milliGRAM(s) Oral three times a day PRN Muscle Spasm  dibucaine 1% Ointment 1 Application(s) Topical daily PRN hemorrhoids  HYDROmorphone   Tablet 2 milliGRAM(s) Oral every 3 hours PRN Moderate Pain (4 - 6)  morphine  - Injectable 4 milliGRAM(s) IV Push every 3 hours PRN Severe Pain (7 - 10)    I&O's Detail    2018 07:01  -  2018 07:00  --------------------------------------------------------  IN:  Total IN: 0 mL    OUT:    Drain: 295 mL    Indwelling Catheter - Urethral: 1800 mL  Total OUT: 2095 mL    Total NET: -2095 mL    POCT Blood Glucose.: 121 mg/dL (2018 18:03)  POCT Blood Glucose.: 127 mg/dL (2018 11:48)    Daily Weight in k.8 (2018 00:32)    Drug Dosing Weight  Height (cm): 160.02 (2018 15:53)  Weight (kg): 54 (2018 15:53)  BMI (kg/m2): 21.1 (2018 15:53)  BSA (m2): 1.55 (2018 15:53)    PHYSICAL EXAM   Constitutional: no acute distress, resting in bed   Respiratory: nonlabored respirations   Gastrointestinal: soft, minimally tender, mildly distended   Extremities: warm, generalized edema    PICC Site: C/D/I    Diet: soft diet and TPN    Culture - Urine (collected 2018 04:21)  Source: URINE MIDSTREAM  Preliminary Report (2018 09:35):    STRSP^Streptococcus species    COLONY COUNT: > = 100,000 CFU/ML    Culture - Blood (collected 2018 23:00)  Source: BLOOD VENOUS  Preliminary Report (2018 23:01):    NO ORGANISMS ISOLATED    NO ORGANISMS ISOLATED AT 24 HOURS    Culture - Blood (collected 2018 23:00)  Source: BLOOD PERIPHERAL  Preliminary Report (2018 23:01):    NO ORGANISMS ISOLATED    NO ORGANISMS ISOLATED AT 24 HOURS    Culture - Blood (collected 2018 19:59)  Source: BLOOD  Preliminary Report (2018 11:38):    ***Blood Panel PCR results on this specimen are available    approximately 3 hours after the Gram stain result***    Gram stain, PCR, and/or culture results may not always    correspond due to difference in methodologies    ------------------------------------------------------------    This PCR assay was performed using [employer] [facility].  The    following targets are tested for:  Enterococcus, vancomycin    resistant enterococci, Listeria monocytogenes,  coagulase    negative staphylococci, S. aureus, methicillin resistant S.    aureus, Streptococcus agalactiae (Group B), S. pneumoniae,    S. pyogenes (Group A), Acinetobacter baumannii, Enterobacter    cloacae, E. coli, Klebsiella oxytoca, K. pneumoniae, Proteus    sp., Serratia marcescens, Haemophilus influenzae, Neisseria    meningitidis, Pseudomonas aeruginosa, Candida albicans, C.    glabrata, C. krusei, C. parapsilosis, C. tropicalis and the    KPC resistance gene.    **NOTE: Due to technical problems, Proteus sp. will NOT be    reported as part of the BCID paneluntil further notice.  Preliminary Report (2018 19:59):    NO ORGANISMS ISOLATED    NO ORGANISMS ISOLATED AT 24 HOURS  Organism: BLOOD CULTURE PCR (2018 11:38)  Organism: BLOOD CULTURE PCR (2018 11:38)    LABORATORY                        8.4    14.97 )-----------( 340      ( 2018 06:02 )             25.2   -    137  |  104  |  15  ----------------------------<  112<H>  3.8   |  23  |  0.33<L>    Ca    7.6<L>      2018 06:02  Phos  2.9       Mg     2.2         TPro  5.4<L>  /  Alb  2.0<L>  /  TBili  14.5<H>  /  DBili  x   /  AST  31  /  ALT  24  /  AlkPhos  189<H>      LIVER FUNCTIONS - ( 2018 05:45 )  Alb: 1.8 g/dL / Pro: 5.1 g/dL / ALK PHOS: 198 u/L / ALT: 27 u/L / AST: 31 u/L / GGT: x           06-20 Chol -- LDL -- HDL -- Trig 138, 06-12 Chol -- LDL -- HDL -- Trig 112, 06-05 Chol 156  HDL 8<L> Trig 85, 05-19 Chol 122 LDL 73 HDL 33<L> Trig 78    Prealbumin 18: 10    ASSESSMENT/PLAN:  59 y/o female s/p ERCP c/b perforation of afferent limb. Pt underwent emergent Ex-lap w/ resection of perforated bowel and stapled side to side functional end to end anastomosis. Pt remained intubated and was transferred to SICU off pressors. s/p extubation, now transferred to floor. Patient meets criteria for severe protein calorie malnutrition requiring TPN for nutritional support. TPN increased back to full volume on 18.  s/p IR tube check on 18    TPN infusion increased to 1.5 L today, TPN is providing 1311 kcal/day - increased protein and decreased dextrose calories in TPN     Monitor fingersticks q 12 hours, obtain daily weights.    Labs reviewed - increased KCl to 100 meq in TPN     Continue TPN and lipids. Will follow up with primary team on plan.   Continue to encourage increased PO intake and monitor tolerance.     1. Protein calorie malnutrition being optimized with TPN: CHO [195] gm.  AA [82] gm. SMOF Lipids [32] gm. lipids will be administered over 12 hours   2.  Hyperglycemia managed with: [0 ] units of regular insulin    3.  Check fluid balance daily.  Strict I/O  [ ] [ ]   4.  Daily BMP, Ionized Calcium, Magnesium and Phosphorous   5.  Triglycerides at initiation of TPN and monthly [ ] [ ]   6.  Pepcid in TPN for Gi prophylaxis  [ ]     Nutrition support pager [phone]

## 2018-06-28 NOTE — PROVIDER CONTACT NOTE (OTHER) - RECOMMENDATIONS
MD made aware.
Allow patient to attempt to void again. Call team with result
Continue to escalate to team. Have team assess
Notify time to see about redrawing.
Order tylenol
Pain medication given. Team will come asses pt and discuss plan.
Pending orders.
Reinsert martinez
Team will see if she needs a dose of Lasix. Will continue to monitor.
continue to monitor
Stat labs and SICU team to bedside for assessment.

## 2018-06-28 NOTE — PROVIDER CONTACT NOTE (OTHER) - ASSESSMENT
Pt has abdominal swelling near incision site. Site hot upon palpation, tender, bulge increased in size. Bilirubin continue to increase

## 2018-06-28 NOTE — PROGRESS NOTE ADULT - ASSESSMENT
Impression:  1. Unsuccessful ERCP complicated by small bowel perforation 6/6/18 s/p ex-lap with small bowel resection and anastomosis 6/6/18  2. Obstructive jaundice with dilated CBD and 1.4 cm enhancing pancreatic head mass s/p percutaneous drainage, with persistent hyperbilirubinemia   3. 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 Acute blood loss anemia with abdominal wall hematoma and hemoperitoneum noted on CTAP    Plan:  - Hepatology eval  - Monitor CBC, CMP, INR  - Miralax as needed  - OOB as tolerated  - Monitor electrolytes and correct derangements  - IVF as appropriate  - Continue antibiotics  - Nutrition per Surgery  - Supportive care per primary team

## 2018-06-28 NOTE — CONSULT NOTE ADULT - ATTENDING COMMENTS
Patient with prior biliary obstruction , now relieved.  Has significant intraabdominal bloody ascites and large abdominal wall hematoma which may contribute to bilirubin production.  Intrahepatic cholestasis may be contributed to by infection or possible drug hepatotoxicity. (metaclopramide/flexeril).  Continue to monitor hgb and bilirubin.

## 2018-06-29 LAB
-  AMPICILLIN: SIGNIFICANT CHANGE UP
-  CIPROFLOXACIN: SIGNIFICANT CHANGE UP
-  COAGULASE NEGATIVE STAPHYLOCOCCUS: SIGNIFICANT CHANGE UP
-  DAPTOMYCIN: SIGNIFICANT CHANGE UP
-  LINEZOLID: SIGNIFICANT CHANGE UP
-  NITROFURANTOIN: SIGNIFICANT CHANGE UP
-  TETRACYCLINE: SIGNIFICANT CHANGE UP
-  VANCOMYCIN: SIGNIFICANT CHANGE UP
ALBUMIN SERPL ELPH-MCNC: 1.9 G/DL — LOW (ref 3.3–5)
ALBUMIN SERPL ELPH-MCNC: 1.9 G/DL — LOW (ref 3.3–5)
ALP SERPL-CCNC: 168 U/L — HIGH (ref 40–120)
ALP SERPL-CCNC: 172 U/L — HIGH (ref 40–120)
ALT FLD-CCNC: 28 U/L — SIGNIFICANT CHANGE UP (ref 4–33)
ALT FLD-CCNC: 30 U/L — SIGNIFICANT CHANGE UP (ref 4–33)
AST SERPL-CCNC: 29 U/L — SIGNIFICANT CHANGE UP (ref 4–32)
AST SERPL-CCNC: 30 U/L — SIGNIFICANT CHANGE UP (ref 4–32)
BACTERIA BLD CULT: SIGNIFICANT CHANGE UP
BACTERIA UR CULT: SIGNIFICANT CHANGE UP
BASOPHILS # BLD AUTO: 0.09 K/UL — SIGNIFICANT CHANGE UP (ref 0–0.2)
BASOPHILS NFR BLD AUTO: 0.5 % — SIGNIFICANT CHANGE UP (ref 0–2)
BASOPHILS NFR SPEC: 0 % — SIGNIFICANT CHANGE UP (ref 0–2)
BASOPHILS NFR SPEC: 0 % — SIGNIFICANT CHANGE UP (ref 0–2)
BILIRUB DIRECT SERPL-MCNC: 11.4 MG/DL — HIGH (ref 0.1–0.2)
BILIRUB SERPL-MCNC: 13.1 MG/DL — HIGH (ref 0.2–1.2)
BILIRUB SERPL-MCNC: 14.5 MG/DL — HIGH (ref 0.2–1.2)
BUN SERPL-MCNC: 17 MG/DL — SIGNIFICANT CHANGE UP (ref 7–23)
BUN SERPL-MCNC: 18 MG/DL — SIGNIFICANT CHANGE UP (ref 7–23)
CA-I BLD-SCNC: 1.16 MMOL/L — SIGNIFICANT CHANGE UP (ref 1.03–1.23)
CALCIUM SERPL-MCNC: 7.8 MG/DL — LOW (ref 8.4–10.5)
CALCIUM SERPL-MCNC: 7.8 MG/DL — LOW (ref 8.4–10.5)
CHLORIDE SERPL-SCNC: 105 MMOL/L — SIGNIFICANT CHANGE UP (ref 98–107)
CHLORIDE SERPL-SCNC: 106 MMOL/L — SIGNIFICANT CHANGE UP (ref 98–107)
CO2 SERPL-SCNC: 22 MMOL/L — SIGNIFICANT CHANGE UP (ref 22–31)
CO2 SERPL-SCNC: 22 MMOL/L — SIGNIFICANT CHANGE UP (ref 22–31)
CREAT SERPL-MCNC: 0.33 MG/DL — LOW (ref 0.5–1.3)
CREAT SERPL-MCNC: 0.36 MG/DL — LOW (ref 0.5–1.3)
EOSINOPHIL # BLD AUTO: 0.25 K/UL — SIGNIFICANT CHANGE UP (ref 0–0.5)
EOSINOPHIL NFR BLD AUTO: 1.4 % — SIGNIFICANT CHANGE UP (ref 0–6)
EOSINOPHIL NFR FLD: 0 % — SIGNIFICANT CHANGE UP (ref 0–6)
EOSINOPHIL NFR FLD: 0 % — SIGNIFICANT CHANGE UP (ref 0–6)
GLUCOSE SERPL-MCNC: 124 MG/DL — HIGH (ref 70–99)
GLUCOSE SERPL-MCNC: 126 MG/DL — HIGH (ref 70–99)
HCT VFR BLD CALC: 25.4 % — LOW (ref 34.5–45)
HCT VFR BLD CALC: 25.4 % — LOW (ref 34.5–45)
HGB BLD-MCNC: 8.6 G/DL — LOW (ref 11.5–15.5)
HGB BLD-MCNC: 8.6 G/DL — LOW (ref 11.5–15.5)
IMM GRANULOCYTES # BLD AUTO: 0.68 # — SIGNIFICANT CHANGE UP
IMM GRANULOCYTES NFR BLD AUTO: 3.7 % — HIGH (ref 0–1.5)
LG PLATELETS BLD QL AUTO: SLIGHT — SIGNIFICANT CHANGE UP
LG PLATELETS BLD QL AUTO: SLIGHT — SIGNIFICANT CHANGE UP
LYMPHOCYTES # BLD AUTO: 1.11 K/UL — SIGNIFICANT CHANGE UP (ref 1–3.3)
LYMPHOCYTES # BLD AUTO: 6.1 % — LOW (ref 13–44)
LYMPHOCYTES NFR SPEC AUTO: 4 % — LOW (ref 13–44)
LYMPHOCYTES NFR SPEC AUTO: 4 % — LOW (ref 13–44)
MACROCYTES BLD QL: SLIGHT — SIGNIFICANT CHANGE UP
MACROCYTES BLD QL: SLIGHT — SIGNIFICANT CHANGE UP
MAGNESIUM SERPL-MCNC: 2.2 MG/DL — SIGNIFICANT CHANGE UP (ref 1.6–2.6)
MAGNESIUM SERPL-MCNC: 2.2 MG/DL — SIGNIFICANT CHANGE UP (ref 1.6–2.6)
MANUAL SMEAR VERIFICATION: SIGNIFICANT CHANGE UP
MANUAL SMEAR VERIFICATION: SIGNIFICANT CHANGE UP
MCHC RBC-ENTMCNC: 30.7 PG — SIGNIFICANT CHANGE UP (ref 27–34)
MCHC RBC-ENTMCNC: 30.7 PG — SIGNIFICANT CHANGE UP (ref 27–34)
MCHC RBC-ENTMCNC: 33.9 % — SIGNIFICANT CHANGE UP (ref 32–36)
MCHC RBC-ENTMCNC: 33.9 % — SIGNIFICANT CHANGE UP (ref 32–36)
MCV RBC AUTO: 90.7 FL — SIGNIFICANT CHANGE UP (ref 80–100)
MCV RBC AUTO: 90.7 FL — SIGNIFICANT CHANGE UP (ref 80–100)
METHOD TYPE: SIGNIFICANT CHANGE UP
MONOCYTES # BLD AUTO: 1.23 K/UL — HIGH (ref 0–0.9)
MONOCYTES NFR BLD AUTO: 6.7 % — SIGNIFICANT CHANGE UP (ref 2–14)
MONOCYTES NFR BLD: 4 % — SIGNIFICANT CHANGE UP (ref 2–9)
MONOCYTES NFR BLD: 4 % — SIGNIFICANT CHANGE UP (ref 2–9)
MYELOCYTES NFR BLD: 1 % — HIGH (ref 0–0)
MYELOCYTES NFR BLD: 1 % — HIGH (ref 0–0)
NEUTROPHIL AB SER-ACNC: 91 % — HIGH (ref 43–77)
NEUTROPHIL AB SER-ACNC: 91 % — HIGH (ref 43–77)
NEUTROPHILS # BLD AUTO: 14.91 K/UL — HIGH (ref 1.8–7.4)
NEUTROPHILS NFR BLD AUTO: 81.6 % — HIGH (ref 43–77)
NRBC # BLD: 0 /100WBC — SIGNIFICANT CHANGE UP
NRBC # BLD: 0 /100WBC — SIGNIFICANT CHANGE UP
NRBC # FLD: 0 — SIGNIFICANT CHANGE UP
NRBC # FLD: 0 — SIGNIFICANT CHANGE UP
ORGANISM # SPEC MICROSCOPIC CNT: SIGNIFICANT CHANGE UP
PHOSPHATE SERPL-MCNC: 2.7 MG/DL — SIGNIFICANT CHANGE UP (ref 2.5–4.5)
PHOSPHATE SERPL-MCNC: 2.7 MG/DL — SIGNIFICANT CHANGE UP (ref 2.5–4.5)
PLATELET # BLD AUTO: 529 K/UL — HIGH (ref 150–400)
PLATELET # BLD AUTO: 529 K/UL — HIGH (ref 150–400)
PLATELET CLUMP BLD QL SMEAR: SIGNIFICANT CHANGE UP
PLATELET CLUMP BLD QL SMEAR: SIGNIFICANT CHANGE UP
PLATELET COUNT - ESTIMATE: SIGNIFICANT CHANGE UP
PLATELET COUNT - ESTIMATE: SIGNIFICANT CHANGE UP
PMV BLD: 10.6 FL — SIGNIFICANT CHANGE UP (ref 7–13)
PMV BLD: 10.6 FL — SIGNIFICANT CHANGE UP (ref 7–13)
POLYCHROMASIA BLD QL SMEAR: SLIGHT — SIGNIFICANT CHANGE UP
POLYCHROMASIA BLD QL SMEAR: SLIGHT — SIGNIFICANT CHANGE UP
POTASSIUM SERPL-MCNC: 3.6 MMOL/L — SIGNIFICANT CHANGE UP (ref 3.5–5.3)
POTASSIUM SERPL-MCNC: 3.7 MMOL/L — SIGNIFICANT CHANGE UP (ref 3.5–5.3)
POTASSIUM SERPL-SCNC: 3.6 MMOL/L — SIGNIFICANT CHANGE UP (ref 3.5–5.3)
POTASSIUM SERPL-SCNC: 3.7 MMOL/L — SIGNIFICANT CHANGE UP (ref 3.5–5.3)
PROT SERPL-MCNC: 5.4 G/DL — LOW (ref 6–8.3)
PROT SERPL-MCNC: 5.4 G/DL — LOW (ref 6–8.3)
RBC # BLD: 2.8 M/UL — LOW (ref 3.8–5.2)
RBC # BLD: 2.8 M/UL — LOW (ref 3.8–5.2)
RBC # FLD: 21.5 % — HIGH (ref 10.3–14.5)
RBC # FLD: 21.5 % — HIGH (ref 10.3–14.5)
REVIEW TO FOLLOW: YES — SIGNIFICANT CHANGE UP
REVIEW TO FOLLOW: YES — SIGNIFICANT CHANGE UP
SODIUM SERPL-SCNC: 138 MMOL/L — SIGNIFICANT CHANGE UP (ref 135–145)
SODIUM SERPL-SCNC: 138 MMOL/L — SIGNIFICANT CHANGE UP (ref 135–145)
WBC # BLD: 18.27 K/UL — HIGH (ref 3.8–10.5)
WBC # BLD: 18.27 K/UL — HIGH (ref 3.8–10.5)
WBC # FLD AUTO: 18.27 K/UL — HIGH (ref 3.8–10.5)
WBC # FLD AUTO: 18.27 K/UL — HIGH (ref 3.8–10.5)

## 2018-06-29 PROCEDURE — 99232 SBSQ HOSP IP/OBS MODERATE 35: CPT | Mod: GC

## 2018-06-29 PROCEDURE — 99233 SBSQ HOSP IP/OBS HIGH 50: CPT

## 2018-06-29 RX ORDER — ELECTROLYTE SOLUTION,INJ
1 VIAL (ML) INTRAVENOUS
Qty: 0 | Refills: 0 | Status: DISCONTINUED | OUTPATIENT
Start: 2018-06-29 | End: 2018-06-29

## 2018-06-29 RX ORDER — HYDROMORPHONE HYDROCHLORIDE 2 MG/ML
0.5 INJECTION INTRAMUSCULAR; INTRAVENOUS; SUBCUTANEOUS EVERY 4 HOURS
Qty: 0 | Refills: 0 | Status: DISCONTINUED | OUTPATIENT
Start: 2018-06-29 | End: 2018-07-03

## 2018-06-29 RX ORDER — LINEZOLID 600 MG/300ML
INJECTION, SOLUTION INTRAVENOUS
Qty: 0 | Refills: 0 | Status: DISCONTINUED | OUTPATIENT
Start: 2018-06-29 | End: 2018-06-29

## 2018-06-29 RX ORDER — CYCLOBENZAPRINE HYDROCHLORIDE 10 MG/1
5 TABLET, FILM COATED ORAL THREE TIMES A DAY
Qty: 0 | Refills: 0 | Status: DISCONTINUED | OUTPATIENT
Start: 2018-06-29 | End: 2018-07-11

## 2018-06-29 RX ORDER — MORPHINE SULFATE 50 MG/1
4 CAPSULE, EXTENDED RELEASE ORAL
Qty: 0 | Refills: 0 | Status: DISCONTINUED | OUTPATIENT
Start: 2018-06-29 | End: 2018-07-03

## 2018-06-29 RX ORDER — DAPTOMYCIN 500 MG/10ML
320 INJECTION, POWDER, LYOPHILIZED, FOR SOLUTION INTRAVENOUS EVERY 24 HOURS
Qty: 0 | Refills: 0 | Status: COMPLETED | OUTPATIENT
Start: 2018-06-29 | End: 2018-06-29

## 2018-06-29 RX ORDER — I.V. FAT EMULSION 20 G/100ML
6.6 EMULSION INTRAVENOUS
Qty: 16 | Refills: 0 | Status: DISCONTINUED | OUTPATIENT
Start: 2018-06-29 | End: 2018-07-01

## 2018-06-29 RX ADMIN — PANTOPRAZOLE SODIUM 40 MILLIGRAM(S): 20 TABLET, DELAYED RELEASE ORAL at 05:58

## 2018-06-29 RX ADMIN — I.V. FAT EMULSION 6.6 ML/HR: 20 EMULSION INTRAVENOUS at 17:40

## 2018-06-29 RX ADMIN — MORPHINE SULFATE 4 MILLIGRAM(S): 50 CAPSULE, EXTENDED RELEASE ORAL at 13:15

## 2018-06-29 RX ADMIN — Medication 5 MILLIGRAM(S): at 23:12

## 2018-06-29 RX ADMIN — Medication 1 GRAM(S): at 17:27

## 2018-06-29 RX ADMIN — MORPHINE SULFATE 4 MILLIGRAM(S): 50 CAPSULE, EXTENDED RELEASE ORAL at 12:55

## 2018-06-29 RX ADMIN — Medication 5 MILLIGRAM(S): at 14:38

## 2018-06-29 RX ADMIN — HYDROMORPHONE HYDROCHLORIDE 2 MILLIGRAM(S): 2 INJECTION INTRAMUSCULAR; INTRAVENOUS; SUBCUTANEOUS at 09:35

## 2018-06-29 RX ADMIN — MORPHINE SULFATE 4 MILLIGRAM(S): 50 CAPSULE, EXTENDED RELEASE ORAL at 23:28

## 2018-06-29 RX ADMIN — HYDROMORPHONE HYDROCHLORIDE 0.5 MILLIGRAM(S): 2 INJECTION INTRAMUSCULAR; INTRAVENOUS; SUBCUTANEOUS at 20:19

## 2018-06-29 RX ADMIN — CHLORHEXIDINE GLUCONATE 1 APPLICATION(S): 213 SOLUTION TOPICAL at 12:53

## 2018-06-29 RX ADMIN — Medication 1 GRAM(S): at 05:58

## 2018-06-29 RX ADMIN — HYDROMORPHONE HYDROCHLORIDE 2 MILLIGRAM(S): 2 INJECTION INTRAMUSCULAR; INTRAVENOUS; SUBCUTANEOUS at 09:05

## 2018-06-29 RX ADMIN — HYDROMORPHONE HYDROCHLORIDE 0.5 MILLIGRAM(S): 2 INJECTION INTRAMUSCULAR; INTRAVENOUS; SUBCUTANEOUS at 20:01

## 2018-06-29 RX ADMIN — MORPHINE SULFATE 4 MILLIGRAM(S): 50 CAPSULE, EXTENDED RELEASE ORAL at 03:15

## 2018-06-29 RX ADMIN — Medication 1 GRAM(S): at 23:12

## 2018-06-29 RX ADMIN — Medication 1 GRAM(S): at 12:55

## 2018-06-29 RX ADMIN — MORPHINE SULFATE 4 MILLIGRAM(S): 50 CAPSULE, EXTENDED RELEASE ORAL at 23:12

## 2018-06-29 RX ADMIN — MORPHINE SULFATE 4 MILLIGRAM(S): 50 CAPSULE, EXTENDED RELEASE ORAL at 02:59

## 2018-06-29 RX ADMIN — MORPHINE SULFATE 4 MILLIGRAM(S): 50 CAPSULE, EXTENDED RELEASE ORAL at 06:46

## 2018-06-29 RX ADMIN — ENOXAPARIN SODIUM 65 MILLIGRAM(S): 100 INJECTION SUBCUTANEOUS at 19:33

## 2018-06-29 RX ADMIN — Medication 1 GRAM(S): at 01:08

## 2018-06-29 RX ADMIN — MORPHINE SULFATE 4 MILLIGRAM(S): 50 CAPSULE, EXTENDED RELEASE ORAL at 07:27

## 2018-06-29 RX ADMIN — PIPERACILLIN AND TAZOBACTAM 25 GRAM(S): 4; .5 INJECTION, POWDER, LYOPHILIZED, FOR SOLUTION INTRAVENOUS at 14:38

## 2018-06-29 RX ADMIN — MORPHINE SULFATE 4 MILLIGRAM(S): 50 CAPSULE, EXTENDED RELEASE ORAL at 18:18

## 2018-06-29 RX ADMIN — HYDROMORPHONE HYDROCHLORIDE 2 MILLIGRAM(S): 2 INJECTION INTRAMUSCULAR; INTRAVENOUS; SUBCUTANEOUS at 15:07

## 2018-06-29 RX ADMIN — DAPTOMYCIN 112.8 MILLIGRAM(S): 500 INJECTION, POWDER, LYOPHILIZED, FOR SOLUTION INTRAVENOUS at 22:38

## 2018-06-29 RX ADMIN — ENOXAPARIN SODIUM 65 MILLIGRAM(S): 100 INJECTION SUBCUTANEOUS at 06:46

## 2018-06-29 RX ADMIN — Medication 5 MILLIGRAM(S): at 05:57

## 2018-06-29 RX ADMIN — Medication 1 EACH: at 17:40

## 2018-06-29 RX ADMIN — PIPERACILLIN AND TAZOBACTAM 25 GRAM(S): 4; .5 INJECTION, POWDER, LYOPHILIZED, FOR SOLUTION INTRAVENOUS at 06:34

## 2018-06-29 RX ADMIN — MORPHINE SULFATE 4 MILLIGRAM(S): 50 CAPSULE, EXTENDED RELEASE ORAL at 19:28

## 2018-06-29 RX ADMIN — PIPERACILLIN AND TAZOBACTAM 25 GRAM(S): 4; .5 INJECTION, POWDER, LYOPHILIZED, FOR SOLUTION INTRAVENOUS at 23:12

## 2018-06-29 RX ADMIN — HYDROMORPHONE HYDROCHLORIDE 2 MILLIGRAM(S): 2 INJECTION INTRAMUSCULAR; INTRAVENOUS; SUBCUTANEOUS at 15:27

## 2018-06-29 RX ADMIN — CYCLOBENZAPRINE HYDROCHLORIDE 5 MILLIGRAM(S): 10 TABLET, FILM COATED ORAL at 15:27

## 2018-06-29 NOTE — PROGRESS NOTE ADULT - SUBJECTIVE AND OBJECTIVE BOX
GENERAL SURGERY DAILY PROGRESS NOTE:       Subjective:  No acute events overnight. This AM pt complaining of discomfort due to midline swelling. Tolerating PO.         Objective:    PE:  Gen: NAD, jaundiced  Chest: breathing comfortably  Abd: soft, moderately distended, incision c/d/i; collection stable    Vital Signs Last 24 Hrs  T(C): 37.6 (29 Jun 2018 08:24), Max: 38.1 (28 Jun 2018 17:15)  T(F): 99.6 (29 Jun 2018 08:24), Max: 100.5 (28 Jun 2018 17:15)  HR: 104 (29 Jun 2018 08:24) (95 - 116)  BP: 127/63 (29 Jun 2018 08:24) (118/65 - 127/64)  BP(mean): --  RR: 17 (29 Jun 2018 08:24) (16 - 17)  SpO2: 98% (29 Jun 2018 08:24) (96% - 100%)    I&O's Detail    28 Jun 2018 07:01  -  29 Jun 2018 07:00  --------------------------------------------------------  IN:    Oral Fluid: 120 mL  Total IN: 120 mL    OUT:    Drain: 300 mL    Indwelling Catheter - Urethral: 400 mL  Total OUT: 700 mL    Total NET: -580 mL          Daily     Daily     MEDICATIONS  (STANDING):  chlorhexidine 4% Liquid 1 Application(s) Topical once  chlorhexidine 4% Liquid 1 Application(s) Topical <User Schedule>  enoxaparin Injectable 65 milliGRAM(s) SubCutaneous <User Schedule>  fat emulsion (Fish Oil and Plant Based) 20% Infusion 6.6 mL/Hr (6.6 mL/Hr) IV Continuous <Continuous>  fat emulsion (Fish Oil and Plant Based) 20% Infusion 13.3 mL/Hr (13.3 mL/Hr) IV Continuous <Continuous>  metoclopramide 5 milliGRAM(s) Oral every 8 hours  pantoprazole    Tablet 40 milliGRAM(s) Oral before breakfast  Parenteral Nutrition - Adult 1 Each (42 mL/Hr) TPN Continuous <Continuous>  Parenteral Nutrition - Adult 1 Each (63 mL/Hr) TPN Continuous <Continuous>  piperacillin/tazobactam IVPB. 3.375 Gram(s) IV Intermittent every 8 hours  sucralfate suspension 1 Gram(s) Oral four times a day    MEDICATIONS  (PRN):  cyclobenzaprine 5 milliGRAM(s) Oral three times a day PRN Muscle Spasm  dibucaine 1% Ointment 1 Application(s) Topical daily PRN hemorrhoids  HYDROmorphone   Tablet 2 milliGRAM(s) Oral every 3 hours PRN Moderate Pain (4 - 6)  morphine  - Injectable 4 milliGRAM(s) IV Push every 3 hours PRN Severe Pain (7 - 10)  morphine  - Injectable 2 milliGRAM(s) IV Push every 3 hours PRN Moderate Pain (4 - 6)      LABS:                        8.6    18.27 )-----------( 529      ( 29 Jun 2018 07:15 )             25.4     06-29    138  |  105  |  17  ----------------------------<  124<H>  3.6   |  22  |  0.33<L>    Ca    7.8<L>      29 Jun 2018 07:15  Phos  2.7     06-29  Mg     2.2     06-29    TPro  5.4<L>  /  Alb  1.9<L>  /  TBili  14.5<H>  /  DBili  x   /  AST  30  /  ALT  28  /  AlkPhos  168<H>  06-29          RADIOLOGY & ADDITIONAL STUDIES:

## 2018-06-29 NOTE — PROGRESS NOTE ADULT - SUBJECTIVE AND OBJECTIVE BOX
Patient is a 58y old  Female who presents with a chief complaint of Painless jaundice (05 Jun 2018 14:49)      SUBJECTIVE / OVERNIGHT EVENTS:  abdomina discomfort at staple line  MEDICATIONS  (STANDING):  chlorhexidine 4% Liquid 1 Application(s) Topical once  chlorhexidine 4% Liquid 1 Application(s) Topical <User Schedule>  enoxaparin Injectable 65 milliGRAM(s) SubCutaneous <User Schedule>  fat emulsion (Fish Oil and Plant Based) 20% Infusion 6.6 mL/Hr (6.6 mL/Hr) IV Continuous <Continuous>  fat emulsion (Fish Oil and Plant Based) 20% Infusion 13.3 mL/Hr (13.3 mL/Hr) IV Continuous <Continuous>  metoclopramide 5 milliGRAM(s) Oral every 8 hours  pantoprazole    Tablet 40 milliGRAM(s) Oral before breakfast  Parenteral Nutrition - Adult 1 Each (42 mL/Hr) TPN Continuous <Continuous>  Parenteral Nutrition - Adult 1 Each (63 mL/Hr) TPN Continuous <Continuous>  piperacillin/tazobactam IVPB. 3.375 Gram(s) IV Intermittent every 8 hours  sucralfate suspension 1 Gram(s) Oral four times a day    MEDICATIONS  (PRN):  cyclobenzaprine 5 milliGRAM(s) Oral three times a day PRN Muscle Spasm  dibucaine 1% Ointment 1 Application(s) Topical daily PRN hemorrhoids  HYDROmorphone   Tablet 2 milliGRAM(s) Oral every 3 hours PRN Moderate Pain (4 - 6)  morphine  - Injectable 4 milliGRAM(s) IV Push every 3 hours PRN Severe Pain (7 - 10)  morphine  - Injectable 2 milliGRAM(s) IV Push every 3 hours PRN Moderate Pain (4 - 6)              PHYSICAL EXAM:  GENERAL: NAD, cachexia  HEAD:  Atraumatic, Normocephalic  EYES: icteric  NECK: Supple, No JVD  CHEST/LUNG: Clear to auscultation bilaterally; No wheeze  HEART: Regular rate and rhythm; No murmurs, rubs, or gallops  ABDOMEN: Soft, distended at suture line; Bowel sounds present, no hepatosplenomegaly, no rebound or guarding, DARRYL biliary drain w fluid  EXTREMITIES:  edema  PSYCH: AAOx3  NEUROLOGY: non-focal, no asterixis  SKIN: No rashes or lesion, jaundice    LABS:                        8.6    18.27 )-----------( 529      ( 29 Jun 2018 07:15 )             25.4     06-29    138  |  105  |  17  ----------------------------<  124<H>  3.6   |  22  |  0.33<L>    Ca    7.8<L>      29 Jun 2018 07:15  Phos  2.7     06-29  Mg     2.2     06-29    TPro  5.4<L>  /  Alb  1.9<L>  /  TBili  14.5<H>  /  DBili  11.4<H>  /  AST  30  /  ALT  28  /  AlkPhos  168<H>  06-29    LIVER FUNCTIONS - ( 29 Jun 2018 07:15 )  Alb: 1.9 g/dL / Pro: 5.4 g/dL / ALK PHOS: 168 u/L / ALT: 28 u/L / AST: 30 u/L / GGT: x                     RADIOLOGY & ADDITIONAL TESTS:

## 2018-06-29 NOTE — PROGRESS NOTE ADULT - ASSESSMENT
Impression:  1. Unsuccessful ERCP complicated by small bowel perforation 6/6/18 s/p ex-lap with small bowel resection and anastomosis 6/6/18  2. Obstructive jaundice with dilated CBD and 1.4 cm enhancing pancreatic head mass s/p percutaneous drainage, with persistent hyperbilirubinemia   3. 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 Acute blood loss anemia with abdominal wall hematoma and hemoperitoneum noted on CTAP    Plan:  - Consider aspiration/drainage of abdominal fluid collection  - Consider ID consult  - Full infectious workup, follow up all culture data  - Monitor CBC, CMP, INR  - Miralax as needed  - Continue antibiotics  - Nutrition per Surgery  - Supportive care per primary team

## 2018-06-29 NOTE — PROGRESS NOTE ADULT - SUBJECTIVE AND OBJECTIVE BOX
NUTRITION NOTE  [id number] [id number]  ===============================    Interval events  Patient was seen and examined at bedside, no acute overnight events.   TPN/lipids initiated on 18, patient is tolerating without any issues.  Patient states that is trying to eat small amounts and continues to drink Ensure Clears throughout the day. She has neem able to tolerate some chicken soup.  TPN/lipids at 1L infusion volume today with 1/2 calories.     Vital Signs Last 24 Hrs  T(C): 37.6 (2018 08:24), Max: 38.1 (2018 17:15)  T(F): 99.6 (2018 08:24), Max: 100.5 (2018 17:15)  HR: 104 (2018 08:24) (95 - 116)  BP: 127/63 (2018 08:24) (118/65 - 127/64)  RR: 17 (2018 08:24) (16 - 17)  SpO2: 98% (2018 08:24) (96% - 100%)    MEDICATIONS  (STANDING):  chlorhexidine 4% Liquid 1 Application(s) Topical once  chlorhexidine 4% Liquid 1 Application(s) Topical <User Schedule>  enoxaparin Injectable 65 milliGRAM(s) SubCutaneous <User Schedule>  fat emulsion (Fish Oil and Plant Based) 20% Infusion 6.6 mL/Hr (6.6 mL/Hr) IV Continuous <Continuous>  fat emulsion (Fish Oil and Plant Based) 20% Infusion 13.3 mL/Hr (13.3 mL/Hr) IV Continuous <Continuous>  metoclopramide 5 milliGRAM(s) Oral every 8 hours  pantoprazole    Tablet 40 milliGRAM(s) Oral before breakfast  Parenteral Nutrition - Adult 1 Each (42 mL/Hr) TPN Continuous <Continuous>  Parenteral Nutrition - Adult 1 Each (63 mL/Hr) TPN Continuous <Continuous>  piperacillin/tazobactam IVPB. 3.375 Gram(s) IV Intermittent every 8 hours  sucralfate suspension 1 Gram(s) Oral four times a day    MEDICATIONS  (PRN):  cyclobenzaprine 5 milliGRAM(s) Oral three times a day PRN Muscle Spasm  dibucaine 1% Ointment 1 Application(s) Topical daily PRN hemorrhoids  HYDROmorphone   Tablet 2 milliGRAM(s) Oral every 3 hours PRN Moderate Pain (4 - 6)  morphine  - Injectable 4 milliGRAM(s) IV Push every 3 hours PRN Severe Pain (7 - 10)  morphine  - Injectable 2 milliGRAM(s) IV Push every 3 hours PRN Moderate Pain (4 - 6)    I&O's Detail    2018 07:01  -  2018 07:00  --------------------------------------------------------  IN:    Oral Fluid: 120 mL  Total IN: 120 mL    OUT:    Drain: 300 mL    Indwelling Catheter - Urethral: 400 mL  Total OUT: 700 mL    Total NET: -580 mL    POCT Blood Glucose.: 130 mg/dL (2018 17:08)  POCT Blood Glucose.: 159 mg/dL (2018 12:29)    Daily Weight in k.8 (2018 00:32)    Drug Dosing Weight  Height (cm): 160.02 (2018 15:53)  Weight (kg): 54 (2018 15:53)  BMI (kg/m2): 21.1 (2018 15:53)  BSA (m2): 1.55 (2018 15:53)    PHYSICAL EXAM   Constitutional: no acute distress, resting in bed   Respiratory: nonlabored respirations   Gastrointestinal: soft, minimally tender, moderately distended   Extremities: warm, generalized edema    PICC Site: C/D/I    Diet: soft diet and TPN      Culture - Urine (collected 2018 04:21)  Source: URINE MIDSTREAM  Preliminary Report (2018 14:31):    ENTFC^Enterococcus faecium    COLONY COUNT: > = 100,000 CFU/ML    YEAST^YEAST.    COLONY COUNT: 10,000-49,000 CFU/mL    Culture - Blood (collected 2018 23:00)  Source: BLOOD VENOUS  Preliminary Report (2018 23:01):    NO ORGANISMS ISOLATED    NO ORGANISMS ISOLATED AT 48 HRS.    Culture - Blood (collected 2018 23:00)  Source: BLOOD PERIPHERAL  Preliminary Report (2018 23:01):    NO ORGANISMS ISOLATED    NO ORGANISMS ISOLATED AT 48 HRS.    LABORATORY                        8.6    18.27 )-----------( 529      ( 2018 07:15 )             25.4       138  |  105  |  17  ----------------------------<  124<H>  3.6   |  22  |  0.33<L>    Ca    7.8<L>      2018 07:15  Phos  2.7       Mg     2.2         TPro  5.4<L>  /  Alb  1.9<L>  /  TBili  14.5<H>  /  DBili  11.4<H>  /  AST  30  /  ALT  28  /  AlkPhos  168<H>      LIVER FUNCTIONS - ( 2018 05:45 )  Alb: 1.8 g/dL / Pro: 5.1 g/dL / ALK PHOS: 198 u/L / ALT: 27 u/L / AST: 31 u/L / GGT: x            Chol -- LDL -- HDL -- Trig 138, 12 Chol -- LDL -- HDL -- Trig 112, 06-05 Chol 156  HDL 8<L> Trig 85, 05-19 Chol 122 LDL 73 HDL 33<L> Trig 78    Prealbumin 18: 10    ASSESSMENT/PLAN:  57 y/o female s/p ERCP c/b perforation of afferent limb. Pt underwent emergent Ex-lap w/ resection of perforated bowel and stapled side to side functional end to end anastomosis. Pt remained intubated and was transferred to SICU off pressors. s/p extubation, now transferred to floor. Patient meets criteria for severe protein calorie malnutrition requiring TPN for nutritional support. TPN increased back to full volume on 18. Now brought back to 1/2 volume and calories on 18.  s/p IR tube check on 18    TPN infusion decreased to 1 L today,     Monitor fingersticks q 12 hours, obtain daily weights.    Labs reviewed - increased potassium and phos content in TPN     Continue TPN and lipids. Will follow up with primary team on plan - 1/2 TPN today. Plan to d/c TPN/lipids tomorrow.     Continue to encourage increased PO intake and monitor tolerance.     Discharge planning in process     1. Protein calorie malnutrition being optimized with TPN: CHO [100] gm.  AA [40] gm. SMOF Lipids [16] gm. lipids will be administered over 12 hours   2.  Hyperglycemia managed with: [0 ] units of regular insulin    3.  Check fluid balance daily.  Strict I/O  [ ] [ ]   4.  Daily BMP, Ionized Calcium, Magnesium and Phosphorous   5.  Triglycerides at initiation of TPN and monthly [ ] [ ]   6.  Pepcid in TPN for Gi prophylaxis  [ ]     Nutrition support pager [phone]

## 2018-06-29 NOTE — PROGRESS NOTE ADULT - ATTENDING COMMENTS
I am concerned that the anterior wall hematoma is getting bigger. It has almost doubled in size over the past 2 days. The patients WBC is up to 18K and the bilirubin continues to rise. I believe that there is a risk that the hematoma is infected and that part of the bilirubin rise is due to sepsis and part is due to reabsorption of the hematoma.    Plan: I suggest aspirating the hematoma to determine if the contents is infected and if it is I suggest drainage. I suggest ID consult. I discussed this with the PA on the surgical service.

## 2018-06-29 NOTE — PROGRESS NOTE ADULT - ASSESSMENT
58F s/p ERCP EUS aborted 2/2 small bowel perforation, s/p ex lap resection of small bowel side to side anastomosis currently hemodynamically stable on the floor.     - F/U Fever work up, no growth from bcx 6/26, bcx from 6/25 with coag neg staph likely contaminant  - continue zosyn, f/u urine cx  - Continue diet, halve TPN today  - Continue martinez, monitor UOP  - Continue T Lovenox  - No plans to intervene on midline swelling. Patient has bowel through defect in fascia underlying the midline incision with surrounding fluid - this is not ammenable to drainage  - Dispo: DC planning

## 2018-06-29 NOTE — PROGRESS NOTE ADULT - ATTENDING COMMENTS
57 y/o female s/p ERCP c/b perforation of afferent limb. Pt underwent emergent Ex-lap w/ resection of perforated bowel and stapled side to side functional end to end anastomosis. Pt remained intubated and was transferred to SICU off pressors. s/p extubation, now transferred to floor. Patient meets criteria for severe protein calorie malnutrition requiring TPN for nutritional support. TPN increased back to full volume on 6/26/18. Now brought back to 1/2 volume and calories on 6/29/18.  s/p IR tube check on 6/27/18    TPN infusion decreased to 1 L today,     Monitor fingersticks q 12 hours, obtain daily weights.    Labs reviewed - increased potassium and phos content in TPN     Continue TPN and lipids. Will follow up with primary team on plan - 1/2 TPN today. Plan to d/c TPN/lipids tomorrow.     Continue to encourage increased PO intake and monitor tolerance.     Discharge planning in process     1. Protein calorie malnutrition being optimized with TPN: CHO [100] gm.  AA [40] gm. SMOF Lipids [16] gm. lipids will be administered over 12 hours   2.  Hyperglycemia managed with: [0 ] units of regular insulin    3.  Check fluid balance daily.  Strict I/O  [ ] [ ]   4.  Daily BMP, Ionized Calcium, Magnesium and Phosphorous   5.  Triglycerides at initiation of TPN and monthly [ ] [ ]   6.  Pepcid in TPN for Gi prophylaxis  [ ]   I have seen and examined the patient, reviewed the laboratory and radiologic data and agree with the history, physical assessment and plan.  I reviewed and discussed with all consultants, house staff and PA's.  The note is edited where appropriate. The Nutrition Support Team (NST) discusses on an ongoing basis with the primary team and all consultants, House staff and PA's to have a permanent risk benefit analyses on all decisions.  I spent  45  minutes in total; providing diagnoses, assessment and plan.

## 2018-06-29 NOTE — PROGRESS NOTE ADULT - ATTENDING COMMENTS
Liver enzymes are improved, however bilirubin persists and this may be contributed to by reabsorption of post operative blood.  Patient complains that abdominal hematoma is increasing in size , although hgb is stable.  Simplify medication regimen as possible.

## 2018-06-29 NOTE — PROGRESS NOTE ADULT - SUBJECTIVE AND OBJECTIVE BOX
Chief Complaint:  Patient is a 58y old  Female who presents with a chief complaint of Painless jaundice (05 Jun 2018 14:49)      Interval Events: Patient reports increased abdominal swelling and discomfort. She denies melena, hematochezia, hematemesis.     Allergies:  No Known Allergies      Hospital Medications:  chlorhexidine 4% Liquid 1 Application(s) Topical once  chlorhexidine 4% Liquid 1 Application(s) Topical <User Schedule>  cyclobenzaprine 5 milliGRAM(s) Oral three times a day PRN  dibucaine 1% Ointment 1 Application(s) Topical daily PRN  enoxaparin Injectable 65 milliGRAM(s) SubCutaneous <User Schedule>  fat emulsion (Fish Oil and Plant Based) 20% Infusion 13.3 mL/Hr IV Continuous <Continuous>  fat emulsion (Fish Oil and Plant Based) 20% Infusion 13.3 mL/Hr IV Continuous <Continuous>  HYDROmorphone   Tablet 2 milliGRAM(s) Oral every 3 hours PRN  metoclopramide 5 milliGRAM(s) Oral every 8 hours  morphine  - Injectable 4 milliGRAM(s) IV Push every 3 hours PRN  morphine  - Injectable 2 milliGRAM(s) IV Push every 3 hours PRN  pantoprazole    Tablet 40 milliGRAM(s) Oral before breakfast  Parenteral Nutrition - Adult 1 Each TPN Continuous <Continuous>  piperacillin/tazobactam IVPB. 3.375 Gram(s) IV Intermittent every 8 hours  sucralfate suspension 1 Gram(s) Oral four times a day      PMHX/PSHX:  Gastric cancer  Malignant neoplasm of stomach, unspecified location  No pertinent past medical history  S/P partial gastrectomy  No significant past surgical history      Family history:  No pertinent family history in first degree relatives      ROS: Negative, except as otherwise noted above    PHYSICAL EXAM:   Vital Signs:  Vital Signs Last 24 Hrs  T(C): 37.6 (29 Jun 2018 08:24), Max: 38.1 (28 Jun 2018 17:15)  T(F): 99.6 (29 Jun 2018 08:24), Max: 100.5 (28 Jun 2018 17:15)  HR: 104 (29 Jun 2018 08:24) (95 - 116)  BP: 127/63 (29 Jun 2018 08:24) (118/65 - 127/64)  BP(mean): --  RR: 17 (29 Jun 2018 08:24) (16 - 17)  SpO2: 98% (29 Jun 2018 08:24) (96% - 100%)  Daily     Daily     GENERAL: No acute distress    HEENT:  Icteric, no thrush  CHEST:  Non-labored breathing, lungs clear b/l  HEART:  +s1, s2 heart sounds, no murmurs  ABDOMEN:  +Midline surgical incision with abdominal swelling, tenderness to palpation, no rebound/guarding, +biliary drainage catheter  EXTREMITIES:  Warm and well perfused  SKIN:  No rash  NEURO:   Awake, interactive, following commands     LABS:  CBC Full  -  ( 29 Jun 2018 07:15 )  WBC Count : 18.27 K/uL  Hemoglobin : 8.6 g/dL  Hematocrit : 25.4 %  Platelet Count - Automated : 529 K/uL  Mean Cell Volume : 90.7 fL  Auto Neutrophil # : 14.91 K/uL  Auto Neutrophil % : 81.6 %    06-29 @ 07:15  Na 138 mmol/L  K 3.6 mmol/L  Cl 105 mmol/L  CO2 22 mmol/L  BUN 17 mg/dL  Creat 0.33 mg/dL  Glucose 124 mg/dL  Ca 7.8 mg/dL    Total protein 5.4 g/dL  Albumin 1.9 g/dL  T bili 14.5 mg/dL  Alk phos 168 u/L  AST 30 u/L  ALT 28 u/L

## 2018-06-29 NOTE — PROGRESS NOTE ADULT - ASSESSMENT
Impression:    1. Hyperbilirubinemia: CT reviewed w radiology, no persistent biliary duct dilation. Suspect intrahepatic cholestasis, likely multifactorial. Factors may include acute illness with possible infection, resorption of intraabdominal hematoma, DILI(unclear which if any drugs might be contributing), less likely TPN given short duration on TPN.    2. Intraabdominal hematoma with possible superinfection, the lovenox may be contributing to this    3. Fever, possible urinary or intra-abdominal source    Recommendation:  -surgical follow up for intraabdominal collection. It is causing patient significant discomfort  -treat underlying infection  -trend Liver chemistries  -would favor holding pantoprazole (there is pepcid in the TPN) and any other nonessential meds (?reglan/flexeril) to remove any possible agents that could be causing DILI

## 2018-06-29 NOTE — CHART NOTE - NSCHARTNOTEFT_GEN_A_CORE
NUTRITION FOLLOW-UP: Spoke to Pt and family concerning her po intake. Pt is currently receiving TPN and pureed foods. Pt is being weaned off TPN and trying to increase her po intake. Pt does not like   like the pureed food and feels she would be able to tolerate a soft diet. Pt also prefers Ensure clear over Ensure Enlive. Discussed alternative food items and snacks Pt may choose.     Weight: 54.06 kg    Labs: Cr-33, Glu-124, POCT- 130, 159, 121; Hgb A1c-5.2    Current Diet: Pureed, and TPN    Enteral Recommendations: D/C Ensure Enlive and add Ensure Clear 3x/day                                           RD to Remain Available: Cherie Ramirez MS, RDN, CDN pager [phone]     Additional Recommendations:   1) Advance diet to soft

## 2018-06-30 LAB
ALBUMIN SERPL ELPH-MCNC: 1.9 G/DL — LOW (ref 3.3–5)
ALP SERPL-CCNC: 179 U/L — HIGH (ref 40–120)
ALT FLD-CCNC: 32 U/L — SIGNIFICANT CHANGE UP (ref 4–33)
APPEARANCE UR: CLEAR — SIGNIFICANT CHANGE UP
APTT BLD: 27.6 SEC — SIGNIFICANT CHANGE UP (ref 27.5–37.4)
AST SERPL-CCNC: 34 U/L — HIGH (ref 4–32)
BASOPHILS # BLD AUTO: 0.04 K/UL — SIGNIFICANT CHANGE UP (ref 0–0.2)
BASOPHILS NFR BLD AUTO: 0.3 % — SIGNIFICANT CHANGE UP (ref 0–2)
BILIRUB SERPL-MCNC: 13.1 MG/DL — HIGH (ref 0.2–1.2)
BILIRUB UR-MCNC: HIGH
BLOOD UR QL VISUAL: HIGH
BUN SERPL-MCNC: 17 MG/DL — SIGNIFICANT CHANGE UP (ref 7–23)
CALCIUM SERPL-MCNC: 8 MG/DL — LOW (ref 8.4–10.5)
CHLORIDE SERPL-SCNC: 106 MMOL/L — SIGNIFICANT CHANGE UP (ref 98–107)
CO2 SERPL-SCNC: 21 MMOL/L — LOW (ref 22–31)
COLOR SPEC: HIGH
CREAT SERPL-MCNC: 0.36 MG/DL — LOW (ref 0.5–1.3)
EOSINOPHIL # BLD AUTO: 0.18 K/UL — SIGNIFICANT CHANGE UP (ref 0–0.5)
EOSINOPHIL NFR BLD AUTO: 1.3 % — SIGNIFICANT CHANGE UP (ref 0–6)
GLUCOSE SERPL-MCNC: 116 MG/DL — HIGH (ref 70–99)
GLUCOSE UR-MCNC: NEGATIVE — SIGNIFICANT CHANGE UP
GRAN CASTS # UR COMP ASSIST: SIGNIFICANT CHANGE UP
HCT VFR BLD CALC: 26.1 % — LOW (ref 34.5–45)
HGB BLD-MCNC: 7.5 G/DL — LOW (ref 11.5–15.5)
IMM GRANULOCYTES # BLD AUTO: 0.41 # — SIGNIFICANT CHANGE UP
IMM GRANULOCYTES NFR BLD AUTO: 2.9 % — HIGH (ref 0–1.5)
INR BLD: 1.22 — HIGH (ref 0.88–1.17)
KETONES UR-MCNC: NEGATIVE — SIGNIFICANT CHANGE UP
LEUKOCYTE ESTERASE UR-ACNC: HIGH
LYMPHOCYTES # BLD AUTO: 0.82 K/UL — LOW (ref 1–3.3)
LYMPHOCYTES # BLD AUTO: 5.8 % — LOW (ref 13–44)
MCHC RBC-ENTMCNC: 28.7 % — LOW (ref 32–36)
MCHC RBC-ENTMCNC: 31.1 PG — SIGNIFICANT CHANGE UP (ref 27–34)
MCV RBC AUTO: 108.3 FL — HIGH (ref 80–100)
MONOCYTES # BLD AUTO: 0.8 K/UL — SIGNIFICANT CHANGE UP (ref 0–0.9)
MONOCYTES NFR BLD AUTO: 5.6 % — SIGNIFICANT CHANGE UP (ref 2–14)
MUCOUS THREADS # UR AUTO: SIGNIFICANT CHANGE UP
NEUTROPHILS # BLD AUTO: 11.96 K/UL — HIGH (ref 1.8–7.4)
NEUTROPHILS NFR BLD AUTO: 84.1 % — HIGH (ref 43–77)
NITRITE UR-MCNC: NEGATIVE — SIGNIFICANT CHANGE UP
NON-SQ EPI CELLS # UR AUTO: 1 — SIGNIFICANT CHANGE UP
NRBC # FLD: 0 — SIGNIFICANT CHANGE UP
PH UR: 6 — SIGNIFICANT CHANGE UP (ref 4.6–8)
PLATELET # BLD AUTO: 406 K/UL — HIGH (ref 150–400)
PMV BLD: 11 FL — SIGNIFICANT CHANGE UP (ref 7–13)
POTASSIUM SERPL-MCNC: 3.9 MMOL/L — SIGNIFICANT CHANGE UP (ref 3.5–5.3)
POTASSIUM SERPL-SCNC: 3.9 MMOL/L — SIGNIFICANT CHANGE UP (ref 3.5–5.3)
PROT SERPL-MCNC: 5.4 G/DL — LOW (ref 6–8.3)
PROT UR-MCNC: 30 MG/DL — HIGH
PROTHROM AB SERPL-ACNC: 13.6 SEC — HIGH (ref 9.8–13.1)
RBC # BLD: 2.41 M/UL — LOW (ref 3.8–5.2)
RBC # FLD: 22.9 % — HIGH (ref 10.3–14.5)
RBC CASTS # UR COMP ASSIST: >50 — HIGH (ref 0–?)
SODIUM SERPL-SCNC: 138 MMOL/L — SIGNIFICANT CHANGE UP (ref 135–145)
SP GR SPEC: 1.02 — SIGNIFICANT CHANGE UP (ref 1–1.04)
SQUAMOUS # UR AUTO: SIGNIFICANT CHANGE UP
UROBILINOGEN FLD QL: NORMAL MG/DL — SIGNIFICANT CHANGE UP
WBC # BLD: 14.21 K/UL — HIGH (ref 3.8–10.5)
WBC # FLD AUTO: 14.21 K/UL — HIGH (ref 3.8–10.5)
WBC UR QL: SIGNIFICANT CHANGE UP (ref 0–?)
YEAST BUDDING # UR COMP ASSIST: SIGNIFICANT CHANGE UP

## 2018-06-30 PROCEDURE — 99254 IP/OBS CNSLTJ NEW/EST MOD 60: CPT | Mod: GC

## 2018-06-30 PROCEDURE — 71045 X-RAY EXAM CHEST 1 VIEW: CPT | Mod: 26

## 2018-06-30 PROCEDURE — 99233 SBSQ HOSP IP/OBS HIGH 50: CPT

## 2018-06-30 PROCEDURE — 99232 SBSQ HOSP IP/OBS MODERATE 35: CPT | Mod: GC

## 2018-06-30 RX ORDER — DAPTOMYCIN 500 MG/10ML
220 INJECTION, POWDER, LYOPHILIZED, FOR SOLUTION INTRAVENOUS EVERY 24 HOURS
Qty: 0 | Refills: 0 | Status: DISCONTINUED | OUTPATIENT
Start: 2018-06-30 | End: 2018-07-06

## 2018-06-30 RX ORDER — ELECTROLYTE SOLUTION,INJ
1 VIAL (ML) INTRAVENOUS
Qty: 0 | Refills: 0 | Status: DISCONTINUED | OUTPATIENT
Start: 2018-06-30 | End: 2018-06-30

## 2018-06-30 RX ORDER — SIMETHICONE 80 MG/1
80 TABLET, CHEWABLE ORAL ONCE
Qty: 0 | Refills: 0 | Status: COMPLETED | OUTPATIENT
Start: 2018-06-30 | End: 2018-06-30

## 2018-06-30 RX ORDER — I.V. FAT EMULSION 20 G/100ML
6.6 EMULSION INTRAVENOUS
Qty: 16 | Refills: 0 | Status: DISCONTINUED | OUTPATIENT
Start: 2018-06-30 | End: 2018-07-02

## 2018-06-30 RX ORDER — ACETAMINOPHEN 500 MG
650 TABLET ORAL ONCE
Qty: 0 | Refills: 0 | Status: COMPLETED | OUTPATIENT
Start: 2018-06-30 | End: 2018-06-30

## 2018-06-30 RX ADMIN — Medication 5 MILLIGRAM(S): at 05:41

## 2018-06-30 RX ADMIN — MORPHINE SULFATE 4 MILLIGRAM(S): 50 CAPSULE, EXTENDED RELEASE ORAL at 20:00

## 2018-06-30 RX ADMIN — ENOXAPARIN SODIUM 65 MILLIGRAM(S): 100 INJECTION SUBCUTANEOUS at 08:01

## 2018-06-30 RX ADMIN — Medication 1 GRAM(S): at 12:17

## 2018-06-30 RX ADMIN — Medication 1 GRAM(S): at 05:32

## 2018-06-30 RX ADMIN — MORPHINE SULFATE 4 MILLIGRAM(S): 50 CAPSULE, EXTENDED RELEASE ORAL at 11:17

## 2018-06-30 RX ADMIN — ENOXAPARIN SODIUM 65 MILLIGRAM(S): 100 INJECTION SUBCUTANEOUS at 19:40

## 2018-06-30 RX ADMIN — HYDROMORPHONE HYDROCHLORIDE 0.5 MILLIGRAM(S): 2 INJECTION INTRAMUSCULAR; INTRAVENOUS; SUBCUTANEOUS at 22:35

## 2018-06-30 RX ADMIN — MORPHINE SULFATE 4 MILLIGRAM(S): 50 CAPSULE, EXTENDED RELEASE ORAL at 19:40

## 2018-06-30 RX ADMIN — HYDROMORPHONE HYDROCHLORIDE 2 MILLIGRAM(S): 2 INJECTION INTRAMUSCULAR; INTRAVENOUS; SUBCUTANEOUS at 06:30

## 2018-06-30 RX ADMIN — MORPHINE SULFATE 4 MILLIGRAM(S): 50 CAPSULE, EXTENDED RELEASE ORAL at 03:03

## 2018-06-30 RX ADMIN — MORPHINE SULFATE 4 MILLIGRAM(S): 50 CAPSULE, EXTENDED RELEASE ORAL at 12:18

## 2018-06-30 RX ADMIN — CYCLOBENZAPRINE HYDROCHLORIDE 5 MILLIGRAM(S): 10 TABLET, FILM COATED ORAL at 10:09

## 2018-06-30 RX ADMIN — SIMETHICONE 80 MILLIGRAM(S): 80 TABLET, CHEWABLE ORAL at 06:07

## 2018-06-30 RX ADMIN — HYDROMORPHONE HYDROCHLORIDE 2 MILLIGRAM(S): 2 INJECTION INTRAMUSCULAR; INTRAVENOUS; SUBCUTANEOUS at 05:32

## 2018-06-30 RX ADMIN — Medication 1 GRAM(S): at 23:12

## 2018-06-30 RX ADMIN — CHLORHEXIDINE GLUCONATE 1 APPLICATION(S): 213 SOLUTION TOPICAL at 12:18

## 2018-06-30 RX ADMIN — Medication 5 MILLIGRAM(S): at 13:50

## 2018-06-30 RX ADMIN — Medication 5 MILLIGRAM(S): at 23:12

## 2018-06-30 RX ADMIN — Medication 650 MILLIGRAM(S): at 13:49

## 2018-06-30 RX ADMIN — HYDROMORPHONE HYDROCHLORIDE 0.5 MILLIGRAM(S): 2 INJECTION INTRAMUSCULAR; INTRAVENOUS; SUBCUTANEOUS at 22:09

## 2018-06-30 RX ADMIN — PIPERACILLIN AND TAZOBACTAM 25 GRAM(S): 4; .5 INJECTION, POWDER, LYOPHILIZED, FOR SOLUTION INTRAVENOUS at 15:07

## 2018-06-30 RX ADMIN — Medication 1 GRAM(S): at 18:11

## 2018-06-30 RX ADMIN — I.V. FAT EMULSION 6.6 ML/HR: 20 EMULSION INTRAVENOUS at 17:20

## 2018-06-30 RX ADMIN — MORPHINE SULFATE 4 MILLIGRAM(S): 50 CAPSULE, EXTENDED RELEASE ORAL at 14:10

## 2018-06-30 RX ADMIN — PANTOPRAZOLE SODIUM 40 MILLIGRAM(S): 20 TABLET, DELAYED RELEASE ORAL at 05:32

## 2018-06-30 RX ADMIN — Medication 1 EACH: at 17:27

## 2018-06-30 RX ADMIN — MORPHINE SULFATE 4 MILLIGRAM(S): 50 CAPSULE, EXTENDED RELEASE ORAL at 03:26

## 2018-06-30 RX ADMIN — PIPERACILLIN AND TAZOBACTAM 25 GRAM(S): 4; .5 INJECTION, POWDER, LYOPHILIZED, FOR SOLUTION INTRAVENOUS at 05:33

## 2018-06-30 RX ADMIN — DAPTOMYCIN 108.8 MILLIGRAM(S): 500 INJECTION, POWDER, LYOPHILIZED, FOR SOLUTION INTRAVENOUS at 21:33

## 2018-06-30 RX ADMIN — MORPHINE SULFATE 4 MILLIGRAM(S): 50 CAPSULE, EXTENDED RELEASE ORAL at 13:54

## 2018-06-30 RX ADMIN — PIPERACILLIN AND TAZOBACTAM 25 GRAM(S): 4; .5 INJECTION, POWDER, LYOPHILIZED, FOR SOLUTION INTRAVENOUS at 23:12

## 2018-06-30 NOTE — PROGRESS NOTE ADULT - ASSESSMENT
Impression:  1. Unsuccessful ERCP complicated by small bowel perforation 6/6/18 s/p ex-lap with small bowel resection and anastomosis 6/6/18  2. Obstructive jaundice with dilated CBD and 1.4 cm enhancing pancreatic head mass s/p percutaneous drainage, with persistent hyperbilirubinemia   3. 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 Acute blood loss anemia with abdominal wall hematoma and hemoperitoneum noted on CTAP    Plan:  - re-consider aspiration/drainage of abdominal fluid collection - concern for infected hematoma as well as source of symptoms, elevated bili  - Consider ID consult  - Full infectious workup, follow up all culture data  - Monitor CBC, CMP, INR  - Miralax as needed  - Continue antibiotics  - Nutrition per Surgery  - Supportive care per primary team Impression:  1. Unsuccessful ERCP complicated by small bowel perforation 6/6/18 s/p ex-lap with small bowel resection and anastomosis 6/6/18  2. Obstructive jaundice with dilated CBD and 1.4 cm enhancing pancreatic head mass s/p percutaneous drainage, with persistent hyperbilirubinemia   3. 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 Acute blood loss anemia with abdominal wall hematoma and hemoperitoneum noted on CTAP    Plan:  - Consider ID consult  - Full infectious workup, follow up all culture data  - Monitor CBC, CMP, INR  - Miralax as needed  - Continue antibiotics  - Management per Surgery  - Supportive care per primary team  - Should have outpatient cancergenetic testing (Pt should email [email or web address])

## 2018-06-30 NOTE — PROGRESS NOTE ADULT - SUBJECTIVE AND OBJECTIVE BOX
Chief Complaint:  Patient is a 58y old  Female who presents with a chief complaint of Painless jaundice (05 Jun 2018 14:49)      Interval Events: Hgb dropped slightly overnight.      Allergies:  No Known Allergies      Hospital Medications:  chlorhexidine 4% Liquid 1 Application(s) Topical once  chlorhexidine 4% Liquid 1 Application(s) Topical <User Schedule>  cyclobenzaprine 5 milliGRAM(s) Oral three times a day PRN  dibucaine 1% Ointment 1 Application(s) Topical daily PRN  enoxaparin Injectable 65 milliGRAM(s) SubCutaneous <User Schedule>  fat emulsion (Fish Oil and Plant Based) 20% Infusion 6.6 mL/Hr IV Continuous <Continuous>  fat emulsion (Fish Oil and Plant Based) 20% Infusion 13.3 mL/Hr IV Continuous <Continuous>  HYDROmorphone   Tablet 2 milliGRAM(s) Oral every 3 hours PRN  HYDROmorphone  Injectable 0.5 milliGRAM(s) IV Push every 4 hours PRN  metoclopramide 5 milliGRAM(s) Oral every 8 hours  morphine  - Injectable 2 milliGRAM(s) IV Push every 3 hours PRN  morphine  - Injectable 4 milliGRAM(s) IV Push every 3 hours PRN  pantoprazole    Tablet 40 milliGRAM(s) Oral before breakfast  Parenteral Nutrition - Adult 1 Each TPN Continuous <Continuous>  piperacillin/tazobactam IVPB. 3.375 Gram(s) IV Intermittent every 8 hours  sucralfate suspension 1 Gram(s) Oral four times a day      PMHX/PSHX:  Gastric cancer  Malignant neoplasm of stomach, unspecified location  No pertinent past medical history  S/P partial gastrectomy  No significant past surgical history      Family history:  No pertinent family history in first degree relatives      ROS:     General:  No wt loss, fevers, chills, night sweats, fatigue,   Eyes:  Good vision, no reported pain  ENT:  No sore throat, pain, runny nose, dysphagia  CV:  No pain, palpitations, hypo/hypertension  Resp:  No dyspnea, cough, tachypnea, wheezing  GI:  See HPI  :  No pain, bleeding, incontinence, nocturia  Muscle:  No pain, weakness  Neuro:  No weakness, tingling, memory problems  Psych:  No fatigue, insomnia, mood problems, depression  Endocrine:  No polyuria, polydipsia, cold/heat intolerance  Heme:  No petechiae, ecchymosis, easy bruisability  Skin:  No rash, edema      PHYSICAL EXAM:     GENERAL:  Appears stated age, well-groomed, well-nourished, no distress  HEENT:  NC/AT,  conjunctivae clear, sclera -anicteric  CHEST:  Full & symmetric excursion, no increased effort, breath sounds clear  HEART:  Regular rhythm, S1, S2, no murmur/rub/S3/S4,  no edema  ABDOMEN:  Soft, non-tender, non-distended, normoactive bowel sounds,  no masses ,no hepato-splenomegaly,   EXTREMITIES:  no cyanosis,clubbing or edema  SKIN:  No rash/erythema/ecchymoses/petechiae/wounds/abscess/warm/dry  NEURO:  Alert, oriented    Vital Signs:  Vital Signs Last 24 Hrs  T(C): 37.3 (30 Jun 2018 07:53), Max: 37.6 (29 Jun 2018 08:24)  T(F): 99.2 (30 Jun 2018 07:53), Max: 99.6 (29 Jun 2018 08:24)  HR: 109 (30 Jun 2018 07:53) (103 - 109)  BP: 128/72 (30 Jun 2018 07:53) (107/63 - 144/79)  BP(mean): --  RR: 18 (30 Jun 2018 07:53) (17 - 18)  SpO2: 100% (30 Jun 2018 07:53) (93% - 100%)  Daily     Daily     LABS:                        7.5    14.21 )-----------( 406      ( 30 Jun 2018 05:30 )             26.1     06-29    138  |  106  |  18  ----------------------------<  126<H>  3.7   |  22  |  0.36<L>    Ca    7.8<L>      29 Jun 2018 14:33  Phos  2.7     06-29  Mg     2.2     06-29    TPro  5.4<L>  /  Alb  1.9<L>  /  TBili  13.1<H>  /  DBili  x   /  AST  29  /  ALT  30  /  AlkPhos  172<H>  06-29    LIVER FUNCTIONS - ( 29 Jun 2018 14:33 )  Alb: 1.9 g/dL / Pro: 5.4 g/dL / ALK PHOS: 172 u/L / ALT: 30 u/L / AST: 29 u/L / GGT: x           PT/INR - ( 30 Jun 2018 05:30 )   PT: 13.6 SEC;   INR: 1.22          PTT - ( 30 Jun 2018 05:30 )  PTT:27.6 SEC        Imaging:  reviewed Chief Complaint:  Patient is a 58y old  Female who presents with a chief complaint of Painless jaundice (05 Jun 2018 14:49)      Interval Events: Hgb dropped slightly overnight.  Pt reporting significant pain in midline abdomen where hematoma is; stating she is having difficulty, eating, breathing.  Passing gas did help.  Small acholic stool yesterday    Allergies:  No Known Allergies      Hospital Medications:  chlorhexidine 4% Liquid 1 Application(s) Topical once  chlorhexidine 4% Liquid 1 Application(s) Topical <User Schedule>  cyclobenzaprine 5 milliGRAM(s) Oral three times a day PRN  dibucaine 1% Ointment 1 Application(s) Topical daily PRN  enoxaparin Injectable 65 milliGRAM(s) SubCutaneous <User Schedule>  fat emulsion (Fish Oil and Plant Based) 20% Infusion 6.6 mL/Hr IV Continuous <Continuous>  fat emulsion (Fish Oil and Plant Based) 20% Infusion 13.3 mL/Hr IV Continuous <Continuous>  HYDROmorphone   Tablet 2 milliGRAM(s) Oral every 3 hours PRN  HYDROmorphone  Injectable 0.5 milliGRAM(s) IV Push every 4 hours PRN  metoclopramide 5 milliGRAM(s) Oral every 8 hours  morphine  - Injectable 2 milliGRAM(s) IV Push every 3 hours PRN  morphine  - Injectable 4 milliGRAM(s) IV Push every 3 hours PRN  pantoprazole    Tablet 40 milliGRAM(s) Oral before breakfast  Parenteral Nutrition - Adult 1 Each TPN Continuous <Continuous>  piperacillin/tazobactam IVPB. 3.375 Gram(s) IV Intermittent every 8 hours  sucralfate suspension 1 Gram(s) Oral four times a day      PMHX/PSHX:  Gastric cancer  Malignant neoplasm of stomach, unspecified location  No pertinent past medical history  S/P partial gastrectomy  No significant past surgical history      Family history:  No pertinent family history in first degree relatives      ROS:     General:  No wt loss, fevers, chills, night sweats, fatigue,   Eyes:  Good vision, no reported pain  ENT:  No sore throat, pain, runny nose, dysphagia  CV:  No pain, palpitations, hypo/hypertension  Resp:  No dyspnea, cough, tachypnea, wheezing  GI:  See HPI  :  No pain, bleeding, incontinence, nocturia  Muscle:  No pain, weakness  Neuro:  No weakness, tingling, memory problems  Psych:  No fatigue, insomnia, mood problems, depression  Endocrine:  No polyuria, polydipsia, cold/heat intolerance  Heme:  No petechiae, ecchymosis, easy bruisability  Skin:  No rash, edema      PHYSICAL EXAM:     GENERAL:  Appears stated age, well-groomed, well-nourished, no distress  HEENT:  NC/AT,  conjunctivae clear, sclera -anicteric  CHEST:  shallow breathing  HEART:  tachycardic  ABDOMEN:  Soft, large midline hematoma, TTP, decreased BS  EXTREMITIES:  no cyanosis,clubbing or edema  SKIN:  No rash  NEURO:  Alert, oriented    Vital Signs:  Vital Signs Last 24 Hrs  T(C): 37.3 (30 Jun 2018 07:53), Max: 37.6 (29 Jun 2018 08:24)  T(F): 99.2 (30 Jun 2018 07:53), Max: 99.6 (29 Jun 2018 08:24)  HR: 109 (30 Jun 2018 07:53) (103 - 109)  BP: 128/72 (30 Jun 2018 07:53) (107/63 - 144/79)  BP(mean): --  RR: 18 (30 Jun 2018 07:53) (17 - 18)  SpO2: 100% (30 Jun 2018 07:53) (93% - 100%)  Daily     Daily     LABS:                        7.5    14.21 )-----------( 406      ( 30 Jun 2018 05:30 )             26.1     06-29    138  |  106  |  18  ----------------------------<  126<H>  3.7   |  22  |  0.36<L>    Ca    7.8<L>      29 Jun 2018 14:33  Phos  2.7     06-29  Mg     2.2     06-29    TPro  5.4<L>  /  Alb  1.9<L>  /  TBili  13.1<H>  /  DBili  x   /  AST  29  /  ALT  30  /  AlkPhos  172<H>  06-29    LIVER FUNCTIONS - ( 29 Jun 2018 14:33 )  Alb: 1.9 g/dL / Pro: 5.4 g/dL / ALK PHOS: 172 u/L / ALT: 30 u/L / AST: 29 u/L / GGT: x           PT/INR - ( 30 Jun 2018 05:30 )   PT: 13.6 SEC;   INR: 1.22          PTT - ( 30 Jun 2018 05:30 )  PTT:27.6 SEC        Imaging:  reviewed

## 2018-06-30 NOTE — PROGRESS NOTE ADULT - ATTENDING COMMENTS
Pt seen and examined. Agree with fellow's note. Plan discussed with patient and primary team.     Patient had chief complaint of abdominal pain Pt seen and examined. Agree with fellow's note. Plan discussed with patient and primary team and  and female relative and ID team.    Patient had chief complaint of abdominal pain Pt seen and examined. Agree with fellow's note. Plan discussed with patient and primary team and  and female relative and ID team (Trey interpretation)    Patient had chief complaint of abdominal pain

## 2018-06-30 NOTE — PROGRESS NOTE ADULT - SUBJECTIVE AND OBJECTIVE BOX
GENERAL SURGERY DAILY PROGRESS NOTE:       Subjective:  No acute events overnight. This AM pt feels OK overall but endorsing some discomfort from midline incisional bulging. Has been tolerating PO.         Objective:    PE:  Gen: NAD, jaundiced  Chest: breathing comfortably  Abd: soft, moderately distended, incision c/d/i; collection stable    Vital Signs Last 24 Hrs  T(C): 38.1 (30 Jun 2018 12:53), Max: 38.1 (30 Jun 2018 12:53)  T(F): 100.5 (30 Jun 2018 12:53), Max: 100.5 (30 Jun 2018 12:53)  HR: 104 (30 Jun 2018 12:53) (103 - 109)  BP: 138/68 (30 Jun 2018 12:53) (107/63 - 144/79)  BP(mean): --  RR: 17 (30 Jun 2018 12:53) (17 - 18)  SpO2: 95% (30 Jun 2018 12:53) (93% - 100%)    I&O's Detail    29 Jun 2018 07:01  -  30 Jun 2018 07:00  --------------------------------------------------------  IN:    fat emulsion (Fish Oil and Plant Based) 20% Infusion: 72.6 mL    Free Water: 10 mL    IV PiggyBack: 156 mL    TPN (Total Parenteral Nutrition): 546 mL  Total IN: 784.6 mL    OUT:    Drain: 190 mL    Indwelling Catheter - Urethral: 1940 mL  Total OUT: 2130 mL    Total NET: -1345.4 mL      30 Jun 2018 07:01  -  30 Jun 2018 15:34  --------------------------------------------------------  IN:    Free Water: 10 mL    Oral Fluid: 100 mL    TPN (Total Parenteral Nutrition): 336 mL  Total IN: 446 mL    OUT:    Drain: 65 mL    Indwelling Catheter - Urethral: 525 mL  Total OUT: 590 mL    Total NET: -144 mL          Daily     Daily     MEDICATIONS  (STANDING):  chlorhexidine 4% Liquid 1 Application(s) Topical once  chlorhexidine 4% Liquid 1 Application(s) Topical <User Schedule>  DAPTOmycin IVPB 220 milliGRAM(s) IV Intermittent every 24 hours  enoxaparin Injectable 65 milliGRAM(s) SubCutaneous <User Schedule>  fat emulsion (Fish Oil and Plant Based) 20% Infusion 6.6 mL/Hr (6.6 mL/Hr) IV Continuous <Continuous>  fat emulsion (Fish Oil and Plant Based) 20% Infusion 13.3 mL/Hr (13.3 mL/Hr) IV Continuous <Continuous>  fat emulsion (Fish Oil and Plant Based) 20% Infusion 6.6 mL/Hr (6.6 mL/Hr) IV Continuous <Continuous>  metoclopramide 5 milliGRAM(s) Oral every 8 hours  pantoprazole    Tablet 40 milliGRAM(s) Oral before breakfast  Parenteral Nutrition - Adult 1 Each (42 mL/Hr) TPN Continuous <Continuous>  Parenteral Nutrition - Adult 1 Each (42 mL/Hr) TPN Continuous <Continuous>  piperacillin/tazobactam IVPB. 3.375 Gram(s) IV Intermittent every 8 hours  sucralfate suspension 1 Gram(s) Oral four times a day    MEDICATIONS  (PRN):  cyclobenzaprine 5 milliGRAM(s) Oral three times a day PRN Muscle Spasm  dibucaine 1% Ointment 1 Application(s) Topical daily PRN hemorrhoids  HYDROmorphone   Tablet 2 milliGRAM(s) Oral every 3 hours PRN Moderate Pain (4 - 6)  HYDROmorphone  Injectable 0.5 milliGRAM(s) IV Push every 4 hours PRN breakthrough pain  morphine  - Injectable 2 milliGRAM(s) IV Push every 3 hours PRN Moderate Pain (4 - 6)  morphine  - Injectable 4 milliGRAM(s) IV Push every 3 hours PRN Severe Pain (7 - 10)      LABS:                        7.5    14.21 )-----------( 406      ( 30 Jun 2018 05:30 )             26.1     06-30    138  |  106  |  17  ----------------------------<  116<H>  3.9   |  21<L>  |  0.36<L>    Ca    8.0<L>      30 Jun 2018 12:02  Phos  2.7     06-29  Mg     2.2     06-29    TPro  5.4<L>  /  Alb  1.9<L>  /  TBili  13.1<H>  /  DBili  x   /  AST  34<H>  /  ALT  32  /  AlkPhos  179<H>  06-30    PT/INR - ( 30 Jun 2018 05:30 )   PT: 13.6 SEC;   INR: 1.22          PTT - ( 30 Jun 2018 05:30 )  PTT:27.6 SEC      RADIOLOGY & ADDITIONAL STUDIES:

## 2018-06-30 NOTE — PROVIDER CONTACT NOTE (OTHER) - ASSESSMENT
Patient noted lying in bed, awake, alert and oriented x4. Able to make needs known. VSS. C/o abdominal discomfort. Pain medication administered as per orders. Will continue to assess.

## 2018-06-30 NOTE — PROGRESS NOTE ADULT - SUBJECTIVE AND OBJECTIVE BOX
NUTRITION NOTE  [id number] CHOKYI  ===============================      Interval events  Patient was seen and examined at bedside, no acute overnight events.   TPN/lipids initiated on 6/11/18, patient is tolerating without any issues.  Patient states that is trying to eat small amounts and continues to drink Ensure Clears throughout the day.   TPN/lipids at 1L infusion volume today with 1/2 calories.       VITAL SIGNS:  T(C): 37.3 (06-30-18 @ 07:53), Max: 37.3 (06-29-18 @ 11:52)  HR: 109 (06-30-18 @ 07:53) (103 - 109)  BP: 128/72 (06-30-18 @ 07:53) (107/63 - 144/79)  RR: 18 (06-30-18 @ 07:53) (17 - 18)  SpO2: 100% (06-30-18 @ 07:53) (93% - 100%)  CVP(mm Hg): --  06-29 @ 07:01  -  06-30 @ 07:00  --------------------------------------------------------  IN: 784.6 mL / OUT: 2130 mL / NET: -1345.4 mL    06-30 @ 07:01  -  06-30 @ 10:11  --------------------------------------------------------  IN: 268 mL / OUT: 290 mL / NET: -22 mL    Metabolic/FLUIDS/ELECTROLYTES/NUTRITION:  Gastrointestinal Medications:  fat emulsion (Fish Oil and Plant Based) 20% Infusion 6.6 mL/Hr IV Continuous <Continuous>  fat emulsion (Fish Oil and Plant Based) 20% Infusion 13.3 mL/Hr IV Continuous <Continuous>  fat emulsion (Fish Oil and Plant Based) 20% Infusion 6.6 mL/Hr IV Continuous <Continuous>  metoclopramide 5 milliGRAM(s) Oral every 8 hours  pantoprazole    Tablet 40 milliGRAM(s) Oral before breakfast  Parenteral Nutrition - Adult 1 Each TPN Continuous <Continuous>  Parenteral Nutrition - Adult 1 Each TPN Continuous <Continuous>  sucralfate suspension 1 Gram(s) Oral four times a day        138  |  106  |  18  ----------------------------<  126<H>  3.7   |  22  |  0.36<L>    Ca    7.8<L>      29 Jun 2018 14:33  Phos  2.7     06-29  Mg     2.2     06-29    TPro  5.4<L>  /  Alb  1.9<L>  /  TBili  13.1<H>  /  DBili  x   /  AST  29  /  ALT  30  /  AlkPhos  172<H>  06-29    PHYSICAL EXAM   Constitutional: no acute distress, resting in bed   Respiratory: nonlabored respirations   Gastrointestinal: soft, minimally tender, moderately distended   Extremities: warm, generalized edema    PICC Site: C/D/I    Diet: soft diet and TPN      INFECTIOUS DISEASE:  Antimicrobials/Immunologic Medications:  piperacillin/tazobactam IVPB. 3.375 Gram(s) IV Intermittent every 8 hours    RECENT CULTURES:  06-27 @ 04:21 URINE MIDSTREAM Enterococcus faecium VRE  Candida albicans        06-26 @ 23:00 BLOOD PERIPHERAL         NO ORGANISMS ISOLATED  NO ORGANISMS ISOLATED AT 72 HRS.  06-25 @ 19:59 BLOOD BLOOD CULTURE PCR  Staphylococcus sp.,coag neg        ***Blood Panel PCR results on this specimen are available  approximately 3 hours after the Gram stain result***  Gram stain, PCR, and/or culture results may not always  correspond due to difference in methodologies  ------------------------------------------------------------  This PCR assay was performed using [employer].  The  following targets are tested for:  Enterococcus, vancomycin  resistant enterococci, Listeria monocytogenes,  coagulase  negative staphylococci, S. aureus, methicillin resistant S.  aureus, Streptococcus agalactiae (Group B), S. pneumoniae,  S. pyogenes (Group A), Acinetobacter baumannii, Enterobacter  cloacae, E. coli, Klebsiella oxytoca, K. pneumoniae, Proteus  sp., Serratia marcescens, Haemophilus influenzae, Neisseria  meningitidis, Pseudomonas aeruginosa, Candida albicans, C.  glabrata, C. krusei, C. parapsilosis, C. tropicalis and the  KPC resistance gene.  **NOTE: Due to technical problems, Proteus sp. will NOT be  reported as part of the BCID paneluntil further notice.        OTHER MEDICATIONS:  Endocrine/Metabolic Medications:    Genitourinary Medications:    Topical/Other Medications:  chlorhexidine 4% Liquid 1 Application(s) Topical once  chlorhexidine 4% Liquid 1 Application(s) Topical <User Schedule>  dibucaine 1% Ointment 1 Application(s) Topical daily PRN        ASSESSMENT/PLAN:  57 y/o female s/p ERCP c/b perforation of afferent limb. Pt underwent emergent Ex-lap w/ resection of perforated bowel and stapled side to side functional end to end anastomosis. Pt remained intubated and was transferred to SICU off pressors. s/p extubation, now transferred to floor. Patient meets criteria for severe protein calorie malnutrition requiring TPN for nutritional support. TPN increased back to full volume on 6/26/18. Now brought back to 1/2 volume and calories on 6/29/18.  s/p IR tube check on 6/27/18    TPN infusion decreased to 1 L today,     Monitor fingersticks q 12 hours, obtain daily weights.    Labs- CMP not obtained today. TPN continued as ordered yesterday.     Continue TPN and lipids. D/W primary team on plan - pt not being discharged and 1/2 TPN continued    Continue to encourage increased PO intake and monitor tolerance.     Discharge planning in process     1. Protein calorie malnutrition being optimized with TPN: CHO [100] gm.  AA [40] gm. SMOF Lipids [16] gm. lipids will be administered over 12 hours   2.  Hyperglycemia managed with: [0 ] units of regular insulin    3.  Check fluid balance daily.  Strict I/O  [ ] [ ]   4.  Daily BMP, Ionized Calcium, Magnesium and Phosphorous   5.  Triglycerides at initiation of TPN and monthly [ ] [ ]   6.  Pepcid in TPN for Gi prophylaxis  [ ]     Nutrition support pager [phone]

## 2018-06-30 NOTE — PROGRESS NOTE ADULT - ATTENDING COMMENTS
57 y/o female s/p ERCP c/b perforation of afferent limb. Pt underwent emergent Ex-lap w/ resection of perforated bowel and stapled side to side functional end to end anastomosis. Pt remained intubated and was transferred to SICU off pressors. s/p extubation, now transferred to floor. Patient meets criteria for severe protein calorie malnutrition requiring TPN for nutritional support. TPN increased back to full volume on 6/26/18. Now brought back to 1/2 volume and calories on 6/29/18.  s/p IR tube check on 6/27/18    TPN infusion decreased to 1 L today,     Monitor fingersticks q 12 hours, obtain daily weights.    Labs- CMP not obtained today. TPN continued as ordered yesterday.     Continue TPN and lipids. D/W primary team on plan - pt not being discharged and 1/2 TPN continued    Continue to encourage increased PO intake and monitor tolerance.     Discharge planning in process     1. Protein calorie malnutrition being optimized with TPN: CHO [100] gm.  AA [40] gm. SMOF Lipids [16] gm. lipids will be administered over 12 hours   2.  Hyperglycemia managed with: [0 ] units of regular insulin    3.  Check fluid balance daily.  Strict I/O  [ ] [ ]   4.  Daily BMP, Ionized Calcium, Magnesium and Phosphorous   5.  Triglycerides at initiation of TPN and monthly [ ] [ ]   6.  Pepcid in TPN for Gi prophylaxis  [ ]   I have seen and examined the patient, reviewed the laboratory and radiologic data and agree with the history, physical assessment and plan.  I reviewed and discussed with all consultants, house staff and PA's.  The note is edited where appropriate. The Nutrition Support Team (NST) discusses on an ongoing basis with the primary team and all consultants, House staff and PA's to have a permanent risk benefit analyses on all decisions.  I spent  45  minutes in total; providing diagnoses, assessment and plan.

## 2018-06-30 NOTE — CONSULT NOTE ADULT - ATTENDING COMMENTS
59 yo F with history gastric CA initially presenting with painless jaundice, now with fever after prolonged stay.  Attempted ERCP on 6/6, complicated by bowel perforation, emergent ex-lap  Had bile drain placed 6/8; 2 subsequent tube checks  Bile culture grew E coli, and patient received course of Cipro into Zosyn; also grew VRE  Patient now with low grade fever and leukocytosis  UCX with VRE S Dapto; although it is clinically uncertain that sepsis like syndrome 2/2 UTI  Note that patient has abd wall hematoma along incision site; unclear if infected but per surgical team not amenable to drain due to proximity to underlying bowel/defect in fascia (note that hematoma could cause leukocytosis/fever even if not infected)  For now would broaden coverage  Overall, Fever, leukocytosis, positive culture finding, jaundice  - Zosyn 3.375g q 8  - Daptomycin 4mg/kg q 24 (monitor CK)  - Check BCX x2  - If continued rise WBC/fever, will need to discuss with surgery further plans regarding abd wall hematoma    Nirmal Nelson MD  Pager [phone]  After 5pm and on weekends call [phone]    I was physically present for the key portions of the evaluation and management service provided. I saw and examined the patient. I agree with the above history, physical, and plan except for any discrepancies which I have documented in “Attending Attestation.” Please refer to “Attending Attestation” for final plan.

## 2018-06-30 NOTE — CONSULT NOTE ADULT - SUBJECTIVE AND OBJECTIVE BOX
HPI:  Patient complains of 1 week of progressively worsening jaundice. Patient now endorses some pain in the right upper quadrant of her abdomen, which extends through her back. Two days ago, she was admitted to [facility] for work-up. She had a CT scan and MRCP. On MRCP she has a 1.4 cm mass in the pancreatic head obstructing the common hepatic duct. Transfer to [facility] was initiated in order to pursue an ERCP with gastroenterology. She denies nausea, vomiting, fever, chills.     Pertinent history: gastric cancer 6/2017 s/p partial gastrectomy with Billroth II reconstruction (05 Jun 2018 14:49)      PAST MEDICAL & SURGICAL HISTORY:  Gastric cancer  Malignant neoplasm of stomach, unspecified location  S/P partial gastrectomy: distal gastrectomy with Billroth II reconstruction, D2 lymphadenectomy, feeding jejunostomy tube placement 6/23/17      Allergies  No Known Allergies        ANTIMICROBIALS:  piperacillin/tazobactam IVPB. 3.375 every 8 hours      OTHER MEDS: MEDICATIONS  (STANDING):  cyclobenzaprine 5 three times a day PRN  enoxaparin Injectable 65 <User Schedule>  HYDROmorphone   Tablet 2 every 3 hours PRN  HYDROmorphone  Injectable 0.5 every 4 hours PRN  metoclopramide 5 every 8 hours  morphine  - Injectable 2 every 3 hours PRN  morphine  - Injectable 4 every 3 hours PRN  pantoprazole    Tablet 40 before breakfast  sucralfate suspension 1 four times a day      SOCIAL HISTORY:  denies smoking etoh or drug    FAMILY HISTORY:  No pertinent family history in first degree relatives      REVIEW OF SYSTEMS  [  ] ROS unobtainable because:    [x  ] All other systems negative except as noted below:	    Constitutional:  [x ] fever [ ] chills  [ ] weight loss  [ ] weakness  Skin:  [ ] rash [ ] phlebitis	  Eyes: [ ] icterus [ ] pain  [ ] discharge	  ENMT: [ ] sore throat  [ ] thrush [ ] ulcers [ ] exudates  Respiratory: [ ] dyspnea [ ] hemoptysis [ ] cough [ ] sputum	  Cardiovascular:  [ ] chest pain [ ] palpitations [ ] edema	  Gastrointestinal:  [ ] nausea [ ] vomiting [ ] diarrhea [ ] constipation [x ] pain	  Genitourinary:  [ ] dysuria [ ] frequency [ ] hematuria [ ] discharge [ ] flank pain  [ ] incontinence  Musculoskeletal:  [ ] myalgias [ ] arthralgias [ ] arthritis  [ ] back pain  Neurological:  [ ] headache [ ] seizures  [ ] confusion/altered mental status  Psychiatric:  [ ] anxiety [ ] depression	  Hematology/Lymphatics:  [ ] lymphadenopathy  Endocrine:  [ ] adrenal [ ] thyroid  Allergic/Immunologic:	 [ ] transplant [ ] seasonal    Vital Signs Last 24 Hrs  T(F): 100.5 (06-30-18 @ 12:53), Max: 101.2 (06-26-18 @ 21:40)    Vital Signs Last 24 Hrs  HR: 104 (06-30-18 @ 12:53) (103 - 109)  BP: 138/68 (06-30-18 @ 12:53) (107/63 - 144/79)  RR: 17 (06-30-18 @ 12:53)  SpO2: 95% (06-30-18 @ 12:53) (93% - 100%)  Wt(kg): --    PHYSICAL EXAM:  General: cachectic, scleral icterus  HEAD/EYES: anicteric, PERRL  ENT:  supple  Cardiovascular:   S1, S2 + BRYNN  Respiratory:  clear bilaterally  GI:  midline lap incision with stales, erythema around staples midline hematoma with erythema and warmth, PTC drain along superior end of incision draining bile normal bowel sounds  :  no CVA tenderness martinez draining bilious urine  Musculoskeletal:  no synovitis  Neurologic:  grossly non-focal  Skin:  no rash  Lymph: no lymphadenopathy  Psychiatric:  appropriate affect  Vascular:  no phlebitis                                7.5    14.21 )-----------( 406      ( 30 Jun 2018 05:30 )             26.1       06-30    138  |  106  |  17  ----------------------------<  116<H>  3.9   |  21<L>  |  0.36<L>    Ca    8.0<L>      30 Jun 2018 12:02  Phos  2.7     06-29  Mg     2.2     06-29    TPro  5.4<L>  /  Alb  1.9<L>  /  TBili  13.1<H>  /  DBili  x   /  AST  34<H>  /  ALT  32  /  AlkPhos  179<H>  06-30          MICROBIOLOGY:  URINE MIDSTREAM  06-27-18 --  --  Enterococcus faecium VRE  Candida albicans  Culture - Urine (06.27.18 @ 04:21)    -  Ampicillin: R >8 SAVANNAH    -  Ciprofloxacin: R >2 SAVANNAH    Culture - Urine:   COLONY COUNT: > = 100,000 CFU/ML    Culture - Urine:   RESULT CALLED TO: CATHRYN STRANGE MD./Y  DATE / TIME CALLED: 06/29/18 1231  CALLED BY: BIN YOUSSEF    -  Vancomycin: R >16 SAVANNAH    -  Tetra/Doxy: R >8 SAVANNAH    -  Nitrofurantoin: I 64 SAVANNAH    -  Linezolid: S    -  Daptomycin: S 4 SAVANNAH    Specimen Source: URINE MIDSTREAM    Organism Identification: Enterococcus faecium VRE  Candida albicans    Organism: Candida albicans  COLONY COUNT: 10,000-49,000 CFU/mL    Organism: Enterococcus faecium VRE  COLONY COUNT: > = 100,000 CFU/ML    Method Type: POSITIVE SAVANNAH 29        BLOOD PERIPHERAL  06-26-18 --  --  --      BLOOD  06-25-18 --  --  BLOOD CULTURE PCR  Staphylococcus sp.,coag neg      BILE  06-08-18 --  --  Escherichia coli  Enterococcus faecium VRE  Culture - Surg Site Aerob/Anaer w/Gm St (06.08.18 @ 18:47)    -  Aztreonam: S <=4 SAVANNAH    -  Cefazolin: S <=2 SAVANNAH    -  Cefepime: S <=2 SAVANNAH    -  Cefoxitin: S <=4 SAVANNAH    -  Ceftazidime: S <=1 SAVANNAH    -  Ceftriaxone: S <=1 SAVANNAH    -  Ciprofloxacin: S <=0.5 SAVANNAH    -  Ciprofloxacin: R >2 SAVANNAH    -  Tobramycin: S <=2 SAVANNAH    -  Tigecycline: S <=1 SAVANNAH    -  Daptomycin: S 4 SAVANNAH    -  Ertapenem: S <=0.5 SAVANNAH    -  Gentamicin: S <=1 SAVANNAH    -  Ampicillin/Sulbactam: I 16/8 SAVANNAH    -  Imipenem: S <=1 SAVANNAH    -  Linezolid: S <=1 SAVANNAH    -  Levofloxacin: S <=1 SAVANNAH    -  Meropenem: S <=1 SAVANNAH    -  Piperacillin/Tazobactam: S <=8 SAVANNAH    -  Tetra/Doxy: R >8 SAVANNAH    -  Trimethoprim/Sulfamethoxazole: S <=0.5/9.5 SAVANNAH    -  Vancomycin: R >16 SAVANNAH    Culture - Surgical Site:   EC^Escherichia coli  ENTFC^Enterococcus faecium    Culture - Surgical Site:   RESULT CALLED TO / READ BACK: BROOKS CASTRO RN/YES  DATE / TIME CALLED: 06/12/18 1140  CALLED BY: MAZIN JEWELL    Gram Stain Wound:   WBC White Blood Cells  QNTY CELLS IN GRAM STAIN: FEW (2+)  GNR^Gram Neg Rods  QUANTITY OF BACTERIA SEEN: MANY (4+)    -  Amikacin: S <=8 SAVANNAH    -  Ampicillin: R >16 SAVANNAH    -  Ampicillin: R >8 SAVANNAH    Specimen Source: BILE    Organism Identification: Escherichia coli  Enterococcus faecium VRE    Organism: Escherichia coli    Organism: Enterococcus faecium VRE    Method Type: NEGATIVE SAVANNAH 43    Method Type: MICROSCAN POS COMBO 34        .Urine Clean Catch (Midstream)  06-03-18   <10,000 CFU/ml  Normal Urogenital juanito present  --  --              v            RADIOLOGY: < from: Xray Chest 1 View- PORTABLE-Urgent (06.30.18 @ 13:41) >    Cardiac silhouette normal in size. Left-sided PICC with tip overlying   SVC. Small left pleural effusion. Left basilar subsegmental atelectasis.   No pneumothorax. Upper abdominal drain.    IMPRESSION:   Small right pleural effusion with likely adjacent atelectasis.    < end of copied text >    < from: CT Abdomen and Pelvis w/ IV Cont (06.25.18 @ 13:08) >  FINDINGS:    CHEST:     LUNGS AND LARGE AIRWAYS: Patent central airways. 0.5 cm right middle lobe   pulmonary nodule unchanged.  PLEURA: Moderate sized right pleural effusion and small left pleural   effusion. Compressive right lower lobe atelectasis.  VESSELS: PICC line in place.  HEART: Heart size is normal. No pericardial effusion.  MEDIASTINUM AND HARRISON: No lymphadenopathy.  CHEST WALL AND LOWER NECK: Within normal limits.    ABDOMEN AND PELVIS:    LIVER/BILE DUCTS: Left percutaneous biliary catheter in place. No biliary   ductal dilatation. Small lefthepatic cyst unchanged.  GALLBLADDER: Underdistended.  SPLEEN: Within normal limits.  PANCREAS: Within normal limits.  ADRENALS: Within normal limits.  KIDNEYS/URETERS: Right kidney cyst. Otherwise normal kidneys.    BLADDER: Collapsed with Martinez catheter.  REPRODUCTIVE ORGANS: Uterus and adnexa normal.    BOWEL: Partial gastrectomy with gastrojejunostomy. Appendix nonvisualized.  PERITONEUM: Moderate amount of intraperitoneal free fluid with layering   blood products. Minimal postoperative pneumoperitoneum.  VESSELS:  Within normal limits.  RETROPERITONEUM: No lymphadenopathy.    ABDOMINAL WALL: Moderate-sized hematoma in the anterior abdominal wall.   Anasarca.  BONES: Within normal limits.    IMPRESSION:     Moderate-sized hematoma in the anterior abdominal wall.    < end of copied text > HPI:  Patient complains of 1 week of progressively worsening jaundice. Patient now endorses some pain in the right upper quadrant of her abdomen, which extends through her back. Two days ago, she was admitted to [facility] for work-up. She had a CT scan and MRCP. On MRCP she has a 1.4 cm mass in the pancreatic head obstructing the common hepatic duct. Transfer to [facility] was initiated in order to pursue an ERCP with gastroenterology. She denies nausea, vomiting, fever, chills.     Pertinent history: gastric cancer 6/2017 s/p partial gastrectomy with Billroth II reconstruction (05 Jun 2018 14:49)    ID note-above reviewed. Patient admitted for ERCP for obstructive jaundice and pancreatic head mass, complicated by small bowel perforation and exp lap with bowel resection. Underwent PTC drain placement with bile cx + E.coli and VRE at that time she was on cipro/flagyl then broadended to Zosyn. Started to spike fever on 06/25, urine culture + VRE and hence ID called  She is c/o pain and swelling at incision site. Denies dysuria s/p pain or back pain. No cough diarrhea, picc site pain    PAST MEDICAL & SURGICAL HISTORY:  Gastric cancer  Malignant neoplasm of stomach, unspecified location  S/P partial gastrectomy: distal gastrectomy with Billroth II reconstruction, D2 lymphadenectomy, feeding jejunostomy tube placement 6/23/17      Allergies  No Known Allergies        ANTIMICROBIALS:  piperacillin/tazobactam IVPB. 3.375 every 8 hours      OTHER MEDS: MEDICATIONS  (STANDING):  cyclobenzaprine 5 three times a day PRN  enoxaparin Injectable 65 <User Schedule>  HYDROmorphone   Tablet 2 every 3 hours PRN  HYDROmorphone  Injectable 0.5 every 4 hours PRN  metoclopramide 5 every 8 hours  morphine  - Injectable 2 every 3 hours PRN  morphine  - Injectable 4 every 3 hours PRN  pantoprazole    Tablet 40 before breakfast  sucralfate suspension 1 four times a day      SOCIAL HISTORY:  denies smoking etoh or drug    FAMILY HISTORY:  No pertinent family history in first degree relatives      REVIEW OF SYSTEMS  [  ] ROS unobtainable because:    [x  ] All other systems negative except as noted below:	    Constitutional:  [x ] fever [ ] chills  [ ] weight loss  [ ] weakness  Skin:  [ ] rash [ ] phlebitis	  Eyes: [ ] icterus [ ] pain  [ ] discharge	  ENMT: [ ] sore throat  [ ] thrush [ ] ulcers [ ] exudates  Respiratory: [ ] dyspnea [ ] hemoptysis [ ] cough [ ] sputum	  Cardiovascular:  [ ] chest pain [ ] palpitations [ ] edema	  Gastrointestinal:  [ ] nausea [ ] vomiting [ ] diarrhea [ ] constipation [x ] pain	  Genitourinary:  [ ] dysuria [ ] frequency [ ] hematuria [ ] discharge [ ] flank pain  [ ] incontinence  Musculoskeletal:  [ ] myalgias [ ] arthralgias [ ] arthritis  [ ] back pain  Neurological:  [ ] headache [ ] seizures  [ ] confusion/altered mental status  Psychiatric:  [ ] anxiety [ ] depression	  Hematology/Lymphatics:  [ ] lymphadenopathy  Endocrine:  [ ] adrenal [ ] thyroid  Allergic/Immunologic:	 [ ] transplant [ ] seasonal    Vital Signs Last 24 Hrs  T(F): 100.5 (06-30-18 @ 12:53), Max: 101.2 (06-26-18 @ 21:40)    Vital Signs Last 24 Hrs  HR: 104 (06-30-18 @ 12:53) (103 - 109)  BP: 138/68 (06-30-18 @ 12:53) (107/63 - 144/79)  RR: 17 (06-30-18 @ 12:53)  SpO2: 95% (06-30-18 @ 12:53) (93% - 100%)  Wt(kg): --    PHYSICAL EXAM:  General: cachectic, scleral icterus  HEAD/EYES: anicteric, PERRL  ENT:  supple  Cardiovascular:   S1, S2 + BRYNN  Respiratory:  clear bilaterally  GI:  midline lap incision with stales, erythema around staples midline hematoma with erythema and warmth, PTC drain along superior end of incision draining bile normal bowel sounds  :  no CVA tenderness martinez draining bilious urine  Musculoskeletal:  no synovitis  Neurologic:  grossly non-focal  Skin:  no rash  Lymph: no lymphadenopathy  Psychiatric:  appropriate affect  Vascular:  no phlebitis                                7.5    14.21 )-----------( 406      ( 30 Jun 2018 05:30 )             26.1       06-30    138  |  106  |  17  ----------------------------<  116<H>  3.9   |  21<L>  |  0.36<L>    Ca    8.0<L>      30 Jun 2018 12:02  Phos  2.7     06-29  Mg     2.2     06-29    TPro  5.4<L>  /  Alb  1.9<L>  /  TBili  13.1<H>  /  DBili  x   /  AST  34<H>  /  ALT  32  /  AlkPhos  179<H>  06-30          MICROBIOLOGY:  URINE MIDSTREAM  06-27-18 --  --  Enterococcus faecium VRE  Candida albicans  Culture - Urine (06.27.18 @ 04:21)    -  Ampicillin: R >8 SAVANNAH    -  Ciprofloxacin: R >2 SAVANNAH    Culture - Urine:   COLONY COUNT: > = 100,000 CFU/ML    Culture - Urine:   RESULT CALLED TO: CATHRYN STRANGE MD./Y  DATE / TIME CALLED: 06/29/18 1231  CALLED BY: BIN YOUSSEF    -  Vancomycin: R >16 SAVANNAH    -  Tetra/Doxy: R >8 SAVANNAH    -  Nitrofurantoin: I 64 SAVANNAH    -  Linezolid: S    -  Daptomycin: S 4 SAVANNAH    Specimen Source: URINE MIDSTREAM    Organism Identification: Enterococcus faecium VRE  Candida albicans    Organism: Candida albicans  COLONY COUNT: 10,000-49,000 CFU/mL    Organism: Enterococcus faecium VRE  COLONY COUNT: > = 100,000 CFU/ML    Method Type: POSITIVE SAVANNAH 29        BLOOD PERIPHERAL  06-26-18 --  --  --      BLOOD  06-25-18 --  --  BLOOD CULTURE PCR  Staphylococcus sp.,coag neg      BILE  06-08-18 --  --  Escherichia coli  Enterococcus faecium VRE  Culture - Surg Site Aerob/Anaer w/Gm St (06.08.18 @ 18:47)    -  Aztreonam: S <=4 SAVANNAH    -  Cefazolin: S <=2 SAVANNAH    -  Cefepime: S <=2 SAVANNAH    -  Cefoxitin: S <=4 SAVANNAH    -  Ceftazidime: S <=1 SAVANNAH    -  Ceftriaxone: S <=1 SAVANNAH    -  Ciprofloxacin: S <=0.5 SAVANNAH    -  Ciprofloxacin: R >2 SAVANNAH    -  Tobramycin: S <=2 SAVANNAH    -  Tigecycline: S <=1 SAVANNAH    -  Daptomycin: S 4 SAVANNAH    -  Ertapenem: S <=0.5 SAVANNAH    -  Gentamicin: S <=1 SAVANNAH    -  Ampicillin/Sulbactam: I 16/8 SAVANNAH    -  Imipenem: S <=1 SAVANNAH    -  Linezolid: S <=1 SAVANNAH    -  Levofloxacin: S <=1 SAVANNAH    -  Meropenem: S <=1 SAVANNAH    -  Piperacillin/Tazobactam: S <=8 SAVANNAH    -  Tetra/Doxy: R >8 SAVANNAH    -  Trimethoprim/Sulfamethoxazole: S <=0.5/9.5 SAVANNAH    -  Vancomycin: R >16 SAVANNAH    Culture - Surgical Site:   EC^Escherichia coli  ENTFC^Enterococcus faecium    Culture - Surgical Site:   RESULT CALLED TO / READ BACK: BROOKS CASTRO RN/YES  DATE / TIME CALLED: 06/12/18 1140  CALLED BY: MAZIN JEWELL    Gram Stain Wound:   WBC White Blood Cells  QNTY CELLS IN GRAM STAIN: FEW (2+)  GNR^Gram Neg Rods  QUANTITY OF BACTERIA SEEN: MANY (4+)    -  Amikacin: S <=8 SAVANNAH    -  Ampicillin: R >16 SAVANNAH    -  Ampicillin: R >8 SAVANNAH    Specimen Source: BILE    Organism Identification: Escherichia coli  Enterococcus faecium VRE    Organism: Escherichia coli    Organism: Enterococcus faecium VRE    Method Type: NEGATIVE SAVANNAH 43    Method Type: MICROSCAN POS COMBO 34        .Urine Clean Catch (Midstream)  06-03-18   <10,000 CFU/ml  Normal Urogenital juanito present  --  --              v            RADIOLOGY: < from: Xray Chest 1 View- PORTABLE-Urgent (06.30.18 @ 13:41) >    Cardiac silhouette normal in size. Left-sided PICC with tip overlying   SVC. Small left pleural effusion. Left basilar subsegmental atelectasis.   No pneumothorax. Upper abdominal drain.    IMPRESSION:   Small right pleural effusion with likely adjacent atelectasis.    < end of copied text >    < from: CT Abdomen and Pelvis w/ IV Cont (06.25.18 @ 13:08) >  FINDINGS:    CHEST:     LUNGS AND LARGE AIRWAYS: Patent central airways. 0.5 cm right middle lobe   pulmonary nodule unchanged.  PLEURA: Moderate sized right pleural effusion and small left pleural   effusion. Compressive right lower lobe atelectasis.  VESSELS: PICC line in place.  HEART: Heart size is normal. No pericardial effusion.  MEDIASTINUM AND HARRISON: No lymphadenopathy.  CHEST WALL AND LOWER NECK: Within normal limits.    ABDOMEN AND PELVIS:    LIVER/BILE DUCTS: Left percutaneous biliary catheter in place. No biliary   ductal dilatation. Small lefthepatic cyst unchanged.  GALLBLADDER: Underdistended.  SPLEEN: Within normal limits.  PANCREAS: Within normal limits.  ADRENALS: Within normal limits.  KIDNEYS/URETERS: Right kidney cyst. Otherwise normal kidneys.    BLADDER: Collapsed with Martinez catheter.  REPRODUCTIVE ORGANS: Uterus and adnexa normal.    BOWEL: Partial gastrectomy with gastrojejunostomy. Appendix nonvisualized.  PERITONEUM: Moderate amount of intraperitoneal free fluid with layering   blood products. Minimal postoperative pneumoperitoneum.  VESSELS:  Within normal limits.  RETROPERITONEUM: No lymphadenopathy.    ABDOMINAL WALL: Moderate-sized hematoma in the anterior abdominal wall.   Anasarca.  BONES: Within normal limits.    IMPRESSION:     Moderate-sized hematoma in the anterior abdominal wall.    < end of copied text > HPI: Patient complains of 1 week of progressively worsening jaundice. Patient now endorses some pain in the right upper quadrant of her abdomen, which extends through her back. Two days ago, she was admitted to [facility] for work-up. She had a CT scan and MRCP. On MRCP she has a 1.4 cm mass in the pancreatic head obstructing the common hepatic duct. Transfer to [facility] was initiated in order to pursue an ERCP with gastroenterology. She denies nausea, vomiting, fever, chills.     Pertinent history: gastric cancer 6/2017 s/p partial gastrectomy with Billroth II reconstruction (05 Jun 2018 14:49)    ID note-above reviewed. Patient admitted for ERCP for obstructive jaundice and pancreatic head mass, complicated by small bowel perforation and exp lap with bowel resection. Underwent PTC drain placement with bile cx + E.coli and VRE at that time she was on cipro/flagyl then broadended to Zosyn. Started to spike fever on 06/25, urine culture + VRE and hence ID called  She is c/o pain and swelling at incision site. Denies dysuria s/p pain or back pain. No cough diarrhea, picc site pain    PAST MEDICAL & SURGICAL HISTORY:  Gastric cancer  Malignant neoplasm of stomach, unspecified location  S/P partial gastrectomy: distal gastrectomy with Billroth II reconstruction, D2 lymphadenectomy, feeding jejunostomy tube placement 6/23/17    Allergies  No Known Allergies    ANTIMICROBIALS:  piperacillin/tazobactam IVPB. 3.375 every 8 hours    OTHER MEDS: MEDICATIONS  (STANDING):  cyclobenzaprine 5 three times a day PRN  enoxaparin Injectable 65 <User Schedule>  HYDROmorphone   Tablet 2 every 3 hours PRN  HYDROmorphone  Injectable 0.5 every 4 hours PRN  metoclopramide 5 every 8 hours  morphine  - Injectable 2 every 3 hours PRN  morphine  - Injectable 4 every 3 hours PRN  pantoprazole    Tablet 40 before breakfast  sucralfate suspension 1 four times a day    SOCIAL HISTORY:  denies smoking etoh or drug    FAMILY HISTORY:  No pertinent family history in first degree relatives    REVIEW OF SYSTEMS  [  ] ROS unobtainable because:    [x  ] All other systems negative except as noted below:	    Constitutional:  [x ] fever [ ] chills  [ ] weight loss  [ ] weakness  Skin:  [ ] rash [ ] phlebitis	  Eyes: [ ] icterus [ ] pain  [ ] discharge	  ENMT: [ ] sore throat  [ ] thrush [ ] ulcers [ ] exudates  Respiratory: [ ] dyspnea [ ] hemoptysis [ ] cough [ ] sputum	  Cardiovascular:  [ ] chest pain [ ] palpitations [ ] edema	  Gastrointestinal:  [ ] nausea [ ] vomiting [ ] diarrhea [ ] constipation [x ] pain	  Genitourinary:  [ ] dysuria [ ] frequency [ ] hematuria [ ] discharge [ ] flank pain  [ ] incontinence  Musculoskeletal:  [ ] myalgias [ ] arthralgias [ ] arthritis  [ ] back pain  Neurological:  [ ] headache [ ] seizures  [ ] confusion/altered mental status  Psychiatric:  [ ] anxiety [ ] depression	  Hematology/Lymphatics:  [ ] lymphadenopathy  Endocrine:  [ ] adrenal [ ] thyroid  Allergic/Immunologic:	 [ ] transplant [ ] seasonal    Vital Signs Last 24 Hrs  T(F): 100.5 (06-30-18 @ 12:53), Max: 101.2 (06-26-18 @ 21:40)    Vital Signs Last 24 Hrs  HR: 104 (06-30-18 @ 12:53) (103 - 109)  BP: 138/68 (06-30-18 @ 12:53) (107/63 - 144/79)  RR: 17 (06-30-18 @ 12:53)  SpO2: 95% (06-30-18 @ 12:53) (93% - 100%)    PHYSICAL EXAM:  General: cachectic, scleral icterus  HEAD/EYES: anicteric, PERRL  ENT:  supple  Cardiovascular:   S1, S2 + BRYNN  Respiratory:  clear bilaterally  GI:  midline lap incision with stales, erythema around staples midline hematoma with erythema and warmth, PTC drain along superior end of incision draining bile normal bowel sounds  :  no CVA tenderness martinez draining bilious urine  Musculoskeletal:  no synovitis  Neurologic:  grossly non-focal  Skin:  no rash  Lymph: no lymphadenopathy  Psychiatric:  appropriate affect  Vascular:  no phlebitis    Labs:                        7.5    14.21 )-----------( 406      ( 30 Jun 2018 05:30 )             26.1     06-30    138  |  106  |  17  ----------------------------<  116<H>  3.9   |  21<L>  |  0.36<L>    Ca    8.0<L>      30 Jun 2018 12:02  Phos  2.7     06-29  Mg     2.2     06-29    TPro  5.4<L>  /  Alb  1.9<L>  /  TBili  13.1<H>  /  DBili  x   /  AST  34<H>  /  ALT  32  /  AlkPhos  179<H>  06-30    MICROBIOLOGY:  URINE MIDSTREAM  06-27-18 --  --  Enterococcus faecium VRE  Candida albicans  Culture - Urine (06.27.18 @ 04:21)    -  Ampicillin: R >8 SAVANNAH    -  Ciprofloxacin: R >2 SAVANNAH    Culture - Urine:   COLONY COUNT: > = 100,000 CFU/ML    Culture - Urine:   RESULT CALLED TO: CATHRYN STRANGE MD./Y  DATE / TIME CALLED: 06/29/18 1231  CALLED BY: BIN YOUSSEF    -  Vancomycin: R >16 SAVANNAH    -  Tetra/Doxy: R >8 SAVANNAH    -  Nitrofurantoin: I 64 SAVANNAH    -  Linezolid: S    -  Daptomycin: S 4 SAVANNAH    Specimen Source: URINE MIDSTREAM    Organism Identification: Enterococcus faecium VRE  Candida albicans    Organism: Candida albicans  COLONY COUNT: 10,000-49,000 CFU/mL    Organism: Enterococcus faecium VRE  COLONY COUNT: > = 100,000 CFU/ML    Method Type: POSITIVE SAVANNAH 29    BLOOD PERIPHERAL  06-26-18 NGTD    BLOOD  06-25-18 --  --  BLOOD CULTURE PCR  Staphylococcus sp.,coag neg    BILE  06-08-18 --  --  Escherichia coli  Enterococcus faecium VRE  Culture - Surg Site Aerob/Anaer w/Gm St (06.08.18 @ 18:47)    -  Aztreonam: S <=4 SAVANNAH    -  Cefazolin: S <=2 SAVANNAH    -  Cefepime: S <=2 SAVANNAH    -  Cefoxitin: S <=4 SAVANNAH    -  Ceftazidime: S <=1 SAVANNAH    -  Ceftriaxone: S <=1 SAVANNAH    -  Ciprofloxacin: S <=0.5 SAVANNAH    -  Ciprofloxacin: R >2 SAVANNAH    -  Tobramycin: S <=2 SAVANNAH    -  Tigecycline: S <=1 SAVANNAH    -  Daptomycin: S 4 SAVANNAH    -  Ertapenem: S <=0.5 SAVANNAH    -  Gentamicin: S <=1 SAVANNAH    -  Ampicillin/Sulbactam: I 16/8 SAVANNAH    -  Imipenem: S <=1 SAVANNAH    -  Linezolid: S <=1 SAVANNAH    -  Levofloxacin: S <=1 SAVANNAH    -  Meropenem: S <=1 SAVANNAH    -  Piperacillin/Tazobactam: S <=8 SAVANNAH    -  Tetra/Doxy: R >8 SAVANNAH    -  Trimethoprim/Sulfamethoxazole: S <=0.5/9.5 SAVANNAH    -  Vancomycin: R >16 SAVANNAH    Culture - Surgical Site:   EC^Escherichia coli  ENTFC^Enterococcus faecium    Culture - Surgical Site:   RESULT CALLED TO / READ BACK: BROOKS CASTRO RN/YES  DATE / TIME CALLED: 06/12/18 1140  CALLED BY: MAZIN JEWELL    Gram Stain Wound:   WBC White Blood Cells  QNTY CELLS IN GRAM STAIN: FEW (2+)  GNR^Gram Neg Rods  QUANTITY OF BACTERIA SEEN: MANY (4+)    -  Amikacin: S <=8 SAVANNAH    -  Ampicillin: R >16 SAVANNAH    -  Ampicillin: R >8 SAVANNAH    Specimen Source: BILE    Organism Identification: Escherichia coli  Enterococcus faecium VRE    Organism: Escherichia coli    Organism: Enterococcus faecium VRE    Method Type: NEGATIVE SAVANNAH 43    Method Type: MICROSCAN POS COMBO 34    .Urine Clean Catch (Midstream)  06-03-18   <10,000 CFU/ml  Normal Urogenital juanito present      RADIOLOGY:  < from: Xray Chest 1 View- PORTABLE-Urgent (06.30.18 @ 13:41) >    Cardiac silhouette normal in size. Left-sided PICC with tip overlying   SVC. Small left pleural effusion. Left basilar subsegmental atelectasis.   No pneumothorax. Upper abdominal drain.    IMPRESSION:   Small right pleural effusion with likely adjacent atelectasis.    < end of copied text >    < from: CT Abdomen and Pelvis w/ IV Cont (06.25.18 @ 13:08) >  FINDINGS:    CHEST:     LUNGS AND LARGE AIRWAYS: Patent central airways. 0.5 cm right middle lobe   pulmonary nodule unchanged.  PLEURA: Moderate sized right pleural effusion and small left pleural   effusion. Compressive right lower lobe atelectasis.  VESSELS: PICC line in place.  HEART: Heart size is normal. No pericardial effusion.  MEDIASTINUM AND HARRISON: No lymphadenopathy.  CHEST WALL AND LOWER NECK: Within normal limits.    ABDOMEN AND PELVIS:    LIVER/BILE DUCTS: Left percutaneous biliary catheter in place. No biliary   ductal dilatation. Small lefthepatic cyst unchanged.  GALLBLADDER: Underdistended.  SPLEEN: Within normal limits.  PANCREAS: Within normal limits.  ADRENALS: Within normal limits.  KIDNEYS/URETERS: Right kidney cyst. Otherwise normal kidneys.    BLADDER: Collapsed with Martinez catheter.  REPRODUCTIVE ORGANS: Uterus and adnexa normal.    BOWEL: Partial gastrectomy with gastrojejunostomy. Appendix nonvisualized.  PERITONEUM: Moderate amount of intraperitoneal free fluid with layering   blood products. Minimal postoperative pneumoperitoneum.  VESSELS:  Within normal limits.  RETROPERITONEUM: No lymphadenopathy.    ABDOMINAL WALL: Moderate-sized hematoma in the anterior abdominal wall.   Anasarca.  BONES: Within normal limits.    IMPRESSION:     Moderate-sized hematoma in the anterior abdominal wall.

## 2018-06-30 NOTE — PROGRESS NOTE ADULT - ASSESSMENT
58F s/p ERCP EUS aborted 2/2 small bowel perforation, s/p ex lap resection of small bowel side to side anastomosis currently hemodynamically stable on the floor.     - continue zosyn  - Appreciate ID reccs - pt will most likely need to be written for daptomycin as Linezolid may cause serotonin syndrome in combination with Flexeril  - Continue diet  - Continue martinez, monitor UOP  - Continue T Lovenox  - Per IR - nothing to drain from abdominal collection

## 2018-07-01 LAB
ALBUMIN SERPL ELPH-MCNC: 1.9 G/DL — LOW (ref 3.3–5)
ALBUMIN SERPL ELPH-MCNC: 1.9 G/DL — LOW (ref 3.3–5)
ALP SERPL-CCNC: 202 U/L — HIGH (ref 40–120)
ALP SERPL-CCNC: 202 U/L — HIGH (ref 40–120)
ALT FLD-CCNC: 28 U/L — SIGNIFICANT CHANGE UP (ref 4–33)
ALT FLD-CCNC: 28 U/L — SIGNIFICANT CHANGE UP (ref 4–33)
APTT BLD: 33.9 SEC — SIGNIFICANT CHANGE UP (ref 27.5–37.4)
AST SERPL-CCNC: 34 U/L — HIGH (ref 4–32)
AST SERPL-CCNC: 34 U/L — HIGH (ref 4–32)
BACTERIA BLD CULT: SIGNIFICANT CHANGE UP
BACTERIA BLD CULT: SIGNIFICANT CHANGE UP
BASOPHILS # BLD AUTO: 0.07 K/UL — SIGNIFICANT CHANGE UP (ref 0–0.2)
BASOPHILS NFR BLD AUTO: 0.4 % — SIGNIFICANT CHANGE UP (ref 0–2)
BILIRUB DIRECT SERPL-MCNC: 10.2 MG/DL — HIGH (ref 0.1–0.2)
BILIRUB SERPL-MCNC: 12.8 MG/DL — HIGH (ref 0.2–1.2)
BILIRUB SERPL-MCNC: 12.8 MG/DL — HIGH (ref 0.2–1.2)
BUN SERPL-MCNC: 15 MG/DL — SIGNIFICANT CHANGE UP (ref 7–23)
CA-I BLD-SCNC: 1.18 MMOL/L — SIGNIFICANT CHANGE UP (ref 1.03–1.23)
CALCIUM SERPL-MCNC: 8 MG/DL — LOW (ref 8.4–10.5)
CHLORIDE SERPL-SCNC: 106 MMOL/L — SIGNIFICANT CHANGE UP (ref 98–107)
CK SERPL-CCNC: 19 U/L — LOW (ref 25–170)
CK SERPL-CCNC: 19 U/L — LOW (ref 25–170)
CO2 SERPL-SCNC: 21 MMOL/L — LOW (ref 22–31)
CREAT SERPL-MCNC: 0.32 MG/DL — LOW (ref 0.5–1.3)
EOSINOPHIL # BLD AUTO: 0.22 K/UL — SIGNIFICANT CHANGE UP (ref 0–0.5)
EOSINOPHIL NFR BLD AUTO: 1.3 % — SIGNIFICANT CHANGE UP (ref 0–6)
GLUCOSE SERPL-MCNC: 91 MG/DL — SIGNIFICANT CHANGE UP (ref 70–99)
HCT VFR BLD CALC: 24.4 % — LOW (ref 34.5–45)
HGB BLD-MCNC: 8.1 G/DL — LOW (ref 11.5–15.5)
IMM GRANULOCYTES # BLD AUTO: 0.62 # — SIGNIFICANT CHANGE UP
IMM GRANULOCYTES NFR BLD AUTO: 3.5 % — HIGH (ref 0–1.5)
INR BLD: 1.11 — SIGNIFICANT CHANGE UP (ref 0.88–1.17)
LYMPHOCYTES # BLD AUTO: 0.88 K/UL — LOW (ref 1–3.3)
LYMPHOCYTES # BLD AUTO: 5 % — LOW (ref 13–44)
MAGNESIUM SERPL-MCNC: 2.2 MG/DL — SIGNIFICANT CHANGE UP (ref 1.6–2.6)
MCHC RBC-ENTMCNC: 31.5 PG — SIGNIFICANT CHANGE UP (ref 27–34)
MCHC RBC-ENTMCNC: 33.2 % — SIGNIFICANT CHANGE UP (ref 32–36)
MCV RBC AUTO: 94.9 FL — SIGNIFICANT CHANGE UP (ref 80–100)
MONOCYTES # BLD AUTO: 0.93 K/UL — HIGH (ref 0–0.9)
MONOCYTES NFR BLD AUTO: 5.3 % — SIGNIFICANT CHANGE UP (ref 2–14)
NEUTROPHILS # BLD AUTO: 14.85 K/UL — HIGH (ref 1.8–7.4)
NEUTROPHILS NFR BLD AUTO: 84.5 % — HIGH (ref 43–77)
NRBC # FLD: 0 — SIGNIFICANT CHANGE UP
PHOSPHATE SERPL-MCNC: 3.3 MG/DL — SIGNIFICANT CHANGE UP (ref 2.5–4.5)
PLATELET # BLD AUTO: 593 K/UL — HIGH (ref 150–400)
PMV BLD: 10.6 FL — SIGNIFICANT CHANGE UP (ref 7–13)
POTASSIUM SERPL-MCNC: 4 MMOL/L — SIGNIFICANT CHANGE UP (ref 3.5–5.3)
POTASSIUM SERPL-SCNC: 4 MMOL/L — SIGNIFICANT CHANGE UP (ref 3.5–5.3)
PROT SERPL-MCNC: 5.6 G/DL — LOW (ref 6–8.3)
PROT SERPL-MCNC: 5.6 G/DL — LOW (ref 6–8.3)
PROTHROM AB SERPL-ACNC: 12.3 SEC — SIGNIFICANT CHANGE UP (ref 9.8–13.1)
RBC # BLD: 2.57 M/UL — LOW (ref 3.8–5.2)
RBC # FLD: 21.9 % — HIGH (ref 10.3–14.5)
SODIUM SERPL-SCNC: 138 MMOL/L — SIGNIFICANT CHANGE UP (ref 135–145)
SPECIMEN SOURCE: SIGNIFICANT CHANGE UP
WBC # BLD: 17.57 K/UL — HIGH (ref 3.8–10.5)
WBC # FLD AUTO: 17.57 K/UL — HIGH (ref 3.8–10.5)

## 2018-07-01 PROCEDURE — 99232 SBSQ HOSP IP/OBS MODERATE 35: CPT

## 2018-07-01 PROCEDURE — 71045 X-RAY EXAM CHEST 1 VIEW: CPT | Mod: 26

## 2018-07-01 PROCEDURE — 74018 RADEX ABDOMEN 1 VIEW: CPT | Mod: 26

## 2018-07-01 PROCEDURE — 99233 SBSQ HOSP IP/OBS HIGH 50: CPT

## 2018-07-01 RX ORDER — ELECTROLYTE SOLUTION,INJ
1 VIAL (ML) INTRAVENOUS
Qty: 0 | Refills: 0 | Status: DISCONTINUED | OUTPATIENT
Start: 2018-07-01 | End: 2018-07-01

## 2018-07-01 RX ORDER — I.V. FAT EMULSION 20 G/100ML
6.6 EMULSION INTRAVENOUS
Qty: 16 | Refills: 0 | Status: DISCONTINUED | OUTPATIENT
Start: 2018-07-01 | End: 2018-07-03

## 2018-07-01 RX ADMIN — MORPHINE SULFATE 4 MILLIGRAM(S): 50 CAPSULE, EXTENDED RELEASE ORAL at 14:33

## 2018-07-01 RX ADMIN — DAPTOMYCIN 108.8 MILLIGRAM(S): 500 INJECTION, POWDER, LYOPHILIZED, FOR SOLUTION INTRAVENOUS at 22:35

## 2018-07-01 RX ADMIN — PANTOPRAZOLE SODIUM 40 MILLIGRAM(S): 20 TABLET, DELAYED RELEASE ORAL at 05:37

## 2018-07-01 RX ADMIN — MORPHINE SULFATE 4 MILLIGRAM(S): 50 CAPSULE, EXTENDED RELEASE ORAL at 09:22

## 2018-07-01 RX ADMIN — I.V. FAT EMULSION 6.6 ML/HR: 20 EMULSION INTRAVENOUS at 17:32

## 2018-07-01 RX ADMIN — CHLORHEXIDINE GLUCONATE 1 APPLICATION(S): 213 SOLUTION TOPICAL at 12:51

## 2018-07-01 RX ADMIN — Medication 1 GRAM(S): at 23:28

## 2018-07-01 RX ADMIN — PIPERACILLIN AND TAZOBACTAM 25 GRAM(S): 4; .5 INJECTION, POWDER, LYOPHILIZED, FOR SOLUTION INTRAVENOUS at 23:10

## 2018-07-01 RX ADMIN — Medication 5 MILLIGRAM(S): at 05:37

## 2018-07-01 RX ADMIN — CYCLOBENZAPRINE HYDROCHLORIDE 5 MILLIGRAM(S): 10 TABLET, FILM COATED ORAL at 08:42

## 2018-07-01 RX ADMIN — HYDROMORPHONE HYDROCHLORIDE 0.5 MILLIGRAM(S): 2 INJECTION INTRAMUSCULAR; INTRAVENOUS; SUBCUTANEOUS at 03:22

## 2018-07-01 RX ADMIN — MORPHINE SULFATE 4 MILLIGRAM(S): 50 CAPSULE, EXTENDED RELEASE ORAL at 21:55

## 2018-07-01 RX ADMIN — Medication 5 MILLIGRAM(S): at 14:36

## 2018-07-01 RX ADMIN — MORPHINE SULFATE 4 MILLIGRAM(S): 50 CAPSULE, EXTENDED RELEASE ORAL at 02:05

## 2018-07-01 RX ADMIN — HYDROMORPHONE HYDROCHLORIDE 0.5 MILLIGRAM(S): 2 INJECTION INTRAMUSCULAR; INTRAVENOUS; SUBCUTANEOUS at 19:06

## 2018-07-01 RX ADMIN — MORPHINE SULFATE 4 MILLIGRAM(S): 50 CAPSULE, EXTENDED RELEASE ORAL at 08:44

## 2018-07-01 RX ADMIN — MORPHINE SULFATE 4 MILLIGRAM(S): 50 CAPSULE, EXTENDED RELEASE ORAL at 18:09

## 2018-07-01 RX ADMIN — Medication 1 GRAM(S): at 05:37

## 2018-07-01 RX ADMIN — Medication 1 EACH: at 17:33

## 2018-07-01 RX ADMIN — Medication 1 GRAM(S): at 17:59

## 2018-07-01 RX ADMIN — PIPERACILLIN AND TAZOBACTAM 25 GRAM(S): 4; .5 INJECTION, POWDER, LYOPHILIZED, FOR SOLUTION INTRAVENOUS at 14:38

## 2018-07-01 RX ADMIN — MORPHINE SULFATE 4 MILLIGRAM(S): 50 CAPSULE, EXTENDED RELEASE ORAL at 11:26

## 2018-07-01 RX ADMIN — MORPHINE SULFATE 4 MILLIGRAM(S): 50 CAPSULE, EXTENDED RELEASE ORAL at 02:32

## 2018-07-01 RX ADMIN — PIPERACILLIN AND TAZOBACTAM 25 GRAM(S): 4; .5 INJECTION, POWDER, LYOPHILIZED, FOR SOLUTION INTRAVENOUS at 05:37

## 2018-07-01 RX ADMIN — Medication 5 MILLIGRAM(S): at 22:01

## 2018-07-01 RX ADMIN — HYDROMORPHONE HYDROCHLORIDE 0.5 MILLIGRAM(S): 2 INJECTION INTRAMUSCULAR; INTRAVENOUS; SUBCUTANEOUS at 03:44

## 2018-07-01 RX ADMIN — MORPHINE SULFATE 4 MILLIGRAM(S): 50 CAPSULE, EXTENDED RELEASE ORAL at 17:26

## 2018-07-01 RX ADMIN — MORPHINE SULFATE 4 MILLIGRAM(S): 50 CAPSULE, EXTENDED RELEASE ORAL at 15:04

## 2018-07-01 RX ADMIN — Medication 1 GRAM(S): at 12:51

## 2018-07-01 RX ADMIN — ENOXAPARIN SODIUM 65 MILLIGRAM(S): 100 INJECTION SUBCUTANEOUS at 19:07

## 2018-07-01 RX ADMIN — HYDROMORPHONE HYDROCHLORIDE 0.5 MILLIGRAM(S): 2 INJECTION INTRAMUSCULAR; INTRAVENOUS; SUBCUTANEOUS at 20:00

## 2018-07-01 RX ADMIN — MORPHINE SULFATE 4 MILLIGRAM(S): 50 CAPSULE, EXTENDED RELEASE ORAL at 12:08

## 2018-07-01 RX ADMIN — MORPHINE SULFATE 4 MILLIGRAM(S): 50 CAPSULE, EXTENDED RELEASE ORAL at 22:50

## 2018-07-01 RX ADMIN — ENOXAPARIN SODIUM 65 MILLIGRAM(S): 100 INJECTION SUBCUTANEOUS at 08:38

## 2018-07-01 NOTE — PROGRESS NOTE ADULT - ASSESSMENT
59 yo F with history gastric CA initially presenting with painless jaundice, now with fever after prolonged stay.  Attempted ERCP on 6/6, complicated by bowel perforation, emergent ex-lap  Had bile drain placed 6/8; 2 subsequent tube checks  Bile culture grew E coli, and patient received course of Cipro into Zosyn; also grew VRE  Patient now with low grade fever and leukocytosis  UCX with VRE S Dapto; although it is clinically uncertain that sepsis like syndrome 2/2 UTI  Note that patient has abd wall hematoma along incision site; unclear if infected but per surgical team not amenable to drain due to proximity to underlying bowel/defect in fascia (note that hematoma could cause leukocytosis/fever even if not infected)  In the interim, patient overall unchanged  Overall, Fever, leukocytosis, positive culture finding, jaundice  - Zosyn 3.375g q 8  - Daptomycin 4mg/kg q 24 (please check CK)  - F/U pending cultures  - If continued rise WBC/fever despite dapto, will need to discuss with surgery further plans regarding abd wall hematoma    Nirmal Nelson MD  Pager [phone]  After 5pm and on weekends call [phone]

## 2018-07-01 NOTE — PROGRESS NOTE ADULT - ATTENDING COMMENTS
57 y/o female s/p ERCP c/b perforation of afferent limb. Pt underwent emergent Ex-lap w/ resection of perforated bowel and stapled side to side functional end to end anastomosis. Pt remained intubated and was transferred to SICU off pressors. s/p extubation, now transferred to floor. Patient meets criteria for severe protein calorie malnutrition requiring TPN for nutritional support. TPN increased back to full volume on 6/26/18. Now brought back to 1/2 volume and calories on 6/29/18.  s/p IR tube check on 6/27/18    TPN infusion volume decreased to 1 L.    Monitor fingersticks q 12 hours, obtain daily weights.    Labs reviewed - increased potassium and phos content in TPN     Continue TPN and lipids. Will follow up with primary team on plan - 1/2 TPN today.    Continue to encourage increased PO intake and monitor tolerance.     Discharge planning in process     1. Protein calorie malnutrition being optimized with TPN: CHO [100] gm.  AA [40] gm. SMOF Lipids [16] gm. lipids will be administered over 12 hours   2.  Hyperglycemia managed with: [0 ] units of regular insulin    3.  Check fluid balance daily.  Strict I/O  [ ] [ ]   4.  Daily BMP, Ionized Calcium, Magnesium and Phosphorous   5.  Triglycerides at initiation of TPN and monthly [ ] [ ]   6.  Pepcid in TPN for Gi prophylaxis  [ ]   I have seen and examined the patient, reviewed the laboratory and radiologic data and agree with the history, physical assessment and plan.  I reviewed and discussed with all consultants, house staff and PA's.  The note is edited where appropriate. The Nutrition Support Team (NST) discusses on an ongoing basis with the primary team and all consultants, House staff and PA's to have a permanent risk benefit analyses on all decisions.  I spent  45  minutes in total; providing diagnoses, assessment and plan.

## 2018-07-01 NOTE — PROGRESS NOTE ADULT - ASSESSMENT
58F s/p ERCP EUS aborted 2/2 small bowel perforation, s/p ex lap resection of small bowel side to side anastomosis currently hemodynamically stable on the floor.       Plan     - C/w Daptomycin and Zosyn per ID.   - Consult IR for drainage of intraabdominal hematoma   - NPO after MN for possible drainage of intraabdominal hematoma   - Coags for possible drainage of intraabdominal hematoma   - Hold PM dose of lovenox for possible drainage of intraabdominal hematoma tomorrow  - C/w Mathis, monitor UOP. Failed TOV x 3

## 2018-07-01 NOTE — PROGRESS NOTE ADULT - SUBJECTIVE AND OBJECTIVE BOX
NUTRITION NOTE  [id number] CHOKYI  ===============================    Interval events  Patient was seen and examined at bedside, no acute overnight events.   TPN/lipids initiated on 18, patient is tolerating without any issues.  Patient states that is trying to eat small amounts and continues to drink Ensure Clears throughout the day. She has been able to tolerate some po intake but does not have too much of an appetite.   TPN/lipids at 1L infusion volume today with 1/2 calories.     Vital Signs Last 24 Hrs  T(C): 37.1 (2018 09:00), Max: 38.1 (2018 12:53)  T(F): 98.7 (2018 09:00), Max: 100.5 (2018 12:53)  HR: 101 (2018 09:00) (98 - 107)  BP: 130/68 (2018 09:00) (124/72 - 141/80)  RR: 18 (2018 09:00) (17 - 18)  SpO2: 97% (2018 09:00) (92% - 98%)    MEDICATIONS  (STANDING):  chlorhexidine 4% Liquid 1 Application(s) Topical once  chlorhexidine 4% Liquid 1 Application(s) Topical <User Schedule>  DAPTOmycin IVPB 220 milliGRAM(s) IV Intermittent every 24 hours  enoxaparin Injectable 65 milliGRAM(s) SubCutaneous <User Schedule>  fat emulsion (Fish Oil and Plant Based) 20% Infusion 6.6 mL/Hr (6.6 mL/Hr) IV Continuous <Continuous>  fat emulsion (Fish Oil and Plant Based) 20% Infusion 6.6 mL/Hr (6.6 mL/Hr) IV Continuous <Continuous>  metoclopramide 5 milliGRAM(s) Oral every 8 hours  pantoprazole    Tablet 40 milliGRAM(s) Oral before breakfast  Parenteral Nutrition - Adult 1 Each (42 mL/Hr) TPN Continuous <Continuous>  Parenteral Nutrition - Adult 1 Each (42 mL/Hr) TPN Continuous <Continuous>  piperacillin/tazobactam IVPB. 3.375 Gram(s) IV Intermittent every 8 hours  sucralfate suspension 1 Gram(s) Oral four times a day    MEDICATIONS  (PRN):  cyclobenzaprine 5 milliGRAM(s) Oral three times a day PRN Muscle Spasm  dibucaine 1% Ointment 1 Application(s) Topical daily PRN hemorrhoids  HYDROmorphone   Tablet 2 milliGRAM(s) Oral every 3 hours PRN Moderate Pain (4 - 6)  HYDROmorphone  Injectable 0.5 milliGRAM(s) IV Push every 4 hours PRN breakthrough pain  morphine  - Injectable 2 milliGRAM(s) IV Push every 3 hours PRN Moderate Pain (4 - 6)  morphine  - Injectable 4 milliGRAM(s) IV Push every 3 hours PRN Severe Pain (7 - 10)    I&O's Detail    2018 07:  -  2018 07:00  --------------------------------------------------------  IN:    fat emulsion (Fish Oil and Plant Based) 20% Infusion: 85.8 mL    Free Water: 20 mL    IV PiggyBack: 250 mL    Oral Fluid: 375 mL    TPN (Total Parenteral Nutrition): 966 mL  Total IN: 1696.8 mL    OUT:    Drain: 200 mL    Indwelling Catheter - Urethral: 1925 mL  Total OUT: 2125 mL    Total NET: -428.2 mL      2018 07:  -  2018 09:04  --------------------------------------------------------  IN:    TPN (Total Parenteral Nutrition): 84 mL  Total IN: 84 mL    OUT:    Drain: 30 mL    Indwelling Catheter - Urethral: 250 mL  Total OUT: 280 mL    Total NET: -196 mL    Daily Weight in k.8 (2018 00:32)    Drug Dosing Weight  Height (cm): 160.02 (2018 15:53)  Weight (kg): 54 (2018 15:53)  BMI (kg/m2): 21.1 (2018 15:53)  BSA (m2): 1.55 (2018 15:53)    POCT Blood Glucose.: 106 mg/dL (2018 06:33)  POCT Blood Glucose.: 109 mg/dL (2018 01:29)  POCT Blood Glucose.: 112 mg/dL (2018 18:14)  POCT Blood Glucose.: 122 mg/dL (2018 12:09)    PHYSICAL EXAM   Constitutional: no acute distress, resting in bed   Respiratory: nonlabored respirations   Gastrointestinal: soft, minimally tender, moderately distended   Extremities: warm, generalized edema    PICC Site: C/D/I    Diet: soft diet and TPN    Culture - Urine (collected 2018 04:21)  Source: URINE MIDSTREAM  Preliminary Report (2018 14:31):    ENTFC^Enterococcus faecium    COLONY COUNT: > = 100,000 CFU/ML    YEAST^YEAST.    COLONY COUNT: 10,000-49,000 CFU/mL    Culture - Blood (collected 2018 23:00)  Source: BLOOD VENOUS  Preliminary Report (2018 23:01):    NO ORGANISMS ISOLATED    NO ORGANISMS ISOLATED AT 48 HRS.    Culture - Blood (collected 2018 23:00)  Source: BLOOD PERIPHERAL  Preliminary Report (2018 23:01):    NO ORGANISMS ISOLATED    NO ORGANISMS ISOLATED AT 48 HRS.    LABORATORY                        8.1    17.57 )-----------( 593      ( 2018 05:40 )             24.4   07    138  |  106  |  15  ----------------------------<  91  4.0   |  21<L>  |  0.32<L>    Ca    8.0<L>      2018 05:40  Phos  3.3       Mg     2.2         TPro  5.6<L>  /  Alb  1.9<L>  /  TBili  12.8<H>  /  DBili  10.2<H>  /  AST  34<H>  /  ALT  28  /  AlkPhos  202<H>      LIVER FUNCTIONS - ( 2018 05:45 )  Alb: 1.8 g/dL / Pro: 5.1 g/dL / ALK PHOS: 198 u/L / ALT: 27 u/L / AST: 31 u/L / GGT: x           06-20 Chol -- LDL -- HDL -- Trig 138, 06-12 Chol -- LDL -- HDL -- Trig 112, 06-05 Chol 156  HDL 8<L> Trig 85, 05-19 Chol 122 LDL 73 HDL 33<L> Trig 78    Prealbumin 18: 10    ASSESSMENT/PLAN:  59 y/o female s/p ERCP c/b perforation of afferent limb. Pt underwent emergent Ex-lap w/ resection of perforated bowel and stapled side to side functional end to end anastomosis. Pt remained intubated and was transferred to SICU off pressors. s/p extubation, now transferred to floor. Patient meets criteria for severe protein calorie malnutrition requiring TPN for nutritional support. TPN increased back to full volume on 18. Now brought back to 1/2 volume and calories on 18.  s/p IR tube check on 18    TPN infusion volume decreased to 1 L.    Monitor fingersticks q 12 hours, obtain daily weights.    Labs reviewed - increased potassium and phos content in TPN     Continue TPN and lipids. Will follow up with primary team on plan - 1/2 TPN today.    Continue to encourage increased PO intake and monitor tolerance.     Discharge planning in process     1. Protein calorie malnutrition being optimized with TPN: CHO [100] gm.  AA [40] gm. SMOF Lipids [16] gm. lipids will be administered over 12 hours   2.  Hyperglycemia managed with: [0 ] units of regular insulin    3.  Check fluid balance daily.  Strict I/O  [ ] [ ]   4.  Daily BMP, Ionized Calcium, Magnesium and Phosphorous   5.  Triglycerides at initiation of TPN and monthly [ ] [ ]   6.  Pepcid in TPN for Gi prophylaxis  [ ]     Nutrition support pager [phone]

## 2018-07-01 NOTE — PROGRESS NOTE ADULT - SUBJECTIVE AND OBJECTIVE BOX
CC: F/U for VRE    Saw/spoke to patient. No fevers, no chills. Overall unchanged. Afebrile overnight.    Allergies  No Known Allergies    ANTIMICROBIALS:  DAPTOmycin IVPB 220 every 24 hours  piperacillin/tazobactam IVPB. 3.375 every 8 hours    PE:    Vital Signs Last 24 Hrs  T(C): 37.1 (2018 09:00), Max: 38.1 (2018 12:53)  T(F): 98.7 (2018 09:00), Max: 100.5 (2018 12:53)  HR: 101 (2018 09:00) (98 - 107)  BP: 130/68 (2018 09:00) (124/72 - 141/80)  RR: 18 (2018 09:00) (17 - 18)  SpO2: 97% (2018 09:00) (92% - 98%)    Gen: AOx3, NAD, non-toxic, anxious, jaundice  CV: S1+S2 normal, no murmurs, tachycardic  Resp: Clear bilat, no resp distress, no crackles/wheezes  Abd: Soft, nontender, +BS; abd wound intact with underlying swelling (known hematoma)  Ext: No LE edema, no wounds    LABS:                        8.1    17.57 )-----------( 593      ( 2018 05:40 )             24.4     07-01    138  |  106  |  15  ----------------------------<  91  4.0   |  21<L>  |  0.32<L>    Ca    8.0<L>      2018 05:40  Phos  3.3     07-  Mg     2.2     07-    TPro  5.6<L>  /  Alb  1.9<L>  /  TBili  12.8<H>  /  DBili  10.2<H>  /  AST  34<H>  /  ALT  28  /  AlkPhos  202<H>  07    Urinalysis Basic - ( 2018 16:00 )    Color: BROWN / Appearance: CLEAR / S.018 / pH: 6.0  Gluc: NEGATIVE / Ketone: NEGATIVE  / Bili: LARGE / Urobili: NORMAL mg/dL   Blood: MODERATE / Protein: 30 mg/dL / Nitrite: NEGATIVE   Leuk Esterase: SMALL / RBC: >50 / WBC 10-25   Sq Epi: OCC / Non Sq Epi: x / Bacteria: x    MICROBIOLOGY:    URINE MIDSTREAM  18 --  --  Enterococcus faecium VRE  Candida albicans    (otherwise reviewed)    RADIOLOGY:    No new available

## 2018-07-01 NOTE — PROGRESS NOTE ADULT - SUBJECTIVE AND OBJECTIVE BOX
GENERAL SURGERY DAILY PROGRESS NOTE:       Subjective:    Patient seen and examined at the bedside. No acute events overnight. Pt continued to have a fever overnight. Pt endorses mild discomfort over his incision. Denies N/V. Tolerating PO.         Objective:    PE:  Gen: Resting comfortably in bed. NAD  Resp: breathing comfortably.   Abd: soft. Midline incision c/d/i. NT, no rebound or guarding. Moderately distended. Bulge under midline incision.         Vital Signs Last 24 Hrs  T(C): 37.1  T(F): 98.7   HR: 101   BP: 130/68   RR: 18   SpO2: 97%     I&O's Detail    2018 07:  -  2018 07:00  --------------------------------------------------------  IN:    fat emulsion (Fish Oil and Plant Based) 20% Infusion: 85.8 mL    Free Water: 20 mL    IV PiggyBack: 250 mL    Oral Fluid: 375 mL    TPN (Total Parenteral Nutrition): 966 mL  Total IN: 1696.8 mL    OUT:    Drain: 200 mL    Indwelling Catheter - Urethral: 1925 mL  Total OUT: 2125 mL    Total NET: -428.2 mL      2018 07:  -  2018 10:21  --------------------------------------------------------  IN:    TPN (Total Parenteral Nutrition): 84 mL  Total IN: 84 mL    OUT:    Drain: 30 mL    Indwelling Catheter - Urethral: 250 mL  Total OUT: 280 mL    Total NET: -196 mL          Daily     Daily     MEDICATIONS  (STANDING):  chlorhexidine 4% Liquid 1 Application(s) Topical once  chlorhexidine 4% Liquid 1 Application(s) Topical <User Schedule>  DAPTOmycin IVPB 220 milliGRAM(s) IV Intermittent every 24 hours  enoxaparin Injectable 65 milliGRAM(s) SubCutaneous <User Schedule>  fat emulsion (Fish Oil and Plant Based) 20% Infusion 6.6 mL/Hr (6.6 mL/Hr) IV Continuous <Continuous>  fat emulsion (Fish Oil and Plant Based) 20% Infusion 6.6 mL/Hr (6.6 mL/Hr) IV Continuous <Continuous>  metoclopramide 5 milliGRAM(s) Oral every 8 hours  pantoprazole    Tablet 40 milliGRAM(s) Oral before breakfast  Parenteral Nutrition - Adult 1 Each (42 mL/Hr) TPN Continuous <Continuous>  Parenteral Nutrition - Adult 1 Each (42 mL/Hr) TPN Continuous <Continuous>  piperacillin/tazobactam IVPB. 3.375 Gram(s) IV Intermittent every 8 hours  sucralfate suspension 1 Gram(s) Oral four times a day    MEDICATIONS  (PRN):  cyclobenzaprine 5 milliGRAM(s) Oral three times a day PRN Muscle Spasm  dibucaine 1% Ointment 1 Application(s) Topical daily PRN hemorrhoids  HYDROmorphone   Tablet 2 milliGRAM(s) Oral every 3 hours PRN Moderate Pain (4 - 6)  HYDROmorphone  Injectable 0.5 milliGRAM(s) IV Push every 4 hours PRN breakthrough pain  morphine  - Injectable 2 milliGRAM(s) IV Push every 3 hours PRN Moderate Pain (4 - 6)  morphine  - Injectable 4 milliGRAM(s) IV Push every 3 hours PRN Severe Pain (7 - 10)      LABS:                        8.1    17.57 )-----------( 593      ( 2018 05:40 )             24.4     07-    138  |  106  |  15  ----------------------------<  91  4.0   |  21<L>  |  0.32<L>    Ca    8.0<L>      2018 05:40  Phos  3.3       Mg     2.2         TPro  5.6<L>  /  Alb  1.9<L>  /  TBili  12.8<H>  /  DBili  10.2<H>  /  AST  34<H>  /  ALT  28  /  AlkPhos  202<H>      PT/INR - ( 2018 05:30 )   PT: 13.6 SEC;   INR: 1.22          PTT - ( 2018 05:30 )  PTT:27.6 SEC  Urinalysis Basic - ( 2018 16:00 )    Color: BROWN / Appearance: CLEAR / S.018 / pH: 6.0  Gluc: NEGATIVE / Ketone: NEGATIVE  / Bili: LARGE / Urobili: NORMAL mg/dL   Blood: MODERATE / Protein: 30 mg/dL / Nitrite: NEGATIVE   Leuk Esterase: SMALL / RBC: >50 / WBC 10-25   Sq Epi: OCC / Non Sq Epi: x / Bacteria: x        RADIOLOGY & ADDITIONAL STUDIES: GENERAL SURGERY DAILY PROGRESS NOTE:       Subjective:    Patient seen and examined at the bedside. No acute events overnight. Pt continued to have a fever overnight. Pt endorses worsening discomfort over his incisional bulge. Denies N/V. Tolerating PO.         Objective:    PE:  Gen: Resting comfortably in bed. NAD  Resp: breathing comfortably.   Abd: soft. Midline incision c/d/i. NT, no rebound or guarding. Moderately distended. Bulge under midline incision.         Vital Signs Last 24 Hrs  T(C): 37.1  T(F): 98.7   HR: 101   BP: 130/68   RR: 18   SpO2: 97%     I&O's Detail    2018 07:  -  2018 07:00  --------------------------------------------------------  IN:    fat emulsion (Fish Oil and Plant Based) 20% Infusion: 85.8 mL    Free Water: 20 mL    IV PiggyBack: 250 mL    Oral Fluid: 375 mL    TPN (Total Parenteral Nutrition): 966 mL  Total IN: 1696.8 mL    OUT:    Drain: 200 mL    Indwelling Catheter - Urethral: 1925 mL  Total OUT: 2125 mL    Total NET: -428.2 mL      2018 07:  -  2018 10:21  --------------------------------------------------------  IN:    TPN (Total Parenteral Nutrition): 84 mL  Total IN: 84 mL    OUT:    Drain: 30 mL    Indwelling Catheter - Urethral: 250 mL  Total OUT: 280 mL    Total NET: -196 mL          Daily     Daily     MEDICATIONS  (STANDING):  chlorhexidine 4% Liquid 1 Application(s) Topical once  chlorhexidine 4% Liquid 1 Application(s) Topical <User Schedule>  DAPTOmycin IVPB 220 milliGRAM(s) IV Intermittent every 24 hours  enoxaparin Injectable 65 milliGRAM(s) SubCutaneous <User Schedule>  fat emulsion (Fish Oil and Plant Based) 20% Infusion 6.6 mL/Hr (6.6 mL/Hr) IV Continuous <Continuous>  fat emulsion (Fish Oil and Plant Based) 20% Infusion 6.6 mL/Hr (6.6 mL/Hr) IV Continuous <Continuous>  metoclopramide 5 milliGRAM(s) Oral every 8 hours  pantoprazole    Tablet 40 milliGRAM(s) Oral before breakfast  Parenteral Nutrition - Adult 1 Each (42 mL/Hr) TPN Continuous <Continuous>  Parenteral Nutrition - Adult 1 Each (42 mL/Hr) TPN Continuous <Continuous>  piperacillin/tazobactam IVPB. 3.375 Gram(s) IV Intermittent every 8 hours  sucralfate suspension 1 Gram(s) Oral four times a day    MEDICATIONS  (PRN):  cyclobenzaprine 5 milliGRAM(s) Oral three times a day PRN Muscle Spasm  dibucaine 1% Ointment 1 Application(s) Topical daily PRN hemorrhoids  HYDROmorphone   Tablet 2 milliGRAM(s) Oral every 3 hours PRN Moderate Pain (4 - 6)  HYDROmorphone  Injectable 0.5 milliGRAM(s) IV Push every 4 hours PRN breakthrough pain  morphine  - Injectable 2 milliGRAM(s) IV Push every 3 hours PRN Moderate Pain (4 - 6)  morphine  - Injectable 4 milliGRAM(s) IV Push every 3 hours PRN Severe Pain (7 - 10)      LABS:                        8.1    17.57 )-----------( 593      ( 2018 05:40 )             24.4     07-    138  |  106  |  15  ----------------------------<  91  4.0   |  21<L>  |  0.32<L>    Ca    8.0<L>      2018 05:40  Phos  3.3       Mg     2.2         TPro  5.6<L>  /  Alb  1.9<L>  /  TBili  12.8<H>  /  DBili  10.2<H>  /  AST  34<H>  /  ALT  28  /  AlkPhos  202<H>      PT/INR - ( 2018 05:30 )   PT: 13.6 SEC;   INR: 1.22          PTT - ( 2018 05:30 )  PTT:27.6 SEC  Urinalysis Basic - ( 2018 16:00 )    Color: BROWN / Appearance: CLEAR / S.018 / pH: 6.0  Gluc: NEGATIVE / Ketone: NEGATIVE  / Bili: LARGE / Urobili: NORMAL mg/dL   Blood: MODERATE / Protein: 30 mg/dL / Nitrite: NEGATIVE   Leuk Esterase: SMALL / RBC: >50 / WBC 10-25   Sq Epi: OCC / Non Sq Epi: x / Bacteria: x        RADIOLOGY & ADDITIONAL STUDIES: GENERAL SURGERY DAILY PROGRESS NOTE:       Subjective:    Patient seen and examined at the bedside. No acute events overnight. Pt continued to have a fever overnight. Pt endorses worsening discomfort over his incisional bulge. Denies N/V. Tolerating PO.         Objective:    PE:  Gen: Resting comfortably in bed. NAD  HEENT: jaundiced  Resp: breathing comfortably.   Abd: soft. Midline incision c/d/i. NT, no rebound or guarding. Moderately distended. Bulge under midline incision.         Vital Signs Last 24 Hrs  T(C): 37.1  T(F): 98.7   HR: 101   BP: 130/68   RR: 18   SpO2: 97%     I&O's Detail    2018 07:  -  2018 07:00  --------------------------------------------------------  IN:    fat emulsion (Fish Oil and Plant Based) 20% Infusion: 85.8 mL    Free Water: 20 mL    IV PiggyBack: 250 mL    Oral Fluid: 375 mL    TPN (Total Parenteral Nutrition): 966 mL  Total IN: 1696.8 mL    OUT:    Drain: 200 mL    Indwelling Catheter - Urethral: 1925 mL  Total OUT: 2125 mL    Total NET: -428.2 mL      2018 07:  -  2018 10:21  --------------------------------------------------------  IN:    TPN (Total Parenteral Nutrition): 84 mL  Total IN: 84 mL    OUT:    Drain: 30 mL    Indwelling Catheter - Urethral: 250 mL  Total OUT: 280 mL    Total NET: -196 mL          Daily     Daily     MEDICATIONS  (STANDING):  chlorhexidine 4% Liquid 1 Application(s) Topical once  chlorhexidine 4% Liquid 1 Application(s) Topical <User Schedule>  DAPTOmycin IVPB 220 milliGRAM(s) IV Intermittent every 24 hours  enoxaparin Injectable 65 milliGRAM(s) SubCutaneous <User Schedule>  fat emulsion (Fish Oil and Plant Based) 20% Infusion 6.6 mL/Hr (6.6 mL/Hr) IV Continuous <Continuous>  fat emulsion (Fish Oil and Plant Based) 20% Infusion 6.6 mL/Hr (6.6 mL/Hr) IV Continuous <Continuous>  metoclopramide 5 milliGRAM(s) Oral every 8 hours  pantoprazole    Tablet 40 milliGRAM(s) Oral before breakfast  Parenteral Nutrition - Adult 1 Each (42 mL/Hr) TPN Continuous <Continuous>  Parenteral Nutrition - Adult 1 Each (42 mL/Hr) TPN Continuous <Continuous>  piperacillin/tazobactam IVPB. 3.375 Gram(s) IV Intermittent every 8 hours  sucralfate suspension 1 Gram(s) Oral four times a day    MEDICATIONS  (PRN):  cyclobenzaprine 5 milliGRAM(s) Oral three times a day PRN Muscle Spasm  dibucaine 1% Ointment 1 Application(s) Topical daily PRN hemorrhoids  HYDROmorphone   Tablet 2 milliGRAM(s) Oral every 3 hours PRN Moderate Pain (4 - 6)  HYDROmorphone  Injectable 0.5 milliGRAM(s) IV Push every 4 hours PRN breakthrough pain  morphine  - Injectable 2 milliGRAM(s) IV Push every 3 hours PRN Moderate Pain (4 - 6)  morphine  - Injectable 4 milliGRAM(s) IV Push every 3 hours PRN Severe Pain (7 - 10)      LABS:                        8.1    17.57 )-----------( 593      ( 2018 05:40 )             24.4     07-    138  |  106  |  15  ----------------------------<  91  4.0   |  21<L>  |  0.32<L>    Ca    8.0<L>      2018 05:40  Phos  3.3       Mg     2.2         TPro  5.6<L>  /  Alb  1.9<L>  /  TBili  12.8<H>  /  DBili  10.2<H>  /  AST  34<H>  /  ALT  28  /  AlkPhos  202<H>      PT/INR - ( 2018 05:30 )   PT: 13.6 SEC;   INR: 1.22          PTT - ( 2018 05:30 )  PTT:27.6 SEC  Urinalysis Basic - ( 2018 16:00 )    Color: BROWN / Appearance: CLEAR / S.018 / pH: 6.0  Gluc: NEGATIVE / Ketone: NEGATIVE  / Bili: LARGE / Urobili: NORMAL mg/dL   Blood: MODERATE / Protein: 30 mg/dL / Nitrite: NEGATIVE   Leuk Esterase: SMALL / RBC: >50 / WBC 10-25   Sq Epi: OCC / Non Sq Epi: x / Bacteria: x        RADIOLOGY & ADDITIONAL STUDIES: GENERAL SURGERY DAILY PROGRESS NOTE:       Subjective:    Patient seen and examined at the bedside. No acute events overnight. Pt continued to have a fever overnight. Pt endorses worsening discomfort over his incisional bulge. Denies N/V. Tolerating PO.         Objective:    PE:  Gen: Resting comfortably in bed. NAD  HEENT: jaundiced  Resp: breathing comfortably.   Abd: soft. Midline incision c/d/i. NT, no rebound or guarding. Moderately distended. Bulge under midline incision.   Ext: 2+ pitting edema LE b/l      Vital Signs Last 24 Hrs  T(C): 37.1  T(F): 98.7   HR: 101   BP: 130/68   RR: 18   SpO2: 97%     I&O's Detail    2018 07:  -  2018 07:00  --------------------------------------------------------  IN:    fat emulsion (Fish Oil and Plant Based) 20% Infusion: 85.8 mL    Free Water: 20 mL    IV PiggyBack: 250 mL    Oral Fluid: 375 mL    TPN (Total Parenteral Nutrition): 966 mL  Total IN: 1696.8 mL    OUT:    Drain: 200 mL    Indwelling Catheter - Urethral: 1925 mL  Total OUT: 2125 mL    Total NET: -428.2 mL      2018 07:  -  2018 10:21  --------------------------------------------------------  IN:    TPN (Total Parenteral Nutrition): 84 mL  Total IN: 84 mL    OUT:    Drain: 30 mL    Indwelling Catheter - Urethral: 250 mL  Total OUT: 280 mL    Total NET: -196 mL          Daily     Daily     MEDICATIONS  (STANDING):  chlorhexidine 4% Liquid 1 Application(s) Topical once  chlorhexidine 4% Liquid 1 Application(s) Topical <User Schedule>  DAPTOmycin IVPB 220 milliGRAM(s) IV Intermittent every 24 hours  enoxaparin Injectable 65 milliGRAM(s) SubCutaneous <User Schedule>  fat emulsion (Fish Oil and Plant Based) 20% Infusion 6.6 mL/Hr (6.6 mL/Hr) IV Continuous <Continuous>  fat emulsion (Fish Oil and Plant Based) 20% Infusion 6.6 mL/Hr (6.6 mL/Hr) IV Continuous <Continuous>  metoclopramide 5 milliGRAM(s) Oral every 8 hours  pantoprazole    Tablet 40 milliGRAM(s) Oral before breakfast  Parenteral Nutrition - Adult 1 Each (42 mL/Hr) TPN Continuous <Continuous>  Parenteral Nutrition - Adult 1 Each (42 mL/Hr) TPN Continuous <Continuous>  piperacillin/tazobactam IVPB. 3.375 Gram(s) IV Intermittent every 8 hours  sucralfate suspension 1 Gram(s) Oral four times a day    MEDICATIONS  (PRN):  cyclobenzaprine 5 milliGRAM(s) Oral three times a day PRN Muscle Spasm  dibucaine 1% Ointment 1 Application(s) Topical daily PRN hemorrhoids  HYDROmorphone   Tablet 2 milliGRAM(s) Oral every 3 hours PRN Moderate Pain (4 - 6)  HYDROmorphone  Injectable 0.5 milliGRAM(s) IV Push every 4 hours PRN breakthrough pain  morphine  - Injectable 2 milliGRAM(s) IV Push every 3 hours PRN Moderate Pain (4 - 6)  morphine  - Injectable 4 milliGRAM(s) IV Push every 3 hours PRN Severe Pain (7 - 10)      LABS:                        8.1    17.57 )-----------( 593      ( 2018 05:40 )             24.4     07-    138  |  106  |  15  ----------------------------<  91  4.0   |  21<L>  |  0.32<L>    Ca    8.0<L>      2018 05:40  Phos  3.3       Mg     2.2         TPro  5.6<L>  /  Alb  1.9<L>  /  TBili  12.8<H>  /  DBili  10.2<H>  /  AST  34<H>  /  ALT  28  /  AlkPhos  202<H>      PT/INR - ( 2018 05:30 )   PT: 13.6 SEC;   INR: 1.22          PTT - ( 2018 05:30 )  PTT:27.6 SEC  Urinalysis Basic - ( 2018 16:00 )    Color: BROWN / Appearance: CLEAR / S.018 / pH: 6.0  Gluc: NEGATIVE / Ketone: NEGATIVE  / Bili: LARGE / Urobili: NORMAL mg/dL   Blood: MODERATE / Protein: 30 mg/dL / Nitrite: NEGATIVE   Leuk Esterase: SMALL / RBC: >50 / WBC 10-25   Sq Epi: OCC / Non Sq Epi: x / Bacteria: x        RADIOLOGY & ADDITIONAL STUDIES:

## 2018-07-02 LAB
ALBUMIN SERPL ELPH-MCNC: 2 G/DL — LOW (ref 3.3–5)
ALP SERPL-CCNC: 264 U/L — HIGH (ref 40–120)
ALT FLD-CCNC: 31 U/L — SIGNIFICANT CHANGE UP (ref 4–33)
AST SERPL-CCNC: 37 U/L — HIGH (ref 4–32)
BACTERIA UR CULT: SIGNIFICANT CHANGE UP
BASOPHILS # BLD AUTO: 0.09 K/UL — SIGNIFICANT CHANGE UP (ref 0–0.2)
BASOPHILS NFR BLD AUTO: 0.5 % — SIGNIFICANT CHANGE UP (ref 0–2)
BILIRUB SERPL-MCNC: 12.9 MG/DL — HIGH (ref 0.2–1.2)
BUN SERPL-MCNC: 14 MG/DL — SIGNIFICANT CHANGE UP (ref 7–23)
BUN SERPL-MCNC: 14 MG/DL — SIGNIFICANT CHANGE UP (ref 7–23)
CA-I BLD-SCNC: 1.16 MMOL/L — SIGNIFICANT CHANGE UP (ref 1.03–1.23)
CALCIUM SERPL-MCNC: 7.8 MG/DL — LOW (ref 8.4–10.5)
CALCIUM SERPL-MCNC: 7.8 MG/DL — LOW (ref 8.4–10.5)
CHLORIDE SERPL-SCNC: 104 MMOL/L — SIGNIFICANT CHANGE UP (ref 98–107)
CHLORIDE SERPL-SCNC: 104 MMOL/L — SIGNIFICANT CHANGE UP (ref 98–107)
CK SERPL-CCNC: 31 U/L — SIGNIFICANT CHANGE UP (ref 25–170)
CO2 SERPL-SCNC: 21 MMOL/L — LOW (ref 22–31)
CO2 SERPL-SCNC: 21 MMOL/L — LOW (ref 22–31)
CREAT SERPL-MCNC: 0.35 MG/DL — LOW (ref 0.5–1.3)
CREAT SERPL-MCNC: 0.35 MG/DL — LOW (ref 0.5–1.3)
EOSINOPHIL # BLD AUTO: 0.17 K/UL — SIGNIFICANT CHANGE UP (ref 0–0.5)
EOSINOPHIL NFR BLD AUTO: 0.9 % — SIGNIFICANT CHANGE UP (ref 0–6)
GLUCOSE SERPL-MCNC: 113 MG/DL — HIGH (ref 70–99)
GLUCOSE SERPL-MCNC: 113 MG/DL — HIGH (ref 70–99)
HCT VFR BLD CALC: 25.8 % — LOW (ref 34.5–45)
HGB BLD-MCNC: 8.4 G/DL — LOW (ref 11.5–15.5)
IMM GRANULOCYTES # BLD AUTO: 0.57 # — SIGNIFICANT CHANGE UP
IMM GRANULOCYTES NFR BLD AUTO: 3.1 % — HIGH (ref 0–1.5)
LYMPHOCYTES # BLD AUTO: 0.84 K/UL — LOW (ref 1–3.3)
LYMPHOCYTES # BLD AUTO: 4.5 % — LOW (ref 13–44)
MAGNESIUM SERPL-MCNC: 2.3 MG/DL — SIGNIFICANT CHANGE UP (ref 1.6–2.6)
MCHC RBC-ENTMCNC: 31.2 PG — SIGNIFICANT CHANGE UP (ref 27–34)
MCHC RBC-ENTMCNC: 32.6 % — SIGNIFICANT CHANGE UP (ref 32–36)
MCV RBC AUTO: 95.9 FL — SIGNIFICANT CHANGE UP (ref 80–100)
MONOCYTES # BLD AUTO: 0.84 K/UL — SIGNIFICANT CHANGE UP (ref 0–0.9)
MONOCYTES NFR BLD AUTO: 4.5 % — SIGNIFICANT CHANGE UP (ref 2–14)
NEUTROPHILS # BLD AUTO: 16.17 K/UL — HIGH (ref 1.8–7.4)
NEUTROPHILS NFR BLD AUTO: 86.5 % — HIGH (ref 43–77)
NRBC # FLD: 0 — SIGNIFICANT CHANGE UP
PHOSPHATE SERPL-MCNC: 3.4 MG/DL — SIGNIFICANT CHANGE UP (ref 2.5–4.5)
PLATELET # BLD AUTO: 528 K/UL — HIGH (ref 150–400)
PMV BLD: 10.4 FL — SIGNIFICANT CHANGE UP (ref 7–13)
POTASSIUM SERPL-MCNC: 4 MMOL/L — SIGNIFICANT CHANGE UP (ref 3.5–5.3)
POTASSIUM SERPL-MCNC: 4 MMOL/L — SIGNIFICANT CHANGE UP (ref 3.5–5.3)
POTASSIUM SERPL-SCNC: 4 MMOL/L — SIGNIFICANT CHANGE UP (ref 3.5–5.3)
POTASSIUM SERPL-SCNC: 4 MMOL/L — SIGNIFICANT CHANGE UP (ref 3.5–5.3)
PROT SERPL-MCNC: 5.5 G/DL — LOW (ref 6–8.3)
RBC # BLD: 2.69 M/UL — LOW (ref 3.8–5.2)
RBC # FLD: 22.3 % — HIGH (ref 10.3–14.5)
SODIUM SERPL-SCNC: 138 MMOL/L — SIGNIFICANT CHANGE UP (ref 135–145)
SODIUM SERPL-SCNC: 138 MMOL/L — SIGNIFICANT CHANGE UP (ref 135–145)
SPECIMEN SOURCE: SIGNIFICANT CHANGE UP
WBC # BLD: 18.68 K/UL — HIGH (ref 3.8–10.5)
WBC # FLD AUTO: 18.68 K/UL — HIGH (ref 3.8–10.5)

## 2018-07-02 PROCEDURE — 99232 SBSQ HOSP IP/OBS MODERATE 35: CPT | Mod: GC

## 2018-07-02 PROCEDURE — 99232 SBSQ HOSP IP/OBS MODERATE 35: CPT | Mod: 24

## 2018-07-02 PROCEDURE — 99233 SBSQ HOSP IP/OBS HIGH 50: CPT

## 2018-07-02 PROCEDURE — 99232 SBSQ HOSP IP/OBS MODERATE 35: CPT

## 2018-07-02 RX ORDER — ELECTROLYTE SOLUTION,INJ
1 VIAL (ML) INTRAVENOUS
Qty: 0 | Refills: 0 | Status: DISCONTINUED | OUTPATIENT
Start: 2018-07-02 | End: 2018-07-02

## 2018-07-02 RX ORDER — ENOXAPARIN SODIUM 100 MG/ML
40 INJECTION SUBCUTANEOUS DAILY
Qty: 0 | Refills: 0 | Status: DISCONTINUED | OUTPATIENT
Start: 2018-07-02 | End: 2018-07-11

## 2018-07-02 RX ORDER — URSODIOL 250 MG/1
300 TABLET, FILM COATED ORAL EVERY 12 HOURS
Qty: 0 | Refills: 0 | Status: DISCONTINUED | OUTPATIENT
Start: 2018-07-02 | End: 2018-07-11

## 2018-07-02 RX ORDER — I.V. FAT EMULSION 20 G/100ML
8.3 EMULSION INTRAVENOUS
Qty: 20 | Refills: 0 | Status: DISCONTINUED | OUTPATIENT
Start: 2018-07-02 | End: 2018-07-04

## 2018-07-02 RX ADMIN — HYDROMORPHONE HYDROCHLORIDE 0.5 MILLIGRAM(S): 2 INJECTION INTRAMUSCULAR; INTRAVENOUS; SUBCUTANEOUS at 09:07

## 2018-07-02 RX ADMIN — MORPHINE SULFATE 2 MILLIGRAM(S): 50 CAPSULE, EXTENDED RELEASE ORAL at 18:57

## 2018-07-02 RX ADMIN — HYDROMORPHONE HYDROCHLORIDE 0.5 MILLIGRAM(S): 2 INJECTION INTRAMUSCULAR; INTRAVENOUS; SUBCUTANEOUS at 14:10

## 2018-07-02 RX ADMIN — MORPHINE SULFATE 4 MILLIGRAM(S): 50 CAPSULE, EXTENDED RELEASE ORAL at 06:29

## 2018-07-02 RX ADMIN — HYDROMORPHONE HYDROCHLORIDE 0.5 MILLIGRAM(S): 2 INJECTION INTRAMUSCULAR; INTRAVENOUS; SUBCUTANEOUS at 09:30

## 2018-07-02 RX ADMIN — HYDROMORPHONE HYDROCHLORIDE 0.5 MILLIGRAM(S): 2 INJECTION INTRAMUSCULAR; INTRAVENOUS; SUBCUTANEOUS at 19:40

## 2018-07-02 RX ADMIN — Medication 1 GRAM(S): at 06:16

## 2018-07-02 RX ADMIN — PIPERACILLIN AND TAZOBACTAM 25 GRAM(S): 4; .5 INJECTION, POWDER, LYOPHILIZED, FOR SOLUTION INTRAVENOUS at 06:13

## 2018-07-02 RX ADMIN — Medication 5 MILLIGRAM(S): at 22:21

## 2018-07-02 RX ADMIN — URSODIOL 300 MILLIGRAM(S): 250 TABLET, FILM COATED ORAL at 22:20

## 2018-07-02 RX ADMIN — DAPTOMYCIN 108.8 MILLIGRAM(S): 500 INJECTION, POWDER, LYOPHILIZED, FOR SOLUTION INTRAVENOUS at 22:20

## 2018-07-02 RX ADMIN — Medication 1 GRAM(S): at 12:31

## 2018-07-02 RX ADMIN — HYDROMORPHONE HYDROCHLORIDE 0.5 MILLIGRAM(S): 2 INJECTION INTRAMUSCULAR; INTRAVENOUS; SUBCUTANEOUS at 19:55

## 2018-07-02 RX ADMIN — MORPHINE SULFATE 2 MILLIGRAM(S): 50 CAPSULE, EXTENDED RELEASE ORAL at 19:39

## 2018-07-02 RX ADMIN — MORPHINE SULFATE 2 MILLIGRAM(S): 50 CAPSULE, EXTENDED RELEASE ORAL at 12:35

## 2018-07-02 RX ADMIN — I.V. FAT EMULSION 8.3 ML/HR: 20 EMULSION INTRAVENOUS at 18:52

## 2018-07-02 RX ADMIN — CHLORHEXIDINE GLUCONATE 1 APPLICATION(S): 213 SOLUTION TOPICAL at 12:38

## 2018-07-02 RX ADMIN — PIPERACILLIN AND TAZOBACTAM 25 GRAM(S): 4; .5 INJECTION, POWDER, LYOPHILIZED, FOR SOLUTION INTRAVENOUS at 22:20

## 2018-07-02 RX ADMIN — Medication 5 MILLIGRAM(S): at 13:47

## 2018-07-02 RX ADMIN — Medication 1 GRAM(S): at 18:54

## 2018-07-02 RX ADMIN — MORPHINE SULFATE 2 MILLIGRAM(S): 50 CAPSULE, EXTENDED RELEASE ORAL at 13:00

## 2018-07-02 RX ADMIN — PANTOPRAZOLE SODIUM 40 MILLIGRAM(S): 20 TABLET, DELAYED RELEASE ORAL at 06:16

## 2018-07-02 RX ADMIN — HYDROMORPHONE HYDROCHLORIDE 2 MILLIGRAM(S): 2 INJECTION INTRAMUSCULAR; INTRAVENOUS; SUBCUTANEOUS at 01:48

## 2018-07-02 RX ADMIN — PIPERACILLIN AND TAZOBACTAM 25 GRAM(S): 4; .5 INJECTION, POWDER, LYOPHILIZED, FOR SOLUTION INTRAVENOUS at 15:39

## 2018-07-02 RX ADMIN — Medication 1 EACH: at 18:51

## 2018-07-02 RX ADMIN — HYDROMORPHONE HYDROCHLORIDE 0.5 MILLIGRAM(S): 2 INJECTION INTRAMUSCULAR; INTRAVENOUS; SUBCUTANEOUS at 13:47

## 2018-07-02 RX ADMIN — Medication 5 MILLIGRAM(S): at 06:16

## 2018-07-02 RX ADMIN — HYDROMORPHONE HYDROCHLORIDE 2 MILLIGRAM(S): 2 INJECTION INTRAMUSCULAR; INTRAVENOUS; SUBCUTANEOUS at 00:58

## 2018-07-02 NOTE — PROGRESS NOTE ADULT - ASSESSMENT
57 yo F with history gastric CA initially presenting with painless jaundice, now with fever after prolonged stay.  Attempted ERCP on 6/6, complicated by bowel perforation, emergent ex-lap  Had bile drain placed 6/8; 2 subsequent tube checks  Bile culture grew E coli, and patient received course of Cipro into Zosyn; also grew VRE  UCX with VRE S Dapto; although it is clinically uncertain that sepsis like syndrome 2/2 UTI  Abd wall hematoma, possible for IR  PICC line with TPN  Still leukocytosis, no new fever  Guarded  Overall, Fever, leukocytosis, positive culture finding, jaundice  - Zosyn 3.375g q 8  - Daptomycin 4mg/kg q 24 (please check CK)  - Abx likely for 7 day course for ? uti vs cholangitis; hematoma workup in progress  - Hematoma drainage per surgery/IR, would send cultures to determine if infected    My colleagues will be covering from 7/3-7/4. Call [phone] with questions.     Nirmal Nelson MD  Pager [phone]  After 5pm and on weekends call [phone]

## 2018-07-02 NOTE — PROGRESS NOTE ADULT - ASSESSMENT
58F s/p ERCP EUS aborted 2/2 small bowel perforation, s/p ex lap resection of small bowel side to side anastomosis currently hemodynamically stable on the floor.       Plan     - C/w Daptomycin for UTI and Zosyn per ID.   - Consult IR for drainage of intraabdominal hematoma   - NPO for possible drainage of intraabdominal hematoma   - Coags for possible drainage of intraabdominal hematoma   - Holding lovenox for possible drainage of intraabdominal hematoma today  - C/w Mathis, monitor UOP. Failed TOV x 3

## 2018-07-02 NOTE — PROGRESS NOTE ADULT - ATTENDING COMMENTS
59 y/o female s/p ERCP c/b perforation of afferent limb. Pt underwent emergent Ex-lap w/ resection of perforated bowel and stapled side to side functional end to end anastomosis. Pt remained intubated and was transferred to SICU off pressors. s/p extubation, now transferred to floor. Patient meets criteria for severe protein calorie malnutrition requiring TPN for nutritional support. TPN increased back to full volume on 6/26/18. Now brought back to 1/2 volume and calories on 6/29/18.  s/p IR tube check on 6/27/18, IR drainage of intraabdominal hematoma today     TPN infusion volume decreased to 1 L. - increased calories today in view of NPO status for planned procedure     Monitor fingersticks q 12 hours, obtain daily weights.    Labs reviewed - electrolytes adjusted in TPN     Continue TPN and lipids. Will follow up with primary team on plan - 1/2 TPN today.    1. Protein calorie malnutrition being optimized with TPN: CHO [115] gm.  AA [50] gm. SMOF Lipids [20] gm. lipids will be administered over 12 hours   2.  Hyperglycemia managed with: [0 ] units of regular insulin    3.  Check fluid balance daily.  Strict I/O  [ ] [ ]   4.  Daily BMP, Ionized Calcium, Magnesium and Phosphorous   5.  Triglycerides at initiation of TPN and monthly [ ] [ ]   6.  Pepcid in TPN for Gi prophylaxis  [ ]   I have seen and examined the patient, reviewed the laboratory and radiologic data and agree with the history, physical assessment and plan.  I reviewed and discussed with all consultants, house staff and PA's.  The note is edited where appropriate. The Nutrition Support Team (NST) discusses on an ongoing basis with the primary team and all consultants, House staff and PA's to have a permanent risk benefit analyses on all decisions.  I spent  45  minutes in total; providing diagnoses, assessment and plan.

## 2018-07-02 NOTE — PROGRESS NOTE ADULT - SUBJECTIVE AND OBJECTIVE BOX
[facility]      Chief Complaint:  Patient is a 58y old  Female who presents with a chief complaint of Painless jaundice (2018 14:49)      Interval Events: Pt still having abdominal discomfort that is improved with pain medication.    Allergies:  No Known Allergies      Hospital Medications:  chlorhexidine 4% Liquid 1 Application(s) Topical once  chlorhexidine 4% Liquid 1 Application(s) Topical <User Schedule>  cyclobenzaprine 5 milliGRAM(s) Oral three times a day PRN  DAPTOmycin IVPB 220 milliGRAM(s) IV Intermittent every 24 hours  dibucaine 1% Ointment 1 Application(s) Topical daily PRN  fat emulsion (Fish Oil and Plant Based) 20% Infusion 6.6 mL/Hr IV Continuous <Continuous>  fat emulsion (Fish Oil and Plant Based) 20% Infusion 6.6 mL/Hr IV Continuous <Continuous>  HYDROmorphone   Tablet 2 milliGRAM(s) Oral every 3 hours PRN  HYDROmorphone  Injectable 0.5 milliGRAM(s) IV Push every 4 hours PRN  metoclopramide 5 milliGRAM(s) Oral every 8 hours  morphine  - Injectable 2 milliGRAM(s) IV Push every 3 hours PRN  morphine  - Injectable 4 milliGRAM(s) IV Push every 3 hours PRN  pantoprazole    Tablet 40 milliGRAM(s) Oral before breakfast  Parenteral Nutrition - Adult 1 Each TPN Continuous <Continuous>  piperacillin/tazobactam IVPB. 3.375 Gram(s) IV Intermittent every 8 hours  sucralfate suspension 1 Gram(s) Oral four times a day      PMHX/PSHX:  Gastric cancer  Malignant neoplasm of stomach, unspecified location  No pertinent past medical history  S/P partial gastrectomy  No significant past surgical history      Family history:  No pertinent family history in first degree relatives      ROS:     General:  No fevers, chills  Eyes:  Good vision  ENT:  No odynophagia, dysphagia  CV:  No pain, palpitations  Resp:  No dyspnea, cough, tachypnea, wheezing  GI:  See HPI  Muscle:  No pain, weakness  Skin:  No rash, edema      PHYSICAL EXAM:     GENERAL:  No distress  HEENT: conjunctivae clear  CHEST:  Full & symmetric excursion, no increased effort  HEART:  Regular rhythm  ABDOMEN:  Soft, tender, non-distended, perc drain, surgical wound  EXTREMITIES:  no edema  SKIN:  Dry/warm  NEURO:  Alert    Vital Signs:  Vital Signs Last 24 Hrs  T(C): 37.2 (2018 05:52), Max: 37.9 (2018 22:03)  T(F): 98.9 (2018 05:52), Max: 100.2 (2018 22:03)  HR: 100 (2018 05:52) (100 - 111)  BP: 132/67 (2018 05:52) (130/68 - 141/81)  BP(mean): --  RR: 18 (2018 05:52) (17 - 20)  SpO2: 96% (2018 05:52) (96% - 99%)  Daily     Daily     LABS:                        8.4    18.68 )-----------( 528      ( 2018 05:30 )             25.8     07    138  |  106  |  15  ----------------------------<  91  4.0   |  21<L>  |  0.32<L>    Ca    8.0<L>      2018 05:40  Phos  3.3     07  Mg     2.2     07    TPro  5.6<L>  /  Alb  1.9<L>  /  TBili  12.8<H>  /  DBili  10.2<H>  /  AST  34<H>  /  ALT  28  /  AlkPhos  202<H>  07    LIVER FUNCTIONS - ( 2018 05:40 )  Alb: 1.9 g/dL / Pro: 5.6 g/dL / ALK PHOS: 202 u/L / ALT: 28 u/L / AST: 34 u/L / GGT: x           PT/INR - ( 2018 11:24 )   PT: 12.3 SEC;   INR: 1.11          PTT - ( 2018 11:24 )  PTT:33.9 SEC  Urinalysis Basic - ( 2018 16:00 )    Color: BROWN / Appearance: CLEAR / S.018 / pH: 6.0  Gluc: NEGATIVE / Ketone: NEGATIVE  / Bili: LARGE / Urobili: NORMAL mg/dL   Blood: MODERATE / Protein: 30 mg/dL / Nitrite: NEGATIVE   Leuk Esterase: SMALL / RBC: >50 / WBC 10-25   Sq Epi: OCC / Non Sq Epi: x / Bacteria: x          Imaging:    < from: CT Abdomen and Pelvis w/ IV Cont (18 @ 13:08) >    LIVER/BILE DUCTS: Left percutaneous biliary catheter in place. No biliary   ductal dilatation. Small lefthepatic cyst unchanged.  GALLBLADDER: Underdistended.  SPLEEN: Within normal limits.  PANCREAS: Within normal limits.  ADRENALS: Within normal limits.  KIDNEYS/URETERS: Right kidney cyst. Otherwise normal kidneys.    BLADDER: Collapsed with Mathis catheter.  REPRODUCTIVE ORGANS: Uterus and adnexa normal.    BOWEL: Partial gastrectomy with gastrojejunostomy. Appendix nonvisualized.  PERITONEUM: Moderate amount of intraperitoneal free fluid with layering   blood products. Minimal postoperative pneumoperitoneum.  VESSELS:  Within normal limits.  RETROPERITONEUM: No lymphadenopathy.    ABDOMINAL WALL: Moderate-sized hematoma in the anterior abdominal wall.   Anasarca.  BONES: Within normal limits.    IMPRESSION:     Moderate-sized hematoma in the anterior abdominal wall.    < end of copied text >

## 2018-07-02 NOTE — PROGRESS NOTE ADULT - SUBJECTIVE AND OBJECTIVE BOX
SURGICAL ONCOLGY  Patient seen and examined.     MEDICATIONS  (STANDING):  chlorhexidine 4% Liquid 1 Application(s) Topical once  chlorhexidine 4% Liquid 1 Application(s) Topical <User Schedule>  DAPTOmycin IVPB 220 milliGRAM(s) IV Intermittent every 24 hours  enoxaparin Injectable 40 milliGRAM(s) SubCutaneous daily  fat emulsion (Fish Oil and Plant Based) 20% Infusion 8.3 mL/Hr (8.3 mL/Hr) IV Continuous <Continuous>  fat emulsion (Fish Oil and Plant Based) 20% Infusion 6.6 mL/Hr (6.6 mL/Hr) IV Continuous <Continuous>  metoclopramide 5 milliGRAM(s) Oral every 8 hours  Parenteral Nutrition - Adult 1 Each (42 mL/Hr) TPN Continuous <Continuous>  piperacillin/tazobactam IVPB. 3.375 Gram(s) IV Intermittent every 8 hours  sucralfate suspension 1 Gram(s) Oral four times a day  ursodiol Capsule 300 milliGRAM(s) Oral every 12 hours    MEDICATIONS  (PRN):  cyclobenzaprine 5 milliGRAM(s) Oral three times a day PRN Muscle Spasm  dibucaine 1% Ointment 1 Application(s) Topical daily PRN hemorrhoids  HYDROmorphone   Tablet 2 milliGRAM(s) Oral every 3 hours PRN Moderate Pain (4 - 6)  HYDROmorphone  Injectable 0.5 milliGRAM(s) IV Push every 4 hours PRN breakthrough pain  morphine  - Injectable 2 milliGRAM(s) IV Push every 3 hours PRN Moderate Pain (4 - 6)  morphine  - Injectable 4 milliGRAM(s) IV Push every 3 hours PRN Severe Pain (7 - 10)      Vital Signs Last 24 Hrs  T(C): 36.9 (02 Jul 2018 19:39), Max: 37.9 (01 Jul 2018 22:03)  T(F): 98.4 (02 Jul 2018 19:39), Max: 100.2 (01 Jul 2018 22:03)  HR: 96 (02 Jul 2018 19:39) (96 - 111)  BP: 128/66 (02 Jul 2018 19:39) (119/65 - 151/75)  BP(mean): --  RR: 20 (02 Jul 2018 19:39) (17 - 20)  SpO2: 100% (02 Jul 2018 19:39) (93% - 100%)    I&O's Detail    01 Jul 2018 07:01  -  02 Jul 2018 07:00  --------------------------------------------------------  IN:    fat emulsion (Fish Oil and Plant Based) 20% Infusion: 19.8 mL    Free Water: 10 mL    IV PiggyBack: 100 mL    Oral Fluid: 300 mL    TPN (Total Parenteral Nutrition): 504 mL  Total IN: 933.8 mL    OUT:    Drain: 155 mL    Indwelling Catheter - Urethral: 2025 mL  Total OUT: 2180 mL    Total NET: -1246.2 mL      02 Jul 2018 07:01  -  02 Jul 2018 20:57  --------------------------------------------------------  IN:  Total IN: 0 mL    OUT:    Drain: 105 mL    Indwelling Catheter - Urethral: 850 mL  Total OUT: 955 mL    Total NET: -955 mL          Daily     Daily     LABS:                        8.4    18.68 )-----------( 528      ( 02 Jul 2018 05:30 )             25.8     07-02    138  |  104  |  14  ----------------------------<  113<H>  4.0   |  21<L>  |  0.35<L>    Ca    7.8<L>      02 Jul 2018 05:30  Phos  3.4     07-02  Mg     2.3     07-02    TPro  5.5<L>  /  Alb  2.0<L>  /  TBili  12.9<H>  /  DBili  x   /  AST  37<H>  /  ALT  31  /  AlkPhos  264<H>  07-02    PT/INR - ( 01 Jul 2018 11:24 )   PT: 12.3 SEC;   INR: 1.11          PTT - ( 01 Jul 2018 11:24 )  PTT:33.9 SEC      Chief complaint: abd pain  PHYSICAL EXAM:  Gen: soft, mild distension, non tender, PTC drain with bile  Abd:     58yFemale s/p SBR for perforated bowel  Plan:   -Diet as ol  - Replace midline dressing to stoma appliance for controlled drainage  - Would obtain palliative consult for goals of care discussion- family at this point hesitant

## 2018-07-02 NOTE — PROGRESS NOTE ADULT - SUBJECTIVE AND OBJECTIVE BOX
Patient is a 58y old  Female who presents with a chief complaint of Painless jaundice (2018 14:49)      SUBJECTIVE / OVERNIGHT EVENTS:  still w abd discomfort  MEDICATIONS  (STANDING):  chlorhexidine 4% Liquid 1 Application(s) Topical once  chlorhexidine 4% Liquid 1 Application(s) Topical <User Schedule>  DAPTOmycin IVPB 220 milliGRAM(s) IV Intermittent every 24 hours  fat emulsion (Fish Oil and Plant Based) 20% Infusion 6.6 mL/Hr (6.6 mL/Hr) IV Continuous <Continuous>  fat emulsion (Fish Oil and Plant Based) 20% Infusion 6.6 mL/Hr (6.6 mL/Hr) IV Continuous <Continuous>  metoclopramide 5 milliGRAM(s) Oral every 8 hours  pantoprazole    Tablet 40 milliGRAM(s) Oral before breakfast  Parenteral Nutrition - Adult 1 Each (42 mL/Hr) TPN Continuous <Continuous>  piperacillin/tazobactam IVPB. 3.375 Gram(s) IV Intermittent every 8 hours  sucralfate suspension 1 Gram(s) Oral four times a day    MEDICATIONS  (PRN):  cyclobenzaprine 5 milliGRAM(s) Oral three times a day PRN Muscle Spasm  dibucaine 1% Ointment 1 Application(s) Topical daily PRN hemorrhoids  HYDROmorphone   Tablet 2 milliGRAM(s) Oral every 3 hours PRN Moderate Pain (4 - 6)  HYDROmorphone  Injectable 0.5 milliGRAM(s) IV Push every 4 hours PRN breakthrough pain  morphine  - Injectable 2 milliGRAM(s) IV Push every 3 hours PRN Moderate Pain (4 - 6)  morphine  - Injectable 4 milliGRAM(s) IV Push every 3 hours PRN Severe Pain (7 - 10)              PHYSICAL EXAM:  GENERAL: NAD, well-developed  HEAD:  Atraumatic, Normocephalic  EYES: icteric  NECK: Supple, No JVD  CHEST/LUNG: Clear to auscultation bilaterally; No wheeze  HEART: Regular rate and rhythm; No murmurs, rubs, or gallops  ABDOMEN: Soft, suture line distended; Bowel sounds present, no hepatosplenomegaly, no rebound or guarding, perc drain w bile  EXTREMITIES:  2+ Peripheral Pulses, No clubbing, cyanosis, or edema  PSYCH: AAOx3  NEUROLOGY: non-focal, no asterixis  SKIN: jaundice    LABS:                        8.4    18.68 )-----------( 528      ( 2018 05:30 )             25.8     07-    138  |  104  |  14  ----------------------------<  113<H>  4.0   |  21<L>  |  0.35<L>    Ca    7.8<L>      2018 05:30  Phos  3.4     07  Mg     2.3         TPro  5.5<L>  /  Alb  2.0<L>  /  TBili  12.9<H>  /  DBili  x   /  AST  37<H>  /  ALT  31  /  AlkPhos  264<H>      LIVER FUNCTIONS - ( 2018 05:30 )  Alb: 2.0 g/dL / Pro: 5.5 g/dL / ALK PHOS: 264 u/L / ALT: 31 u/L / AST: 37 u/L / GGT: x           PT/INR - ( 2018 11:24 )   PT: 12.3 SEC;   INR: 1.11          PTT - ( 2018 11:24 )  PTT:33.9 SEC  CARDIAC MARKERS ( 2018 05:30 )  x     / x     / 31 u/L / x     / x      CARDIAC MARKERS ( 2018 11:24 )  x     / x     / 19 u/L / x     / x      CARDIAC MARKERS ( 2018 05:40 )  x     / x     / 19 u/L / x     / x          Urinalysis Basic - ( 2018 16:00 )    Color: BROWN / Appearance: CLEAR / S.018 / pH: 6.0  Gluc: NEGATIVE / Ketone: NEGATIVE  / Bili: LARGE / Urobili: NORMAL mg/dL   Blood: MODERATE / Protein: 30 mg/dL / Nitrite: NEGATIVE   Leuk Esterase: SMALL / RBC: >50 / WBC 10-25   Sq Epi: OCC / Non Sq Epi: x / Bacteria: x        RADIOLOGY & ADDITIONAL TESTS: Patient is a 58y old  Female who presents with a chief complaint of Painless jaundice (2018 14:49)      SUBJECTIVE / OVERNIGHT EVENTS:  still w abd discomfort, also w/ dyspnea  MEDICATIONS  (STANDING):  chlorhexidine 4% Liquid 1 Application(s) Topical once  chlorhexidine 4% Liquid 1 Application(s) Topical <User Schedule>  DAPTOmycin IVPB 220 milliGRAM(s) IV Intermittent every 24 hours  fat emulsion (Fish Oil and Plant Based) 20% Infusion 6.6 mL/Hr (6.6 mL/Hr) IV Continuous <Continuous>  fat emulsion (Fish Oil and Plant Based) 20% Infusion 6.6 mL/Hr (6.6 mL/Hr) IV Continuous <Continuous>  metoclopramide 5 milliGRAM(s) Oral every 8 hours  pantoprazole    Tablet 40 milliGRAM(s) Oral before breakfast  Parenteral Nutrition - Adult 1 Each (42 mL/Hr) TPN Continuous <Continuous>  piperacillin/tazobactam IVPB. 3.375 Gram(s) IV Intermittent every 8 hours  sucralfate suspension 1 Gram(s) Oral four times a day    MEDICATIONS  (PRN):  cyclobenzaprine 5 milliGRAM(s) Oral three times a day PRN Muscle Spasm  dibucaine 1% Ointment 1 Application(s) Topical daily PRN hemorrhoids  HYDROmorphone   Tablet 2 milliGRAM(s) Oral every 3 hours PRN Moderate Pain (4 - 6)  HYDROmorphone  Injectable 0.5 milliGRAM(s) IV Push every 4 hours PRN breakthrough pain  morphine  - Injectable 2 milliGRAM(s) IV Push every 3 hours PRN Moderate Pain (4 - 6)  morphine  - Injectable 4 milliGRAM(s) IV Push every 3 hours PRN Severe Pain (7 - 10)              PHYSICAL EXAM:  GENERAL: NAD, well-developed  HEAD:  Atraumatic, Normocephalic  EYES: icteric  NECK: Supple, No JVD  CHEST/LUNG: Clear to auscultation bilaterally; No wheeze  HEART: Regular rate and rhythm; No murmurs, rubs, or gallops  ABDOMEN: Soft, suture line distended; Bowel sounds present, no hepatosplenomegaly, no rebound or guarding, perc drain w bile  EXTREMITIES:  2+ Peripheral Pulses, No clubbing, cyanosis, or edema  PSYCH: AAOx3  NEUROLOGY: non-focal, no asterixis  SKIN: jaundice    LABS:                        8.4    18.68 )-----------( 528      ( 2018 05:30 )             25.8     07-02    138  |  104  |  14  ----------------------------<  113<H>  4.0   |  21<L>  |  0.35<L>    Ca    7.8<L>      2018 05:30  Phos  3.4       Mg     2.3         TPro  5.5<L>  /  Alb  2.0<L>  /  TBili  12.9<H>  /  DBili  x   /  AST  37<H>  /  ALT  31  /  AlkPhos  264<H>      LIVER FUNCTIONS - ( 2018 05:30 )  Alb: 2.0 g/dL / Pro: 5.5 g/dL / ALK PHOS: 264 u/L / ALT: 31 u/L / AST: 37 u/L / GGT: x           PT/INR - ( 2018 11:24 )   PT: 12.3 SEC;   INR: 1.11          PTT - ( 2018 11:24 )  PTT:33.9 SEC  CARDIAC MARKERS ( 2018 05:30 )  x     / x     / 31 u/L / x     / x      CARDIAC MARKERS ( 2018 11:24 )  x     / x     / 19 u/L / x     / x      CARDIAC MARKERS ( 2018 05:40 )  x     / x     / 19 u/L / x     / x          Urinalysis Basic - ( 2018 16:00 )    Color: BROWN / Appearance: CLEAR / S.018 / pH: 6.0  Gluc: NEGATIVE / Ketone: NEGATIVE  / Bili: LARGE / Urobili: NORMAL mg/dL   Blood: MODERATE / Protein: 30 mg/dL / Nitrite: NEGATIVE   Leuk Esterase: SMALL / RBC: >50 / WBC 10-25   Sq Epi: OCC / Non Sq Epi: x / Bacteria: x        RADIOLOGY & ADDITIONAL TESTS:

## 2018-07-02 NOTE — PROGRESS NOTE ADULT - ATTENDING COMMENTS
Patient was seen and examined with GI fellow. Agree with above.  Patient remained jaundiced with hyperbilirubinemia of 12.9. abnormal ALP improved to 260.   Will recommend Baldo and discuss with biliary team for workup if hyperbilirubinemia does not improve further.

## 2018-07-02 NOTE — PROGRESS NOTE ADULT - SUBJECTIVE AND OBJECTIVE BOX
CC: F/U Leukocytosis    Saw/spoke to patient. No fevers, no chills. Overall unchanged. No new complaints. Surgery team released staples at bedside to drain from hematoma.    Allergies  No Known Allergies    ANTIMICROBIALS:  DAPTOmycin IVPB 220 every 24 hours  piperacillin/tazobactam IVPB. 3.375 every 8 hours    PE:    Vital Signs Last 24 Hrs  T(C): 37.1 (2018 12:38), Max: 37.9 (2018 22:03)  T(F): 98.8 (2018 12:38), Max: 100.2 (2018 22:03)  HR: 102 (2018 12:38) (98 - 111)  BP: 151/75 (2018 12:38) (131/78 - 151/75)  RR: 17 (2018 12:38) (17 - 20)  SpO2: 96% (2018 12:38) (95% - 98%)    Gen: AOx3, NAD, non-toxic, pleasant, jaundice  CV: S1+S2 normal, no murmurs, tachycardic  Resp: Clear bilat, no resp distress, no crackles/wheezes  Abd: Drainage from hematoma with overlying dressing  Ext: No LE edema, no wounds    LABS:                        8.4    18.68 )-----------( 528      ( 2018 05:30 )             25.8     07-02    138  |  104  |  14  ----------------------------<  113<H>  4.0   |  21<L>  |  0.35<L>    Ca    7.8<L>      2018 05:30  Phos  3.4     07-02  Mg     2.3     07-02    TPro  5.5<L>  /  Alb  2.0<L>  /  TBili  12.9<H>  /  DBili  x   /  AST  37<H>  /  ALT  31  /  AlkPhos  264<H>  07-02    Urinalysis Basic - ( 2018 16:00 )    Color: BROWN / Appearance: CLEAR / S.018 / pH: 6.0  Gluc: NEGATIVE / Ketone: NEGATIVE  / Bili: LARGE / Urobili: NORMAL mg/dL   Blood: MODERATE / Protein: 30 mg/dL / Nitrite: NEGATIVE   Leuk Esterase: SMALL / RBC: >50 / WBC 10-25   Sq Epi: OCC / Non Sq Epi: x / Bacteria: x    MICROBIOLOGY:    BLOOD  18 NGTD    URINE CATHETER  18 Candida    URINE MIDSTREAM  18 --  --  Enterococcus faecium VRE  Candida albicans    (otherwise reviewed)    RADIOLOGY:     CXR:    INTERPRETATION:     Heart size and the mediastinum cannot be accurately evaluated on this   projection.  A left upper extremity PICC line is unchanged in position.  A catheter are again overlies the abdomen.  A small right pleural effusion with likely associated passive atelectasis   is unchanged. There may be a trace left pleural effusion.  No pneumothorax seen.

## 2018-07-02 NOTE — CHART NOTE - NSCHARTNOTEFT_GEN_A_CORE
Surg Onc    150cc of old blood and serosang fluid drained from midline abdominal wound. Wound opened at superior and inferior aspect and probed with qtip. 1 inch packing left in place to facilitate continued drainage. Patient tolerated procedure well.    D team   [id number]

## 2018-07-02 NOTE — PROGRESS NOTE ADULT - SUBJECTIVE AND OBJECTIVE BOX
GENERAL SURGERY DAILY PROGRESS NOTE:         Subjective:  Pt seen and examined at the bedside. No acute events overnight. Pt continues to endorse abd pain, worst around the region of her abdominal wall bulge. Denies N/V. NPO for possible drainage of abdominal wall hematoma by IR. Passing flatus and BM.         Objective:    PE:  Gen: Ill appearing woman resting in bed. Appears uncomfortable   Resp: breathing comfortably   Abd: soft, ND, NT, no rebound or guarding  Ext: no edema, WWP  Neuro: AAOx3, no focal deficits    Vital Signs   T(F): 99.5  HR: 98  BP: 131/78  RR: 20  SpO2: 95% RA      I&O's Detail    03 Jul 2018 07:01  -  04 Jul 2018 07:00  --------------------------------------------------------  IN:  Total IN:     OUT:  Total OUT: 955 mL    Total NET: - 955 mL         MEDICATIONS  (STANDING):  chlorhexidine 4% Liquid 1 Application(s) Topical once  chlorhexidine 4% Liquid 1 Application(s) Topical <User Schedule>  DAPTOmycin IVPB 220 milliGRAM(s) IV Intermittent every 24 hours  enoxaparin Injectable 40 milliGRAM(s) SubCutaneous daily  HYDROmorphone  Injectable 0.2 milliGRAM(s) IV Push every 4 hours  metoclopramide 5 milliGRAM(s) Oral every 8 hours  piperacillin/tazobactam IVPB. 3.375 Gram(s) IV Intermittent every 8 hours  sucralfate suspension 1 Gram(s) Oral four times a day    MEDICATIONS  (PRN):  cyclobenzaprine 5 milliGRAM(s) Oral three times a day PRN Muscle Spasm  dibucaine 1% Ointment 1 Application(s) Topical daily PRN hemorrhoids  HYDROmorphone  Injectable 0.2 milliGRAM(s) IV Push every 4 hours PRN Moderate Pain (4 - 6)      LABS:                        8.4    18.6 )-----------( 528             25.8        138  |  104  |  14  ----------------------------<  113  4.0   |  21  |  0.35<L>    Ca    7.8<L>     Phos  3.4       Mg     2.3         TPro  5.5<L>  /  Alb  2.0<L>  /  TBili  12.9<H>  /  DBili  x   /  AST  37<H>  /  ALT  31  /  AlkPhos  264<H>           RADIOLOGY & ADDITIONAL STUDIES: GENERAL SURGERY DAILY PROGRESS NOTE:         Subjective:  Pt seen and examined at the bedside. No acute events overnight. Pt continues to endorse abd pain, worst around the region of her abdominal wall bulge. Denies N/V. NPO for possible drainage of abdominal wall hematoma by IR. Passing flatus and BM.         Objective:    PE:  Gen: Ill appearing woman resting in bed. Appears uncomfortable   Resp: breathing comfortably   Abd: soft. Midline incision w/ underlying bulge appears slightly enlarged from yesterday, NT, no rebound or guarding.     Vital Signs   T(F): 99.5  HR: 98  BP: 131/78  RR: 20  SpO2: 95% RA      I&O's Detail    03 Jul 2018 07:01  -  04 Jul 2018 07:00  --------------------------------------------------------  IN:  Total IN:     OUT:  Total OUT: 955 mL    Total NET: - 955 mL         MEDICATIONS  (STANDING):  chlorhexidine 4% Liquid 1 Application(s) Topical once  chlorhexidine 4% Liquid 1 Application(s) Topical <User Schedule>  DAPTOmycin IVPB 220 milliGRAM(s) IV Intermittent every 24 hours  enoxaparin Injectable 40 milliGRAM(s) SubCutaneous daily  HYDROmorphone  Injectable 0.2 milliGRAM(s) IV Push every 4 hours  metoclopramide 5 milliGRAM(s) Oral every 8 hours  piperacillin/tazobactam IVPB. 3.375 Gram(s) IV Intermittent every 8 hours  sucralfate suspension 1 Gram(s) Oral four times a day    MEDICATIONS  (PRN):  cyclobenzaprine 5 milliGRAM(s) Oral three times a day PRN Muscle Spasm  dibucaine 1% Ointment 1 Application(s) Topical daily PRN hemorrhoids  HYDROmorphone  Injectable 0.2 milliGRAM(s) IV Push every 4 hours PRN Moderate Pain (4 - 6)      LABS:                        8.4    18.6 )-----------( 528             25.8        138  |  104  |  14  ----------------------------<  113  4.0   |  21  |  0.35<L>    Ca    7.8<L>     Phos  3.4       Mg     2.3         TPro  5.5<L>  /  Alb  2.0<L>  /  TBili  12.9<H>  /  DBili  x   /  AST  37<H>  /  ALT  31  /  AlkPhos  264<H>           RADIOLOGY & ADDITIONAL STUDIES:

## 2018-07-02 NOTE — PROGRESS NOTE ADULT - ASSESSMENT
Impression:    1. Hyperbilirubinemia: CT reviewed w radiology, no persistent biliary duct dilation. Suspect intrahepatic cholestasis, likely multifactorial. Factors may include acute illness with possible infection, resorption of intraabdominal hematoma, DILI(unclear which if any drugs might be contributing), less likely TPN given short duration on TPN.    2. Intraabdominal hematoma with possible superinfection, the lovenox may be contributing to this    3. Fever, possible urinary or intra-abdominal source    4. VRE UTI    Recommendation:  -surgical follow up for intraabdominal collection. It is causing patient significant discomfort  -treat underlying infection  -trend Liver chemistries  -would favor holding pantoprazole (there is pepcid in the TPN) and any other nonessential meds (?reglan/flexeril) to remove any possible agents that could be causing DILI Impression:    1. Hyperbilirubinemia: CT reviewed w radiology, no persistent biliary duct dilation. Suspect intrahepatic cholestasis, likely multifactorial. Factors may include acute illness with possible infection, resorption of intraabdominal hematoma, DILI(unclear which if any drugs might be contributing), less likely TPN given short duration on TPN.    2. Intraabdominal hematoma with possible superinfection, the lovenox may be contributing to this    3. Fever, possible urinary or intra-abdominal source    4. VRE UTI    Recommendation:  -surgical follow up for intraabdominal collection. It is causing patient significant discomfort  -treat underlying infection  -trend Liver chemistries  -would favor holding pantoprazole (there is pepcid in the TPN) and any other nonessential meds (?reglan/flexeril) to remove any possible agents that could be causing DILI  -start ursodiol 300mg BID Impression:    1. Hyperbilirubinemia: CT reviewed w radiology, no persistent biliary duct dilation. Suspect intrahepatic cholestasis, likely multifactorial. Factors may include acute illness with possible infection, resorption of intraabdominal hematoma, DILI(unclear which if any drugs might be contributing), less likely TPN given short duration on TPN.    2. Intraabdominal hematoma with possible superinfection, the lovenox may be contributing to this    3. Fever, possible urinary or intra-abdominal source    4. VRE UTI    5. Dyspnea: ?rule out PE    Recommendation:  -surgical follow up for intraabdominal collection. It is causing patient significant discomfort  -treat underlying infection  -trend Liver chemistries  -would favor holding pantoprazole (there is pepcid in the TPN) and any other nonessential meds (?reglan/flexeril) to remove any possible agents that could be causing DILI  -start ursodiol 300mg BID  -consider ruling out PE for dyspnea Impression:    1. Hyperbilirubinemia: CT reviewed w radiology, no persistent biliary duct dilation. Suspect intrahepatic cholestasis, likely multifactorial. Factors may include acute illness with possible infection, resorption of intraabdominal hematoma, DILI(unclear which if any drugs might be contributing), less likely TPN given short duration on TPN.    2. Intraabdominal hematoma with possible superinfection, the lovenox may be contributing to this    3. Fever, possible urinary or intra-abdominal source    4. VRE UTI    5. Dyspnea: ?rule out PE    Recommendation:  -surgical follow up for intraabdominal collection. It has caused patient significant discomfort  -treat underlying infection  -trend Liver chemistries  -would favor holding pantoprazole (there is pepcid in the TPN) and any other nonessential meds (?reglan/flexeril) to remove any possible agents that could be causing DILI  -start ursodiol 500mg BID  -consider ruling out PE for dyspnea

## 2018-07-02 NOTE — PROGRESS NOTE ADULT - SUBJECTIVE AND OBJECTIVE BOX
NUTRITION NOTE  [id number] CHOKYI  ===============================    Interval events  Patient was seen and examined at bedside, no acute overnight events.   TPN/lipids initiated on 18, patient is tolerating without any issues.  Patient states that is trying to eat small amounts and continues to drink Ensure Clears throughout the day. She has been able to tolerate some po intake but does not have too much of an appetite.   TPN/lipids at 1L infusion volume today with 1/2 calories.   NPO today for planned IR drainage of intraabdominal hematoma    Vital Signs Last 24 Hrs  T(C): 37.5 (2018 09:16), Max: 37.9 (2018 22:03)  T(F): 99.5 (2018 09:16), Max: 100.2 (2018 22:03)  HR: 98 (2018 09:16) (98 - 111)  BP: 131/78 (2018 09:16) (131/78 - 141/81)  RR: 20 (2018 09:16) (17 - 20)  SpO2: 95% (2018 09:16) (95% - 99%)    MEDICATIONS  (STANDING):  chlorhexidine 4% Liquid 1 Application(s) Topical once  chlorhexidine 4% Liquid 1 Application(s) Topical <User Schedule>  DAPTOmycin IVPB 220 milliGRAM(s) IV Intermittent every 24 hours  fat emulsion (Fish Oil and Plant Based) 20% Infusion 6.6 mL/Hr (6.6 mL/Hr) IV Continuous <Continuous>  fat emulsion (Fish Oil and Plant Based) 20% Infusion 8.3 mL/Hr (8.3 mL/Hr) IV Continuous <Continuous>  metoclopramide 5 milliGRAM(s) Oral every 8 hours  pantoprazole    Tablet 40 milliGRAM(s) Oral before breakfast  Parenteral Nutrition - Adult 1 Each (42 mL/Hr) TPN Continuous <Continuous>  Parenteral Nutrition - Adult 1 Each (42 mL/Hr) TPN Continuous <Continuous>  piperacillin/tazobactam IVPB. 3.375 Gram(s) IV Intermittent every 8 hours  sucralfate suspension 1 Gram(s) Oral four times a day    MEDICATIONS  (PRN):  cyclobenzaprine 5 milliGRAM(s) Oral three times a day PRN Muscle Spasm  dibucaine 1% Ointment 1 Application(s) Topical daily PRN hemorrhoids  HYDROmorphone   Tablet 2 milliGRAM(s) Oral every 3 hours PRN Moderate Pain (4 - 6)  HYDROmorphone  Injectable 0.5 milliGRAM(s) IV Push every 4 hours PRN breakthrough pain  morphine  - Injectable 2 milliGRAM(s) IV Push every 3 hours PRN Moderate Pain (4 - 6)  morphine  - Injectable 4 milliGRAM(s) IV Push every 3 hours PRN Severe Pain (7 - 10)    I&O's Detail    2018 07:01  -  2018 07:00  --------------------------------------------------------  IN:    fat emulsion (Fish Oil and Plant Based) 20% Infusion: 19.8 mL    Free Water: 10 mL    IV PiggyBack: 100 mL    Oral Fluid: 300 mL    TPN (Total Parenteral Nutrition): 504 mL  Total IN: 933.8 mL    OUT:    Drain: 155 mL    Indwelling Catheter - Urethral: 2025 mL  Total OUT: 2180 mL    Total NET: -1246.2 mL    POCT Blood Glucose.: 92 mg/dL (2018 17:42)  POCT Blood Glucose.: 120 mg/dL (2018 11:39)    Daily Weight in k.8 (2018 00:32)    Drug Dosing Weight  Height (cm): 160.02 (2018 15:53)  Weight (kg): 54 (2018 15:53)  BMI (kg/m2): 21.1 (2018 15:53)  BSA (m2): 1.55 (2018 15:53)    PHYSICAL EXAM   Constitutional: no acute distress, resting in bed   Respiratory: nonlabored respirations   Gastrointestinal: soft, minimally tender, moderately distended   Extremities: warm, generalized edema    PICC Site: C/D/I    Diet: soft diet and TPN (NPO today for IR procedure)      Culture - Blood (collected 2018 18:36)  Source: BLOOD  Preliminary Report (2018 18:37):    NO ORGANISMS ISOLATED    NO ORGANISMS ISOLATED AT 24 HOURS    Culture - Urine (collected 2018 16:11)  Source: URINE CATHETER  Preliminary Report (2018 08:41):    YEAST^YEAST.    COLONY COUNT: 50,000-99,000 CFU/mL    LABORATORY                        8.4    18.68 )-----------( 528      ( 2018 05:30 )             25.8   07-02    138  |  104  |  14  ----------------------------<  113<H>  4.0   |  21<L>  |  0.35<L>    Ca    7.8<L>      2018 05:30  Phos  3.4     07-  Mg     2.3     07-    TPro  5.5<L>  /  Alb  2.0<L>  /  TBili  12.9<H>  /  DBili  x   /  AST  37<H>  /  ALT  31  /  AlkPhos  264<H>      LIVER FUNCTIONS - ( 2018 05:45 )  Alb: 1.8 g/dL / Pro: 5.1 g/dL / ALK PHOS: 198 u/L / ALT: 27 u/L / AST: 31 u/L / GGT: x           06-20 Chol -- LDL -- HDL -- Trig 138, 06-12 Chol -- LDL -- HDL -- Trig 112, 06-05 Chol 156  HDL 8<L> Trig 85, 05-19 Chol 122 LDL 73 HDL 33<L> Trig 78    Prealbumin 18: 10    ASSESSMENT/PLAN:  57 y/o female s/p ERCP c/b perforation of afferent limb. Pt underwent emergent Ex-lap w/ resection of perforated bowel and stapled side to side functional end to end anastomosis. Pt remained intubated and was transferred to SICU off pressors. s/p extubation, now transferred to floor. Patient meets criteria for severe protein calorie malnutrition requiring TPN for nutritional support. TPN increased back to full volume on 18. Now brought back to 1/2 volume and calories on 18.  s/p IR tube check on 18, IR drainage of intraabdominal hematoma today     TPN infusion volume decreased to 1 L. - increased calories today in view of NPO status for planned procedure     Monitor fingersticks q 12 hours, obtain daily weights.    Labs reviewed - electrolytes adjusted in TPN     Continue TPN and lipids. Will follow up with primary team on plan - 1/2 TPN today.    1. Protein calorie malnutrition being optimized with TPN: CHO [115] gm.  AA [50] gm. SMOF Lipids [20] gm. lipids will be administered over 12 hours   2.  Hyperglycemia managed with: [0 ] units of regular insulin    3.  Check fluid balance daily.  Strict I/O  [ ] [ ]   4.  Daily BMP, Ionized Calcium, Magnesium and Phosphorous   5.  Triglycerides at initiation of TPN and monthly [ ] [ ]   6.  Pepcid in TPN for Gi prophylaxis  [ ]     Nutrition support pager [phone]

## 2018-07-02 NOTE — PROGRESS NOTE ADULT - ASSESSMENT
Impression:  1. Unsuccessful ERCP complicated by small bowel perforation 6/6/18 s/p ex-lap with small bowel resection and anastomosis 6/6/18  2. Obstructive jaundice with dilated CBD and 1.4 cm enhancing pancreatic head mass s/p percutaneous drainage, with persistent hyperbilirubinemia   3. 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 Acute blood loss anemia with abdominal wall hematoma and hemoperitoneum noted on CTAP    Plan:  - Follow up ID  - Full infectious workup, follow up all culture data  - Monitor CBC, CMP, INR  - Miralax as needed  - Continue antibiotics  - IR for drainage of hematoma today.

## 2018-07-03 DIAGNOSIS — R06.00 DYSPNEA, UNSPECIFIED: ICD-10-CM

## 2018-07-03 DIAGNOSIS — R10.9 UNSPECIFIED ABDOMINAL PAIN: ICD-10-CM

## 2018-07-03 DIAGNOSIS — C16.9 MALIGNANT NEOPLASM OF STOMACH, UNSPECIFIED: ICD-10-CM

## 2018-07-03 DIAGNOSIS — Z51.5 ENCOUNTER FOR PALLIATIVE CARE: ICD-10-CM

## 2018-07-03 LAB
ALBUMIN SERPL ELPH-MCNC: 1.6 G/DL — LOW (ref 3.3–5)
ALP SERPL-CCNC: 318 U/L — HIGH (ref 40–120)
ALT FLD-CCNC: 38 U/L — HIGH (ref 4–33)
AST SERPL-CCNC: 40 U/L — HIGH (ref 4–32)
BASOPHILS # BLD AUTO: 0.05 K/UL — SIGNIFICANT CHANGE UP (ref 0–0.2)
BASOPHILS NFR BLD AUTO: 0.3 % — SIGNIFICANT CHANGE UP (ref 0–2)
BILIRUB SERPL-MCNC: 13 MG/DL — HIGH (ref 0.2–1.2)
BUN SERPL-MCNC: 13 MG/DL — SIGNIFICANT CHANGE UP (ref 7–23)
CA-I BLD-SCNC: 1.14 MMOL/L — SIGNIFICANT CHANGE UP (ref 1.03–1.23)
CALCIUM SERPL-MCNC: 7.9 MG/DL — LOW (ref 8.4–10.5)
CHLORIDE SERPL-SCNC: 104 MMOL/L — SIGNIFICANT CHANGE UP (ref 98–107)
CO2 SERPL-SCNC: 22 MMOL/L — SIGNIFICANT CHANGE UP (ref 22–31)
CREAT SERPL-MCNC: 0.32 MG/DL — LOW (ref 0.5–1.3)
EOSINOPHIL # BLD AUTO: 0.2 K/UL — SIGNIFICANT CHANGE UP (ref 0–0.5)
EOSINOPHIL NFR BLD AUTO: 1.1 % — SIGNIFICANT CHANGE UP (ref 0–6)
GLUCOSE SERPL-MCNC: 102 MG/DL — HIGH (ref 70–99)
HCT VFR BLD CALC: 24.8 % — LOW (ref 34.5–45)
HGB BLD-MCNC: 8.1 G/DL — LOW (ref 11.5–15.5)
IMM GRANULOCYTES # BLD AUTO: 0.66 # — SIGNIFICANT CHANGE UP
IMM GRANULOCYTES NFR BLD AUTO: 3.8 % — HIGH (ref 0–1.5)
LYMPHOCYTES # BLD AUTO: 0.98 K/UL — LOW (ref 1–3.3)
LYMPHOCYTES # BLD AUTO: 5.6 % — LOW (ref 13–44)
MAGNESIUM SERPL-MCNC: 2.1 MG/DL — SIGNIFICANT CHANGE UP (ref 1.6–2.6)
MCHC RBC-ENTMCNC: 31.2 PG — SIGNIFICANT CHANGE UP (ref 27–34)
MCHC RBC-ENTMCNC: 32.7 % — SIGNIFICANT CHANGE UP (ref 32–36)
MCV RBC AUTO: 95.4 FL — SIGNIFICANT CHANGE UP (ref 80–100)
MONOCYTES # BLD AUTO: 0.76 K/UL — SIGNIFICANT CHANGE UP (ref 0–0.9)
MONOCYTES NFR BLD AUTO: 4.4 % — SIGNIFICANT CHANGE UP (ref 2–14)
NEUTROPHILS # BLD AUTO: 14.81 K/UL — HIGH (ref 1.8–7.4)
NEUTROPHILS NFR BLD AUTO: 84.8 % — HIGH (ref 43–77)
NRBC # FLD: 0 — SIGNIFICANT CHANGE UP
PHOSPHATE SERPL-MCNC: 2.9 MG/DL — SIGNIFICANT CHANGE UP (ref 2.5–4.5)
PLATELET # BLD AUTO: 587 K/UL — HIGH (ref 150–400)
PMV BLD: 10 FL — SIGNIFICANT CHANGE UP (ref 7–13)
POTASSIUM SERPL-MCNC: 3.8 MMOL/L — SIGNIFICANT CHANGE UP (ref 3.5–5.3)
POTASSIUM SERPL-SCNC: 3.8 MMOL/L — SIGNIFICANT CHANGE UP (ref 3.5–5.3)
PROT SERPL-MCNC: 5.5 G/DL — LOW (ref 6–8.3)
RBC # BLD: 2.6 M/UL — LOW (ref 3.8–5.2)
RBC # FLD: 22.4 % — HIGH (ref 10.3–14.5)
SODIUM SERPL-SCNC: 136 MMOL/L — SIGNIFICANT CHANGE UP (ref 135–145)
WBC # BLD: 17.46 K/UL — HIGH (ref 3.8–10.5)
WBC # FLD AUTO: 17.46 K/UL — HIGH (ref 3.8–10.5)

## 2018-07-03 PROCEDURE — 99232 SBSQ HOSP IP/OBS MODERATE 35: CPT | Mod: GC

## 2018-07-03 PROCEDURE — 99223 1ST HOSP IP/OBS HIGH 75: CPT

## 2018-07-03 PROCEDURE — 99233 SBSQ HOSP IP/OBS HIGH 50: CPT

## 2018-07-03 RX ORDER — ELECTROLYTE SOLUTION,INJ
1 VIAL (ML) INTRAVENOUS
Qty: 0 | Refills: 0 | Status: DISCONTINUED | OUTPATIENT
Start: 2018-07-03 | End: 2018-07-03

## 2018-07-03 RX ORDER — HYDROMORPHONE HYDROCHLORIDE 2 MG/ML
0.2 INJECTION INTRAMUSCULAR; INTRAVENOUS; SUBCUTANEOUS EVERY 4 HOURS
Qty: 0 | Refills: 0 | Status: DISCONTINUED | OUTPATIENT
Start: 2018-07-03 | End: 2018-07-05

## 2018-07-03 RX ORDER — I.V. FAT EMULSION 20 G/100ML
8.3 EMULSION INTRAVENOUS
Qty: 20 | Refills: 0 | Status: DISCONTINUED | OUTPATIENT
Start: 2018-07-03 | End: 2018-07-05

## 2018-07-03 RX ADMIN — HYDROMORPHONE HYDROCHLORIDE 0.5 MILLIGRAM(S): 2 INJECTION INTRAMUSCULAR; INTRAVENOUS; SUBCUTANEOUS at 11:50

## 2018-07-03 RX ADMIN — HYDROMORPHONE HYDROCHLORIDE 0.5 MILLIGRAM(S): 2 INJECTION INTRAMUSCULAR; INTRAVENOUS; SUBCUTANEOUS at 12:10

## 2018-07-03 RX ADMIN — ENOXAPARIN SODIUM 40 MILLIGRAM(S): 100 INJECTION SUBCUTANEOUS at 13:22

## 2018-07-03 RX ADMIN — MORPHINE SULFATE 2 MILLIGRAM(S): 50 CAPSULE, EXTENDED RELEASE ORAL at 01:00

## 2018-07-03 RX ADMIN — HYDROMORPHONE HYDROCHLORIDE 2 MILLIGRAM(S): 2 INJECTION INTRAMUSCULAR; INTRAVENOUS; SUBCUTANEOUS at 15:30

## 2018-07-03 RX ADMIN — PIPERACILLIN AND TAZOBACTAM 25 GRAM(S): 4; .5 INJECTION, POWDER, LYOPHILIZED, FOR SOLUTION INTRAVENOUS at 13:22

## 2018-07-03 RX ADMIN — Medication 1 GRAM(S): at 17:45

## 2018-07-03 RX ADMIN — Medication 1 EACH: at 17:41

## 2018-07-03 RX ADMIN — Medication 5 MILLIGRAM(S): at 21:13

## 2018-07-03 RX ADMIN — PIPERACILLIN AND TAZOBACTAM 25 GRAM(S): 4; .5 INJECTION, POWDER, LYOPHILIZED, FOR SOLUTION INTRAVENOUS at 06:24

## 2018-07-03 RX ADMIN — CYCLOBENZAPRINE HYDROCHLORIDE 5 MILLIGRAM(S): 10 TABLET, FILM COATED ORAL at 04:13

## 2018-07-03 RX ADMIN — HYDROMORPHONE HYDROCHLORIDE 0.2 MILLIGRAM(S): 2 INJECTION INTRAMUSCULAR; INTRAVENOUS; SUBCUTANEOUS at 21:13

## 2018-07-03 RX ADMIN — HYDROMORPHONE HYDROCHLORIDE 0.2 MILLIGRAM(S): 2 INJECTION INTRAMUSCULAR; INTRAVENOUS; SUBCUTANEOUS at 18:05

## 2018-07-03 RX ADMIN — CHLORHEXIDINE GLUCONATE 1 APPLICATION(S): 213 SOLUTION TOPICAL at 13:22

## 2018-07-03 RX ADMIN — Medication 5 MILLIGRAM(S): at 06:24

## 2018-07-03 RX ADMIN — HYDROMORPHONE HYDROCHLORIDE 0.2 MILLIGRAM(S): 2 INJECTION INTRAMUSCULAR; INTRAVENOUS; SUBCUTANEOUS at 21:57

## 2018-07-03 RX ADMIN — URSODIOL 300 MILLIGRAM(S): 250 TABLET, FILM COATED ORAL at 17:45

## 2018-07-03 RX ADMIN — Medication 5 MILLIGRAM(S): at 13:22

## 2018-07-03 RX ADMIN — CYCLOBENZAPRINE HYDROCHLORIDE 5 MILLIGRAM(S): 10 TABLET, FILM COATED ORAL at 15:04

## 2018-07-03 RX ADMIN — MORPHINE SULFATE 2 MILLIGRAM(S): 50 CAPSULE, EXTENDED RELEASE ORAL at 11:29

## 2018-07-03 RX ADMIN — HYDROMORPHONE HYDROCHLORIDE 2 MILLIGRAM(S): 2 INJECTION INTRAMUSCULAR; INTRAVENOUS; SUBCUTANEOUS at 14:57

## 2018-07-03 RX ADMIN — HYDROMORPHONE HYDROCHLORIDE 0.2 MILLIGRAM(S): 2 INJECTION INTRAMUSCULAR; INTRAVENOUS; SUBCUTANEOUS at 17:45

## 2018-07-03 RX ADMIN — MORPHINE SULFATE 2 MILLIGRAM(S): 50 CAPSULE, EXTENDED RELEASE ORAL at 06:30

## 2018-07-03 RX ADMIN — HYDROMORPHONE HYDROCHLORIDE 0.5 MILLIGRAM(S): 2 INJECTION INTRAMUSCULAR; INTRAVENOUS; SUBCUTANEOUS at 02:35

## 2018-07-03 RX ADMIN — HYDROMORPHONE HYDROCHLORIDE 0.5 MILLIGRAM(S): 2 INJECTION INTRAMUSCULAR; INTRAVENOUS; SUBCUTANEOUS at 02:20

## 2018-07-03 RX ADMIN — CYCLOBENZAPRINE HYDROCHLORIDE 5 MILLIGRAM(S): 10 TABLET, FILM COATED ORAL at 10:06

## 2018-07-03 RX ADMIN — PIPERACILLIN AND TAZOBACTAM 25 GRAM(S): 4; .5 INJECTION, POWDER, LYOPHILIZED, FOR SOLUTION INTRAVENOUS at 21:58

## 2018-07-03 RX ADMIN — MORPHINE SULFATE 2 MILLIGRAM(S): 50 CAPSULE, EXTENDED RELEASE ORAL at 11:45

## 2018-07-03 RX ADMIN — DAPTOMYCIN 108.8 MILLIGRAM(S): 500 INJECTION, POWDER, LYOPHILIZED, FOR SOLUTION INTRAVENOUS at 21:14

## 2018-07-03 RX ADMIN — MORPHINE SULFATE 2 MILLIGRAM(S): 50 CAPSULE, EXTENDED RELEASE ORAL at 06:47

## 2018-07-03 RX ADMIN — MORPHINE SULFATE 2 MILLIGRAM(S): 50 CAPSULE, EXTENDED RELEASE ORAL at 00:46

## 2018-07-03 RX ADMIN — Medication 1 GRAM(S): at 13:22

## 2018-07-03 RX ADMIN — Medication 1 GRAM(S): at 00:46

## 2018-07-03 RX ADMIN — I.V. FAT EMULSION 8.3 ML/HR: 20 EMULSION INTRAVENOUS at 17:41

## 2018-07-03 RX ADMIN — Medication 1 GRAM(S): at 06:24

## 2018-07-03 RX ADMIN — URSODIOL 300 MILLIGRAM(S): 250 TABLET, FILM COATED ORAL at 06:24

## 2018-07-03 NOTE — CONSULT NOTE ADULT - ASSESSMENT
57 yo F with h/o gastric cancer 6/2017 s/p partial gastrectomy with Billroth II reconstruction. Admitted for jaundice, s/p unsuccessful ERCP c/b small bowel perforation (6/6) s/p ex-lap with SB resection and anastomosis. 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 Currently being treated with Abx, consult for Palliative care for symptom management and GOC.
Impression:  1. Obstructive jaundice with dilated CBD and 1.4 cm enhancing pancreatic head mass  2. Gastric adenocarcinoma (diagnosed 6/2017) s/p distal gastrectomy with Billroth II, on chemotherapy    Plan:  - Monitor CBC, CMP, INR  - IV fluids as necessary  - Will review imaging with Biliary attending  - Plan for EUS/ERCP, likely tomorrow; keep NPO for now in case patient can be added on   - Supportive care per primary team
58 f PMH gastric adenocarcinoma 2017 s/p chemo x 8 months, perforated duodenal ulcer 05/2018, presented to [facility] with obstructie jaundice found to have a 1.4 cm pancreatic head mass and transferred to [facility] for ERCP on 06/05 complicated by iatrogenic small bowel perforation s/p exploratory laporotomy with small bowel resection. On 06/08 underwent PTC placement, cultures grew E.coli and VRE. At that time was on ciprofloxacin and flagyl for 10 days 6/7-6/16 and then on Zosyn 6/16-6/20. Has had tube checks 06/18, 06/27 for persistent hyperbilirubinemia. PICC placed 06/11 for TPN  She began to spike fever on 06/25. Blood cx + Cons-likely contaminant. Repeat CT a/p showing enlarging abdominal wall hematoma. Urine cx + VRE,   She has no urinary symptoms to suggest a UTI and the VRE in urine likely reflects colonization, but given positive E.coli and VRE in bile cultures would be concerned about cholangitis as a cause of fever vs seeding of abdominal wall hematoma    Recommend:  c/w Zosyn 3.375 iv q8h  Start Daptomycin 4 mg/kg IV daily  Check baseline CK now and weekly thereafter  Repeat blood CX x 2  IR vs surgical drainage of hematoma
Impression:    1. Hyperbilirubinemia: CT reviewed w radiology, no persistent biliary duct dilation. Suspect intrahepatic cholestasis, likely multifactorial. Factors may include acute illness with possible infection, resorption of intraabdominal hematoma, DILI(unclear which if any drugs might be contributing), less likely TPN given short duration on TPN.    2. Intraabdominal hematoma with possible superinfection, the lovenox may be contributing to this    3. Fever, possible urinary or intra-abdominal source    Recommendation:  -will discuss w surgery team if any intervention needed for abdominal hematoma  -treat underlying infection  -trend Liver chemistries

## 2018-07-03 NOTE — PROVIDER CONTACT NOTE (OTHER) - NAME OF MD/NP/PA/DO NOTIFIED:
Surgery D Team
Anne Marie
Anne Marie BROCK
Carlos MOCTEZUMA team during rounds
Deepali Moralez [id number]
Deepali Moralez [id number]
Deepali Moralez [id number]
Deepali Moralez [id number]
Faisal Fierro Surg D
Felicia Espino (Nurse Manager) and Nga GONZALEZ (Nurse Educator)
MD Tito Deutsch
STRANGE
Zulma Jang
DARIAN DAWKINS MD

## 2018-07-03 NOTE — CONSULT NOTE ADULT - CONSULT REASON
GOC and Symptom management
Hemodynamic monitoring
Jaundice, eval for ERCP
Red eye
TPN
VRE in urine
Hyperbilirubminemia

## 2018-07-03 NOTE — CONSULT NOTE ADULT - PROBLEM SELECTOR RECOMMENDATION 3
With opiate regimen as above. Would consider US if significant ascites, unclear if it can be tapped to reduce symptom burden.

## 2018-07-03 NOTE — PROVIDER CONTACT NOTE (OTHER) - ACTION/TREATMENT ORDERED:
MD made aware
Repeat blood test, and to be drawn Peripherally. Send STAT.
Continue to monitor pt.
Team aware. will cont to monitor.
Allow patient to attempt to void again. Call team with result
Attempt to draw peripherally as original sample from PICC where heparin runs. Safety maintained, will monitor. Will attempt stick, awaiting lab order.
Awaiting order for Reinsert Mathis
Pain medication given. Team will come asses pt and discuss plan.
Team came to assess. Waiting on new orders
Team will see if she needs a dose of Lasix. Will continue to monitor.
Will order acetaminophen. Urine/ blood cultures have already been sent. Pt already on zosyn.
continue to monitor
SICU resident Zulma Jang, and SICU attending Morris Lucero to bedside for assessment, Stat labs ordered and sent, 1unit PRBCS ordered and given, will continue to monitor.

## 2018-07-03 NOTE — CONSULT NOTE ADULT - PROBLEM SELECTOR RECOMMENDATION 9
Patient with h/o gastric CA dx 6/2017 s/p partial gastrectomy with Billroth II reconstruction. Presents with jaundice, found to have pancreatic mass, s/p ERCP, unsuccessful c/b SB perforation s/p ex-lap. According to bx is a metastatic adeno CA, consistent with recurrence. Patient with h/o gastric CA dx 6/2017 s/p partial gastrectomy with Billroth II reconstruction. Presents with jaundice, found to have pancreatic mass, s/p ERCP, unsuccessful c/b SB perforation s/p ex-lap. According to bx is a metastatic adeno CA, consistent with recurrence. Would recommend final input from oncology, family asking for palliative care. Pending further GOC conversations, needs to be addressed TPN, need for 24hr care. Hospice would be the best service to provide care for patient at home.

## 2018-07-03 NOTE — PROVIDER CONTACT NOTE (OTHER) - REASON
Abd bulge under Surgical incision
Abdomen feels more distended and hard, tachycardia 110
BP 97/41 HR 82
Bulge on bad under surgical incision
PICC hard to flush and decreased blood return.
Pt Due To Void
Pt stating she feels short of breath
Pt still has yet to void
Sanguinous drainage from tubes
febrile
pt. straight cath 1560 ml & color dark brown
ptt 100.7
Abdominal swelling near incision site increased
Lab test contaminated

## 2018-07-03 NOTE — PROVIDER CONTACT NOTE (OTHER) - DATE AND TIME:
30-Jun-2018 11:27
03-Jul-2018 06:15
06-Jun-2018 00:06
08-Jun-2018 20:50
13-Jun-2018 23:40
18-Jun-2018 08:36
18-Jun-2018 08:37
18-Jun-2018 10:07
19-Jun-2018 08:58
19-Jun-2018 10:00
20-Jun-2018 07:28
28-Jun-2018 13:25
28-Jun-2018 17:15
28-Jun-2018 19:54

## 2018-07-03 NOTE — CONSULT NOTE ADULT - CONSULT REQUESTED DATE/TIME
03-Jul-2018 18:55
06-Jun-2018 07:14
07-Jun-2018 01:13
11-Jun-2018 10:00
14-Jun-2018 20:52
30-Jun-2018 14:08
28-Jun-2018 09:57

## 2018-07-03 NOTE — PROVIDER CONTACT NOTE (OTHER) - BACKGROUND
Pt admitted for Jaundice s/p gastrectomy and EGD for small bowel perforation.
A&O x4. Ambulatory . O2 sat on room air is 98.
A&O x4. Ambulatory. Mathis removed at 0000.
A&O x4. Incision sutured on 6/18 where it was leaking ( where previous staple was removed on 6/16) due to drainage at site.
A&O x4. Mathis removed at 0000
Patient s/p IR procedure for biliary drain placement.
Pt admitted for painless jaundice, and has a known abdominal hematoma.
Pt admitted for painless jaundice.
pt direct admit from [place] with jaundice
pt s/p abdominal sx with double lumen picc receiving continuous TPN and lipids.
s/p small bowel resection, gastrectomy
stiches placed yesterday on 6/18 to secure abd incision and to suppress the drainage

## 2018-07-03 NOTE — PROGRESS NOTE ADULT - ASSESSMENT
58F s/p ERCP EUS aborted 2/2 small bowel perforation, s/p ex lap resection of small bowel side to side anastomosis. Afebrile overnight. WBC remains elevated at 18. +Urine cultures. Patient is breathing comfortably and pain has improved since drainage of her abdominal wound. On exam, abdominal wall bulge decreased in size since drainage of the hematoma yesterday.     - Palliative now following  - C/w Daptomycin for UTI +VRE and Zosyn for +bile culture (E. coli) per ID.   - Tolerating soft diet   - Trending LFTs  - DVT ppx  - C/w Mathis, monitor UOP. Failed TOV x 3. F/u with urology outpatient.

## 2018-07-03 NOTE — PROGRESS NOTE ADULT - ATTENDING COMMENTS
59 y/o female s/p ERCP c/b perforation of afferent limb. Pt underwent emergent Ex-lap w/ resection of perforated bowel and stapled side to side functional end to end anastomosis. Pt remained intubated and was transferred to SICU off pressors. s/p extubation, now transferred to floor. Patient meets criteria for severe protein calorie malnutrition requiring TPN for nutritional support. TPN increased back to full volume on 6/26/18. Now brought back to 1/2 volume and calories on 6/29/18.  s/p IR tube check on 6/27/18, s/p bedside drainage of intraabdominal hematoma on 7/2/18    TPN infusion volume decreased to 1 L.    Monitor fingersticks q 12 hours, obtain daily weights.    Labs reviewed - increased potassium content in TPN     Continue TPN and lipids. Will follow up with primary team on plan - 1/2 TPN today.    1. Protein calorie malnutrition being optimized with TPN: CHO [115] gm.  AA [60] gm. SMOF Lipids [20] gm. lipids will be administered over 12 hours   2.  Hyperglycemia managed with: [0 ] units of regular insulin    3.  Check fluid balance daily.  Strict I/O  [ ] [ ]   4.  Daily BMP, Ionized Calcium, Magnesium and Phosphorous   5.  Triglycerides at initiation of TPN and monthly [ ] [ ]   6.  Pepcid in TPN for Gi prophylaxis  [ ]   I have seen and examined the patient, reviewed the laboratory and radiologic data and agree with the history, physical assessment and plan.  I reviewed and discussed with all consultants, house staff and PA's.  The note is edited where appropriate. The Nutrition Support Team (NST) discusses on an ongoing basis with the primary team and all consultants, House staff and PA's to have a permanent risk benefit analyses on all decisions.  I spent  45  minutes in total; providing diagnoses, assessment and plan.

## 2018-07-03 NOTE — PROGRESS NOTE ADULT - SUBJECTIVE AND OBJECTIVE BOX
NUTRITION NOTE  [id number] JANNETTEI  ===============================    Interval events  Patient was seen and examined at bedside, no acute overnight events.   TPN/lipids initiated on 18, patient is tolerating without any issues.  Patient states that is trying to eat small amounts and continues to drink Ensure Clears throughout the day. She has been able to tolerate some po intake but does not have too much of an appetite.   TPN/lipids at 1L infusion volume today with 1/2 calories.   s/o bedside drainage of intraabdominal hematoma on 18    Vital Signs Last 24 Hrs  T(C): 36.8 (2018 08:13), Max: 37.4 (2018 16:44)  T(F): 98.3 (2018 08:13), Max: 99.3 (2018 16:44)  HR: 89 (2018 08:13) (82 - 102)  BP: 123/71 (2018 08:13) (119/65 - 151/75)  RR: 20 (2018 08:13) (17 - 20)  SpO2: 99% (2018 08:13) (93% - 100%)    MEDICATIONS  (STANDING):  chlorhexidine 4% Liquid 1 Application(s) Topical once  chlorhexidine 4% Liquid 1 Application(s) Topical <User Schedule>  DAPTOmycin IVPB 220 milliGRAM(s) IV Intermittent every 24 hours  enoxaparin Injectable 40 milliGRAM(s) SubCutaneous daily  fat emulsion (Fish Oil and Plant Based) 20% Infusion 8.3 mL/Hr (8.3 mL/Hr) IV Continuous <Continuous>  fat emulsion (Fish Oil and Plant Based) 20% Infusion 8.3 mL/Hr (8.3 mL/Hr) IV Continuous <Continuous>  metoclopramide 5 milliGRAM(s) Oral every 8 hours  Parenteral Nutrition - Adult 1 Each (42 mL/Hr) TPN Continuous <Continuous>  Parenteral Nutrition - Adult 1 Each (42 mL/Hr) TPN Continuous <Continuous>  piperacillin/tazobactam IVPB. 3.375 Gram(s) IV Intermittent every 8 hours  sucralfate suspension 1 Gram(s) Oral four times a day  ursodiol Capsule 300 milliGRAM(s) Oral every 12 hours    MEDICATIONS  (PRN):  cyclobenzaprine 5 milliGRAM(s) Oral three times a day PRN Muscle Spasm  dibucaine 1% Ointment 1 Application(s) Topical daily PRN hemorrhoids  HYDROmorphone   Tablet 2 milliGRAM(s) Oral every 3 hours PRN Moderate Pain (4 - 6)  HYDROmorphone  Injectable 0.5 milliGRAM(s) IV Push every 4 hours PRN breakthrough pain  morphine  - Injectable 2 milliGRAM(s) IV Push every 3 hours PRN Moderate Pain (4 - 6)  morphine  - Injectable 4 milliGRAM(s) IV Push every 3 hours PRN Severe Pain (7 - 10)    I&O's Detail    2018 07:01  -  2018 07:00  --------------------------------------------------------  IN:    fat emulsion (Fish Oil and Plant Based) 20% Infusion: 99.6 mL    IV PiggyBack: 150 mL    Other: 4 mL    TPN (Total Parenteral Nutrition): 504 mL  Total IN: 757.6 mL    OUT:    Drain: 165 mL    Indwelling Catheter - Urethral: 2100 mL  Total OUT: 2265 mL    Total NET: -1507.4 mL    POCT Blood Glucose.: 131 mg/dL (2018 06:47)  POCT Blood Glucose.: 111 mg/dL (2018 18:04)  POCT Blood Glucose.: 103 mg/dL (2018 11:59)    Daily Weight in k.8 (2018 00:32)    Drug Dosing Weight  Height (cm): 160.02 (2018 15:53)  Weight (kg): 54 (2018 15:53)  BMI (kg/m2): 21.1 (2018 15:53)  BSA (m2): 1.55 (2018 15:53)    PHYSICAL EXAM   Constitutional: no acute distress, resting in bed   Respiratory: nonlabored respirations   Gastrointestinal: soft, minimally tender, mildly distended   Extremities: warm, generalized edema    PICC Site: C/D/I    Diet: soft diet and TPN/lipids     Culture - Blood (collected 2018 18:36)  Source: BLOOD  Preliminary Report (2018 18:37):    NO ORGANISMS ISOLATED    NO ORGANISMS ISOLATED AT 24 HOURS    Culture - Urine (collected 2018 16:11)  Source: URINE CATHETER  Preliminary Report (2018 08:41):    YEAST^YEAST.    COLONY COUNT: 50,000-99,000 CFU/mL    LABORATORY                                   8.1    17.46 )-----------( 587      ( 2018 06:02 )             24.8   07    136  |  104  |  13  ----------------------------<  102<H>  3.8   |  22  |  0.32<L>    Ca    7.9<L>      2018 06:02  Phos  2.9     07  Mg     2.1     07    TPro  5.5<L>  /  Alb  1.6<L>  /  TBili  13.0<H>  /  DBili  x   /  AST  40<H>  /  ALT  38<H>  /  AlkPhos  318<H>  0703    LIVER FUNCTIONS - ( 2018 06:02 )  Alb: 1.6 g/dL / Pro: 5.5 g/dL / ALK PHOS: 318 u/L / ALT: 38 u/L / AST: 40 u/L / GGT: x           06-20 Chol -- LDL -- HDL -- Trig 138, 06-12 Chol -- LDL -- HDL -- Trig 112, 06-05 Chol 156  HDL 8<L> Trig 85, 05-19 Chol 122 LDL 73 HDL 33<L> Trig 78    Prealbumin 18: 10    ASSESSMENT/PLAN:  59 y/o female s/p ERCP c/b perforation of afferent limb. Pt underwent emergent Ex-lap w/ resection of perforated bowel and stapled side to side functional end to end anastomosis. Pt remained intubated and was transferred to SICU off pressors. s/p extubation, now transferred to floor. Patient meets criteria for severe protein calorie malnutrition requiring TPN for nutritional support. TPN increased back to full volume on 18. Now brought back to 1/2 volume and calories on 18.  s/p IR tube check on 18, s/p bedside drainage of intraabdominal hematoma on 18    TPN infusion volume decreased to 1 L.    Monitor fingersticks q 12 hours, obtain daily weights.    Labs reviewed - increased potassium content in TPN     Continue TPN and lipids. Will follow up with primary team on plan -  TPN today.    1. Protein calorie malnutrition being optimized with TPN: CHO [115] gm.  AA [60] gm. SMOF Lipids [20] gm. lipids will be administered over 12 hours   2.  Hyperglycemia managed with: [0 ] units of regular insulin    3.  Check fluid balance daily.  Strict I/O  [ ] [ ]   4.  Daily BMP, Ionized Calcium, Magnesium and Phosphorous   5.  Triglycerides at initiation of TPN and monthly [ ] [ ]   6.  Pepcid in TPN for Gi prophylaxis  [ ]     Nutrition support pager [phone]

## 2018-07-03 NOTE — PROGRESS NOTE ADULT - ASSESSMENT
Impression:  1. Unsuccessful ERCP complicated by small bowel perforation 6/6/18 s/p ex-lap with small bowel resection and anastomosis 6/6/18  2. Obstructive jaundice with dilated CBD and 1.4 cm enhancing pancreatic head mass s/p percutaneous drainage, with persistent hyperbilirubinemia   3. 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 Acute blood loss anemia with abdominal wall hematoma and hemoperitoneum noted on CTAP    Plan:  - would obtain u/s abd to evaluate abdominal wall hematoma again, and drain by IR since there is still evidence of hematoma. Send for cultures  - Follow up ID  - Full infectious workup, follow up all culture data  - Monitor CBC, CMP, INR  - Continue antibiotics

## 2018-07-03 NOTE — CONSULT NOTE ADULT - PROBLEM SELECTOR RECOMMENDATION 4
Patient is full code, ongoing GOC. Set up family meeting on Thursday for further discussions of GOC and ideal setting, considering hospice referral pending further discussions.

## 2018-07-03 NOTE — PROGRESS NOTE ADULT - SUBJECTIVE AND OBJECTIVE BOX
[facility]      Chief Complaint:  Patient is a 58y old  Female who presents with a chief complaint of Painless jaundice (05 Jun 2018 14:49)      Interval Events: Pt had bedside drainage of abd wall hematoma by surgery yesterday with 150 cc removed. Pt still having some abd pain    Allergies:  No Known Allergies      Hospital Medications:  chlorhexidine 4% Liquid 1 Application(s) Topical once  chlorhexidine 4% Liquid 1 Application(s) Topical <User Schedule>  cyclobenzaprine 5 milliGRAM(s) Oral three times a day PRN  DAPTOmycin IVPB 220 milliGRAM(s) IV Intermittent every 24 hours  dibucaine 1% Ointment 1 Application(s) Topical daily PRN  enoxaparin Injectable 40 milliGRAM(s) SubCutaneous daily  fat emulsion (Fish Oil and Plant Based) 20% Infusion 8.3 mL/Hr IV Continuous <Continuous>  fat emulsion (Fish Oil and Plant Based) 20% Infusion 6.6 mL/Hr IV Continuous <Continuous>  HYDROmorphone   Tablet 2 milliGRAM(s) Oral every 3 hours PRN  HYDROmorphone  Injectable 0.5 milliGRAM(s) IV Push every 4 hours PRN  metoclopramide 5 milliGRAM(s) Oral every 8 hours  morphine  - Injectable 2 milliGRAM(s) IV Push every 3 hours PRN  morphine  - Injectable 4 milliGRAM(s) IV Push every 3 hours PRN  Parenteral Nutrition - Adult 1 Each TPN Continuous <Continuous>  piperacillin/tazobactam IVPB. 3.375 Gram(s) IV Intermittent every 8 hours  sucralfate suspension 1 Gram(s) Oral four times a day  ursodiol Capsule 300 milliGRAM(s) Oral every 12 hours      PMHX/PSHX:  Gastric cancer  Malignant neoplasm of stomach, unspecified location  No pertinent past medical history  S/P partial gastrectomy  No significant past surgical history      Family history:  No pertinent family history in first degree relatives      ROS:     General:  No fevers, chills  Eyes:  Good vision  ENT:  No odynophagia, dysphagia  CV:  No pain, palpitations  Resp:  No dyspnea, cough, tachypnea, wheezing  GI:  See HPI  Muscle:  No pain, weakness  Skin:  No rash, edema      PHYSICAL EXAM:     GENERAL:  No distress  HEENT: conjunctivae clear, icteric  CHEST:  Full & symmetric excursion, no increased effort  HEART:  Regular rhythm  ABDOMEN:  Soft, non-tender, wound distended, ir drain, firm abdominal wall possible due to hematoma  EXTREMITIES:  no edema  SKIN:  Dry/warm  NEURO:  Alert    Vital Signs:  Vital Signs Last 24 Hrs  T(C): 37.1 (03 Jul 2018 06:08), Max: 37.5 (02 Jul 2018 09:16)  T(F): 98.7 (03 Jul 2018 06:08), Max: 99.5 (02 Jul 2018 09:16)  HR: 96 (03 Jul 2018 06:08) (82 - 102)  BP: 122/68 (03 Jul 2018 06:08) (119/65 - 151/75)  BP(mean): --  RR: 20 (03 Jul 2018 06:08) (17 - 20)  SpO2: 98% (03 Jul 2018 06:08) (93% - 100%)  Daily     Daily     LABS:                        8.4    18.68 )-----------( 528      ( 02 Jul 2018 05:30 )             25.8     07-02    138  |  104  |  14  ----------------------------<  113<H>  4.0   |  21<L>  |  0.35<L>    Ca    7.8<L>      02 Jul 2018 05:30  Phos  3.4     07-02  Mg     2.3     07-02    TPro  5.5<L>  /  Alb  2.0<L>  /  TBili  12.9<H>  /  DBili  x   /  AST  37<H>  /  ALT  31  /  AlkPhos  264<H>  07-02    LIVER FUNCTIONS - ( 02 Jul 2018 05:30 )  Alb: 2.0 g/dL / Pro: 5.5 g/dL / ALK PHOS: 264 u/L / ALT: 31 u/L / AST: 37 u/L / GGT: x           PT/INR - ( 01 Jul 2018 11:24 )   PT: 12.3 SEC;   INR: 1.11          PTT - ( 01 Jul 2018 11:24 )  PTT:33.9 SEC        Imaging:

## 2018-07-03 NOTE — PROGRESS NOTE ADULT - ASSESSMENT
Impression:    1. Hyperbilirubinemia: CT reviewed w radiology, no persistent biliary duct dilation. Suspect intrahepatic cholestasis, likely multifactorial, but must better evaluate biliary tree to rule out obstruction. Factors may include acute illness with possible infection, resorption of intraabdominal hematoma, DILI(unclear which if any drugs might be contributing), less likely TPN given short duration on TPN.    2. Intraabdominal hematoma with possible superinfection, the lovenox may be contributing to this    3. Fever, possible urinary or intra-abdominal source    4. VRE UTI    5. Dyspnea: ?rule out PE    6. Hypoalbuminemia, likely  malnutrition    Recommendation:  -obtain an MRCP  -treat underlying infection  -trend Liver chemistries  -would favor holding pantoprazole (there is pepcid in the TPN) and any other nonessential meds (?reglan/flexeril) to remove any possible agents that could be causing DILI  -start ursodiol 300mg BID  -consider ruling out PE for dyspnea  -optimize nutrition status (would discuss w nutrition)

## 2018-07-03 NOTE — CONSULT NOTE ADULT - PROBLEM SELECTOR RECOMMENDATION 2
Likely from surgery/abdominal malignancy. She indicates referred pain to her back. Pain 7-8/10, goes away completely with IV dilaudid although it makes her slightly nauseous and dizzy. Would recommend to start atc dilaudid, reducing dose at 0.2mg q4hrs atc and 0.2mg Q4hrs PRN. Will calculate opiate requirements in 24hrs then transition to PO. Discussed with patient and son both in agreement.

## 2018-07-03 NOTE — CONSULT NOTE ADULT - SUBJECTIVE AND OBJECTIVE BOX
Patient chose to use son as english , professional  offered and patient refused.     HPI:  Patient complains of 1 week of progressively worsening jaundice. Patient now endorses some pain in the right upper quadrant of her abdomen, which extends through her back. Two days ago, she was admitted to [facility] for work-up. She had a CT scan and MRCP. On MRCP she has a 1.4 cm mass in the pancreatic head obstructing the common hepatic duct. Transfer to [facility] was initiated in order to pursue an ERCP with gastroenterology. She denies nausea, vomiting, fever, chills.   Pertinent history: gastric cancer 6/2017 s/p partial gastrectomy with Billroth II reconstruction (05 Jun 2018 14:49)    Patient originally from [facility], admitted for jaundice, s/p unsuccessful ERCP c/b small bowel perforation (6/6) s/p ex-lap with SB resection and anastomosis. 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 Currently being treated with Abx, consult for Palliative care for symptom management and GOC.      PERTINENT PM/SXH:   Gastric cancer  Malignant neoplasm of stomach, unspecified location  No pertinent past medical history    S/P partial gastrectomy  No significant past surgical history    SOCIAL HISTORY:   Significant other/partner:  [x ] YES  [ ] NO               Children:  [x ] YES  [ ] NO                   Scientology/Spirituality:  Substance hx:  [ ] YES   [x ] NO                   Tobacco hx:  [ ] YES  [x ] NO                       Alcohol hx: [ ] YES  [x ] NO         Home Opioid hx:  [x ] YES  [ ] NO --> istop reviewed [id number]  Living Situation: [x ] Home  [ ] Long term care  [ ] Rehab [ ] Other  Patient lives at home with , has 3 children. The most involved is Vicente who lives in [place], second is patient's daughter Travis who lives in [place].    FAMILY HISTORY:  No pertinent family history in first degree relatives    [ ] Family history non-contributory     BASELINE (I)ADLs (prior to admission):  [place]orndike: [ ] total  [x ] moderate [ ] dependent    ADVANCE DIRECTIVES:    Full code  MOLST  [ ] YES [x ] NO                      [ ] Completed  Health Care Proxy [ ] YES  [x ] NO   [ ] Completed  Living Will  [ ] YES [x ] NO             [ x] Surrogate  [ ] HCP  [ ] Guardian:                                                                  Phone#:  By [place] surrogacy law patient's next of kin is  but defers most of medical decisions to her son. Will complete HCP form once patient accepts to use professional .   Son - Vicente   Allergies    No Known Allergies    Intolerances    MEDICATIONS  (STANDING):  chlorhexidine 4% Liquid 1 Application(s) Topical once  chlorhexidine 4% Liquid 1 Application(s) Topical <User Schedule>  DAPTOmycin IVPB 220 milliGRAM(s) IV Intermittent every 24 hours  enoxaparin Injectable 40 milliGRAM(s) SubCutaneous daily  fat emulsion (Fish Oil and Plant Based) 20% Infusion 8.3 mL/Hr (8.3 mL/Hr) IV Continuous <Continuous>  fat emulsion (Fish Oil and Plant Based) 20% Infusion 8.3 mL/Hr (8.3 mL/Hr) IV Continuous <Continuous>  HYDROmorphone  Injectable 0.2 milliGRAM(s) IV Push every 4 hours  metoclopramide 5 milliGRAM(s) Oral every 8 hours  Parenteral Nutrition - Adult 1 Each (42 mL/Hr) TPN Continuous <Continuous>  piperacillin/tazobactam IVPB. 3.375 Gram(s) IV Intermittent every 8 hours  sucralfate suspension 1 Gram(s) Oral four times a day  ursodiol Capsule 300 milliGRAM(s) Oral every 12 hours    MEDICATIONS  (PRN):  cyclobenzaprine 5 milliGRAM(s) Oral three times a day PRN Muscle Spasm  dibucaine 1% Ointment 1 Application(s) Topical daily PRN hemorrhoids  HYDROmorphone  Injectable 0.2 milliGRAM(s) IV Push every 4 hours PRN Moderate Pain (4 - 6)    PRESENT SYMPTOMS:  Source: [x ] Patient   [ ] Family   [ ] Team     Pain:                        [ ] No [x ] Yes             [ ] Mild [ ] Moderate [x ] Severe    Onset - Intermittent during the day  Location - Abdomen  Duration - Days  Character - Aching  Alleviating/Aggravating - Alleviated by pain medications, lasts for 3-4 hrs then the pain returns.  Radiation - Feels discomfort in her back as well, she feels it radiates from her abdomen to her back.  Timing -     Dyspnea:                [ ] No [x ] Yes             [ ] Mild [x ] Moderate [ ] Severe    Anxiety:                  [ x] No [ ] Yes             [ ] Mild [ ] Moderate [ ] Severe    Fatigue:                  [ ] No [x ] Yes             [x ] Mild [ ] Moderate [ ] Severe    Nausea:                  [ ] No [x ] Yes             [x ] Mild [ ] Moderate [ ] Severe    Loss of appetite:   [ ] No [x ] Yes             [x ] Mild [ ] Moderate [ ] Severe    Constipation:        [ ] No [x ] Yes             [ ] Mild [x ] Moderate [ ] Severe    Other Symptoms:  [x ] All other review of systems negative   [ ] Unable to obtain due to poor mentation     Karnofsky Performance Score/Palliative Performance Status Version 2:40%    PHYSICAL EXAM:  Vital Signs Last 24 Hrs  T(C): 37.1 (03 Jul 2018 16:32), Max: 37.1 (03 Jul 2018 00:44)  T(F): 98.7 (03 Jul 2018 16:32), Max: 98.7 (03 Jul 2018 00:44)  HR: 97 (03 Jul 2018 16:32) (82 - 97)  BP: 141/84 (03 Jul 2018 16:32) (122/68 - 141/84)  BP(mean): --  RR: 20 (03 Jul 2018 16:32) (20 - 20)  SpO2: 99% (03 Jul 2018 16:32) (95% - 100%) I&O's Summary    02 Jul 2018 07:01  -  03 Jul 2018 07:00  --------------------------------------------------------  IN: 757.6 mL / OUT: 2265 mL / NET: -1507.4 mL    03 Jul 2018 07:01  -  03 Jul 2018 18:55  --------------------------------------------------------  IN: 478.6 mL / OUT: 1140 mL / NET: -661.4 mL    General:  [x ] Alert  [x ] Oriented x      [ ] Lethargic  [ ] Agitated   [ ] Cachexia   [ ] Unarousable  [x ] Verbal  [ ] Non-Verbal    HEENT:  [ ] Normal   [x ] Dry mouth   [ ] ET Tube    [ ] Trach  [ ] Oral lesions    Lungs:   [ x] Clear [ ] Tachypnea  [ ] Audible excessive secretions   [ ] Rhonchi        [ ] Right [ ] Left [ ] Bilateral  [ ] Crackles        [ ] Right [ ] Left [ ] Bilateral  [ ] Wheezing     [ ] Right [ ] Left [ ] Bilateral    Cardiovascular:  [x ] Regular [ ] Irregular [ ] Tachycardia   [ ] Bradycardia  [ ] Murmur [ ] Other    Abdomen: [ ] Soft  [x ] Distended   [ ] +BS  [ ] Non tender [x ] Tender  [ ]PEG   [ ]OGT/ NGT   Last BM:       Genitourinary: [ ] Normal [ ] Incontinent   [ ] Oliguria/Anuria   [x ] Mathis    Musculoskeletal:  [ ] Normal   [ x] Weakness  [ ] Bedbound/Wheelchair bound [ ] Edema    Neurological: [ ] No focal deficits  [ ] Cognitive impairment  [ ] Dysphagia [ ] Dysarthria [ ] Paresis [ ] Other     Skin: [x ] Normal   [ ] Pressure ulcer(s)                  [ ] Rash    LABS:                        8.1    17.46 )-----------( 587      ( 03 Jul 2018 06:02 )             24.8     07-03    136  |  104  |  13  ----------------------------<  102<H>  3.8   |  22  |  0.32<L>    Ca    7.9<L>      03 Jul 2018 06:02  Phos  2.9     07-03  Mg     2.1     07-03    TPro  5.5<L>  /  Alb  1.6<L>  /  TBili  13.0<H>  /  DBili  x   /  AST  40<H>  /  ALT  38<H>  /  AlkPhos  318<H>  07-03    Shock: [ ] Septic [ ] Cardiogenic [ ] Neurologic [ ] Hypovolemic  Vasopressors x None  Inotrophs x None    Protein Calorie Malnutrition: [ ] Mild [ ] Moderate [ ] Severe    Oral Intake: [ ] Unable/mouth care only [ ] Minimal [ ] Moderate [x ] Full Capability  Diet: [ ] NPO [ ] Tube feeds [ ] TPN [ ] Other     RADIOLOGY & ADDITIONAL STUDIES:     < from: CT Abdomen and Pelvis w/ IV Cont (06.25.18 @ 13:08) >    IMPRESSION:     Moderate-sized hematoma in the anterior abdominal wall.    Moderate amount of free intraperitoneal fluid with layering blood   products.    REFERRALS:   [ ] Chaplaincy  [ ] Hospice  [ ] Child Life  [ ] Social Work  [ ] Case management [ ] Holistic Therapy

## 2018-07-03 NOTE — PROGRESS NOTE ADULT - SUBJECTIVE AND OBJECTIVE BOX
GENERAL SURGERY DAILY PROGRESS NOTE:       Subjective:  Pt seen and examined at the bedside. Afebrile overnight. Pt reports pain has improved since drainage of her abdominal wound. Denies N/V. Tolerating soft diet. Passing flatus and BM.    Objective:    PE:  Gen: Ill appearing woman resting in bed. Appears   HEENT: jaundiced   Resp: breathing comfortably  Abd: soft, ND, mildly tender, no rebound or guarding. Bulge under midline incision draining minimal dark blood, decreased in size from yesterday.        Vital Signs Last 24 Hrs  T(C): 36.6 (2018 13:14), Max: 37.4 (2018 16:44)  T(F): 97.8 (2018 13:14), Max: 99.3 (2018 16:44)  HR: 97 (2018 13:14) (82 - 97)  BP: 138/70 (2018 13:14) (119/65 - 138/70)  BP(mean): --  RR: 20 (2018 13:14) (18 - 20)  SpO2: 98% (2018 13:14) (93% - 100%)    I&O's Detail    2018 07:01  -  2018 07:00  --------------------------------------------------------  IN:    fat emulsion (Fish Oil and Plant Based) 20% Infusion: 99.6 mL    IV PiggyBack: 150 mL    Other: 4 mL    TPN (Total Parenteral Nutrition): 504 mL  Total IN: 757.6 mL    OUT:    Drain: 165 mL    Indwelling Catheter - Urethral: 2100 mL  Total OUT: 2265 mL    Total NET: -1507.4 mL      2018 07:01  -  2018 16:26  --------------------------------------------------------  IN:    TPN (Total Parenteral Nutrition): 294 mL  Total IN: 294 mL    OUT:    Drain: 90 mL    Indwelling Catheter - Urethral: 600 mL  Total OUT: 690 mL    Total NET: -396 mL          Daily     Daily Weight in k.9 (2018 13:14)    MEDICATIONS  (STANDING):  chlorhexidine 4% Liquid 1 Application(s) Topical once  chlorhexidine 4% Liquid 1 Application(s) Topical <User Schedule>  DAPTOmycin IVPB 220 milliGRAM(s) IV Intermittent every 24 hours  enoxaparin Injectable 40 milliGRAM(s) SubCutaneous daily  fat emulsion (Fish Oil and Plant Based) 20% Infusion 8.3 mL/Hr (8.3 mL/Hr) IV Continuous <Continuous>  fat emulsion (Fish Oil and Plant Based) 20% Infusion 8.3 mL/Hr (8.3 mL/Hr) IV Continuous <Continuous>  HYDROmorphone  Injectable 0.2 milliGRAM(s) IV Push every 4 hours  metoclopramide 5 milliGRAM(s) Oral every 8 hours  Parenteral Nutrition - Adult 1 Each (42 mL/Hr) TPN Continuous <Continuous>  Parenteral Nutrition - Adult 1 Each (42 mL/Hr) TPN Continuous <Continuous>  piperacillin/tazobactam IVPB. 3.375 Gram(s) IV Intermittent every 8 hours  sucralfate suspension 1 Gram(s) Oral four times a day  ursodiol Capsule 300 milliGRAM(s) Oral every 12 hours    MEDICATIONS  (PRN):  cyclobenzaprine 5 milliGRAM(s) Oral three times a day PRN Muscle Spasm  dibucaine 1% Ointment 1 Application(s) Topical daily PRN hemorrhoids  HYDROmorphone  Injectable 0.2 milliGRAM(s) IV Push every 4 hours PRN Moderate Pain (4 - 6)      LABS:                        8.1    17.46 )-----------( 587      ( 2018 06:02 )             24.8     07-03    136  |  104  |  13  ----------------------------<  102<H>  3.8   |  22  |  0.32<L>    Ca    7.9<L>      2018 06:02  Phos  2.9     07-03  Mg     2.1     07-03    TPro  5.5<L>  /  Alb  1.6<L>  /  TBili  13.0<H>  /  DBili  x   /  AST  40<H>  /  ALT  38<H>  /  AlkPhos  318<H>  -03          RADIOLOGY & ADDITIONAL STUDIES:            .

## 2018-07-03 NOTE — PROGRESS NOTE ADULT - SUBJECTIVE AND OBJECTIVE BOX
Patient is a 58y old  Female who presents with a chief complaint of Painless jaundice (05 Jun 2018 14:49)      SUBJECTIVE / OVERNIGHT EVENTS:  dysnpea, improved abd discomfort s/p drainage of hematoma  MEDICATIONS  (STANDING):  chlorhexidine 4% Liquid 1 Application(s) Topical once  chlorhexidine 4% Liquid 1 Application(s) Topical <User Schedule>  DAPTOmycin IVPB 220 milliGRAM(s) IV Intermittent every 24 hours  enoxaparin Injectable 40 milliGRAM(s) SubCutaneous daily  fat emulsion (Fish Oil and Plant Based) 20% Infusion 8.3 mL/Hr (8.3 mL/Hr) IV Continuous <Continuous>  fat emulsion (Fish Oil and Plant Based) 20% Infusion 8.3 mL/Hr (8.3 mL/Hr) IV Continuous <Continuous>  metoclopramide 5 milliGRAM(s) Oral every 8 hours  Parenteral Nutrition - Adult 1 Each (42 mL/Hr) TPN Continuous <Continuous>  Parenteral Nutrition - Adult 1 Each (42 mL/Hr) TPN Continuous <Continuous>  piperacillin/tazobactam IVPB. 3.375 Gram(s) IV Intermittent every 8 hours  sucralfate suspension 1 Gram(s) Oral four times a day  ursodiol Capsule 300 milliGRAM(s) Oral every 12 hours    MEDICATIONS  (PRN):  cyclobenzaprine 5 milliGRAM(s) Oral three times a day PRN Muscle Spasm  dibucaine 1% Ointment 1 Application(s) Topical daily PRN hemorrhoids  HYDROmorphone   Tablet 2 milliGRAM(s) Oral every 3 hours PRN Moderate Pain (4 - 6)  HYDROmorphone  Injectable 0.5 milliGRAM(s) IV Push every 4 hours PRN breakthrough pain  morphine  - Injectable 2 milliGRAM(s) IV Push every 3 hours PRN Moderate Pain (4 - 6)  morphine  - Injectable 4 milliGRAM(s) IV Push every 3 hours PRN Severe Pain (7 - 10)              PHYSICAL EXAM:  GENERAL: NAD, thin  HEAD:  Atraumatic, Normocephalic  EYES: icteric  NECK: Supple, No JVD  CHEST/LUNG: Clear to auscultation bilaterally; No wheeze  HEART: Regular rate and rhythm; No murmurs, rubs, or gallops  ABDOMEN: Soft, Nontender,  small blood collecting at suture line into bag, distended; Bowel sounds present, no hepatosplenomegaly, no rebound or guarding  EXTREMITIES:bipedal edema  PSYCH: AAOx3  NEUROLOGY: non-focal, no asterixis  SKIN: jaundice    LABS:                        8.1    17.46 )-----------( 587      ( 03 Jul 2018 06:02 )             24.8     07-03    136  |  104  |  13  ----------------------------<  102<H>  3.8   |  22  |  0.32<L>    Ca    7.9<L>      03 Jul 2018 06:02  Phos  2.9     07-03  Mg     2.1     07-03    TPro  5.5<L>  /  Alb  1.6<L>  /  TBili  13.0<H>  /  DBili  x   /  AST  40<H>  /  ALT  38<H>  /  AlkPhos  318<H>  07-03    LIVER FUNCTIONS - ( 03 Jul 2018 06:02 )  Alb: 1.6 g/dL / Pro: 5.5 g/dL / ALK PHOS: 318 u/L / ALT: 38 u/L / AST: 40 u/L / GGT: x             CARDIAC MARKERS ( 02 Jul 2018 05:30 )  x     / x     / 31 u/L / x     / x              RADIOLOGY & ADDITIONAL TESTS:

## 2018-07-03 NOTE — PROVIDER CONTACT NOTE (OTHER) - SITUATION
BNP Lab Test Contaminated.
BP 97/41 HR 82
Explained full situation where underneath pt incision site is big soft bulge, where they sutured incision closed on 6/18
Febrile 100.5
Patient has sanguinous drainage from nasogastric tube, IR biliary drain, and midline abdominal incision.
Pt due to void 0800. Pt has yet to void. Bladder scanner used - 655 cc in bladder.
Pt stating that she feels short of breath while lying in bed.
Pt still has yet to void. Pt states she has no urge to void.
Pt's HR in the 110 (teens), and her abdomen feels tender and distended (increasingly)
Upon assessment, large bulge noted underneath abd surgical incision. soft to the touch, well defined border
daily .7 for heparin gtt at 10 ml/hr
pt. straight cath 1560 ml & color dark brown
pts double lumen PICC is hard to flush and has decreased blood return flow.
Pt has abdominal swelling near incision site. Site hot upon palpation, tender, bulge increased in size. Bilirubin continue to increase

## 2018-07-04 LAB
BASOPHILS # BLD AUTO: 0.06 K/UL — SIGNIFICANT CHANGE UP (ref 0–0.2)
BASOPHILS NFR BLD AUTO: 0.3 % — SIGNIFICANT CHANGE UP (ref 0–2)
BUN SERPL-MCNC: 12 MG/DL — SIGNIFICANT CHANGE UP (ref 7–23)
CA-I BLD-SCNC: 1.19 MMOL/L — SIGNIFICANT CHANGE UP (ref 1.03–1.23)
CALCIUM SERPL-MCNC: 8 MG/DL — LOW (ref 8.4–10.5)
CHLORIDE SERPL-SCNC: 103 MMOL/L — SIGNIFICANT CHANGE UP (ref 98–107)
CO2 SERPL-SCNC: 24 MMOL/L — SIGNIFICANT CHANGE UP (ref 22–31)
CREAT SERPL-MCNC: 0.3 MG/DL — LOW (ref 0.5–1.3)
EOSINOPHIL # BLD AUTO: 0.19 K/UL — SIGNIFICANT CHANGE UP (ref 0–0.5)
EOSINOPHIL NFR BLD AUTO: 1 % — SIGNIFICANT CHANGE UP (ref 0–6)
GLUCOSE SERPL-MCNC: 142 MG/DL — HIGH (ref 70–99)
HCT VFR BLD CALC: 24.3 % — LOW (ref 34.5–45)
HGB BLD-MCNC: 8.3 G/DL — LOW (ref 11.5–15.5)
IMM GRANULOCYTES # BLD AUTO: 0.78 # — SIGNIFICANT CHANGE UP
IMM GRANULOCYTES NFR BLD AUTO: 4.2 % — HIGH (ref 0–1.5)
LYMPHOCYTES # BLD AUTO: 0.7 K/UL — LOW (ref 1–3.3)
LYMPHOCYTES # BLD AUTO: 3.8 % — LOW (ref 13–44)
MAGNESIUM SERPL-MCNC: 2.1 MG/DL — SIGNIFICANT CHANGE UP (ref 1.6–2.6)
MCHC RBC-ENTMCNC: 30.7 PG — SIGNIFICANT CHANGE UP (ref 27–34)
MCHC RBC-ENTMCNC: 34.2 % — SIGNIFICANT CHANGE UP (ref 32–36)
MCV RBC AUTO: 90 FL — SIGNIFICANT CHANGE UP (ref 80–100)
MONOCYTES # BLD AUTO: 0.77 K/UL — SIGNIFICANT CHANGE UP (ref 0–0.9)
MONOCYTES NFR BLD AUTO: 4.1 % — SIGNIFICANT CHANGE UP (ref 2–14)
NEUTROPHILS # BLD AUTO: 16.15 K/UL — HIGH (ref 1.8–7.4)
NEUTROPHILS NFR BLD AUTO: 86.6 % — HIGH (ref 43–77)
NRBC # FLD: 0 — SIGNIFICANT CHANGE UP
PHOSPHATE SERPL-MCNC: 3.2 MG/DL — SIGNIFICANT CHANGE UP (ref 2.5–4.5)
PLATELET # BLD AUTO: 642 K/UL — HIGH (ref 150–400)
PMV BLD: 10.1 FL — SIGNIFICANT CHANGE UP (ref 7–13)
POTASSIUM SERPL-MCNC: 3.7 MMOL/L — SIGNIFICANT CHANGE UP (ref 3.5–5.3)
POTASSIUM SERPL-SCNC: 3.7 MMOL/L — SIGNIFICANT CHANGE UP (ref 3.5–5.3)
RBC # BLD: 2.7 M/UL — LOW (ref 3.8–5.2)
RBC # FLD: 22.1 % — HIGH (ref 10.3–14.5)
SODIUM SERPL-SCNC: 135 MMOL/L — SIGNIFICANT CHANGE UP (ref 135–145)
WBC # BLD: 18.65 K/UL — HIGH (ref 3.8–10.5)
WBC # FLD AUTO: 18.65 K/UL — HIGH (ref 3.8–10.5)

## 2018-07-04 PROCEDURE — 99233 SBSQ HOSP IP/OBS HIGH 50: CPT

## 2018-07-04 RX ORDER — HYDROMORPHONE HYDROCHLORIDE 2 MG/ML
0.2 INJECTION INTRAMUSCULAR; INTRAVENOUS; SUBCUTANEOUS ONCE
Qty: 0 | Refills: 0 | Status: DISCONTINUED | OUTPATIENT
Start: 2018-07-04 | End: 2018-07-04

## 2018-07-04 RX ORDER — I.V. FAT EMULSION 20 G/100ML
8.3 EMULSION INTRAVENOUS
Qty: 20 | Refills: 0 | Status: DISCONTINUED | OUTPATIENT
Start: 2018-07-04 | End: 2018-07-06

## 2018-07-04 RX ORDER — ELECTROLYTE SOLUTION,INJ
1 VIAL (ML) INTRAVENOUS
Qty: 0 | Refills: 0 | Status: DISCONTINUED | OUTPATIENT
Start: 2018-07-04 | End: 2018-07-04

## 2018-07-04 RX ADMIN — HYDROMORPHONE HYDROCHLORIDE 0.2 MILLIGRAM(S): 2 INJECTION INTRAMUSCULAR; INTRAVENOUS; SUBCUTANEOUS at 13:03

## 2018-07-04 RX ADMIN — PIPERACILLIN AND TAZOBACTAM 25 GRAM(S): 4; .5 INJECTION, POWDER, LYOPHILIZED, FOR SOLUTION INTRAVENOUS at 06:02

## 2018-07-04 RX ADMIN — I.V. FAT EMULSION 8.3 ML/HR: 20 EMULSION INTRAVENOUS at 18:11

## 2018-07-04 RX ADMIN — HYDROMORPHONE HYDROCHLORIDE 0.2 MILLIGRAM(S): 2 INJECTION INTRAMUSCULAR; INTRAVENOUS; SUBCUTANEOUS at 02:30

## 2018-07-04 RX ADMIN — Medication 5 MILLIGRAM(S): at 06:02

## 2018-07-04 RX ADMIN — CYCLOBENZAPRINE HYDROCHLORIDE 5 MILLIGRAM(S): 10 TABLET, FILM COATED ORAL at 10:59

## 2018-07-04 RX ADMIN — URSODIOL 300 MILLIGRAM(S): 250 TABLET, FILM COATED ORAL at 06:02

## 2018-07-04 RX ADMIN — HYDROMORPHONE HYDROCHLORIDE 0.2 MILLIGRAM(S): 2 INJECTION INTRAMUSCULAR; INTRAVENOUS; SUBCUTANEOUS at 21:25

## 2018-07-04 RX ADMIN — HYDROMORPHONE HYDROCHLORIDE 0.2 MILLIGRAM(S): 2 INJECTION INTRAMUSCULAR; INTRAVENOUS; SUBCUTANEOUS at 15:10

## 2018-07-04 RX ADMIN — PIPERACILLIN AND TAZOBACTAM 25 GRAM(S): 4; .5 INJECTION, POWDER, LYOPHILIZED, FOR SOLUTION INTRAVENOUS at 13:04

## 2018-07-04 RX ADMIN — Medication 1 GRAM(S): at 13:04

## 2018-07-04 RX ADMIN — HYDROMORPHONE HYDROCHLORIDE 0.2 MILLIGRAM(S): 2 INJECTION INTRAMUSCULAR; INTRAVENOUS; SUBCUTANEOUS at 06:44

## 2018-07-04 RX ADMIN — HYDROMORPHONE HYDROCHLORIDE 0.2 MILLIGRAM(S): 2 INJECTION INTRAMUSCULAR; INTRAVENOUS; SUBCUTANEOUS at 09:20

## 2018-07-04 RX ADMIN — CHLORHEXIDINE GLUCONATE 1 APPLICATION(S): 213 SOLUTION TOPICAL at 13:04

## 2018-07-04 RX ADMIN — HYDROMORPHONE HYDROCHLORIDE 0.2 MILLIGRAM(S): 2 INJECTION INTRAMUSCULAR; INTRAVENOUS; SUBCUTANEOUS at 06:02

## 2018-07-04 RX ADMIN — HYDROMORPHONE HYDROCHLORIDE 0.2 MILLIGRAM(S): 2 INJECTION INTRAMUSCULAR; INTRAVENOUS; SUBCUTANEOUS at 17:29

## 2018-07-04 RX ADMIN — DAPTOMYCIN 108.8 MILLIGRAM(S): 500 INJECTION, POWDER, LYOPHILIZED, FOR SOLUTION INTRAVENOUS at 21:25

## 2018-07-04 RX ADMIN — HYDROMORPHONE HYDROCHLORIDE 0.2 MILLIGRAM(S): 2 INJECTION INTRAMUSCULAR; INTRAVENOUS; SUBCUTANEOUS at 13:23

## 2018-07-04 RX ADMIN — Medication 5 MILLIGRAM(S): at 21:25

## 2018-07-04 RX ADMIN — HYDROMORPHONE HYDROCHLORIDE 0.2 MILLIGRAM(S): 2 INJECTION INTRAMUSCULAR; INTRAVENOUS; SUBCUTANEOUS at 00:40

## 2018-07-04 RX ADMIN — HYDROMORPHONE HYDROCHLORIDE 0.2 MILLIGRAM(S): 2 INJECTION INTRAMUSCULAR; INTRAVENOUS; SUBCUTANEOUS at 21:45

## 2018-07-04 RX ADMIN — HYDROMORPHONE HYDROCHLORIDE 0.2 MILLIGRAM(S): 2 INJECTION INTRAMUSCULAR; INTRAVENOUS; SUBCUTANEOUS at 19:10

## 2018-07-04 RX ADMIN — HYDROMORPHONE HYDROCHLORIDE 0.2 MILLIGRAM(S): 2 INJECTION INTRAMUSCULAR; INTRAVENOUS; SUBCUTANEOUS at 18:50

## 2018-07-04 RX ADMIN — HYDROMORPHONE HYDROCHLORIDE 0.2 MILLIGRAM(S): 2 INJECTION INTRAMUSCULAR; INTRAVENOUS; SUBCUTANEOUS at 14:50

## 2018-07-04 RX ADMIN — Medication 1 GRAM(S): at 17:17

## 2018-07-04 RX ADMIN — ENOXAPARIN SODIUM 40 MILLIGRAM(S): 100 INJECTION SUBCUTANEOUS at 13:04

## 2018-07-04 RX ADMIN — URSODIOL 300 MILLIGRAM(S): 250 TABLET, FILM COATED ORAL at 17:17

## 2018-07-04 RX ADMIN — HYDROMORPHONE HYDROCHLORIDE 0.2 MILLIGRAM(S): 2 INJECTION INTRAMUSCULAR; INTRAVENOUS; SUBCUTANEOUS at 00:18

## 2018-07-04 RX ADMIN — Medication 5 MILLIGRAM(S): at 13:04

## 2018-07-04 RX ADMIN — HYDROMORPHONE HYDROCHLORIDE 0.2 MILLIGRAM(S): 2 INJECTION INTRAMUSCULAR; INTRAVENOUS; SUBCUTANEOUS at 09:02

## 2018-07-04 RX ADMIN — CYCLOBENZAPRINE HYDROCHLORIDE 5 MILLIGRAM(S): 10 TABLET, FILM COATED ORAL at 04:41

## 2018-07-04 RX ADMIN — HYDROMORPHONE HYDROCHLORIDE 0.2 MILLIGRAM(S): 2 INJECTION INTRAMUSCULAR; INTRAVENOUS; SUBCUTANEOUS at 17:17

## 2018-07-04 RX ADMIN — HYDROMORPHONE HYDROCHLORIDE 0.2 MILLIGRAM(S): 2 INJECTION INTRAMUSCULAR; INTRAVENOUS; SUBCUTANEOUS at 01:42

## 2018-07-04 RX ADMIN — Medication 1 EACH: at 18:11

## 2018-07-04 RX ADMIN — PIPERACILLIN AND TAZOBACTAM 25 GRAM(S): 4; .5 INJECTION, POWDER, LYOPHILIZED, FOR SOLUTION INTRAVENOUS at 22:52

## 2018-07-04 NOTE — PROGRESS NOTE ADULT - ATTENDING COMMENTS
59 y/o female s/p ERCP c/b perforation of afferent limb. Pt underwent emergent Ex-lap w/ resection of perforated bowel and stapled side to side functional end to end anastomosis. Pt remained intubated and was transferred to SICU off pressors. s/p extubation, now transferred to floor. Patient meets criteria for severe protein calorie malnutrition requiring TPN for nutritional support. TPN increased back to full volume on 6/26/18. Now brought back to 1/2 volume and calories on 6/29/18.  s/p IR tube check on 6/27/18, s/p bedside drainage of intraabdominal hematoma on 7/2/18    TPN infusion volume decreased to 1 L.    Monitor fingersticks q 12 hours, obtain daily weights.    Labs reviewed    Continue TPN and lipids. Will follow up with primary team on plan - 1/2 TPN today.    1. Protein calorie malnutrition being optimized with TPN: CHO [115] gm.  AA [60] gm. SMOF Lipids [20] gm. lipids will be administered over 12 hours   2.  Hyperglycemia managed with: [0 ] units of regular insulin    3.  Check fluid balance daily.  Strict I/O  [ ] [ ]   4.  Daily BMP, Ionized Calcium, Magnesium and Phosphorous   5.  Triglycerides at initiation of TPN and monthly [ ] [ ]   6.  Pepcid in TPN for Gi prophylaxis  [ ]   I have seen and examined the patient, reviewed the laboratory and radiologic data and agree with the history, physical assessment and plan.  I reviewed and discussed with all consultants, house staff and PA's.  The note is edited where appropriate. The Nutrition Support Team (NST) discusses on an ongoing basis with the primary team and all consultants, House staff and PA's to have a permanent risk benefit analyses on all decisions.  I spent  45  minutes in total; providing diagnoses, assessment and plan.

## 2018-07-04 NOTE — PROGRESS NOTE ADULT - SUBJECTIVE AND OBJECTIVE BOX
GENERAL SURGERY DAILY PROGRESS NOTE:       Subjective:  Pt seen and examined at the bedside. No acute events overnight. Pain well controlled. Denies N/V. Denies difficulty breathing.  Tolerating regular diet. Passing flatus and BM.        Objective:    PE:  Gen: Ill appearing woman resting comfortably in bed.   Resp: breathing comfortably  Abd: soft, NT, bulge under midline incision, not draining. No rebound or guarding      Vital Signs Last 24 Hrs  T(C): 36.7 (2018 08:31), Max: 37.1 (2018 16:32)  T(F): 98.1 (2018 08:31), Max: 98.7 (2018 16:32)  HR: 102 (2018 08:31) (90 - 102)  BP: 130/69 (2018 08:31) (128/67 - 141/84)  BP(mean): --  RR: 20 (2018 08:31) (20 - 20)  SpO2: 92% (2018 08:31) (92% - 99%)    I&O's Detail    2018 07:01  -  2018 07:00  --------------------------------------------------------  IN:    fat emulsion (Fish Oil and Plant Based) 20% Infusion: 16.6 mL    TPN (Total Parenteral Nutrition): 462 mL  Total IN: 478.6 mL    OUT:    Drain: 240 mL    Indwelling Catheter - Urethral: 2000 mL    Intermittent Catheterization - Urethral: 250 mL  Total OUT: 2490 mL    Total NET: -2011.4 mL          Daily     Daily Weight in k.9 (2018 13:14)    MEDICATIONS  (STANDING):  chlorhexidine 4% Liquid 1 Application(s) Topical once  chlorhexidine 4% Liquid 1 Application(s) Topical <User Schedule>  DAPTOmycin IVPB 220 milliGRAM(s) IV Intermittent every 24 hours  enoxaparin Injectable 40 milliGRAM(s) SubCutaneous daily  fat emulsion (Fish Oil and Plant Based) 20% Infusion 8.3 mL/Hr (8.3 mL/Hr) IV Continuous <Continuous>  fat emulsion (Fish Oil and Plant Based) 20% Infusion 8.3 mL/Hr (8.3 mL/Hr) IV Continuous <Continuous>  HYDROmorphone  Injectable 0.2 milliGRAM(s) IV Push every 4 hours  metoclopramide 5 milliGRAM(s) Oral every 8 hours  Parenteral Nutrition - Adult 1 Each (42 mL/Hr) TPN Continuous <Continuous>  Parenteral Nutrition - Adult 1 Each (42 mL/Hr) TPN Continuous <Continuous>  piperacillin/tazobactam IVPB. 3.375 Gram(s) IV Intermittent every 8 hours  sucralfate suspension 1 Gram(s) Oral four times a day  ursodiol Capsule 300 milliGRAM(s) Oral every 12 hours    MEDICATIONS  (PRN):  cyclobenzaprine 5 milliGRAM(s) Oral three times a day PRN Muscle Spasm  dibucaine 1% Ointment 1 Application(s) Topical daily PRN hemorrhoids  HYDROmorphone  Injectable 0.2 milliGRAM(s) IV Push every 4 hours PRN Moderate Pain (4 - 6)      LABS:                        8.3    18.65 )-----------( 642      ( 2018 06:33 )             24.3     07-04    135  |  103  |  12  ----------------------------<  142<H>  3.7   |  24  |  0.30<L>    Ca    8.0<L>      2018 06:33  Phos  3.2       Mg     2.1         TPro  5.5<L>  /  Alb  1.6<L>  /  TBili  13.0<H>  /  DBili  x   /  AST  40<H>  /  ALT  38<H>  /  AlkPhos  318<H>            RADIOLOGY & ADDITIONAL STUDIES:

## 2018-07-04 NOTE — PROGRESS NOTE ADULT - ASSESSMENT
58F s/p ERCP EUS aborted 2/2 small bowel perforation, s/p ex lap resection of small bowel side to side anastomosis currently hemodynamically stable on the floor.     - Goals of care conversation with pt and her son. Palliative following   - C/w Daptomycin for UTI +VRE and Zosyn for +bile culture (E. coli) per ID.   - Tolerating soft diet   - Trending LFTs  - DVT ppx  - C/w Mathis, monitor UOP. Failed TOV x 3. F/u with urology outpatient. 58F s/p ERCP EUS aborted 2/2 small bowel perforation, s/p ex lap resection of small bowel side to side anastomosis currently hemodynamically stable on the floor.     - Goals of care conversation with pt and her son. Palliative following. F/u palliative for recs for home nursing and hospice.   - C/w Daptomycin for UTI +VRE and Zosyn for +bile culture (E. coli) per ID.   - Tolerating soft diet   - Trending LFTs  - DVT ppx  - C/w Mathis, monitor UOP. Failed TOV x 3. F/u with urology outpatient.

## 2018-07-04 NOTE — PROGRESS NOTE ADULT - SUBJECTIVE AND OBJECTIVE BOX
NUTRITION NOTE  [id number] JANNETTEI  ===============================    Interval events  Patient was seen and examined at bedside, no acute overnight events.   TPN/lipids initiated on 18, patient is tolerating without any issues.  Patient states that is trying to eat small amounts and continues to drink Ensure Clears throughout the day. She has been able to tolerate some po intake but does not have too much of an appetite.   TPN/lipids at 1L infusion volume today with 1/2 calories.   s/o bedside drainage of intraabdominal hematoma on 18    Vital Signs Last 24 Hrs  T(C): 36.7 (2018 08:31), Max: 37.1 (2018 16:32)  T(F): 98.1 (2018 08:31), Max: 98.7 (2018 16:32)  HR: 102 (2018 08:31) (90 - 102)  BP: 130/69 (2018 08:31) (128/67 - 141/84)  RR: 20 (2018 08:31) (20 - 20)  SpO2: 92% (2018 08:31) (92% - 99%)    MEDICATIONS  (STANDING):  chlorhexidine 4% Liquid 1 Application(s) Topical once  chlorhexidine 4% Liquid 1 Application(s) Topical <User Schedule>  DAPTOmycin IVPB 220 milliGRAM(s) IV Intermittent every 24 hours  enoxaparin Injectable 40 milliGRAM(s) SubCutaneous daily  fat emulsion (Fish Oil and Plant Based) 20% Infusion 8.3 mL/Hr (8.3 mL/Hr) IV Continuous <Continuous>  fat emulsion (Fish Oil and Plant Based) 20% Infusion 8.3 mL/Hr (8.3 mL/Hr) IV Continuous <Continuous>  HYDROmorphone  Injectable 0.2 milliGRAM(s) IV Push every 4 hours  metoclopramide 5 milliGRAM(s) Oral every 8 hours  Parenteral Nutrition - Adult 1 Each (42 mL/Hr) TPN Continuous <Continuous>  Parenteral Nutrition - Adult 1 Each (42 mL/Hr) TPN Continuous <Continuous>  piperacillin/tazobactam IVPB. 3.375 Gram(s) IV Intermittent every 8 hours  sucralfate suspension 1 Gram(s) Oral four times a day  ursodiol Capsule 300 milliGRAM(s) Oral every 12 hours    MEDICATIONS  (PRN):  cyclobenzaprine 5 milliGRAM(s) Oral three times a day PRN Muscle Spasm  dibucaine 1% Ointment 1 Application(s) Topical daily PRN hemorrhoids  HYDROmorphone  Injectable 0.2 milliGRAM(s) IV Push every 4 hours PRN Moderate Pain (4 - 6)    I&O's Detail    2018 07:01  -  2018 07:00  --------------------------------------------------------  IN:    fat emulsion (Fish Oil and Plant Based) 20% Infusion: 16.6 mL    TPN (Total Parenteral Nutrition): 462 mL  Total IN: 478.6 mL    OUT:    Drain: 240 mL    Indwelling Catheter - Urethral: 2000 mL    Intermittent Catheterization - Urethral: 250 mL  Total OUT: 2490 mL    Total NET: -2011.4 mL    POCT Blood Glucose.: 128 mg/dL (2018 05:38)  POCT Blood Glucose.: 107 mg/dL (2018 23:51)  POCT Blood Glucose.: 109 mg/dL (2018 18:13)  POCT Blood Glucose.: 128 mg/dL (2018 12:20)    Daily Weight in k.9 (2018 13:14)    Drug Dosing Weight  Height (cm): 160.02 (2018 15:53)  Weight (kg): 54 (2018 15:53)  BMI (kg/m2): 21.1 (2018 15:53)  BSA (m2): 1.55 (2018 15:53)    PHYSICAL EXAM   Constitutional: no acute distress, resting in bed   Respiratory: nonlabored respirations   Gastrointestinal: soft, mildly tender, mildly distended   Extremities: warm, generalized edema    PICC Site: C/D/I    Diet: soft diet and TPN/lipids     Culture - Blood (collected 2018 18:36)  Source: BLOOD  Preliminary Report (2018 18:37):    NO ORGANISMS ISOLATED    NO ORGANISMS ISOLATED AT 24 HOURS    Culture - Urine (collected 2018 16:11)  Source: URINE CATHETER  Preliminary Report (2018 08:41):    YEAST^YEAST.    COLONY COUNT: 50,000-99,000 CFU/mL    LABORATORY                                   8.3    18.65 )-----------( 642      ( 2018 06:33 )             24.3     07-04    135  |  103  |  12  ----------------------------<  142<H>  3.7   |  24  |  0.30<L>    Ca    8.0<L>      2018 06:33  Phos  3.2     07-04  Mg     2.1     07-04    TPro  5.5<L>  /  Alb  1.6<L>  /  TBili  13.0<H>  /  DBili  x   /  AST  40<H>  /  ALT  38<H>  /  AlkPhos  318<H>  07-03    LIVER FUNCTIONS - ( 2018 06:02 )  Alb: 1.6 g/dL / Pro: 5.5 g/dL / ALK PHOS: 318 u/L / ALT: 38 u/L / AST: 40 u/L / GGT: x           06-20 Chol -- LDL -- HDL -- Trig 138, 06-12 Chol -- LDL -- HDL -- Trig 112, 06-05 Chol 156  HDL 8<L> Trig 85, 05-19 Chol 122 LDL 73 HDL 33<L> Trig 78    Prealbumin 18: 10    ASSESSMENT/PLAN:  57 y/o female s/p ERCP c/b perforation of afferent limb. Pt underwent emergent Ex-lap w/ resection of perforated bowel and stapled side to side functional end to end anastomosis. Pt remained intubated and was transferred to SICU off pressors. s/p extubation, now transferred to floor. Patient meets criteria for severe protein calorie malnutrition requiring TPN for nutritional support. TPN increased back to full volume on 18. Now brought back to 1/2 volume and calories on 18.  s/p IR tube check on 18, s/p bedside drainage of intraabdominal hematoma on 18    TPN infusion volume decreased to 1 L.    Monitor fingersticks q 12 hours, obtain daily weights.    Labs reviewed    Continue TPN and lipids. Will follow up with primary team on plan - 1/2 TPN today.    1. Protein calorie malnutrition being optimized with TPN: CHO [115] gm.  AA [60] gm. SMOF Lipids [20] gm. lipids will be administered over 12 hours   2.  Hyperglycemia managed with: [0 ] units of regular insulin    3.  Check fluid balance daily.  Strict I/O  [ ] [ ]   4.  Daily BMP, Ionized Calcium, Magnesium and Phosphorous   5.  Triglycerides at initiation of TPN and monthly [ ] [ ]   6.  Pepcid in TPN for Gi prophylaxis  [ ]     Nutrition support pager [phone]

## 2018-07-05 LAB
ALBUMIN SERPL ELPH-MCNC: 1.9 G/DL — LOW (ref 3.3–5)
ALP SERPL-CCNC: 270 U/L — HIGH (ref 40–120)
ALT FLD-CCNC: 30 U/L — SIGNIFICANT CHANGE UP (ref 4–33)
AST SERPL-CCNC: 31 U/L — SIGNIFICANT CHANGE UP (ref 4–32)
BACTERIA BLD CULT: SIGNIFICANT CHANGE UP
BILIRUB SERPL-MCNC: 10.6 MG/DL — HIGH (ref 0.2–1.2)
BUN SERPL-MCNC: 11 MG/DL — SIGNIFICANT CHANGE UP (ref 7–23)
CA-I BLD-SCNC: 1.13 MMOL/L — SIGNIFICANT CHANGE UP (ref 1.03–1.23)
CALCIUM SERPL-MCNC: 8 MG/DL — LOW (ref 8.4–10.5)
CHLORIDE SERPL-SCNC: 101 MMOL/L — SIGNIFICANT CHANGE UP (ref 98–107)
CO2 SERPL-SCNC: 23 MMOL/L — SIGNIFICANT CHANGE UP (ref 22–31)
CREAT SERPL-MCNC: 0.31 MG/DL — LOW (ref 0.5–1.3)
GLUCOSE SERPL-MCNC: 106 MG/DL — HIGH (ref 70–99)
HCT VFR BLD CALC: 24.7 % — LOW (ref 34.5–45)
HGB BLD-MCNC: 8.3 G/DL — LOW (ref 11.5–15.5)
MAGNESIUM SERPL-MCNC: 2 MG/DL — SIGNIFICANT CHANGE UP (ref 1.6–2.6)
MCHC RBC-ENTMCNC: 30.7 PG — SIGNIFICANT CHANGE UP (ref 27–34)
MCHC RBC-ENTMCNC: 33.6 % — SIGNIFICANT CHANGE UP (ref 32–36)
MCV RBC AUTO: 91.5 FL — SIGNIFICANT CHANGE UP (ref 80–100)
NRBC # FLD: 0 — SIGNIFICANT CHANGE UP
PHOSPHATE SERPL-MCNC: 3.1 MG/DL — SIGNIFICANT CHANGE UP (ref 2.5–4.5)
PLATELET # BLD AUTO: 680 K/UL — HIGH (ref 150–400)
PMV BLD: 10.1 FL — SIGNIFICANT CHANGE UP (ref 7–13)
POTASSIUM SERPL-MCNC: 4.2 MMOL/L — SIGNIFICANT CHANGE UP (ref 3.5–5.3)
POTASSIUM SERPL-SCNC: 4.2 MMOL/L — SIGNIFICANT CHANGE UP (ref 3.5–5.3)
PROT SERPL-MCNC: 5.5 G/DL — LOW (ref 6–8.3)
RBC # BLD: 2.7 M/UL — LOW (ref 3.8–5.2)
RBC # FLD: 21.9 % — HIGH (ref 10.3–14.5)
SODIUM SERPL-SCNC: 135 MMOL/L — SIGNIFICANT CHANGE UP (ref 135–145)
WBC # BLD: 19.21 K/UL — HIGH (ref 3.8–10.5)
WBC # FLD AUTO: 19.21 K/UL — HIGH (ref 3.8–10.5)

## 2018-07-05 PROCEDURE — 99233 SBSQ HOSP IP/OBS HIGH 50: CPT

## 2018-07-05 PROCEDURE — 99232 SBSQ HOSP IP/OBS MODERATE 35: CPT | Mod: GC

## 2018-07-05 PROCEDURE — 99232 SBSQ HOSP IP/OBS MODERATE 35: CPT | Mod: 24

## 2018-07-05 PROCEDURE — 99232 SBSQ HOSP IP/OBS MODERATE 35: CPT

## 2018-07-05 RX ORDER — ELECTROLYTE SOLUTION,INJ
1 VIAL (ML) INTRAVENOUS
Qty: 0 | Refills: 0 | Status: DISCONTINUED | OUTPATIENT
Start: 2018-07-05 | End: 2018-07-06

## 2018-07-05 RX ORDER — I.V. FAT EMULSION 20 G/100ML
8.3 EMULSION INTRAVENOUS
Qty: 20 | Refills: 0 | Status: DISCONTINUED | OUTPATIENT
Start: 2018-07-05 | End: 2018-07-06

## 2018-07-05 RX ORDER — HYDROMORPHONE HYDROCHLORIDE 2 MG/ML
0.5 INJECTION INTRAMUSCULAR; INTRAVENOUS; SUBCUTANEOUS EVERY 4 HOURS
Qty: 0 | Refills: 0 | Status: DISCONTINUED | OUTPATIENT
Start: 2018-07-05 | End: 2018-07-09

## 2018-07-05 RX ADMIN — HYDROMORPHONE HYDROCHLORIDE 0.5 MILLIGRAM(S): 2 INJECTION INTRAMUSCULAR; INTRAVENOUS; SUBCUTANEOUS at 19:00

## 2018-07-05 RX ADMIN — HYDROMORPHONE HYDROCHLORIDE 0.5 MILLIGRAM(S): 2 INJECTION INTRAMUSCULAR; INTRAVENOUS; SUBCUTANEOUS at 21:10

## 2018-07-05 RX ADMIN — HYDROMORPHONE HYDROCHLORIDE 0.2 MILLIGRAM(S): 2 INJECTION INTRAMUSCULAR; INTRAVENOUS; SUBCUTANEOUS at 03:10

## 2018-07-05 RX ADMIN — I.V. FAT EMULSION 8.3 ML/HR: 20 EMULSION INTRAVENOUS at 17:43

## 2018-07-05 RX ADMIN — HYDROMORPHONE HYDROCHLORIDE 0.2 MILLIGRAM(S): 2 INJECTION INTRAMUSCULAR; INTRAVENOUS; SUBCUTANEOUS at 02:46

## 2018-07-05 RX ADMIN — HYDROMORPHONE HYDROCHLORIDE 0.5 MILLIGRAM(S): 2 INJECTION INTRAMUSCULAR; INTRAVENOUS; SUBCUTANEOUS at 20:06

## 2018-07-05 RX ADMIN — HYDROMORPHONE HYDROCHLORIDE 0.2 MILLIGRAM(S): 2 INJECTION INTRAMUSCULAR; INTRAVENOUS; SUBCUTANEOUS at 14:11

## 2018-07-05 RX ADMIN — Medication 1 EACH: at 17:43

## 2018-07-05 RX ADMIN — HYDROMORPHONE HYDROCHLORIDE 0.2 MILLIGRAM(S): 2 INJECTION INTRAMUSCULAR; INTRAVENOUS; SUBCUTANEOUS at 05:23

## 2018-07-05 RX ADMIN — Medication 5 MILLIGRAM(S): at 13:43

## 2018-07-05 RX ADMIN — HYDROMORPHONE HYDROCHLORIDE 0.2 MILLIGRAM(S): 2 INJECTION INTRAMUSCULAR; INTRAVENOUS; SUBCUTANEOUS at 08:24

## 2018-07-05 RX ADMIN — HYDROMORPHONE HYDROCHLORIDE 0.2 MILLIGRAM(S): 2 INJECTION INTRAMUSCULAR; INTRAVENOUS; SUBCUTANEOUS at 05:50

## 2018-07-05 RX ADMIN — DAPTOMYCIN 108.8 MILLIGRAM(S): 500 INJECTION, POWDER, LYOPHILIZED, FOR SOLUTION INTRAVENOUS at 21:09

## 2018-07-05 RX ADMIN — HYDROMORPHONE HYDROCHLORIDE 0.2 MILLIGRAM(S): 2 INJECTION INTRAMUSCULAR; INTRAVENOUS; SUBCUTANEOUS at 11:20

## 2018-07-05 RX ADMIN — PIPERACILLIN AND TAZOBACTAM 25 GRAM(S): 4; .5 INJECTION, POWDER, LYOPHILIZED, FOR SOLUTION INTRAVENOUS at 05:23

## 2018-07-05 RX ADMIN — CHLORHEXIDINE GLUCONATE 1 APPLICATION(S): 213 SOLUTION TOPICAL at 12:13

## 2018-07-05 RX ADMIN — ENOXAPARIN SODIUM 40 MILLIGRAM(S): 100 INJECTION SUBCUTANEOUS at 12:16

## 2018-07-05 RX ADMIN — HYDROMORPHONE HYDROCHLORIDE 0.2 MILLIGRAM(S): 2 INJECTION INTRAMUSCULAR; INTRAVENOUS; SUBCUTANEOUS at 12:13

## 2018-07-05 RX ADMIN — URSODIOL 300 MILLIGRAM(S): 250 TABLET, FILM COATED ORAL at 17:48

## 2018-07-05 RX ADMIN — HYDROMORPHONE HYDROCHLORIDE 0.2 MILLIGRAM(S): 2 INJECTION INTRAMUSCULAR; INTRAVENOUS; SUBCUTANEOUS at 01:19

## 2018-07-05 RX ADMIN — Medication 5 MILLIGRAM(S): at 21:09

## 2018-07-05 RX ADMIN — PIPERACILLIN AND TAZOBACTAM 25 GRAM(S): 4; .5 INJECTION, POWDER, LYOPHILIZED, FOR SOLUTION INTRAVENOUS at 21:10

## 2018-07-05 RX ADMIN — URSODIOL 300 MILLIGRAM(S): 250 TABLET, FILM COATED ORAL at 05:23

## 2018-07-05 RX ADMIN — PIPERACILLIN AND TAZOBACTAM 25 GRAM(S): 4; .5 INJECTION, POWDER, LYOPHILIZED, FOR SOLUTION INTRAVENOUS at 13:40

## 2018-07-05 RX ADMIN — HYDROMORPHONE HYDROCHLORIDE 0.2 MILLIGRAM(S): 2 INJECTION INTRAMUSCULAR; INTRAVENOUS; SUBCUTANEOUS at 13:43

## 2018-07-05 RX ADMIN — HYDROMORPHONE HYDROCHLORIDE 0.5 MILLIGRAM(S): 2 INJECTION INTRAMUSCULAR; INTRAVENOUS; SUBCUTANEOUS at 17:16

## 2018-07-05 RX ADMIN — Medication 1 GRAM(S): at 17:50

## 2018-07-05 RX ADMIN — HYDROMORPHONE HYDROCHLORIDE 0.2 MILLIGRAM(S): 2 INJECTION INTRAMUSCULAR; INTRAVENOUS; SUBCUTANEOUS at 09:12

## 2018-07-05 RX ADMIN — HYDROMORPHONE HYDROCHLORIDE 0.2 MILLIGRAM(S): 2 INJECTION INTRAMUSCULAR; INTRAVENOUS; SUBCUTANEOUS at 01:35

## 2018-07-05 RX ADMIN — HYDROMORPHONE HYDROCHLORIDE 0.5 MILLIGRAM(S): 2 INJECTION INTRAMUSCULAR; INTRAVENOUS; SUBCUTANEOUS at 19:25

## 2018-07-05 NOTE — PROGRESS NOTE ADULT - ATTENDING COMMENTS
57 y/o female s/p ERCP c/b perforation of afferent limb. Pt underwent emergent Ex-lap w/ resection of perforated bowel and stapled side to side functional end to end anastomosis. Pt remained intubated and was transferred to SICU off pressors. s/p extubation, now transferred to floor. Patient meets criteria for severe protein calorie malnutrition requiring TPN for nutritional support. TPN increased back to full volume on 6/26/18. Now brought back to 1/2 volume and calories on 6/29/18.  s/p IR tube check on 6/27/18, s/p bedside drainage of intraabdominal hematoma on 7/2/18    TPN infusion volume decreased to 1 L.    Monitor fingersticks q 12 hours, obtain daily weights.    Labs reviewed     Continue TPN and lipids. Will follow up with primary team on plan and palliative recommendations - continue with 1/2 TPN today.    1. Protein calorie malnutrition being optimized with TPN: CHO [115] gm.  AA [60] gm. SMOF Lipids [20] gm. lipids will be administered over 12 hours   2.  Hyperglycemia managed with: [0 ] units of regular insulin    3.  Check fluid balance daily.  Strict I/O  [ ] [ ]   4.  Daily BMP, Ionized Calcium, Magnesium and Phosphorous   5.  Triglycerides at initiation of TPN and monthly [ ] [ ]   6.  Pepcid in TPN for Gi prophylaxis  [ ]     I have seen and examined the patient, reviewed the laboratory and radiologic data and agree with the history, physical assessment and plan.  I reviewed and discussed with all consultants, house staff and PA's.  The note is edited where appropriate. The Nutrition Support Team (NST) discusses on an ongoing basis with the primary team and all consultants, House staff and PA's to have a permanent risk benefit analyses on all decisions.  I spent  45  minutes in total; providing diagnoses, assessment and plan.

## 2018-07-05 NOTE — PROGRESS NOTE ADULT - ATTENDING COMMENTS
Patient was seen and examined with GI fellow at rounds. Agree with above.  Dyspnea resolved, leukocytosis uptrending, liver enzymes were normal, hyperbilirubinemia downtrending, .   Pt denies pruritus. Bloody collection seen at drain for hematoma.   Will discuss with biliary team if further workup for biliary obstruction needed.

## 2018-07-05 NOTE — PROGRESS NOTE ADULT - ASSESSMENT
58F s/p ERCP EUS aborted 2/2 small bowel perforation, s/p ex lap resection of small bowel side to side anastomosis currently hemodynamically stable on the floor.     -Goals of care conversation with pt and her son. Palliative following. F/u palliative for recs for home nursing and hospice.   - C/w (Day 6 of 7) Daptomycin for UTI +VRE and Zosyn for +bile culture (E. coli) per ID.   - Tolerating soft diet   - Trending LFTs  - DVT ppx with lovenox  - C/w Mathis, monitor UOP. Failed TOV x 3. F/u with urology outpatient.

## 2018-07-05 NOTE — PROGRESS NOTE ADULT - SUBJECTIVE AND OBJECTIVE BOX
NUTRITION NOTE  [id number] JANNETTEI  ===============================    Interval events  Patient was seen and examined at bedside, no acute overnight events.   TPN/lipids initiated on 18, patient is tolerating without any issues.  Patient states that is trying to eat small amounts and continues to drink Ensure Clears throughout the day. She has been able to tolerate some po intake but does not have too much of an appetite.   TPN/lipids at 1L infusion volume today with 1/2 calories.   s/o bedside drainage of intraabdominal hematoma on 18    Vital Signs Last 24 Hrs  T(C): 36.8 (2018 08:51), Max: 37.3 (2018 19:39)  T(F): 98.3 (2018 08:51), Max: 99.1 (2018 19:39)  HR: 91 (2018 08:51) (91 - 106)  BP: 126/65 (2018 08:51) (122/67 - 137/76)  RR: 20 (2018 08:51) (20 - 20)  SpO2: 100% (2018 08:51) (95% - 100%)    MEDICATIONS  (STANDING):  chlorhexidine 4% Liquid 1 Application(s) Topical once  chlorhexidine 4% Liquid 1 Application(s) Topical <User Schedule>  DAPTOmycin IVPB 220 milliGRAM(s) IV Intermittent every 24 hours  enoxaparin Injectable 40 milliGRAM(s) SubCutaneous daily  fat emulsion (Fish Oil and Plant Based) 20% Infusion 8.3 mL/Hr (8.3 mL/Hr) IV Continuous <Continuous>  fat emulsion (Fish Oil and Plant Based) 20% Infusion 8.3 mL/Hr (8.3 mL/Hr) IV Continuous <Continuous>  HYDROmorphone  Injectable 0.2 milliGRAM(s) IV Push every 4 hours  metoclopramide 5 milliGRAM(s) Oral every 8 hours  Parenteral Nutrition - Adult 1 Each (42 mL/Hr) TPN Continuous <Continuous>  Parenteral Nutrition - Adult 1 Each (42 mL/Hr) TPN Continuous <Continuous>  piperacillin/tazobactam IVPB. 3.375 Gram(s) IV Intermittent every 8 hours  sucralfate suspension 1 Gram(s) Oral four times a day  ursodiol Capsule 300 milliGRAM(s) Oral every 12 hours    MEDICATIONS  (PRN):  cyclobenzaprine 5 milliGRAM(s) Oral three times a day PRN Muscle Spasm  dibucaine 1% Ointment 1 Application(s) Topical daily PRN hemorrhoids  HYDROmorphone  Injectable 0.2 milliGRAM(s) IV Push every 4 hours PRN Moderate Pain (4 - 6)    I&O's Detail    2018 07:01  -  2018 07:00  --------------------------------------------------------  IN:    fat emulsion (Fish Oil and Plant Based) 20% Infusion: 16.6 mL    TPN (Total Parenteral Nutrition): 504 mL  Total IN: 520.6 mL    OUT:    Drain: 220 mL    Indwelling Catheter - Urethral: 1270 mL    Intermittent Catheterization - Urethral: 1300 mL  Total OUT: 2790 mL    Total NET: -2269.4 mL      2018 07:01  -  2018 11:08  --------------------------------------------------------  IN:    Oral Fluid: 100 mL    TPN (Total Parenteral Nutrition): 126 mL  Total IN: 226 mL    OUT:    Drain: 150 mL    Indwelling Catheter - Urethral: 350 mL  Total OUT: 500 mL    Total NET: -274 mL    POCT Blood Glucose.: 133 mg/dL (2018 06:12)  POCT Blood Glucose.: 113 mg/dL (2018 00:55)  POCT Blood Glucose.: 107 mg/dL (2018 18:05)  POCT Blood Glucose.: 136 mg/dL (2018 12:06)    Daily     Daily Weight in k.9 (2018 13:01)    Daily Weight in k.9 (2018 13:14)    Drug Dosing Weight  Height (cm): 160.02 (2018 15:53)  Weight (kg): 54 (2018 15:53)  BMI (kg/m2): 21.1 (2018 15:53)  BSA (m2): 1.55 (2018 15:53)    PHYSICAL EXAM   Constitutional: no acute distress, resting in bed   Respiratory: nonlabored respirations   Gastrointestinal: soft, mildly tender, mildly distended   Extremities: warm  PICC Site: C/D/I    Diet: soft diet and TPN/lipids     Culture - Blood (collected 2018 18:36)  Source: BLOOD  Preliminary Report (2018 18:37):    NO ORGANISMS ISOLATED    NO ORGANISMS ISOLATED AT 24 HOURS    Culture - Urine (collected 2018 16:11)  Source: URINE CATHETER  Preliminary Report (2018 08:41):    YEAST^YEAST.    COLONY COUNT: 50,000-99,000 CFU/mL    LABORATORY                                   8.3    19.21 )-----------( 680      ( 2018 05:42 )             24.7     07-05    135  |  101  |  11  ----------------------------<  106<H>  4.2   |  23  |  0.31<L>    Ca    8.0<L>      2018 05:42  Phos  3.1     07-05  Mg     2.0     07-05    TPro  5.5<L>  /  Alb  1.9<L>  /  TBili  10.6<H>  /  DBili  x   /  AST  31  /  ALT  30  /  AlkPhos  270<H>  07-05             LIVER FUNCTIONS - ( 2018 05:42 )  Alb: 1.9 g/dL / Pro: 5.5 g/dL / ALK PHOS: 270 u/L / ALT: 30 u/L / AST: 31 u/L / GGT: x           06-20 Chol -- LDL -- HDL -- Trig 138, 06-12 Chol -- LDL -- HDL -- Trig 112, 06-05 Chol 156  HDL 8<L> Trig 85, 05-19 Chol 122 LDL 73 HDL 33<L> Trig 78    Prealbumin /18: 10    ASSESSMENT/PLAN:  59 y/o female s/p ERCP c/b perforation of afferent limb. Pt underwent emergent Ex-lap w/ resection of perforated bowel and stapled side to side functional end to end anastomosis. Pt remained intubated and was transferred to SICU off pressors. s/p extubation, now transferred to floor. Patient meets criteria for severe protein calorie malnutrition requiring TPN for nutritional support. TPN increased back to full volume on 18. Now brought back to 1/2 volume and calories on 18.  s/p IR tube check on 18, s/p bedside drainage of intraabdominal hematoma on 18    TPN infusion volume decreased to 1 L.    Monitor fingersticks q 12 hours, obtain daily weights.    Labs reviewed     Continue TPN and lipids. Will follow up with primary team on plan and palliative recommendations - continue with 1/2 TPN today.    1. Protein calorie malnutrition being optimized with TPN: CHO [115] gm.  AA [60] gm. SMOF Lipids [20] gm. lipids will be administered over 12 hours   2.  Hyperglycemia managed with: [0 ] units of regular insulin    3.  Check fluid balance daily.  Strict I/O  [ ] [ ]   4.  Daily BMP, Ionized Calcium, Magnesium and Phosphorous   5.  Triglycerides at initiation of TPN and monthly [ ] [ ]   6.  Pepcid in TPN for Gi prophylaxis  [ ]     Nutrition support pager [phone]

## 2018-07-05 NOTE — PROGRESS NOTE ADULT - SUBJECTIVE AND OBJECTIVE BOX
Chief Complaint:  Patient is a 58y old  Female who presents with a chief complaint of Painless jaundice (2018 14:49)      Interval Events: Abdominal pain unchanged but controlled by pain medication. Had x1 BM this AM. Tolerating soft diet.     Allergies:  No Known Allergies    Hospital Medications:  chlorhexidine 4% Liquid 1 Application(s) Topical once  chlorhexidine 4% Liquid 1 Application(s) Topical <User Schedule>  cyclobenzaprine 5 milliGRAM(s) Oral three times a day PRN  DAPTOmycin IVPB 220 milliGRAM(s) IV Intermittent every 24 hours  dibucaine 1% Ointment 1 Application(s) Topical daily PRN  enoxaparin Injectable 40 milliGRAM(s) SubCutaneous daily  fat emulsion (Fish Oil and Plant Based) 20% Infusion 8.3 mL/Hr IV Continuous <Continuous>  fat emulsion (Fish Oil and Plant Based) 20% Infusion 8.3 mL/Hr IV Continuous <Continuous>  HYDROmorphone  Injectable 0.2 milliGRAM(s) IV Push every 4 hours  HYDROmorphone  Injectable 0.2 milliGRAM(s) IV Push every 4 hours PRN  metoclopramide 5 milliGRAM(s) Oral every 8 hours  Parenteral Nutrition - Adult 1 Each TPN Continuous <Continuous>  Parenteral Nutrition - Adult 1 Each TPN Continuous <Continuous>  piperacillin/tazobactam IVPB. 3.375 Gram(s) IV Intermittent every 8 hours  sucralfate suspension 1 Gram(s) Oral four times a day  ursodiol Capsule 300 milliGRAM(s) Oral every 12 hours      PMHX/PSHX:  Gastric cancer  Malignant neoplasm of stomach, unspecified location  No pertinent past medical history  S/P partial gastrectomy  No significant past surgical history      Family history:  No pertinent family history in first degree relatives      PHYSICAL EXAM:   Vital Signs:  Vital Signs Last 24 Hrs  T(C): 36.8 (2018 08:51), Max: 37.3 (2018 19:39)  T(F): 98.3 (2018 08:51), Max: 99.1 (2018 19:39)  HR: 91 (2018 08:51) (91 - 106)  BP: 126/65 (2018 08:51) (122/67 - 137/76)  BP(mean): --  RR: 20 (2018 08:51) (20 - 20)  SpO2: 100% (2018 08:51) (95% - 100%)  Daily     Daily Weight in k.9 (2018 13:01)    GENERAL: NAD, thin  HEAD:  Atraumatic, Normocephalic  EYES: icteric  NECK: Supple, No JVD  CHEST/LUNG: Clear to auscultation bilaterally; No wheeze  HEART: Regular rate and rhythm; No murmurs, rubs, or gallops  ABDOMEN: Soft, Nontender, 150cc blood collecting at suture line into bag, distended; Bowel sounds present, no hepatosplenomegaly, no rebound or guarding  EXTREMITIES:bipedal edema  PSYCH: AAOx3  NEUROLOGY: non-focal, no asterixis  SKIN: jaundice      LABS:                        8.3    19.21 )-----------( 680      ( 2018 05:42 )             24.7     Mean Cell Volume: 91.5 fL (18 @ 05:42)        135  |  101  |  11  ----------------------------<  106<H>  4.2   |  23  |  0.31<L>    Ca    8.0<L>      2018 05:42  Phos  3.1       Mg     2.0         TPro  5.5<L>  /  Alb  1.9<L>  /  TBili  10.6<H>  /  DBili  x   /  AST  31  /  ALT  30  /  AlkPhos  270<H>  07-05    LIVER FUNCTIONS - ( 2018 05:42 )  Alb: 1.9 g/dL / Pro: 5.5 g/dL / ALK PHOS: 270 u/L / ALT: 30 u/L / AST: 31 u/L / GGT: x                                       8.3    19.21 )-----------( 680      ( 2018 05:42 )             24.7                         8.3    18.65 )-----------( 642      ( 2018 06:33 )             24.3                         8.1    17.46 )-----------( 587      ( 2018 06:02 )             24.8     Imaging:

## 2018-07-05 NOTE — PROGRESS NOTE ADULT - SUBJECTIVE AND OBJECTIVE BOX
[facility]      Chief Complaint:  Patient is a 58y old  Female who presents with a chief complaint of Painless jaundice (2018 14:49)      Interval Events: Pt still has some abd pain but able to tolerate PO intake.     Allergies:  No Known Allergies      Hospital Medications:  chlorhexidine 4% Liquid 1 Application(s) Topical once  chlorhexidine 4% Liquid 1 Application(s) Topical <User Schedule>  cyclobenzaprine 5 milliGRAM(s) Oral three times a day PRN  DAPTOmycin IVPB 220 milliGRAM(s) IV Intermittent every 24 hours  dibucaine 1% Ointment 1 Application(s) Topical daily PRN  enoxaparin Injectable 40 milliGRAM(s) SubCutaneous daily  fat emulsion (Fish Oil and Plant Based) 20% Infusion 8.3 mL/Hr IV Continuous <Continuous>  fat emulsion (Fish Oil and Plant Based) 20% Infusion 8.3 mL/Hr IV Continuous <Continuous>  HYDROmorphone  Injectable 0.2 milliGRAM(s) IV Push every 4 hours  HYDROmorphone  Injectable 0.2 milliGRAM(s) IV Push every 4 hours PRN  metoclopramide 5 milliGRAM(s) Oral every 8 hours  Parenteral Nutrition - Adult 1 Each TPN Continuous <Continuous>  piperacillin/tazobactam IVPB. 3.375 Gram(s) IV Intermittent every 8 hours  sucralfate suspension 1 Gram(s) Oral four times a day  ursodiol Capsule 300 milliGRAM(s) Oral every 12 hours      PMHX/PSHX:  Gastric cancer  Malignant neoplasm of stomach, unspecified location  No pertinent past medical history  S/P partial gastrectomy  No significant past surgical history      Family history:  No pertinent family history in first degree relatives      ROS:     General:  No fevers, chills  Eyes:  Good vision  ENT:  No odynophagia, dysphagia  CV:  No pain, palpitations  Resp:  No dyspnea, cough, tachypnea, wheezing  GI:  See HPI  Muscle:  No pain, weakness  Skin:  No rash, edema      PHYSICAL EXAM:     pt refused physical exam today.    Vital Signs:  Vital Signs Last 24 Hrs  T(C): 37.1 (2018 05:20), Max: 37.3 (2018 19:39)  T(F): 98.7 (2018 05:20), Max: 99.1 (2018 19:39)  HR: 98 (2018 05:20) (98 - 106)  BP: 122/67 (2018 05:20) (122/67 - 137/76)  BP(mean): --  RR: 20 (2018 05:20) (20 - 20)  SpO2: 100% (2018 05:20) (95% - 100%)  Daily     Daily Weight in k.9 (2018 13:01)    LABS:                        8.3    19.21 )-----------( 680      ( 2018 05:42 )             24.7     07-05    135  |  101  |  11  ----------------------------<  106<H>  4.2   |  23  |  0.31<L>    Ca    8.0<L>      2018 05:42  Phos  3.1     07-05  Mg     2.0     07-05    TPro  5.5<L>  /  Alb  1.9<L>  /  TBili  10.6<H>  /  DBili  x   /  AST  31  /  ALT  30  /  AlkPhos  270<H>  07-05    LIVER FUNCTIONS - ( 2018 05:42 )  Alb: 1.9 g/dL / Pro: 5.5 g/dL / ALK PHOS: 270 u/L / ALT: 30 u/L / AST: 31 u/L / GGT: x                   Imaging:

## 2018-07-05 NOTE — PROGRESS NOTE ADULT - ASSESSMENT
59 yo F with history gastric CA initially presenting with painless jaundice, now with fever after prolonged stay.  Attempted ERCP on 6/6, complicated by bowel perforation, emergent ex-lap  Had bile drain placed 6/8; 2 subsequent tube checks  Bile culture grew E coli, and patient received course of Cipro into Zosyn; also grew VRE  UCX with VRE S Dapto; although it is clinically uncertain that sepsis like syndrome 2/2 UTI/cholangitis  Abd wall hematoma, s/p drainage into ostomy  PICC line with TPN  Leukocytosis persists, no further fevers; still chronically ill appearing  Guarded  Overall, Fever, leukocytosis, positive culture finding, jaundice, UTI  - Zosyn 3.375g q 8  - Daptomycin 4mg/kg q 24 (please check CK)  - Discontinue abx tomorrow AM (Would be s/p 7 days Dapto)--presuming reassuring status  - Hematoma care per surgery  - Appreciate palliative input    Nirmal Nelson MD  Pager [phone]  After 5pm and on weekends call [phone]

## 2018-07-05 NOTE — PROGRESS NOTE ADULT - SUBJECTIVE AND OBJECTIVE BOX
INTERVAL HPI/OVERNIGHT EVENTS:  Patient being followed by Palliative Care for symptom management and GOC.  Discussed in length today with patient's son prognosis and disease progression. We discussed the option of patient going home with home hospice, he will discuss with his sister and they will give us an answer tomorrow, pending referral until they come to a decision. In terms of TPN after discussion with patient's son is in agreement that at this point TPN is not providing much benefit for patient's quality of life as opposed to the high risk TPN imposes. He is ok to discontinue TPN.    Allergies    No Known Allergies    Intolerances    ADVANCE DIRECTIVES:    Full code  MOLST [ ] YES [ x] NO     MEDICATIONS  (STANDING):  chlorhexidine 4% Liquid 1 Application(s) Topical once  chlorhexidine 4% Liquid 1 Application(s) Topical <User Schedule>  DAPTOmycin IVPB 220 milliGRAM(s) IV Intermittent every 24 hours  enoxaparin Injectable 40 milliGRAM(s) SubCutaneous daily  fat emulsion (Fish Oil and Plant Based) 20% Infusion 8.3 mL/Hr (8.3 mL/Hr) IV Continuous <Continuous>  fat emulsion (Fish Oil and Plant Based) 20% Infusion 8.3 mL/Hr (8.3 mL/Hr) IV Continuous <Continuous>  HYDROmorphone  Injectable 0.2 milliGRAM(s) IV Push every 4 hours  metoclopramide 5 milliGRAM(s) Oral every 8 hours  Parenteral Nutrition - Adult 1 Each (42 mL/Hr) TPN Continuous <Continuous>  Parenteral Nutrition - Adult 1 Each (42 mL/Hr) TPN Continuous <Continuous>  piperacillin/tazobactam IVPB. 3.375 Gram(s) IV Intermittent every 8 hours  sucralfate suspension 1 Gram(s) Oral four times a day  ursodiol Capsule 300 milliGRAM(s) Oral every 12 hours    MEDICATIONS  (PRN):  cyclobenzaprine 5 milliGRAM(s) Oral three times a day PRN Muscle Spasm  dibucaine 1% Ointment 1 Application(s) Topical daily PRN hemorrhoids  HYDROmorphone  Injectable 0.2 milliGRAM(s) IV Push every 4 hours PRN Moderate Pain (4 - 6)    PRESENT SYMPTOMS:  Source: [ x] Patient   [ ] Family   [ ] Team     Pain:                        [ ] No [ x] Yes             [ ] Mild [ x] Moderate [ ] Severe    Onset -  Location -  Duration -  Character -  Alleviating/Aggravating -  Radiation -  Timing -    Dyspnea:                [x ] No [ ] Yes             [ ] Mild [ ] Moderate [ ] Severe    Anxiety:                  [ x] No [ ] Yes             [ ] Mild [ ] Moderate [ ] Severe    Fatigue:                  [ ] No [x ] Yes             [ ] Mild [ x] Moderate [ ] Severe    Nausea:                  [ ] No [x ] Yes             [ x] Mild [ ] Moderate [ ] Severe    Loss of appetite:   [ ] No [x ] Yes             [ ] Mild [ x] Moderate [ ] Severe    Constipation:        [ ] No [x ] Yes             [ ] Mild [ ] Moderate [ ] Severe    Other Symptoms:  [x ] All other review of systems negative   [ ] Unable to obtain due to poor mentation     Karnofsky Performance Score/Palliative Performance Status Version 2: 30 %    PHYSICAL EXAM:  Vital Signs Last 24 Hrs  T(C): 37.4 (05 Jul 2018 12:25), Max: 37.4 (05 Jul 2018 12:25)  T(F): 99.4 (05 Jul 2018 12:25), Max: 99.4 (05 Jul 2018 12:25)  HR: 93 (05 Jul 2018 12:25) (91 - 106)  BP: 132/68 (05 Jul 2018 12:25) (122/67 - 137/76)  BP(mean): --  RR: 20 (05 Jul 2018 12:25) (20 - 20)  SpO2: 99% (05 Jul 2018 12:25) (95% - 100%) I&O's Summary    04 Jul 2018 07:01  -  05 Jul 2018 07:00  --------------------------------------------------------  IN: 520.6 mL / OUT: 2790 mL / NET: -2269.4 mL    05 Jul 2018 07:01  -  05 Jul 2018 15:59  --------------------------------------------------------  IN: 688 mL / OUT: 840 mL / NET: -152 mL    General:  [x ] Alert  [ ] Oriented x      [ ] Lethargic  [ ] Agitated   [ ] Cachexia   [ ] Unarousable  [x ] Verbal  [ ] Non-Verbal    HEENT:  [ ] Normal   [x ] Dry mouth   [ ] ET Tube    [ ] Trach  [ ] Oral lesions    Lungs:   [x ] Clear [ ] Tachypnea  [ ] Audible excessive secretions   [ ] Rhonchi        [ ] Right [ ] Left [ ] Bilateral  [ ] Crackles        [ ] Right [ ] Left [ ] Bilateral  [ ] Wheezing     [ ] Right [ ] Left [ ] Bilateral    Cardiovascular:  [x ] Regular [ ] Irregular [ ] Tachycardia   [ ] Bradycardia  [ ] Murmur [ ] Other    Abdomen: [ ] Soft  [ ] Distended   [ ] +BS  [ ] Non tender [x ] Tender  [ ]PEG   [ ]OGT/ NGT   Last BM:     Large wound covered with dressing, ostomy with liquid stool.    Genitourinary: [ ] Normal [ ] Incontinent   [ ] Oliguria/Anuria   [ x] Mathis    Musculoskeletal:  [ ] Normal   [x ] Weakness  [ ] Bedbound/Wheelchair bound [ ] Edema    Neurological: [ x] No focal deficits  [ ] Cognitive impairment  [ ] Dysphagia [ ] Dysarthria [ ] Paresis [ ] Other     Skin: [x ] Normal   [ ] Pressure ulcer(s)                  [ ] Rash    LABS:                        8.3    19.21 )-----------( 680      ( 05 Jul 2018 05:42 )             24.7     07-05    135  |  101  |  11  ----------------------------<  106<H>  4.2   |  23  |  0.31<L>    Ca    8.0<L>      05 Jul 2018 05:42  Phos  3.1     07-05  Mg     2.0     07-05    TPro  5.5<L>  /  Alb  1.9<L>  /  TBili  10.6<H>  /  DBili  x   /  AST  31  /  ALT  30  /  AlkPhos  270<H>  07-05    Shock: No [ ] Septic [ ] Cardiogenic [ ] Neurologic [ ] Hypovolemic  Vasopressors x None  Inotrophs x None    Oral Intake: [ ] Unable/mouth care only [ ] Minimal [ x] Moderate [ ] Full Capability    Diet: [ ] NPO [ ] Tube feeds [x ] TPN [ x] Other     RADIOLOGY & ADDITIONAL STUDIES: No new imaging    REFERRALS:   [ ] Chaplaincy  [ ] Hospice  [ ] Child Life  [ ] Social Work  [ ] Case management [ ] Holistic Therapy

## 2018-07-05 NOTE — PROGRESS NOTE ADULT - SUBJECTIVE AND OBJECTIVE BOX
CC: F/U for UTI    Saw/spoke to patient. No fevers, no chills. Overall well. No new complaints.    Allergies  No Known Allergies    ANTIMICROBIALS:  DAPTOmycin IVPB 220 every 24 hours  piperacillin/tazobactam IVPB. 3.375 every 8 hours    PE:    Vital Signs Last 24 Hrs  T(C): 37.4 (05 Jul 2018 12:25), Max: 37.4 (05 Jul 2018 12:25)  T(F): 99.4 (05 Jul 2018 12:25), Max: 99.4 (05 Jul 2018 12:25)  HR: 93 (05 Jul 2018 12:25) (91 - 106)  BP: 132/68 (05 Jul 2018 12:25) (122/67 - 137/76)  RR: 20 (05 Jul 2018 12:25) (20 - 20)  SpO2: 99% (05 Jul 2018 12:25) (95% - 100%)    Gen: AOx3, NAD, non-toxic, jaundiced  CV: S1+S2 normal, no murmurs, nontachycardic  Resp: Clear bilat, no resp distress, no crackles/wheezes  Abd: Soft, nontender, +BS, hematoma with ostomy overlying, bloody drainage  Ext: No LE edema, no wounds    LABS:                        8.3    19.21 )-----------( 680      ( 05 Jul 2018 05:42 )             24.7     07-05    135  |  101  |  11  ----------------------------<  106<H>  4.2   |  23  |  0.31<L>    Ca    8.0<L>      05 Jul 2018 05:42  Phos  3.1     07-05  Mg     2.0     07-05    TPro  5.5<L>  /  Alb  1.9<L>  /  TBili  10.6<H>  /  DBili  x   /  AST  31  /  ALT  30  /  AlkPhos  270<H>  07-05    MICROBIOLOGY:    BLOOD  06-30-18 NGTD    URINE MIDSTREAM  06-27-18 --  --  Enterococcus faecium VRE  Candida albicans    (otherwise reviewed)    RADIOLOGY:    No new available

## 2018-07-05 NOTE — PROGRESS NOTE ADULT - ASSESSMENT
Impression:    1. Hyperbilirubinemia: CT reviewed w radiology, no persistent biliary duct dilation. Suspect intrahepatic cholestasis, likely multifactorial, but must better evaluate biliary tree to rule out obstruction. Factors may include acute illness with possible infection, resorption of intraabdominal hematoma, DILI(unclear which if any drugs might be contributing), less likely TPN given short duration on TPN.    2. Intraabdominal hematoma with possible superinfection, the lovenox may be contributing to this    3. Fever, possible urinary or intra-abdominal source    4. VRE UTI    5. Dyspnea: ?rule out PE    6. Hypoalbuminemia, likely malnutrition    Recommendation:  -US abd to evaluate abdominal wall hematoma  -treat underlying infection  -trend Liver chemistries  -would favor holding pantoprazole (there is pepcid in the TPN) and any other nonessential meds (?reglan/flexeril) to remove any possible agents that could be causing DILI  -start ursodiol 300mg BID  -consider ruling out PE for dyspnea  -optimize nutrition status (would discuss w nutrition) Impression:    1. Hyperbilirubinemia: CT reviewed w radiology, no persistent biliary duct dilation. Suspect intrahepatic cholestasis, likely multifactorial, but must better evaluate biliary tree to rule out obstruction. Factors may include acute illness with possible infection, resorption of intraabdominal hematoma, DILI(unclear which if any drugs might be contributing), less likely TPN given short duration on TPN.    2. Intraabdominal hematoma, lovenox may be contributing to this, complicated by possible superinfection,    3. Fever, possible urinary or intra-abdominal source    4. VRE UTI    5. Dyspnea resolved.     6. Hypoalbuminemia, likely malnutrition    Recommendation:  -US abd to evaluate abdominal wall hematoma  -treat underlying infection  -trend Liver chemistries  -would favor holding pantoprazole (there is pepcid in the TPN) and any other nonessential meds (?reglan/flexeril) to remove any possible agents that could be causing DILI  -carson 300 mg BID  -optimize nutrition status (would discuss w nutrition)

## 2018-07-05 NOTE — PROGRESS NOTE ADULT - ASSESSMENT
59 yo F with h/o gastric cancer 6/2017 s/p partial gastrectomy with Billroth II reconstruction. Admitted for jaundice, s/p unsuccessful ERCP c/b small bowel perforation (6/6) s/p ex-lap with SB resection and anastomosis. 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 Currently being treated with Abx, consult for Palliative care for symptom management and GOC.

## 2018-07-05 NOTE — PROGRESS NOTE ADULT - SUBJECTIVE AND OBJECTIVE BOX
SUBJECTIVE  Pt seen and examined at the bedside. No acute events overnight. Pain well controlled. Denies N/V. Denies difficulty breathing.  Tolerating regular diet. Passing flatus and BM.      OBJECTIVE  PE:  Gen: Ill appearing jaundiced woman resting comfortably in bed.   Resp: breathing comfortably  Abd: soft, NT, moderately distended, bulge under midline incision, not draining. No rebound or guarding      T(C): 37.4 (07-05-18 @ 12:25), Max: 37.4 (07-05-18 @ 12:25)  HR: 93 (07-05-18 @ 12:25) (91 - 106)  BP: 132/68 (07-05-18 @ 12:25) (122/67 - 137/76)  RR: 20 (07-05-18 @ 12:25) (20 - 20)  SpO2: 99% (07-05-18 @ 12:25) (95% - 100%)    07-04-18 @ 07:01  -  07-05-18 @ 07:00  --------------------------------------------------------  IN: 520.6 mL / OUT: 2790 mL / NET: -2269.4 mL    07-05-18 @ 07:01  -  07-05-18 @ 13:53  --------------------------------------------------------  IN: 636 mL / OUT: 840 mL / NET: -204 mL      MEDICATIONS  (STANDING):  chlorhexidine 4% Liquid 1 Application(s) Topical once  chlorhexidine 4% Liquid 1 Application(s) Topical <User Schedule>  cyclobenzaprine 5 milliGRAM(s) Oral three times a day PRN  DAPTOmycin IVPB 220 milliGRAM(s) IV Intermittent every 24 hours  dibucaine 1% Ointment 1 Application(s) Topical daily PRN  enoxaparin Injectable 40 milliGRAM(s) SubCutaneous daily  fat emulsion (Fish Oil and Plant Based) 20% Infusion 8.3 mL/Hr IV Continuous <Continuous>  fat emulsion (Fish Oil and Plant Based) 20% Infusion 8.3 mL/Hr IV Continuous <Continuous>  HYDROmorphone  Injectable 0.2 milliGRAM(s) IV Push every 4 hours  HYDROmorphone  Injectable 0.2 milliGRAM(s) IV Push every 4 hours PRN  metoclopramide 5 milliGRAM(s) Oral every 8 hours  Parenteral Nutrition - Adult 1 Each TPN Continuous <Continuous>  Parenteral Nutrition - Adult 1 Each TPN Continuous <Continuous>  piperacillin/tazobactam IVPB. 3.375 Gram(s) IV Intermittent every 8 hours  sucralfate suspension 1 Gram(s) Oral four times a day  ursodiol Capsule 300 milliGRAM(s) Oral every 12 hours      MEDICATIONS  (PRN):  cyclobenzaprine 5 milliGRAM(s) Oral three times a day PRN Muscle Spasm  dibucaine 1% Ointment 1 Application(s) Topical daily PRN hemorrhoids  HYDROmorphone  Injectable 0.2 milliGRAM(s) IV Push every 4 hours PRN Moderate Pain (4 - 6)    LABS:                        8.3    19.21 )-----------( 680      ( 05 Jul 2018 05:42 )             24.7     07-05    135  |  101  |  11  ----------------------------<  106<H>  4.2   |  23  |  0.31<L>    Ca    8.0<L>      05 Jul 2018 05:42  Phos  3.1     07-05  Mg     2.0     07-05    TPro  5.5<L>  /  Alb  1.9<L>  /  TBili  10.6<H>  /  DBili  x   /  AST  31  /  ALT  30  /  AlkPhos  270<H>  07-05          RADIOLOGY & ADDITIONAL STUDIES:

## 2018-07-05 NOTE — PROGRESS NOTE ADULT - SUBJECTIVE AND OBJECTIVE BOX
SURGICAL ONCOLGY  Patient seen and examined.     MEDICATIONS  (STANDING):  chlorhexidine 4% Liquid 1 Application(s) Topical once  chlorhexidine 4% Liquid 1 Application(s) Topical <User Schedule>  DAPTOmycin IVPB 220 milliGRAM(s) IV Intermittent every 24 hours  enoxaparin Injectable 40 milliGRAM(s) SubCutaneous daily  fat emulsion (Fish Oil and Plant Based) 20% Infusion 8.3 mL/Hr (8.3 mL/Hr) IV Continuous <Continuous>  fat emulsion (Fish Oil and Plant Based) 20% Infusion 8.3 mL/Hr (8.3 mL/Hr) IV Continuous <Continuous>  HYDROmorphone  Injectable 0.5 milliGRAM(s) IV Push every 4 hours  metoclopramide 5 milliGRAM(s) Oral every 8 hours  Parenteral Nutrition - Adult 1 Each (42 mL/Hr) TPN Continuous <Continuous>  piperacillin/tazobactam IVPB. 3.375 Gram(s) IV Intermittent every 8 hours  sucralfate suspension 1 Gram(s) Oral four times a day  ursodiol Capsule 300 milliGRAM(s) Oral every 12 hours    MEDICATIONS  (PRN):  cyclobenzaprine 5 milliGRAM(s) Oral three times a day PRN Muscle Spasm  dibucaine 1% Ointment 1 Application(s) Topical daily PRN hemorrhoids  HYDROmorphone  Injectable 0.5 milliGRAM(s) IV Push every 4 hours PRN Moderate Pain (4 - 6)      Vital Signs Last 24 Hrs  T(C): 37.2 (05 Jul 2018 21:07), Max: 37.5 (05 Jul 2018 16:29)  T(F): 99 (05 Jul 2018 21:07), Max: 99.5 (05 Jul 2018 16:29)  HR: 103 (05 Jul 2018 21:07) (91 - 106)  BP: 137/81 (05 Jul 2018 21:07) (122/67 - 137/81)  BP(mean): --  RR: 20 (05 Jul 2018 21:07) (20 - 20)  SpO2: 98% (05 Jul 2018 21:07) (96% - 100%)    I&O's Detail    04 Jul 2018 07:01  -  05 Jul 2018 07:00  --------------------------------------------------------  IN:    fat emulsion (Fish Oil and Plant Based) 20% Infusion: 16.6 mL    TPN (Total Parenteral Nutrition): 504 mL  Total IN: 520.6 mL    OUT:    Drain: 220 mL    Indwelling Catheter - Urethral: 1270 mL    Intermittent Catheterization - Urethral: 1300 mL  Total OUT: 2790 mL    Total NET: -2269.4 mL      05 Jul 2018 07:01  -  05 Jul 2018 21:14  --------------------------------------------------------  IN:    Free Water: 10 mL    IV PiggyBack: 100 mL    Oral Fluid: 300 mL    TPN (Total Parenteral Nutrition): 462 mL  Total IN: 872 mL    OUT:    Drain: 250 mL    Indwelling Catheter - Urethral: 1100 mL  Total OUT: 1350 mL    Total NET: -478 mL          Daily     Daily     LABS:                        8.3    19.21 )-----------( 680      ( 05 Jul 2018 05:42 )             24.7     07-05    135  |  101  |  11  ----------------------------<  106<H>  4.2   |  23  |  0.31<L>    Ca    8.0<L>      05 Jul 2018 05:42  Phos  3.1     07-05  Mg     2.0     07-05    TPro  5.5<L>  /  Alb  1.9<L>  /  TBili  10.6<H>  /  DBili  x   /  AST  31  /  ALT  30  /  AlkPhos  270<H>  07-05          Chief complaint: abd pain  PHYSICAL EXAM:  Gen: awake alert, icteric+  Abd: softly distended non tender  open incision site with old blood in stoma bag    58yFemale s/p SBR for perforated small bowel  Plan:   -Diet as santana  - Palliative care consulted- hospice care recommended

## 2018-07-06 LAB
BUN SERPL-MCNC: 10 MG/DL — SIGNIFICANT CHANGE UP (ref 7–23)
CA-I BLD-SCNC: 1.15 MMOL/L — SIGNIFICANT CHANGE UP (ref 1.03–1.23)
CALCIUM SERPL-MCNC: 8 MG/DL — LOW (ref 8.4–10.5)
CHLORIDE SERPL-SCNC: 98 MMOL/L — SIGNIFICANT CHANGE UP (ref 98–107)
CK SERPL-CCNC: 13 U/L — LOW (ref 25–170)
CO2 SERPL-SCNC: 24 MMOL/L — SIGNIFICANT CHANGE UP (ref 22–31)
CREAT SERPL-MCNC: 0.31 MG/DL — LOW (ref 0.5–1.3)
GLUCOSE SERPL-MCNC: 114 MG/DL — HIGH (ref 70–99)
MAGNESIUM SERPL-MCNC: 2 MG/DL — SIGNIFICANT CHANGE UP (ref 1.6–2.6)
PHOSPHATE SERPL-MCNC: 2.9 MG/DL — SIGNIFICANT CHANGE UP (ref 2.5–4.5)
POTASSIUM SERPL-MCNC: 4.1 MMOL/L — SIGNIFICANT CHANGE UP (ref 3.5–5.3)
POTASSIUM SERPL-SCNC: 4.1 MMOL/L — SIGNIFICANT CHANGE UP (ref 3.5–5.3)
SODIUM SERPL-SCNC: 133 MMOL/L — LOW (ref 135–145)

## 2018-07-06 PROCEDURE — 99232 SBSQ HOSP IP/OBS MODERATE 35: CPT | Mod: GC

## 2018-07-06 PROCEDURE — 99232 SBSQ HOSP IP/OBS MODERATE 35: CPT

## 2018-07-06 PROCEDURE — 99232 SBSQ HOSP IP/OBS MODERATE 35: CPT | Mod: 24

## 2018-07-06 PROCEDURE — 99233 SBSQ HOSP IP/OBS HIGH 50: CPT

## 2018-07-06 RX ORDER — TAMSULOSIN HYDROCHLORIDE 0.4 MG/1
0.4 CAPSULE ORAL AT BEDTIME
Qty: 0 | Refills: 0 | Status: DISCONTINUED | OUTPATIENT
Start: 2018-07-06 | End: 2018-07-11

## 2018-07-06 RX ADMIN — CHLORHEXIDINE GLUCONATE 1 APPLICATION(S): 213 SOLUTION TOPICAL at 12:25

## 2018-07-06 RX ADMIN — HYDROMORPHONE HYDROCHLORIDE 0.5 MILLIGRAM(S): 2 INJECTION INTRAMUSCULAR; INTRAVENOUS; SUBCUTANEOUS at 23:54

## 2018-07-06 RX ADMIN — HYDROMORPHONE HYDROCHLORIDE 0.5 MILLIGRAM(S): 2 INJECTION INTRAMUSCULAR; INTRAVENOUS; SUBCUTANEOUS at 05:35

## 2018-07-06 RX ADMIN — HYDROMORPHONE HYDROCHLORIDE 0.5 MILLIGRAM(S): 2 INJECTION INTRAMUSCULAR; INTRAVENOUS; SUBCUTANEOUS at 05:38

## 2018-07-06 RX ADMIN — ENOXAPARIN SODIUM 40 MILLIGRAM(S): 100 INJECTION SUBCUTANEOUS at 12:25

## 2018-07-06 RX ADMIN — Medication 5 MILLIGRAM(S): at 14:10

## 2018-07-06 RX ADMIN — HYDROMORPHONE HYDROCHLORIDE 0.5 MILLIGRAM(S): 2 INJECTION INTRAMUSCULAR; INTRAVENOUS; SUBCUTANEOUS at 04:46

## 2018-07-06 RX ADMIN — HYDROMORPHONE HYDROCHLORIDE 0.5 MILLIGRAM(S): 2 INJECTION INTRAMUSCULAR; INTRAVENOUS; SUBCUTANEOUS at 09:14

## 2018-07-06 RX ADMIN — URSODIOL 300 MILLIGRAM(S): 250 TABLET, FILM COATED ORAL at 17:09

## 2018-07-06 RX ADMIN — TAMSULOSIN HYDROCHLORIDE 0.4 MILLIGRAM(S): 0.4 CAPSULE ORAL at 10:39

## 2018-07-06 RX ADMIN — HYDROMORPHONE HYDROCHLORIDE 0.5 MILLIGRAM(S): 2 INJECTION INTRAMUSCULAR; INTRAVENOUS; SUBCUTANEOUS at 10:39

## 2018-07-06 RX ADMIN — Medication 1 GRAM(S): at 05:26

## 2018-07-06 RX ADMIN — HYDROMORPHONE HYDROCHLORIDE 0.5 MILLIGRAM(S): 2 INJECTION INTRAMUSCULAR; INTRAVENOUS; SUBCUTANEOUS at 08:30

## 2018-07-06 RX ADMIN — HYDROMORPHONE HYDROCHLORIDE 0.5 MILLIGRAM(S): 2 INJECTION INTRAMUSCULAR; INTRAVENOUS; SUBCUTANEOUS at 01:43

## 2018-07-06 RX ADMIN — Medication 5 MILLIGRAM(S): at 05:26

## 2018-07-06 RX ADMIN — Medication 1 GRAM(S): at 12:25

## 2018-07-06 RX ADMIN — HYDROMORPHONE HYDROCHLORIDE 0.5 MILLIGRAM(S): 2 INJECTION INTRAMUSCULAR; INTRAVENOUS; SUBCUTANEOUS at 17:08

## 2018-07-06 RX ADMIN — Medication 1 GRAM(S): at 00:40

## 2018-07-06 RX ADMIN — Medication 5 MILLIGRAM(S): at 21:39

## 2018-07-06 RX ADMIN — HYDROMORPHONE HYDROCHLORIDE 0.5 MILLIGRAM(S): 2 INJECTION INTRAMUSCULAR; INTRAVENOUS; SUBCUTANEOUS at 04:17

## 2018-07-06 RX ADMIN — HYDROMORPHONE HYDROCHLORIDE 0.5 MILLIGRAM(S): 2 INJECTION INTRAMUSCULAR; INTRAVENOUS; SUBCUTANEOUS at 20:43

## 2018-07-06 RX ADMIN — HYDROMORPHONE HYDROCHLORIDE 0.5 MILLIGRAM(S): 2 INJECTION INTRAMUSCULAR; INTRAVENOUS; SUBCUTANEOUS at 20:17

## 2018-07-06 RX ADMIN — HYDROMORPHONE HYDROCHLORIDE 0.5 MILLIGRAM(S): 2 INJECTION INTRAMUSCULAR; INTRAVENOUS; SUBCUTANEOUS at 00:40

## 2018-07-06 RX ADMIN — HYDROMORPHONE HYDROCHLORIDE 0.5 MILLIGRAM(S): 2 INJECTION INTRAMUSCULAR; INTRAVENOUS; SUBCUTANEOUS at 11:16

## 2018-07-06 RX ADMIN — URSODIOL 300 MILLIGRAM(S): 250 TABLET, FILM COATED ORAL at 05:26

## 2018-07-06 RX ADMIN — Medication 1 GRAM(S): at 23:54

## 2018-07-06 RX ADMIN — Medication 1 GRAM(S): at 17:08

## 2018-07-06 RX ADMIN — PIPERACILLIN AND TAZOBACTAM 25 GRAM(S): 4; .5 INJECTION, POWDER, LYOPHILIZED, FOR SOLUTION INTRAVENOUS at 05:26

## 2018-07-06 RX ADMIN — TAMSULOSIN HYDROCHLORIDE 0.4 MILLIGRAM(S): 0.4 CAPSULE ORAL at 21:39

## 2018-07-06 NOTE — PROGRESS NOTE ADULT - ASSESSMENT
59 yo F with history gastric CA initially presenting with painless jaundice, now with fever after prolonged stay.  Attempted ERCP on 6/6, complicated by bowel perforation, emergent ex-lap  Had bile drain placed 6/8; 2 subsequent tube checks  Bile culture grew E coli, and patient received course of Cipro into Zosyn; also grew VRE  UCX with VRE S Dapto; although it is clinically uncertain that sepsis like syndrome 2/2 UTI/cholangitis  Abd wall hematoma, s/p drainage into ostomy  PICC line with TPN  Leukocytosis persists, no further fevers; still chronically ill appearing  S/p course abx for ? VRE UTI and cholangitis  Poor prognosis  Overall, Fever, leukocytosis, positive culture finding, jaundice, UTI  - DC Dapto/Zosyn, observe off abx  - Hematoma care per surgery  - Appreciate palliative input--appears family pursuing hospice care  - Will sign off. Please call with further questions or change in status.    Nirmal Nelson MD  Pager [phone]  After 5pm and on weekends call [phone]

## 2018-07-06 NOTE — PROGRESS NOTE ADULT - ASSESSMENT
Impression:  1. Unsuccessful ERCP complicated by small bowel perforation 6/6/18 s/p ex-lap with small bowel resection and anastomosis 6/6/18  2. Obstructive jaundice with dilated CBD and 1.4 cm enhancing pancreatic head mass s/p percutaneous drainage, with persistent hyperbilirubinemia   3. 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 Acute blood loss anemia with abdominal wall hematoma and hemoperitoneum noted on CTAP  5. Metastatic moderately differentiated adenocarcinoma likely from gastric cancer    Plan:  - would obtain u/s abd to evaluate abdominal wall hematoma again, and drain by IR since there is still evidence of hematoma. Send for cultures  - Follow up ID  - Full infectious workup, follow up all culture data  - Monitor CBC, CMP, INR  - Continue antibiotics  - pending home hospice? on going palliative discussion

## 2018-07-06 NOTE — CHART NOTE - NSCHARTNOTEFT_GEN_A_CORE
Source: Patient , Family , nursing and EMR     Diet : Soft with Ensure Clear 3/d     Patient reports feeling food not going down the chest and heavy after eating, no N/V, but heavy feeling causes poor PO intake , encouraged small frequent feeds, liquids 30 mints after meal , sit up 20 - 30 mints after meal etc.  Noted the hospice plan . PN discontinued .     Current Weight: - 65.8 kg on 7/6/18; Admit wt. - 55 kg - no edema present     Pertinent Medications: MEDICATIONS  (STANDING):  chlorhexidine 4% Liquid 1 Application(s) Topical once  chlorhexidine 4% Liquid 1 Application(s) Topical <User Schedule>  enoxaparin Injectable 40 milliGRAM(s) SubCutaneous daily  HYDROmorphone  Injectable 0.5 milliGRAM(s) IV Push every 4 hours  metoclopramide 5 milliGRAM(s) Oral every 8 hours  sucralfate suspension 1 Gram(s) Oral four times a day  tamsulosin 0.4 milliGRAM(s) Oral at bedtime  ursodiol Capsule 300 milliGRAM(s) Oral every 12 hours    MEDICATIONS  (PRN):  cyclobenzaprine 5 milliGRAM(s) Oral three times a day PRN Muscle Spasm  dibucaine 1% Ointment 1 Application(s) Topical daily PRN hemorrhoids  HYDROmorphone  Injectable 0.5 milliGRAM(s) IV Push every 4 hours PRN Moderate Pain (4 - 6)    Pertinent Labs:  07-06 Na133 mmol/L<L> Glu 114 mg/dL<H> K+ 4.1 mmol/L Cr  0.31 mg/dL<L> BUN 10 mg/dL 07-06 Phos 2.9 mg/dL 07-05 Alb 1.9 g/dL<L> 06-18 PAB 10 mg/dL<L> 06-20 Chol --    LDL --    HDL --    Trig 138 mg/dL      Skin: intact    Estimated Needs:   no change since previous assessment    Recommend PO diet consistent with GOC . Noted hospice plan .

## 2018-07-06 NOTE — PROGRESS NOTE ADULT - ASSESSMENT
Impression:    1. Hyperbilirubinemia: Suspect intrahepatic cholestasis. Factors may include acute illness with possible infection, resorption of intraabdominal hematoma, DILI(unclear which if any drugs might be contributing), less likely TPN given short duration on TPN.    2. Intraabdominal hematoma, lovenox may be contributing to this, complicated by possible superinfection,    3. Fever, possible urinary or intra-abdominal source    4. VRE UTI    6. Hypoalbuminemia, likely malnutrition    Recommendation:  -F/u US abd to evaluate abdominal wall hematoma  -treat underlying infection, please repeat blood cultures, urine culture to evaluate for infectious source of hyperbilirubinemia  -trend Liver chemistries  -would favor holding pantoprazole (there is pepcid in the TPN) and any other nonessential meds (?reglan/flexeril) to remove any possible agents that could be causing DILI  -carson 300 mg BID  -transition to solid diet (pt expressed wishes to take a full diet)  -Onc consult for treatment, otherwise palliative for comfort. If Onc offer and family/patient wish to pursue treatment, may consider pursuing a liver biopsy for further evaluation.    D/w Dr aGrrett. Impression:    1. Hyperbilirubinemia: Suspect intrahepatic cholestasis. Factors may include acute illness with possible infection, resorption of intraabdominal hematoma, DILI(unclear which if any drugs might be contributing), less likely TPN given short duration on TPN, less likely metastatic liver disease    2. Intraabdominal hematoma, lovenox may be contributing to this, complicated by possible superinfection,    3. Fever, possible urinary or intra-abdominal source    4. VRE UTI    6. Hypoalbuminemia, likely malnutrition    Recommendation:  -F/u US abd to evaluate abdominal wall hematoma  -treat underlying infection, please repeat blood cultures, urine culture to evaluate for infectious source of hyperbilirubinemia  -trend Liver chemistries  -panculture and fungal culture.   -would favor holding pantoprazole (there is pepcid in the TPN) and any other nonessential meds (?reglan/flexeril) to remove any possible agents that could be causing DILI  -carson 300 mg BID  -transition to solid diet (pt expressed wishes to take a full diet)  -Onc followup. If Onc offers and family/patient wish to pursue treatment, may consider pursuing a liver biopsy to determine the cause of abnormal liver tests with hyperbilirubinemia.   D/w Dr Garrett.

## 2018-07-06 NOTE — PROGRESS NOTE ADULT - SUBJECTIVE AND OBJECTIVE BOX
Chief Complaint:  Patient is a 58y old  Female who presents with a chief complaint of Painless jaundice (2018 14:49)      Interval Events: No acute overnight events. Pt's pain is controlled with analgesic.     Allergies:  No Known Allergies        Hospital Medications:  chlorhexidine 4% Liquid 1 Application(s) Topical once  chlorhexidine 4% Liquid 1 Application(s) Topical <User Schedule>  cyclobenzaprine 5 milliGRAM(s) Oral three times a day PRN  dibucaine 1% Ointment 1 Application(s) Topical daily PRN  enoxaparin Injectable 40 milliGRAM(s) SubCutaneous daily  HYDROmorphone  Injectable 0.5 milliGRAM(s) IV Push every 4 hours  HYDROmorphone  Injectable 0.5 milliGRAM(s) IV Push every 4 hours PRN  metoclopramide 5 milliGRAM(s) Oral every 8 hours  sucralfate suspension 1 Gram(s) Oral four times a day  tamsulosin 0.4 milliGRAM(s) Oral at bedtime  ursodiol Capsule 300 milliGRAM(s) Oral every 12 hours      PMHX/PSHX:  Gastric cancer  Malignant neoplasm of stomach, unspecified location  No pertinent past medical history  S/P partial gastrectomy  No significant past surgical history      Family history:  No pertinent family history in first degree relatives      PHYSICAL EXAM:   Vital Signs:  Vital Signs Last 24 Hrs  T(C): 37.1 (2018 09:05), Max: 37.5 (2018 16:29)  T(F): 98.7 (2018 09:05), Max: 99.5 (2018 16:29)  HR: 104 (2018 09:05) (100 - 105)  BP: 135/63 (2018 09:05) (130/68 - 137/81)  BP(mean): --  RR: 18 (2018 09:05) (18 - 20)  SpO2: 98% (2018 09:05) (96% - 98%)  Daily     Daily Weight in k.8 (2018 05:00)    GENERAL: NAD, thin  HEAD:  Atraumatic, Normocephalic  EYES: icteric  NECK: Supple, No JVD  CHEST/LUNG: Clear to auscultation bilaterally; No wheeze  HEART: Regular rate and rhythm; No murmurs, rubs, or gallops  ABDOMEN: Soft, Nontender, 150cc blood collecting at suture line into bag, distended; Bowel sounds present, no hepatosplenomegaly, no rebound or guarding  EXTREMITIES:bipedal edema  PSYCH: AAOx3  NEUROLOGY: non-focal, no asterixis  SKIN: jaundice    LABS:                        8.3    19.21 )-----------( 680      ( 2018 05:42 )             24.7       07-    133<L>  |  98  |  10  ----------------------------<  114<H>  4.1   |  24  |  0.31<L>    Ca    8.0<L>      2018 05:50  Phos  2.9     07-  Mg     2.0     07-    TPro  5.5<L>  /  Alb  1.9<L>  /  TBili  10.6<H>  /  DBili  x   /  AST  31  /  ALT  30  /  AlkPhos  270<H>  07-05    LIVER FUNCTIONS - ( 2018 05:42 )  Alb: 1.9 g/dL / Pro: 5.5 g/dL / ALK PHOS: 270 u/L / ALT: 30 u/L / AST: 31 u/L / GGT: x                                       8.3    19.21 )-----------( 680      ( 2018 05:42 )             24.7                         8.3    18.65 )-----------( 642      ( 2018 06:33 )             24.3     Imaging:

## 2018-07-06 NOTE — PROGRESS NOTE ADULT - ATTENDING COMMENTS
Patient was seen and examined with GI fellow at rounds. Agree with above.  Would recommend   -oncology followup for management of advanced metastatic cancer  -proceed with a liver biopsy for workup of abnormal liver tests and hyperbilirubinemia if treatment for metastatic cancer is contemplated per oncology  -Nutrition supplements

## 2018-07-06 NOTE — PROGRESS NOTE ADULT - SUBJECTIVE AND OBJECTIVE BOX
CC: F/U positive cultures    Saw/spoke to patient. No fevers, no chills. No new complaints. Overall unchanged.    Allergies  No Known Allergies    ANTIMICROBIALS:  Off    PE:    Vital Signs Last 24 Hrs  T(C): 37.1 (06 Jul 2018 09:05), Max: 37.5 (05 Jul 2018 16:29)  T(F): 98.7 (06 Jul 2018 09:05), Max: 99.5 (05 Jul 2018 16:29)  HR: 104 (06 Jul 2018 09:05) (100 - 105)  BP: 135/63 (06 Jul 2018 09:05) (130/68 - 137/81)  RR: 18 (06 Jul 2018 09:05) (18 - 20)  SpO2: 98% (06 Jul 2018 09:05) (96% - 98%)    Gen: AOx3, NAD, non-toxic, pleasant, chronically ill appearing  CV: S1+S2 normal, no murmurs, nontachycardic  Resp: Clear bilat, no resp distress, no crackles/wheezes  Abd: Soft, nontender, +BS, wound hematoma slightly decreased  Ext: No LE edema, no wounds    LABS:                        8.3    19.21 )-----------( 680      ( 05 Jul 2018 05:42 )             24.7     07-06    133<L>  |  98  |  10  ----------------------------<  114<H>  4.1   |  24  |  0.31<L>    Ca    8.0<L>      06 Jul 2018 05:50  Phos  2.9     07-06  Mg     2.0     07-06    TPro  5.5<L>  /  Alb  1.9<L>  /  TBili  10.6<H>  /  DBili  x   /  AST  31  /  ALT  30  /  AlkPhos  270<H>  07-05    MICROBIOLOGY:    BLOOD  06-30-18 NGTD    URINE CATHETER  06-30-18 NGTD    URINE MIDSTREAM  06-27-18 --  --  Enterococcus faecium VRE  Candida albicans    (otherwise reviewed)    RADIOLOGY:    No new available

## 2018-07-06 NOTE — PROGRESS NOTE ADULT - ASSESSMENT
58F s/p ERCP EUS aborted 2/2 small bowel perforation, s/p ex lap resection of small bowel side to side anastomosis currently hemodynamically stable on the floor.     - Palliative following.   - d/c'd abx as per ID recs  - d/c'd TPN dc'd  - Tolerating soft diet   - Trending LFTs  - DVT ppx with lovenox  - d/c Mathis, monitor UOP.   - f/u bedside U/S from GI  - check 6pm for TOV  - f/u with urology outpatient.   -Dispo: hospice on Monday 58F s/p ERCP EUS aborted 2/2 small bowel perforation, s/p ex lap resection of small bowel side to side anastomosis currently hemodynamically stable on the floor.     - Palliative following.   - d/c'd abx as per ID recs  - d/c'd TPN dc'd  - Tolerating soft diet   - Trending LFTs  - DVT ppx with lovenox  - d/c Mathis, monitor UOP.   - f/u bedside U/S from GI  - check 6pm for TOV  - f/u with urology outpatient.   - change midline packing q 3 days  -Dispo: hospice on Monday

## 2018-07-06 NOTE — PROGRESS NOTE ADULT - SUBJECTIVE AND OBJECTIVE BOX
ADVANCED GASTROENTEROLOGY      Chief Complaint:  Patient is a 58y old  Female who presents with a chief complaint of Painless jaundice (2018 14:49)      Interval Events: Pt states her pain is the same as before. Omental sample came back positive for moderately differentiated adeno.  Allergies:  No Known Allergies      Hospital Medications:  chlorhexidine 4% Liquid 1 Application(s) Topical once  chlorhexidine 4% Liquid 1 Application(s) Topical <User Schedule>  cyclobenzaprine 5 milliGRAM(s) Oral three times a day PRN  DAPTOmycin IVPB 220 milliGRAM(s) IV Intermittent every 24 hours  dibucaine 1% Ointment 1 Application(s) Topical daily PRN  enoxaparin Injectable 40 milliGRAM(s) SubCutaneous daily  fat emulsion (Fish Oil and Plant Based) 20% Infusion 8.3 mL/Hr IV Continuous <Continuous>  fat emulsion (Fish Oil and Plant Based) 20% Infusion 8.3 mL/Hr IV Continuous <Continuous>  HYDROmorphone  Injectable 0.5 milliGRAM(s) IV Push every 4 hours  HYDROmorphone  Injectable 0.5 milliGRAM(s) IV Push every 4 hours PRN  metoclopramide 5 milliGRAM(s) Oral every 8 hours  Parenteral Nutrition - Adult 1 Each TPN Continuous <Continuous>  piperacillin/tazobactam IVPB. 3.375 Gram(s) IV Intermittent every 8 hours  sucralfate suspension 1 Gram(s) Oral four times a day  ursodiol Capsule 300 milliGRAM(s) Oral every 12 hours      PMHX/PSHX:  Gastric cancer  Malignant neoplasm of stomach, unspecified location  No pertinent past medical history  S/P partial gastrectomy  No significant past surgical history      Family history:  No pertinent family history in first degree relatives      ROS:     General:  No fevers, chills  Eyes:  Good vision  ENT:  No odynophagia, dysphagia  CV:  No pain, palpitations  Resp:  No dyspnea, cough, tachypnea, wheezing  GI:  See HPI  Muscle:  No pain, weakness  Skin:  No rash, edema      PHYSICAL EXAM:     pt refused exam    Vital Signs:  Vital Signs Last 24 Hrs  T(C): 37.2 (2018 05:00), Max: 37.5 (2018 16:29)  T(F): 98.9 (2018 05:00), Max: 99.5 (2018 16:29)  HR: 100 (2018 05:00) (91 - 105)  BP: 130/68 (2018 05:00) (126/65 - 137/81)  BP(mean): --  RR: 18 (2018 05:00) (18 - 20)  SpO2: 98% (2018 05:00) (96% - 100%)  Daily     Daily Weight in k.8 (2018 05:00)    LABS:                        8.3    19.21 )-----------( 680      ( 2018 05:42 )             24.7     07-06    133<L>  |  98  |  10  ----------------------------<  114<H>  4.1   |  24  |  0.31<L>    Ca    8.0<L>      2018 05:50  Phos  2.9     07-  Mg     2.0     07-06    TPro  5.5<L>  /  Alb  1.9<L>  /  TBili  10.6<H>  /  DBili  x   /  AST  31  /  ALT  30  /  AlkPhos  270<H>  07-    LIVER FUNCTIONS - ( 2018 05:42 )  Alb: 1.9 g/dL / Pro: 5.5 g/dL / ALK PHOS: 270 u/L / ALT: 30 u/L / AST: 31 u/L / GGT: x                   Imaging:  Surgical Pathology Report (18 @ 21:58)    Surgical Pathology Report:   ACCESSION No:  [id number]    RIGOBERTO CHRISTENSEN                          2        Surgical Final Report          Final Diagnosis  1. Small bowel implant at gastrojejunostomy, biopsy  - Fibrous tissue, negative for carcinoma    2. Omentum nodule, excision  - Metastatic moderately differentiated adenocarcinoma    3. Small bowel with enterotomy and foreign body, resection  - Moderately differentiated invasive adenocarcinoma  extending from the serosa to the submucosa. See note  - Suture material with acute inflammation and foreign body  giant cell reaction  - Acute serositis and mucosal ischemic change  - Resection margins, negative for carcinoma  - Metal, consistent with surgical device (gross only)  Note: The previous surgical resection for gastric carcinoma is  noted; the morphology of this tumor is compatible with it. This  may represent a recurrence.    4. Additional foreign body, removal  - Metal, consistent with surgical device (gross only)    Verified by: Sofiya Romero M.D.  (Electronic Signature)  Reported on: 06/15/18 14:50 EDT,[address] [place] [address], [place]  [address]  _________________________________________________________________    Clinical History  Unspecified injury of other part of small intestine  Exploratory laparotomy, small bowel resection    Specimen(s) Submitted  1- Small bowel implant gastrojejunostomy  2- Omentum nodule  3- Small bowel with enterotomy and foreign body ( bear claw )  4- Additional foreign body    Gross Description  1. The specimen is received in formalin in the specimen container  is labeled: Small bowel implant at gastrojejunostomy. It consists  of one piece of tan-white, irregular soft tissue measuring 0.3 cm  in greatest dimension. Entirely submitted. One cassette.    2. The specimen isreceived in formalin and specimen container is  labeled: Omental nodule.  It consists of a 2.2 x 1.0 x 0.7 cm  piece of tan-yellow, lobulated, focally tan-white and firm  tissue. The specimen is bisected to reveal            CHOKYI, RIGOBERTO            2        Surgical Final Report          that the tan-white, firm area measures up to 0.5 cm. Entirely  submitted.  One cassette.    3. The specimen is received in formalin and in containers  labeled: Small bowel enterotomy and foreign body (bear claw). It  consists of a a 7.5 in length x 3.0 cm diameter unoriented  portion of small bowel with two stapled resection margins. There  is a 2.5 cm attached tan-yellow, lobulated adipose tissue. At one  aspect, there is a 2.0 x 2.0 cm full-thickness defect identified,  which has an embedded 1.2 x 1.2 cm silver-colored metallic  surgical device at the periphery. The embedded tissue is tan-  brown, firm and hemorrhagic. The surrounding protruding mucosa is  tan with normal folds. On the serosa opposite the mesentery,  there is a 3.5 x 2.0 x 0.7 cm area of gray-white, firm, focally  cauterized, irregular discoloration (inked red). The defect is  inked blue, the nearest stapled margin is inked black, and the  opposing margin is inked green.  Sectioning reveals that the serosal discoloration corresponds to  a tan-white, firm, subserosal nodule. This nodule is 0.5 cm from  the opposing margin and abuts the full-thickness defect.  No  other lesions are identified.  No lymph nodes are identified  within the mesentery.  Digital photographs are taken.  Representative sections.  Four cassettes.  3A: Perpendicular section of mucosa adjacent to surgical device,  to include nearest margin  3B:  Nodule at full-thickness defect  3C:  Nodule at opposite margin, perpendicular  3D: Uninvolved bowel wall    4.  The specimen is received in formalin and the specimen  container is labeled: Additional foreign body.  It consists of a  1.5 x 0.2 cm silver-colored metallic, cylindrical, hook-like  surgical device. There is an attached, 0.9 x 0.7 x 0.2 cm piece  of dark brown-red, ragged tissue. Gross examination only. No  sections submitted.    In addition to other data that may appear on the specimen  containers, all labels have been inspected to confirm the  presence of the patient's name and date of birth.  [facility] 18 [id number]

## 2018-07-06 NOTE — PROGRESS NOTE ADULT - SUBJECTIVE AND OBJECTIVE BOX
SUBJECTIVE    Pt seen and examined at the bedside. No acute events overnight. Pain well controlled. Denies N/V. Tolerating soft diet.    chlorhexidine 4% Liquid 1 Application(s) Topical once  chlorhexidine 4% Liquid 1 Application(s) Topical <User Schedule>  cyclobenzaprine 5 milliGRAM(s) Oral three times a day PRN  dibucaine 1% Ointment 1 Application(s) Topical daily PRN  enoxaparin Injectable 40 milliGRAM(s) SubCutaneous daily  HYDROmorphone  Injectable 0.5 milliGRAM(s) IV Push every 4 hours  HYDROmorphone  Injectable 0.5 milliGRAM(s) IV Push every 4 hours PRN  metoclopramide 5 milliGRAM(s) Oral every 8 hours  sucralfate suspension 1 Gram(s) Oral four times a day  tamsulosin 0.4 milliGRAM(s) Oral at bedtime  ursodiol Capsule 300 milliGRAM(s) Oral every 12 hours      OBJECTIVE    T(C): 37.1 (07-06-18 @ 09:05), Max: 37.5 (07-05-18 @ 16:29)  HR: 104 (07-06-18 @ 09:05) (93 - 105)  BP: 135/63 (07-06-18 @ 09:05) (130/68 - 137/81)  RR: 18 (07-06-18 @ 09:05) (18 - 20)  SpO2: 98% (07-06-18 @ 09:05) (96% - 99%)    07-05-18 @ 07:01  -  07-06-18 @ 07:00  --------------------------------------------------------  IN: 1275.3 mL / OUT: 3230 mL / NET: -1954.7 mL    07-06-18 @ 07:01  -  07-06-18 @ 10:22  --------------------------------------------------------  IN: 72 mL / OUT: 360 mL / NET: -288 mL    EXAM  PE:  Gen: Ill appearing jaundiced woman resting comfortably in bed.   Resp: breathing comfortably  Abd: soft, NT, moderately distended, bulge under midline incision, not draining. No rebound or guarding      LABS             8.3    19.21 )-----------( 680      ( 05 Jul 2018 05:42 )             24.7     07-06    133<L>  |  98  |  10  ----------------------------<  114<H>  4.1   |  24  |  0.31<L>    Ca    8.0<L>      06 Jul 2018 05:50  Phos  2.9     07-06  Mg     2.0     07-06    TPro  5.5<L>  /  Alb  1.9<L>  /  TBili  10.6<H>  /  DBili  x   /  AST  31  /  ALT  30  /  AlkPhos  270<H>  07-05

## 2018-07-06 NOTE — PROGRESS NOTE ADULT - ATTENDING COMMENTS
59 y/o female s/p ERCP c/b perforation of afferent limb. Pt underwent emergent Ex-lap w/ resection of perforated bowel and stapled side to side functional end to end anastomosis. Pt remained intubated and was transferred to SICU off pressors. s/p extubation, now transferred to floor. Patient meets criteria for severe protein calorie malnutrition requiring TPN for nutritional support. TPN increased back to full volume on 6/26/18. Now brought back to 1/2 volume and calories on 6/29/18.  s/p IR tube check on 6/27/18, s/p bedside drainage of intraabdominal hematoma on 7/2/18, TPN/lipids discontinued on 7/6/18    Patient is being followed by palliative service. Discussed with primary surgery team - plan to stop parenteral nutrition at this time.   home hospice planning in process  I have seen and examined the patient, reviewed the laboratory and radiologic data and agree with the history, physical assessment and plan.  I reviewed and discussed with all consultants, house staff and PA's.  The note is edited where appropriate. The Nutrition Support Team (NST) discusses on an ongoing basis with the primary team and all consultants, House staff and PA's to have a permanent risk benefit analyses on all decisions.  I spent  45  minutes in total; providing diagnoses, assessment and plan.

## 2018-07-06 NOTE — PROGRESS NOTE ADULT - SUBJECTIVE AND OBJECTIVE BOX
INTERVAL HPI/OVERNIGHT EVENTS:  Patient being followed by Palliative Care for symptom management and GOC.  Discussed with son today referring patient to hospice, he believes he is not yet ready to make this decision. I explained in detail what would entail to take patient home without the recommended support of hospice care on board. He understands but wants to further discuss with his family. Discussed with primary team.     Allergies    No Known Allergies    Intolerances    ADVANCE DIRECTIVES:    Full code  MOLST [ ] YES [ x] NO     MEDICATIONS  (STANDING):  enoxaparin Injectable 40 milliGRAM(s) SubCutaneous daily  HYDROmorphone  Injectable 0.5 milliGRAM(s) IV Push every 4 hours  metoclopramide 5 milliGRAM(s) Oral every 8 hours  sucralfate suspension 1 Gram(s) Oral four times a day  tamsulosin 0.4 milliGRAM(s) Oral at bedtime  ursodiol Capsule 300 milliGRAM(s) Oral every 12 hours    MEDICATIONS  (PRN):  cyclobenzaprine 5 milliGRAM(s) Oral three times a day PRN Muscle Spasm  dibucaine 1% Ointment 1 Application(s) Topical daily PRN hemorrhoids  HYDROmorphone  Injectable 0.5 milliGRAM(s) IV Push every 4 hours PRN Moderate Pain (4 - 6)    PRESENT SYMPTOMS:  Source: [ x] Patient   [ ] Family   [ ] Team     Pain:                        [ ] No [ x] Yes             [ ] Mild [ ] Moderate [ ] Severe    Onset - When she eats/moves  Location - Mid abdomen  Duration - Constant  Character - Aching/Cramping  Alleviating/Aggravating - Improvement with pain medications.  Radiation - None  Timing - Constant    Dyspnea:                [ ] No [x ] Yes             [x ] Mild [ ] Moderate [ ] Severe    Anxiety:                  [ x] No [ ] Yes             [ ] Mild [ ] Moderate [ ] Severe    Fatigue:                  [ ] No [x ] Yes             [ ] Mild [ x] Moderate [ ] Severe    Nausea:                  [ ] No [x ] Yes             [ x] Mild [ ] Moderate [ ] Severe    Loss of appetite:   [ ] No [x ] Yes             [ ] Mild [ x] Moderate [ ] Severe    Constipation:        [ ] No [x ] Yes             [ ] Mild [ ] Moderate [ ] Severe    Other Symptoms:  [x ] All other review of systems negative   [ ] Unable to obtain due to poor mentation     Karnofsky Performance Score/Palliative Performance Status Version 2: 30 %    PHYSICAL EXAM:  Vital Signs Last 24 Hrs  T(C): 37.4 (05 Jul 2018 12:25), Max: 37.4 (05 Jul 2018 12:25)  T(F): 99.4 (05 Jul 2018 12:25), Max: 99.4 (05 Jul 2018 12:25)  HR: 93 (05 Jul 2018 12:25) (91 - 106)  BP: 132/68 (05 Jul 2018 12:25) (122/67 - 137/76)  BP(mean): --  RR: 20 (05 Jul 2018 12:25) (20 - 20)  SpO2: 99% (05 Jul 2018 12:25) (95% - 100%) I&O's Summary    04 Jul 2018 07:01  -  05 Jul 2018 07:00  --------------------------------------------------------  IN: 520.6 mL / OUT: 2790 mL / NET: -2269.4 mL    05 Jul 2018 07:01  -  05 Jul 2018 15:59  --------------------------------------------------------  IN: 688 mL / OUT: 840 mL / NET: -152 mL    General:  [x ] Alert  [ ] Oriented x      [ ] Lethargic  [ ] Agitated   [ ] Cachexia   [ ] Unarousable  [x ] Verbal  [ ] Non-Verbal    HEENT:  [ ] Normal   [x ] Dry mouth   [ ] ET Tube    [ ] Trach  [ ] Oral lesions    Lungs:   [x ] Clear [ ] Tachypnea  [ ] Audible excessive secretions   [ ] Rhonchi        [ ] Right [ ] Left [ ] Bilateral  [ ] Crackles        [ ] Right [ ] Left [ ] Bilateral  [ ] Wheezing     [ ] Right [ ] Left [ ] Bilateral    Cardiovascular:  [x ] Regular [ ] Irregular [ ] Tachycardia   [ ] Bradycardia  [ ] Murmur [ ] Other    Abdomen: [ ] Soft  [ ] Distended   [ ] +BS  [ ] Non tender [x ] Tender  [ ]PEG   [ ]OGT/ NGT   Last BM:     Large wound covered with dressing, ostomy with liquid stool.    Genitourinary: [ ] Normal [ ] Incontinent   [ ] Oliguria/Anuria   [ x] Mathis    Musculoskeletal:  [ ] Normal   [x ] Weakness  [ ] Bedbound/Wheelchair bound [ ] Edema    Neurological: [ x] No focal deficits  [ ] Cognitive impairment  [ ] Dysphagia [ ] Dysarthria [ ] Paresis [ ] Other     Skin: [x ] Normal   [ ] Pressure ulcer(s)                  [ ] Rash    LABS:                        8.3    19.21 )-----------( 680      ( 05 Jul 2018 05:42 )             24.7     07-05    135  |  101  |  11  ----------------------------<  106<H>  4.2   |  23  |  0.31<L>    Ca    8.0<L>      05 Jul 2018 05:42  Phos  3.1     07-05  Mg     2.0     07-05    TPro  5.5<L>  /  Alb  1.9<L>  /  TBili  10.6<H>  /  DBili  x   /  AST  31  /  ALT  30  /  AlkPhos  270<H>  07-05    Shock: No [ ] Septic [ ] Cardiogenic [ ] Neurologic [ ] Hypovolemic  Vasopressors x None  Inotrophs x None    Oral Intake: [ ] Unable/mouth care only [ ] Minimal [ ] Moderate [x ] Full Capability    Diet: [ ] NPO [ ] Tube feeds [ ] TPN [ x] Other     RADIOLOGY & ADDITIONAL STUDIES: No new imaging    REFERRALS:   [ ] Chaplaincy  [ ] Hospice  [ ] Child Life  [ ] Social Work  [ ] Case management [ ] Holistic Therapy

## 2018-07-06 NOTE — PROGRESS NOTE ADULT - SUBJECTIVE AND OBJECTIVE BOX
NUTRITION NOTE  [id number] CHOKYI  ===============================    Interval events  Patient was seen and examined at bedside, no acute overnight events.     Palliative care is following the patient.      Vital Signs Last 24 Hrs  T(C): 37.1 (2018 09:05), Max: 37.5 (2018 16:29)  T(F): 98.7 (2018 09:05), Max: 99.5 (2018 16:29)  HR: 104 (2018 09:05) (93 - 105)  BP: 135/63 (2018 09:05) (130/68 - 137/81)  RR: 18 (2018 09:05) (18 - 20)  SpO2: 98% (2018 09:05) (96% - 99%)    MEDICATIONS  (STANDING):  chlorhexidine 4% Liquid 1 Application(s) Topical once  chlorhexidine 4% Liquid 1 Application(s) Topical <User Schedule>  enoxaparin Injectable 40 milliGRAM(s) SubCutaneous daily  HYDROmorphone  Injectable 0.5 milliGRAM(s) IV Push every 4 hours  metoclopramide 5 milliGRAM(s) Oral every 8 hours  sucralfate suspension 1 Gram(s) Oral four times a day  tamsulosin 0.4 milliGRAM(s) Oral at bedtime  ursodiol Capsule 300 milliGRAM(s) Oral every 12 hours    MEDICATIONS  (PRN):  cyclobenzaprine 5 milliGRAM(s) Oral three times a day PRN Muscle Spasm  dibucaine 1% Ointment 1 Application(s) Topical daily PRN hemorrhoids  HYDROmorphone  Injectable 0.5 milliGRAM(s) IV Push every 4 hours PRN Moderate Pain (4 - 6)    I&O's Detail    2018 07:01  -  2018 07:00  --------------------------------------------------------  IN:    fat emulsion (Fish Oil and Plant Based) 20% Infusion: 91.3 mL    Free Water: 10 mL    IV PiggyBack: 100 mL    Oral Fluid: 300 mL    TPN (Total Parenteral Nutrition): 774 mL  Total IN: 1275.3 mL    OUT:    Drain: 380 mL    Indwelling Catheter - Urethral: 2700 mL    Other: 150 mL  Total OUT: 3230 mL    Total NET: -1954.7 mL      2018 07:01  -  2018 11:34  --------------------------------------------------------  IN:    TPN (Total Parenteral Nutrition): 72 mL  Total IN: 72 mL    OUT:    Drain: 60 mL    Indwelling Catheter - Urethral: 300 mL  Total OUT: 360 mL    Total NET: -288 mL    POCT Blood Glucose.: 117 mg/dL (2018 06:49)  POCT Blood Glucose.: 116 mg/dL (2018 23:53)  POCT Blood Glucose.: 107 mg/dL (2018 17:59)  POCT Blood Glucose.: 112 mg/dL (2018 11:57)    Daily Weight in k.8 (2018 05:00)    Drug Dosing Weight  Height (cm): 160.02 (2018 15:53)  Weight (kg): 54 (2018 15:53)  BMI (kg/m2): 21.1 (2018 15:53)  BSA (m2): 1.55 (2018 15:53)    PHYSICAL EXAM   Constitutional: no acute distress, resting in bed   Respiratory: nonlabored respirations   Gastrointestinal: soft, mildly tender, mildly distended   Extremities: warm  PICC Site: C/D/I    Diet: soft diet, parenteral nutrition d/c on 18    LABORATORY                                   8.3    19.21 )-----------( 680      ( 2018 05:42 )             24.7   07-06    133<L>  |  98  |  10  ----------------------------<  114<H>  4.1   |  24  |  0.31<L>    Ca    8.0<L>      2018 05:50  Phos  2.9     07-06  Mg     2.0     07-    TPro  5.5<L>  /  Alb  1.9<L>  /  TBili  10.6<H>  /  DBili  x   /  AST  31  /  ALT  30  /  AlkPhos  270<H>               LIVER FUNCTIONS - ( 2018 05:42 )  Alb: 1.9 g/dL / Pro: 5.5 g/dL / ALK PHOS: 270 u/L / ALT: 30 u/L / AST: 31 u/L / GGT: x            Chol -- LDL -- HDL -- Trig 138,  Chol -- LDL -- HDL -- Trig 112, - Chol 156  HDL 8<L> Trig 85,  Chol 122 LDL 73 HDL 33<L> Trig 78    Prealbumin 18: 10    ASSESSMENT/PLAN:  57 y/o female s/p ERCP c/b perforation of afferent limb. Pt underwent emergent Ex-lap w/ resection of perforated bowel and stapled side to side functional end to end anastomosis. Pt remained intubated and was transferred to SICU off pressors. s/p extubation, now transferred to floor. Patient meets criteria for severe protein calorie malnutrition requiring TPN for nutritional support. TPN increased back to full volume on 18. Now brought back to 1/2 volume and calories on 18.  s/p IR tube check on 18, s/p bedside drainage of intraabdominal hematoma on 18, TPN/lipids discontinued on 18    Patient is being followed by palliative service. Discussed with primary surgery team - plan to stop parenteral nutrition at this time.   home hospice planning in process

## 2018-07-07 PROCEDURE — 99232 SBSQ HOSP IP/OBS MODERATE 35: CPT | Mod: 24

## 2018-07-07 RX ORDER — CHLORHEXIDINE GLUCONATE 213 G/1000ML
1 SOLUTION TOPICAL ONCE
Qty: 0 | Refills: 0 | Status: DISCONTINUED | OUTPATIENT
Start: 2018-07-07 | End: 2018-07-11

## 2018-07-07 RX ADMIN — HYDROMORPHONE HYDROCHLORIDE 0.5 MILLIGRAM(S): 2 INJECTION INTRAMUSCULAR; INTRAVENOUS; SUBCUTANEOUS at 06:55

## 2018-07-07 RX ADMIN — HYDROMORPHONE HYDROCHLORIDE 0.5 MILLIGRAM(S): 2 INJECTION INTRAMUSCULAR; INTRAVENOUS; SUBCUTANEOUS at 10:45

## 2018-07-07 RX ADMIN — Medication 5 MILLIGRAM(S): at 13:19

## 2018-07-07 RX ADMIN — TAMSULOSIN HYDROCHLORIDE 0.4 MILLIGRAM(S): 0.4 CAPSULE ORAL at 22:04

## 2018-07-07 RX ADMIN — ENOXAPARIN SODIUM 40 MILLIGRAM(S): 100 INJECTION SUBCUTANEOUS at 13:19

## 2018-07-07 RX ADMIN — Medication 5 MILLIGRAM(S): at 05:44

## 2018-07-07 RX ADMIN — Medication 1 GRAM(S): at 13:19

## 2018-07-07 RX ADMIN — HYDROMORPHONE HYDROCHLORIDE 0.5 MILLIGRAM(S): 2 INJECTION INTRAMUSCULAR; INTRAVENOUS; SUBCUTANEOUS at 05:42

## 2018-07-07 RX ADMIN — HYDROMORPHONE HYDROCHLORIDE 0.5 MILLIGRAM(S): 2 INJECTION INTRAMUSCULAR; INTRAVENOUS; SUBCUTANEOUS at 10:13

## 2018-07-07 RX ADMIN — HYDROMORPHONE HYDROCHLORIDE 0.5 MILLIGRAM(S): 2 INJECTION INTRAMUSCULAR; INTRAVENOUS; SUBCUTANEOUS at 18:59

## 2018-07-07 RX ADMIN — HYDROMORPHONE HYDROCHLORIDE 0.5 MILLIGRAM(S): 2 INJECTION INTRAMUSCULAR; INTRAVENOUS; SUBCUTANEOUS at 14:00

## 2018-07-07 RX ADMIN — Medication 1 GRAM(S): at 17:13

## 2018-07-07 RX ADMIN — HYDROMORPHONE HYDROCHLORIDE 0.5 MILLIGRAM(S): 2 INJECTION INTRAMUSCULAR; INTRAVENOUS; SUBCUTANEOUS at 04:33

## 2018-07-07 RX ADMIN — URSODIOL 300 MILLIGRAM(S): 250 TABLET, FILM COATED ORAL at 17:13

## 2018-07-07 RX ADMIN — Medication 5 MILLIGRAM(S): at 22:04

## 2018-07-07 RX ADMIN — HYDROMORPHONE HYDROCHLORIDE 0.5 MILLIGRAM(S): 2 INJECTION INTRAMUSCULAR; INTRAVENOUS; SUBCUTANEOUS at 19:58

## 2018-07-07 RX ADMIN — HYDROMORPHONE HYDROCHLORIDE 0.5 MILLIGRAM(S): 2 INJECTION INTRAMUSCULAR; INTRAVENOUS; SUBCUTANEOUS at 17:13

## 2018-07-07 RX ADMIN — HYDROMORPHONE HYDROCHLORIDE 0.5 MILLIGRAM(S): 2 INJECTION INTRAMUSCULAR; INTRAVENOUS; SUBCUTANEOUS at 22:04

## 2018-07-07 RX ADMIN — URSODIOL 300 MILLIGRAM(S): 250 TABLET, FILM COATED ORAL at 05:44

## 2018-07-07 RX ADMIN — HYDROMORPHONE HYDROCHLORIDE 0.5 MILLIGRAM(S): 2 INJECTION INTRAMUSCULAR; INTRAVENOUS; SUBCUTANEOUS at 13:19

## 2018-07-07 RX ADMIN — HYDROMORPHONE HYDROCHLORIDE 0.5 MILLIGRAM(S): 2 INJECTION INTRAMUSCULAR; INTRAVENOUS; SUBCUTANEOUS at 18:00

## 2018-07-07 RX ADMIN — Medication 1 GRAM(S): at 05:44

## 2018-07-07 NOTE — PROGRESS NOTE ADULT - SUBJECTIVE AND OBJECTIVE BOX
GENERAL SURGERY DAILY PROGRESS NOTE:    24h events:  Mathis d/c'd midnight. Voided 8am. Bladder scan 3pm showing residual volume 800cc.    Subjective:  Patient seen and examined. Reports pain is well controlled. Denies N/V. Tolerating diet.      Exam:  Gen: NAD  Resp: Airway patent, non-labored respirations  Abd: Soft, ND, NT, no rebound or guarding. Incisions c/d/i  Ext: No edema, WWP  Neuro: AAOx3, no focal deficits    Vital Signs Last 24 Hrs  T(C): 37.2 (2018 13:17), Max: 37.6 (2018 17:29)  T(F): 98.9 (2018 13:17), Max: 99.6 (2018 17:29)  HR: 104 (2018 13:17) (98 - 112)  BP: 123/54 (2018 13:17) (108/58 - 124/63)  BP(mean): --  RR: 17 (2018 13:17) (16 - 19)  SpO2: 94% (2018 13:17) (94% - 100%)    I&O's Detail    2018 07:01  -  2018 07:00  --------------------------------------------------------  IN:    Free Water: 20 mL    Oral Fluid: 300 mL    TPN (Total Parenteral Nutrition): 72 mL  Total IN: 392 mL    OUT:    Drain: 295 mL    Indwelling Catheter - Urethral: 1850 mL    Other: 50 mL    Voided: 200 mL  Total OUT: 2395 mL    Total NET: -2003 mL      2018 07:01  -  2018 15:55  --------------------------------------------------------  IN:  Total IN: 0 mL    OUT:    Drain: 100 mL    Other: 40 mL    Voided: 400 mL  Total OUT: 540 mL    Total NET: -540 mL          Daily     Daily Weight in k.6 (2018 05:41)    MEDICATIONS  (STANDING):  chlorhexidine 4% Liquid 1 Application(s) Topical once  enoxaparin Injectable 40 milliGRAM(s) SubCutaneous daily  HYDROmorphone  Injectable 0.5 milliGRAM(s) IV Push every 4 hours  metoclopramide 5 milliGRAM(s) Oral every 8 hours  sucralfate suspension 1 Gram(s) Oral four times a day  tamsulosin 0.4 milliGRAM(s) Oral at bedtime  ursodiol Capsule 300 milliGRAM(s) Oral every 12 hours    MEDICATIONS  (PRN):  cyclobenzaprine 5 milliGRAM(s) Oral three times a day PRN Muscle Spasm  dibucaine 1% Ointment 1 Application(s) Topical daily PRN hemorrhoids  HYDROmorphone  Injectable 0.5 milliGRAM(s) IV Push every 4 hours PRN Moderate Pain (4 - 6)      LABS:        133<L>  |  98  |  10  ----------------------------<  114<H>  4.1   |  24  |  0.31<L>    Ca    8.0<L>      2018 05:50  Phos  2.9       Mg     2.0

## 2018-07-07 NOTE — PROGRESS NOTE ADULT - ASSESSMENT
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    -Pain control, appreciate palliative recs  -D/c'd abx, appreciate ID recs  -Cont soft diet  -Replace Mathis  -DVT ppx with lovenox  -F/u with urology outpatient.   -Change midline packing q 3 days    Dispo: home hospice on Monday      Seen and d/w attending and chief  JOSE ELIAS Larsen, PGY 1  [phone] with any questions

## 2018-07-07 NOTE — PROGRESS NOTE ADULT - SUBJECTIVE AND OBJECTIVE BOX
SURGICAL ONCOLGY  Patient seen and examined.     MEDICATIONS  (STANDING):  chlorhexidine 4% Liquid 1 Application(s) Topical once  enoxaparin Injectable 40 milliGRAM(s) SubCutaneous daily  HYDROmorphone  Injectable 0.5 milliGRAM(s) IV Push every 4 hours  metoclopramide 5 milliGRAM(s) Oral every 8 hours  sucralfate suspension 1 Gram(s) Oral four times a day  tamsulosin 0.4 milliGRAM(s) Oral at bedtime  ursodiol Capsule 300 milliGRAM(s) Oral every 12 hours    MEDICATIONS  (PRN):  cyclobenzaprine 5 milliGRAM(s) Oral three times a day PRN Muscle Spasm  dibucaine 1% Ointment 1 Application(s) Topical daily PRN hemorrhoids  HYDROmorphone  Injectable 0.5 milliGRAM(s) IV Push every 4 hours PRN Moderate Pain (4 - 6)      Vital Signs Last 24 Hrs  T(C): 37.4 (2018 16:22), Max: 37.4 (2018 16:22)  T(F): 99.3 (2018 16:22), Max: 99.3 (2018 16:22)  HR: 107 (2018 16:22) (98 - 112)  BP: 120/62 (2018 16:22) (108/58 - 123/54)  BP(mean): --  RR: 18 (2018 16:22) (16 - 18)  SpO2: 97% (2018 16:22) (94% - 100%)    I&O's Detail    2018 07:01  -  2018 07:00  --------------------------------------------------------  IN:    Free Water: 20 mL    Oral Fluid: 300 mL    TPN (Total Parenteral Nutrition): 72 mL  Total IN: 392 mL    OUT:    Drain: 295 mL    Indwelling Catheter - Urethral: 1850 mL    Other: 50 mL    Voided: 200 mL  Total OUT: 2395 mL    Total NET: -2003 mL      2018 07:01  -  2018 18:49  --------------------------------------------------------  IN:  Total IN: 0 mL    OUT:    Drain: 180 mL    Indwelling Catheter - Urethral: 500 mL    Other: 40 mL    Voided: 400 mL  Total OUT: 1120 mL    Total NET: -1120 mL          Daily     Daily Weight in k.6 (2018 05:41)    LABS:        133<L>  |  98  |  10  ----------------------------<  114<H>  4.1   |  24  |  0.31<L>    Ca    8.0<L>      2018 05:50  Phos  2.9       Mg     2.0                 Chief complaint: improving abd pain  PHYSICAL EXAM:  Gen:   Abd: softly distended, draining hematoma- decreasing in outpout PTC with bile    58yFemale s/p SBR with failure to thrive  Plan:   -Appreciate palliative care input arranging for hospice

## 2018-07-08 PROCEDURE — 99232 SBSQ HOSP IP/OBS MODERATE 35: CPT | Mod: GC

## 2018-07-08 PROCEDURE — 99232 SBSQ HOSP IP/OBS MODERATE 35: CPT | Mod: 24

## 2018-07-08 RX ADMIN — URSODIOL 300 MILLIGRAM(S): 250 TABLET, FILM COATED ORAL at 17:09

## 2018-07-08 RX ADMIN — HYDROMORPHONE HYDROCHLORIDE 0.5 MILLIGRAM(S): 2 INJECTION INTRAMUSCULAR; INTRAVENOUS; SUBCUTANEOUS at 13:15

## 2018-07-08 RX ADMIN — Medication 1 GRAM(S): at 00:43

## 2018-07-08 RX ADMIN — HYDROMORPHONE HYDROCHLORIDE 0.5 MILLIGRAM(S): 2 INJECTION INTRAMUSCULAR; INTRAVENOUS; SUBCUTANEOUS at 03:04

## 2018-07-08 RX ADMIN — HYDROMORPHONE HYDROCHLORIDE 0.5 MILLIGRAM(S): 2 INJECTION INTRAMUSCULAR; INTRAVENOUS; SUBCUTANEOUS at 05:28

## 2018-07-08 RX ADMIN — Medication 5 MILLIGRAM(S): at 05:28

## 2018-07-08 RX ADMIN — HYDROMORPHONE HYDROCHLORIDE 0.5 MILLIGRAM(S): 2 INJECTION INTRAMUSCULAR; INTRAVENOUS; SUBCUTANEOUS at 10:05

## 2018-07-08 RX ADMIN — ENOXAPARIN SODIUM 40 MILLIGRAM(S): 100 INJECTION SUBCUTANEOUS at 13:01

## 2018-07-08 RX ADMIN — Medication 5 MILLIGRAM(S): at 22:15

## 2018-07-08 RX ADMIN — Medication 1 GRAM(S): at 17:09

## 2018-07-08 RX ADMIN — HYDROMORPHONE HYDROCHLORIDE 0.5 MILLIGRAM(S): 2 INJECTION INTRAMUSCULAR; INTRAVENOUS; SUBCUTANEOUS at 09:33

## 2018-07-08 RX ADMIN — HYDROMORPHONE HYDROCHLORIDE 0.5 MILLIGRAM(S): 2 INJECTION INTRAMUSCULAR; INTRAVENOUS; SUBCUTANEOUS at 22:30

## 2018-07-08 RX ADMIN — HYDROMORPHONE HYDROCHLORIDE 0.5 MILLIGRAM(S): 2 INJECTION INTRAMUSCULAR; INTRAVENOUS; SUBCUTANEOUS at 13:01

## 2018-07-08 RX ADMIN — HYDROMORPHONE HYDROCHLORIDE 0.5 MILLIGRAM(S): 2 INJECTION INTRAMUSCULAR; INTRAVENOUS; SUBCUTANEOUS at 16:10

## 2018-07-08 RX ADMIN — Medication 1 GRAM(S): at 13:01

## 2018-07-08 RX ADMIN — HYDROMORPHONE HYDROCHLORIDE 0.5 MILLIGRAM(S): 2 INJECTION INTRAMUSCULAR; INTRAVENOUS; SUBCUTANEOUS at 02:43

## 2018-07-08 RX ADMIN — HYDROMORPHONE HYDROCHLORIDE 0.5 MILLIGRAM(S): 2 INJECTION INTRAMUSCULAR; INTRAVENOUS; SUBCUTANEOUS at 17:09

## 2018-07-08 RX ADMIN — HYDROMORPHONE HYDROCHLORIDE 0.5 MILLIGRAM(S): 2 INJECTION INTRAMUSCULAR; INTRAVENOUS; SUBCUTANEOUS at 22:15

## 2018-07-08 RX ADMIN — HYDROMORPHONE HYDROCHLORIDE 0.5 MILLIGRAM(S): 2 INJECTION INTRAMUSCULAR; INTRAVENOUS; SUBCUTANEOUS at 15:49

## 2018-07-08 RX ADMIN — TAMSULOSIN HYDROCHLORIDE 0.4 MILLIGRAM(S): 0.4 CAPSULE ORAL at 22:15

## 2018-07-08 RX ADMIN — HYDROMORPHONE HYDROCHLORIDE 0.5 MILLIGRAM(S): 2 INJECTION INTRAMUSCULAR; INTRAVENOUS; SUBCUTANEOUS at 21:00

## 2018-07-08 RX ADMIN — Medication 1 GRAM(S): at 05:28

## 2018-07-08 RX ADMIN — HYDROMORPHONE HYDROCHLORIDE 0.5 MILLIGRAM(S): 2 INJECTION INTRAMUSCULAR; INTRAVENOUS; SUBCUTANEOUS at 17:40

## 2018-07-08 RX ADMIN — Medication 5 MILLIGRAM(S): at 13:01

## 2018-07-08 RX ADMIN — URSODIOL 300 MILLIGRAM(S): 250 TABLET, FILM COATED ORAL at 05:28

## 2018-07-08 RX ADMIN — HYDROMORPHONE HYDROCHLORIDE 0.5 MILLIGRAM(S): 2 INJECTION INTRAMUSCULAR; INTRAVENOUS; SUBCUTANEOUS at 20:34

## 2018-07-08 NOTE — PROGRESS NOTE ADULT - SUBJECTIVE AND OBJECTIVE BOX
SURGICAL ONCOLGY  Patient seen and examined.     MEDICATIONS  (STANDING):  chlorhexidine 4% Liquid 1 Application(s) Topical once  enoxaparin Injectable 40 milliGRAM(s) SubCutaneous daily  HYDROmorphone  Injectable 0.5 milliGRAM(s) IV Push every 4 hours  metoclopramide 5 milliGRAM(s) Oral every 8 hours  sucralfate suspension 1 Gram(s) Oral four times a day  tamsulosin 0.4 milliGRAM(s) Oral at bedtime  ursodiol Capsule 300 milliGRAM(s) Oral every 12 hours    MEDICATIONS  (PRN):  cyclobenzaprine 5 milliGRAM(s) Oral three times a day PRN Muscle Spasm  dibucaine 1% Ointment 1 Application(s) Topical daily PRN hemorrhoids  HYDROmorphone  Injectable 0.5 milliGRAM(s) IV Push every 4 hours PRN Moderate Pain (4 - 6)      Vital Signs Last 24 Hrs  T(C): 37.1 (2018 20:48), Max: 37.1 (2018 20:48)  T(F): 98.7 (2018 20:48), Max: 98.7 (2018 20:48)  HR: 100 (2018 20:48) (95 - 105)  BP: 106/68 (2018 20:48) (103/82 - 127/65)  BP(mean): --  RR: 18 (2018 20:48) (16 - 19)  SpO2: 95% (2018 20:48) (95% - 99%)    I&O's Detail    2018 07:01  -  2018 07:00  --------------------------------------------------------  IN:    Enteral Tube Flush: 10 mL  Total IN: 10 mL    OUT:    Drain: 270 mL    Indwelling Catheter - Urethral: 1800 mL    Other: 60 mL    Voided: 400 mL  Total OUT: 2530 mL    Total NET: -2520 mL      2018 07:01  -  2018 22:08  --------------------------------------------------------  IN:    Enteral Tube Flush: 10 mL  Total IN: 10 mL    OUT:    Drain: 130 mL    Indwelling Catheter - Urethral: 650 mL    Other: 20 mL  Total OUT: 800 mL    Total NET: -790 mL          Daily     Daily Weight in k.4 (2018 05:27)    LABS:                Chief complaint: abd pain  PHYSICAL EXAM:  Gen:   Abd: soft ND minimal midline tenderness, drainage with old blood  PTC with bile    58yFemale s/p SBR for perforated viscus  Plan:   -Awaiting Hospice care planning

## 2018-07-08 NOTE — PROGRESS NOTE ADULT - SUBJECTIVE AND OBJECTIVE BOX
GENERAL SURGERY DAILY PROGRESS NOTE:    Overnight:  No acute events.    Subjective:  Patient seen and examined. Reports pain is well controlled. Denies N/V. Tolerating diet.       Exam:  Gen: NAD, jaundiced  Resp: Airway patent, non-labored respirations  Abd: Soft, ND, NT, midline incision with underlying hernia, expressing serosang fluid, dressed and intact   Ext: No edema, WWP  Neuro: AAOx3, no focal deficits    Vital Signs Last 24 Hrs  T(C): 37 (2018 16:24), Max: 37.1 (2018 20:48)  T(F): 98.6 (2018 16:24), Max: 98.7 (2018 20:48)  HR: 95 (2018 16:24) (90 - 105)  BP: 110/61 (2018 16:24) (103/82 - 127/65)  BP(mean): --  RR: 18 (2018 16:24) (16 - 19)  SpO2: 97% (2018 16:24) (95% - 100%)    I&O's Detail    2018 07:01  -  2018 07:00  --------------------------------------------------------  IN:    Enteral Tube Flush: 10 mL  Total IN: 10 mL    OUT:    Drain: 270 mL    Indwelling Catheter - Urethral: 1800 mL    Other: 60 mL    Voided: 400 mL  Total OUT: 2530 mL    Total NET: -2520 mL      2018 07:01  -  2018 18:32  --------------------------------------------------------  IN:    Enteral Tube Flush: 10 mL  Total IN: 10 mL    OUT:    Drain: 130 mL    Indwelling Catheter - Urethral: 650 mL    Other: 20 mL  Total OUT: 800 mL    Total NET: -790 mL          Daily     Daily Weight in k.4 (2018 05:27)    MEDICATIONS  (STANDING):  chlorhexidine 4% Liquid 1 Application(s) Topical once  enoxaparin Injectable 40 milliGRAM(s) SubCutaneous daily  HYDROmorphone  Injectable 0.5 milliGRAM(s) IV Push every 4 hours  metoclopramide 5 milliGRAM(s) Oral every 8 hours  sucralfate suspension 1 Gram(s) Oral four times a day  tamsulosin 0.4 milliGRAM(s) Oral at bedtime  ursodiol Capsule 300 milliGRAM(s) Oral every 12 hours    MEDICATIONS  (PRN):  cyclobenzaprine 5 milliGRAM(s) Oral three times a day PRN Muscle Spasm  dibucaine 1% Ointment 1 Application(s) Topical daily PRN hemorrhoids  HYDROmorphone  Injectable 0.5 milliGRAM(s) IV Push every 4 hours PRN Moderate Pain (4 - 6)      LABS:

## 2018-07-08 NOTE — PROGRESS NOTE ADULT - ASSESSMENT
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    -Pain control, appreciate palliative recs  -D/c'd abx, appreciate ID recs  -Cont soft diet  -Cont Mathis  -DVT ppx with lovenox  -F/u with urology outpatient.   -Change midline pouch q3 days    Dispo: home hospice on Monday (7/9)      Seen and d/w attending and chief  JOSE ELIAS Larsen, PGY 1  [phone] with any questions

## 2018-07-08 NOTE — PROGRESS NOTE ADULT - SUBJECTIVE AND OBJECTIVE BOX
ADVANCED GASTROENTEROLOGY FOLLOW-UP NOTE    Chief Complaint:  Patient is a 58y old  Female who presents with a chief complaint of Painless jaundice (2018 14:49)      Interval Events:   - Afebrile, HD stable  - No nausea, vomiting   - Pain well controlled with intermittent Dilaudid   - Tolerating soft diet     Allergies:  No Known Allergies      Hospital Medications:  chlorhexidine 4% Liquid 1 Application(s) Topical once  cyclobenzaprine 5 milliGRAM(s) Oral three times a day PRN  dibucaine 1% Ointment 1 Application(s) Topical daily PRN  enoxaparin Injectable 40 milliGRAM(s) SubCutaneous daily  HYDROmorphone  Injectable 0.5 milliGRAM(s) IV Push every 4 hours  HYDROmorphone  Injectable 0.5 milliGRAM(s) IV Push every 4 hours PRN  metoclopramide 5 milliGRAM(s) Oral every 8 hours  sucralfate suspension 1 Gram(s) Oral four times a day  tamsulosin 0.4 milliGRAM(s) Oral at bedtime  ursodiol Capsule 300 milliGRAM(s) Oral every 12 hours      PMHX/PSHX:  Gastric cancer  Malignant neoplasm of stomach, unspecified location  No pertinent past medical history  S/P partial gastrectomy  No significant past surgical history      Family history:  No pertinent family history in first degree relatives      ROS:     General:  No wt loss, fevers, chills, night sweats, fatigue,   Eyes:  Good vision, no reported pain  ENT:  No sore throat, pain, runny nose, dysphagia  CV:  No pain, palpitations, hypo/hypertension  Resp:  No dyspnea, cough, tachypnea, wheezing  GI:  see HPI  :  No pain, bleeding, incontinence, nocturia  Muscle:  No pain, weakness  Neuro:  No weakness, tingling, memory problems  Psych:  No fatigue, insomnia, mood problems, depression  Endocrine:  No polyuria, polydipsia, cold/heat intolerance  Heme:  No petechiae, ecchymosis, easy bruisability  Skin:  No rash, tattoos, scars, edema      PHYSICAL EXAM:   Vital Signs:  Vital Signs Last 24 Hrs  T(C): 36.8 (2018 08:28), Max: 37.4 (2018 16:22)  T(F): 98.2 (2018 08:28), Max: 99.3 (2018 16:22)  HR: 100 (2018 08:28) (90 - 107)  BP: 103/82 (2018 08:28) (103/82 - 127/65)  BP(mean): --  RR: 19 (2018 08:28) (16 - 19)  SpO2: 95% (2018 08:28) (94% - 100%)  Daily     Daily Weight in k.4 (2018 05:27)    GENERAL:  no acute distress  HEENT:  -icterus   CHEST:  clear bilaterally, no wheezes or rales  HEART:  RRR, S1S2  ABDOMEN:  soft, non-tender, non-distended, normoactive bowel sounds,  no hepato-splenomegaly  EXTEREMITIES:  no  edema  SKIN:  No rash/erythema/ecchymoses/petechiae/wounds/abscess/warm/dry  NEURO:  alert, oriented, conversant     LABS:  No new labs

## 2018-07-08 NOTE — PROGRESS NOTE ADULT - ASSESSMENT
Impression:  1. Unsuccessful ERCP complicated by small bowel perforation 6/6/18 s/p ex-lap with small bowel resection and anastomosis 6/6/18  2. Obstructive jaundice with dilated CBD and 1.4 cm 2/2 enhancing pancreatic head mass s/p percutaneous drainage, with persistent hyperbilirubinemia   3. 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 Acute blood loss anemia with abdominal wall hematoma and hemoperitoneum noted on CTAP  5. Metastatic moderately differentiated adenocarcinoma likely from gastric cancer    Plan:  - recommending abdominal ultrasound to evaluate abdominal wall hematoma for possible drainage by IR however given plan for hospice this has been deferred   - follow-up full infectious workup   - antibiotics as per ID   - daily CBC, CMP, INR  - per palliative/primary, plan for discharge to home hospice tomorrow

## 2018-07-09 PROCEDURE — 99233 SBSQ HOSP IP/OBS HIGH 50: CPT

## 2018-07-09 PROCEDURE — 99232 SBSQ HOSP IP/OBS MODERATE 35: CPT | Mod: GC

## 2018-07-09 PROCEDURE — 99232 SBSQ HOSP IP/OBS MODERATE 35: CPT | Mod: 24

## 2018-07-09 RX ORDER — HYDROMORPHONE HYDROCHLORIDE 2 MG/ML
30 INJECTION INTRAMUSCULAR; INTRAVENOUS; SUBCUTANEOUS
Qty: 0 | Refills: 0 | Status: DISCONTINUED | OUTPATIENT
Start: 2018-07-09 | End: 2018-07-11

## 2018-07-09 RX ORDER — HYDROMORPHONE HYDROCHLORIDE 2 MG/ML
0.5 INJECTION INTRAMUSCULAR; INTRAVENOUS; SUBCUTANEOUS
Qty: 0 | Refills: 0 | Status: DISCONTINUED | OUTPATIENT
Start: 2018-07-09 | End: 2018-07-10

## 2018-07-09 RX ORDER — HYDROMORPHONE HYDROCHLORIDE 2 MG/ML
0.5 INJECTION INTRAMUSCULAR; INTRAVENOUS; SUBCUTANEOUS ONCE
Qty: 0 | Refills: 0 | Status: DISCONTINUED | OUTPATIENT
Start: 2018-07-09 | End: 2018-07-09

## 2018-07-09 RX ORDER — HYDROMORPHONE HYDROCHLORIDE 2 MG/ML
0.5 INJECTION INTRAMUSCULAR; INTRAVENOUS; SUBCUTANEOUS
Qty: 0 | Refills: 0 | Status: DISCONTINUED | OUTPATIENT
Start: 2018-07-09 | End: 2018-07-11

## 2018-07-09 RX ADMIN — HYDROMORPHONE HYDROCHLORIDE 0.5 MILLIGRAM(S): 2 INJECTION INTRAMUSCULAR; INTRAVENOUS; SUBCUTANEOUS at 01:35

## 2018-07-09 RX ADMIN — Medication 1 GRAM(S): at 13:24

## 2018-07-09 RX ADMIN — HYDROMORPHONE HYDROCHLORIDE 0.5 MILLIGRAM(S): 2 INJECTION INTRAMUSCULAR; INTRAVENOUS; SUBCUTANEOUS at 10:21

## 2018-07-09 RX ADMIN — Medication 1 GRAM(S): at 23:17

## 2018-07-09 RX ADMIN — HYDROMORPHONE HYDROCHLORIDE 0.5 MILLIGRAM(S): 2 INJECTION INTRAMUSCULAR; INTRAVENOUS; SUBCUTANEOUS at 05:15

## 2018-07-09 RX ADMIN — HYDROMORPHONE HYDROCHLORIDE 0.5 MILLIGRAM(S): 2 INJECTION INTRAMUSCULAR; INTRAVENOUS; SUBCUTANEOUS at 17:35

## 2018-07-09 RX ADMIN — HYDROMORPHONE HYDROCHLORIDE 0.5 MILLIGRAM(S): 2 INJECTION INTRAMUSCULAR; INTRAVENOUS; SUBCUTANEOUS at 20:45

## 2018-07-09 RX ADMIN — HYDROMORPHONE HYDROCHLORIDE 0.5 MILLIGRAM(S): 2 INJECTION INTRAMUSCULAR; INTRAVENOUS; SUBCUTANEOUS at 01:12

## 2018-07-09 RX ADMIN — HYDROMORPHONE HYDROCHLORIDE 0.5 MILLIGRAM(S): 2 INJECTION INTRAMUSCULAR; INTRAVENOUS; SUBCUTANEOUS at 05:45

## 2018-07-09 RX ADMIN — HYDROMORPHONE HYDROCHLORIDE 0.5 MILLIGRAM(S): 2 INJECTION INTRAMUSCULAR; INTRAVENOUS; SUBCUTANEOUS at 07:57

## 2018-07-09 RX ADMIN — HYDROMORPHONE HYDROCHLORIDE 0.5 MILLIGRAM(S): 2 INJECTION INTRAMUSCULAR; INTRAVENOUS; SUBCUTANEOUS at 10:38

## 2018-07-09 RX ADMIN — HYDROMORPHONE HYDROCHLORIDE 0.5 MILLIGRAM(S): 2 INJECTION INTRAMUSCULAR; INTRAVENOUS; SUBCUTANEOUS at 23:47

## 2018-07-09 RX ADMIN — URSODIOL 300 MILLIGRAM(S): 250 TABLET, FILM COATED ORAL at 05:15

## 2018-07-09 RX ADMIN — HYDROMORPHONE HYDROCHLORIDE 0.5 MILLIGRAM(S): 2 INJECTION INTRAMUSCULAR; INTRAVENOUS; SUBCUTANEOUS at 14:33

## 2018-07-09 RX ADMIN — HYDROMORPHONE HYDROCHLORIDE 0.5 MILLIGRAM(S): 2 INJECTION INTRAMUSCULAR; INTRAVENOUS; SUBCUTANEOUS at 20:20

## 2018-07-09 RX ADMIN — HYDROMORPHONE HYDROCHLORIDE 0.5 MILLIGRAM(S): 2 INJECTION INTRAMUSCULAR; INTRAVENOUS; SUBCUTANEOUS at 23:17

## 2018-07-09 RX ADMIN — URSODIOL 300 MILLIGRAM(S): 250 TABLET, FILM COATED ORAL at 17:37

## 2018-07-09 RX ADMIN — Medication 1 GRAM(S): at 05:15

## 2018-07-09 RX ADMIN — TAMSULOSIN HYDROCHLORIDE 0.4 MILLIGRAM(S): 0.4 CAPSULE ORAL at 23:17

## 2018-07-09 RX ADMIN — Medication 5 MILLIGRAM(S): at 23:17

## 2018-07-09 RX ADMIN — Medication 1 GRAM(S): at 17:37

## 2018-07-09 RX ADMIN — HYDROMORPHONE HYDROCHLORIDE 0.5 MILLIGRAM(S): 2 INJECTION INTRAMUSCULAR; INTRAVENOUS; SUBCUTANEOUS at 18:00

## 2018-07-09 RX ADMIN — HYDROMORPHONE HYDROCHLORIDE 0.5 MILLIGRAM(S): 2 INJECTION INTRAMUSCULAR; INTRAVENOUS; SUBCUTANEOUS at 07:42

## 2018-07-09 RX ADMIN — Medication 5 MILLIGRAM(S): at 05:15

## 2018-07-09 RX ADMIN — Medication 5 MILLIGRAM(S): at 13:24

## 2018-07-09 RX ADMIN — ENOXAPARIN SODIUM 40 MILLIGRAM(S): 100 INJECTION SUBCUTANEOUS at 13:23

## 2018-07-09 RX ADMIN — HYDROMORPHONE HYDROCHLORIDE 0.5 MILLIGRAM(S): 2 INJECTION INTRAMUSCULAR; INTRAVENOUS; SUBCUTANEOUS at 14:00

## 2018-07-09 NOTE — PROGRESS NOTE ADULT - ASSESSMENT
Impression:  1. Unsuccessful ERCP complicated by small bowel perforation 6/6/18 s/p ex-lap with small bowel resection and anastomosis 6/6/18  2. Obstructive jaundice with dilated CBD and 1.4 cm 2/2 enhancing pancreatic head mass s/p percutaneous drainage, with persistent hyperbilirubinemia   3. 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 Acute blood loss anemia with abdominal wall hematoma and hemoperitoneum noted on CTAP  5. Metastatic moderately differentiated adenocarcinoma likely from gastric cancer    Plan:  - comfort care as per pt and family's wishes  - antibiotics as per ID   - per palliative/primary, plan for discharge to home hospice tomorrow

## 2018-07-09 NOTE — PROGRESS NOTE ADULT - SUBJECTIVE AND OBJECTIVE BOX
Subjective:  Patient seen and examined. Reports pain is well controlled. Denies N/V. Tolerating diet.       chlorhexidine 4% Liquid 1 Application(s) Topical once  cyclobenzaprine 5 milliGRAM(s) Oral three times a day PRN  dibucaine 1% Ointment 1 Application(s) Topical daily PRN  enoxaparin Injectable 40 milliGRAM(s) SubCutaneous daily  HYDROmorphone PCA (1 mG/mL) 30 milliLiter(s) PCA Continuous PCA Continuous  HYDROmorphone PCA (1 mG/mL) Rescue Clinician Bolus 0.5 milliGRAM(s) IV Push every 2 hours PRN  metoclopramide 5 milliGRAM(s) Oral every 8 hours  sucralfate suspension 1 Gram(s) Oral four times a day  tamsulosin 0.4 milliGRAM(s) Oral at bedtime  ursodiol Capsule 300 milliGRAM(s) Oral every 12 hours      OBJECTIVE    T(C): 36.9 (07-09-18 @ 11:53), Max: 37.1 (07-08-18 @ 20:48)  HR: 103 (07-09-18 @ 11:53) (95 - 105)  BP: 117/59 (07-09-18 @ 11:53) (103/64 - 124/67)  RR: 19 (07-09-18 @ 11:53) (17 - 19)  SpO2: 99% (07-09-18 @ 11:53) (95% - 100%)    07-08-18 @ 07:01  -  07-09-18 @ 07:00  --------------------------------------------------------  IN: 10 mL / OUT: 1300 mL / NET: -1290 mL    07-09-18 @ 07:01  -  07-09-18 @ 12:57  --------------------------------------------------------  IN: 100 mL / OUT: 260 mL / NET: -160 mL        EXAM    Gen: NAD, jaundiced  Resp: Airway patent, non-labored respirations  Abd: Soft, ND, NT, midline incision with underlying hernia, expressing serosanguinous fluid, dressed and intact

## 2018-07-09 NOTE — PROGRESS NOTE ADULT - ASSESSMENT
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    -Pain control, appreciate palliative recs  -needs PICC replaced as per palliative recommendation  -awaiting bed order for hospice  -d/c'd abx, appreciate ID recs  -Cont soft diet  -c/w Mathis  -DVT ppx with lovenox  -F/u with urology outpatient.   -Change midline pouch q3 days    Dispo: home hospice on Monday (7/9)

## 2018-07-09 NOTE — PROGRESS NOTE ADULT - SUBJECTIVE AND OBJECTIVE BOX
Chief Complaint:  Patient is a 58y old  Female who presents with a chief complaint of Painless jaundice (05 Jun 2018 14:49)      Interval Events:  No acute overnight events. Pt's pain is controlled with analgesic.     Allergies:  No Known Allergies        Hospital Medications:  chlorhexidine 4% Liquid 1 Application(s) Topical once  cyclobenzaprine 5 milliGRAM(s) Oral three times a day PRN  dibucaine 1% Ointment 1 Application(s) Topical daily PRN  enoxaparin Injectable 40 milliGRAM(s) SubCutaneous daily  HYDROmorphone  Injectable 0.5 milliGRAM(s) IV Push every 4 hours  HYDROmorphone  Injectable 0.5 milliGRAM(s) IV Push every 4 hours PRN  metoclopramide 5 milliGRAM(s) Oral every 8 hours  sucralfate suspension 1 Gram(s) Oral four times a day  tamsulosin 0.4 milliGRAM(s) Oral at bedtime  ursodiol Capsule 300 milliGRAM(s) Oral every 12 hours      PMHX/PSHX:  Gastric cancer  Malignant neoplasm of stomach, unspecified location  No pertinent past medical history  S/P partial gastrectomy  No significant past surgical history      Family history:  No pertinent family history in first degree relatives    PHYSICAL EXAM:   Vital Signs:  Vital Signs Last 24 Hrs  T(C): 36.9 (09 Jul 2018 07:55), Max: 37.1 (08 Jul 2018 20:48)  T(F): 98.4 (09 Jul 2018 07:55), Max: 98.7 (08 Jul 2018 20:48)  HR: 100 (09 Jul 2018 07:55) (95 - 105)  BP: 103/64 (09 Jul 2018 07:55) (103/64 - 124/67)  BP(mean): --  RR: 17 (09 Jul 2018 07:55) (17 - 19)  SpO2: 100% (09 Jul 2018 07:55) (95% - 100%)  Daily     Daily     GENERAL: NAD, thin  HEAD:  Atraumatic, Normocephalic  EYES: icteric  NECK: Supple, No JVD  CHEST/LUNG: Clear to auscultation bilaterally; No wheeze  HEART: Regular rate and rhythm; No murmurs, rubs, or gallops  ABDOMEN: Soft, Nontender, 200cc blood collecting at suture line into bag, distended; Bowel sounds present, no hepatosplenomegaly, no rebound or guarding  EXTREMITIES:bipedal edema  PSYCH: AAOx3  NEUROLOGY: non-focal, no asterixis  SKIN: jaundice

## 2018-07-09 NOTE — PROGRESS NOTE ADULT - ATTENDING COMMENTS
- I have seen and examined the patient on rounds.  Abd softly distended, PTC with bile    A/P- SBR for perf bowel    - Diet as santana  - f/u palliative

## 2018-07-09 NOTE — CHART NOTE - NSCHARTNOTEFT_GEN_A_CORE
Pre-Interventional Radiology Procedure Note    58y    Female    Procedure: PICC EXCHANGE  for hospice - PCA    Diagnosis/Indication: Patient is a 58y old  Female who presents with a chief complaint of Painless jaundice (05 Jun 2018 14:49)      Interventional Radiology Attending Physician: None     Ordering Attending Physician:     PAST MEDICAL & SURGICAL HISTORY:  Gastric cancer  Malignant neoplasm of stomach, unspecified location  S/P partial gastrectomy: distal gastrectomy with Billroth II reconstruction, D2 lymphadenectomy, feeding jejunostomy tube placement 6/23/17           Pt and her family aware of planned procedure.

## 2018-07-09 NOTE — PROGRESS NOTE ADULT - SUBJECTIVE AND OBJECTIVE BOX
INTERVAL HPI/OVERNIGHT EVENTS:  Patient being followed by Palliative Care for symptom management and GOC.  After discussions with patient's son- who patient refers to, hospice referral made. Plan is for pt to go home with PCA.    Allergies    No Known Allergies    Intolerances    ADVANCE DIRECTIVES:    Full code  MOLST [ ] YES [ x] NO     MEDICATIONS  (STANDING):  chlorhexidine 4% Liquid 1 Application(s) Topical once  enoxaparin Injectable 40 milliGRAM(s) SubCutaneous daily  HYDROmorphone PCA (1 mG/mL) 30 milliLiter(s) PCA Continuous PCA Continuous  metoclopramide 5 milliGRAM(s) Oral every 8 hours  sucralfate suspension 1 Gram(s) Oral four times a day  tamsulosin 0.4 milliGRAM(s) Oral at bedtime  ursodiol Capsule 300 milliGRAM(s) Oral every 12 hours    MEDICATIONS  (PRN):  cyclobenzaprine 5 milliGRAM(s) Oral three times a day PRN Muscle Spasm  dibucaine 1% Ointment 1 Application(s) Topical daily PRN hemorrhoids  HYDROmorphone  Injectable 0.5 milliGRAM(s) IV Push every 3 hours PRN moderate to severe pain  HYDROmorphone PCA (1 mG/mL) Rescue Clinician Bolus 0.5 milliGRAM(s) IV Push every 2 hours PRN Severe Pain (7 - 10)    PRESENT SYMPTOMS:  Source: [ x] Patient   [ ] Family   [ ] Team     Pain:                        [ ] No [ x] Yes             [ ] Mild [ ] Moderate [ ] Severe    Onset - When she eats/moves  Location - Mid abdomen  Duration - Constant  Character - Aching/Cramping  Alleviating/Aggravating - Improvement with pain medications.  Radiation - None  Timing - Constant    Dyspnea:                [ ] No [x ] Yes             [x ] Mild [ ] Moderate [ ] Severe    Anxiety:                  [ x] No [ ] Yes             [ ] Mild [ ] Moderate [ ] Severe    Fatigue:                  [ ] No [x ] Yes             [ ] Mild [ x] Moderate [ ] Severe    Nausea:                  [ ] No [x ] Yes             [ x] Mild [ ] Moderate [ ] Severe    Loss of appetite:   [ ] No [x ] Yes             [ ] Mild [ x] Moderate [ ] Severe    Constipation:        [ ] No [x ] Yes             [ ] Mild [ ] Moderate [ ] Severe    Other Symptoms:  [x ] All other review of systems negative   [ ] Unable to obtain due to poor mentation     Karnofsky Performance Score/Palliative Performance Status Version 2: 30 %    PHYSICAL EXAM:  Vital Signs Last 24 Hrs  T(C): 37.4 (09 Jul 2018 17:41), Max: 37.4 (09 Jul 2018 17:41)  T(F): 99.4 (09 Jul 2018 17:41), Max: 99.4 (09 Jul 2018 17:41)  HR: 97 (09 Jul 2018 17:41) (97 - 105)  BP: 117/58 (09 Jul 2018 17:41) (103/64 - 117/59)  BP(mean): --  RR: 19 (09 Jul 2018 17:41) (17 - 19)  SpO2: 100% (09 Jul 2018 17:41) (95% - 100%) I&O's Summary    08 Jul 2018 07:01  -  09 Jul 2018 07:00  --------------------------------------------------------  IN: 10 mL / OUT: 1300 mL / NET: -1290 mL    09 Jul 2018 07:01  -  09 Jul 2018 18:59  --------------------------------------------------------  IN: 175 mL / OUT: 590 mL / NET: -415 mL    General:  [x ] Alert  [ ] Oriented x      [ ] Lethargic  [ ] Agitated   [ ] Cachexia   [ ] Unarousable  [x ] Verbal  [ ] Non-Verbal    HEENT:  [ ] Normal   [x ] Dry mouth   [ ] ET Tube    [ ] Trach  [ ] Oral lesions    Lungs:   [x ] Clear [ ] Tachypnea  [ ] Audible excessive secretions   [ ] Rhonchi        [ ] Right [ ] Left [ ] Bilateral  [ ] Crackles        [ ] Right [ ] Left [ ] Bilateral  [ ] Wheezing     [ ] Right [ ] Left [ ] Bilateral    Cardiovascular:  [x ] Regular [ ] Irregular [ ] Tachycardia   [ ] Bradycardia  [ ] Murmur [ ] Other    Abdomen: [ ] Soft  [ ] Distended   [ ] +BS  [ ] Non tender [x ] Tender  [ ]PEG   [ ]OGT/ NGT   Last BM:     Large wound covered with dressing, ostomy with liquid stool.    Genitourinary: [ ] Normal [ ] Incontinent   [ ] Oliguria/Anuria   [ x] Mathis    Musculoskeletal:  [ ] Normal   [x ] Weakness  [ ] Bedbound/Wheelchair bound [ ] Edema    Neurological: [ x] No focal deficits  [ ] Cognitive impairment  [ ] Dysphagia [ ] Dysarthria [ ] Paresis [ ] Other     Skin: [x ] Normal   [ ] Pressure ulcer(s)                  [ ] Rash    LABS: no new labs today.    Shock: No [ ] Septic [ ] Cardiogenic [ ] Neurologic [ ] Hypovolemic  Vasopressors x None  Inotrophs x None    Oral Intake: [ ] Unable/mouth care only [ ] Minimal [ ] Moderate [x ] Full Capability    Diet: [ ] NPO [ ] Tube feeds [ ] TPN [ x] Other     RADIOLOGY & ADDITIONAL STUDIES: No new imaging    REFERRALS:   [ ] Chaplaincy  [ ] Hospice  [ ] Child Life  [ ] Social Work  [ ] Case management [ ] Holistic Therapy

## 2018-07-09 NOTE — PROGRESS NOTE ADULT - I WAS PHYSICALLY PRESENT FOR THE KEY PORTIONS OF THE EVALUATION AND MANAGEMENT (E/M) SERVICE PROVIDED.  I AGREE WITH THE ABOVE HISTORY, PHYSICAL, AND PLAN WHICH I HAVE REVIEWED AND EDITED WHERE APPROPRIATE

## 2018-07-09 NOTE — PROGRESS NOTE ADULT - ASSESSMENT
Impression:    1. Hyperbilirubinemia: Suspect intrahepatic cholestasis. Factors may include acute illness with possible infection, resorption of intraabdominal hematoma, DILI(unclear which if any drugs might be contributing), less likely TPN given short duration on TPN, less likely metastatic liver disease    2. Intraabdominal hematoma, lovenox may be contributing to this, complicated by possible superinfection    3. Fever, possible urinary or intra-abdominal source    4. VRE UTI    6. Hypoalbuminemia, likely malnutrition    Recommendation:  -follow up infectious workup  -trend Liver chemistries  -home with home hospice today per primary team   -abdominal US to evaluate hematoma deferred in light of pursuing hospice route.

## 2018-07-09 NOTE — PROGRESS NOTE ADULT - SUBJECTIVE AND OBJECTIVE BOX
ADVANCED GASTROENTEROLOGY      Chief Complaint:  Patient is a 58y old  Female who presents with a chief complaint of Painless jaundice (05 Jun 2018 14:49)      Interval Events: Pt having no more lab draws for palliative care. She is having home hospice set up this week.    Allergies:  No Known Allergies      Hospital Medications:  chlorhexidine 4% Liquid 1 Application(s) Topical once  cyclobenzaprine 5 milliGRAM(s) Oral three times a day PRN  dibucaine 1% Ointment 1 Application(s) Topical daily PRN  enoxaparin Injectable 40 milliGRAM(s) SubCutaneous daily  HYDROmorphone  Injectable 0.5 milliGRAM(s) IV Push every 4 hours  HYDROmorphone  Injectable 0.5 milliGRAM(s) IV Push every 4 hours PRN  metoclopramide 5 milliGRAM(s) Oral every 8 hours  sucralfate suspension 1 Gram(s) Oral four times a day  tamsulosin 0.4 milliGRAM(s) Oral at bedtime  ursodiol Capsule 300 milliGRAM(s) Oral every 12 hours      PMHX/PSHX:  Gastric cancer  Malignant neoplasm of stomach, unspecified location  No pertinent past medical history  S/P partial gastrectomy  No significant past surgical history      Family history:  No pertinent family history in first degree relatives      ROS:     General:  No fevers, chills  Eyes:  Good vision  ENT:  No odynophagia, dysphagia  CV:  No pain, palpitations  Resp:  No dyspnea, cough, tachypnea, wheezing  GI:  See HPI  Muscle:  No pain, weakness  Skin:  No rash, edema      PHYSICAL EXAM:     GENERAL:  No distress  HEENT: conjunctivae clear  CHEST:  Full & symmetric excursion, no increased effort  HEART:  Regular rhythm  ABDOMEN:  Soft, , wound, drain in place  EXTREMITIES:  no edema  SKIN:  Dry/warm  NEURO:  Alert    Vital Signs:  Vital Signs Last 24 Hrs  T(C): 36.9 (09 Jul 2018 07:55), Max: 37.1 (08 Jul 2018 20:48)  T(F): 98.4 (09 Jul 2018 07:55), Max: 98.7 (08 Jul 2018 20:48)  HR: 100 (09 Jul 2018 07:55) (95 - 105)  BP: 103/64 (09 Jul 2018 07:55) (103/64 - 124/67)  BP(mean): --  RR: 17 (09 Jul 2018 07:55) (17 - 18)  SpO2: 100% (09 Jul 2018 07:55) (95% - 100%)  Daily     Daily     LABS:                    Imaging:

## 2018-07-09 NOTE — PROGRESS NOTE ADULT - ASSESSMENT
57 yo F with h/o gastric cancer 6/2017 s/p partial gastrectomy with Billroth II reconstruction. Admitted for jaundice, s/p unsuccessful ERCP c/b small bowel perforation (6/6) s/p ex-lap with SB resection and anastomosis. 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 Currently being treated with Abx, consult for Palliative care for symptom management and GOC.

## 2018-07-10 ENCOUNTER — TRANSCRIPTION ENCOUNTER (OUTPATIENT)
Age: 58
End: 2018-07-10

## 2018-07-10 LAB
BUN SERPL-MCNC: 8 MG/DL — SIGNIFICANT CHANGE UP (ref 7–23)
CALCIUM SERPL-MCNC: 8 MG/DL — LOW (ref 8.4–10.5)
CHLORIDE SERPL-SCNC: 97 MMOL/L — LOW (ref 98–107)
CO2 SERPL-SCNC: 21 MMOL/L — LOW (ref 22–31)
CREAT SERPL-MCNC: 0.36 MG/DL — LOW (ref 0.5–1.3)
FUNGUS SPEC QL CULT: SIGNIFICANT CHANGE UP
GLUCOSE SERPL-MCNC: 105 MG/DL — HIGH (ref 70–99)
HCT VFR BLD CALC: 28.8 % — LOW (ref 34.5–45)
HGB BLD-MCNC: 9.5 G/DL — LOW (ref 11.5–15.5)
MCHC RBC-ENTMCNC: 31.4 PG — SIGNIFICANT CHANGE UP (ref 27–34)
MCHC RBC-ENTMCNC: 33 % — SIGNIFICANT CHANGE UP (ref 32–36)
MCV RBC AUTO: 95 FL — SIGNIFICANT CHANGE UP (ref 80–100)
NRBC # FLD: 0 — SIGNIFICANT CHANGE UP
PLATELET # BLD AUTO: 820 K/UL — HIGH (ref 150–400)
PMV BLD: 9.6 FL — SIGNIFICANT CHANGE UP (ref 7–13)
POTASSIUM SERPL-MCNC: 3.4 MMOL/L — LOW (ref 3.5–5.3)
POTASSIUM SERPL-SCNC: 3.4 MMOL/L — LOW (ref 3.5–5.3)
RBC # BLD: 3.03 M/UL — LOW (ref 3.8–5.2)
RBC # FLD: 21 % — HIGH (ref 10.3–14.5)
SODIUM SERPL-SCNC: 129 MMOL/L — LOW (ref 135–145)
WBC # BLD: 18.62 K/UL — HIGH (ref 3.8–10.5)
WBC # FLD AUTO: 18.62 K/UL — HIGH (ref 3.8–10.5)

## 2018-07-10 PROCEDURE — 36584 COMPL RPLCMT PICC RS&I: CPT

## 2018-07-10 PROCEDURE — 77001 FLUOROGUIDE FOR VEIN DEVICE: CPT | Mod: 26,GC

## 2018-07-10 PROCEDURE — 99233 SBSQ HOSP IP/OBS HIGH 50: CPT

## 2018-07-10 RX ORDER — SODIUM CHLORIDE 9 MG/ML
1000 INJECTION INTRAMUSCULAR; INTRAVENOUS; SUBCUTANEOUS
Qty: 0 | Refills: 0 | Status: DISCONTINUED | OUTPATIENT
Start: 2018-07-10 | End: 2018-07-11

## 2018-07-10 RX ORDER — ONDANSETRON 8 MG/1
4 TABLET, FILM COATED ORAL EVERY 6 HOURS
Qty: 0 | Refills: 0 | Status: DISCONTINUED | OUTPATIENT
Start: 2018-07-10 | End: 2018-07-11

## 2018-07-10 RX ORDER — NALOXONE HYDROCHLORIDE 4 MG/.1ML
0.1 SPRAY NASAL
Qty: 0 | Refills: 0 | Status: DISCONTINUED | OUTPATIENT
Start: 2018-07-10 | End: 2018-07-11

## 2018-07-10 RX ORDER — POTASSIUM CHLORIDE 20 MEQ
10 PACKET (EA) ORAL
Qty: 0 | Refills: 0 | Status: COMPLETED | OUTPATIENT
Start: 2018-07-10 | End: 2018-07-10

## 2018-07-10 RX ADMIN — HYDROMORPHONE HYDROCHLORIDE 0.5 MILLIGRAM(S): 2 INJECTION INTRAMUSCULAR; INTRAVENOUS; SUBCUTANEOUS at 03:40

## 2018-07-10 RX ADMIN — Medication 5 MILLIGRAM(S): at 12:02

## 2018-07-10 RX ADMIN — HYDROMORPHONE HYDROCHLORIDE 0.5 MILLIGRAM(S): 2 INJECTION INTRAMUSCULAR; INTRAVENOUS; SUBCUTANEOUS at 03:10

## 2018-07-10 RX ADMIN — Medication 1 GRAM(S): at 06:33

## 2018-07-10 RX ADMIN — HYDROMORPHONE HYDROCHLORIDE 0.5 MILLIGRAM(S): 2 INJECTION INTRAMUSCULAR; INTRAVENOUS; SUBCUTANEOUS at 09:33

## 2018-07-10 RX ADMIN — HYDROMORPHONE HYDROCHLORIDE 0.5 MILLIGRAM(S): 2 INJECTION INTRAMUSCULAR; INTRAVENOUS; SUBCUTANEOUS at 06:32

## 2018-07-10 RX ADMIN — Medication 1 GRAM(S): at 17:40

## 2018-07-10 RX ADMIN — Medication 100 MILLIEQUIVALENT(S): at 09:20

## 2018-07-10 RX ADMIN — Medication 5 MILLIGRAM(S): at 06:33

## 2018-07-10 RX ADMIN — TAMSULOSIN HYDROCHLORIDE 0.4 MILLIGRAM(S): 0.4 CAPSULE ORAL at 21:21

## 2018-07-10 RX ADMIN — Medication 1 GRAM(S): at 12:01

## 2018-07-10 RX ADMIN — URSODIOL 300 MILLIGRAM(S): 250 TABLET, FILM COATED ORAL at 06:33

## 2018-07-10 RX ADMIN — URSODIOL 300 MILLIGRAM(S): 250 TABLET, FILM COATED ORAL at 17:40

## 2018-07-10 RX ADMIN — HYDROMORPHONE HYDROCHLORIDE 0.5 MILLIGRAM(S): 2 INJECTION INTRAMUSCULAR; INTRAVENOUS; SUBCUTANEOUS at 10:03

## 2018-07-10 RX ADMIN — HYDROMORPHONE HYDROCHLORIDE 30 MILLILITER(S): 2 INJECTION INTRAMUSCULAR; INTRAVENOUS; SUBCUTANEOUS at 19:09

## 2018-07-10 RX ADMIN — HYDROMORPHONE HYDROCHLORIDE 0.5 MILLIGRAM(S): 2 INJECTION INTRAMUSCULAR; INTRAVENOUS; SUBCUTANEOUS at 07:00

## 2018-07-10 RX ADMIN — HYDROMORPHONE HYDROCHLORIDE 30 MILLILITER(S): 2 INJECTION INTRAMUSCULAR; INTRAVENOUS; SUBCUTANEOUS at 11:29

## 2018-07-10 RX ADMIN — Medication 5 MILLIGRAM(S): at 21:21

## 2018-07-10 RX ADMIN — Medication 100 MILLIEQUIVALENT(S): at 11:00

## 2018-07-10 RX ADMIN — SODIUM CHLORIDE 30 MILLILITER(S): 9 INJECTION INTRAMUSCULAR; INTRAVENOUS; SUBCUTANEOUS at 11:56

## 2018-07-10 RX ADMIN — Medication 100 MILLIEQUIVALENT(S): at 10:04

## 2018-07-10 RX ADMIN — ENOXAPARIN SODIUM 40 MILLIGRAM(S): 100 INJECTION SUBCUTANEOUS at 12:01

## 2018-07-10 NOTE — DISCHARGE NOTE ADULT - MEDICATION SUMMARY - MEDICATIONS TO TAKE
I will START or STAY ON the medications listed below when I get home from the hospital:    Dilaudid PCA 0.4 mg/mL #10 250mL bags. 0.2mg IVq1hr continuous, 0.2 mg IV q30 min  --  MDD:14.4   -- Indication: For Pain medication    metoclopramide 5 mg oral tablet  -- 1 tab(s) by mouth every 8 hours MDD:3  -- Indication: For nausea    dibucaine 1% topical ointment  -- 1 application on skin once a day, As needed, hemorrhoids  -- Indication: For hemorrhoid    ursodiol 300 mg oral capsule  -- 1 cap(s) by mouth every 12 hours  -- Indication: For GI agent    sucralfate 1 g/10 mL oral suspension  -- 10 milliliter(s) by mouth 4 times a day  -- Indication: For GI agent    cyclobenzaprine 5 mg oral tablet  -- 1 tab(s) by mouth 3 times a day, As needed, Muscle Spasm  -- Indication: For Muscle spasm

## 2018-07-10 NOTE — PROGRESS NOTE ADULT - SUBJECTIVE AND OBJECTIVE BOX
Subjective:  Patient seen and examined. Reports pain is well controlled. Denies n/v. Tolerating diet.     chlorhexidine 4% Liquid 1 Application(s) Topical once  cyclobenzaprine 5 milliGRAM(s) Oral three times a day PRN  dibucaine 1% Ointment 1 Application(s) Topical daily PRN  enoxaparin Injectable 40 milliGRAM(s) SubCutaneous daily  HYDROmorphone PCA (1 mG/mL) 30 milliLiter(s) PCA Continuous PCA Continuous  HYDROmorphone PCA (1 mG/mL) Rescue Clinician Bolus 0.5 milliGRAM(s) IV Push every 2 hours PRN  metoclopramide 5 milliGRAM(s) Oral every 8 hours  sodium chloride 0.9%. 1000 milliLiter(s) IV Continuous <Continuous>  sucralfate suspension 1 Gram(s) Oral four times a day  tamsulosin 0.4 milliGRAM(s) Oral at bedtime  ursodiol Capsule 300 milliGRAM(s) Oral every 12 hours            OBJECTIVE    T(C): 37.1 (07-10-18 @ 11:59), Max: 37.4 (07-09-18 @ 17:41)  HR: 97 (07-10-18 @ 11:59) (90 - 106)  BP: 112/65 (07-10-18 @ 11:59) (95/50 - 117/58)  RR: 16 (07-10-18 @ 11:59) (16 - 19)  SpO2: 98% (07-10-18 @ 11:59) (90% - 100%)    07-09-18 @ 07:01  -  07-10-18 @ 07:00  --------------------------------------------------------  IN: 175 mL / OUT: 1315 mL / NET: -1140 mL    07-10-18 @ 07:01  -  07-10-18 @ 16:36  --------------------------------------------------------  IN: 0 mL / OUT: 410 mL / NET: -410 mL        EXAM  Gen: NAD, jaundiced, sitting up in bed  Resp: non-labored respirations  Abd: Soft, nt, nd, midline incision dressed and intact       LABS                        9.5    18.62 )-----------( 820      ( 10 Jul 2018 06:30 )             28.8     07-10    129<L>  |  97<L>  |  8   ----------------------------<  105<H>  3.4<L>   |  21<L>  |  0.36<L>    Ca    8.0<L>      10 Jul 2018 06:30

## 2018-07-10 NOTE — DISCHARGE NOTE ADULT - CARE PROVIDER_API CALL
Koffi Trent), Surgery; Surgical Oncology  [address] [place] [address]  Phone: [phone]  Fax: [phone]

## 2018-07-10 NOTE — DISCHARGE NOTE ADULT - PATIENT PORTAL LINK FT
You can access the [facility] Patient Portal, offered by [facility], by registering with the following website: [email or web address]

## 2018-07-10 NOTE — PROGRESS NOTE ADULT - ASSESSMENT
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    -Pain control, appreciate palliative recs  -PICC replacement scheduled for today  -bed order for hospice to arrive this morning  -Cont soft diet  -c/w Mathis  -DVT ppx with lovenox  -F/u with urology outpatient.   -Change midline pouch q3 days    Dispo: home hospice on Monday (7/9)

## 2018-07-10 NOTE — DISCHARGE NOTE ADULT - CARE PLAN
Principal Discharge DX:	Malignant neoplasm of stomach, unspecified location  Goal:	Pain control, palliative  Assessment and plan of treatment:	Patient will be discharge home to hospice care.  WOUND CARE:  Please keep incision site dry clean and intact. Please care for and change ostomy bag as needed.  BATHING: Please do not submerge wound underwater. You may shower and/or sponge bathe.  ACTIVITY: No heavy lifting or straining. Otherwise, you may return to your usual level of physical activity. If you are taking narcotic pain medication (such as Percocet) DO NOT drive a car, operate machinery or make important decisions.  DIET: Return to your usual diet.  NOTIFY YOUR SURGEON IF: You have any bleeding that does not stop, any pus draining from your wound(s), any fever (over 100.4 F) or chills, persistent nausea/vomiting, persistent diarrhea, or if your pain is not controlled on your discharge pain medications.  FOLLOW-UP: Please follow up with your primary care physician in one week regarding your hospitalization. If you have questions or concerns regarding your hospital stay please call Dr. Trent.

## 2018-07-10 NOTE — PROGRESS NOTE ADULT - SUBJECTIVE AND OBJECTIVE BOX
INTERVAL HPI/OVERNIGHT EVENTS:  Patient being followed by Palliative Care for symptom management and GOC.  Plan is for pt to go home with home hospice with PCA.    Allergies    No Known Allergies    Intolerances    ADVANCE DIRECTIVES:    Full code  MOLST [ ] YES [ x] NO     MEDICATIONS  (STANDING):  chlorhexidine 4% Liquid 1 Application(s) Topical once  enoxaparin Injectable 40 milliGRAM(s) SubCutaneous daily  HYDROmorphone PCA (1 mG/mL) 30 milliLiter(s) PCA Continuous PCA Continuous  metoclopramide 5 milliGRAM(s) Oral every 8 hours  sodium chloride 0.9%. 1000 milliLiter(s) (30 mL/Hr) IV Continuous <Continuous>  sucralfate suspension 1 Gram(s) Oral four times a day  tamsulosin 0.4 milliGRAM(s) Oral at bedtime  ursodiol Capsule 300 milliGRAM(s) Oral every 12 hours    MEDICATIONS  (PRN):  cyclobenzaprine 5 milliGRAM(s) Oral three times a day PRN Muscle Spasm  dibucaine 1% Ointment 1 Application(s) Topical daily PRN hemorrhoids  HYDROmorphone PCA (1 mG/mL) Rescue Clinician Bolus 0.5 milliGRAM(s) IV Push every 2 hours PRN Severe Pain (7 - 10)    PRESENT SYMPTOMS:  Source: [ x] Patient   [ ] Family   [ ] Team     Pain:                        [ ] No [ x] Yes             [ ] Mild [ ] Moderate [ ] Severe    Onset - When she eats/moves  Location - Mid abdomen  Duration - Constant  Character - Aching/Cramping  Alleviating/Aggravating - Improvement with pain medications.  Radiation - None  Timing - Constant    Dyspnea:                [ ] No [x ] Yes             [x ] Mild [ ] Moderate [ ] Severe    Anxiety:                  [ x] No [ ] Yes             [ ] Mild [ ] Moderate [ ] Severe    Fatigue:                  [ ] No [x ] Yes             [ ] Mild [ x] Moderate [ ] Severe    Nausea:                  [ ] No [x ] Yes             [ x] Mild [ ] Moderate [ ] Severe    Loss of appetite:   [ ] No [x ] Yes             [ ] Mild [ x] Moderate [ ] Severe    Constipation:        [ ] No [x ] Yes             [ ] Mild [ ] Moderate [ ] Severe    Other Symptoms:  [x ] All other review of systems negative   [ ] Unable to obtain due to poor mentation     Karnofsky Performance Score/Palliative Performance Status Version 2: 30 %    PHYSICAL EXAM:  Vital Signs Last 24 Hrs  T(C): 37.1 (10 Jul 2018 11:59), Max: 37.4 (09 Jul 2018 17:41)  T(F): 98.7 (10 Jul 2018 11:59), Max: 99.4 (09 Jul 2018 17:41)  HR: 97 (10 Jul 2018 11:59) (90 - 106)  BP: 112/65 (10 Jul 2018 11:59) (95/50 - 117/58)  BP(mean): --  RR: 16 (10 Jul 2018 11:59) (16 - 19)  SpO2: 98% (10 Jul 2018 11:59) (90% - 100%) I&O's Summary    09 Jul 2018 07:01  -  10 Jul 2018 07:00  --------------------------------------------------------  IN: 175 mL / OUT: 1315 mL / NET: -1140 mL    10 Jul 2018 07:01  -  10 Jul 2018 14:07  --------------------------------------------------------  IN: 0 mL / OUT: 260 mL / NET: -260 mL    General:  [x ] Alert  [ ] Oriented x      [ ] Lethargic  [ ] Agitated   [ ] Cachexia   [ ] Unarousable  [x ] Verbal  [ ] Non-Verbal    HEENT:  [ ] Normal   [x ] Dry mouth   [ ] ET Tube    [ ] Trach  [ ] Oral lesions    Lungs:   [x ] Clear [ ] Tachypnea  [ ] Audible excessive secretions   [ ] Rhonchi        [ ] Right [ ] Left [ ] Bilateral  [ ] Crackles        [ ] Right [ ] Left [ ] Bilateral  [ ] Wheezing     [ ] Right [ ] Left [ ] Bilateral    Cardiovascular:  [x ] Regular [ ] Irregular [ ] Tachycardia   [ ] Bradycardia  [ ] Murmur [ ] Other    Abdomen: [ ] Soft  [ ] Distended   [ ] +BS  [ ] Non tender [x ] Tender  [ ]PEG   [ ]OGT/ NGT   Last BM:     Large wound covered with dressing, ostomy with liquid stool.    Genitourinary: [ ] Normal [ ] Incontinent   [ ] Oliguria/Anuria   [ x] Mathis    Musculoskeletal:  [ ] Normal   [x ] Weakness  [ ] Bedbound/Wheelchair bound [ ] Edema    Neurological: [ x] No focal deficits  [ ] Cognitive impairment  [ ] Dysphagia [ ] Dysarthria [ ] Paresis [ ] Other     Skin: [x ] Normal   [ ] Pressure ulcer(s)                  [ ] Rash    LABS: no new labs today.    Shock: No [ ] Septic [ ] Cardiogenic [ ] Neurologic [ ] Hypovolemic  Vasopressors x None  Inotrophs x None    Oral Intake: [ ] Unable/mouth care only [ ] Minimal [ ] Moderate [x ] Full Capability    Diet: [ ] NPO [ ] Tube feeds [ ] TPN [ x] Other     RADIOLOGY & ADDITIONAL STUDIES: No new imaging    REFERRALS:   [ ] Chaplaincy  [ ] Hospice  [ ] Child Life  [ ] Social Work  [ ] Case management [ ] Holistic Therapy

## 2018-07-10 NOTE — DISCHARGE NOTE ADULT - MEDICATION SUMMARY - MEDICATIONS TO STOP TAKING
I will STOP taking the medications listed below when I get home from the hospital:    pantoprazole 40 mg oral delayed release tablet  -- 1 tab(s) by mouth 2 times a day

## 2018-07-10 NOTE — DISCHARGE NOTE ADULT - PLAN OF CARE
Pain control, palliative Patient will be discharge home to hospice care.  WOUND CARE:  Please keep incision site dry clean and intact. Please care for and change ostomy bag as needed.  BATHING: Please do not submerge wound underwater. You may shower and/or sponge bathe.  ACTIVITY: No heavy lifting or straining. Otherwise, you may return to your usual level of physical activity. If you are taking narcotic pain medication (such as Percocet) DO NOT drive a car, operate machinery or make important decisions.  DIET: Return to your usual diet.  NOTIFY YOUR SURGEON IF: You have any bleeding that does not stop, any pus draining from your wound(s), any fever (over 100.4 F) or chills, persistent nausea/vomiting, persistent diarrhea, or if your pain is not controlled on your discharge pain medications.  FOLLOW-UP: Please follow up with your primary care physician in one week regarding your hospitalization. If you have questions or concerns regarding your hospital stay please call Dr. Trent.

## 2018-07-10 NOTE — DISCHARGE NOTE ADULT - HOSPITAL COURSE
Patient complains of 1 week of progressively worsening jaundice. Patient now endorses some pain in the right upper quadrant of her abdomen, which extends through her back. Two days ago, she was admitted to [facility] for work-up. She had a CT scan and MRCP. On MRCP she has a 1.4 cm mass in the pancreatic head obstructing the common hepatic duct. [facility] was initiated in order to pursue an ERCP with gastroenterology. She denies nausea, vomiting, fever, chills. Patient complains of 1 week of progressively worsening jaundice. Patient now endorses some pain in the right upper quadrant of her abdomen, which extends through her back. Two days ago, she was admitted to [facility] for work-up. She had a CT scan and MRCP. On MRCP she has a 1.4 cm mass in the pancreatic head obstructing the common hepatic duct. Transfer to [facility] was initiated in order to pursue an ERCP with gastroenterology. She denies nausea, vomiting, fever, chills.   Pt had ERCP c/b perforation of afferent limb. Pt underwent emergent Ex-lap w/ resection of perforated bowel and stapled side to side functional end to end anastomosis. Pt remained intubated and was transferred to SICU off pressors.  Bile culture grew E coli, and patient received course of Cipro into Zosyn; also grew VRE.  Patient devoloped a UTI while admitted, treated with IV antibiotics. Started on TPN, abdominal wall incisional hematoma drained. Patient released home with hospice care.

## 2018-07-11 VITALS
SYSTOLIC BLOOD PRESSURE: 103 MMHG | TEMPERATURE: 98 F | RESPIRATION RATE: 18 BRPM | HEART RATE: 101 BPM | DIASTOLIC BLOOD PRESSURE: 64 MMHG | OXYGEN SATURATION: 95 %

## 2018-07-11 PROCEDURE — 99233 SBSQ HOSP IP/OBS HIGH 50: CPT

## 2018-07-11 PROCEDURE — 71045 X-RAY EXAM CHEST 1 VIEW: CPT | Mod: 26

## 2018-07-11 RX ORDER — SUCRALFATE 1 G
10 TABLET ORAL
Qty: 1200 | Refills: 0 | OUTPATIENT
Start: 2018-07-11 | End: 2018-08-09

## 2018-07-11 RX ORDER — DIBUCAINE 1 %
1 OINTMENT (GRAM) RECTAL
Qty: 1 | Refills: 0 | OUTPATIENT
Start: 2018-07-11 | End: 2018-08-09

## 2018-07-11 RX ORDER — URSODIOL 250 MG/1
1 TABLET, FILM COATED ORAL
Qty: 60 | Refills: 0 | OUTPATIENT
Start: 2018-07-11

## 2018-07-11 RX ORDER — CYCLOBENZAPRINE HYDROCHLORIDE 10 MG/1
1 TABLET, FILM COATED ORAL
Qty: 90 | Refills: 0 | OUTPATIENT
Start: 2018-07-11

## 2018-07-11 RX ORDER — METOCLOPRAMIDE HCL 10 MG
1 TABLET ORAL
Qty: 90 | Refills: 0 | OUTPATIENT
Start: 2018-07-11 | End: 2018-08-09

## 2018-07-11 RX ADMIN — HYDROMORPHONE HYDROCHLORIDE 30 MILLILITER(S): 2 INJECTION INTRAMUSCULAR; INTRAVENOUS; SUBCUTANEOUS at 07:11

## 2018-07-11 RX ADMIN — Medication 1 GRAM(S): at 12:04

## 2018-07-11 RX ADMIN — Medication 5 MILLIGRAM(S): at 05:12

## 2018-07-11 RX ADMIN — Medication 5 MILLIGRAM(S): at 13:45

## 2018-07-11 RX ADMIN — Medication 1 GRAM(S): at 17:17

## 2018-07-11 RX ADMIN — URSODIOL 300 MILLIGRAM(S): 250 TABLET, FILM COATED ORAL at 05:12

## 2018-07-11 RX ADMIN — ENOXAPARIN SODIUM 40 MILLIGRAM(S): 100 INJECTION SUBCUTANEOUS at 12:04

## 2018-07-11 RX ADMIN — Medication 1 GRAM(S): at 05:12

## 2018-07-11 RX ADMIN — URSODIOL 300 MILLIGRAM(S): 250 TABLET, FILM COATED ORAL at 17:17

## 2018-07-11 NOTE — PROGRESS NOTE ADULT - ASSESSMENT
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    -Pain control, appreciate palliative recs  -PICC placed, CXR to confirm placement  -Awaiting hospital bed arrival at home  -Cont soft diet  -Cont Mathis  -SQL  -F/u with urology outpatient.   -Change midline pouch q3 days    Dispo: home hospice pending bed arrival      Seen and d/w attending and chief  JOSE ELIAS Larsen, PGY 1  [phone] with any questions

## 2018-07-11 NOTE — PROGRESS NOTE ADULT - PROVIDER SPECIALTY LIST ADULT
Anesthesia
Gastroenterology
Hepatology
Infectious Disease
Nutrition Support
Palliative Care
Palliative Care
SICU
Surgery
Gastroenterology
Gastroenterology
Nutrition Support
Surgery
Gastroenterology
Hepatology
Surgery
Palliative Care

## 2018-07-11 NOTE — PROGRESS NOTE ADULT - PROBLEM SELECTOR PLAN 2
Not well controlled. Used 4PRNs in the past 24hrs of 0.2mg w/o pain being well controlled. Would recommend to increase dilaudid to 0.5mg Q4hrs atc and 0.5mg Q4hrs PRN. Soft stools seen in the ostomy, would recommend to start bowel regimen to prevent constipation.
Patient not tolerating PO regimen would recommend to change to PCA Dilaudid 0.2 mg/hr continuous, 0.2mg demand q30min, CB 0.5mg q2hr PRN. Please page if pain uncontrolled or any issues/questions about PCA. Plan is for patient to go home with PCA and hospice care. Would recommend assessing PICC line, as patient would need PIC to go home with hospice and PCA.
Would recommend to start PO regimen as patient tolerating PO, would d/c IV regimen tomorrow in AM and start with MS Contin 30mg q12hrs. Continue with Dilaudid 0.5mg IV q4hrs PRN. Soft stools seen in the ostomy, would recommend to start bowel regimen to prevent constipation.
Patient not tolerating PO regimen. PCA started until today, Dilaudid 0.2 mg/hr continuous, 0.2mg demand q30min, CB 0.5mg q2hr PRN. Patient used only 3 demands in 24hrs, no CB. Continue with same regimen. [facility] already aware patient is being discharged. Please page if pain uncontrolled or any issues/questions about PCA. Pager #[phone]
Patient not tolerating PO regimen. PCA started until today, Dilaudid 0.2 mg/hr continuous, 0.2mg demand q30min, CB 0.5mg q2hr PRN. Please page if pain uncontrolled or any issues/questions about PCA. Pager #[phone]  Plan is for patient to go home with PCA and hospice care. Plan is for PICC line to be exchanged, as patient would need PIC to go home with hospice and PCA.

## 2018-07-11 NOTE — PROGRESS NOTE ADULT - PROBLEM SELECTOR PLAN 3
With opiate regimen as above. Would consider US if significant ascites, unclear if it can be tapped to reduce symptom burden.
With opiate regimen as above.
With opiate regimen as above. Would consider US if significant ascites, unclear if it can be tapped to reduce symptom burden.
With opiate regimen as above.
With opiate regimen as above.

## 2018-07-11 NOTE — PROGRESS NOTE ADULT - PROBLEM SELECTOR PLAN 1
Patient with h/o gastric CA dx 6/2017 s/p partial gastrectomy with Billroth II reconstruction. Presents with jaundice, found to have pancreatic mass, s/p ERCP, unsuccessful c/b SB perforation s/p ex-lap. According to bx is a metastatic adeno CA, consistent with recurrence. Would recommend final input from oncology, family asking for palliative care. After GOC conversations with patient's son, in agreement to discontinue TPN regardless of calorie count. Hospice would be the best service to provide care for patient at home, discussed with patient's son will await his response after he discusses it with his sister.
Patient with h/o gastric CA dx 6/2017 s/p partial gastrectomy with Billroth II reconstruction. Presents with jaundice, found to have pancreatic mass, s/p ERCP, unsuccessful c/b SB perforation s/p ex-lap. According to bx is a metastatic adeno CA, consistent with recurrence. Would recommend final input from oncology. After GOC conversations with patient's son, in agreement to discontinue TPN regardless of calorie count. Hospice referral made today.
Patient with h/o gastric CA dx 6/2017 s/p partial gastrectomy with Billroth II reconstruction. Presents with jaundice, found to have pancreatic mass, s/p ERCP, unsuccessful c/b SB perforation s/p ex-lap. According to bx is a metastatic adeno CA, consistent with recurrence. Would recommend final input from oncology. After GOC conversations with patient's son, in agreement to discontinue TPN regardless of calorie count. Hospice would be the best service to provide care for patient at home, patient's son not ready to make that decision, ongoing GOC conversations.
Patient with h/o gastric CA dx 6/2017 s/p partial gastrectomy with Billroth II reconstruction. Presents with jaundice, found to have pancreatic mass, s/p ERCP, unsuccessful c/b SB perforation s/p ex-lap. According to bx is a metastatic adeno CA, consistent with recurrence. Would recommend final input from oncology. After GOC conversations with patient's son, in agreement to discontinue TPN regardless of calorie count. Hospice referral made, plan is for home hospice with PCA.
Patient with h/o gastric CA dx 6/2017 s/p partial gastrectomy with Billroth II reconstruction. Presents with jaundice, found to have pancreatic mass, s/p ERCP, unsuccessful c/b SB perforation s/p ex-lap. According to bx is a metastatic adeno CA, consistent with recurrence. Would recommend final input from oncology. After GOC conversations with patient's son, in agreement to discontinue TPN regardless of calorie count. Plan is for patient to go home with hospice care on a PCA.

## 2018-07-11 NOTE — PROGRESS NOTE ADULT - SUBJECTIVE AND OBJECTIVE BOX
GENERAL SURGERY DAILY PROGRESS NOTE:    Overnight:  No acute events.    Subjective:  Patient seen and examined. Reports pain is well controlled. Tolerating diet.     Exam:  Gen: NAD, jaundiced  Resp: Airway patent, non-labored respirations  Abd: Soft, mildly distended, NT, Incisions c/d/i  : Mathis in place  Ext: No edema, WWP  Neuro: AAOx3, no focal deficits    Vital Signs Last 24 Hrs  T(C): 37.1 (11 Jul 2018 08:00), Max: 37.1 (10 Jul 2018 11:59)  T(F): 98.8 (11 Jul 2018 08:00), Max: 98.8 (11 Jul 2018 08:00)  HR: 99 (11 Jul 2018 08:00) (97 - 110)  BP: 126/67 (11 Jul 2018 08:00) (101/42 - 126/67)  BP(mean): --  RR: 18 (11 Jul 2018 08:00) (16 - 19)  SpO2: 99% (11 Jul 2018 08:00) (94% - 99%)    I&O's Detail    10 Jul 2018 07:01  -  11 Jul 2018 07:00  --------------------------------------------------------  IN:    sodium chloride 0.9%.: 210 mL  Total IN: 210 mL    OUT:    Drain: 175 mL    Indwelling Catheter - Urethral: 1270 mL    Other: 20 mL  Total OUT: 1465 mL    Total NET: -1255 mL    MEDICATIONS  (STANDING):  chlorhexidine 4% Liquid 1 Application(s) Topical once  enoxaparin Injectable 40 milliGRAM(s) SubCutaneous daily  HYDROmorphone PCA (1 mG/mL) 30 milliLiter(s) PCA Continuous PCA Continuous  metoclopramide 5 milliGRAM(s) Oral every 8 hours  sodium chloride 0.9%. 1000 milliLiter(s) (30 mL/Hr) IV Continuous <Continuous>  sucralfate suspension 1 Gram(s) Oral four times a day  tamsulosin 0.4 milliGRAM(s) Oral at bedtime  ursodiol Capsule 300 milliGRAM(s) Oral every 12 hours    MEDICATIONS  (PRN):  cyclobenzaprine 5 milliGRAM(s) Oral three times a day PRN Muscle Spasm  dibucaine 1% Ointment 1 Application(s) Topical daily PRN hemorrhoids  HYDROmorphone PCA (1 mG/mL) Rescue Clinician Bolus 0.5 milliGRAM(s) IV Push every 2 hours PRN Severe Pain (7 - 10)  naloxone Injectable 0.1 milliGRAM(s) IV Push every 3 minutes PRN For ANY of the following changes in patient status:  A. RR LESS THAN 10 breaths per minute, B. Oxygen saturation LESS THAN 90%, C. Sedation score of 6  ondansetron Injectable 4 milliGRAM(s) IV Push every 6 hours PRN Nausea      LABS:                        9.5    18.62 )-----------( 820      ( 10 Jul 2018 06:30 )             28.8     07-10    129<L>  |  97<L>  |  8   ----------------------------<  105<H>  3.4<L>   |  21<L>  |  0.36<L>    Ca    8.0<L>      10 Jul 2018 06:30

## 2018-07-11 NOTE — PROGRESS NOTE ADULT - PROBLEM SELECTOR PLAN 4
Patient is full code, ongoing GOC. Discussed with patient's son in length about what is the goal for the patient, son expressed that the family's main goal is for patient to be at home as much as possible, son is taking some months off from work in order to take care of her. They understand that she will require 24 hour care and they are assuming that responsibility. I recommended hospice care as the ideal extra layer of support at home to help manage symptoms and provide adequate follow up. Patient's son will discuss this with his sister and I will follow up with them tomorrow, if in agreement will make the hospice referral tomorrow. Discussed with primary team.
Patient is full code, ongoing GOC. Discussed with patient's son - patient defers all conversations to son - about what care would the patient require at home if hospice services are not on board. Patient's son would like to think about this decision over the weekend. I recommended patient to go home with hospice services understanding patient's goal of going home with aggressive symptom management. Patient's son will discuss this with his sister. Discussed with primary team.
Patient is full code, ongoing GOC. Discussed with patient's son - patient defers all conversations to son. Plan is for patient to be discharged home with hospice services and PCA. Discussed with primary team.
Patient is full code, ongoing GOC. Discussed with patient's son - patient defers all conversations to son. Plan is for patient to be discharged home with hospice services and PCA today.
Patient is full code, ongoing GOC. Discussed with patient's son - patient defers all conversations to son. Plan is for patient to be discharged home with hospice services and PCA. Discussed with primary team.

## 2018-07-11 NOTE — GOALS OF CARE CONVERSATION - PERSONAL ADVANCE DIRECTIVE - CONVERSATION DETAILS
[facility]:  Pt cleared for d/c to home.  DME (Hosp bed/MALOU, O2) was delivered to the home yesterday.  PICC line was placed late yesterday.  [facility] came to [facility] this afternoon to hook pt up to pump for her continued IV Dilaudid drip upon d/c.   has arranged for ambulance pick-up @ 6PM.  D/C meds will be delivered to pt's home this evening by [facility] pharmacy.
[facility] - Consents signed. Hosp bed and O2 will be delivered in AM from [facility].
[facility] - Received a call from [facility]. They are ready to service pt when PICC line is in and when they have the PICC report.

## 2018-07-11 NOTE — PROGRESS NOTE ADULT - SUBJECTIVE AND OBJECTIVE BOX
INTERVAL HPI/OVERNIGHT EVENTS:  Patient being followed by Palliative Care for symptom management and GOC.  Plan is for pt to go home with home hospice with PCA today.    Allergies    No Known Allergies    Intolerances    ADVANCE DIRECTIVES:    Full code  MOLST [ ] YES [ x] NO     MEDICATIONS  (STANDING):  chlorhexidine 4% Liquid 1 Application(s) Topical once  enoxaparin Injectable 40 milliGRAM(s) SubCutaneous daily  HYDROmorphone PCA (1 mG/mL) 30 milliLiter(s) PCA Continuous PCA Continuous  metoclopramide 5 milliGRAM(s) Oral every 8 hours  sodium chloride 0.9%. 1000 milliLiter(s) (30 mL/Hr) IV Continuous <Continuous>  sucralfate suspension 1 Gram(s) Oral four times a day  tamsulosin 0.4 milliGRAM(s) Oral at bedtime  ursodiol Capsule 300 milliGRAM(s) Oral every 12 hours    MEDICATIONS  (PRN):  cyclobenzaprine 5 milliGRAM(s) Oral three times a day PRN Muscle Spasm  dibucaine 1% Ointment 1 Application(s) Topical daily PRN hemorrhoids  HYDROmorphone PCA (1 mG/mL) Rescue Clinician Bolus 0.5 milliGRAM(s) IV Push every 2 hours PRN Severe Pain (7 - 10)  naloxone Injectable 0.1 milliGRAM(s) IV Push every 3 minutes PRN For ANY of the following changes in patient status:  A. RR LESS THAN 10 breaths per minute, B. Oxygen saturation LESS THAN 90%, C. Sedation score of 6  ondansetron Injectable 4 milliGRAM(s) IV Push every 6 hours PRN Nausea    PRESENT SYMPTOMS:  Source: [ x] Patient   [ ] Family   [ ] Team     Pain:                        [ ] No [ x] Yes             [ ] Mild [ ] Moderate [ ] Severe    Onset - When she eats/moves  Location - Mid abdomen  Duration - Constant  Character - Aching/Cramping  Alleviating/Aggravating - Improvement with pain medications.  Radiation - None  Timing - Constant    Dyspnea:                [ ] No [x ] Yes             [x ] Mild [ ] Moderate [ ] Severe    Anxiety:                  [ x] No [ ] Yes             [ ] Mild [ ] Moderate [ ] Severe    Fatigue:                  [ ] No [x ] Yes             [ ] Mild [ x] Moderate [ ] Severe    Nausea:                  [ ] No [x ] Yes             [ x] Mild [ ] Moderate [ ] Severe    Loss of appetite:   [ ] No [x ] Yes             [ ] Mild [ x] Moderate [ ] Severe    Constipation:        [ ] No [x ] Yes             [ ] Mild [ ] Moderate [ ] Severe    Other Symptoms:  [x ] All other review of systems negative   [ ] Unable to obtain due to poor mentation     Karnofsky Performance Score/Palliative Performance Status Version 2: 30 %    PHYSICAL EXAM:  Vital Signs Last 24 Hrs  T(C): 36.8 (11 Jul 2018 12:18), Max: 37.1 (10 Jul 2018 17:29)  T(F): 98.3 (11 Jul 2018 12:18), Max: 98.8 (11 Jul 2018 08:00)  HR: 101 (11 Jul 2018 12:18) (99 - 110)  BP: 109/52 (11 Jul 2018 12:18) (101/42 - 126/67)  BP(mean): --  RR: 17 (11 Jul 2018 12:18) (17 - 19)  SpO2: 98% (11 Jul 2018 12:18) (94% - 99%) I&O's Summary    10 Jul 2018 07:01  -  11 Jul 2018 07:00  --------------------------------------------------------  IN: 210 mL / OUT: 1465 mL / NET: -1255 mL    11 Jul 2018 07:01  -  11 Jul 2018 12:50  --------------------------------------------------------  IN: 0 mL / OUT: 310 mL / NET: -310 mL    General:  [x ] Alert  [ ] Oriented x      [ ] Lethargic  [ ] Agitated   [ ] Cachexia   [ ] Unarousable  [x ] Verbal  [ ] Non-Verbal    HEENT:  [ ] Normal   [x ] Dry mouth   [ ] ET Tube    [ ] Trach  [ ] Oral lesions    Lungs:   [x ] Clear [ ] Tachypnea  [ ] Audible excessive secretions   [ ] Rhonchi        [ ] Right [ ] Left [ ] Bilateral  [ ] Crackles        [ ] Right [ ] Left [ ] Bilateral  [ ] Wheezing     [ ] Right [ ] Left [ ] Bilateral    Cardiovascular:  [x ] Regular [ ] Irregular [ ] Tachycardia   [ ] Bradycardia  [ ] Murmur [ ] Other    Abdomen: [ ] Soft  [ ] Distended   [ ] +BS  [ ] Non tender [x ] Tender  [ ]PEG   [ ]OGT/ NGT   Last BM:     Large wound covered with dressing, ostomy with liquid stool.    Genitourinary: [ ] Normal [ ] Incontinent   [ ] Oliguria/Anuria   [ x] Mathis    Musculoskeletal:  [ ] Normal   [x ] Weakness  [ ] Bedbound/Wheelchair bound [ ] Edema    Neurological: [ x] No focal deficits  [ ] Cognitive impairment  [ ] Dysphagia [ ] Dysarthria [ ] Paresis [ ] Other     Skin: [x ] Normal   [ ] Pressure ulcer(s)                  [ ] Rash    LABS: no new labs today.    Shock: No [ ] Septic [ ] Cardiogenic [ ] Neurologic [ ] Hypovolemic  Vasopressors x None  Inotrophs x None    Oral Intake: [ ] Unable/mouth care only [ ] Minimal [ ] Moderate [x ] Full Capability    Diet: [ ] NPO [ ] Tube feeds [ ] TPN [ x] Other     RADIOLOGY & ADDITIONAL STUDIES: No new imaging    REFERRALS:   [ ] Chaplaincy  [ ] Hospice  [ ] Child Life  [ ] Social Work  [ ] Case management [ ] Holistic Therapy

## 2018-07-18 NOTE — CHART NOTE - NSCHARTNOTEFT_GEN_A_CORE
SURGICAL ONCOLOGY- note for 7/6/18  Patient seen and examined.         Chief complaint: decreasing abd pain  PHYSICAL EXAM:  Gen:   Abd: soft, NT ND  PTC with bile  58yFemale s/p SBR for perforated viscus  Plan:   - Pt tolerating diet in small amounts  - Palliative consult initiated per family requests  - I have discussed the diagnosis with the patient in detail. Patient expressed verbal understanding and willingness to proceed with proposed plan. All questions anwered

## 2018-07-18 NOTE — CHART NOTE - NSCHARTNOTEFT_GEN_A_CORE
SURGICAL ONCOLOGY note for 6/15/18  Patient seen and examined.       Chief complaint: controlled abd pain, low appetite  PHYSICAL EXAM:  Gen:   Abd: soft NT ND  PTC with bile    58yFemale s/p SBR for perforated viscus  Plan:   -Diet as santana  - Cont current wound care  - Pt encouraged to increase PO diet as santana  - I have discussed the diagnosis with the patient in detail. Patient expressed verbal understanding and willingness to proceed with proposed plan. All questions answered

## 2018-09-09 NOTE — PROGRESS NOTE ADULT - ATTENDING COMMENTS
57 y/o female s/p ERCP c/b perforation of afferent limb. Pt underwent emergent Ex-lap w/ resection of perforated bowel and stapled side to side functional end to end anastomosis. Pt remained intubated and was transferred to SICU off pressors. s/p extubation, now transferred to floor with NGT in place. Patient meets criteria for severe protein calorie malnutrition requiring TPN for nutritional support. Patient now with post op ileus, awaiting GI function.     TPN infusion volume increased to 1.5L - TPN is providing 1312 kcal/day.  Monitor fingersticks q6 hours, obtain daily weights.  Labs reviewed - increased sodium and phos content in TPN     Will follow up with primary team on plan.     1. Protein calorie malnutrition being optimized with TPN: CHO [200 ] gm.  AA [78 ] gm. SMOF Lipids [ 32] gm. lipids will be administered over 12 hours   2.  Hyperglycemia managed with: [ ] units of regular insulin    3.  Check fluid balance daily.  Strict I/O  [ ] [ ]   4.  Daily BMP, Ionized Calcium, Magnesium and Phosphorous   5.  Triglycerides at initiation of TPN and monthly [ ] [ ]   6.  Pepcid in TPN for Gi prophylaxis  [ ]     I have seen and examined the patient, reviewed the laboratory and radiologic data and agree with practitioner's history, physical assessment, plan and reviewed, discussed with other consultants, House staff and PA's.  and edited where appropriate.  I spent  45   min in total providing critical care for the diagnoses, assessment and plan above.       Time involved in performance of separately billable procedures was not counted toward my critical care time.  There is no overlap. 35.6

## 2018-09-27 NOTE — PROGRESS NOTE ADULT - NEGATIVE SKIN SYMPTOMS
Healthy Active Living  An initiative of the American Academy of Pediatrics    Fact Sheet: Healthy Active Living for Families    Healthy nutrition starts as early as infancy with breastfeeding.  Once your baby begins eating solid foods, introduce nutritiou At the 4-month checkup, the healthcare provider will [address] baby and ask how things are going at home. This sheet describes some of what you can expect. Development and milestones  The healthcare provider will ask questions about your baby.  He or sh · It’s fine if your baby poops even less often than every 2 to 3 days if the baby is otherwise healthy.  But if your baby also becomes fussy, spits up more than normal, eats less than normal, or has very hard stool, tell the healthcare provider. Your baby m · Swaddling (wrapping the baby tightly in a blanket) at this age could be dangerous. If a baby is swaddled and rolls onto his or her stomach, he or she could suffocate. Avoid swaddling blankets.  Instead, use a blanket sleeper to keep your baby warm with th · By this age, babies begin putting things in their mouths. Don’t let your baby have access to anything small enough to choke on. As a rule, an item small enough to fit inside a toilet paper tube can cause a child to choke.   · When you take the baby outsid no dryness/no itching/no rash · Before leaving the baby with someone, choose carefully. Watch how caregivers interact with your baby. Ask questions and check references. Get to know your baby’s caregivers so you can develop a trusting relationship.   · Always say goodbye to your baby, a

## 2018-11-28 NOTE — DIETITIAN INITIAL EVALUATION ADULT. - SIGNS/SYMPTOMS
Patience Houser : 1979 Payor: Aníbal Rich / Plan: [facility] [employer] [facility] / Product Type: PPO /  
 [address] at [facility] [address] [facility], [address] [facility], [facility], [id number] [place] [address] [phone]   [phone] OUTPATIENT PHYSICAL THERAPY:Discharge 2018 ICD-10: Treatment Diagnosis: Low back pain (M54.5) Muscle spasm of back (M62.830) Numbness and tingling of both lower extremities (R20.0, R20.2) Precautions/Allergies:  
Patient has no known allergies. Fall Risk Score: 1 (? 5 = High Risk) MD Orders: Eval and Treat  MEDICAL/REFERRING DIAGNOSIS: 
Low back pain at multiple sites DATE OF ONSET: 6/15/18 REFERRING PHYSICIAN: Prabhjot Unger MD 
RETURN PHYSICIAN APPOINTMENT: TBD by patient INITIAL ASSESSMENT:   Mr. Kendall Hernandez presents to physical therapy with decreased strength, ROM, joint mobility, functional mobility, and chronic pain complaints. These S/S are consistent with Chronic low back pain. Patient will benefit from skilled physical therapy for pain science education, manual therapeutic techniques (as appropriate), therapeutic exercises and activities, neuromuscular re-education, and comprehensive home exercises program to address current impairments and functional limitations. Patience Houser will benefit from skilled PT (medically necessary) in order to address above deficits affecting participation in basic ADLs and overall functional tolerance. PROBLEM LIST (Impacting functional limitations): 1. Decreased Strength 2. Decreased ADL/Functional Activities 3. Increased Pain 4. Decreased Activity Tolerance 5. Decreased Flexibility/Joint Mobility 6. Decreased [place] with Home Exercise Program INTERVENTIONS PLANNED: 
1. Pain Science Education 2. Home Exercise Program (HEP) 3. Manual Therapy 4. Neuromuscular Re-education/Strengthening 5. Range of Motion (ROM) 6. Therapeutic Activites 7. Therapeutic Exercise/Strengthening TREATMENT PLAN: 
Effective Dates: 8/22/18 TO 9/22/2018 (30 days). Frequency/Duration:1 x per week for 30 DaysGOALS: (Goals have been discussed and agreed upon with patient.) SHORT-TERM FUNCTIONAL GOALS: Time Frame: 3 weeks 1. Niko Tanner will report <=5/10 pain with prolonged postures. 2. Niko Tanner will demonstrate improvement in active lumbar flexion to WNL to show increased  participation in ADLs. 3. Niko Tanner will demonstrate demonstrate improvement in the ability to lift 20 lbs from the floor without pain complaints. 4.  Niko Tanner will show a greater than 8 point decrease on the Modified Oswestry in order to show an increase in lumbar function. 5. Niko Tanner will be independent in all HEP and will have good understanding of pain management principles. DISCHARGE GOALS: Time Frame: 6 weeks 1. Niko Tanner will show full ROM of the lumbar spine  in order to return to full functional mobility 2. Niko Tanner will show a greater than 15 point decrease on the Oswestry in order to show an increase in spinal  function 3. Niko Tanner will report  Being able to return to independent exercise program at a gym without fear of injury. 4. Niko Tanner will be independent in all advanced HEP Niko Tanner Failed to show for further appointments was discharged from therapy.  
 
Maximus Mccall PT  
    
 
 
 
 inadequate oral intake for 1 month PTA, moderate fat loss, severe muscle mass loss

## 2018-12-05 NOTE — PROGRESS NOTE ADULT - PROBLEM SELECTOR PLAN 3
Telephone Encounter by Nilsa Dee DO at 02/12/18 03:11 PM     Author:  Nilsa Dee DO Service:  (none) Author Type:  Physician     Filed:  02/12/18 03:12 PM Encounter Date:  2/12/2018 Status:  Signed     :  Nilsa Dee DO (Physician)            Brandon, take as directed, disp #1 with no refills.  Also should start Flonase nasal spray daily OTC.[BV1.1M]      Revision History        User Key Date/Time User Provider Type Action    > BV1.1 02/12/18 03:12 PM Nilsa Dee DO Physician Sign    M - Manual             Management as per primary team

## 2018-12-25 NOTE — PROGRESS NOTE ADULT - PROBLEM SELECTOR PLAN 3
normocytic normochromic anemia, likely secondary to underlying chemotherapy and malignancy  baseline around 8.5-9  H/H stable  will monitor CBC Q daily today

## 2019-08-14 NOTE — ED PROVIDER NOTE - NS ED ATTENDING STATEMENT MOD
Reason for Call: Request for an order or referral:    Order or referral being requested:  oxygen    Date needed: as soon as possible    Has the patient been seen by the PCP for this problem? NO    Additional comments:  She is having a hard time breathing    Phone number Patient can be reached at:  Home number on file [phone] (home)    Best Time:   Any     Can we leave a detailed message on this number?  YES    Call taken on 8/14/2019 at 11:37 AM by Daisy Ramos   I have personally seen and examined this patient. I have fully participated in the care of this patient. I have reviewed all pertinent clinical information, including history physical exam, plan and the Resident's note and agree except as noted I have personally performed a face to face diagnostic evaluation on this patient. I have reviewed the ACP note and agree with the history, exam and plan of care, except as noted.

## 2019-12-09 NOTE — PATIENT PROFILE ADULT. - PROVIDER NOTIFICATION
Trixie Toth is a 60 y.o.  female patient, who presents for a 6 minute walk test ordered by MD Parker.  The diagnosis is Pre Heart Transplant.  The patient's BMI is 24.5 kg/m2.  Predicted distance (lower limit of normal) is 369.49 meters.      Test Results:    The test was completed without stopping.    The total time walked was 360 seconds.  During walking, the patient reported:  No complaints. The patient used no assistive devices  during testing.     12/09/2019---------Distance: 370.33 meters (1215 feet)     O2 Sat % Supplemental Oxygen Heart Rate Blood Pressure Leeanne Scale   Pre-exercise  (Resting) 99 % Room Air 69 bpm 102/55 mmHg 0.5   During Exercise 93 % Room Air 89 bpm 98/55 mmHg 2   Post-exercise  (Recovery) 100 % Room Air  72 bpm   mmHg       Recovery Time: 72 seconds    Performing nurse/tech: Lance CUEVAS      PREVIOUS STUDY:   The patient has not had a previous study.      CLINICAL INTERPRETATION:  Six minute walk distance is 370.33 meters (1215 feet) with light dyspnea.  During exercise, there was significant desaturation while breathing room air.  Both blood pressure and heart rate remained stable with walking.  The patient did not report non-pulmonary symptoms during exercise.  No previous study performed.  Based upon age and body mass index, exercise capacity is normal.  
Declines

## 2020-02-12 NOTE — DISCHARGE NOTE ADULT - ADMISSION DATE +STARTOFVISITDATE
Physical Therapy Daily Treatment Note  Date:  2020    Patient Name:  Yousuf Harvey    :  1955  MRN: [id number]    Restrictions/Precautions:  Fall Risk(none)  Pertinent Medical History: Additional Pertinent Hx: DM    Medical/Treatment Diagnosis Information:  · Diagnosis: lumbar radiculpathy  · Treatment Diagnosis: pain in foot    Insurance/Certification information:  PT Insurance Information: [address]  Physician Information:  Referring Practitioner: Dr. Liang Boards of care signed (Y/N):  Sent for cosign (received), PN faxed     Visit# / total visits:   Pain level: 0/10 LBP               1/10  R foot. Functional Outcomes Measure:  Test: LEFS  Score: 38    History of Injury:Patient has a 10+ year history of back pain, has gotten injections in the past in the back, she reports having one 2 weeks ago with no change in symptoms. She also has diabetic neuropathy in toes of both feet. Increased pain with standing and walking, sometimes unable to tolerate    Subjective:    20 R foot more sore today 3/10 due to increased activity. No c/o of LBP. No F/U with Dr. Betty Louis. To have injection ( R peroneal NB) on  R foot today by pain specialist Dr. Magnolia Guillory, to see Jaime Kimbrough chiropractor today also.     To have R SI joint injection Dr. Magnolia Guillory 20      Objective:   Observation: Updated 20  · Palpation: mod TTP still present at base of 5th MT   Test measurements:    · Ankle strength: 5/5    Exercises:  Exercise/Equipment Resistance/Repetitions Other comments   Lumbar radiculopathy/neuritis     Nustep seat 7 L1 x5 mins    Gastroc Incline 3x 20 secs     Tandem stance  gumdrop 30 sec R/L  4 way x 10 R    SLS L/R   30 sec each    HR gurmeet TR gurmeet  10         Ankle tband 4-way Lime  12x each  R Increase reps                       STM R peroneal tendon x10 mins   Min TTP noted lateral foot/ base of 5th metatarsal.  Pt reports less than previous   Ionto with dexamethasone Not done today due to Home Exercise Program:    [] (92113) Reviewed/Progressed HEP activities related to strengthening, flexibility, endurance, ROM for functional self-care, mobility, lifting and ambulation   [] (29016) Reviewed/Progressed HEP activities related to improving balance, coordination, kinesthetic sense, posture, motor skill, proprioception for self-care, mobility, lifting, and ambulation      Manual Treatments:  MFR / STM / Oscillations-Mobs:  G-I, II, III, IV / Manipulation / MLD  [x] (37216) Provided manual therapy to mobilize  soft tissue/joints/fluid for the purpose of modulating pain, promoting relaxation, increasing ROM, reducing/eliminating soft tissue swelling/inflammation/restriction, improving soft tissue extensibility and allowing for proper ROM for normal function with self- care, mobility, lifting and ambulation.         Timed Code Treatment Minutes: 30   Total Treatment Minutes: 30     [] EVAL (LOW) 05272   [] EVAL (MOD) 21700   [] EVAL (HIGH) 45064   [] RE-EVAL   [x] TE (33334) x   1  [] Aquatic (70590) x  [] NMR (34641)   x  [] Aquatic Group (72489) x  [x] Manual (75625) x    [] Ultrasound (35188) x  [] TA (36348) x  [] Mech Traction (18571)  [] Ionto (55208)           [] ES (un) (73635):   [] Vasopump (59485                  Assessment  [x] Patient tolerated treatment well [] Patient limited by fatigue  [] Patient limited by pain  [] Patient limited by other medical complications  [] Other:     Prognosis: [x] Good [x] Fair  [] Poor    Goals:    Short term goals  Time Frame for Short term goals: 2-3 weeeks  Short term goal 1: Decrease pain by 50% in the water and incidence by 25% out of the water 1/24 MET  Short term goal 2: Patient independent with hep 1/24 MET  Short term goal 3: Patient tolerates 45 minutes of water therapy 1/24 MET     Long term goals  Time Frame for Long term goals : discharge  Long term goal 1: Decrease foot pain to 2-3/10 at worst 1/24 NOT MET  Long term goal 2: Patient understands importance of continuing exercises to maintain gains in therapy 1/24 MET  Long term goal 3: patient able to walk 10-15 minutes without increased foot pain 1/24 MET    Patient Requires Follow-up: [x] Yes  [] No    Plan:   [x] Continue per plan of care [] Alter current plan (see comments)  [] Plan of care initiated [] Hold pending MD visit [] Discharge     Plan for Next Session:  Re-eval per PT.  Assess injection in foot    Electronically signed by:  Garima Doe, PTA  110 Statement Selected

## 2020-08-31 NOTE — H&P ADULT - ATTENDING COMMENTS
Impression: Age-related nuclear cataract, bilateral: H25.13. Plan: Cataracts do not require treatment unless they interfere with vision and impact one's activities of daily living, in which case cataract extraction with lens implant insertion should be performed. Cataracts occur in everyone as they age. Contact office if you experience progressive loss of vision, increasing glare, and problems with daily activities. Recommend patient to return on an annual basis with dilation. Patient presented with painless jaundice.  she is known to have gastric cancer, s/p partial gastrectomy.  She had a recent admission for pneumoperitoneum.      Alert, cooperative 59 yo woman  Vital Signs Last 24 Hrs  T(C): 37.2 (2018 07:39), Max: 37.2 (2018 07:39)  T(F): 98.9 (2018 07:39), Max: 98.9 (2018 07:39)  HR: 70 (2018 07:39) (62 - 70)  BP: 133/63 (2018 07:39) (105/58 - 133/63)  BP(mean): --  RR: 17 (2018 07:39) (17 - 18)  SpO2: 100% (2018 07:39) (99% - 100%)  Lungs, clear  Cor, RRR  Abdomen, non-tender, bs WNL                          8.5    6.7   )-----------( 266      ( 2018 17:20 )             27.8           140  |  111<H>  |  8   ----------------------------<  99  3.8   |  23  |  0.47<L>    Ca    8.6      2018 17:20    TPro  6.2  /  Alb  2.4<L>  /  TBili  8.1<H>  /  DBili  6.7<H>  /  AST  105<H>  /  ALT  113<H>  /  AlkPhos  784<H>    Urinalysis Basic - ( 2018 17:20 )    Color: Yellow / Appearance: Clear / S.005 / pH: x  Gluc: x / Ketone: Negative  / Bili: Negative / Urobili: Negative   Blood: x / Protein: Negative / Nitrite: Negative   Leuk Esterase: Negative / RBC: 2-5 /HPF / WBC 0-2 /HPF   Sq Epi: x / Non Sq Epi: Occasional /HPF / Bacteria: Trace /HPF        PT/INR - ( 2018 17:20 )   PT: 12.2 sec;   INR: 1.12 ratio         PTT - ( 2018 17:20 )  PTT:32.2 sec  Lactate Trend   @ 17:20 Lactate:1.0   < from: MR MRCP w/wo IV Cont (18 @ 16:23) >    Findings: There is dilatation of the intrahepatic biliary ducts. The   common hepatic duct is also dilated measuring 1.5 cm in caliber with an   abrupt transition; in the area of the abrupt transition within the   pancreatic head, there appears to be a 1.4 cm enhancing mass which is   better seen on delayed postcontrast images, suspicious for an obstructive   cholangiocarcinoma. The distal common bile duct maintains normal caliber   at 6 mm. The main pancreatic duct maintains normal caliber without   dilatation. The SMV and portal veins do not appear to be encased.    A few hepatic cysts are seen measuring up to 1.7 cm. The spleen,   adrenals, and the left kidney appear unremarkable. There is a 1.6 cm   right renal.     Mild perisplenic ascites. No evidence for an enlarged lymph node.    Impression: Dilatation of the intrahepatic biliary ducts. The common   hepatic duct is also dilated measuring 1.5 cm in caliber with an abrupt   transition; in the area of the abrupt transition within the pancreatic   head, there appears to be a 1.4 cm enhancing mass which is better seen on   delayed postcontrast images, suspicious for an obstructive   cholangiocarcinoma. A pancreatic head cancer is also in the differential   consideration. The distal common bile duct maintains normal caliber at 6   mm. The main pancreatic duct maintains normal caliber without dilatation.   The SMV and portal veins do not appear to be encased.    Mildascites.    Other findings as above.    < end of copied text >    IMP:  New obstructing mass in the head of the pancreas.  Recurrent gastric cancer vs cholangiocarcinoma vs abscess  Discussed with Surgery, Dr. Aguilar, and GI, Dr. Muse.  Best to proceed with endoscopic ultrasound for diagnosis.   Will advance diet, as tolerated, for now.

## 2020-11-07 NOTE — ED PROVIDER NOTE - PRINCIPAL DIAGNOSIS
Labs WNL.  Low PTH most likely secondary to laboratory error.  Discontinue Vitamin D 5000 IU daily.  Start multivitamin daily.  Follow-up Endocrine Clinic prn.   Gastric mass

## 2021-04-05 NOTE — PROCEDURE NOTE - NSSITEPREP_SKIN_A_CORE
chlorhexidine Detail Level: Detailed Depth Of Biopsy: dermis Was A Bandage Applied: Yes Size Of Lesion In Cm: 0 Anticipated Plan (Based On Presumed Biopsy Results): Mohs Biopsy Type: H and E Biopsy Method: 15 blade Anesthesia Type: 1% lidocaine with 1:300,000 epinephrine and a 1:10 solution of 8.4% sodium bicarbonate Anesthesia Volume In Cc (Will Not Render If 0): 1 Hemostasis: Electrocautery Wound Care: Petrolatum Dressing: bandage Destruction After The Procedure: No Type Of Destruction Used: Curettage Curettage Text: The wound bed was treated with curettage after the biopsy was performed. Cryotherapy Text: The wound bed was treated with cryotherapy after the biopsy was performed. Electrodesiccation Text: The wound bed was treated with electrodesiccation after the biopsy was performed. Electrodesiccation And Curettage Text: The wound bed was treated with electrodesiccation and curettage after the biopsy was performed. Silver Nitrate Text: The wound bed was treated with silver nitrate after the biopsy was performed. Lab: 451 Path Notes (To The Dermatopathologist): Check margins Consent: Written consent was obtained and risks were reviewed including but not limited to scarring, infection, bleeding, scabbing, incomplete removal, nerve damage and allergy to anesthesia. Post-Care Instructions: I reviewed with the patient in detail post-care instructions. Patient is to keep the biopsy site dry overnight, and then apply vaseline twice daily until healed. Patient may apply 50/50 diluted hydrogen peroxide soaks to remove any crusting. Notification Instructions: Patient will be notified of biopsy results. However, patient instructed to call the office if not contacted within 2 weeks. Billing Type: Third-Party Bill Information: Selecting Yes will display possible errors in your note based on the variables you have selected. This validation is only offered as a suggestion for you. PLEASE NOTE THAT THE VALIDATION TEXT WILL BE REMOVED WHEN YOU FINALIZE YOUR NOTE. IF YOU WANT TO FAX A PRELIMINARY NOTE YOU WILL NEED TO TOGGLE THIS TO 'NO' IF YOU DO NOT WANT IT IN YOUR FAXED NOTE.

## 2021-05-26 NOTE — PATIENT PROFILE ADULT. - TEACHING/LEARNING FACTORS IMPACT ABILITY TO LEARN
Implemented All Universal Safety Interventions:  [place] to call system. Call bell, personal items and telephone within reach. Instruct patient to call for assistance. Room bathroom lighting operational. Non-slip footwear when patient is off stretcher. Physically safe environment: no spills, clutter or unnecessary equipment. Stretcher in lowest position, wheels locked, appropriate side rails in place. language

## 2022-01-28 NOTE — PROGRESS NOTE ADULT - SUBJECTIVE AND OBJECTIVE BOX
PA HAS BEEN DENIED  CARE COORDINATION - SCAN - PA DENIED PULMICORT FLEXHALER 180 MCG/ACT (01/21/2022)    PROVIDER PLEASE SEND A FORMULARY COVERED ALTERNATIVE     THANK YOU   Vital Signs  T(C): 36.9, Max: 37 (06-18 @ 22:14)  HR: 62 (54 - 62)  BP: 114/74 (101/61 - 119/74)  RR: 17 (17 - 18)  SpO2: 97% (96% - 97%)     I & Os for 24h ending 06-19 @ 07:00  =============================================  IN:    dextrose 5% + lactated ringers.: 2400 ml    IV PiggyBack: 250 ml    Total IN: 2650 ml  ---------------------------------------------  OUT:    Nasoenteral Tube: 800 ml    Voided: 450 ml    Total OUT: 1250 ml  ---------------------------------------------  Total NET: 1400 ml    Labs   06-19    143  |  107  |  4<L>  ----------------------------<  120<H>  3.2<L>   |  26  |  0.44<L>    Ca    8.0<L>      19 Jun 2017 07:16  Phos  2.9     06-19  Mg     1.9     06-19          CAPILLARY BLOOD GLUCOSE    I&O's Detail  I & Os for 24h ending 19 Jun 2017 07:00  =============================================  IN:    dextrose 5% + lactated ringers.: 2400 ml    IV PiggyBack: 250 ml    Total IN: 2650 ml  ---------------------------------------------  OUT:    Nasoenteral Tube: 800 ml    Voided: 450 ml    Total OUT: 1250 ml  ---------------------------------------------  Total NET: 1400 ml    I & Os for current day (as of 19 Jun 2017 09:25)  =============================================  IN:    Total IN: 0 ml  ---------------------------------------------  OUT:    Voided: 400 ml    Total OUT: 400 ml  ---------------------------------------------  Total NET: -400 ml

## 2022-02-01 NOTE — DISCHARGE NOTE ADULT - NSFTFATTESTRESTRICTRD_GEN_ALL_CORE
History and Physical  [facility]  Internal Medicine  Paula Loza MD      Bienvenido Munoz  YOB: 1961    Date of Service:  2/1/2022    Vitals:    02/01/22 0902   BP: (!) 100/56   Site: Right Upper Arm   Position: Sitting   Cuff Size: Medium Adult   Pulse: 56   SpO2: 96%   Weight: 182 lb (82.6 kg)   Height: 5' 9.5\" (1.765 m)     Body mass index is 26.49 kg/m². Wt Readings from Last 3 Encounters:   02/01/22 182 lb (82.6 kg)   07/27/21 174 lb 6.4 oz (79.1 kg)   01/28/21 183 lb 3.2 oz (83.1 kg)     BP Readings from Last 3 Encounters:   02/01/22 (!) 100/56   07/27/21 102/60   01/28/21 100/62        Chief Niya Munoz is a 63 y.o. male who presents for complete physical examination. Chief Complaint   Patient presents with    Annual Exam       HPI:   Occasional right sided chest pain when walking around the house. Lateral mid axilla. Sharp pain. Lasts 30 seconds. Goes away on its own. Stress does not bring on. Otherwise normal exercise tolerance.      Patient Active Problem List   Diagnosis    Acquired hypothyroidism    Other hyperlipidemia    Mild intermittent asthma without complication    BCC (basal cell carcinoma), scalp/neck    Snoring    History of foreign travel    Colon polyp    Elevated fasting glucose    Erectile dysfunction       Allergies   Allergen Reactions    Mometasone Furoate Other (See Comments)     sleepiness    Statins Support Therapy Other (See Comments)     Made hands numb      Outpatient Medications Marked as Taking for the 2/1/22 encounter (Office Visit) with Jil Veras MD   Medication Sig Dispense Refill    pravastatin (PRAVACHOL) 40 MG tablet TAKE ONE TABLET BY MOUTH DAILY 30 tablet 4    levothyroxine (SYNTHROID) 112 MCG tablet TAKE ONE TABLET BY MOUTH DAILY 90 tablet 3    sildenafil (VIAGRA) 100 MG tablet Take 1 tablet by mouth as needed for Erectile Dysfunction 30 tablet 2    Psyllium (METAMUCIL PO) Take  by mouth daily as needed.  Multiple Vitamin (MULTIVITAMIN PO) Take  by mouth. Past Medical History:   Diagnosis Date    Asthma     bronchial    Basal cell carcinoma 2013    left forehead and neck    Colon polyp     tubular adenoma    Diarrhea 2014    Elevated LFTs 9/15/2015    Hyperlipidemia     Hypothyroidism     Shigella infection 2014     Past Surgical History:   Procedure Laterality Date    ELBOW FRACTURE SURGERY      right elbow    VASECTOMY  3/2009     Family History   Problem Relation Age of Onset    Stroke Father [de-identified]         age 80    Alzheimer's Disease Father     Glaucoma Mother     High Cholesterol Brother     Thyroid Disease Brother     Colon Cancer Maternal Aunt     Glaucoma Maternal Grandmother        Review of Systems:  A comprehensive review of systems was negative except for what was noted in the HPI. Physical Exam  Constitutional:       General: He is not in acute distress. Appearance: Normal appearance. He is well-developed. He is not diaphoretic. HENT:      Head: Normocephalic and atraumatic. Right Ear: Tympanic membrane, ear canal and external ear normal.      Left Ear: Tympanic membrane, ear canal and external ear normal.      Nose: Nose normal.      Mouth/Throat:      Mouth: Mucous membranes are moist.      Pharynx: Oropharynx is clear. No oropharyngeal exudate or posterior oropharyngeal erythema. Eyes:      General: No scleral icterus. Conjunctiva/sclera: Conjunctivae normal.      Pupils: Pupils are equal, round, and reactive to light. Neck:      Thyroid: No thyromegaly. Vascular: No carotid bruit or JVD. Comments: No JVD at 90 deg  No thyromegaly  Cardiovascular:      Rate and Rhythm: Normal rate and regular rhythm. Pulses: Normal pulses. Heart sounds: Normal heart sounds. No murmur heard. No friction rub. No gallop.     Pulmonary:      Effort: Pulmonary effort is normal.      Breath sounds: Normal breath sounds. No wheezing or rales. Abdominal:      General: Bowel sounds are normal. There is no distension. Palpations: Abdomen is soft. There is no mass. Tenderness: There is no abdominal tenderness. There is no guarding or rebound. Musculoskeletal:         General: No tenderness or deformity. Normal range of motion. Cervical back: Normal range of motion and neck supple. Right lower leg: No edema. Left lower leg: No edema. Lymphadenopathy:      Cervical: No cervical adenopathy. Skin:     General: Skin is warm and dry. Findings: No lesion or rash. Neurological:      General: No focal deficit present. Mental Status: He is alert and oriented to person, place, and time. Cranial Nerves: No cranial nerve deficit (grossly intact). Sensory: No sensory deficit. Motor: No weakness. Coordination: Coordination normal.      Gait: Gait normal.   Psychiatric:         Mood and Affect: Mood normal.         Behavior: Behavior normal.         Lipid panel:   Lab Results   Component Value Date    CHOL 130 02/23/2021    TRIG 110 02/23/2021    HDL 43 02/23/2021    LDLCALC 65 02/23/2021     Glucose:   Glucose (mg/dL)   Date Value   02/23/2021 94       No results found for this visit on 02/01/22. Preventive Care:  Self-testicular exams: yes  Exercise: biking daily in the summer. Walks the dog daily. Social History     Tobacco Use    Smoking status: Never Smoker    Smokeless tobacco: Never Used    Tobacco comment: passive smoke exposure   Substance Use Topics    Alcohol use:  Yes     Alcohol/week: 0.0 standard drinks     Types: 1 Glasses of wine, 1 Cans of beer per week     Comment: occasionaly     Social History     Substance and Sexual Activity   Sexual Activity Yes    Partners: Female       Advance Directive: N, <no information>    Immunization History   Administered Date(s) Administered    COVID-19, J&J, PF, 0.5 mL 03/06/2021    COVID-19, Moderna, Primary or Immunocompromised, PF, 100mcg/0.5mL 11/03/2021    DT (pediatric) 05/01/2006    Hepatitis A 07/10/2008, 06/15/2016    Hepatitis B (Engerix-B) 06/15/2016, 01/05/2017, 06/05/2017    Influenza Virus Vaccine 09/16/2012, 11/14/2015, 11/21/2017, 11/01/2019    Influenza Whole 09/10/2009    Influenza, Quadv, IM, (6 mo and older Fluzone, Flulaval, Fluarix and 3 yrs and older Afluria) 11/05/2016    Influenza, Quadv, Recombinant, IM PF (Flublok 18 yrs and older) 08/27/2020    Pneumococcal Polysaccharide ([id number]) 03/25/2014    Tdap (Boostrix, Adacel) 03/25/2014    Zoster Recombinant (Shingrix) 08/31/2019, 01/01/2020       Health Maintenance   Topic Date Due    Depression Screen  Never done    A1C test (Diabetic or Prediabetic)  02/23/2022    Lipid screen  02/23/2022    TSH testing  07/27/2022    DTaP/Tdap/Td vaccine (3 - Td or Tdap) 03/25/2024    Pneumococcal 0-64 years Vaccine (2 of 2 - PPSV23) 03/19/2025    Colon cancer screen colonoscopy  04/29/2026    Flu vaccine  Completed    Shingles Vaccine  Completed    COVID-19 Vaccine  Completed    Hepatitis C screen  Completed    HIV screen  Completed    Hepatitis A vaccine  Aged Out    Hepatitis B vaccine  Aged Out    Hib vaccine  Aged Out    Meningococcal (ACWY) vaccine  Aged Out          Assessment/Plan:  1. Annual physical exam  Care gaps identified and addressed  Labs as below  UTD on IMZ  UTD on cancer screenings. Prostate exam normal today. Healthy diet and exercise  -     Comprehensive Metabolic Panel; Future  -     CBC Auto Differential; Future  -     TSH without Reflex; Future  -     Lipid Panel; Future  -     Hemoglobin A1C; Future  -     PSA screening; Future  2. Acquired hypothyroidism  -     CBC Auto Differential; Future  -     TSH without Reflex; Future  3. Mixed hyperlipidemia  -     Comprehensive Metabolic Panel; Future  -     Lipid Panel; Future  4. Other hyperlipidemia  -     Lipid Panel; Future  5.  Mild intermittent asthma without complication  6. Right-sided chest pain  Very intermittent right sided chest pain. EKG today. Unlikely to be cardiac. Will get CXR. -     EKG 12 Lead  EKG: normal sinus rhythm, LAFB. Unchanged from prior.   -     Comprehensive Metabolic Panel; Future  -     CBC Auto Differential; Future  7. Prostate cancer screening  -     PSA screening; Future  8. Encounter for lipid screening for cardiovascular disease  -     Lipid Panel; Future  9. Screening for diabetes mellitus (DM)  -     Hemoglobin A1C; Future       Return in about 6 months (around 8/1/2022) for chronic conditions. ATTENDING CERTIFICATION

## 2022-09-27 NOTE — ED ADULT NURSE REASSESSMENT NOTE - GENITOURINARY WDL
"cc: Establish care, headaches, elevated blood pressure readings    Subjective:     HPI  Dimitri Watkins is a 41 y.o. male presenting to establish care.  It has been over 5 years since he has been seen by primary care provider.  He manages outpatient therapy.    This past month he has been having increased frequency and headaches.  Previously did have migraines when he was younger, but has not like one for many years.  States that when the headaches onset they are either in the back of the head, or along bilateral temporal areas.  He does take Tylenol, which alleviates the headaches.  States he could probably hydrate little bit more adequately.  He does have a TENS machine, do cupping, and massage along the trapezius areas for tension.  Denies photophobia, phonophobia, nausea, vomiting, mental status changes, slurred speech, visual changes.    Is also had intermittent elevated blood pressure readings.  States he has checked his blood pressure few times at home 3-4 times, consistently in the low 140s systolic.  Blood pressures within normal range today.  Denies chest pain, palpitations.    Review of systems:  See above.     No current outpatient medications on file.    Allergies, past medical history, past surgical history, family history, social history reviewed and updated    Objective:     Vitals: /84 (BP Location: Left arm, Patient Position: Sitting, BP Cuff Size: Adult)   Pulse 86   Temp 36.9 °C (98.4 °F) (Temporal)   Resp 16   Ht 1.88 m (6' 2\")   Wt 93.9 kg (207 lb)   SpO2 98%   BMI 26.58 kg/m²   General: Alert, pleasant, NAD  HEENT: Normocephalic. Neck supple.  Moderate trapezius muscle spasm-nontender.  No thyromegaly or masses palpated. No cervical or supraclavicular lymphadenopathy. No carotid bruits   Heart: Regular rate and rhythm.  S1 and S2 normal.  No murmurs appreciated.  Respiratory: Normal respiratory effort.  Clear to auscultation bilaterally.  Neurological: No tremors, sensation " grossly intact, patellar and biceps reflexes 2+ symmetric, tone/strength normal, gait is normal, rapid movements normal, finger-to-nose intact, heel-knee-shin intact, CN2-12 intact, no pronator drift, romberg negative, no clonus   Skin: Warm, dry, no rashes.  Extremities: No leg edema.  Radial pulses 2+ symmetric  Psych:  Affect/mood is normal, judgement is good, memory is intact, grooming is appropriate.    Assessment/Plan:     Dimitri was seen today for establish care.    Diagnoses and all orders for this visit:    Episodic tension-type headache, not intractable  -Discussed the seem to be more tension related.  Can continue with Tylenol as needed.  Recommended referral to PT, states that he can have his physical therapist work on him at his office.  Discussed if at any point these are to increase more frequency change in intensity follow-up for reevaluation.  Recommended increase hydration.    Elevated blood-pressure reading, without diagnosis of hypertension  -Blood pressure is within normal limits today.  Advised to start to monitor blood pressure more frequently.  If it is consistently greater than 140 systolic follow-up sooner.  Otherwise we will review blood pressure log at next visit.    Blood tests for routine general physical examination  -     CBC WITH DIFFERENTIAL; Future  -     Comp Metabolic Panel; Future  -     Lipid Profile; Future  -     TSH WITH REFLEX TO FT4; Future    Establishing care with new doctor, encounter for    Family history of cancer  -Has family history of prostate cancer.  Would be interested in genetic research studies.  Referral placed.  -     Referral to Genetic Research Studies      Return in about 4 weeks (around 10/25/2022) for Annual PX, Lab Review.   Bladder non-tender and non-distended. Urine clear yellow.

## 2023-04-24 NOTE — PROGRESS NOTE ADULT - PROBLEM/PLAN-2
DEXA scan due end of August [phone]  
DISPLAY PLAN FREE TEXT

## 2023-06-20 NOTE — ED ADULT NURSE NOTE - CARDIO ASSESSMENT
WDL Cheiloplasty (Complex) Text: A decision was made to reconstruct the defect with a  cheiloplasty.  The defect was undermined extensively.  Additional obicularis oris muscle was excised with a 15 blade scalpel.  The defect was converted into a full thickness wedge to facilite a better cosmetic result.  Small vessels were then tied off with 5-0 monocyrl. The obicularis oris, superficial fascia, adipose and dermis were then reapproximated.  After the deeper layers were approximated the epidermis was reapproximated with particular care given to realign the vermilion border.

## 2023-08-07 NOTE — PROGRESS NOTE ADULT - SUBJECTIVE AND OBJECTIVE BOX
----- Message from Porsha Woodard DO sent at 8/4/2023  3:52 PM EDT -----  Recent admission to [facility].  Diagnosis hyponatremia. Requesting discharge today. Recommend repeat BMP next week. Follow-up will be based upon repeat laboratory studies. Thank you. S: no acute events overnight, pt seen and examined. s/p IR tube check yesterday. started on zosyn for + U/A.    O:  T(C): 37 (18 @ 05:59), Max: 37.8 (18 @ 20:19)  HR: 111 (18 @ 05:59) (99 - 117)  BP: 131/75 (18 @ 05:59) (129/70 - 137/73)  RR: 18 (18 @ 05:59) (17 - 19)  SpO2: 96% (18 @ 05:59) (96% - 99%)  Wt(kg): --   @ 07:01  -   @ 07:00  --------------------------------------------------------  IN:  Total IN: 0 mL    OUT:    Drain: 295 mL    Indwelling Catheter - Urethral: 1800 mL  Total OUT: 2095 mL    Total NET: -2095 mL      Gen: NAD, jaundiced  Chest: breathing comfortably  Abd: soft, moderately distended, incision c/d/i; collection stable    .  LABS:                         8.4    11.18 )-----------( 380      ( 2018 05:45 )             25.3     -    138  |  104  |  13  ----------------------------<  108<H>  4.1   |  23  |  0.34<L>    Ca    7.8<L>      2018 05:45  Phos  2.9       Mg     2.0         TPro  5.1<L>  /  Alb  1.8<L>  /  TBili  14.0<H>  /  DBili  11.6<H>  /  AST  31  /  ALT  27  /  AlkPhos  198<H>        Urinalysis Basic - ( 2018 02:15 )    Color: BROWN / Appearance: HAZY / S.014 / pH: 6.5  Gluc: NEGATIVE / Ketone: NEGATIVE  / Bili: LARGE / Urobili: NORMAL mg/dL   Blood: MODERATE / Protein: 30 mg/dL / Nitrite: NEGATIVE   Leuk Esterase: LARGE / RBC: >50 / WBC 10-25   Sq Epi: x / Non Sq Epi: x / Bacteria: MANY          Culture - Blood (collected 2018 23:00)  Source: BLOOD VENOUS  Preliminary Report (2018 23:01):    NO ORGANISMS ISOLATED    NO ORGANISMS ISOLATED AT 24 HOURS    Culture - Blood (collected 2018 23:00)  Source: BLOOD PERIPHERAL  Preliminary Report (2018 23:):    NO ORGANISMS ISOLATED    NO ORGANISMS ISOLATED AT 24 HOURS    Culture - Blood (collected 2018 19:59)  Source: BLOOD  Preliminary Report (2018 11:38):    ***Blood Panel PCR results on this specimen are available    approximately 3 hours after the Gram stain result***    Gram stain, PCR, and/or culture results may not always    correspond due to difference in methodologies    ------------------------------------------------------------    This PCR assay was performed using [employer].  The    following targets are tested for:  Enterococcus, vancomycin    resistant enterococci, Listeria monocytogenes,  coagulase    negative staphylococci, S. aureus, methicillin resistant S.    aureus, Streptococcus agalactiae (Group B), S. pneumoniae,    S. pyogenes (Group A), Acinetobacter baumannii, Enterobacter    cloacae, E. coli, Klebsiella oxytoca, K. pneumoniae, Proteus    sp., Serratia marcescens, Haemophilus influenzae, Neisseria    meningitidis, Pseudomonas aeruginosa, Candida albicans, C.    glabrata, C. krusei, C. parapsilosis, C. tropicalis and the    KPC resistance gene.    **NOTE: Due to technical problems, Proteus sp. will NOT be    reported as part of the BCID paneluntil further notice.  Preliminary Report (2018 19:59):    NO ORGANISMS ISOLATED    NO ORGANISMS ISOLATED AT 24 HOURS  Organism: BLOOD CULTURE PCR (2018 11:38)  Organism: BLOOD CULTURE PCR (2018 11:38)        RADIOLOGY, EKG & ADDITIONAL TESTS: Reviewed.

## 2023-12-07 NOTE — PROGRESS NOTE ADULT - SUBJECTIVE AND OBJECTIVE BOX
Nerve Block    Date/Time: 12/7/2023 1:25 PM    Performed by: Silas Esparza MD  Authorized by: Silas Esparza MD    Block Type: transverse abdominis plane  Laterality:  Bilateral  Patient Location:  OR  Indication: post-op pain management at surgeon's request    Preanesthetic Checklist Patient Identified (2 criteria), Block Plan Confirmed, Resuscitation Equipment Available, Supplemental O2 (if needed), Allergies Confirmed, Block Site Marked (if applicable), Monitors Applied, Aseptic Technique, Coagulation Status, Necessary Block Equipment Present, Timeout Performed, IV Access Functioning, Consent Verified, Drugs/Solutions Labeled and Sedation Given (if needed)    Patient Position:  Supine  Prep:  Chlorhexidine gluconate (CHG)  Max Sterile Barrier Technique:  Hand washing, cap/mask, sterile gel and sterile gloves  Monitoring:  Blood pressure, continuous capnography, continuous pulse oximetry, EKG and heart rate  Injection Technique:  Single-shot  Procedures: ultrasound guided    Physical status during block:   Medications Administered  ropivacaine (NAROPIN) 0.2 % - Infiltration   20 mL - 12/7/2023 1:28:00 PM   20 mL - 12/7/2023 1:30:00 PM  Injection Assessment:  Negative aspiration for heme  Patient Condition:  Tolerated well, no immediate complications  Heart Rate Change: No    Slowly Injected: Yes    Start Time:12/7/2023 1:25 PM  Stop Time: 12/7/2023 1:32 PM       GENERAL SURGERY DAILY PROGRESS NOTE:       Subjective: complaning of abd pain around drain and superior wound being red       Objective: NAD AAOx3  IR drain with milky brown drainage, superior wound indurated with erythema - 2 staples removed and able to drain about 20cc - wound packed and clean dressing placed       MEDICATIONS  (STANDING):  enoxaparin Injectable 40 milliGRAM(s) SubCutaneous daily  ciprofloxacin   IVPB   IV Intermittent   ciprofloxacin   IVPB 400 milliGRAM(s) IV Intermittent every 12 hours  pantoprazole    Tablet 40 milliGRAM(s) Oral before breakfast    MEDICATIONS  (PRN):  acetaminophen   Tablet. 650 milliGRAM(s) Oral every 6 hours PRN Mild Pain (1 - 3)  oxyCODONE    IR 5 milliGRAM(s) Oral every 4 hours PRN Moderate Pain (4 - 6)      Vital Signs Last 24 Hrs  T(C): 36.8 (13 Jul 2017 06:48), Max: 37.7 (12 Jul 2017 17:38)  T(F): 98.3 (13 Jul 2017 06:48), Max: 99.8 (12 Jul 2017 17:38)  HR: 80 (13 Jul 2017 06:48) (72 - 94)  BP: 94/60 (13 Jul 2017 06:48) (93/61 - 113/72)  BP(mean): --  RR: 18 (13 Jul 2017 06:48) (17 - 18)  SpO2: 98% (13 Jul 2017 06:48) (96% - 98%)    I&O's Detail    12 Jul 2017 07:01  -  13 Jul 2017 07:00  --------------------------------------------------------  IN:    Oral Fluid: 600 mL    Solution: 600 mL    Solution: 100 mL    Solution: 200 mL    Solution: 100 mL    Solution: 200 mL  Total IN: 1800 mL    OUT:    Bulb: 70 mL    Voided: 1250 mL  Total OUT: 1320 mL    Total NET: 480 mL          Daily     Daily     LABS:                        8.4    7.2   )-----------( 576      ( 13 Jul 2017 03:40 )             25.5     07-13    137  |  105  |  3<L>  ----------------------------<  136<H>  3.5   |  20<L>  |  0.41<L>    Ca    8.4      13 Jul 2017 03:40  Phos  3.0     07-13  Mg     1.9     07-13      PT/INR - ( 12 Jul 2017 09:52 )   PT: 13.0 sec;   INR: 1.19 ratio         PTT - ( 12 Jul 2017 09:52 )  PTT:24.6 sec

## 2024-04-24 NOTE — PROGRESS NOTE ADULT - ASSESSMENT
58F s/p ERCP EUS aborted 2/2 small bowel perforation, s/p ex lap resection of small bowel side to side anastamosis. currently hemodynamically stable on the floor    - Plan for PICC with IR today  - Will begin TPN after PICC is in place  - Pain control  - Continue PTC, monitor output  - DVT ppx Unknown

## 2024-04-26 NOTE — CHART NOTE - NSCHARTNOTESELECT_GEN_ALL_CORE
"      Nephrology Progress Note      Nephrology following for ESRD.   Events over night:   Patient seen at the end of dialysis today.  She tolerated 1 L UF.  She is going to the OR for cholecystectomy today    /59   Pulse 73   Temp 37.2 °C (99 °F)   Resp 12   Ht 1.549 m (5' 1\")   Wt 68 kg (150 lb)   SpO2 96%   BMI 28.34 kg/m²     Input / Output:  24 HR:   Intake/Output Summary (Last 24 hours) at 4/26/2024 1227  Last data filed at 4/26/2024 1200  Gross per 24 hour   Intake 860 ml   Output 1000 ml   Net -140 ml       Physical Exam   Alert and oriented x 3 NAD  Neck: no JVD  CV: RRR  Lungs: CTA bilaterally  Abd: soft, NT, ND   Ext: no lower extremity edema    Scheduled medications  [Transfer Hold] allopurinol, 100 mg, oral, Daily  [Transfer Hold] amLODIPine, 5 mg, oral, Daily  [Transfer Hold] aspirin, 81 mg, oral, Daily  [Transfer Hold] cholecalciferol, 2,000 Units, oral, Daily  [Transfer Hold] ciprofloxacin, 400 mg, intravenous, q24h  [Transfer Hold] cyanocobalamin, 1,000 mcg, oral, Daily  [Transfer Hold] heparin (porcine), 5,000 Units, subcutaneous, q12h  [Transfer Hold] insulin lispro, 0-5 Units, subcutaneous, TID with meals  [Transfer Hold] metoprolol tartrate, 25 mg, oral, BID  [Transfer Hold] metroNIDAZOLE, 500 mg, intravenous, q8h  [Transfer Hold] neomycin-bacitracnZn-polymyxnB, , Topical, Daily  [Transfer Hold] pravastatin, 40 mg, oral, Nightly  [Transfer Hold] pregabalin, 75 mg, oral, BID  [Transfer Hold] sevelamer carbonate, 800 mg, oral, TID with meals      Continuous medications  sodium chloride 0.9%, 50 mL/hr, Last Rate: 50 mL/hr (04/26/24 1214)      PRN medications  PRN medications: [Transfer Hold] acetaminophen, [Transfer Hold] dextrose, [Transfer Hold] dextrose, [Transfer Hold] glucagon, [Transfer Hold] glucagon, [Transfer Hold] HYDROmorphone, [Transfer Hold] ipratropium-albuteroL, [Transfer Hold] melatonin, [Transfer Hold] ondansetron **OR** [Transfer Hold] ondansetron, [Transfer Hold] " Follow-Up Note/Nutrition Services polyethylene glycol   Results from last 7 days   Lab Units 04/26/24  0343   SODIUM mmol/L 137   POTASSIUM mmol/L 3.6   CHLORIDE mmol/L 104   CO2 mmol/L 26   BUN mg/dL 18   CREATININE mg/dL 1.93*   CALCIUM mg/dL 9.0   PROTEIN TOTAL g/dL 5.4*   BILIRUBIN TOTAL mg/dL 0.6   ALK PHOS U/L 95   ALT U/L 12   AST U/L 14   GLUCOSE mg/dL 96      Results from last 7 days   Lab Units 04/26/24  0343 04/22/24  1601   MAGNESIUM mg/dL 1.79 1.81      Results from last 7 days   Lab Units 04/26/24  0343 04/25/24  0352 04/24/24  0454   WBC AUTO x10*3/uL 5.0 6.9 6.8   HEMOGLOBIN g/dL 7.1* 7.3* 7.7*   HEMATOCRIT % 22.2* 23.0* 22.0*   PLATELETS AUTO x10*3/uL 193 201 209        Assessment & Plan:     Patient is 79 y.o. female who is admitted to hospital for fall with acute blood loss anemia after scalp laceration. Nephrology consulted in view of ESRD.      ESRD- HD [facility]         Anemia acute on chronic with hx of MDS-   Has been getting Micera and iron at her HD unit. 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     Pinky Christie DO  4/26/2024  12:27 PM     [facility]    [address], [address]   [place] [address]  Office: [phone]

## 2024-05-24 NOTE — ED ADULT NURSE NOTE - NSSISCREENINGQ5_ED_A_ED
Noted, [facility] is appropriate for these symptoms.    Patient calling for appointment. Today with Dr. Gema Hernandez for sinus/ear inf./pressure. I did recommend [facility]/IC    FYI    Yes No
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
## 2024-12-09 NOTE — DIETITIAN INITIAL EVALUATION ADULT. - ETIOLOGY
Preceptor Attestation:    I discussed the patient with the resident and evaluated the patient in person. I have verified the content of the note, which accurately reflects my assessment of the patient and the plan of care.   Supervising Physician:  Blake William MD.   altered GI function and hx of inadequate protein-energy intake

## 2024-12-24 NOTE — BRIEF OPERATIVE NOTE - POST-OP DX
HPI:    Patient ID: Christine Francis is a 54 year old female.  Lives with daughter  Patient is not sexually active.  HPI Physical Exam  54 year old female is here for annual physical exam   She is here to discuss health maintenance issues.   She is feeling much better since she lost weight.    Wt Readings from Last 6 Encounters:   24 222 lb 9.6 oz (101 kg)   02/15/24 233 lb (105.7 kg)   23 239 lb (108.4 kg)   23 247 lb (112 kg)   23 251 lb (113.9 kg)   23 245 lb (111.1 kg)        HTN  Vitals:    24 0718   BP: 127/79   Pulse: 79    She states she had been taking amlodipine 10 mg po daily.  This keeps her B/P in good control.    Exercise- Walks more then usually    Allergies  Benadryl- She had been taking benadryl    Hypercalcinemia  Patient had never mentioned that she had been taking TUMs daily for GERD symptoms.    Mammogram ordered    Colonoscopy  Dr Dean note states return in 10 years.()  There was an external note that said return in 3 years.-   Patient will follow up with Dr. Sanabria regarding her Liver and when she needs to have a repeat colonoscopy.    GERD Symptoms  She mentioned for the first time that she has been taking TUMS excessively for years.    Immunization History   Administered Date(s) Administered    Covid-19 Vaccine Moderna 100 mcg/0.5 ml 2021, 2021, 2021, 2022    Covid-19 Vaccine Moderna 50 Mcg/0.25 Ml 2022    Influenza 2017, 2018    Influenza Vaccine, trivalent (IIV3), PF 0.5mL (40832) 2024    TDAP 2020       Past Medical History:    Essential hypertension    Fibroids    surgery      Past Surgical History:   Procedure Laterality Date          Colonoscopy N/A 2021    Procedure: COLONOSCOPY;  Surgeon: Kelechi Sanabria MD;  [facility] END[facility]COPY    Other surgical history  2015    Root Canal Extraction      Social History     Socioeconomic History    Marital  status: Legally    Tobacco Use    Smoking status: Never    Smokeless tobacco: Never   Vaping Use    Vaping status: Never Used   Substance and Sexual Activity    Alcohol use: Yes     Comment: very rare     Drug use: No    Sexual activity: Never     Partners: Male   Other Topics Concern    Caffeine Concern Yes     Comment: coffee, 2 cups/day          Review of Systems   Constitutional:  Negative for chills, fatigue and fever.   HENT:  Negative for congestion, ear pain, hearing loss, sinus pressure, sinus pain, sore throat, trouble swallowing and voice change.    Eyes:  Negative for pain and visual disturbance.   Respiratory:  Negative for cough, chest tightness and shortness of breath.    Cardiovascular:  Negative for chest pain, palpitations and leg swelling.   Gastrointestinal:  Positive for constipation. Negative for abdominal pain, diarrhea, nausea and vomiting.   Endocrine: Negative for cold intolerance and heat intolerance.   Genitourinary:  Negative for dysuria and hematuria.   Musculoskeletal:  Negative for back pain and joint swelling.   Skin:  Positive for rash.   Allergic/Immunologic: Negative for environmental allergies and food allergies.   Neurological:  Negative for weakness, numbness and headaches.   Hematological:  Does not bruise/bleed easily.   Psychiatric/Behavioral:  Negative for dysphoric mood and sleep disturbance. The patient is nervous/anxious.               Current Outpatient Medications   Medication Sig Dispense Refill    amLODIPine 10 MG Oral Tab Take 1 tablet (10 mg total) by mouth daily. 90 tablet 0    mupirocin 2 % External Ointment Apply 1 Application topically 3 (three) times daily. 15 g 1    omeprazole 20 MG Oral Capsule Delayed Release Take 1 capsule (20 mg total) by mouth every morning. 60 capsule 5    clotrimazole-betamethasone 1-0.05 % External Cream Apply 1 Application  topically 2 (two) times daily as needed. 45 g 3    Vitamin D3 50 MCG (2000 UT) Oral Cap Take 1 capsule  (2,000 Units total) by mouth daily.      predniSONE 20 MG Oral Tab 2 tabs daily for 4 days. (Patient not taking: Reported on 12/24/2024) 8 tablet 0    benzonatate 200 MG Oral Cap Take 1 capsule (200 mg total) by mouth 3 (three) times daily as needed for cough. (Patient not taking: Reported on 12/24/2024) 30 capsule 1     Allergies:Allergies[1]   PHYSICAL EXAM:   Physical Exam  Constitutional:       Appearance: Normal appearance. She is well-developed.   HENT:      Head: Normocephalic.      Right Ear: Tympanic membrane normal.      Left Ear: Tympanic membrane normal.      Nose: Nose normal.      Mouth/Throat:      Mouth: Mucous membranes are moist.      Pharynx: No oropharyngeal exudate or posterior oropharyngeal erythema.   Eyes:      General:         Right eye: No discharge.         Left eye: No discharge.      Pupils: Pupils are equal, round, and reactive to light.   Cardiovascular:      Rate and Rhythm: Normal rate and regular rhythm.      Heart sounds: Normal heart sounds. No murmur heard.     No friction rub. No gallop.   Pulmonary:      Effort: Pulmonary effort is normal. No respiratory distress.      Breath sounds: Normal breath sounds. No wheezing, rhonchi or rales.   Abdominal:      General: Bowel sounds are normal. There is no distension.      Palpations: Abdomen is soft. There is no mass.      Tenderness: There is no abdominal tenderness. There is no right CVA tenderness, left CVA tenderness or guarding.   Genitourinary:     General: Normal vulva.      Vagina: No vaginal discharge.      Comments: Pelvic Exam:  External Genitalia: normal appearance, hair distribution, and no lesions  Urethral Meatus:  normal in size, location, without lesions and prolapse  Bladder:  No fullness, masses or tenderness  Vagina:  Normal appearance without lesions  Cervix:  Normal without tenderness on motion  Uterus: normal in size, contour, position, mobility, without tenderness  Adnexa: normal without masses or  tenderness  Perineum: normal      Musculoskeletal:         General: No tenderness.      Cervical back: Normal range of motion and neck supple. No tenderness.      Right lower leg: No edema.      Left lower leg: No edema.   Lymphadenopathy:      Cervical: No cervical adenopathy.   Skin:     General: Skin is warm and dry.      Findings: No rash.   Neurological:      Mental Status: She is alert and oriented to person, place, and time.      Coordination: Coordination normal.      Gait: Gait normal.   Psychiatric:         Mood and Affect: Mood normal.         Behavior: Behavior normal.         Thought Content: Thought content normal.         Judgment: Judgment normal.       /79 (BP Location: Right arm, Patient Position: Sitting, Cuff Size: large)   Pulse 79   Ht 5' 4\" (1.626 m)   Wt 222 lb 9.6 oz (101 kg)   BMI 38.21 kg/m²   Wt Readings from Last 2 Encounters:   12/24/24 222 lb 9.6 oz (101 kg)   02/15/24 233 lb (105.7 kg)     Body mass index is 38.21 kg/m².(2)  Lab Results   Component Value Date    WBC 10.1 01/09/2023    RBC 4.35 01/09/2023    HGB 11.7 (L) 01/09/2023    HCT 38.0 01/09/2023    MCV 87.4 01/09/2023    MCH 26.9 01/09/2023    MCHC 30.8 (L) 01/09/2023    RDW 13.4 01/09/2023    .0 01/09/2023    MPV 8.6 01/09/2019      Lab Results   Component Value Date    GLU 95 01/09/2023    BUN 11 01/09/2023    BUNCREA 7.9 (L) 01/09/2023    CREATSERUM 1.39 (H) 01/09/2023    ANIONGAP 4 01/09/2023    GFRNAA 66 11/24/2021    GFRAA 76 11/24/2021    CA 10.3 (H) 01/09/2023    OSMOCALC 289 01/09/2023    ALKPHO 125 (H) 01/09/2023    AST 15 01/09/2023    ALT 16 01/09/2023    ALKPHOS 120 (H) 02/29/2016    BILT 0.4 01/09/2023    TP 9.1 (H) 01/09/2023    ALB 3.4 01/09/2023    GLOBULIN 5.7 (H) 01/09/2023    AGRATIO 0.8 (L) 09/21/2015     01/09/2023    K 4.5 01/09/2023     01/09/2023    CO2 30.0 01/09/2023      Lab Results   Component Value Date     01/09/2023    A1C 5.7 (H) 01/09/2023      Lab Results    Component Value Date    CHOLEST 203 (H) 01/09/2023    TRIG 60 01/09/2023    HDL 41 01/09/2023     (H) 01/09/2023    VLDL 11 01/09/2023    NONHDLC 162 (H) 01/09/2023    CALCNONHDL 135 (H) 09/21/2015      Lab Results   Component Value Date    TSH 3.680 01/09/2023                ASSESSMENT/PLAN:     Problem List Items Addressed This Visit       Annual physical exam     Normal exam.  Labs as ordered.  Skin check normal.  No significant abnormal nevi.  Breast exam completed-no palpable abnormalities, discharge from the nipples or axillary adenopathy.  No cervical or inguinal lymphadenopathy.  Hernial orifices intact.  Pelvic exam completed-no cervical movement tenderness, adnexal palpable abnormalities.  No cystocele, rectocele or uterine descent.  Pap DUE  in 2025  Colonoscopy- Due in 2024 or other note states 2031. She will follow up with Dr. Dean    Mammogram ordered            Relevant Orders    Comp Metabolic Panel (14)    Lipid Panel    Vitamin D, 25-Hydroxy    Vitamin B12    TSH W Reflex To Free T4    Urinalysis, Routine    CBC, NO DIFFERENTIAL/PLATELET    Serum Protein Electrophoresis    Essential hypertension     Patient states that Amlodipine 10mg regulates her blood pressure better  She is not dizzy anymore    amLODIPine 10 MG Oral Tab          Take 1 tablet (10 mg total) by mouth daily., Normal, Disp-90 tablet, R-0              Relevant Medications    amLODIPine 10 MG Oral Tab    Obesity     Working hard with healthy eating and increased walking    Wt Readings from Last 6 Encounters:   12/24/24 222 lb 9.6 oz (101 kg)   02/15/24 233 lb (105.7 kg)   11/20/23 239 lb (108.4 kg)   09/11/23 247 lb (112 kg)   04/17/23 251 lb (113.9 kg)   04/14/23 245 lb (111.1 kg)              Other Visit Diagnoses       Screening for colon cancer    -  Primary    Relevant Medications    omeprazole 20 MG Oral Capsule Delayed Release    Other Relevant Orders    GASTRO - INTERNAL    Screening for skin cancer        Relevant  Medications    mupirocin 2 % External Ointment    Other Relevant Orders    Derm Referral - In Network    Encounter for screening mammogram for malignant neoplasm of breast        Relevant Orders    [facility] MANUEL 2D+3D SCREENING BILAT (CPT=77067/06036)    Allergy, initial encounter        Relevant Orders    Adult Food Allergy Prof [E]    Vaginal sore        Relevant Medications    amLODIPine 10 MG Oral Tab    mupirocin 2 % External Ointment    Gastroesophageal reflux disease without esophagitis        Relevant Medications    amLODIPine 10 MG Oral Tab    omeprazole 20 MG Oral Capsule Delayed Release    Other Relevant Orders    GASTRO - INTERNAL               Orders Placed This Encounter   Procedures    Comp Metabolic Panel (14)    Lipid Panel    Vitamin D, 25-Hydroxy    Vitamin B12    TSH W Reflex To Free T4    Urinalysis, Routine    CBC, NO DIFFERENTIAL/PLATELET    Serum Protein Electrophoresis    Adult Food Allergy Prof [E]    Fluzone trivalent vaccine, PF 0.5mL, 6mo+ (81130)       Meds This Visit:  Requested Prescriptions     Signed Prescriptions Disp Refills    amLODIPine 10 MG Oral Tab 90 tablet 0     Sig: Take 1 tablet (10 mg total) by mouth daily.    mupirocin 2 % External Ointment 15 g 1     Sig: Apply 1 Application topically 3 (three) times daily.    omeprazole 20 MG Oral Capsule Delayed Release 60 capsule 5     Sig: Take 1 capsule (20 mg total) by mouth every morning.       Imaging & Referrals:  INFLUENZA VACCINE, TRI, PRESERV FREE, 0.5 ML  GASTRO - INTERNAL  DERM - INTERNAL  [facility] MANUEL 2D+3D SCREENING BILAT (CPT=77067/04838)       Relevant medications automatically are pulled and are not relevant.on this note.  Computer glitch  XIAO Mendiola          [1] No Known Allergies     Gastric cancer  06/23/2017    Active  [place]